# Patient Record
Sex: MALE | Race: WHITE | Employment: UNEMPLOYED | URBAN - METROPOLITAN AREA
[De-identification: names, ages, dates, MRNs, and addresses within clinical notes are randomized per-mention and may not be internally consistent; named-entity substitution may affect disease eponyms.]

---

## 2017-01-04 ENCOUNTER — TELEPHONE (OUTPATIENT)
Dept: NURSING | Facility: CLINIC | Age: 47
End: 2017-01-04

## 2017-01-04 NOTE — TELEPHONE ENCOUNTER
Call Type: Triage Call    Presenting Problem: i am still waiting faor a call back if I can come  in and get my blood tests done.;; Review of EPIC reveals orders are  present for requested test; Transferred to scheduling to schedule  lab appointment.  Triage Note:  Guideline Title: Information Only Call; No Symptom Triage (Adult)  Recommended Disposition: Provide Information or Advice Only  Original Inclination: Would have called clinic  Override Disposition:  Intended Action: See /Timothy Appt  Physician Contacted: No  Requesting information regarding scheduled exam, test or procedure; no triage  required. Information provided from approved resources or clinical experience. ?  YES  Requesting regular office appointment ? NO  Sign(s) or symptom(s) associated with a diagnosed condition or with a new illness  ? NO  Requesting information about provider, services or community resources ? NO  Call back to complete assessment/clarification of information from prior caller to  complete triage ? NO  Requesting information and provider is best resource; no triage required. ? NO  Caller not with patient and is unable to provide clinical information about  patient to facilitate triage. ? NO  Requesting provider information for recently scheduled test, procedure; no triage  required. Needed information not available per approved resources or clinical  experience. ? NO  Requesting information not available per approved reference or clinical  experience; no triage required. ? NO  Physician Instructions:  Care Advice:

## 2017-01-05 ENCOUNTER — TELEPHONE (OUTPATIENT)
Dept: FAMILY MEDICINE | Facility: CLINIC | Age: 47
End: 2017-01-05

## 2017-01-06 ENCOUNTER — DOCUMENTATION ONLY (OUTPATIENT)
Dept: LAB | Facility: CLINIC | Age: 47
End: 2017-01-06

## 2017-01-06 DIAGNOSIS — Z12.5 SPECIAL SCREENING FOR MALIGNANT NEOPLASM OF PROSTATE: ICD-10-CM

## 2017-01-06 DIAGNOSIS — I10 HYPERTENSION GOAL BP (BLOOD PRESSURE) < 140/90: ICD-10-CM

## 2017-01-06 DIAGNOSIS — I10 HYPERTENSION GOAL BP (BLOOD PRESSURE) < 140/90: Primary | ICD-10-CM

## 2017-01-06 LAB — PSA SERPL-ACNC: 0.42 UG/L (ref 0–4)

## 2017-01-06 PROCEDURE — 84403 ASSAY OF TOTAL TESTOSTERONE: CPT | Mod: 90 | Performed by: FAMILY MEDICINE

## 2017-01-06 PROCEDURE — 36415 COLL VENOUS BLD VENIPUNCTURE: CPT | Performed by: FAMILY MEDICINE

## 2017-01-06 PROCEDURE — G0103 PSA SCREENING: HCPCS | Performed by: FAMILY MEDICINE

## 2017-01-06 PROCEDURE — 84270 ASSAY OF SEX HORMONE GLOBUL: CPT | Mod: 90 | Performed by: FAMILY MEDICINE

## 2017-01-06 PROCEDURE — 80076 HEPATIC FUNCTION PANEL: CPT | Performed by: PHYSICIAN ASSISTANT

## 2017-01-06 PROCEDURE — 99000 SPECIMEN HANDLING OFFICE-LAB: CPT | Performed by: FAMILY MEDICINE

## 2017-01-06 NOTE — PROGRESS NOTES
This patient came in for lab work today at the [facility]. He was requesting to have his liver function tested as well. Please place appropriate lab orders. We have the blood that is needed for testing. Thank you!  -[facility]

## 2017-01-08 LAB
SHBG SERPL-SCNC: 18 NMOL/L (ref 11–80)
TESTOST FREE SERPL-MCNC: 0.26 NG/DL (ref 4.7–24.4)
TESTOST SERPL-MCNC: 11 NG/DL (ref 240–950)

## 2017-01-09 LAB
ALBUMIN SERPL-MCNC: 3.6 G/DL (ref 3.4–5)
ALP SERPL-CCNC: 111 U/L (ref 40–150)
ALT SERPL W P-5'-P-CCNC: 129 U/L (ref 0–70)
AST SERPL W P-5'-P-CCNC: 56 U/L (ref 0–45)
BILIRUB DIRECT SERPL-MCNC: 0.1 MG/DL (ref 0–0.2)
BILIRUB SERPL-MCNC: 0.5 MG/DL (ref 0.2–1.3)
PROT SERPL-MCNC: 6.9 G/DL (ref 6.8–8.8)

## 2017-01-09 NOTE — PROGRESS NOTES
Quick Note:    Please call pt with results below:     Awaiting liver functions, but PSA level = normal and testosterone levels = quite low - recommend that pt discuss possible supplementation with Dr. Segovia.  ______

## 2017-01-09 NOTE — PROGRESS NOTES
Quick Note:    Jeremi  Here are your recent results. Your liver function is elevated and you should make an office visit to discuss this elevation and possible causes. If you have any questions please do not hesitate to contact our office via phone ([phone]) or MyChart.    Rosalia Calvo, MS, PA-C  [facility] [place]    ______

## 2017-01-10 ENCOUNTER — TELEPHONE (OUTPATIENT)
Dept: FAMILY MEDICINE | Facility: CLINIC | Age: 47
End: 2017-01-10

## 2017-01-10 NOTE — TELEPHONE ENCOUNTER
Tuesday the 17th is the earliest but put him at  The 2:00 Pm or he can see someone else. Maria G James CMA

## 2017-01-10 NOTE — TELEPHONE ENCOUNTER
Reason for Call:  Same Day Appointment, Requested Provider:  Luis Armando Segovia MD    PCP: Luis Armando Segovia    Reason for visit: Pt asking to be seen this week to go over his lab results.  He is a bit concerned about some of them and wants to talk to MD about it.    Duration of symptoms:     Have you been treated for this in the past? Yes    Additional comments:     Can we leave a detailed message on this number? YES    Phone number patient can be reached at: Cell number on file:    Telephone Information:   Mobile [phone]       Best Time:     Call taken on 1/10/2017 at 3:21 PM by Myrna Shay

## 2017-01-11 ENCOUNTER — OFFICE VISIT (OUTPATIENT)
Dept: FAMILY MEDICINE | Facility: CLINIC | Age: 47
End: 2017-01-11
Payer: COMMERCIAL

## 2017-01-11 ENCOUNTER — TELEPHONE (OUTPATIENT)
Dept: FAMILY MEDICINE | Facility: CLINIC | Age: 47
End: 2017-01-11

## 2017-01-11 VITALS
OXYGEN SATURATION: 96 % | WEIGHT: 239 LBS | HEIGHT: 72 IN | SYSTOLIC BLOOD PRESSURE: 122 MMHG | BODY MASS INDEX: 32.37 KG/M2 | TEMPERATURE: 98.2 F | DIASTOLIC BLOOD PRESSURE: 84 MMHG | HEART RATE: 102 BPM

## 2017-01-11 DIAGNOSIS — R80.9 MICROALBUMINURIA: ICD-10-CM

## 2017-01-11 DIAGNOSIS — R79.89 ELEVATED LFTS: ICD-10-CM

## 2017-01-11 DIAGNOSIS — R79.89 LOW TESTOSTERONE LEVEL IN MALE: Primary | ICD-10-CM

## 2017-01-11 DIAGNOSIS — N18.30 CKD (CHRONIC KIDNEY DISEASE) STAGE 3, GFR 30-59 ML/MIN (H): ICD-10-CM

## 2017-01-11 DIAGNOSIS — J45.20 INTERMITTENT ASTHMA, UNCOMPLICATED: ICD-10-CM

## 2017-01-11 PROCEDURE — 99215 OFFICE O/P EST HI 40 MIN: CPT | Performed by: PHYSICIAN ASSISTANT

## 2017-01-11 PROCEDURE — 82043 UR ALBUMIN QUANTITATIVE: CPT | Performed by: PHYSICIAN ASSISTANT

## 2017-01-11 RX ORDER — ALBUTEROL SULFATE 90 UG/1
AEROSOL, METERED RESPIRATORY (INHALATION)
COMMUNITY
Start: 1982-10-20 | End: 2017-01-11

## 2017-01-11 RX ORDER — AMITRIPTYLINE HYDROCHLORIDE 50 MG/1
TABLET ORAL
COMMUNITY
Start: 2008-02-29 | End: 2017-06-08

## 2017-01-11 RX ORDER — LORAZEPAM 1 MG/1
1 TABLET ORAL
COMMUNITY
End: 2017-01-27
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
## 2017-01-11 NOTE — TELEPHONE ENCOUNTER
Called & offered time below. Pt declined.  He is scheduled tomorrow with Rosalia Calvo.  Tamiko Richards, Clinic Receptionist

## 2017-01-11 NOTE — TELEPHONE ENCOUNTER
Clinic Action Needed:Yes  Reason for Call: Jeremi called stating he saw Rosalia FARAH and they discussed having his labs redrawn as she was under the impression that they were not fasting labs.  He called stating he is confident they were fasting as they were done between 8-10 AM and he would like to proceed with treatment rather than incur the delay in and expense in treatment.   Patient Recommendations/Teaching:Referred to clinic - routine visit  Teaching per [facility] guidelines.  This message is being routed to both his primary Dr. Segovia and to Rosalia FARAH at [facility] specific request.  He stated he has an appt with Dr. Segovia next week.   Routed to: Rosalia Anaya PA-C

## 2017-01-11 NOTE — NURSING NOTE
Chief Complaint   Patient presents with     Results     Review lab results from 1/6       Initial /84 mmHg  Pulse 102  Temp(Src) 98.2  F (36.8  C) (Tympanic)  Ht 6' (1.829 m)  Wt 239 lb (108.41 kg)  BMI 32.41 kg/m2  SpO2 96% Estimated body mass index is 32.41 kg/(m^2) as calculated from the following:    Height as of this encounter: 6' (1.829 m).    Weight as of this encounter: 239 lb (108.41 kg).  BP completed using cuff size: jose Miranda CMA

## 2017-01-12 DIAGNOSIS — Z53.9 ERRONEOUS ENCOUNTER--DISREGARD: Primary | ICD-10-CM

## 2017-01-12 LAB
CREAT UR-MCNC: 99 MG/DL
MICROALBUMIN UR-MCNC: 192 MG/L
MICROALBUMIN/CREAT UR: 193.35 MG/G CR (ref 0–17)

## 2017-01-12 NOTE — TELEPHONE ENCOUNTER
I was not questioning whether or not the labs were fasting.  Due to the dramatic change in testosterone when compared to 2015 results (as most testosterone levels are not this low unless the patient does not have testes) a repeat lab is needed to ensure it is accurate and not simply a lab error.  We also need to ensure his liver function has returned normal if replacement is needed.      Rosalia Calvo MS, PA-C

## 2017-01-12 NOTE — TELEPHONE ENCOUNTER
Called pt left VM that Rosalia wants labs rechecked due to dramatic change.  Checked with lab no need to be fasting.    Gemma Reeves RN    [facility]

## 2017-01-13 DIAGNOSIS — R79.89 ELEVATED LFTS: ICD-10-CM

## 2017-01-13 DIAGNOSIS — N18.30 CKD (CHRONIC KIDNEY DISEASE) STAGE 3, GFR 30-59 ML/MIN (H): ICD-10-CM

## 2017-01-13 DIAGNOSIS — R79.89 LOW TESTOSTERONE LEVEL IN MALE: ICD-10-CM

## 2017-01-13 LAB
ALBUMIN SERPL-MCNC: 3.7 G/DL (ref 3.4–5)
ALP SERPL-CCNC: 104 U/L (ref 40–150)
ALT SERPL W P-5'-P-CCNC: 83 U/L (ref 0–70)
ANION GAP SERPL CALCULATED.3IONS-SCNC: 6 MMOL/L (ref 3–14)
AST SERPL W P-5'-P-CCNC: 36 U/L (ref 0–45)
BILIRUB DIRECT SERPL-MCNC: 0.2 MG/DL (ref 0–0.2)
BILIRUB SERPL-MCNC: 0.6 MG/DL (ref 0.2–1.3)
BUN SERPL-MCNC: 14 MG/DL (ref 7–30)
CALCIUM SERPL-MCNC: 9.1 MG/DL (ref 8.5–10.1)
CHLORIDE SERPL-SCNC: 103 MMOL/L (ref 94–109)
CO2 SERPL-SCNC: 30 MMOL/L (ref 20–32)
CREAT SERPL-MCNC: 1.29 MG/DL (ref 0.66–1.25)
FERRITIN SERPL-MCNC: 226 NG/ML (ref 26–388)
FSH SERPL-ACNC: 3.3 IU/L (ref 0.7–10.8)
GFR SERPL CREATININE-BSD FRML MDRD: 59 ML/MIN/1.7M2
GLUCOSE SERPL-MCNC: 92 MG/DL (ref 70–99)
HBV CORE AB SERPL QL IA: NONREACTIVE
HBV SURFACE AB SERPL IA-ACNC: 0.59 M[IU]/ML
HBV SURFACE AG SERPL QL IA: NONREACTIVE
HCV AB SERPL QL IA: NORMAL
HIV 1+2 AB+HIV1 P24 AG SERPL QL IA: NORMAL
LH SERPL-ACNC: 1.7 IU/L (ref 1.5–9.3)
POTASSIUM SERPL-SCNC: 4.1 MMOL/L (ref 3.4–5.3)
PROLACTIN SERPL-MCNC: 14 UG/L (ref 2–18)
PROT SERPL-MCNC: 6.9 G/DL (ref 6.8–8.8)
SODIUM SERPL-SCNC: 139 MMOL/L (ref 133–144)
T3FREE SERPL-MCNC: 3.2 PG/ML (ref 2.3–4.2)
T4 FREE SERPL-MCNC: 0.8 NG/DL (ref 0.76–1.46)
TSH SERPL DL<=0.05 MIU/L-ACNC: 3.5 MU/L (ref 0.4–4)

## 2017-01-13 PROCEDURE — 84439 ASSAY OF FREE THYROXINE: CPT | Performed by: PHYSICIAN ASSISTANT

## 2017-01-13 PROCEDURE — 86706 HEP B SURFACE ANTIBODY: CPT | Performed by: PHYSICIAN ASSISTANT

## 2017-01-13 PROCEDURE — 87340 HEPATITIS B SURFACE AG IA: CPT | Performed by: PHYSICIAN ASSISTANT

## 2017-01-13 PROCEDURE — 84481 FREE ASSAY (FT-3): CPT | Performed by: PHYSICIAN ASSISTANT

## 2017-01-13 PROCEDURE — 84403 ASSAY OF TOTAL TESTOSTERONE: CPT | Performed by: PHYSICIAN ASSISTANT

## 2017-01-13 PROCEDURE — 36415 COLL VENOUS BLD VENIPUNCTURE: CPT | Performed by: PHYSICIAN ASSISTANT

## 2017-01-13 PROCEDURE — 82672 ASSAY OF ESTROGEN: CPT | Mod: 90 | Performed by: PHYSICIAN ASSISTANT

## 2017-01-13 PROCEDURE — 86704 HEP B CORE ANTIBODY TOTAL: CPT | Performed by: PHYSICIAN ASSISTANT

## 2017-01-13 PROCEDURE — 84443 ASSAY THYROID STIM HORMONE: CPT | Performed by: PHYSICIAN ASSISTANT

## 2017-01-13 PROCEDURE — 87389 HIV-1 AG W/HIV-1&-2 AB AG IA: CPT | Performed by: PHYSICIAN ASSISTANT

## 2017-01-13 PROCEDURE — 80076 HEPATIC FUNCTION PANEL: CPT | Performed by: PHYSICIAN ASSISTANT

## 2017-01-13 PROCEDURE — 86803 HEPATITIS C AB TEST: CPT | Performed by: PHYSICIAN ASSISTANT

## 2017-01-13 PROCEDURE — 99000 SPECIMEN HANDLING OFFICE-LAB: CPT | Performed by: PHYSICIAN ASSISTANT

## 2017-01-13 PROCEDURE — 83001 ASSAY OF GONADOTROPIN (FSH): CPT | Performed by: PHYSICIAN ASSISTANT

## 2017-01-13 PROCEDURE — 83002 ASSAY OF GONADOTROPIN (LH): CPT | Performed by: PHYSICIAN ASSISTANT

## 2017-01-13 PROCEDURE — 84146 ASSAY OF PROLACTIN: CPT | Performed by: PHYSICIAN ASSISTANT

## 2017-01-13 PROCEDURE — 84270 ASSAY OF SEX HORMONE GLOBUL: CPT | Performed by: PHYSICIAN ASSISTANT

## 2017-01-13 PROCEDURE — 80048 BASIC METABOLIC PNL TOTAL CA: CPT | Performed by: PHYSICIAN ASSISTANT

## 2017-01-13 PROCEDURE — 82728 ASSAY OF FERRITIN: CPT | Performed by: PHYSICIAN ASSISTANT

## 2017-01-16 ENCOUNTER — TELEPHONE (OUTPATIENT)
Dept: FAMILY MEDICINE | Facility: CLINIC | Age: 47
End: 2017-01-16

## 2017-01-16 NOTE — TELEPHONE ENCOUNTER
Name of medication and dosage:  Advair  Previously tried and failed:  none  Submitted PA via:  [employer]  [facility]  Reference #:  [id number]  Standard or Urgent:  Standard  Date submitted:  01/16/2017  MA signature:  Maria G James CMA

## 2017-01-16 NOTE — TELEPHONE ENCOUNTER
Name of medication and dosage:  advair  Previously tried and failed:  none  Submitted PA via:  [employer]  [facility]  [facility]  Reference #:  [id number]  Standard or Urgent:  Standard  Date submitted:  01/16/2017  MA signature:  Maria G James CMA

## 2017-01-17 LAB
SHBG SERPL-SCNC: 18 NMOL/L (ref 11–80)
TESTOST FREE SERPL-MCNC: 0.61 NG/DL (ref 4.7–24.4)
TESTOST SERPL-MCNC: 25 NG/DL (ref 240–950)

## 2017-01-17 NOTE — PROGRESS NOTES
Quick Note:    Jeremi  Here are your recent results. You testosterone has remained very low and requires replacement. Dr. Segovia will start replacement therapy. Your liver function has improved quite a bit with the cessation of medications. All other labs for hepatitis and endocrine disorders are normal. If you have any questions please do not hesitate to contact our office via phone ([phone]) or [employer].    I am still awaiting the final results of your estrogen levels (this can take a few weeks to result)    Rosalia Calvo, MS, PA-C  [facility] [place]    ______

## 2017-01-18 LAB — LAB SCANNED RESULT: NORMAL

## 2017-01-18 NOTE — PROGRESS NOTES
Quick Note:    Jeremi  Here are your recent results. Your estrogen levels are normal. If you have any questions please do not hesitate to contact our office via phone ([phone]) or MyChart.    Rosalia Calvo MS, PA-C  [facility] [place]    ______

## 2017-01-20 NOTE — TELEPHONE ENCOUNTER
Response received regarding PA for advair (name of medication) - denied.  Forwarding to provider for review.  Needs to fail formulary alternatives - dulera, aerospan, asmanex or arnuity ellipta.  Please advise  Forms sent to [employer].    Emerald Zuniga

## 2017-01-21 NOTE — TELEPHONE ENCOUNTER
I do not see that he has ever tried any of the alternatives in the past.  Please confirm with patient and if he has not, I can send in an alternative to see if it works for him.      Rosalia Calvo MS, PA-C
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
## 2017-01-27 ENCOUNTER — OFFICE VISIT (OUTPATIENT)
Dept: FAMILY MEDICINE | Facility: CLINIC | Age: 47
End: 2017-01-27
Payer: COMMERCIAL

## 2017-01-27 VITALS
BODY MASS INDEX: 33.05 KG/M2 | DIASTOLIC BLOOD PRESSURE: 82 MMHG | WEIGHT: 244 LBS | TEMPERATURE: 98.3 F | SYSTOLIC BLOOD PRESSURE: 152 MMHG | HEART RATE: 91 BPM | OXYGEN SATURATION: 94 % | HEIGHT: 72 IN

## 2017-01-27 DIAGNOSIS — J30.1 ALLERGIC RHINITIS DUE TO POLLEN, UNSPECIFIED RHINITIS SEASONALITY: ICD-10-CM

## 2017-01-27 DIAGNOSIS — N18.30 CKD (CHRONIC KIDNEY DISEASE) STAGE 3, GFR 30-59 ML/MIN (H): ICD-10-CM

## 2017-01-27 DIAGNOSIS — H04.123 DRY EYES: ICD-10-CM

## 2017-01-27 DIAGNOSIS — E34.9 HYPOTESTOSTERONISM: ICD-10-CM

## 2017-01-27 DIAGNOSIS — F41.1 GENERALIZED ANXIETY DISORDER: ICD-10-CM

## 2017-01-27 DIAGNOSIS — F41.0 PANIC DISORDER WITHOUT AGORAPHOBIA: Primary | ICD-10-CM

## 2017-01-27 DIAGNOSIS — G47.00 INSOMNIA, UNSPECIFIED TYPE: ICD-10-CM

## 2017-01-27 DIAGNOSIS — J45.20 INTERMITTENT ASTHMA, UNCOMPLICATED: ICD-10-CM

## 2017-01-27 DIAGNOSIS — F33.1 MAJOR DEPRESSIVE DISORDER, RECURRENT EPISODE, MODERATE (H): ICD-10-CM

## 2017-01-27 PROCEDURE — 99214 OFFICE O/P EST MOD 30 MIN: CPT | Performed by: FAMILY MEDICINE

## 2017-01-27 RX ORDER — LORAZEPAM 1 MG/1
1 TABLET ORAL EVERY 8 HOURS PRN
Qty: 90 TABLET | Refills: 0 | Status: SHIPPED | OUTPATIENT
Start: 2017-02-07 | End: 2017-03-20

## 2017-01-27 RX ORDER — ALPRAZOLAM 0.5 MG
0.5 TABLET ORAL 2 TIMES DAILY PRN
Qty: 22 TABLET | Refills: 0 | Status: SHIPPED | OUTPATIENT
Start: 2017-01-27 | End: 2017-04-03

## 2017-01-27 RX ORDER — TESTOSTERONE CYPIONATE 1000 MG/10ML
100 INJECTION, SOLUTION INTRAMUSCULAR WEEKLY
Qty: 10 ML | Refills: 3 | Status: SHIPPED | OUTPATIENT
Start: 2017-01-27 | End: 2017-01-27

## 2017-01-27 RX ORDER — TESTOSTERONE CYPIONATE 1000 MG/10ML
100 INJECTION, SOLUTION INTRAMUSCULAR WEEKLY
Qty: 10 ML | Refills: 3 | Status: SHIPPED | OUTPATIENT
Start: 2017-01-27 | End: 2017-02-21

## 2017-01-27 RX ORDER — ALPRAZOLAM 0.5 MG
0.5 TABLET ORAL 2 TIMES DAILY PRN
Qty: 22 TABLET | Refills: 0 | Status: SHIPPED | OUTPATIENT
Start: 2017-01-27 | End: 2017-01-27

## 2017-01-27 ASSESSMENT — ANXIETY QUESTIONNAIRES
2. NOT BEING ABLE TO STOP OR CONTROL WORRYING: NEARLY EVERY DAY
5. BEING SO RESTLESS THAT IT IS HARD TO SIT STILL: MORE THAN HALF THE DAYS
GAD7 TOTAL SCORE: 20
IF YOU CHECKED OFF ANY PROBLEMS ON THIS QUESTIONNAIRE, HOW DIFFICULT HAVE THESE PROBLEMS MADE IT FOR YOU TO DO YOUR WORK, TAKE CARE OF THINGS AT HOME, OR GET ALONG WITH OTHER PEOPLE: EXTREMELY DIFFICULT
1. FEELING NERVOUS, ANXIOUS, OR ON EDGE: NEARLY EVERY DAY
7. FEELING AFRAID AS IF SOMETHING AWFUL MIGHT HAPPEN: NEARLY EVERY DAY
3. WORRYING TOO MUCH ABOUT DIFFERENT THINGS: NEARLY EVERY DAY
6. BECOMING EASILY ANNOYED OR IRRITABLE: NEARLY EVERY DAY

## 2017-01-27 ASSESSMENT — PATIENT HEALTH QUESTIONNAIRE - PHQ9: 5. POOR APPETITE OR OVEREATING: NEARLY EVERY DAY

## 2017-01-27 NOTE — TELEPHONE ENCOUNTER
The medications are:  ALPRAZolam (XANAX) 0.5 MG tablet  And   testosterone cypionate 100 MG/ML SOLN inj

## 2017-01-27 NOTE — TELEPHONE ENCOUNTER
Name of caller: Jeremi  Relationship to Patient: Self    Reason for Call: Patient calling - states he was seen by PCP today and was prescribed a few medications. States when he went to the pharmacy the price was way too much and he cannot afford. Pharmacist advised him there were no generic forms. Requesting to have faxed over to a different pharmacy. [facility] [address].    Best phone number to reach patient at is: [phone]  Ok to leave a message with medical info: Yes    Pharmacy preferred (if calling for a refill): NA

## 2017-01-27 NOTE — NURSING NOTE
Chief Complaint   Patient presents with     Recheck Medication       Initial /82 mmHg  Pulse 91  Temp(Src) 98.3  F (36.8  C) (Oral)  Ht 6' (1.829 m)  Wt 244 lb (110.678 kg)  BMI 33.09 kg/m2  SpO2 94% Estimated body mass index is 33.09 kg/(m^2) as calculated from the following:    Height as of this encounter: 6' (1.829 m).    Weight as of this encounter: 244 lb (110.678 kg).  BP completed using cuff size: large

## 2017-01-27 NOTE — Clinical Note
My Asthma Action Plan  Name: Jeremi Damon   YOB: 1970  Date: 1/27/2017   My doctor: Luis Armando Segovia   My clinic: [address] [address] [phone] My Control Medicine: Advair        Dose:   My Rescue Medicine: Albuterol        Dose:   My Oral Steroid Medicine: albuterol My Asthma Severity: intermittent  Avoid your asthma triggers: pt aware of triggers        GREEN ZONE   Good Control    I feel good    No cough or wheeze    Can work, sleep and play without asthma symptoms       Take your asthma control medicine every day.     1. If exercise triggers your asthma, take your rescue medication    15 minutes before exercise or sports, and    During exercise if you have asthma symptoms  2. Spacer to use with inhaler: If you have a spacer, make sure to use it with your inhaler             YELLOW ZONE Getting Worse  I have ANY of these:    I do not feel good    Cough or wheeze    Chest feels tight    Wake up at night   1. Keep taking your Green Zone medications  2. Start taking your rescue medicine:    every 20 minutes for up to 1 hour. Then every 4 hours for 24-48 hours.  3. If you stay in the Yellow Zone for more than 12-24 hours, contact your doctor.  4. If you do not return to the Green Zone in 12-24 hours or you get worse, start taking your oral steroid medicine if prescribed by your provider.           RED ZONE Medical Alert - Get Help  I have ANY of these:    I feel awful    Medicine is not helping    Breathing getting harder    Trouble walking or talking    Nose opens wide to breathe       1. Take your rescue medicine NOW  2. If your provider has prescribed an oral steroid medicine, start taking it NOW  3. Call your doctor NOW  4. If you are still in the Red Zone after 20 minutes and you have not reached your doctor:    Take your rescue medicine again and    Call 911 or go to the emergency room right away    See your  regular doctor within 2 weeks of an Emergency Room or Urgent Care visit for follow-up treatment.        The above medication may be given at school or day care?: N/A (Adult Patient)  Child can carry and use inhaler(s) at school with approval of school nurse?: N/A (Adult Patient)    Electronically signed by: Maria G James, January 27, 2017    Annual Reminders:  Meet with Asthma Educator,  Flu Shot in the Fall, consider Pneumonia Vaccination for patients with asthma (aged 19 and older).    Pharmacy:    [id number] - [place], [place] - [address] AT [facility] [address]  [facility] - [facility] [address] [facility] - [facility] [address] AT [facility] [address]  WRITTEN PRESCRIPTION REQUESTED  [facility] - [place], [place] - [address] AT [place] & [address] [place], [place] [address] [address] [facility] [address] [facility] [address] [facility][1] [address] [facility]                      Asthma Triggers  How To Control Things That Make Your Asthma Worse    Triggers are things that make your asthma worse.  Look at the list below to help you find your triggers and what you can do about them.  You can help prevent asthma flare-ups by staying away from your triggers.      Trigger                                                          What you can do   Cigarette Smoke  Tobacco smoke can make asthma worse. Do not allow smoking in your home, car or around you.  Be sure no one smokes at a child s day care or school.  If you smoke, ask your health care provider for ways to help you quick.  Ask family members to quit too.  Ask your health care provider for a referral to Quit plan to help you quit smoking, or call 1-135-484-PLAN.     Colds, Flu, Bronchitis  These are common triggers of asthma. Wash your hands often.  Don t touch your eyes, nose or mouth.  Get a flu shot every year.      Dust Mites  These are tiny bugs that live in cloth or carpet. They are too small to see. Wash sheets and blankets in hot water every week.   Encase pillows and mattress in dust mite proof covers.  Avoid having carpet if you can. If you have carpet, vacuum weekly.   Use a dust mask and HEPA vacuum.   Pollen and Outdoor Mold  Some people are allergic to trees, grass, or weed pollen, or molds. Try to keep your windows closed.  Limit time out doors when pollen count is high.   Ask you health care provider about taking medicine during allergy season.     Animal Dander  Some people are allergic to skin flakes, urine or saliva from pets with fur or feathers. Keep pets with fur or feathers out of your home.    If you can t keep the pet outdoors, then keep the pet out of your bedroom.  Keep the bedroom door closed.  Keep pets off cloth furniture and away from stuffed toys.     Mice, Rats, and Cockroaches  Some people are allergic to the waste from these pests.   Cover food and garbage.  Clean up spills and food crumbs.  Store grease in the refrigerator.   Keep food out of the bedroom.   Indoor Mold  This can be a trigger if your home has high moisture Fix leaking faucets, pipes, or other sources of water.   Clean moldy surfaces.  Dehumidify basement if it is damp and smelly.   Smoke, Strong Odors, and Sprays  These can reduce air quality. Stay away from strong odors and sprays, such as perfume, powder, hair spray, paints, smoke incense, paint, cleaning products, candles and new carpet.   Exercise or Sports  Some people with asthma have this trigger. Be active!  Ask you doctor about taking medicine before sports or exercise to prevent symptoms.    Warm up for 5-10 minutes before and after sports or exercise.     Other Triggers of Asthma  Cold air:  Cover your nose and mouth with a scarf.  Sometimes laughing or crying can be a trigger.  Some medicines and food can trigger asthma.

## 2017-01-27 NOTE — LETTER
[address] [address]  [phone]        August 1, 2017    Jeremi Damon  [address] [place] [address]              Dear Jeremi Damon    This is to remind you that your non-fasting lab work is due.    You may call our office at [phone] to schedule an appointment.    Please disregard this notice if you have already had your labs drawn or made an appointment.        Sincerely,        Luis Armando Segovia MD

## 2017-01-27 NOTE — MR AVS SNAPSHOT
After Visit Summary   1/27/2017    Jeremi Damon    MRN: [id number]           Patient Information     Date Of Birth          1970        Visit Information        Provider Department      1/27/2017 10:40 AM Luis Armando Segovia MD [facility]        Today's Diagnoses     Panic disorder without agoraphobia    -  1     Major Depressive Disorder, Recurrent Episode, Mode         Intermittent asthma, uncomplicated         CKD (chronic kidney disease) stage 3, GFR 30-59 ml/min         Hypotestosteronism         Dry eyes         Allergic rhinitis due to pollen, unspecified rhinitis seasonality         Generalized anxiety disorder         Insomnia, unspecified type           Care Instructions    [employer]        Follow-ups after your visit        Future tests that were ordered for you today     Open Standing Orders        Priority Remaining Interval Expires Ordered    Testosterone Free and Total Routine 6/6 q1-2 months 1/27/2018 1/27/2017          Open Future Orders        Priority Expected Expires Ordered    Prostate spec antigen screen Routine 7/27/2017 7/27/2017 1/27/2017            Who to contact     If you have questions or need follow up information about today's clinic visit or your schedule please contact [facility] directly at [phone].  Normal or non-critical lab and imaging results will be communicated to you by "[employer]"hart, letter or phone within 4 business days after the clinic has received the results. If you do not hear from us within 7 days, please contact the clinic through "[employer]"hart or phone. If you have a critical or abnormal lab result, we will notify you by phone as soon as possible.  Submit refill requests through [employer] or call your pharmacy and they will forward the refill request to us. Please allow 3 business days for your refill to be completed.          Additional Information About Your Visit        "[employer] Information     [employer] gives you secure  access to your electronic health record. If you see a primary care provider, you can also send messages to your care team and make appointments. If you have questions, please call your primary care clinic.  If you do not have a primary care provider, please call [phone] and they will assist you.        Care EveryWhere ID     This is your Care EveryWhere ID. This could be used by other organizations to access your Page medical records  [id number]        Your Vitals Were     Pulse Temperature Height BMI (Body Mass Index) Pulse Oximetry       91 98.3  F (36.8  C) (Oral) 6' (1.829 m) 33.09 kg/m2 94%        Blood Pressure from Last 3 Encounters:   01/27/17 152/82   01/11/17 122/84   12/09/16 130/82    Weight from Last 3 Encounters:   01/27/17 244 lb (110.678 kg)   01/11/17 239 lb (108.41 kg)   12/09/16 236 lb (107.049 kg)              We Performed the Following     Asthma Action Plan   (Please complete E-AAP by signing order and opening link in order details)     DEPRESSION ACTION PLAN (DAP) Order [[id number]]          Today's Medication Changes          These changes are accurate as of: 1/27/17 12:21 PM.  If you have any questions, ask your nurse or doctor.               Start taking these medicines.        Dose/Directions    ALPRAZolam 0.5 MG tablet   Commonly known as:  XANAX   Used for:  Panic disorder without agoraphobia, Generalized anxiety disorder   Started by:  Luis Armando Segovia MD        Dose:  0.5 mg   Take 1 tablet (0.5 mg) by mouth 2 times daily as needed for anxiety   Quantity:  22 tablet   Refills:  0       beclomethasone 80 MCG/ACT Inhaler   Commonly known as:  QVAR   Used for:  Intermittent asthma, uncomplicated   Started by:  Luis Armando Segovia MD        Dose:  2 puff   Inhale 2 puffs into the lungs 2 times daily   Quantity:  3 Inhaler   Refills:  1       glycerin-hypromellose- 0.2-0.2-1 % Soln ophthalmic solution   Commonly known as:  ARTIFICIAL TEARS   Used for:  Dry eyes   Started by:   Luis Armando Segovia MD        Dose:  1 drop   Place 1 drop into both eyes 2 times daily as needed for dry eyes   Quantity:  30 mL   Refills:  3       testosterone cypionate 100 MG/ML Soln inj   Used for:  Hypotestosteronism   Started by:  Luis Armando Segovia MD        Dose:  100 mg   Inject 1 mL (100 mg) into the muscle once a week   Quantity:  10 mL   Refills:  3         These medicines have changed or have updated prescriptions.        Dose/Directions    LORazepam 1 MG tablet   Commonly known as:  ATIVAN   This may have changed:  See the new instructions.   Used for:  Panic disorder without agoraphobia, Generalized anxiety disorder   Changed by:  Luis Armando Segovia MD        Dose:  1 mg   Start taking on:  2/7/2017   Take 1 tablet (1 mg) by mouth every 8 hours as needed for anxiety   Quantity:  90 tablet   Refills:  0            Where to get your medicines      These medications were sent to [id number] - [place] - [address]  [address] [place] [address]     Phone:  [phone]    - beclomethasone 80 MCG/ACT Inhaler  - glycerin-hypromellose- 0.2-0.2-1 % Soln ophthalmic solution      Some of these will need a paper prescription and others can be bought over the counter.  Ask your nurse if you have questions.     Bring a paper prescription for each of these medications    - ALPRAZolam 0.5 MG tablet  - LORazepam 1 MG tablet  - testosterone cypionate 100 MG/ML Soln inj             Primary Care Provider Office Phone # Fax #    Luis Armando Segovia [phone]       [address] [address]        Thank you!     Thank you for choosing [facility]  for your care. Our goal is always to provide you with excellent care. Hearing back from our patients is one way we can continue to improve our services. Please take a few minutes to complete the written survey that you may receive in the mail after your visit with us. Thank you!              Your Updated Medication List - Protect others around you: Learn how to safely use, store and throw away your medicines at www.disposemymeds.org.          This list is accurate as of: 1/27/17 12:21 PM.  Always use your most recent med list.                   Brand Name Dispense Instructions for use    albuterol 108 (90 BASE) MCG/ACT Inhaler    PROAIR HFA/PROVENTIL HFA/VENTOLIN HFA    1 Inhaler    Inhale 2 puffs into the lungs every 4 hours as needed for shortness of breath / dyspnea       ALPRAZolam 0.5 MG tablet    XANAX    22 tablet    Take 1 tablet (0.5 mg) by mouth 2 times daily as needed for anxiety       * amitriptyline 50 MG tablet    ELAVIL         * amitriptyline HCl 150 MG Tabs     90 tablet    Take 150 mg by mouth At Bedtime       amphetamine-dextroamphetamine 20 MG per tablet    ADDERALL    90 tablet    Take 1 tablet (20 mg) by mouth 3 times daily       ASPIRIN NOT PRESCRIBED    INTENTIONAL     by Other route continuous prn.       beclomethasone 80 MCG/ACT Inhaler    QVAR    3 Inhaler    Inhale 2 puffs into the lungs 2 times daily       cycloSPORINE 0.05 % ophthalmic emulsion    RESTASIS    1 Box    Place 1 drop into both eyes 2 times daily       finasteride 5 MG tablet    PROSCAR    90 tablet    1/2 tab daily       fluticasone 50 MCG/ACT spray    FLONASE    1 Package    Spray 1-2 sprays into both nostrils daily       fluticasone-salmeterol 250-50 MCG/DOSE diskus inhaler    ADVAIR DISKUS    1 Inhaler    INHALE 1 PUFF BY MOUTH TWICE DAILY       glycerin-hypromellose- 0.2-0.2-1 % Soln ophthalmic solution    ARTIFICIAL TEARS    30 mL    Place 1 drop into both eyes 2 times daily as needed for dry eyes       hydrochlorothiazide 25 MG tablet    HYDRODIURIL    90 tablet    Take 1 tablet (25 mg) by mouth daily       imiquimod 5 % cream    ALDARA    36 packet    Apply  to lesion.   Wash off after 8 hours.   May use for up to 16 weeks.       LORazepam 1 MG tablet   Start taking on:  2/7/2017    ATIVAN     90 tablet    Take 1 tablet (1 mg) by mouth every 8 hours as needed for anxiety       losartan 50 MG tablet    COZAAR    90 tablet    Take 1 tablet (50 mg) by mouth daily       metoprolol 25 MG tablet    LOPRESSOR    180 tablet    Take 1 tablet (25 mg) by mouth 2 times daily       MULTIVITAMIN TABS   OR          nitroglycerin 0.4 MG sublingual tablet    NITROSTAT    25 tablet    Place 1 tablet (0.4 mg) under the tongue every 5 minutes as needed for chest pain If you are still having symptoms after 3 doses (15 minutes) call 911.       testosterone cypionate 100 MG/ML Soln inj     10 mL    Inject 1 mL (100 mg) into the muscle once a week       traMADol 50 MG tablet    ULTRAM    90 tablet    TAKE ONE TO TWO TABLETS BY MOUTH EVERY 8 HOURS AS NEEDED FOR MODERATE PAIN       Vitamin C 100 MG Tabs     90    1 TABLET 3 TIMES DAILY       zolpidem 10 MG tablet    AMBIEN    30 tablet    Take 1 tablet (10 mg) by mouth nightly as needed for sleep       * Notice:  This list has 2 medication(s) that are the same as other medications prescribed for you. Read the directions carefully, and ask your doctor or other care provider to review them with you.

## 2017-01-27 NOTE — TELEPHONE ENCOUNTER
Called # below     Left a VM asking how much were the scripts and was it with insurance     Awaiting call back     Kari Mcleod RN, BSN  [place] [facility]

## 2017-01-27 NOTE — Clinical Note
My Depression Action Plan  Name: Jeremi Damon   Date of Birth 1970  Date: 1/27/2017    My doctor: Luis Armando Segovia   My clinic: [address] [id number] [phone]          GREEN    ZONE   Good Control    What it looks like:     Things are going generally well. You have normal up s and down s. You may even feel depressed from time to time, but bad moods usually last less than a day.   What you need to do:  1. Continue to care for yourself (see self care plan)  2. Check your depression survival kit and update it as needed  3. Follow your physician s recommendations including any medication.  4. Do not stop taking medication unless you consult with your physician first.           YELLOW         ZONE Getting Worse    What it looks like:     Depression is starting to interfere with your life.     It may be hard to get out of bed; you may be starting to isolate yourself from others.    Symptoms of depression are starting to last most all day and this has happened for several days.     You may have suicidal thoughts but they are not constant.   What you need to do:     1. Call your care team, your response to treatment will improve if you keep your care team informed of your progress. Yellow periods are signs an adjustment may need to be made.     2. Continue your self-care, even if you have to fake it!    3. Talk to someone in your support network    4. Open up your depression survival kit           RED    ZONE Medical Alert - Get Help    What it looks like:     Depression is seriously interfering with your life.     You may experience these or other symptoms: You can t get out of bed most days, can t work or engage in other necessary activities, you have trouble taking care of basic hygiene, or basic responsibilities, thoughts of suicide or death that will not go away, self-injurious behavior.     What you need to do:  1. Call your care  team and request a same-day appointment. If they are not available (weekends or after hours) call your local crisis line, emergency room or 911.      Electronically signed by: Maria G James, January 27, 2017    Depression Self Care Plan / Survival Kit    Self-Care for Depression  Here s the deal. Your body and mind are really not as separate as most people think.  What you do and think affects how you feel and how you feel influences what you do and think. This means if you do things that people who feel good do, it will help you feel better.  Sometimes this is all it takes.  There is also a place for medication and therapy depending on how severe your depression is, so be sure to consult with your medical provider and/ or Behavioral Health Consultant if your symptoms are worsening or not improving.     In order to better manage my stress, I will:    Exercise  Get some form of exercise, every day. This will help reduce pain and release endorphins, the  feel good  chemicals in your brain. This is almost as good as taking antidepressants!  This is not the same as joining a gym and then never going! (they count on that by the way ) It can be as simple as just going for a walk or doing some gardening, anything that will get you moving.      Hygiene   Maintain good hygiene (Get out of bed in the morning, Make your bed, Brush your teeth, Take a shower, and Get dressed like you were going to work, even if you are unemployed).  If your clothes don't fit try to get ones that do.    Diet  I will strive to eat foods that are good for me, drink plenty of water, and avoid excessive sugar, caffeine, alcohol, and other mood-altering substances.  Some foods that are helpful in depression are: complex carbohydrates, B vitamins, flaxseed, fish or fish oil, fresh fruits and vegetables.    Psychotherapy  I agree to participate in Individual Therapy (if recommended).    Medication  If prescribed medications, I agree to take them.   Missing doses can result in serious side effects.  I understand that drinking alcohol, or other illicit drug use, may cause potential side effects.  I will not stop my medication abruptly without first discussing it with my provider.    Staying Connected With Others  I will stay in touch with my friends, family members, and my primary care provider/team.    Use your imagination  Be creative.  We all have a creative side; it doesn t matter if it s oil painting, sand castles, or mud pies! This will also kick up the endorphins.    Witness Beauty  (AKA stop and smell the roses) Take a look outside, even in mid-winter. Notice colors, textures. Watch the squirrels and birds.     Service to others  Be of service to others.  There is always someone else in need.  By helping others we can  get out of ourselves  and remember the really important things.  This also provides opportunities for practicing all the other parts of the program.    Humor  Laugh and be silly!  Adjust your TV habits for less news and crime-drama and more comedy.    Control your stress  Try breathing deep, massage therapy, biofeedback, and meditation. Find time to relax each day.     My support system    Clinic Contact:  Phone number:    Contact 1:  Phone number:    Contact 2:  Phone number:    [place]/:  Phone number:    Therapist:  Phone number:    Local crisis center:    Phone number:    Other community support:  Phone number:

## 2017-01-27 NOTE — TELEPHONE ENCOUNTER
Faxed to the pharmacy     Called # below     advised pt on the the information above and below     Patient stated an understanding and agreed with plan.    Kari Mcleod RN, BSN  [facility]

## 2017-01-27 NOTE — TELEPHONE ENCOUNTER
Xanax  - insurance wont pay for it due to it is similar nature of it with ativan  - pt says he will pay cash for it     Testosterone cypionate 80 bucks without insurance  - good rx will only work for 200 mg/ml    Would you be willing to send a new script to a different pharmacy since they will be cheaper at the [facility]

## 2017-01-28 ASSESSMENT — ANXIETY QUESTIONNAIRES: GAD7 TOTAL SCORE: 20

## 2017-01-28 ASSESSMENT — PATIENT HEALTH QUESTIONNAIRE - PHQ9: SUM OF ALL RESPONSES TO PHQ QUESTIONS 1-9: 26

## 2017-01-30 ENCOUNTER — TELEPHONE (OUTPATIENT)
Dept: FAMILY MEDICINE | Facility: CLINIC | Age: 47
End: 2017-01-30

## 2017-01-30 DIAGNOSIS — E34.9 HYPOTESTOSTERONISM: ICD-10-CM

## 2017-01-30 NOTE — TELEPHONE ENCOUNTER
Name of medication and dosage:  Testosterone 100 mg/ml  Previously tried and failed:  none  Submitted PA via:  [employer]  To:  [facility]  Reference #:  [id number]  Standard or Urgent:  Standard  Date submitted:  01/30/2017  MA signature:  Maria G James CMA

## 2017-02-02 ENCOUNTER — TELEPHONE (OUTPATIENT)
Dept: FAMILY MEDICINE | Facility: CLINIC | Age: 47
End: 2017-02-02

## 2017-02-02 DIAGNOSIS — J45.20 INTERMITTENT ASTHMA, UNCOMPLICATED: Primary | ICD-10-CM

## 2017-02-02 RX ORDER — LORAZEPAM 1 MG/1
TABLET ORAL
Qty: 90 TABLET | Refills: 0 | OUTPATIENT
Start: 2017-02-02

## 2017-02-02 NOTE — TELEPHONE ENCOUNTER
Qvar is not covered by insurance. Asmanex or Aerospan are preferred. [facility] is pharmacy.  Maria G James CMA

## 2017-02-07 DIAGNOSIS — G47.00 INSOMNIA, UNSPECIFIED: Primary | ICD-10-CM

## 2017-02-07 RX ORDER — ZOLPIDEM TARTRATE 10 MG/1
10 TABLET ORAL
Qty: 30 TABLET | Refills: 0 | Status: SHIPPED | OUTPATIENT
Start: 2017-02-07 | End: 2017-10-10

## 2017-02-07 RX ORDER — LORAZEPAM 1 MG/1
TABLET ORAL
Qty: 90 TABLET | Refills: 0 | OUTPATIENT
Start: 2017-02-07

## 2017-02-07 NOTE — TELEPHONE ENCOUNTER
Pt called stating he had requested ambien last week.    Message handled by Nurse Triage with [facility] - provider name: RAIMUNDO MIKE     RX approved.    Faxed to Kwadwo Reeves RN    [place] [facility]  .

## 2017-02-08 DIAGNOSIS — G47.00 INSOMNIA, UNSPECIFIED: Primary | ICD-10-CM

## 2017-02-08 RX ORDER — ZOLPIDEM TARTRATE 10 MG/1
10 TABLET ORAL
Qty: 30 TABLET | Refills: 0 | OUTPATIENT
Start: 2017-02-08

## 2017-02-08 NOTE — TELEPHONE ENCOUNTER
Routing refill request to provider for review/approval because:  Drug not on the [facility] refill protocol   Amelia Flores RN

## 2017-02-08 NOTE — TELEPHONE ENCOUNTER
Controlled Substance Refill Request for zolpidem (AMBIEN) 10 MG tablet  Problem List Complete:  Yes    Last Written Prescription Date:  2.7.17  Last Fill Quantity: 30,   # refills: 0    Last Office Visit with [facility] primary care provider: 1 27.17    Clinic visit frequency required: na     Future Office visit:     Controlled substance agreement on file: no     Processing:  Fax Rx to listed pharmacy   checked in past 6 months?  No, route to RN1.9.17

## 2017-02-10 DIAGNOSIS — M54.42 LEFT-SIDED LOW BACK PAIN WITH LEFT-SIDED SCIATICA, UNSPECIFIED CHRONICITY: Primary | ICD-10-CM

## 2017-02-10 RX ORDER — TRAMADOL HYDROCHLORIDE 50 MG/1
TABLET ORAL
Qty: 90 TABLET | Refills: 0 | Status: SHIPPED | OUTPATIENT
Start: 2017-02-10 | End: 2017-06-08

## 2017-02-10 NOTE — TELEPHONE ENCOUNTER
Controlled Substance Refill Request for traMADol (ULTRAM) 50 MG tablet  Problem List Complete:  Yes    Last Written Prescription Date:  1.7.17  Last Fill Quantity: 90,   # refills: 0    Last Office Visit with [facility] primary care provider: 1.27.17    Clinic visit frequency required: na     Future Office visit:     Controlled substance agreement on file: No.     Processing:  Fax Rx to listed pharmacy   checked in past 6 months?  Yes 1.9.17

## 2017-02-20 ENCOUNTER — TELEPHONE (OUTPATIENT)
Dept: FAMILY MEDICINE | Facility: CLINIC | Age: 47
End: 2017-02-20

## 2017-02-20 NOTE — TELEPHONE ENCOUNTER
Mar is not on pt med list but needs a PA according to his pharmacy [facility]. Do you want the PA or try something else. Maria G James CMA

## 2017-02-21 RX ORDER — TESTOSTERONE CYPIONATE 200 MG/ML
100 INJECTION, SOLUTION INTRAMUSCULAR WEEKLY
Qty: 2 ML | Refills: 3 | Status: SHIPPED | OUTPATIENT
Start: 2017-02-21 | End: 2017-03-20

## 2017-02-21 NOTE — TELEPHONE ENCOUNTER
Pt  Called and asked to have the testosterone changed to 200 MG/ML from the 100 it is cheaper and he will pay out of pocket and do only 1/2 the dose.     Send to [facility]. Maria G James CMA

## 2017-02-21 NOTE — TELEPHONE ENCOUNTER
Response received regarding PA for testosterone (name of medication) - denied.  Forwarding to provider for review.  Informed [facility]  Forms sent to scan.      Pt needs to have all of these conditions    Primary or hypogonadotropic hypogonadism     2 test of low testosterone levels    Demetrice James CMA

## 2017-02-22 NOTE — TELEPHONE ENCOUNTER
Called [facility] pharmacy and informed the Qvar PA was denied - pt already picked up [facility]

## 2017-02-22 NOTE — TELEPHONE ENCOUNTER
asmanex just sent recently -  - please see if that was covered,  If yes then d/c qvar,  Please let me know otherwise

## 2017-03-11 DIAGNOSIS — G47.00 INSOMNIA, UNSPECIFIED TYPE: ICD-10-CM

## 2017-03-13 RX ORDER — AMITRIPTYLINE HYDROCHLORIDE 150 MG/1
TABLET ORAL
Qty: 90 TABLET | Refills: 1 | Status: SHIPPED | OUTPATIENT
Start: 2017-03-13 | End: 2017-09-27

## 2017-03-13 NOTE — TELEPHONE ENCOUNTER
Prescription approved per [facility].    Amelia Grace RN, BSN   [facility]

## 2017-03-13 NOTE — TELEPHONE ENCOUNTER
AMITRIPTYLINE HCL 150MG TAB      Last Written Prescription Date: 09/02/2016  Last Fill Quantity: 90, # refills: 1  Last Office Visit with G, P or [facility] prescribing provider: 01/27/2017       Potassium   Date Value Ref Range Status   01/13/2017 4.1 3.4 - 5.3 mmol/L Final     Creatinine   Date Value Ref Range Status   01/13/2017 1.29 (H) 0.66 - 1.25 mg/dL Final     BP Readings from Last 3 Encounters:   01/27/17 152/82   01/11/17 122/84   12/09/16 130/82

## 2017-03-14 DIAGNOSIS — F41.0 PANIC DISORDER WITHOUT AGORAPHOBIA: ICD-10-CM

## 2017-03-14 DIAGNOSIS — F41.1 GENERALIZED ANXIETY DISORDER: ICD-10-CM

## 2017-03-14 RX ORDER — LORAZEPAM 1 MG/1
TABLET ORAL
Qty: 90 TABLET | Refills: 0 | Status: CANCELLED | OUTPATIENT
Start: 2017-03-14

## 2017-03-14 NOTE — TELEPHONE ENCOUNTER
Controlled Substance Refill Request for  LORazepam (ATIVAN) 1 MG tablet 90 tablet 0 2/7/2017  No   Sig: Take 1 tablet (1 mg) by mouth every 8 hours as needed for anxiety   Class: Local Print   Route: Oral   Order: [id number]       Problem List Complete:  No     PROVIDER TO CONSIDER COMPLETION OF PROBLEM LIST AND OVERVIEW/CONTROLLED SUBSTANCE AGREEMENT        Last Office Visit with [facility] primary care provider: 1/27/2017    Future Office visit:     Controlled substance agreement on file: No.     Processing:  Fax Rx to see above  pharmacy   checked in past 6 months?  No, route to RN     Kari Mcleod RN, BSN  [facility]

## 2017-03-14 NOTE — TELEPHONE ENCOUNTER
Pulled .  Oxycodone Rx's monthly from Pain clinic since 7/2016 (most recently #90 monthly).  No record of this in chart.  Needs OV to discuss.  Once scheduled will fill this RX.    Maria G aware (and may have made phone call attempt already)

## 2017-03-17 NOTE — TELEPHONE ENCOUNTER
Pt calling     Advised pt on the information below     Pt stated that he has told Md RAIMUNDO about the percocet and he was given the tramadol for him to wean off the percocet and it should be documented in his chart   (RN unable to locate such documentation)     Pt will be seeing Md RAIMUNDO on Monday to discuss further     Kari Mcleod RN, BSN  [facility]

## 2017-03-20 ENCOUNTER — OFFICE VISIT (OUTPATIENT)
Dept: FAMILY MEDICINE | Facility: CLINIC | Age: 47
End: 2017-03-20
Payer: COMMERCIAL

## 2017-03-20 VITALS
HEART RATE: 84 BPM | HEIGHT: 72 IN | BODY MASS INDEX: 33.46 KG/M2 | TEMPERATURE: 98.2 F | OXYGEN SATURATION: 96 % | DIASTOLIC BLOOD PRESSURE: 84 MMHG | SYSTOLIC BLOOD PRESSURE: 142 MMHG | WEIGHT: 247 LBS

## 2017-03-20 DIAGNOSIS — I42.2 HYPERTROPHIC CARDIOMYOPATHY (H): ICD-10-CM

## 2017-03-20 DIAGNOSIS — I10 ESSENTIAL HYPERTENSION WITH GOAL BLOOD PRESSURE LESS THAN 140/90: ICD-10-CM

## 2017-03-20 DIAGNOSIS — E34.9 HYPOTESTOSTERONISM: ICD-10-CM

## 2017-03-20 DIAGNOSIS — F41.1 GENERALIZED ANXIETY DISORDER: ICD-10-CM

## 2017-03-20 DIAGNOSIS — M54.42 LEFT-SIDED LOW BACK PAIN WITH LEFT-SIDED SCIATICA, UNSPECIFIED CHRONICITY: ICD-10-CM

## 2017-03-20 DIAGNOSIS — F41.0 PANIC DISORDER WITHOUT AGORAPHOBIA: Primary | ICD-10-CM

## 2017-03-20 DIAGNOSIS — B36.0 TINEA VERSICOLOR: ICD-10-CM

## 2017-03-20 PROCEDURE — 99214 OFFICE O/P EST MOD 30 MIN: CPT | Performed by: FAMILY MEDICINE

## 2017-03-20 RX ORDER — FLUCONAZOLE 150 MG/1
150 TABLET ORAL WEEKLY
Qty: 10 TABLET | Refills: 3 | Status: SHIPPED | OUTPATIENT
Start: 2017-03-20 | End: 2019-07-17

## 2017-03-20 RX ORDER — OXYCODONE HYDROCHLORIDE 5 MG/1
5 TABLET ORAL PRN
COMMUNITY
Start: 2017-03-19 | End: 2017-06-08

## 2017-03-20 RX ORDER — TESTOSTERONE CYPIONATE 200 MG/ML
100 INJECTION, SOLUTION INTRAMUSCULAR WEEKLY
Qty: 2 ML | Refills: 5 | Status: SHIPPED | OUTPATIENT
Start: 2017-03-20 | End: 2017-03-20

## 2017-03-20 RX ORDER — METOPROLOL TARTRATE 50 MG
50 TABLET ORAL 2 TIMES DAILY
Qty: 180 TABLET | Refills: 1 | Status: SHIPPED | OUTPATIENT
Start: 2017-03-20 | End: 2017-08-15

## 2017-03-20 RX ORDER — LORAZEPAM 1 MG/1
1 TABLET ORAL EVERY 8 HOURS PRN
Qty: 90 TABLET | Refills: 0 | Status: SHIPPED | OUTPATIENT
Start: 2017-03-20 | End: 2017-05-03

## 2017-03-20 RX ORDER — TESTOSTERONE CYPIONATE 200 MG/ML
100 INJECTION, SOLUTION INTRAMUSCULAR WEEKLY
Qty: 2 ML | Refills: 5 | Status: SHIPPED | OUTPATIENT
Start: 2017-03-20 | End: 2017-06-08

## 2017-03-20 NOTE — PROGRESS NOTES
SUBJECTIVE:                                                    Jeremi Damon is a 46 year old male who presents to clinic today for the following health issues:    Discuss pain medication-oxycodone and tramadol- [facility] pain clinic    Pain: Patient received triggerpoint injections this morning at the pain clinic. His pain today is down his left leg radiating into his calf and toes. He is also in pain from his injections. Reports he would like to get off of pain medications in the future if his pain can be better managed.     Lorazepam: Used for anxiety and not being able to sleep. Reports he has stayed up for 48 hours at a time multiple times a month due to pain.    Problem list and histories reviewed & adjusted, as indicated.  Additional history: as documented    ROS:  Constitutional, HEENT, cardiovascular, pulmonary, GI, , musculoskeletal, neuro, skin, endocrine and psych systems are negative, except as otherwise noted.    This document serves as a record of the services and decisions personally performed and made by Luis Armando Segovia MD. It was created on his behalf by Lynn Collins, a trained medical scribe. The creation of this document is based the provider's statements to the medical scribe.  Lynn Collins   OBJECTIVE:                                                    /84  Pulse 84  Temp 98.2  F (36.8  C) (Oral)  Ht 1.829 m (6')  Wt 112 kg (247 lb)  SpO2 96%  BMI 33.5 kg/m2 Body mass index is 33.5 kg/(m^2).   GENERAL: healthy, alert, well nourished, well hydrated, no distress  EYES: Eyes grossly normal to inspection, extraocular movements - intact  RESP: lungs clear to auscultation - no rales, no rhonchi, no wheezes  CV: regular rates and rhythm, normal S1 S2, no S3 or S4 and no murmur, no click or rub -  MS: extremities- no gross deformities noted, no edema  BACK: bilateral paralumbar tenderness  SKIN: slight pigmented patchy rash on across back with fine scaling, otherwise, no suspicious  lesions, no rashes  PSYCH: Alert and oriented times 3; speech- coherent , normal rate and volume; able to articulate logical thoughts, able to abstract reason, no tangential thoughts, no hallucinations or delusions, affect- normal  Diagnostic test results:  No results found for this or any previous visit (from the past 24 hour(s)).     ASSESSMENT/PLAN:         Jeremi was seen today for recheck medication.    Diagnoses and all orders for this visit:    Panic disorder without agoraphobia  Generalized anxiety disorder: - controlled - continue medication.  -     LORazepam (ATIVAN) 1 MG tablet; Take 1 tablet (1 mg) by mouth every 8 hours as needed for anxiety    Hypotestosteronism  -     testosterone cypionate (DEPOTESTOTERONE CYPIONATE) 200 MG/ML injection; Inject 0.5 mLs (100 mg) into the muscle once a week - with pharmacist recommended syringes and needles.    Tinea versicolor: - controlled - continue medication.  -     fluconazole (DIFLUCAN) 150 MG tablet; Take 1 tablet (150 mg) by mouth once a week for 4 weeks and then monthly    Left-sided low back pain with left-sided sciatica, unspecified chronicity    Essential hypertension with goal blood pressure less than 140/90: Patient will return for recheck.  -     metoprolol (LOPRESSOR) 50 MG tablet; Take 1 tablet (50 mg) by mouth 2 times daily        H/o hypertrophic cardiomyopathy - stable - no chest pain,  No edema.    Risks, benefits and alternatives of treatments discussed. Plan agreed on.      Followup: BP recheck    Will call, return to clinic, or go to ED if worsening or symptoms not improving as discussed.    See patient instructions.     BMI:   Estimated body mass index is 33.5 kg/(m^2) as calculated from the following:    Height as of this encounter: 1.829 m (6').    Weight as of this encounter: 112 kg (247 lb).   Weight management plan: Discussed healthy diet and exercise guidelines and patient will follow up in 6 months in clinic to re-evaluate.      Health  Maintenance Topics with due status: Overdue       Topic Date Due    WELLNESS VISIT Q1 YR (NO INBASKET) 07/07/2012     Health Maintenance Topics with due status: Due On       Topic Date Due    ASTHMA CONTROL TEST Q6 MOS (NO INBASKET) 03/02/2017       Health maintenance reviewed/updated? Yes    The information in this document, created by the medical scribe for me, accurately reflects the services I personally performed and the decisions made by me. I have reviewed and approved this document for accuracy prior to leaving the patient care area.  Luis Armando Segovia MD March 20, 2017 7:51 AM        Nate Segovia MD

## 2017-03-20 NOTE — NURSING NOTE
Chief Complaint   Patient presents with     Recheck Medication       Initial /84  Pulse 84  Temp 98.2  F (36.8  C) (Oral)  Ht 6' (1.829 m)  Wt 247 lb (112 kg)  SpO2 96%  BMI 33.5 kg/m2 Estimated body mass index is 33.5 kg/(m^2) as calculated from the following:    Height as of this encounter: 6' (1.829 m).    Weight as of this encounter: 247 lb (112 kg).  Medication Reconciliation: complete

## 2017-03-20 NOTE — MR AVS SNAPSHOT
After Visit Summary   3/20/2017    Jeremi Damon    MRN: [id number]           Patient Information     Date Of Birth          1970        Visit Information        Provider Department      3/20/2017 2:00 PM Luis Armando Segovia MD [facility]        Today's Diagnoses     Panic disorder without agoraphobia    -  1    Generalized anxiety disorder        Hypotestosteronism        Tinea versicolor        Left-sided low back pain with left-sided sciatica, unspecified chronicity        Essential hypertension with goal blood pressure less than 140/90           Follow-ups after your visit        Who to contact     If you have questions or need follow up information about today's clinic visit or your schedule please contact [facility] directly at [phone].  Normal or non-critical lab and imaging results will be communicated to you by MyChart, letter or phone within 4 business days after the clinic has received the results. If you do not hear from us within 7 days, please contact the clinic through [employer] or phone. If you have a critical or abnormal lab result, we will notify you by phone as soon as possible.  Submit refill requests through [employer] or call your pharmacy and they will forward the refill request to us. Please allow 3 business days for your refill to be completed.          Additional Information About Your Visit        MyChart Information     [employer] gives you secure access to your electronic health record. If you see a primary care provider, you can also send messages to your care team and make appointments. If you have questions, please call your primary care clinic.  If you do not have a primary care provider, please call [phone] and they will assist you.        Care EveryWhere ID     This is your Care EveryWhere ID. This could be used by other organizations to access your [place] medical records  [id number]        Your Vitals Were     Pulse  Temperature Height Pulse Oximetry BMI (Body Mass Index)       84 98.2  F (36.8  C) (Oral) 6' (1.829 m) 96% 33.5 kg/m2        Blood Pressure from Last 3 Encounters:   03/20/17 142/84   01/27/17 152/82   01/11/17 122/84    Weight from Last 3 Encounters:   03/20/17 247 lb (112 kg)   01/27/17 244 lb (110.7 kg)   01/11/17 239 lb (108.4 kg)              Today, you had the following     No orders found for display         Today's Medication Changes          These changes are accurate as of: 3/20/17  2:41 PM.  If you have any questions, ask your nurse or doctor.               Start taking these medicines.        Dose/Directions    fluconazole 150 MG tablet   Commonly known as:  DIFLUCAN   Used for:  Tinea versicolor   Started by:  Luis Armando Segovia MD        Dose:  150 mg   Take 1 tablet (150 mg) by mouth once a week for 4 weeks and then monthly   Quantity:  10 tablet   Refills:  3       testosterone cypionate 200 MG/ML injection   Commonly known as:  DEPOTESTOTERONE CYPIONATE   Used for:  Hypotestosteronism   Started by:  Luis Armando Segovia MD        Dose:  100 mg   Inject 0.5 mLs (100 mg) into the muscle once a week - with pharmacist recommended syringes and needles.   Quantity:  2 mL   Refills:  5         These medicines have changed or have updated prescriptions.        Dose/Directions    metoprolol 50 MG tablet   Commonly known as:  LOPRESSOR   This may have changed:    - medication strength  - how much to take   Used for:  Essential hypertension with goal blood pressure less than 140/90   Changed by:  Luis Armando Segovia MD        Dose:  50 mg   Take 1 tablet (50 mg) by mouth 2 times daily   Quantity:  180 tablet   Refills:  1            Where to get your medicines      Some of these will need a paper prescription and others can be bought over the counter.  Ask your nurse if you have questions.     Bring a paper prescription for each of these medications     fluconazole 150 MG tablet    LORazepam 1 MG tablet     metoprolol 50 MG tablet    testosterone cypionate 200 MG/ML injection                Primary Care Provider Office Phone # Fax #    Luis Armando Segovia [phone]       [facility] [address] [place] [id number]        Thank you!     Thank you for choosing [facility]  for your care. Our goal is always to provide you with excellent care. Hearing back from our patients is one way we can continue to improve our services. Please take a few minutes to complete the written survey that you may receive in the mail after your visit with us. Thank you!             Your Updated Medication List - Protect others around you: Learn how to safely use, store and throw away your medicines at www.disposemymeds.org.          This list is accurate as of: 3/20/17  2:41 PM.  Always use your most recent med list.                   Brand Name Dispense Instructions for use    albuterol 108 (90 BASE) MCG/ACT Inhaler    PROAIR HFA/PROVENTIL HFA/VENTOLIN HFA    1 Inhaler    Inhale 2 puffs into the lungs every 4 hours as needed for shortness of breath / dyspnea       ALPRAZolam 0.5 MG tablet    XANAX    22 tablet    Take 1 tablet (0.5 mg) by mouth 2 times daily as needed for anxiety       * amitriptyline 50 MG tablet    ELAVIL         * amitriptyline HCl 150 MG Tabs     90 tablet    TAKE ONE TABLET (150MG) BY MOUTH AT BEDTIME       amphetamine-dextroamphetamine 20 MG per tablet    ADDERALL    90 tablet    Take 1 tablet (20 mg) by mouth 3 times daily       ASPIRIN NOT PRESCRIBED    INTENTIONAL     by Other route continuous prn.       cycloSPORINE 0.05 % ophthalmic emulsion    RESTASIS    1 Box    Place 1 drop into both eyes 2 times daily       finasteride 5 MG tablet    PROSCAR    90 tablet    1/2 tab daily       fluconazole 150 MG tablet    DIFLUCAN    10 tablet    Take 1 tablet (150 mg) by mouth once a week for 4 weeks and then monthly       fluticasone 50 MCG/ACT spray    FLONASE    1  Package    Spray 1-2 sprays into both nostrils daily       fluticasone-salmeterol 250-50 MCG/DOSE diskus inhaler    ADVAIR DISKUS    1 Inhaler    INHALE 1 PUFF BY MOUTH TWICE DAILY       glycerin-hypromellose- 0.2-0.2-1 % Soln ophthalmic solution    ARTIFICIAL TEARS    30 mL    Place 1 drop into both eyes 2 times daily as needed for dry eyes       hydrochlorothiazide 25 MG tablet    HYDRODIURIL    90 tablet    Take 1 tablet (25 mg) by mouth daily       imiquimod 5 % cream    ALDARA    36 packet    Apply  to lesion.   Wash off after 8 hours.   May use for up to 16 weeks.       LORazepam 1 MG tablet    ATIVAN    90 tablet    Take 1 tablet (1 mg) by mouth every 8 hours as needed for anxiety       losartan 50 MG tablet    COZAAR    90 tablet    Take 1 tablet (50 mg) by mouth daily       metoprolol 50 MG tablet    LOPRESSOR    180 tablet    Take 1 tablet (50 mg) by mouth 2 times daily       mometasone 220 MCG/INH Inhaler    ASMANEX 60 METERED DOSES    1 Inhaler    Inhale 1 puff into the lungs 2 times daily       MULTIVITAMIN TABS   OR          nitroglycerin 0.4 MG sublingual tablet    NITROSTAT    25 tablet    Place 1 tablet (0.4 mg) under the tongue every 5 minutes as needed for chest pain If you are still having symptoms after 3 doses (15 minutes) call 911.       oxyCODONE 5 MG IR tablet    ROXICODONE     5 mg as needed       testosterone cypionate 200 MG/ML injection    DEPOTESTOTERONE CYPIONATE    2 mL    Inject 0.5 mLs (100 mg) into the muscle once a week - with pharmacist recommended syringes and needles.       traMADol 50 MG tablet    ULTRAM    90 tablet    TAKE ONE TO TWO TABLETS BY MOUTH EVERY 8 HOURS AS NEEDED FOR MODERATE PAIN       Vitamin C 100 MG Tabs     90    1 TABLET 3 TIMES DAILY       zolpidem 10 MG tablet    AMBIEN    30 tablet    Take 1 tablet (10 mg) by mouth nightly as needed for sleep       * Notice:  This list has 2 medication(s) that are the same as other medications prescribed for you.  Read the directions carefully, and ask your doctor or other care provider to review them with you.

## 2017-04-03 DIAGNOSIS — F41.1 GENERALIZED ANXIETY DISORDER: ICD-10-CM

## 2017-04-03 DIAGNOSIS — F41.0 PANIC DISORDER WITHOUT AGORAPHOBIA: ICD-10-CM

## 2017-04-03 NOTE — TELEPHONE ENCOUNTER
Controlled Substance Refill Request for Alprazolam (xanax)  Problem List Complete:  Yes    Last Written Prescription Date:  01/27/2017  Last Fill Quantity: 22,   # refills: 0    Last Office Visit with [facility] primary care provider: 03/20/2017    Clinic visit frequency required: None     Future Office visit:     Controlled substance agreement on file: Yes:  Date 08/07/2015.     Processing:  Fax Rx to [facility] pharmacy   checked in past 6 months?  No, route to CINDY Mason, CMA

## 2017-04-04 RX ORDER — ALPRAZOLAM 0.5 MG
TABLET ORAL
Qty: 22 TABLET | Refills: 0 | Status: SHIPPED | OUTPATIENT
Start: 2017-04-04 | End: 2017-06-08

## 2017-04-14 ENCOUNTER — APPOINTMENT (OUTPATIENT)
Dept: CT IMAGING | Facility: CLINIC | Age: 47
End: 2017-04-14
Attending: EMERGENCY MEDICINE
Payer: COMMERCIAL

## 2017-04-14 ENCOUNTER — HOSPITAL ENCOUNTER (EMERGENCY)
Facility: CLINIC | Age: 47
Discharge: LEFT BEFORE DISCHARGE | End: 2017-04-15
Attending: EMERGENCY MEDICINE | Admitting: EMERGENCY MEDICINE
Payer: COMMERCIAL

## 2017-04-14 DIAGNOSIS — I10 ESSENTIAL HYPERTENSION: ICD-10-CM

## 2017-04-14 DIAGNOSIS — R51.9 NONINTRACTABLE HEADACHE, UNSPECIFIED CHRONICITY PATTERN, UNSPECIFIED HEADACHE TYPE: ICD-10-CM

## 2017-04-14 LAB
ANION GAP SERPL CALCULATED.3IONS-SCNC: 8 MMOL/L (ref 3–14)
APPEARANCE CSF: CLEAR
BASOPHILS # BLD AUTO: 0 10E9/L (ref 0–0.2)
BASOPHILS NFR BLD AUTO: 0.2 %
BUN SERPL-MCNC: 10 MG/DL (ref 7–30)
CALCIUM SERPL-MCNC: 9 MG/DL (ref 8.5–10.1)
CHLORIDE SERPL-SCNC: 103 MMOL/L (ref 94–109)
CO2 SERPL-SCNC: 30 MMOL/L (ref 20–32)
COLOR CSF: COLORLESS
CREAT SERPL-MCNC: 1.08 MG/DL (ref 0.66–1.25)
DIFFERENTIAL METHOD BLD: ABNORMAL
EOSINOPHIL # BLD AUTO: 0 10E9/L (ref 0–0.7)
EOSINOPHIL NFR BLD AUTO: 0.4 %
ERYTHROCYTE [DISTWIDTH] IN BLOOD BY AUTOMATED COUNT: 13.4 % (ref 10–15)
GFR SERPL CREATININE-BSD FRML MDRD: 73 ML/MIN/1.7M2
GLUCOSE CSF-MCNC: 73 MG/DL (ref 40–70)
GLUCOSE SERPL-MCNC: 98 MG/DL (ref 70–99)
HCT VFR BLD AUTO: 54.5 % (ref 40–53)
HGB BLD-MCNC: 18.5 G/DL (ref 13.3–17.7)
IMM GRANULOCYTES # BLD: 0 10E9/L (ref 0–0.4)
IMM GRANULOCYTES NFR BLD: 0.4 %
LYMPHOCYTES # BLD AUTO: 2 10E9/L (ref 0.8–5.3)
LYMPHOCYTES NFR BLD AUTO: 17.3 %
MCH RBC QN AUTO: 31.3 PG (ref 26.5–33)
MCHC RBC AUTO-ENTMCNC: 33.9 G/DL (ref 31.5–36.5)
MCV RBC AUTO: 92 FL (ref 78–100)
MONOCYTES # BLD AUTO: 0.9 10E9/L (ref 0–1.3)
MONOCYTES NFR BLD AUTO: 8 %
NEUTROPHILS # BLD AUTO: 8.4 10E9/L (ref 1.6–8.3)
NEUTROPHILS NFR BLD AUTO: 73.7 %
NRBC # BLD AUTO: 0 10*3/UL
NRBC BLD AUTO-RTO: 0 /100
PLATELET # BLD AUTO: 235 10E9/L (ref 150–450)
POTASSIUM SERPL-SCNC: 3.9 MMOL/L (ref 3.4–5.3)
PROT CSF-MCNC: 50 MG/DL (ref 15–60)
RBC # BLD AUTO: 5.91 10E12/L (ref 4.4–5.9)
RBC # CSF MANUAL: 1 /UL (ref 0–2)
RBC # CSF MANUAL: NORMAL /UL (ref 0–2)
SODIUM SERPL-SCNC: 141 MMOL/L (ref 133–144)
TUBE # CSF: 4 #
WBC # BLD AUTO: 11.4 10E9/L (ref 4–11)
WBC # CSF MANUAL: 1 /UL (ref 0–5)
WBC # CSF MANUAL: NORMAL /UL (ref 0–5)

## 2017-04-14 PROCEDURE — 89050 BODY FLUID CELL COUNT: CPT | Performed by: EMERGENCY MEDICINE

## 2017-04-14 PROCEDURE — 96375 TX/PRO/DX INJ NEW DRUG ADDON: CPT

## 2017-04-14 PROCEDURE — 82945 GLUCOSE OTHER FLUID: CPT | Performed by: EMERGENCY MEDICINE

## 2017-04-14 PROCEDURE — 87070 CULTURE OTHR SPECIMN AEROBIC: CPT | Performed by: EMERGENCY MEDICINE

## 2017-04-14 PROCEDURE — 87498 ENTEROVIRUS PROBE&REVRS TRNS: CPT | Performed by: EMERGENCY MEDICINE

## 2017-04-14 PROCEDURE — 62270 DX LMBR SPI PNXR: CPT

## 2017-04-14 PROCEDURE — 70450 CT HEAD/BRAIN W/O DYE: CPT

## 2017-04-14 PROCEDURE — 25000125 ZZHC RX 250: Performed by: EMERGENCY MEDICINE

## 2017-04-14 PROCEDURE — 84157 ASSAY OF PROTEIN OTHER: CPT | Performed by: EMERGENCY MEDICINE

## 2017-04-14 PROCEDURE — 25000128 H RX IP 250 OP 636: Performed by: EMERGENCY MEDICINE

## 2017-04-14 PROCEDURE — 99285 EMERGENCY DEPT VISIT HI MDM: CPT | Mod: 25

## 2017-04-14 PROCEDURE — 96361 HYDRATE IV INFUSION ADD-ON: CPT

## 2017-04-14 PROCEDURE — 96365 THER/PROPH/DIAG IV INF INIT: CPT

## 2017-04-14 PROCEDURE — 80048 BASIC METABOLIC PNL TOTAL CA: CPT | Performed by: EMERGENCY MEDICINE

## 2017-04-14 PROCEDURE — 87205 SMEAR GRAM STAIN: CPT | Performed by: EMERGENCY MEDICINE

## 2017-04-14 PROCEDURE — 85025 COMPLETE CBC W/AUTO DIFF WBC: CPT | Performed by: EMERGENCY MEDICINE

## 2017-04-14 RX ORDER — KETOROLAC TROMETHAMINE 30 MG/ML
30 INJECTION, SOLUTION INTRAMUSCULAR; INTRAVENOUS ONCE
Status: COMPLETED | OUTPATIENT
Start: 2017-04-14 | End: 2017-04-14

## 2017-04-14 RX ORDER — LABETALOL HYDROCHLORIDE 5 MG/ML
10 INJECTION, SOLUTION INTRAVENOUS ONCE
Status: COMPLETED | OUTPATIENT
Start: 2017-04-14 | End: 2017-04-14

## 2017-04-14 RX ORDER — ONDANSETRON 2 MG/ML
4 INJECTION INTRAMUSCULAR; INTRAVENOUS ONCE
Status: COMPLETED | OUTPATIENT
Start: 2017-04-14 | End: 2017-04-14

## 2017-04-14 RX ORDER — DIPHENHYDRAMINE HYDROCHLORIDE 50 MG/ML
50 INJECTION INTRAMUSCULAR; INTRAVENOUS ONCE
Status: COMPLETED | OUTPATIENT
Start: 2017-04-14 | End: 2017-04-14

## 2017-04-14 RX ORDER — SODIUM CHLORIDE 9 MG/ML
1000 INJECTION, SOLUTION INTRAVENOUS CONTINUOUS
Status: DISCONTINUED | OUTPATIENT
Start: 2017-04-14 | End: 2017-04-15 | Stop reason: HOSPADM

## 2017-04-14 RX ADMIN — KETOROLAC TROMETHAMINE 30 MG: 30 INJECTION, SOLUTION INTRAMUSCULAR at 20:06

## 2017-04-14 RX ADMIN — ONDANSETRON 4 MG: 2 SOLUTION INTRAMUSCULAR; INTRAVENOUS at 20:06

## 2017-04-14 RX ADMIN — SODIUM CHLORIDE 1000 ML: 9 INJECTION, SOLUTION INTRAVENOUS at 20:06

## 2017-04-14 RX ADMIN — DIPHENHYDRAMINE HYDROCHLORIDE 50 MG: 50 INJECTION, SOLUTION INTRAMUSCULAR; INTRAVENOUS at 20:06

## 2017-04-14 RX ADMIN — Medication 2 G: at 21:15

## 2017-04-14 RX ADMIN — LABETALOL HYDROCHLORIDE 10 MG: 5 INJECTION, SOLUTION INTRAVENOUS at 23:33

## 2017-04-14 ASSESSMENT — ENCOUNTER SYMPTOMS
FEVER: 0
PHOTOPHOBIA: 1
LIGHT-HEADEDNESS: 1
ABDOMINAL PAIN: 0
DIZZINESS: 1
HEADACHES: 1

## 2017-04-14 NOTE — ED AVS SNAPSHOT
Emergency Department    [address] [place] [address]    Phone:  [phone]    Fax:  [phone]                                       Jeremi Damon   MRN: [id number]    Department:   Emergency Department   Date of Visit:  4/14/2017           After Visit Summary Signature Page     I have received my discharge instructions, and my questions have been answered. I have discussed any challenges I see with this plan with the nurse or doctor.    ..........................................................................................................................................  Patient/Patient Representative Signature      ..........................................................................................................................................  Patient Representative Print Name and Relationship to Patient    ..................................................               ................................................  Date                                            Time    ..........................................................................................................................................  Reviewed by Signature/Title    ...................................................              ..............................................  Date                                                            Time

## 2017-04-14 NOTE — ED AVS SNAPSHOT
Emergency Department    [address] [facility] [address]    Phone:  [phone]    Fax:  [phone]                                       Jeremi Damon   MRN: [id number]    Department:   Emergency Department   Date of Visit:  4/14/2017           Patient Information     Date Of Birth          1970        Your diagnoses for this visit were:     Nonintractable headache, unspecified chronicity pattern, unspecified headache type     Essential hypertension        You were seen by Luis Armando Griggs DO.      Follow-up Information     Follow up with Luis Armando Segovia MD In 2 days.    Specialty:  Family Practice    Contact information:    [facility]  [address] [facility] [address] [phone]          Follow up with  Emergency Department.    Specialty:  EMERGENCY MEDICINE    Why:  If symptoms worsen    Contact information:    [address] [facility] [address] [phone]        Discharge Instructions          * HEADACHE [unspecified]    The cause of your headache today is not clear, but it does not appear to be the sign of any serious illness.  Under stress, some people tense the muscles of their shoulder, neck and scalp without knowing it. If this condition lasts long enough, a TENSION HEADACHE can occur.  A MIGRAINE HEADACHE is caused by changes in blood flow to the brain. It can be mild or severe. A migraine attack may be triggered by emotional stress, hormone changes during the menstrual cycle, oral contraceptives, alcohol use, certain foods containing tyramine, eye strain, weather changes, missing meals, lack of sleep or oversleeping.  Other causes of headache include a viral illness, sinus, ear or throat infection, dental pain and TMJ (jaw joint) pain.  HOME CARE:    If you were given pain medicine for this headache, do not drive yourself home. Arrange for a ride, instead. When you get home, try to sleep. You should feel much better  when you wake up.    If you are having nausea or vomiting, follow a light diet until your headache is relieved.    If you have a migraine type headache, use sunglasses when in the daylight or around bright indoor lighting until symptoms improve. Bright glaring light can worsen this kind of headache.  FOLLOW UP with your doctor if the headache is not better within the next 24 hours. If you have frequent headaches you should discuss a treatment plan with your primary care doctor. By being aware of the earliest signs of headache, and starting treatment right away, you may be able to stop the pain yourself.  GET PROMPT MEDICAL ATTENTION if any of the following occur:    Worsening of your head pain or no improvement within 24 hours    Repeated vomiting (unable to keep liquids down)    Fever over 101 F (38.3 C)    Stiff neck    Extreme drowsiness, confusion or fainting    Weakness of an arm or leg or one side of the face    Difficulty with speech or vision    [id number] [place] [address]. All rights reserved. This information is not intended as a substitute for professional medical care. Always follow your healthcare professional's instructions.    Established High Blood Pressure    High blood pressure (hypertension) is a chronic disease. Often health care providers don t know what causes it. But it can be caused by certain health conditions and medicines.  If you have high blood pressure, you may not have any symptoms. If you do have symptoms, they may include headache, dizziness, changes in your vision, chest pain, and shortness of breath. But even without symptoms, high blood pressure that s not treated raises your risk for heart attack and stroke. High blood pressure is a serious health risk and shouldn t be ignored.  A blood pressure reading is made up of two numbers: a higher number over a lower number. The top number is the systolic pressure. The bottom number is the  diastolic pressure. A normal blood pressure is less than 120 over less than 80.  High blood pressure is when either the top number is 140 or higher, or the bottom number is 90 or higher. This must be the result when taking your blood pressure a number of times. The blood pressures between normal and high are called prehypertension.  Home care  If you have high blood pressure, you should do what is listed below to lower your blood pressure. If you are taking medicines for high blood pressure, these methods may reduce or end your need for medicines in the future.    Begin a weight-loss program if you are overweight.    Cut back on how much salt you get in your diet. Here s how to do this:    Don t eat foods that have a lot of salt. These include olives, pickles, smoked meats, and salted potato chips.    Don t add salt to your food at the table.    Use only small amounts of salt when cooking.    Begin an exercise program. Talk with your health care provider about the type of exercise program that would be best for you. It doesn't have to be hard. Even brisk walking for 20 minutes 3 times a week is a good form of exercise.    Don t take medicines that have heart stimulants. This includes many cold and sinus decongestant pills and sprays, as well as diet pills. Check the warnings about hypertension on the label. Stimulants such as amphetamine or cocaine could be lethal for someone with high blood pressure. Never take these.    Limit how much caffeine you get in your diet. Switch to caffeine-free products.    Stop smoking. If you are a long-time smoker, this can be hard. Enroll in a stop-smoking program to make it more likely that you will quit for good.    Learn how to handle stress. This is an important part of any program to lower blood pressure. Learn about relaxation methods like meditation, yoga, or biofeedback.    If your provider prescribed medicines, take them exactly as directed. Missing doses may cause your  blood pressure get out of control.    Consider buying an automatic blood pressure machine. You can get one of these at most pharmacies. Use this to watch your blood pressure at home. Give the results to your provider.  Follow-up care  You will need to make regular visits to your health care provider. This is to check your blood pressure and to make changes to your medicines. Make a follow-up appointment as directed.  When to seek medical advice  Call your health care provider right away if any of these occur:    Chest pain or shortness of breath    Severe headache    Throbbing or rushing sound in the ears    Nosebleed    Sudden severe pain in your belly (abdomen)    Extreme drowsiness, confusion, or fainting    Dizziness or dizziness with a spinning sensation (vertigo)    Weakness of an arm or leg or one side of the face    You have problems speaking or seeing     1088-7213 [employer]. [address] [place], [place] [address]. All rights reserved. This information is not intended as a substitute for professional medical care. Always follow your healthcare professional's instructions.          24 Hour Appointment Hotline       To make an appointment at any [facility], call [phone] ([phone]). If you don't have a family doctor or clinic, we will help you find one. [facility] are conveniently located to serve the needs of you and your family.             Review of your medicines      Our records show that you are taking the medicines listed below. If these are incorrect, please call your family doctor or clinic.        Dose / Directions Last dose taken    albuterol 108 (90 BASE) MCG/ACT Inhaler   Commonly known as:  PROAIR HFA/PROVENTIL HFA/VENTOLIN HFA   Dose:  2 puff   Quantity:  1 Inhaler        Inhale 2 puffs into the lungs every 4 hours as needed for shortness of breath / dyspnea   Refills:  11        ALPRAZolam 0.5 MG tablet   Commonly known as:  XANAX   Quantity:  22  tablet        TAKE ONE TABLET BY MOUTH TWICE DAILY AS NEEDED FOR ANXIETY   Refills:  0        * amitriptyline 50 MG tablet   Commonly known as:  ELAVIL        Refills:  0        * amitriptyline HCl 150 MG Tabs   Quantity:  90 tablet        TAKE ONE TABLET (150MG) BY MOUTH AT BEDTIME   Refills:  1        amphetamine-dextroamphetamine 20 MG per tablet   Commonly known as:  ADDERALL   Dose:  20 mg   Quantity:  90 tablet        Take 1 tablet (20 mg) by mouth 3 times daily   Refills:  0        ASPIRIN NOT PRESCRIBED   Commonly known as:  INTENTIONAL        by Other route continuous prn.   Refills:  0        cycloSPORINE 0.05 % ophthalmic emulsion   Commonly known as:  RESTASIS   Dose:  1 drop   Quantity:  1 Box        Place 1 drop into both eyes 2 times daily   Refills:  11        finasteride 5 MG tablet   Commonly known as:  PROSCAR   Quantity:  90 tablet        1/2 tab daily   Refills:  3        fluconazole 150 MG tablet   Commonly known as:  DIFLUCAN   Dose:  150 mg   Quantity:  10 tablet        Take 1 tablet (150 mg) by mouth once a week for 4 weeks and then monthly   Refills:  3        fluticasone 50 MCG/ACT spray   Commonly known as:  FLONASE   Dose:  1-2 spray   Quantity:  1 Package        Spray 1-2 sprays into both nostrils daily   Refills:  11        fluticasone-salmeterol 250-50 MCG/DOSE diskus inhaler   Commonly known as:  ADVAIR DISKUS   Quantity:  1 Inhaler        INHALE 1 PUFF BY MOUTH TWICE DAILY   Refills:  3        glycerin-hypromellose- 0.2-0.2-1 % Soln ophthalmic solution   Commonly known as:  ARTIFICIAL TEARS   Dose:  1 drop   Quantity:  30 mL        Place 1 drop into both eyes 2 times daily as needed for dry eyes   Refills:  3        hydrochlorothiazide 25 MG tablet   Commonly known as:  HYDRODIURIL   Dose:  25 mg   Quantity:  90 tablet        Take 1 tablet (25 mg) by mouth daily   Refills:  1        imiquimod 5 % cream   Commonly known as:  ALDARA   Quantity:  36 packet        Apply  to  lesion.   Wash off after 8 hours.   May use for up to 16 weeks.   Refills:  6        LORazepam 1 MG tablet   Commonly known as:  ATIVAN   Dose:  1 mg   Quantity:  90 tablet        Take 1 tablet (1 mg) by mouth every 8 hours as needed for anxiety   Refills:  0        losartan 50 MG tablet   Commonly known as:  COZAAR   Dose:  50 mg   Quantity:  90 tablet        Take 1 tablet (50 mg) by mouth daily   Refills:  1        metoprolol 50 MG tablet   Commonly known as:  LOPRESSOR   Dose:  50 mg   Quantity:  180 tablet        Take 1 tablet (50 mg) by mouth 2 times daily   Refills:  1        mometasone 220 MCG/INH Inhaler   Commonly known as:  ASMANEX 60 METERED DOSES   Dose:  1 puff   Quantity:  1 Inhaler        Inhale 1 puff into the lungs 2 times daily   Refills:  11        MULTIVITAMIN TABS   OR        Refills:  0        nitroglycerin 0.4 MG sublingual tablet   Commonly known as:  NITROSTAT   Dose:  0.4 mg   Quantity:  25 tablet        Place 1 tablet (0.4 mg) under the tongue every 5 minutes as needed for chest pain If you are still having symptoms after 3 doses (15 minutes) call 911.   Refills:  0        oxyCODONE 5 MG IR tablet   Commonly known as:  ROXICODONE   Dose:  5 mg        5 mg as needed   Refills:  0        testosterone cypionate 200 MG/ML injection   Commonly known as:  DEPOTESTOTERONE CYPIONATE   Dose:  100 mg   Quantity:  2 mL        Inject 0.5 mLs (100 mg) into the muscle once a week - with pharmacist recommended syringes and needles.   Refills:  5        traMADol 50 MG tablet   Commonly known as:  ULTRAM   Quantity:  90 tablet        TAKE ONE TO TWO TABLETS BY MOUTH EVERY 8 HOURS AS NEEDED FOR MODERATE PAIN   Refills:  0        Vitamin C 100 MG Tabs   Quantity:  90        1 TABLET 3 TIMES DAILY   Refills:  0        zolpidem 10 MG tablet   Commonly known as:  AMBIEN   Dose:  10 mg   Quantity:  30 tablet        Take 1 tablet (10 mg) by mouth nightly as needed for sleep   Refills:  0        * Notice:  This  list has 2 medication(s) that are the same as other medications prescribed for you. Read the directions carefully, and ask your doctor or other care provider to review them with you.            Procedures and tests performed during your visit     Basic metabolic panel    CBC with platelets differential    CSF Culture Aerobic Bacterial    CT Head w/o Contrast    Cell count with differential CSF: Tube 1    Cell count with differential CSF: Tube 4    Enterovirus PCR CSF    Glucose CSF: Tube 2    Gram stain    Protein total CSF: Tube 2      Orders Needing Specimen Collection     None      Pending Results     Date and Time Order Name Status Description    4/14/2017 2253 Enterovirus PCR CSF In process     4/14/2017 2253 Gram stain Preliminary     4/14/2017 2253 CSF Culture Aerobic Bacterial In process             Pending Culture Results     Date and Time Order Name Status Description    4/14/2017 2253 Enterovirus PCR CSF In process     4/14/2017 2253 Gram stain Preliminary     4/14/2017 2253 CSF Culture Aerobic Bacterial In process             Test Results From Your Hospital Stay        4/14/2017 10:08 PM      Narrative     CT SCAN OF THE HEAD WITHOUT CONTRAST   4/14/2017  9:48 PM     HISTORY: Headache.    TECHNIQUE: Axial images of the head and coronal reformations without  IV contrast material.  Radiation dose for this scan was reduced using  automated exposure control, adjustment of the mA and/or kV according  to patient size, or iterative reconstruction technique.    COMPARISON: None.    FINDINGS: The ventricles are normal in size, shape and configuration.   The brain parenchyma and subarachnoid spaces are normal. There is no  evidence of intracranial hemorrhage, mass, acute infarct or anomaly.     The visualized portions of the sinuses show some minimal mucosal  thickening in the ethmoid regions and maxillary sinuses. The mastoids  appear normal. There is no evidence of trauma.        Impression     IMPRESSION:  Normal CT scan of the head.      CHANEL JARA MD         4/14/2017  9:36 PM      Component Results     Component Value Ref Range & Units Status    WBC 11.4 (H) 4.0 - 11.0 10e9/L Final    RBC Count 5.91 (H) 4.4 - 5.9 10e12/L Final    Hemoglobin 18.5 (H) 13.3 - 17.7 g/dL Final    Hematocrit 54.5 (H) 40.0 - 53.0 % Final    MCV 92 78 - 100 fl Final    MCH 31.3 26.5 - 33.0 pg Final    MCHC 33.9 31.5 - 36.5 g/dL Final    RDW 13.4 10.0 - 15.0 % Final    Platelet Count 235 150 - 450 10e9/L Final    Diff Method Automated Method  Final    % Neutrophils 73.7 % Final    % Lymphocytes 17.3 % Final    % Monocytes 8.0 % Final    % Eosinophils 0.4 % Final    % Basophils 0.2 % Final    % Immature Granulocytes 0.4 % Final    Nucleated RBCs 0 0 /100 Final    Absolute Neutrophil 8.4 (H) 1.6 - 8.3 10e9/L Final    Absolute Lymphocytes 2.0 0.8 - 5.3 10e9/L Final    Absolute Monocytes 0.9 0.0 - 1.3 10e9/L Final    Absolute Eosinophils 0.0 0.0 - 0.7 10e9/L Final    Absolute Basophils 0.0 0.0 - 0.2 10e9/L Final    Abs Immature Granulocytes 0.0 0 - 0.4 10e9/L Final    Absolute Nucleated RBC 0.0  Final         4/14/2017  9:50 PM      Component Results     Component Value Ref Range & Units Status    Sodium 141 133 - 144 mmol/L Final    Potassium 3.9 3.4 - 5.3 mmol/L Final    Chloride 103 94 - 109 mmol/L Final    Carbon Dioxide 30 20 - 32 mmol/L Final    Anion Gap 8 3 - 14 mmol/L Final    Glucose 98 70 - 99 mg/dL Final    Urea Nitrogen 10 7 - 30 mg/dL Final    Creatinine 1.08 0.66 - 1.25 mg/dL Final    GFR Estimate 73 >60 mL/min/1.7m2 Final    Non  GFR Calc    GFR Estimate If Black 88 >60 mL/min/1.7m2 Final    African American GFR Calc    Calcium 9.0 8.5 - 10.1 mg/dL Final         4/14/2017 11:19 PM      Component Results     Component Value Ref Range & Units Status    WBC CSF 1 0 - 5 /uL Final    RBC CSF 1 0 - 2 /uL Final    Tube Number 4 # Final    Color CSF Colorless CLRL Final    Appearance CSF Clear CLER Final          4/14/2017 11:18 PM      Component Results     Component Value Ref Range & Units Status    Glucose CSF 73 (H) 40 - 70 mg/dL Final    CSF glucose concentrations are about 60 percent of normal plasma glucose.         4/14/2017 11:18 PM      Component Results     Component Value Ref Range & Units Status    Protein Total CSF 50 15 - 60 mg/dL Final         4/14/2017 10:59 PM         4/14/2017 11:30 PM      Component Results     Component    Specimen Description    Cerebrospinal fluid    Gram Stain    No organisms seen  No WBC's seen  No RBC's seen  Called to GATO WILKES RN ER @ [address].  Gram stain result is preliminary and awaits review of Microbiology Staff.      Micro Report Status    Pending         4/14/2017 11:20 PM      Component Results     Component Value Ref Range & Units Status    WBC CSF  0 - 5 /uL Final    Canceled, Test credited   Duplicate request  NO EVIDENCE OF TRAUMATIC TAP. REFLEX COUNTING OF TUBE 1 NOT INDICATED.      RBC CSF  0 - 2 /uL Final    Canceled, Test credited   Duplicate request  NO EVIDENCE OF TRAUMATIC TAP. REFLEX COUNTING OF TUBE 1 NOT INDICATED.           4/14/2017 11:00 PM                Clinical Quality Measure: Blood Pressure Screening     Your blood pressure was checked while you were in the emergency department today. The last reading we obtained was  BP: (!) 171/107 . Please read the guidelines below about what these numbers mean and what you should do about them.  If your systolic blood pressure (the top number) is less than 120 and your diastolic blood pressure (the bottom number) is less than 80, then your blood pressure is normal. There is nothing more that you need to do about it.  If your systolic blood pressure (the top number) is 120-139 or your diastolic blood pressure (the bottom number) is 80-89, your blood pressure may be higher than it should be. You should have your blood pressure rechecked within a year by a primary care provider.  If your systolic blood  pressure (the top number) is 140 or greater or your diastolic blood pressure (the bottom number) is 90 or greater, you may have high blood pressure. High blood pressure is treatable, but if left untreated over time it can put you at risk for heart attack, stroke, or kidney failure. You should have your blood pressure rechecked by a primary care provider within the next 4 weeks.  If your provider in the emergency department today gave you specific instructions to follow-up with your doctor or provider even sooner than that, you should follow that instruction and not wait for up to 4 weeks for your follow-up visit.        Thank you for choosing [facility]       Thank you for choosing [facility] for your care. Our goal is always to provide you with excellent care. Hearing back from our patients is one way we can continue to improve our services. Please take a few minutes to complete the written survey that you may receive in the mail after you visit with us. Thank you!        [facility] [employer] Information     [employer] gives you secure access to your electronic health record. If you see a primary care provider, you can also send messages to your care team and make appointments. If you have questions, please call your primary care clinic.  If you do not have a primary care provider, please call [phone] and they will assist you.        Care EveryWhere ID     This is your Care EveryWhere ID. This could be used by other organizations to access your [facility] medical records  [id number]        After Visit Summary       This is your record. Keep this with you and show to your community pharmacist(s) and doctor(s) at your next visit.

## 2017-04-15 VITALS
HEIGHT: 72 IN | HEART RATE: 99 BPM | TEMPERATURE: 97.9 F | BODY MASS INDEX: 34.67 KG/M2 | DIASTOLIC BLOOD PRESSURE: 107 MMHG | OXYGEN SATURATION: 94 % | WEIGHT: 256 LBS | RESPIRATION RATE: 18 BRPM | SYSTOLIC BLOOD PRESSURE: 171 MMHG

## 2017-04-15 DIAGNOSIS — L65.9 ALOPECIA: ICD-10-CM

## 2017-04-15 LAB
EV RNA SPEC QL NAA+PROBE: NORMAL
GRAM STN SPEC: NORMAL
MICRO REPORT STATUS: NORMAL
SPECIMEN SOURCE: NORMAL
SPECIMEN SOURCE: NORMAL

## 2017-04-15 RX ORDER — LABETALOL HYDROCHLORIDE 5 MG/ML
20 INJECTION, SOLUTION INTRAVENOUS ONCE
Status: DISCONTINUED | OUTPATIENT
Start: 2017-04-15 | End: 2017-04-15 | Stop reason: HOSPADM

## 2017-04-15 NOTE — ED NOTES
[facility]  ED Nurse Handoff Report    ED Chief complaint: Headache (Gets migraines several times a week. This headache has been there 2.5 weeks. Tried oxycodone and flexeril but not helping. +light sensitivity and vomiting.)      ED Diagnosis:   Final diagnoses:   Nonintractable headache, unspecified chronicity pattern, unspecified headache type   Essential hypertension       Code Status: Full Code    Allergies:   Allergies   Allergen Reactions     Amlodipine      Cramping       Theophylline Hives       Activity level - Baseline/Home:  Independent    Activity Level - Current:   Independent     Needed?: No    Isolation: No  Infection: Not Applicable    Bariatric?: No    Vital Signs:   Vitals:    04/14/17 2230 04/14/17 2330 04/14/17 2345 04/15/17 0000   BP:  (!) 181/121 (!) 208/115 (!) 215/118   Pulse:       Resp:       Temp:       TempSrc:       SpO2: 97%  93% 94%   Weight:       Height:           Cardiac Rhythm: ,        Pain level: 0-10 Pain Scale: 9    Is this patient confused?: No    Patient Report: Initial Complaint: Headache  Focused Assessment: PMH significant for migraines who presents to the emergency department for evaluation of a headache. For the past 2.5 weeks the patient has been dealing with an intermittent migraine, which has felt different from his typical migraines. Pt hypertensive while in ED.  Not much improvement on symptoms with medications.  Tests Performed: labs, head CT, LP  Abnormal Results: no improvement; hypertensive  Treatments provided: iv toradol, benadryl, zofran, labetolol    Family Comments: self    OBS brochure/video discussed/provided to patient: N/A    ED Medications:   Medications   0.9% sodium chloride BOLUS (0 mLs Intravenous Stopped 4/14/17 2330)     Followed by   0.9% sodium chloride infusion (not administered)   labetalol (NORMODYNE/TRANDATE) injection 20 mg (not administered)   ketorolac (TORADOL) injection 30 mg (30 mg Intravenous Given  4/14/17 2006)   diphenhydrAMINE (BENADRYL) injection 50 mg (50 mg Intravenous Given 4/14/17 2006)   ondansetron (ZOFRAN) injection 4 mg (4 mg Intravenous Given 4/14/17 2006)   magnesium sulfate 2 g in NS intermittent infusion ([facility] or [facility]) (0 g Intravenous Stopped 4/14/17 2221)   labetalol (NORMODYNE/TRANDATE) injection 10 mg (10 mg Intravenous Given 4/14/17 2333)       Drips infusing?:  No      ED NURSE PHONE NUMBER: [id number]

## 2017-04-15 NOTE — DISCHARGE INSTRUCTIONS
* HEADACHE [unspecified]    The cause of your headache today is not clear, but it does not appear to be the sign of any serious illness.  Under stress, some people tense the muscles of their shoulder, neck and scalp without knowing it. If this condition lasts long enough, a TENSION HEADACHE can occur.  A MIGRAINE HEADACHE is caused by changes in blood flow to the brain. It can be mild or severe. A migraine attack may be triggered by emotional stress, hormone changes during the menstrual cycle, oral contraceptives, alcohol use, certain foods containing tyramine, eye strain, weather changes, missing meals, lack of sleep or oversleeping.  Other causes of headache include a viral illness, sinus, ear or throat infection, dental pain and TMJ (jaw joint) pain.  HOME CARE:    If you were given pain medicine for this headache, do not drive yourself home. Arrange for a ride, instead. When you get home, try to sleep. You should feel much better when you wake up.    If you are having nausea or vomiting, follow a light diet until your headache is relieved.    If you have a migraine type headache, use sunglasses when in the daylight or around bright indoor lighting until symptoms improve. Bright glaring light can worsen this kind of headache.  FOLLOW UP with your doctor if the headache is not better within the next 24 hours. If you have frequent headaches you should discuss a treatment plan with your primary care doctor. By being aware of the earliest signs of headache, and starting treatment right away, you may be able to stop the pain yourself.  GET PROMPT MEDICAL ATTENTION if any of the following occur:    Worsening of your head pain or no improvement within 24 hours    Repeated vomiting (unable to keep liquids down)    Fever over 101 F (38.3 C)    Stiff neck    Extreme drowsiness, confusion or fainting    Weakness of an arm or leg or one side of the face    Difficulty with speech or vision    [id number] [address], [address]  [place] [address]. All rights reserved. This information is not intended as a substitute for professional medical care. Always follow your healthcare professional's instructions.    Established High Blood Pressure    High blood pressure (hypertension) is a chronic disease. Often health care providers don t know what causes it. But it can be caused by certain health conditions and medicines.  If you have high blood pressure, you may not have any symptoms. If you do have symptoms, they may include headache, dizziness, changes in your vision, chest pain, and shortness of breath. But even without symptoms, high blood pressure that s not treated raises your risk for heart attack and stroke. High blood pressure is a serious health risk and shouldn t be ignored.  A blood pressure reading is made up of two numbers: a higher number over a lower number. The top number is the systolic pressure. The bottom number is the diastolic pressure. A normal blood pressure is less than 120 over less than 80.  High blood pressure is when either the top number is 140 or higher, or the bottom number is 90 or higher. This must be the result when taking your blood pressure a number of times. The blood pressures between normal and high are called prehypertension.  Home care  If you have high blood pressure, you should do what is listed below to lower your blood pressure. If you are taking medicines for high blood pressure, these methods may reduce or end your need for medicines in the future.    Begin a weight-loss program if you are overweight.    Cut back on how much salt you get in your diet. Here s how to do this:    Don t eat foods that have a lot of salt. These include olives, pickles, smoked meats, and salted potato chips.    Don t add salt to your food at the table.    Use only small amounts of salt when cooking.    Begin an exercise program. Talk with your health care provider about the type of exercise program  that would be best for you. It doesn't have to be hard. Even brisk walking for 20 minutes 3 times a week is a good form of exercise.    Don t take medicines that have heart stimulants. This includes many cold and sinus decongestant pills and sprays, as well as diet pills. Check the warnings about hypertension on the label. Stimulants such as amphetamine or cocaine could be lethal for someone with high blood pressure. Never take these.    Limit how much caffeine you get in your diet. Switch to caffeine-free products.    Stop smoking. If you are a long-time smoker, this can be hard. Enroll in a stop-smoking program to make it more likely that you will quit for good.    Learn how to handle stress. This is an important part of any program to lower blood pressure. Learn about relaxation methods like meditation, yoga, or biofeedback.    If your provider prescribed medicines, take them exactly as directed. Missing doses may cause your blood pressure get out of control.    Consider buying an automatic blood pressure machine. You can get one of these at most pharmacies. Use this to watch your blood pressure at home. Give the results to your provider.  Follow-up care  You will need to make regular visits to your health care provider. This is to check your blood pressure and to make changes to your medicines. Make a follow-up appointment as directed.  When to seek medical advice  Call your health care provider right away if any of these occur:    Chest pain or shortness of breath    Severe headache    Throbbing or rushing sound in the ears    Nosebleed    Sudden severe pain in your belly (abdomen)    Extreme drowsiness, confusion, or fainting    Dizziness or dizziness with a spinning sensation (vertigo)    Weakness of an arm or leg or one side of the face    You have problems speaking or seeing     [id number] [employer]. [address] [place] [address], [place] [address]. All rights reserved. This information is not  intended as a substitute for professional medical care. Always follow your healthcare professional's instructions.

## 2017-04-15 NOTE — ED PROVIDER NOTES
History     Chief Complaint:  Headache     HPI   Jeremi Damon is a 46 year old male with a PMH significant for migraines who presents to the emergency department for evaluation of a headache.  For the past 2.5 weeks the patient has been dealing with an intermittent migraine, which has felt different from his typical migraines.  He has tried Oxycodone and Flexeril for this without any improvement.  Currently, his headache is a 9/10 in severity and described as a pressure, squeezing with associated photophobia.  The patient has had associated nausea and vomiting for the last four days.  He additionally feels dizzy and light-headed.  The patient otherwise has had no fevers, chest pain or abdominal pain.  He denies any recent head trauma or falls.          Allergies:  Amlodipine  Theophylline     Medications:    Xanax  Oxycodone  Ativan   Diflucan   Testosterone cypionate   Lopressor  Amitriptyline   Tramadol   Ambien   Asmanex inhaler   Advair diskus   Albuterol inhaler  Adderall   Nitroglycerin   Hydrochlorothiazide  Cozaar  Proscar  Flonase   Multivitamin   Aspirin      Past Medical History:    Benign hypertension   Hypersomnia with sleep apnea  Major depressive disorder   Mild persistent asthma  Other primary cardiomyopathies   Insomnia  ADHD  Panic disorder with agoraphobia   Generalized anxiety disorder  Hyperkalemia   Stage III CKD   GERD without esophagitis   Migraine   Microalbuminuria     Past Surgical History:    Appendectomy   Back surgery   Repair nasal septum   Laparoscopic cholecystectomy   Pectoral muscle surgery   Bilateral radiofrequency volume reduction of the inferior turbinates  Rhinoplasty, septoplasty     Family History:    Cancer - Father  Hypertension - Father, Brother   CAD - Father, Mother   Cardiovascular - Sister, Brother    Social History:  The patient presented to the ED alone.   Tobacco use: Never smoker   Alcohol use: Occasional     Marital Status:  Single      Review of Systems    Constitutional: Negative for fever.   Eyes: Positive for photophobia.   Cardiovascular: Negative for chest pain.   Gastrointestinal: Negative for abdominal pain.   Neurological: Positive for dizziness, light-headedness and headaches.   All other systems reviewed and are negative.      Physical Exam     Patient Vitals for the past 24 hrs:   BP Temp Temp src Pulse Resp SpO2 Height Weight   04/15/17 0030 (!) 171/107 - - - - - - -   04/15/17 0019 (!) 160/94 - - - - - - -   04/15/17 0016 (!) 155/91 - - - - - - -   04/15/17 0000 (!) 215/118 - - - - 94 % - -   04/14/17 2345 (!) 208/115 - - - - 93 % - -   04/14/17 2330 (!) 181/121 - - - - - - -   04/14/17 2230 - - - - - 97 % - -   04/14/17 2221 - - - - - 97 % - -   04/14/17 2219 (!) 232/125 - - - - - - -   04/14/17 1948 (!) 168/97 - - - - - - -   04/14/17 1927 (!) 185/98 97.9  F (36.6  C) Oral 99 20 98 % 1.829 m (6') 116.1 kg (256 lb)      Physical Exam  Physical Exam   General:  Lying on bed.    HENT:  No obvious trauma to head  Right Ear:  External ear normal.   Left Ear:  External ear normal.   Nose:  Nose normal.   Eyes:  Conjunctivae and EOM are normal. Pupils are equal, round, and reactive.   Neck: Normal range of motion. Neck supple. No tracheal deviation present.   CV:  Normal heart sounds. No murmur heard.  Pulm/Chest: Effort normal and breath sounds normal.   Abd: Soft. No distension. There is no tenderness. There is no rigidity, no rebound and no guarding.   M/S: Normal range of motion.   Neuro: Alert. GCS 15.  CN II-XII Grossly intact, no pronator drift, normal finger-nose-finger, visual fields intact by confrontation. Muscle strength is +5 proximal and distal in the bilateral upper and lower extremities. No dysarthria. Normal palm up, palm down.   Skin: Skin is warm and dry. No rash noted. Not diaphoretic.   Psych: Normal mood and affect. Behavior is normal.      Emergency Department Course   Imaging:   Radiographic findings were communicated with the patient  who voiced understanding of the findings.    CT Head w/o Contrast:   IMPRESSION: Normal CT scan of the head.  Per radiology.     Laboratory:  CBC: WBC 11.4 high, HGB 18.5 high, o/w WNL. ()    BMP: WNL. (Creatinine 1.08)      Cell Count with Differential CSF Tube 1: WBC 1, RBC 1, Colorless   Glucose CSF Tube 2: 73 high   Protein Total CSF Tube 2: 50   Gram Stain: No organisms seen, No WBC's seen, No RBC's seen.  Preliminary read.     CSF Culture Aerobic Bacterial: Pending   Enterovirus PCR CSF: Pending     Procedures:   Lumbar Puncture       INDICATION:  Headache      CONSENT:  Risks (including but not limited to; infection, bleeding, spinal headache with possibility of spinal patch and temporary or permanent neurologic injury), benefits and alternatives were discussed with patient and consent for procedure was obtained.    TIMEOUT:  Universal protocol was followed. TIME OUT conducted just prior to starting procedure confirmed patient identity, site/side, procedure, patient position, and availability of correct equipment and implants? Yes      MEDICATIONS:  Lidocaine: Local infiltration    PROCEDURAL NOTE:  Patient was placed in a sitting, supported by bedside stand position.  The low back was prepped with Betadine.  The patient was medicated as above.  A spinal needle was used to gain access to the subarachnoid space with stylet in place.   The fluid was clear.  Stylet was replaced and needle withdrawn.    PATIENT STATUS:  Patient tolerated the procedure well.  There were no complications.       Interventions:  2006 NS 1,000 mL IV  2006 Toradol 30 mg IV  2006 Benadryl 50 mg IV  2006 Zofran 4 mg IV    2115 Magnesium sulfate 2 g IV    2333 Labetalol 10 mg IV      Emergency Department Course:  Nursing notes and vitals reviewed.   1946: I performed an exam of the patient as documented above.     The patient was administered the above medications.     2050: I reassessed the patient who continues to have a headache  despite the above interventions.  I reviewed the risks and benefits for advanced imaging and a lumbar puncture with the patient.  He consented for CT imaging.      2206: I reassessed the patient.      2237: Lumbar puncture was performed as documented above.      2333: I reassessed the patient.      At this point, Jeremi Damon declined my recommended care and insisted upon discharge. I  discussed with the the patient my current diagnostic impression, recommended treatment, and alternatives to treatment. I discussed with them what I anticipated could happen if the patient were discharged including significant morbidity and even death. The patient was able to understand this explanation and ask appropriate questions. In my opinion this patient does have capacity to decide to leave Against Medical Advice, and I have asked the patient to sign a form indicating that decision. I have given the patient thorough discharge instructions and have prescribed treatment. I have invited the patient to return here at any time if they decide to have further evaluation or treatment, regardless of his ability to pay and that it would be my pleasure to continue to evaluate and treat their concerns.      Impression & Plan    Medical Decision Making:  Jeremi Damon is a very pleasant 46 year old male who presents with a headache. He has a history of headaches.  I considered a broad differential diagnosis for this patient including tension, migraine, analgesic rebound, occipital neuralgia, etc.  I also considered other less common but serious causes considered included meningitis, encephalitis, subarachnoid bleed, temporal arteritis, stroke, tumor, etc.  The patient did not feel much better after the Toradol, Benadryl and Zofran.  The patient reported his headache continued to feel different from his normal migraine.  I then reviewed the risks and benefits of advanced imaging and LP.  The patient consented for this.  Fortunately, CT  scan is unremarkable.  He then consented for an LP after reviewing the risks and benefits of this.  There is no evidence of red cells to suggest subarachnoid hemorrhage and no signs of meningitis.  An enterovirus is sent off.  I discussed he would receive a call if this is positive, but if it returns positive no additional intervention is needed it would just be an FYI for cause of pain.  The patient was hypertensive and it came down somewhat, but then spike pretty high to 234/125.  The patient is on Metoprolol and Losartan.  He did not take his evening dose of either.  He is provided Labetalol here which helped reduce his pressure somewhat, but he required a second dose and he was still hypertensive. This could be contributing to his headache. He will be admitted for continued eval and treatment. He has no chest pain to suggest cardiac disease or aortic dissection. No signs of renal insufficieny.    The pt declined admission. He reports feeling much better. Headache is improved. No chest pain or other symptoms. Pt wants to leave AMA. I had several conversations with the pt. He desires to still go home. I invited him to return at any time. His blood pressure did improve. He has his losartan and metoprolol and will take them when he gets home.      Diagnosis:      ICD-10-CM    1. Nonintractable headache, unspecified chronicity pattern, unspecified headache type R51    2. Essential hypertension I10        Disposition:  Admit to Nehal Quiroga, am serving as a scribe on 4/14/2017 at 1946 to document services personally performed by Luis Armando Griggs DO, based on my observations and the provider's statements to me.    Nehal Bradford  4/14/2017    EMERGENCY DEPARTMENT            Luis Armando Griggs DO  04/15/17 0045

## 2017-04-17 NOTE — TELEPHONE ENCOUNTER
finasteride (PROSCAR) 5 MG tablet      Last Written Prescription Date: 4.11.16  Last Fill Quantity: 90,  # refills: 3   Last Office Visit with G, [facility] or [facility] prescribing provider: 3.20.17

## 2017-04-18 RX ORDER — FINASTERIDE 5 MG/1
TABLET, FILM COATED ORAL
Qty: 90 TABLET | Refills: 1 | Status: SHIPPED | OUTPATIENT
Start: 2017-04-18 | End: 2017-11-16

## 2017-04-18 NOTE — TELEPHONE ENCOUNTER
Proscar         Last Written Prescription Date: 04/11/2016  Last Fill Quantity: 90, # refills: 3    Last Office Visit with G, P or [facility] prescribing provider:  03/20/2107   Future Office Visit:      BP Readings from Last 3 Encounters:   04/15/17 (!) 171/107   03/20/17 142/84   01/27/17 152/82

## 2017-04-18 NOTE — TELEPHONE ENCOUNTER
Prescription approved per [facility] Refill Protocol.    Gemma Reeves RN    [facility]

## 2017-04-20 LAB
BACTERIA SPEC CULT: NO GROWTH
MICRO REPORT STATUS: NORMAL
SPECIMEN SOURCE: NORMAL

## 2017-05-03 DIAGNOSIS — F41.0 PANIC DISORDER WITHOUT AGORAPHOBIA: ICD-10-CM

## 2017-05-03 DIAGNOSIS — F41.1 GENERALIZED ANXIETY DISORDER: ICD-10-CM

## 2017-05-04 RX ORDER — LORAZEPAM 1 MG/1
TABLET ORAL
Qty: 90 TABLET | Refills: 0 | Status: SHIPPED | OUTPATIENT
Start: 2017-05-04 | End: 2017-06-05

## 2017-05-04 NOTE — TELEPHONE ENCOUNTER
Controlled Substance Refill Request for ativan  Problem List Complete:  Yes    Last Written Prescription Date:  3/20/17  Last Fill Quantity: 90,   # refills: 0    Last Office Visit with [facility] primary care provider: 3/20/17    Clinic visit frequency required: not listed     Future Office visit:     Controlled substance agreement on file: Yes:  Date 8/7/15.     Processing:  Fax Rx to [facility] pharmacy    Emerald Zuniga

## 2017-05-09 DIAGNOSIS — I10 ESSENTIAL HYPERTENSION WITH GOAL BLOOD PRESSURE LESS THAN 140/90: ICD-10-CM

## 2017-05-10 NOTE — TELEPHONE ENCOUNTER
Losartan      Last Written Prescription Date: 09/02/2016  Last Fill Quantity: 90, # refills: 1  Last Office Visit with [facility], [facility] or [facility] prescribing provider: 03/20/2107       Potassium   Date Value Ref Range Status   04/14/2017 3.9 3.4 - 5.3 mmol/L Final     Creatinine   Date Value Ref Range Status   04/14/2017 1.08 0.66 - 1.25 mg/dL Final     BP Readings from Last 3 Encounters:   04/15/17 (!) 171/107   03/20/17 142/84   01/27/17 152/82

## 2017-05-11 RX ORDER — LOSARTAN POTASSIUM 100 MG/1
100 TABLET ORAL DAILY
Qty: 90 TABLET | Refills: 1 | Status: SHIPPED | OUTPATIENT
Start: 2017-05-11 | End: 2017-10-10

## 2017-05-11 NOTE — TELEPHONE ENCOUNTER
See recent notes from pt trying to see RL in clinic, pt was upset that their insurance is not covered at the clinic anymore. Advised pt of nurse only or pharmacy visit as well.     Please advise.    Routing refill request to provider for review/approval because:  Patient needs to be seen because:  Due for BP check.    Amelia Flores RN

## 2017-05-12 NOTE — TELEPHONE ENCOUNTER
Agree he needs to have his bp rechecked as it was quite high.  I will send in a refill of his medication for now and increase the dose to 100 mg (he can use up his 50 mg tabs by taking 2 at the same time)

## 2017-05-12 NOTE — TELEPHONE ENCOUNTER
Attempt # 1    Left non-detailed VM for patient to call back.    Gemma Hartley, REJI, RN, PHN  [facility]

## 2017-05-12 NOTE — TELEPHONE ENCOUNTER
"Jeremi called back, call taken for [facility].     I informed him Jeremi of below to increase Losartan and that he is due for BP recheck. I informed him he can have RN visit, or pharmacy check and those are non-chargeable visits. Patient says he has cuff at home. Had no further questions regarding medication. Informed patient he should also have BP check after his increase in dosage of Losartan. Time not indicated below but usually 2 weeks. He says he has a cuff at home and will take it.     We spent quite a bit of time talking about his insurance (see below notes). He is upset his insurance is not accepted at [facility]. He says he has [employer] and has never had any problems before. Per a provider at this office [facility] [place] is not accepted but it is \"thought\" [facility] is accepted. I advised him to call his insurance company or speak to his  as to his specific benefits. I also referred him to the [facility] Consumer Price line and [facility] to see if they would also be able to assist him further with his insurance specifics.     Jeremi was thankful for information given and will call the numbers I had given him for the above resources.     BENNY [facility] Triage  "

## 2017-05-13 DIAGNOSIS — I10 ESSENTIAL HYPERTENSION WITH GOAL BLOOD PRESSURE LESS THAN 140/90: ICD-10-CM

## 2017-05-16 RX ORDER — METOPROLOL TARTRATE 50 MG
TABLET ORAL
Qty: 180 TABLET | Refills: 0 | OUTPATIENT
Start: 2017-05-16

## 2017-05-16 NOTE — TELEPHONE ENCOUNTER
metoprolol (LOPRESSOR) 50 MG tablet      Last Written Prescription Date: 3/20/2017  Last Fill Quantity: 180, # refills: 1    Last Office Visit with G, P or [facility] prescribing provider:  3/20/2017   Future Office Visit:        BP Readings from Last 3 Encounters:   04/15/17 (!) 171/107   03/20/17 142/84   01/27/17 152/82       Patient was given written script for 6 months of refills on 3/20/2017. I called patient and he said he switched to [facility] pharmacy and just received refill on 4/15/2017 for the Metoprolol and it has 1 refill available.  He said to disregard this refill.  Rx denied and sent back to [facility] noting what patient stated.    Gemma Hartley, REJI, RN, PHN  [facility]

## 2017-05-20 ENCOUNTER — HOSPITAL ENCOUNTER (EMERGENCY)
Facility: CLINIC | Age: 47
Discharge: HOME | End: 2017-05-20
Attending: EMERGENCY MEDICINE | Admitting: EMERGENCY MEDICINE
Payer: COMMERCIAL

## 2017-05-20 VITALS
WEIGHT: 255 LBS | HEART RATE: 81 BPM | RESPIRATION RATE: 18 BRPM | DIASTOLIC BLOOD PRESSURE: 106 MMHG | TEMPERATURE: 98 F | HEIGHT: 72 IN | BODY MASS INDEX: 34.54 KG/M2 | OXYGEN SATURATION: 95 % | SYSTOLIC BLOOD PRESSURE: 163 MMHG
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
## 2017-05-20 DIAGNOSIS — L03.011 CELLULITIS OF FINGER OF RIGHT HAND: ICD-10-CM

## 2017-05-20 DIAGNOSIS — S61.259A CAT BITE OF FINGER, INITIAL ENCOUNTER: ICD-10-CM

## 2017-05-20 PROCEDURE — 90715 TDAP VACCINE 7 YRS/> IM: CPT | Performed by: EMERGENCY MEDICINE

## 2017-05-20 PROCEDURE — 99282 EMERGENCY DEPT VISIT SF MDM: CPT | Mod: 25

## 2017-05-20 PROCEDURE — 90471 IMMUNIZATION ADMIN: CPT

## 2017-05-20 PROCEDURE — 25000125 ZZHC RX 250: Performed by: EMERGENCY MEDICINE

## 2017-05-20 RX ADMIN — CLOSTRIDIUM TETANI TOXOID ANTIGEN (FORMALDEHYDE INACTIVATED), CORYNEBACTERIUM DIPHTHERIAE TOXOID ANTIGEN (FORMALDEHYDE INACTIVATED), BORDETELLA PERTUSSIS TOXOID ANTIGEN (GLUTARALDEHYDE INACTIVATED), BORDETELLA PERTUSSIS FILAMENTOUS HEMAGGLUTININ ANTIGEN (FORMALDEHYDE INACTIVATED), BORDETELLA PERTUSSIS PERTACTIN ANTIGEN, AND BORDETELLA PERTUSSIS FIMBRIAE 2/3 ANTIGEN 0.5 ML: 5; 2; 2.5; 5; 3; 5 INJECTION, SUSPENSION INTRAMUSCULAR at 19:18

## 2017-05-20 NOTE — ED AVS SNAPSHOT
Emergency Department    [address] [place] [id number]    Phone:  [phone]    Fax:  [phone]                                       Jeremi Damon   MRN: [id number]    Department:   Emergency Department   Date of Visit:  5/20/2017           After Visit Summary Signature Page     I have received my discharge instructions, and my questions have been answered. I have discussed any challenges I see with this plan with the nurse or doctor.    ..........................................................................................................................................  Patient/Patient Representative Signature      ..........................................................................................................................................  Patient Representative Print Name and Relationship to Patient    ..................................................               ................................................  Date                                            Time    ..........................................................................................................................................  Reviewed by Signature/Title    ...................................................              ..............................................  Date                                                            Time

## 2017-05-20 NOTE — ED AVS SNAPSHOT
Emergency Department    [address] [facility] [address]    Phone:  [phone]    Fax:  [phone]                                       Jeremi Damon   MRN: [id number]    Department:   Emergency Department   Date of Visit:  5/20/2017           Patient Information     Date Of Birth          1970        Your diagnoses for this visit were:     Cat bite of finger, initial encounter     Cellulitis of finger of right hand        You were seen by Cara Mohamud MD.      Follow-up Information     Follow up with Luis Armando Segovia MD.    Specialty:  Family Practice    Contact information:    [facility]  [address] [facility] [address]  [phone]          Follow up with  Emergency Department.    Specialty:  EMERGENCY MEDICINE    Why:  If symptoms worsen    Contact information:    [address] [facility] [id number] [phone]        Discharge Instructions         Cat Bite    A cat bite can cause a wound deep enough to break the skin. In such cases, the wound is cleaned and then closed. Sometimes, the wound is not closed completely. This is so that fluid can drain if the wound becomes infected. In addition to wound care, a tetanus shot may be given, if needed.  Home Care    Wash your hands well with soap and warm water before and after caring for the wound. This helps lower the risk of infection.    Care for the wound as directed. If a dressing was applied to the wound, be sure to change it as directed.    If the wound bleeds, place a clean, soft cloth on the wound. Then firmly apply pressure until the bleeding stops. This may take up to 5 minutes. Do not release the pressure and look at the wound during this time.    Most wounds heal within 10 days. But an infection can occur even with proper treatment. So be sure to check the wound daily for signs of infection (see below).    Antibiotics may be prescribed. These help prevent or treat  infection. If you re given antibiotics, take them as directed. Also be sure to complete the medications.  Rabies Prevention   Rabies is a virus that can be carried in certain animals. These can include domestic animals such as cats and dogs. Pets fully vaccinated against rabies (2 shots) are at very low risk of infection. But because human rabies is almost always fatal, any biting pet should be confined for 10 days as an extra precaution. In general, if there is a risk for rabies, the following steps may need to be taken:    If someone s pet cat has bitten you, it should be kept in a secure area for the next 10 days to watch for signs of illness. (If the pet owner won t allow this, contact your local animal control center.) If the cat becomes ill or dies during that time, contact your local animal control center at once so the animal may be tested for rabies. If the cat stays healthy for the next 10 days, there is no danger of rabies in the animal or you.    If a stray cat bit you, contact your local animal control center. They can give information on capture, quarantine, and animal rabies testing.    If you can t locate the animal that bit you in the next 2 days, and if rabies exists in your region, you may need to receive the rabies vaccine series. Call your health care provider right away. Or, return to the emergency department promptly.    All animal bites should be reported to the local animal control center. If you were not given a form to fill out, you can report this yourself.  Follow-up care  Follow up with your health care provider, or as directed.  When to seek medical advice  Call your health care provider right away if any of these occur:    Signs of infection:    Spreading redness or warmth from the wound    Increased pain or swelling    Fever of 100.4 F (38 C) or higher, or as directed by your health care provider    Colored fluid or pus draining from the wound    Signs of rabies  infection:    Headache    Confusion    Strange behavior    Seizure    Decreased ability to move any body part near the bite area    Bleeding that cannot be stopped after 5 minutes of firm pressure              4015-3616 [employer]. [address] [place] [address], [place] [address]. All rights reserved. This information is not intended as a substitute for professional medical care. Always follow your healthcare professional's instructions.          Discharge Instructions for Cellulitis  You have been diagnosed with cellulitis. This is an infection in the deepest layer of the skin. In some cases, the infection also affects the muscle. Cellulitis is caused by bacteria. The bacteria can enter the body through broken skin. This can happen with a cut, scratch, animal bite, or an insect bite that has been scratched. You may have been treated in the hospital with antibiotics and fluids. You will likely be given a prescription for antibiotics to take at home. This sheet will help you take care of yourself at home.  Home Care  When you are home:    Take the prescribed antibiotic medication you are given as directed until it is gone. Take it even if you feel better. It treats the infection and stops it from returning. Not taking all of the medication can make future infections hard to treat.    Keep the infected area clean.    When possible, raise the infected area above the level of your heart. This helps keep swelling down.    Talk to your doctor if you are in pain. Ask what kind of over-the-counter medication you can take for pain.    Apply clean bandages as advised.    Take your temperature once a day for a week.    Wash your hands often to prevent spreading the infection.  In the future, wash your hands before and after you touch cuts, scratches, or bandages. This will help prevent infection.   When to Call Your Doctor  Call your doctor immediately if you have any of the following:    Vomiting    Fever of100.4 F  (38 C) or higher, or as directed by your health care provider    Shaking chills    Redness that gets worse in or around the infected area    Swelling of the infected area    Pain that gets worse in or around the infected area    Difficulty or pain when moving the joints above or below the infected area    Discharge or pus draining from the area     4103-5235 [employer]. [address] [place] [address]. All rights reserved. This information is not intended as a substitute for professional medical care. Always follow your healthcare professional's instructions.          24 Hour Appointment Hotline       To make an appointment at any [facility], call [phone] ([phone]). If you don't have a family doctor or clinic, we will help you find one. [facility] clinics are conveniently located to serve the needs of you and your family.             Review of your medicines      START taking        Dose / Directions Last dose taken    amoxicillin-clavulanate 875-125 MG per tablet   Commonly known as:  AUGMENTIN   Dose:  1 tablet   Quantity:  20 tablet        Take 1 tablet by mouth 2 times daily   Refills:  0          Our records show that you are taking the medicines listed below. If these are incorrect, please call your family doctor or clinic.        Dose / Directions Last dose taken    albuterol 108 (90 BASE) MCG/ACT Inhaler   Commonly known as:  PROAIR HFA/PROVENTIL HFA/VENTOLIN HFA   Dose:  2 puff   Quantity:  1 Inhaler        Inhale 2 puffs into the lungs every 4 hours as needed for shortness of breath / dyspnea   Refills:  11        ALPRAZolam 0.5 MG tablet   Commonly known as:  XANAX   Quantity:  22 tablet        TAKE ONE TABLET BY MOUTH TWICE DAILY AS NEEDED FOR ANXIETY   Refills:  0        * amitriptyline 50 MG tablet   Commonly known as:  ELAVIL        Refills:  0        * amitriptyline HCl 150 MG Tabs   Quantity:  90 tablet        TAKE ONE TABLET (150MG) BY MOUTH AT BEDTIME    Refills:  1        amphetamine-dextroamphetamine 20 MG per tablet   Commonly known as:  ADDERALL   Dose:  20 mg   Quantity:  90 tablet        Take 1 tablet (20 mg) by mouth 3 times daily   Refills:  0        ASPIRIN NOT PRESCRIBED   Commonly known as:  INTENTIONAL        by Other route continuous prn.   Refills:  0        cycloSPORINE 0.05 % ophthalmic emulsion   Commonly known as:  RESTASIS   Dose:  1 drop   Quantity:  1 Box        Place 1 drop into both eyes 2 times daily   Refills:  11        finasteride 5 MG tablet   Commonly known as:  PROSCAR   Quantity:  90 tablet        TAKE ONE-HALF TABLET BY MOUTH ONCE DAILY   Refills:  1        fluconazole 150 MG tablet   Commonly known as:  DIFLUCAN   Dose:  150 mg   Quantity:  10 tablet        Take 1 tablet (150 mg) by mouth once a week for 4 weeks and then monthly   Refills:  3        fluticasone 50 MCG/ACT spray   Commonly known as:  FLONASE   Dose:  1-2 spray   Quantity:  1 Package        Spray 1-2 sprays into both nostrils daily   Refills:  11        fluticasone-salmeterol 250-50 MCG/DOSE diskus inhaler   Commonly known as:  ADVAIR DISKUS   Quantity:  1 Inhaler        INHALE 1 PUFF BY MOUTH TWICE DAILY   Refills:  3        glycerin-hypromellose- 0.2-0.2-1 % Soln ophthalmic solution   Commonly known as:  ARTIFICIAL TEARS   Dose:  1 drop   Quantity:  30 mL        Place 1 drop into both eyes 2 times daily as needed for dry eyes   Refills:  3        hydrochlorothiazide 25 MG tablet   Commonly known as:  HYDRODIURIL   Dose:  25 mg   Quantity:  90 tablet        Take 1 tablet (25 mg) by mouth daily   Refills:  1        imiquimod 5 % cream   Commonly known as:  ALDARA   Quantity:  36 packet        Apply  to lesion.   Wash off after 8 hours.   May use for up to 16 weeks.   Refills:  6        LORazepam 1 MG tablet   Commonly known as:  ATIVAN   Quantity:  90 tablet        TAKE 1 TABLET BY MOUTH EVERY 8 HOURS AS NEEDED FOR ANXIETY   Refills:  0        losartan 100 MG  tablet   Commonly known as:  COZAAR   Dose:  100 mg   Quantity:  90 tablet        Take 1 tablet (100 mg) by mouth daily   Refills:  1        metoprolol 50 MG tablet   Commonly known as:  LOPRESSOR   Dose:  50 mg   Quantity:  180 tablet        Take 1 tablet (50 mg) by mouth 2 times daily   Refills:  1        mometasone 220 MCG/INH Inhaler   Commonly known as:  ASMANEX 60 METERED DOSES   Dose:  1 puff   Quantity:  1 Inhaler        Inhale 1 puff into the lungs 2 times daily   Refills:  11        MULTIVITAMIN TABS   OR        Refills:  0        nitroglycerin 0.4 MG sublingual tablet   Commonly known as:  NITROSTAT   Dose:  0.4 mg   Quantity:  25 tablet        Place 1 tablet (0.4 mg) under the tongue every 5 minutes as needed for chest pain If you are still having symptoms after 3 doses (15 minutes) call 911.   Refills:  0        oxyCODONE 5 MG IR tablet   Commonly known as:  ROXICODONE   Dose:  5 mg        5 mg as needed   Refills:  0        testosterone cypionate 200 MG/ML injection   Commonly known as:  DEPOTESTOTERONE   Dose:  100 mg   Quantity:  2 mL        Inject 0.5 mLs (100 mg) into the muscle once a week - with pharmacist recommended syringes and needles.   Refills:  5        traMADol 50 MG tablet   Commonly known as:  ULTRAM   Quantity:  90 tablet        TAKE ONE TO TWO TABLETS BY MOUTH EVERY 8 HOURS AS NEEDED FOR MODERATE PAIN   Refills:  0        Vitamin C 100 MG Tabs   Quantity:  90        1 TABLET 3 TIMES DAILY   Refills:  0        zolpidem 10 MG tablet   Commonly known as:  AMBIEN   Dose:  10 mg   Quantity:  30 tablet        Take 1 tablet (10 mg) by mouth nightly as needed for sleep   Refills:  0        * Notice:  This list has 2 medication(s) that are the same as other medications prescribed for you. Read the directions carefully, and ask your doctor or other care provider to review them with you.            Prescriptions were sent or printed at these locations (1 Prescription)                   Other  Prescriptions                Printed at Department/Unit printer (1 of 1)         amoxicillin-clavulanate (AUGMENTIN) 875-125 MG per tablet                Orders Needing Specimen Collection     None      Pending Results     No orders found from 5/18/2017 to 5/21/2017.            Pending Culture Results     No orders found from 5/18/2017 to 5/21/2017.            Pending Results Instructions     If you had any lab results that were not finalized at the time of your Discharge, you can call the ED Lab Result RN at [phone]. You will be contacted by this team for any positive Lab results or changes in treatment. The nurses are available 7 days a week from 10A to 6:30P.  You can leave a message 24 hours per day and they will return your call.        Test Results From Your Hospital Stay               Clinical Quality Measure: Blood Pressure Screening     Your blood pressure was checked while you were in the emergency department today. The last reading we obtained was  BP: (!) 166/111 . Please read the guidelines below about what these numbers mean and what you should do about them.  If your systolic blood pressure (the top number) is less than 120 and your diastolic blood pressure (the bottom number) is less than 80, then your blood pressure is normal. There is nothing more that you need to do about it.  If your systolic blood pressure (the top number) is 120-139 or your diastolic blood pressure (the bottom number) is 80-89, your blood pressure may be higher than it should be. You should have your blood pressure rechecked within a year by a primary care provider.  If your systolic blood pressure (the top number) is 140 or greater or your diastolic blood pressure (the bottom number) is 90 or greater, you may have high blood pressure. High blood pressure is treatable, but if left untreated over time it can put you at risk for heart attack, stroke, or kidney failure. You should have your blood pressure rechecked by a  primary care provider within the next 4 weeks.  If your provider in the emergency department today gave you specific instructions to follow-up with your doctor or provider even sooner than that, you should follow that instruction and not wait for up to 4 weeks for your follow-up visit.        Thank you for choosing [facility]       Thank you for choosing [facility] for your care. Our goal is always to provide you with excellent care. Hearing back from our patients is one way we can continue to improve our services. Please take a few minutes to complete the written survey that you may receive in the mail after you visit with us. Thank you!        [employer] Information     [employer] gives you secure access to your electronic health record. If you see a primary care provider, you can also send messages to your care team and make appointments. If you have questions, please call your primary care clinic.  If you do not have a primary care provider, please call [phone] and they will assist you.        Care EveryWhere ID     This is your Care EveryWhere ID. This could be used by other organizations to access your [facility] medical records  [id number]        After Visit Summary       This is your record. Keep this with you and show to your community pharmacist(s) and doctor(s) at your next visit.

## 2017-05-20 NOTE — ED PROVIDER NOTES
History     Chief Complaint:  Cat Bite      HPI   Jeremi Damon is a 46 year old male who presents with a cat bite.  He states that he was bit by his cat on Thursday and has concerns that the tingling in his finger may be caused by infection.  The patient was bit on his right index finger.  He denies any other symptoms at this time.  No fever, chills, dizziness or lightheadedness.      Allergies:  Amlodipine  Theophylline      Medications:    Xanax  Oxycodone  Ativan   Diflucan   Testosterone cypionate   Lopressor  Amitriptyline   Tramadol   Ambien   Asmanex inhaler   Advair diskus   Albuterol inhaler  Adderall   Nitroglycerin   Hydrochlorothiazide  Cozaar  Proscar  Flonase   Multivitamin   Aspirin       Past Medical History:    Benign hypertension   Hypersomnia with sleep apnea  Major depressive disorder   Mild persistent asthma  Other primary cardiomyopathies   Insomnia  ADHD  Panic disorder with agoraphobia   Generalized anxiety disorder  Hyperkalemia   Stage III CKD   GERD without esophagitis   Migraine   Microalbuminuria      Past Surgical History:   Appendectomy   Back surgery   Repair nasal septum   Laparoscopic cholecystectomy   Pectoral muscle surgery   Bilateral radiofrequency volume reduction of the inferior turbinates  Rhinoplasty, septoplasty      Family History:   Cancer - Father  Hypertension - Father, Brother   CAD - Father, Mother   Cardiovascular - Sister, Brother     Social History:  The patient presented to the ED alone.   Tobacco use: Never smoker   Alcohol use: Occasional    Marital Status: Single     Review of Systems   Skin: Positive for wound.        Cat bite right index finger   All other systems reviewed and are negative.        Physical Exam   First Vitals:  BP: (!) 195/107  Pulse: 81  Temp: 98  F (36.7  C)  Resp: 16  Height: 182.9 cm (6')  Weight: 115.7 kg (255 lb)  SpO2: 98 %    Physical Exam  General: Patient is alert and normal appearing.  HEENT: Head atraumatic    Eyes: pupils  equal and reactive. Conjunctiva clear   Nares: patent   Oropharynx: no lesions, uvula midline, no palatal draping, normal voice, no trismus  Neck: Supple without lymphadenopathy, no meningismus  Chest: Heart regular rate and rhythm.   Lungs: Equal clear to auscultation with no wheeze or rales  Abdomen: Soft, non tender, nondistended, normal bowel sounds  Back: No costovertebral angle tenderness, no midline C, T or L spine tenderness  Neuro: Grossly nonfocal, normal speech, strength equal bilaterally, CN 2-12 intact  Extremities: No deformities, equal radial and DP pulses. No clubbing, cyanosis.  No edema  Skin: Warm and dry with no rash. Right index finger with redness and swelling around cat bite eryn on distal phalynx, cap refill < 2sec, edema of index finger, but normal flexion/extension and minimal tenderness along flexor tendon.       Emergency Department Course     Interventions:  1918 - Tdap 0.5 ml IM    Emergency Department Course:  Nursing notes and vitals reviewed.  I performed an exam of the patient as documented above.   Tetanus booster was administered to the patient in the form of Tdap injection, see interventions above.      I personally reviewed the laboratory results with the patient and answered all related questions prior to discharge.    Impression & Plan      Medical Decision Making:  Jeremi Damon is a 46 year old male who presents for evaluation of a cat bite to his right index finger.  The workup here in the ED shows no signs of compartment syndrome, significant lacerations, tendon or bone injury.  No signs of foreign body or cat teeth in wound.  Cat is known so will have them observe for signs of rabies; no rabies shots indicated from ED. Patient states shots up to date on cat.  Do not feel consistent with flexor tenosynovitis at this time.  The wounds were scrubbed and washed out with high pressure irrigation.  Will start augmentin and have them observe for signs of worsening infection  (pain, redness, warmth, red streaks, etc).       Diagnosis:    ICD-10-CM    1. Cat bite of finger, initial encounter S61.259A     W55.01XA    2. Cellulitis of finger of right hand L03.011        Disposition:   Discharge home.      Discharge Medications:  Discharge Medication List as of 5/20/2017  7:25 PM      START taking these medications    Details   amoxicillin-clavulanate (AUGMENTIN) 875-125 MG per tablet Take 1 tablet by mouth 2 times daily, Disp-20 tablet, R-0, Local Print             Scribe Disclosure:  I, Ines Mcdonald, am serving as a scribe at 6:59 PM on 5/20/2017 to document services personally performed by Cara Mohamud MD, based on my observations and the provider's statements to me.      Ines Mcdonald  5/20/2017    EMERGENCY DEPARTMENT       Cara Mohamud MD  05/21/17 4123

## 2017-05-21 ASSESSMENT — ENCOUNTER SYMPTOMS: WOUND: 1

## 2017-05-21 NOTE — DISCHARGE INSTRUCTIONS
Cat Bite    A cat bite can cause a wound deep enough to break the skin. In such cases, the wound is cleaned and then closed. Sometimes, the wound is not closed completely. This is so that fluid can drain if the wound becomes infected. In addition to wound care, a tetanus shot may be given, if needed.  Home Care    Wash your hands well with soap and warm water before and after caring for the wound. This helps lower the risk of infection.    Care for the wound as directed. If a dressing was applied to the wound, be sure to change it as directed.    If the wound bleeds, place a clean, soft cloth on the wound. Then firmly apply pressure until the bleeding stops. This may take up to 5 minutes. Do not release the pressure and look at the wound during this time.    Most wounds heal within 10 days. But an infection can occur even with proper treatment. So be sure to check the wound daily for signs of infection (see below).    Antibiotics may be prescribed. These help prevent or treat infection. If you re given antibiotics, take them as directed. Also be sure to complete the medications.  Rabies Prevention   Rabies is a virus that can be carried in certain animals. These can include domestic animals such as cats and dogs. Pets fully vaccinated against rabies (2 shots) are at very low risk of infection. But because human rabies is almost always fatal, any biting pet should be confined for 10 days as an extra precaution. In general, if there is a risk for rabies, the following steps may need to be taken:    If someone s pet cat has bitten you, it should be kept in a secure area for the next 10 days to watch for signs of illness. (If the pet owner won t allow this, contact your local animal control center.) If the cat becomes ill or dies during that time, contact your local animal control center at once so the animal may be tested for rabies. If the cat stays healthy for the next 10 days, there is no danger of rabies in the  animal or you.    If a stray cat bit you, contact your local animal control center. They can give information on capture, quarantine, and animal rabies testing.    If you can t locate the animal that bit you in the next 2 days, and if rabies exists in your region, you may need to receive the rabies vaccine series. Call your health care provider right away. Or, return to the emergency department promptly.    All animal bites should be reported to the local animal control center. If you were not given a form to fill out, you can report this yourself.  Follow-up care  Follow up with your health care provider, or as directed.  When to seek medical advice  Call your health care provider right away if any of these occur:    Signs of infection:    Spreading redness or warmth from the wound    Increased pain or swelling    Fever of 100.4 F (38 C) or higher, or as directed by your health care provider    Colored fluid or pus draining from the wound    Signs of rabies infection:    Headache    Confusion    Strange behavior    Seizure    Decreased ability to move any body part near the bite area    Bleeding that cannot be stopped after 5 minutes of firm pressure              8223-4499 [employer]. [address] [place] [address]. All rights reserved. This information is not intended as a substitute for professional medical care. Always follow your healthcare professional's instructions.          Discharge Instructions for Cellulitis  You have been diagnosed with cellulitis. This is an infection in the deepest layer of the skin. In some cases, the infection also affects the muscle. Cellulitis is caused by bacteria. The bacteria can enter the body through broken skin. This can happen with a cut, scratch, animal bite, or an insect bite that has been scratched. You may have been treated in the hospital with antibiotics and fluids. You will likely be given a prescription for antibiotics to take at home. This  sheet will help you take care of yourself at home.  Home Care  When you are home:    Take the prescribed antibiotic medication you are given as directed until it is gone. Take it even if you feel better. It treats the infection and stops it from returning. Not taking all of the medication can make future infections hard to treat.    Keep the infected area clean.    When possible, raise the infected area above the level of your heart. This helps keep swelling down.    Talk to your doctor if you are in pain. Ask what kind of over-the-counter medication you can take for pain.    Apply clean bandages as advised.    Take your temperature once a day for a week.    Wash your hands often to prevent spreading the infection.  In the future, wash your hands before and after you touch cuts, scratches, or bandages. This will help prevent infection.   When to Call Your Doctor  Call your doctor immediately if you have any of the following:    Vomiting    Fever of100.4 F (38 C) or higher, or as directed by your health care provider    Shaking chills    Redness that gets worse in or around the infected area    Swelling of the infected area    Pain that gets worse in or around the infected area    Difficulty or pain when moving the joints above or below the infected area    Discharge or pus draining from the area     7475-9445 [employer]. [address] [place] [address], [place] [address]. All rights reserved. This information is not intended as a substitute for professional medical care. Always follow your healthcare professional's instructions.

## 2017-05-31 ENCOUNTER — TRANSFERRED RECORDS (OUTPATIENT)
Dept: HEALTH INFORMATION MANAGEMENT | Facility: CLINIC | Age: 47
End: 2017-05-31

## 2017-06-05 DIAGNOSIS — F41.0 PANIC DISORDER WITHOUT AGORAPHOBIA: ICD-10-CM

## 2017-06-05 DIAGNOSIS — F41.1 GENERALIZED ANXIETY DISORDER: ICD-10-CM

## 2017-06-06 RX ORDER — LORAZEPAM 1 MG/1
TABLET ORAL
Qty: 90 TABLET | Refills: 0 | Status: SHIPPED | OUTPATIENT
Start: 2017-06-06 | End: 2017-06-08

## 2017-06-06 NOTE — TELEPHONE ENCOUNTER
Prescription(s) signed and in the [facility].  Please process and notify the patient, if needed.

## 2017-06-06 NOTE — TELEPHONE ENCOUNTER
Controlled Substance Refill Request for Ativan  Problem List Complete:  Yes    Last Written Prescription Date:  05/04/2017 -- NOTE-not to exceed 5 additional fills before 09/16/2017.  Last Fill Quantity: 90,   # refills: 0    Last Office Visit with [facility] primary care provider: 03/20/2017    Clinic visit frequency required: None Noted     Future Office visit:     Controlled substance agreement on file: No.     Processing:  Fax Rx to  [facility]/pharmacy #[id number] - [place], [place] - [address]   checked in past 6 months?  No, route to RN

## 2017-06-08 ENCOUNTER — OFFICE VISIT (OUTPATIENT)
Dept: FAMILY MEDICINE | Facility: CLINIC | Age: 47
End: 2017-06-08
Payer: COMMERCIAL

## 2017-06-08 VITALS
WEIGHT: 259 LBS | DIASTOLIC BLOOD PRESSURE: 103 MMHG | HEART RATE: 82 BPM | HEIGHT: 72 IN | OXYGEN SATURATION: 93 % | BODY MASS INDEX: 35.08 KG/M2 | TEMPERATURE: 98.7 F | SYSTOLIC BLOOD PRESSURE: 150 MMHG

## 2017-06-08 DIAGNOSIS — I10 HYPERTENSION GOAL BP (BLOOD PRESSURE) < 140/90: ICD-10-CM

## 2017-06-08 DIAGNOSIS — I10 ESSENTIAL HYPERTENSION WITH GOAL BLOOD PRESSURE LESS THAN 140/90: ICD-10-CM

## 2017-06-08 DIAGNOSIS — F90.2 ATTENTION DEFICIT HYPERACTIVITY DISORDER (ADHD), COMBINED TYPE: ICD-10-CM

## 2017-06-08 DIAGNOSIS — I42.2 HYPERTROPHIC CARDIOMYOPATHY (H): ICD-10-CM

## 2017-06-08 DIAGNOSIS — F41.0 PANIC DISORDER WITHOUT AGORAPHOBIA: ICD-10-CM

## 2017-06-08 DIAGNOSIS — E29.1 HYPOGONADISM MALE: Primary | ICD-10-CM

## 2017-06-08 DIAGNOSIS — F41.1 GENERALIZED ANXIETY DISORDER: ICD-10-CM

## 2017-06-08 DIAGNOSIS — J45.20 INTERMITTENT ASTHMA, UNCOMPLICATED: ICD-10-CM

## 2017-06-08 PROCEDURE — 99214 OFFICE O/P EST MOD 30 MIN: CPT | Performed by: FAMILY MEDICINE

## 2017-06-08 RX ORDER — ALBUTEROL SULFATE 90 UG/1
2 AEROSOL, METERED RESPIRATORY (INHALATION) EVERY 4 HOURS PRN
Qty: 1 INHALER | Refills: 11 | Status: SHIPPED | OUTPATIENT
Start: 2017-06-08 | End: 2018-01-20

## 2017-06-08 RX ORDER — HYDROCHLOROTHIAZIDE 25 MG/1
25 TABLET ORAL DAILY
Qty: 90 TABLET | Refills: 1 | Status: SHIPPED | OUTPATIENT
Start: 2017-06-08 | End: 2017-10-10

## 2017-06-08 RX ORDER — TESTOSTERONE CYPIONATE 200 MG/ML
100 INJECTION, SOLUTION INTRAMUSCULAR WEEKLY
Qty: 2 ML | Refills: 5 | Status: SHIPPED | OUTPATIENT
Start: 2017-06-08 | End: 2017-09-11

## 2017-06-08 RX ORDER — AMLODIPINE BESYLATE 5 MG/1
5 TABLET ORAL DAILY
Qty: 90 TABLET | Refills: 1 | Status: SHIPPED | OUTPATIENT
Start: 2017-06-08 | End: 2017-10-10

## 2017-06-08 RX ORDER — DEXTROAMPHETAMINE SACCHARATE, AMPHETAMINE ASPARTATE, DEXTROAMPHETAMINE SULFATE AND AMPHETAMINE SULFATE 5; 5; 5; 5 MG/1; MG/1; MG/1; MG/1
20 TABLET ORAL 3 TIMES DAILY
Qty: 90 TABLET | Refills: 0 | Status: SHIPPED | OUTPATIENT
Start: 2017-06-08 | End: 2017-06-08

## 2017-06-08 RX ORDER — DEXTROAMPHETAMINE SACCHARATE, AMPHETAMINE ASPARTATE, DEXTROAMPHETAMINE SULFATE AND AMPHETAMINE SULFATE 5; 5; 5; 5 MG/1; MG/1; MG/1; MG/1
20 TABLET ORAL 3 TIMES DAILY
Qty: 90 TABLET | Refills: 0 | Status: SHIPPED | OUTPATIENT
Start: 2017-07-08 | End: 2017-06-08

## 2017-06-08 RX ORDER — LORAZEPAM 1 MG/1
1 TABLET ORAL EVERY 8 HOURS PRN
Qty: 90 TABLET | Refills: 0 | Status: SHIPPED | OUTPATIENT
Start: 2017-07-08 | End: 2017-06-08

## 2017-06-08 RX ORDER — LORAZEPAM 1 MG/1
1 TABLET ORAL EVERY 8 HOURS PRN
Qty: 90 TABLET | Refills: 0 | Status: SHIPPED | OUTPATIENT
Start: 2017-08-08 | End: 2017-06-27

## 2017-06-08 RX ORDER — TESTOSTERONE CYPIONATE 200 MG/ML
100 INJECTION, SOLUTION INTRAMUSCULAR WEEKLY
Qty: 2 ML | Refills: 5 | Status: SHIPPED | OUTPATIENT
Start: 2017-06-08 | End: 2017-06-08

## 2017-06-08 RX ORDER — DEXTROAMPHETAMINE SACCHARATE, AMPHETAMINE ASPARTATE, DEXTROAMPHETAMINE SULFATE AND AMPHETAMINE SULFATE 5; 5; 5; 5 MG/1; MG/1; MG/1; MG/1
20 TABLET ORAL 3 TIMES DAILY
Qty: 90 TABLET | Refills: 0 | Status: SHIPPED | OUTPATIENT
Start: 2017-08-08 | End: 2018-04-03

## 2017-06-08 RX ORDER — ALBUTEROL SULFATE 90 UG/1
2 AEROSOL, METERED RESPIRATORY (INHALATION) EVERY 4 HOURS PRN
Qty: 1 INHALER | Refills: 11 | Status: SHIPPED | OUTPATIENT
Start: 2017-06-08 | End: 2017-06-08

## 2017-06-08 NOTE — PROGRESS NOTES
SUBJECTIVE:                                                    Jeremi Damon is a 46 year old male who presents to clinic today for the following health issues:    Hypertension Follow-up    Low Salt Diet: no added salt    The patient has felt more jittery since increasing the losartan dose to 100 mg. He does drink caffeine.    The patient has been unable to get his heart rate up past 110 bpm when exercising. He feels like this is causing him to be unable to lose weight.     Anxiety Follow-Up    Status since last visit: No change    Other associated symptoms:None    Complicating factors:   Significant life event: No   Current substance abuse: None  Depression symptoms: No  NANCY-7 SCORE 9/2/2016 12/9/2016 1/27/2017   Total Score - - -   Total Score 20 14 20        GAD7     Medication Followup of ADHD/ Adderall    Taking Medication as prescribed: yes    Side Effects:  None    Medication Helping Symptoms:  yes     Back Pain  The patient has been working with a neurologist regarding chronic back pain. He has a history of left-sided low back pain with left-sided sciatica. He recently had a CT scan and it was found that he has a lipoma in his lower back and a bone spur. He is considering back surgery.    Heart Problem:  The patient states that he occasionally feels like his heart skips a beat. This occurs when he is at rest. He does exercise regularly.    Hypotestoterone:  The patient states that he unable to get coverage for his hypotestosterone medications.    Seasonal Allergies:  The patient has been taking benadryl to help with seasonal allergy symptoms.      Problem list and histories reviewed & adjusted, as indicated.  Additional history: as documented      ROS:  Constitutional, HEENT, cardiovascular, pulmonary, GI, , musculoskeletal, neuro, skin, endocrine and psych systems are negative, except as otherwise noted.    This document serves as a record of the services and decisions personally performed and made by  Luis Armando Segovia MD. It was created on his behalf by Uyen Bush, a trained medical scribe. The creation of this document is based on the provider's statements to the medical scribe.  Uyen Bush 2:44 PM 6/8/2017  OBJECTIVE:                                                    BP (!) 150/103  Pulse 82  Temp 98.7  F (37.1  C) (Oral)  Ht 1.829 m (6')  Wt 117.5 kg (259 lb)  SpO2 93%  BMI 35.13 kg/m2 Body mass index is 35.13 kg/(m^2).   GENERAL: healthy, alert, well nourished, well hydrated, no distress  HENT: ear canals- normal; TMs- normal; Nose- normal; Mouth- no ulcers, no lesions  NECK: no tenderness, no adenopathy, no asymmetry, no masses, no stiffness; thyroid- normal to palpation  RESP: lungs clear to auscultation - no rales, no rhonchi, no wheezes  CV: regular rates and rhythm, normal S1 S2, no S3 or S4 and no murmur, no click or rub -  ABDOMEN: soft, no tenderness, no  hepatosplenomegaly, no masses, normal bowel sounds  MS: extremities- no gross deformities noted, no edema  SKIN: no suspicious lesions, no rashes  NEURO: strength and tone- normal, sensory exam- grossly normal, mentation- intact, speech- normal, reflexes- symmetric  BACK: left lower back pain, otherwise no CVA tenderness, no paralumbar tenderness    Diagnostic test results:  none      ASSESSMENT/PLAN:         Jeremi was seen today for recheck medication.    Diagnoses and all orders for this visit:    Hypogonadism male - start depotestoterone injections  -     testosterone cypionate (DEPOTESTOTERONE) 200 MG/ML injection; Inject 0.5 mLs (100 mg) into the muscle once a week - with pharmacist recommended syringes and needles.    Hypertension goal BP (blood pressure) < 140/90 - uncontrolled - continue medication. Start taking amlodipine once daily.    Hypertrophic cardiomyopathy (H)    Intermittent asthma, uncomplicated - controlled - continue medication.  -     fluticasone-salmeterol (ADVAIR DISKUS) 250-50 MCG/DOSE diskus inhaler; INHALE  1 PUFF BY MOUTH TWICE DAILY  -     albuterol (PROAIR HFA/PROVENTIL HFA/VENTOLIN HFA) 108 (90 BASE) MCG/ACT Inhaler; Inhale 2 puffs into the lungs every 4 hours as needed for shortness of breath / dyspnea    Attention deficit hyperactivity disorder (ADHD), combined type - controlled - continue medication.  -     amphetamine-dextroamphetamine (ADDERALL) 20 MG per tablet; Take 1 tablet (20 mg) by mouth 3 times daily    Essential hypertension with goal blood pressure less than 140/90 - uncontrolled - continue medication. Start taking amlodipine once daily.  -     hydrochlorothiazide (HYDRODIURIL) 25 MG tablet; Take 1 tablet (25 mg) by mouth daily  -     amLODIPine (NORVASC) 5 MG tablet; Take 1 tablet (5 mg) by mouth daily    Panic disorder without agoraphobia - controlled - continue medication.  -     LORazepam (ATIVAN) 1 MG tablet; Take 1 tablet (1 mg) by mouth every 8 hours as needed for anxiety    Generalized anxiety disorder - controlled - continue medication.  -     LORazepam (ATIVAN) 1 MG tablet; Take 1 tablet (1 mg) by mouth every 8 hours as needed for anxiety        Risks, benefits and alternatives of treatments discussed. Plan agreed on.      Followup: As needed    Will call, return to clinic, or go to ED if worsening or symptoms not improving as discussed.    See patient instructions.     BMI:   Estimated body mass index is 35.13 kg/(m^2) as calculated from the following:    Height as of this encounter: 1.829 m (6').    Weight as of this encounter: 117.5 kg (259 lb).   Weight management plan: Discussed healthy diet and exercise guidelines and patient will follow up in 12 months in clinic to re-evaluate.      Health Maintenance Topics with due status: Overdue       Topic Date Due    WELLNESS VISIT Q1 YR 07/07/2012    ASTHMA CONTROL TEST Q6 MOS 03/02/2017       Health maintenance reviewed/updated? Yes    The information in this document, created by a scribe for me, accurately reflects the services I personally  performed and the decisions made by me. I have reviewed and approved this document for accuracy.      Nate Segovia MD

## 2017-06-08 NOTE — MR AVS SNAPSHOT
After Visit Summary   6/8/2017    Jeremi Damon    MRN: [id number]           Patient Information     Date Of Birth          1970        Visit Information        Provider Department      6/8/2017 2:20 PM Luis Armando Segovia MD [facility]        Today's Diagnoses     Hypogonadism male    -  1    Hypertension goal BP (blood pressure) < 140/90        Hypertrophic cardiomyopathy (H)        Hypotestosteronism        Intermittent asthma, uncomplicated        Attention deficit hyperactivity disorder (ADHD), combined type        Essential hypertension with goal blood pressure less than 140/90        Panic disorder without agoraphobia        Generalized anxiety disorder           Follow-ups after your visit        Who to contact     If you have questions or need follow up information about today's clinic visit or your schedule please contact [facility] directly at [phone].  Normal or non-critical lab and imaging results will be communicated to you by MyChart, letter or phone within 4 business days after the clinic has received the results. If you do not hear from us within 7 days, please contact the clinic through MyChart or phone. If you have a critical or abnormal lab result, we will notify you by phone as soon as possible.  Submit refill requests through [employer] or call your pharmacy and they will forward the refill request to us. Please allow 3 business days for your refill to be completed.          Additional Information About Your Visit        MyChart Information     [employer] gives you secure access to your electronic health record. If you see a primary care provider, you can also send messages to your care team and make appointments. If you have questions, please call your primary care clinic.  If you do not have a primary care provider, please call [phone] and they will assist you.        Care EveryWhere ID     This is your Care EveryWhere ID. This could be  used by other organizations to access your [facility] medical records  [id number]        Your Vitals Were     Pulse Temperature Height Pulse Oximetry BMI (Body Mass Index)       82 98.7  F (37.1  C) (Oral) 6' (1.829 m) 93% 35.13 kg/m2        Blood Pressure from Last 3 Encounters:   06/08/17 (!) 150/103   05/20/17 (!) 163/106   04/15/17 (!) 171/107    Weight from Last 3 Encounters:   06/08/17 259 lb (117.5 kg)   05/20/17 255 lb (115.7 kg)   04/14/17 256 lb (116.1 kg)              Today, you had the following     No orders found for display         Today's Medication Changes          These changes are accurate as of: 6/8/17  3:08 PM.  If you have any questions, ask your nurse or doctor.               Start taking these medicines.        Dose/Directions    amLODIPine 5 MG tablet   Commonly known as:  NORVASC   Used for:  Essential hypertension with goal blood pressure less than 140/90   Started by:  Luis Armando Segovia MD        Dose:  5 mg   Take 1 tablet (5 mg) by mouth daily   Quantity:  90 tablet   Refills:  1       amphetamine-dextroamphetamine 20 MG per tablet   Commonly known as:  ADDERALL   Used for:  Attention deficit hyperactivity disorder (ADHD), combined type   Started by:  Luis Armando Segovia MD        Dose:  20 mg   Start taking on:  8/8/2017   Take 1 tablet (20 mg) by mouth 3 times daily   Quantity:  90 tablet   Refills:  0       LORazepam 1 MG tablet   Commonly known as:  ATIVAN   Used for:  Panic disorder without agoraphobia, Generalized anxiety disorder   Started by:  Luis Armando Segovia MD        Dose:  1 mg   Start taking on:  8/8/2017   Take 1 tablet (1 mg) by mouth every 8 hours as needed for anxiety   Quantity:  90 tablet   Refills:  0         Stop taking these medicines if you haven't already. Please contact your care team if you have questions.     ALPRAZolam 0.5 MG tablet   Commonly known as:  XANAX   Stopped by:  Luis Armando Segovia MD           mometasone 220 MCG/INH Inhaler   Commonly known as:   ASMANEX 60 METERED DOSES   Stopped by:  Luis Armando Segovia MD           oxyCODONE 5 MG IR tablet   Commonly known as:  ROXICODONE   Stopped by:  Luis Armando Segovia MD           traMADol 50 MG tablet   Commonly known as:  ULTRAM   Stopped by:  Luis Armando Segovia MD                Where to get your medicines      These medications were sent to [facility]/pharmacy #[id number] - [place], [place] - [address]  [address] [address]     Phone:  [phone]     albuterol 108 (90 BASE) MCG/ACT Inhaler    amLODIPine 5 MG tablet    fluticasone-salmeterol 250-50 MCG/DOSE diskus inhaler    hydrochlorothiazide 25 MG tablet         Some of these will need a paper prescription and others can be bought over the counter.  Ask your nurse if you have questions.     Bring a paper prescription for each of these medications     amphetamine-dextroamphetamine 20 MG per tablet    LORazepam 1 MG tablet    testosterone cypionate 200 MG/ML injection                Primary Care Provider Office Phone # Fax #    Luis Armando Segovia [phone]       [facility] [address] [place] [id number]        Thank you!     Thank you for choosing [facility]  for your care. Our goal is always to provide you with excellent care. Hearing back from our patients is one way we can continue to improve our services. Please take a few minutes to complete the written survey that you may receive in the mail after your visit with us. Thank you!             Your Updated Medication List - Protect others around you: Learn how to safely use, store and throw away your medicines at www.disposemymeds.org.          This list is accurate as of: 6/8/17  3:08 PM.  Always use your most recent med list.                   Brand Name Dispense Instructions for use    albuterol 108 (90 BASE) MCG/ACT Inhaler    PROAIR HFA/PROVENTIL HFA/VENTOLIN HFA    1 Inhaler    Inhale 2 puffs into the lungs every 4 hours as needed for shortness of  breath / dyspnea       amitriptyline HCl 150 MG Tabs     90 tablet    TAKE ONE TABLET (150MG) BY MOUTH AT BEDTIME       amLODIPine 5 MG tablet    NORVASC    90 tablet    Take 1 tablet (5 mg) by mouth daily       amphetamine-dextroamphetamine 20 MG per tablet   Start taking on:  8/8/2017    ADDERALL    90 tablet    Take 1 tablet (20 mg) by mouth 3 times daily       ASPIRIN NOT PRESCRIBED    INTENTIONAL     by Other route continuous prn.       cycloSPORINE 0.05 % ophthalmic emulsion    RESTASIS    1 Box    Place 1 drop into both eyes 2 times daily       finasteride 5 MG tablet    PROSCAR    90 tablet    TAKE ONE-HALF TABLET BY MOUTH ONCE DAILY       fluconazole 150 MG tablet    DIFLUCAN    10 tablet    Take 1 tablet (150 mg) by mouth once a week for 4 weeks and then monthly       fluticasone 50 MCG/ACT spray    FLONASE    1 Package    Spray 1-2 sprays into both nostrils daily       fluticasone-salmeterol 250-50 MCG/DOSE diskus inhaler    ADVAIR DISKUS    1 Inhaler    INHALE 1 PUFF BY MOUTH TWICE DAILY       glycerin-hypromellose- 0.2-0.2-1 % Soln ophthalmic solution    ARTIFICIAL TEARS    30 mL    Place 1 drop into both eyes 2 times daily as needed for dry eyes       hydrochlorothiazide 25 MG tablet    HYDRODIURIL    90 tablet    Take 1 tablet (25 mg) by mouth daily       imiquimod 5 % cream    ALDARA    36 packet    Apply  to lesion.   Wash off after 8 hours.   May use for up to 16 weeks.       LORazepam 1 MG tablet   Start taking on:  8/8/2017    ATIVAN    90 tablet    Take 1 tablet (1 mg) by mouth every 8 hours as needed for anxiety       losartan 100 MG tablet    COZAAR    90 tablet    Take 1 tablet (100 mg) by mouth daily       metoprolol 50 MG tablet    LOPRESSOR    180 tablet    Take 1 tablet (50 mg) by mouth 2 times daily       MULTIVITAMIN TABS   OR          nitroglycerin 0.4 MG sublingual tablet    NITROSTAT    25 tablet    Place 1 tablet (0.4 mg) under the tongue every 5 minutes as needed for chest  pain If you are still having symptoms after 3 doses (15 minutes) call 911.       testosterone cypionate 200 MG/ML injection    DEPOTESTOTERONE    2 mL    Inject 0.5 mLs (100 mg) into the muscle once a week - with pharmacist recommended syringes and needles.       Vitamin C 100 MG Tabs     90    1 TABLET 3 TIMES DAILY       zolpidem 10 MG tablet    AMBIEN    30 tablet    Take 1 tablet (10 mg) by mouth nightly as needed for sleep

## 2017-06-08 NOTE — NURSING NOTE
Chief Complaint   Patient presents with     Recheck Medication       Initial BP (!) 144/100 (BP Location: Left arm, Patient Position: Chair, Cuff Size: Adult Large)  Pulse 82  Temp 98.7  F (37.1  C) (Oral)  Ht 6' (1.829 m)  Wt 259 lb (117.5 kg)  SpO2 93%  BMI 35.13 kg/m2 Estimated body mass index is 35.13 kg/(m^2) as calculated from the following:    Height as of this encounter: 6' (1.829 m).    Weight as of this encounter: 259 lb (117.5 kg).  Medication Reconciliation: complete

## 2017-06-09 ASSESSMENT — ASTHMA QUESTIONNAIRES: ACT_TOTALSCORE: 6

## 2017-06-22 ENCOUNTER — TRANSFERRED RECORDS (OUTPATIENT)
Dept: HEALTH INFORMATION MANAGEMENT | Facility: CLINIC | Age: 47
End: 2017-06-22

## 2017-06-23 ENCOUNTER — TELEPHONE (OUTPATIENT)
Dept: FAMILY MEDICINE | Facility: CLINIC | Age: 47
End: 2017-06-23

## 2017-06-23 DIAGNOSIS — F41.1 GENERALIZED ANXIETY DISORDER: ICD-10-CM

## 2017-06-23 DIAGNOSIS — F41.0 PANIC DISORDER WITHOUT AGORAPHOBIA: ICD-10-CM

## 2017-06-23 NOTE — TELEPHONE ENCOUNTER
Pt calling regarding their Ativan prescription.      Pt will be going to [place] or [place] on 7/1 and will be back around 7/15.  Pt notes they are due (around 7/8) in the middle of these dates.  Pt requests if we can extend into this timeframe and have a smaller script next fill?    [phone] can leave a detailed vm. Can fax to [facility] in [place] on [address].    Routing to PCP for further review/recommendations/orders.    Amelia Flores RN

## 2017-06-27 RX ORDER — LORAZEPAM 1 MG/1
1 TABLET ORAL EVERY 8 HOURS PRN
Qty: 90 TABLET | Refills: 0 | Status: SHIPPED | OUTPATIENT
Start: 2017-07-31 | End: 2017-06-27

## 2017-06-27 RX ORDER — LORAZEPAM 1 MG/1
1 TABLET ORAL EVERY 8 HOURS PRN
Qty: 90 TABLET | Refills: 0 | Status: SHIPPED | OUTPATIENT
Start: 2017-06-30 | End: 2017-09-19

## 2017-06-27 NOTE — TELEPHONE ENCOUNTER
He can fill early and then the next refill will not be before 8/8/17 _ prescription already given for that date at his last visit.

## 2017-07-12 ENCOUNTER — TELEPHONE (OUTPATIENT)
Dept: FAMILY MEDICINE | Facility: CLINIC | Age: 47
End: 2017-07-12

## 2017-07-12 NOTE — TELEPHONE ENCOUNTER
Reason for Call:  Same Day Appointment, Requested Provider:  Luis Armando Segovia MD    PCP: Luis Armando Segovia    Reason for visit: Lower back pain    Duration of symptoms: Pt states he drove from [place] and now has back pain and would like to see Dr. Segovia. Pt was told he was scheduled out till Tuesday, and offered urgent care, pt stated he would like to see Dr. Segovia this week.    Have you been treated for this in the past? No    Additional comments:     Can we leave a detailed message on this number? YES    Phone number patient can be reached at: Home number on file [phone] (home)    Best Time:     Call taken on 7/12/2017 at 12:29 PM by Amelia Ellis

## 2017-07-13 DIAGNOSIS — M54.42 LEFT-SIDED LOW BACK PAIN WITH LEFT-SIDED SCIATICA, UNSPECIFIED CHRONICITY: ICD-10-CM

## 2017-07-13 RX ORDER — TRAMADOL HYDROCHLORIDE 50 MG/1
TABLET ORAL
Qty: 30 TABLET | Refills: 0 | Status: CANCELLED | OUTPATIENT
Start: 2017-07-13

## 2017-07-13 RX ORDER — TRAMADOL HYDROCHLORIDE 50 MG/1
50-100 TABLET ORAL 2 TIMES DAILY PRN
Qty: 90 TABLET | Refills: 0 | Status: SHIPPED | OUTPATIENT
Start: 2017-07-13 | End: 2017-10-10

## 2017-07-13 NOTE — TELEPHONE ENCOUNTER
Left message advising patient of appt - advised to call back if does not work    Emerald Zuniga

## 2017-07-13 NOTE — TELEPHONE ENCOUNTER
Reason for Call:  Medication or medication refill:    Do you use a [facility]?  Name of the pharmacy and phone number for the current request:  [facility] in [place] on [address]    Name of the medication requested: Tramadol 50 mg    Other request: Please call or fax it in to [facility]    Can we leave a detailed message on this number? YES    Phone number patient can be reached at: Home number on file [phone] (home)    Best Time: any      Call taken on 7/13/2017 at 10:14 AM by Swati Richards

## 2017-07-13 NOTE — TELEPHONE ENCOUNTER
Patient calling and states that he drove to and from [place] and notes his back his trashed.  States it is stiff and he can not bend over.  States history of surgery and pain shooting down his leg  Back Pain       Duration: Worsening the beginning of this week        Specific cause: sitting in care driving on vacation to and from [place]    Description:   Location of pain: low back, down left leg and into foot  Character of pain: sharp  Pain radiation:radiates into the left leg  New numbness or weakness in legs, not attributed to pain:  no     Intensity: Currently 8/10    History:   Pain interferes with job: Retired however interfering with ADL such as getting into a chair and simple movements  History of back problems: L5 and S1 Fusion  Any previous MRI or X-rays: Yes- at [facility].  Date 3 weeks ago  Sees a specialist for back pain:  Yes, through [facility] pain clninic pain clinic, appointment made for soonest they could get him in was 25.  Therapies tried without relief: cold states any moving is to much right now    Alleviating factors:   Improved by: not improved by anything      Precipitating factors:  Worsened by: Lifting, Bending, Standing, Sitting and Walking    Patient is requesting refill of tramadol  States has used in the past and has been effective in treating.  Requesting refill until seen Monday.  Not sure if his insurance would cover an UC visit and does not think he needs ER at this point.   has long history of back issues.      Please advise,  Medication pended for fill if appropriate,  Natalie Romeo RN  [employer]

## 2017-07-13 NOTE — TELEPHONE ENCOUNTER
Prescription(s) signed and in the [facility].  Please process and notify the patient, if needed.

## 2017-07-13 NOTE — TELEPHONE ENCOUNTER
tramadol      Last Written Prescription Date: 2/10/2017  Last Fill Quantity: 90,  # refills: 0   Last Office Visit with G, P or [facility] prescribing provider: 6/8/2017                                         Next 5 appointments (look out 90 days)     Jul 17, 2017  9:40 AM CDT   Office Visit with Luis Armando Segovia MD   [facility]    [address] [place] [id number] [phone]                  Routing refill request to provider for review/approval because:  Drug not active on patient's medication list, discontinued.  Amelia Flores RN

## 2017-07-25 ENCOUNTER — TRANSFERRED RECORDS (OUTPATIENT)
Dept: HEALTH INFORMATION MANAGEMENT | Facility: CLINIC | Age: 47
End: 2017-07-25

## 2017-08-10 ENCOUNTER — TRANSFERRED RECORDS (OUTPATIENT)
Dept: HEALTH INFORMATION MANAGEMENT | Facility: CLINIC | Age: 47
End: 2017-08-10

## 2017-08-10 LAB — PHQ9 SCORE: 24

## 2017-08-15 DIAGNOSIS — I10 ESSENTIAL HYPERTENSION WITH GOAL BLOOD PRESSURE LESS THAN 140/90: ICD-10-CM

## 2017-08-15 NOTE — TELEPHONE ENCOUNTER
metoprolol (LOPRESSOR) 50 MG tablet      Last Written Prescription Date: 3/20/2017  Last Fill Quantity: 180 tablet, # refills: 1    Last Office Visit with G, [facility] or [facility] prescribing provider:  6/8/2017   Future Office Visit:        BP Readings from Last 3 Encounters:   06/08/17 (!) 150/103   05/20/17 (!) 163/106   04/15/17 (!) 171/107

## 2017-08-17 RX ORDER — METOPROLOL TARTRATE 50 MG
TABLET ORAL
Qty: 180 TABLET | Refills: 0 | Status: SHIPPED | OUTPATIENT
Start: 2017-08-17 | End: 2017-10-10

## 2017-08-17 NOTE — TELEPHONE ENCOUNTER
Limited rx    Due for med check in the next few weeks with RL    BP Readings from Last 3 Encounters:   06/08/17 (!) 150/103   05/20/17 (!) 163/106   04/15/17 (!) 171/107     Creatinine   Date Value Ref Range Status   04/14/2017 1.08 0.66 - 1.25 mg/dL Final

## 2017-08-17 NOTE — TELEPHONE ENCOUNTER
Routing refill request to provider for review/approval because:  Labs out of range:  BP      REJI Bonner, RN, PHN  [facility]  Ph) [phone]

## 2017-09-06 ENCOUNTER — TELEPHONE (OUTPATIENT)
Dept: LAB | Facility: CLINIC | Age: 47
End: 2017-09-06

## 2017-09-07 ENCOUNTER — TRANSFERRED RECORDS (OUTPATIENT)
Dept: HEALTH INFORMATION MANAGEMENT | Facility: CLINIC | Age: 47
End: 2017-09-07

## 2017-09-11 DIAGNOSIS — E29.1 HYPOGONADISM MALE: ICD-10-CM

## 2017-09-12 RX ORDER — TESTOSTERONE CYPIONATE 200 MG/ML
100 INJECTION, SOLUTION INTRAMUSCULAR WEEKLY
Qty: 10 ML | Refills: 1 | Status: SHIPPED | OUTPATIENT
Start: 2017-09-12 | End: 2017-11-16

## 2017-09-12 RX ORDER — TESTOSTERONE CYPIONATE 100 MG/ML
INJECTION, SOLUTION INTRAMUSCULAR
Refills: 3 | OUTPATIENT
Start: 2017-09-12

## 2017-09-12 NOTE — TELEPHONE ENCOUNTER
Called Pt - requested rx be [facility]ed to [facility] [address].   Faxed 09/17/2017 - 5:12pm  Sophie Virgen CMA

## 2017-09-18 ENCOUNTER — TELEPHONE (OUTPATIENT)
Dept: FAMILY MEDICINE | Facility: CLINIC | Age: 47
End: 2017-09-18

## 2017-09-18 DIAGNOSIS — J45.30 MILD PERSISTENT ASTHMA WITHOUT COMPLICATION: Primary | ICD-10-CM

## 2017-09-18 DIAGNOSIS — I10 ESSENTIAL HYPERTENSION WITH GOAL BLOOD PRESSURE LESS THAN 140/90: ICD-10-CM

## 2017-09-18 RX ORDER — AMLODIPINE BESYLATE 5 MG/1
TABLET ORAL
Qty: 90 TABLET | Refills: 0 | OUTPATIENT
Start: 2017-09-18

## 2017-09-18 NOTE — TELEPHONE ENCOUNTER
Advair not covered . Preferred products Dulera or Symbicort Inhalers.  Please advise . Need new order  Thank-you    [facility] pharmacy  Jennifer Guerrero LPN

## 2017-09-19 DIAGNOSIS — F41.1 GENERALIZED ANXIETY DISORDER: ICD-10-CM

## 2017-09-19 DIAGNOSIS — F41.0 PANIC DISORDER WITHOUT AGORAPHOBIA: ICD-10-CM

## 2017-09-19 RX ORDER — BUDESONIDE AND FORMOTEROL FUMARATE DIHYDRATE 160; 4.5 UG/1; UG/1
2 AEROSOL RESPIRATORY (INHALATION) 2 TIMES DAILY
Qty: 3 INHALER | Refills: 1 | Status: SHIPPED | OUTPATIENT
Start: 2017-09-19 | End: 2018-12-28

## 2017-09-19 RX ORDER — LORAZEPAM 1 MG/1
TABLET ORAL
Qty: 90 TABLET | Refills: 0 | Status: SHIPPED | OUTPATIENT
Start: 2017-09-19 | End: 2017-10-10

## 2017-09-19 NOTE — TELEPHONE ENCOUNTER
Prescription(s) signed and in the [facility].  Please process and notify the patient, if needed.

## 2017-09-19 NOTE — TELEPHONE ENCOUNTER
Controlled Substance Refill Request for Lorazepam  Problem List Complete:  Yes    Last Written Prescription Date:  06/30/2017  Last Fill Quantity: 90,   # refills: 0    Last Office Visit with [facility] primary care provider: 07/17/2017    Clinic visit frequency required: none noted     Future Office visit:     Controlled substance agreement on file: No.     Processing:  [id number] [place] [place] [facility] [address]     checked in past 6 months?  No, route to RN

## 2017-09-27 ENCOUNTER — TELEPHONE (OUTPATIENT)
Dept: FAMILY MEDICINE | Facility: CLINIC | Age: 47
End: 2017-09-27

## 2017-09-27 DIAGNOSIS — G47.00 INSOMNIA, UNSPECIFIED TYPE: ICD-10-CM

## 2017-09-27 RX ORDER — AMITRIPTYLINE HYDROCHLORIDE 150 MG/1
TABLET ORAL
Qty: 90 TABLET | Refills: 0 | Status: SHIPPED | OUTPATIENT
Start: 2017-09-27 | End: 2017-12-24

## 2017-09-27 NOTE — TELEPHONE ENCOUNTER
Pt calling wanting their amitriptyline filled.  Ok per protocol to fill.    Pt also scheduled for RL follow up next week.    Amelia Flores RN  [facility]

## 2017-10-03 ENCOUNTER — TRANSFERRED RECORDS (OUTPATIENT)
Dept: HEALTH INFORMATION MANAGEMENT | Facility: CLINIC | Age: 47
End: 2017-10-03

## 2017-10-10 ENCOUNTER — OFFICE VISIT (OUTPATIENT)
Dept: FAMILY MEDICINE | Facility: CLINIC | Age: 47
End: 2017-10-10
Payer: MEDICARE

## 2017-10-10 VITALS
HEART RATE: 100 BPM | SYSTOLIC BLOOD PRESSURE: 160 MMHG | BODY MASS INDEX: 35.08 KG/M2 | TEMPERATURE: 98.3 F | WEIGHT: 259 LBS | HEIGHT: 72 IN | OXYGEN SATURATION: 92 % | DIASTOLIC BLOOD PRESSURE: 102 MMHG

## 2017-10-10 DIAGNOSIS — F33.1 MAJOR DEPRESSIVE DISORDER, RECURRENT EPISODE, MODERATE (H): ICD-10-CM

## 2017-10-10 DIAGNOSIS — M54.42 LEFT-SIDED LOW BACK PAIN WITH LEFT-SIDED SCIATICA, UNSPECIFIED CHRONICITY: ICD-10-CM

## 2017-10-10 DIAGNOSIS — N18.30 CKD (CHRONIC KIDNEY DISEASE) STAGE 3, GFR 30-59 ML/MIN (H): ICD-10-CM

## 2017-10-10 DIAGNOSIS — Z00.00 ROUTINE GENERAL MEDICAL EXAMINATION AT A HEALTH CARE FACILITY: Primary | ICD-10-CM

## 2017-10-10 DIAGNOSIS — Z23 NEED FOR PROPHYLACTIC VACCINATION AND INOCULATION AGAINST INFLUENZA: ICD-10-CM

## 2017-10-10 DIAGNOSIS — J45.30 MILD PERSISTENT ASTHMA WITHOUT COMPLICATION: ICD-10-CM

## 2017-10-10 DIAGNOSIS — F33.8 SEASONAL AFFECTIVE DISORDER (H): ICD-10-CM

## 2017-10-10 DIAGNOSIS — Z12.11 SCREENING FOR COLON CANCER: ICD-10-CM

## 2017-10-10 DIAGNOSIS — R19.5 LOOSE STOOLS: ICD-10-CM

## 2017-10-10 DIAGNOSIS — D58.2 ELEVATED HEMOGLOBIN (H): ICD-10-CM

## 2017-10-10 DIAGNOSIS — I10 HYPERTENSION GOAL BP (BLOOD PRESSURE) < 140/90: ICD-10-CM

## 2017-10-10 DIAGNOSIS — I10 ESSENTIAL HYPERTENSION WITH GOAL BLOOD PRESSURE LESS THAN 140/90: ICD-10-CM

## 2017-10-10 DIAGNOSIS — F41.1 GENERALIZED ANXIETY DISORDER: ICD-10-CM

## 2017-10-10 DIAGNOSIS — R19.7 DIARRHEA, UNSPECIFIED TYPE: ICD-10-CM

## 2017-10-10 DIAGNOSIS — F41.0 PANIC DISORDER WITHOUT AGORAPHOBIA: ICD-10-CM

## 2017-10-10 DIAGNOSIS — E29.1 HYPOGONADISM IN MALE: ICD-10-CM

## 2017-10-10 LAB
BASOPHILS # BLD AUTO: 0 10E9/L (ref 0–0.2)
BASOPHILS NFR BLD AUTO: 0.3 %
DIFFERENTIAL METHOD BLD: ABNORMAL
EOSINOPHIL # BLD AUTO: 0.4 10E9/L (ref 0–0.7)
EOSINOPHIL NFR BLD AUTO: 5.7 %
ERYTHROCYTE [DISTWIDTH] IN BLOOD BY AUTOMATED COUNT: 15.6 % (ref 10–15)
HCT VFR BLD AUTO: 53.9 % (ref 40–53)
HGB BLD-MCNC: 17.6 G/DL (ref 13.3–17.7)
LYMPHOCYTES # BLD AUTO: 1.6 10E9/L (ref 0.8–5.3)
LYMPHOCYTES NFR BLD AUTO: 22.5 %
MCH RBC QN AUTO: 28.9 PG (ref 26.5–33)
MCHC RBC AUTO-ENTMCNC: 32.7 G/DL (ref 31.5–36.5)
MCV RBC AUTO: 88 FL (ref 78–100)
MONOCYTES # BLD AUTO: 0.8 10E9/L (ref 0–1.3)
MONOCYTES NFR BLD AUTO: 11.5 %
NEUTROPHILS # BLD AUTO: 4.3 10E9/L (ref 1.6–8.3)
NEUTROPHILS NFR BLD AUTO: 60 %
PLATELET # BLD AUTO: 193 10E9/L (ref 150–450)
RBC # BLD AUTO: 6.1 10E12/L (ref 4.4–5.9)
WBC # BLD AUTO: 7.2 10E9/L (ref 4–11)

## 2017-10-10 PROCEDURE — 36415 COLL VENOUS BLD VENIPUNCTURE: CPT | Performed by: FAMILY MEDICINE

## 2017-10-10 PROCEDURE — 84270 ASSAY OF SEX HORMONE GLOBUL: CPT | Performed by: FAMILY MEDICINE

## 2017-10-10 PROCEDURE — 80061 LIPID PANEL: CPT | Performed by: FAMILY MEDICINE

## 2017-10-10 PROCEDURE — 90686 IIV4 VACC NO PRSV 0.5 ML IM: CPT | Performed by: FAMILY MEDICINE

## 2017-10-10 PROCEDURE — 84403 ASSAY OF TOTAL TESTOSTERONE: CPT | Performed by: FAMILY MEDICINE

## 2017-10-10 PROCEDURE — 99213 OFFICE O/P EST LOW 20 MIN: CPT | Mod: 25 | Performed by: FAMILY MEDICINE

## 2017-10-10 PROCEDURE — 85025 COMPLETE CBC W/AUTO DIFF WBC: CPT | Performed by: FAMILY MEDICINE

## 2017-10-10 PROCEDURE — 80053 COMPREHEN METABOLIC PANEL: CPT | Performed by: FAMILY MEDICINE

## 2017-10-10 PROCEDURE — G0008 ADMIN INFLUENZA VIRUS VAC: HCPCS | Performed by: FAMILY MEDICINE

## 2017-10-10 PROCEDURE — 99396 PREV VISIT EST AGE 40-64: CPT | Mod: 25 | Performed by: FAMILY MEDICINE

## 2017-10-10 RX ORDER — AMLODIPINE BESYLATE 5 MG/1
5 TABLET ORAL DAILY
Qty: 90 TABLET | Refills: 1 | Status: SHIPPED | OUTPATIENT
Start: 2017-10-10 | End: 2017-10-10

## 2017-10-10 RX ORDER — LOPERAMIDE HYDROCHLORIDE 2 MG/1
2 TABLET ORAL 3 TIMES DAILY PRN
Qty: 90 TABLET | Refills: 1 | Status: SHIPPED | OUTPATIENT
Start: 2017-10-10 | End: 2018-02-16

## 2017-10-10 RX ORDER — AMLODIPINE BESYLATE 10 MG/1
10 TABLET ORAL DAILY
Qty: 90 TABLET | Refills: 1 | Status: SHIPPED | OUTPATIENT
Start: 2017-10-10 | End: 2018-09-11

## 2017-10-10 RX ORDER — METOPROLOL TARTRATE 50 MG
50 TABLET ORAL 2 TIMES DAILY
Qty: 180 TABLET | Refills: 1 | Status: SHIPPED | OUTPATIENT
Start: 2017-10-10 | End: 2018-04-10

## 2017-10-10 RX ORDER — OXYCODONE HYDROCHLORIDE 10 MG/1
10-20 TABLET ORAL EVERY 6 HOURS PRN
Qty: 100 TABLET | Refills: 0 | Status: SHIPPED | OUTPATIENT
Start: 2017-10-10 | End: 2017-11-02

## 2017-10-10 RX ORDER — BUPROPION HYDROCHLORIDE 150 MG/1
150 TABLET ORAL EVERY MORNING
Qty: 90 TABLET | Refills: 1 | Status: SHIPPED | OUTPATIENT
Start: 2017-10-10 | End: 2018-04-10

## 2017-10-10 RX ORDER — LOSARTAN POTASSIUM 100 MG/1
100 TABLET ORAL DAILY
Qty: 90 TABLET | Refills: 1 | Status: SHIPPED | OUTPATIENT
Start: 2017-10-10 | End: 2018-09-11

## 2017-10-10 RX ORDER — HYDROCHLOROTHIAZIDE 25 MG/1
25 TABLET ORAL DAILY
Qty: 90 TABLET | Refills: 1 | Status: SHIPPED | OUTPATIENT
Start: 2017-10-10 | End: 2018-04-10

## 2017-10-10 RX ORDER — LORAZEPAM 1 MG/1
1 TABLET ORAL 2 TIMES DAILY PRN
Qty: 60 TABLET | Refills: 0 | Status: SHIPPED | OUTPATIENT
Start: 2017-10-19 | End: 2017-11-08

## 2017-10-10 RX ORDER — CLONAZEPAM 1 MG/1
0.5-1 TABLET ORAL DAILY
Qty: 30 TABLET | Refills: 0 | Status: SHIPPED | OUTPATIENT
Start: 2017-10-10 | End: 2017-11-16

## 2017-10-10 NOTE — MR AVS SNAPSHOT
After Visit Summary   10/10/2017    Jeremi Damon    MRN: [id number]           Patient Information     Date Of Birth          1970        Visit Information        Provider Department      10/10/2017 10:40 AM Luis Armando Segovia MD [facility] [place]        Today's Diagnoses     Routine general medical examination at a health care facility    -  1    Essential hypertension with goal blood pressure less than 140/90        Elevated hemoglobin (H)        Left-sided low back pain with left-sided sciatica, unspecified chronicity        Mild persistent asthma without complication        Major Depressive Disorder, Recurrent Episode, Mode        CKD (chronic kidney disease) stage 3, GFR 30-59 ml/min        Hypertension goal BP (blood pressure) < 140/90        Generalized anxiety disorder        Need for prophylactic vaccination and inoculation against influenza        Screening for colon cancer        Seasonal affective disorder (H)        Panic disorder without agoraphobia        Hypogonadism in male        Diarrhea, unspecified type        Loose stools          Care Instructions      Preventive Health Recommendations  Male Ages 40 to 49    Yearly exam:             See your health care provider every year in order to  o   Review health changes.   o   Discuss preventive care.    o   Review your medicines if your doctor has prescribed any.    You should be tested each year for STDs (sexually transmitted diseases) if you re at risk.     Have a cholesterol test every 5 years.     Have a colonoscopy (test for colon cancer) if someone in your family has had colon cancer or polyps before age 50.     After age 45, have a diabetes test (fasting glucose). If you are at risk for diabetes, you should have this test every 3 years.      Talk with your health care provider about whether or not a prostate cancer screening test (PSA) is right for you.    Shots: Get a flu shot each year. Get a tetanus shot every 10  years.     Nutrition:    Eat at least 5 servings of fruits and vegetables daily.     Eat whole-grain bread, whole-wheat pasta and brown rice instead of white grains and rice.     Talk to your provider about Calcium and Vitamin D.     Lifestyle    Exercise for at least 150 minutes a week (30 minutes a day, 5 days a week). This will help you control your weight and prevent disease.     Limit alcohol to one drink per day.     No smoking.     Wear sunscreen to prevent skin cancer.     See your dentist every six months for an exam and cleaning.              Follow-ups after your visit        Future tests that were ordered for you today     Open Future Orders        Priority Expected Expires Ordered    Fecal colorectal cancer screen (FIT) Routine 10/31/2017 1/2/2018 10/10/2017            Who to contact     If you have questions or need follow up information about today's clinic visit or your schedule please contact [facility] directly at [phone].  Normal or non-critical lab and imaging results will be communicated to you by [employer], letter or phone within 4 business days after the clinic has received the results. If you do not hear from us within 7 days, please contact the clinic through [employer] or phone. If you have a critical or abnormal lab result, we will notify you by phone as soon as possible.  Submit refill requests through [employer] or call your pharmacy and they will forward the refill request to us. Please allow 3 business days for your refill to be completed.          Additional Information About Your Visit        [employer] Information     [employer] gives you secure access to your electronic health record. If you see a primary care provider, you can also send messages to your care team and make appointments. If you have questions, please call your primary care clinic.  If you do not have a primary care provider, please call [phone] and they will assist you.        Care EveryWhere ID      This is your Care EveryWhere ID. This could be used by other organizations to access your [place] medical records  [id number]        Your Vitals Were     Pulse Temperature Height Pulse Oximetry BMI (Body Mass Index)       100 98.3  F (36.8  C) (Oral) 6' (1.829 m) 92% 35.13 kg/m2        Blood Pressure from Last 3 Encounters:   10/10/17 (!) 160/102   06/08/17 (!) 150/103   05/20/17 (!) 163/106    Weight from Last 3 Encounters:   10/10/17 259 lb (117.5 kg)   06/08/17 259 lb (117.5 kg)   05/20/17 255 lb (115.7 kg)              We Performed the Following     CBC with platelets and differential     Comprehensive metabolic panel (BMP + Alb, Alk Phos, ALT, AST, Total. Bili, TP)     HC FLU VAC PRESRV FREE QUAD SPLIT VIR 3+YRS IM  [13082]     HC FLU VAC PRESRV FREE QUAD SPLIT VIR 3+YRS IM     Lipid panel reflex to direct LDL     Testosterone Free and Total     VACCINE ADMINISTRATION, INITIAL          Today's Medication Changes          These changes are accurate as of: 10/10/17 11:48 AM.  If you have any questions, ask your nurse or doctor.               Start taking these medicines.        Dose/Directions    amLODIPine 10 MG tablet   Commonly known as:  NORVASC   Used for:  Essential hypertension with goal blood pressure less than 140/90   Started by:  Luis Armando Segovia MD        Dose:  10 mg   Take 1 tablet (10 mg) by mouth daily   Quantity:  90 tablet   Refills:  1       buPROPion 150 MG 24 hr tablet   Commonly known as:  WELLBUTRIN XL   Used for:  Generalized anxiety disorder, Seasonal affective disorder (H), Major depressive disorder, recurrent episode, moderate (H)   Started by:  Luis Armando Segovia MD        Dose:  150 mg   Take 1 tablet (150 mg) by mouth every morning   Quantity:  90 tablet   Refills:  1       clonazePAM 1 MG tablet   Commonly known as:  klonoPIN   Used for:  Generalized anxiety disorder   Started by:  Luis Armando Segovia MD        Dose:  0.5-1 mg   Take 0.5-1 tablets (0.5-1 mg) by mouth daily  "  Quantity:  30 tablet   Refills:  0       loperamide 2 MG tablet   Commonly known as:  IMODIUM A-D   Used for:  Loose stools   Started by:  Luis Armando Segovia MD        Dose:  2 mg   Take 1 tablet (2 mg) by mouth 3 times daily as needed for diarrhea   Quantity:  90 tablet   Refills:  1       oxyCODONE 10 MG IR tablet   Commonly known as:  ROXICODONE   Used for:  Left-sided low back pain with left-sided sciatica, unspecified chronicity   Started by:  Luis Armando Segovia MD        Dose:  10-20 mg   Take 1-2 tablets (10-20 mg) by mouth every 6 hours as needed for severe pain - max 6 tabs / day   Quantity:  100 tablet   Refills:  0       Syringe Luer Lock 20G X 1-1/2\" 3 ML Misc   Used for:  Hypogonadism in male   Started by:  Luis Armando Segovia MD        Dose:  1 Device   1 Device once a week - and also needs 22 G needles 1.5 inch #30 with 1 refill   Quantity:  30 each   Refills:  1         These medicines have changed or have updated prescriptions.        Dose/Directions    LORazepam 1 MG tablet   Commonly known as:  ATIVAN   This may have changed:  See the new instructions.   Used for:  Panic disorder without agoraphobia, Generalized anxiety disorder   Changed by:  Luis Armando Segovia MD        Dose:  1 mg   Start taking on:  10/19/2017   Take 1 tablet (1 mg) by mouth 2 times daily as needed for anxiety   Quantity:  60 tablet   Refills:  0       metoprolol 50 MG tablet   Commonly known as:  LOPRESSOR   This may have changed:  See the new instructions.   Used for:  Essential hypertension with goal blood pressure less than 140/90   Changed by:  Luis Armando Segovia MD        Dose:  50 mg   Take 1 tablet (50 mg) by mouth 2 times daily   Quantity:  180 tablet   Refills:  1         Stop taking these medicines if you haven't already. Please contact your care team if you have questions.     traMADol 50 MG tablet   Commonly known as:  ULTRAM   Stopped by:  Luis Armando Segovia MD           zolpidem 10 MG tablet   Commonly known as:  " "AMBIEN   Stopped by:  Luis Armando Segovia MD                Where to get your medicines      These medications were sent to [id number] - [place], [place] - [address]  [address] [address]     Phone:  [phone]     amLODIPine 10 MG tablet    buPROPion 150 MG 24 hr tablet    hydrochlorothiazide 25 MG tablet    loperamide 2 MG tablet    losartan 100 MG tablet    metoprolol 50 MG tablet    Syringe Luer Lock 20G X 1-1/2\" 3 ML Misc         Some of these will need a paper prescription and others can be bought over the counter.  Ask your nurse if you have questions.     Bring a paper prescription for each of these medications     clonazePAM 1 MG tablet    LORazepam 1 MG tablet    oxyCODONE 10 MG IR tablet                Primary Care Provider Office Phone # Fax #    Luis Armando Segovia [phone] [address] [address]        Equal Access to Services     [facility]: Hadii aad ku hadasho Soomaali, waaxda luqadaha, qaybta kaalmada adeegyada, waxay idiin haynedn andre zelaya . So [facility] [phone].    ATENCIÓN: Si habla español, tiene a lake disposición servicios gratuitos de asistencia lingüística. Joslyn al [phone].    We comply with applicable federal civil rights laws and [place] laws. We do not discriminate on the basis of race, color, national origin, age, disability, sex, sexual orientation, or gender identity.            Thank you!     Thank you for choosing [facility]  for your care. Our goal is always to provide you with excellent care. Hearing back from our patients is one way we can continue to improve our services. Please take a few minutes to complete the written survey that you may receive in the mail after your visit with us. Thank you!             Your Updated Medication List - Protect others around you: Learn how to safely use, store and throw away your medicines at www.disposemymeds.org.          This list is " accurate as of: 10/10/17 11:48 AM.  Always use your most recent med list.                   Brand Name Dispense Instructions for use Diagnosis    albuterol 108 (90 BASE) MCG/ACT Inhaler    PROAIR HFA/PROVENTIL HFA/VENTOLIN HFA    1 Inhaler    Inhale 2 puffs into the lungs every 4 hours as needed for shortness of breath / dyspnea    Intermittent asthma, uncomplicated       amitriptyline HCl 150 MG Tabs     90 tablet    TAKE ONE TABLET (150MG) BY MOUTH AT BEDTIME    Insomnia, unspecified type       amLODIPine 10 MG tablet    NORVASC    90 tablet    Take 1 tablet (10 mg) by mouth daily    Essential hypertension with goal blood pressure less than 140/90       amphetamine-dextroamphetamine 20 MG per tablet    ADDERALL    90 tablet    Take 1 tablet (20 mg) by mouth 3 times daily    Attention deficit hyperactivity disorder (ADHD), combined type       ASPIRIN NOT PRESCRIBED    INTENTIONAL     by Other route continuous prn.        budesonide-formoterol 160-4.5 MCG/ACT Inhaler    SYMBICORT    3 Inhaler    Inhale 2 puffs into the lungs 2 times daily    Mild persistent asthma without complication       buPROPion 150 MG 24 hr tablet    WELLBUTRIN XL    90 tablet    Take 1 tablet (150 mg) by mouth every morning    Generalized anxiety disorder, Seasonal affective disorder (H), Major depressive disorder, recurrent episode, moderate (H)       clonazePAM 1 MG tablet    klonoPIN    30 tablet    Take 0.5-1 tablets (0.5-1 mg) by mouth daily    Generalized anxiety disorder       cycloSPORINE 0.05 % ophthalmic emulsion    RESTASIS    1 Box    Place 1 drop into both eyes 2 times daily    Dry eyes, bilateral       finasteride 5 MG tablet    PROSCAR    90 tablet    TAKE ONE-HALF TABLET BY MOUTH ONCE DAILY    Alopecia       fluconazole 150 MG tablet    DIFLUCAN    10 tablet    Take 1 tablet (150 mg) by mouth once a week for 4 weeks and then monthly    Tinea versicolor       fluticasone 50 MCG/ACT spray    FLONASE    1 Package    Spray 1-2  "sprays into both nostrils daily    Nasal congestion       glycerin-hypromellose- 0.2-0.2-1 % Soln ophthalmic solution    ARTIFICIAL TEARS    30 mL    Place 1 drop into both eyes 2 times daily as needed for dry eyes    Dry eyes       hydrochlorothiazide 25 MG tablet    HYDRODIURIL    90 tablet    Take 1 tablet (25 mg) by mouth daily    Essential hypertension with goal blood pressure less than 140/90       imiquimod 5 % cream    ALDARA    36 packet    Apply  to lesion.   Wash off after 8 hours.   May use for up to 16 weeks.    Flat wart       loperamide 2 MG tablet    IMODIUM A-D    90 tablet    Take 1 tablet (2 mg) by mouth 3 times daily as needed for diarrhea    Loose stools       LORazepam 1 MG tablet   Start taking on:  10/19/2017    ATIVAN    60 tablet    Take 1 tablet (1 mg) by mouth 2 times daily as needed for anxiety    Panic disorder without agoraphobia, Generalized anxiety disorder       losartan 100 MG tablet    COZAAR    90 tablet    Take 1 tablet (100 mg) by mouth daily    Essential hypertension with goal blood pressure less than 140/90       metoprolol 50 MG tablet    LOPRESSOR    180 tablet    Take 1 tablet (50 mg) by mouth 2 times daily    Essential hypertension with goal blood pressure less than 140/90       MULTIVITAMIN TABS   OR           nitroGLYcerin 0.4 MG sublingual tablet    NITROSTAT    25 tablet    Place 1 tablet (0.4 mg) under the tongue every 5 minutes as needed for chest pain If you are still having symptoms after 3 doses (15 minutes) call 911.    Other chest pain       oxyCODONE 10 MG IR tablet    ROXICODONE    100 tablet    Take 1-2 tablets (10-20 mg) by mouth every 6 hours as needed for severe pain - max 6 tabs / day    Left-sided low back pain with left-sided sciatica, unspecified chronicity       Syringe Luer Lock 20G X 1-1/2\" 3 ML Misc     30 each    1 Device once a week - and also needs 22 G needles 1.5 inch #30 with 1 refill    Hypogonadism in male       testosterone " cypionate 200 MG/ML injection    DEPOTESTOTERONE    10 mL    Inject 0.5 mLs (100 mg) into the muscle once a week    Hypogonadism male       Vitamin C 100 MG Tabs     90    1 TABLET 3 TIMES DAILY

## 2017-10-10 NOTE — NURSING NOTE
Chief Complaint   Patient presents with     Physical     non-fasting     Initial BP (!) 160/102 (BP Location: Left arm, Patient Position: Chair, Cuff Size: Adult Large)  Pulse 100  Temp 98.3  F (36.8  C) (Oral)  Ht 6' (1.829 m)  Wt 259 lb (117.5 kg)  SpO2 92%  BMI 35.13 kg/m2 Estimated body mass index is 35.13 kg/(m^2) as calculated from the following:    Height as of this encounter: 6' (1.829 m).    Weight as of this encounter: 259 lb (117.5 kg).  BP completed using cuff size large left Arm  Lisa [facility] CMA

## 2017-10-10 NOTE — PROGRESS NOTES
"  SUBJECTIVE:                                                    Jeremi Damon is a 47 year old male who presents to clinic today for the following health issues:    Medication Followup of ***    Taking Medication as prescribed: {.:396672::\"yes\"}    Side Effects:  {NONEORCHOOSE:695549::\"None\"}    Medication Helping Symptoms:  {.:709369::\"yes\"}         Problem list and histories reviewed & adjusted, as indicated.  Additional history: as documented    ROS:  Constitutional, HEENT, cardiovascular, pulmonary, GI, , musculoskeletal, neuro, skin, endocrine and psych systems are negative, except as otherwise noted.    OBJECTIVE:                                                    There were no vitals taken for this visit. There is no height or weight on file to calculate BMI.   {.:127720::\"GENERAL: healthy, alert, well nourished, well hydrated, no distress\",\"HENT: ear canals- normal; TMs- normal; Nose- normal; Mouth- no ulcers, no lesions\",\"NECK: no tenderness, no adenopathy, no asymmetry, no masses, no stiffness; thyroid- normal to palpation\",\"RESP: lungs clear to auscultation - no rales, no rhonchi, no wheezes\",\"CV: regular rates and rhythm, normal S1 S2, no S3 or S4 and no murmur, no click or rub -\",\"ABDOMEN: soft, no tenderness, no  hepatosplenomegaly, no masses, normal bowel sounds\"}  Diagnostic test results:  {DIAGNOSTIC TEST RESULTS:142534::\"none \"}     ASSESSMENT/PLAN:         There are no diagnoses linked to this encounter.    Risks, benefits and alternatives of treatments discussed. Plan agreed on.      Followup:***    Will call, return to clinic, or go to ED if worsening or symptoms not improving as discussed.    See patient instructions.     {Quality Requirements:515070}    Health Maintenance Topics with due status: Overdue       Topic Date Due    WELLNESS VISIT Q1 YR 07/07/2012    INFLUENZA VACCINE (SYSTEM ASSIGNED) 09/01/2017       Health maintenance reviewed/updated? Yes      Nate Segovia MD   "

## 2017-10-11 LAB
ALBUMIN SERPL-MCNC: 3.7 G/DL (ref 3.4–5)
ALP SERPL-CCNC: 79 U/L (ref 40–150)
ALT SERPL W P-5'-P-CCNC: 94 U/L (ref 0–70)
ANION GAP SERPL CALCULATED.3IONS-SCNC: 8 MMOL/L (ref 3–14)
AST SERPL W P-5'-P-CCNC: 85 U/L (ref 0–45)
BILIRUB SERPL-MCNC: 0.7 MG/DL (ref 0.2–1.3)
BUN SERPL-MCNC: 14 MG/DL (ref 7–30)
CALCIUM SERPL-MCNC: 8.5 MG/DL (ref 8.5–10.1)
CHLORIDE SERPL-SCNC: 104 MMOL/L (ref 94–109)
CHOLEST SERPL-MCNC: 139 MG/DL
CO2 SERPL-SCNC: 30 MMOL/L (ref 20–32)
CREAT SERPL-MCNC: 1.62 MG/DL (ref 0.66–1.25)
GFR SERPL CREATININE-BSD FRML MDRD: 45 ML/MIN/1.7M2
GLUCOSE SERPL-MCNC: 69 MG/DL (ref 70–99)
HDLC SERPL-MCNC: 28 MG/DL
LDLC SERPL CALC-MCNC: 63 MG/DL
NONHDLC SERPL-MCNC: 111 MG/DL
POTASSIUM SERPL-SCNC: 4.3 MMOL/L (ref 3.4–5.3)
PROT SERPL-MCNC: 7.2 G/DL (ref 6.8–8.8)
SODIUM SERPL-SCNC: 142 MMOL/L (ref 133–144)
TRIGL SERPL-MCNC: 239 MG/DL

## 2017-10-11 ASSESSMENT — ASTHMA QUESTIONNAIRES: ACT_TOTALSCORE: 18

## 2017-10-12 LAB
SHBG SERPL-SCNC: 13 NMOL/L (ref 11–80)
TESTOST FREE SERPL-MCNC: 12.65 NG/DL (ref 4.7–24.4)
TESTOST SERPL-MCNC: 411 NG/DL (ref 240–950)

## 2017-10-16 NOTE — PROGRESS NOTES
Dear Jeremi,    Here is a summary of your recent test results:  -Liver and gallbladder tests (ALT,AST, Alk phos,bilirubin) are silightly elevated.   -Kidney function (GFR) is decreased.  ADVISE: recheck in 3 months (BMP, DX: 593.9 - unspecified disorder of kidney )  -Sodium is normal.  -Potassium is normal.  -Glucose (diabetic screening test) is normal (actually slight low from fasting).  -LDL(bad) cholesterol level is normal.  -HDL(good) cholesterol level is low and your triglycerides are elevated which can increase your heart disease risk.  A diet high in fat and simple carbohydrates, genetics and being overweight can contribute to this.   ADVISE: a regular exercise program with at least 30 minutes of aerobic exercise 3-4 days/week ( 45 minutes 4-6 days/week if weight loss needed), and omega-3 fatty acids (fish oil) 3756-2200 mg daily are helpful to improve this.  Rechecking your cholesterol in 12 months is recommended (LIPID w/ LDL reflex, DX: low HDL).  -Normal red blood cell (hgb) levels, normal white blood cell count and normal platelet levels.  -Testosterone levels are in the normal range.    For additional lab test information, labtestsonline.org is an excellent reference.             Thank you very much for trusting me and [facility].     Healthy regards,  Nate Segovia MD

## 2017-10-19 ENCOUNTER — HOSPITAL ENCOUNTER (INPATIENT)
Facility: CLINIC | Age: 47
LOS: 2 days | Discharge: HOME | DRG: 552 | End: 2017-10-21
Attending: EMERGENCY MEDICINE | Admitting: INTERNAL MEDICINE
Payer: MEDICARE

## 2017-10-19 ENCOUNTER — APPOINTMENT (OUTPATIENT)
Dept: CT IMAGING | Facility: CLINIC | Age: 47
DRG: 552 | End: 2017-10-19
Attending: EMERGENCY MEDICINE
Payer: MEDICARE

## 2017-10-19 DIAGNOSIS — M54.6 ACUTE LEFT-SIDED THORACIC BACK PAIN: ICD-10-CM

## 2017-10-19 LAB
ALBUMIN SERPL-MCNC: 3.4 G/DL (ref 3.4–5)
ALP SERPL-CCNC: 84 U/L (ref 40–150)
ALT SERPL W P-5'-P-CCNC: 79 U/L (ref 0–70)
ANION GAP SERPL CALCULATED.3IONS-SCNC: 6 MMOL/L (ref 3–14)
AST SERPL W P-5'-P-CCNC: 100 U/L (ref 0–45)
BILIRUB SERPL-MCNC: 0.8 MG/DL (ref 0.2–1.3)
BUN SERPL-MCNC: 20 MG/DL (ref 7–30)
CALCIUM SERPL-MCNC: 8.4 MG/DL (ref 8.5–10.1)
CHLORIDE SERPL-SCNC: 101 MMOL/L (ref 94–109)
CO2 SERPL-SCNC: 30 MMOL/L (ref 20–32)
CREAT SERPL-MCNC: 1.82 MG/DL (ref 0.66–1.25)
ERYTHROCYTE [DISTWIDTH] IN BLOOD BY AUTOMATED COUNT: 14.6 % (ref 10–15)
GFR SERPL CREATININE-BSD FRML MDRD: 40 ML/MIN/1.7M2
GLUCOSE SERPL-MCNC: 72 MG/DL (ref 70–99)
HCT VFR BLD AUTO: 50.3 % (ref 40–53)
HGB BLD-MCNC: 16.7 G/DL (ref 13.3–17.7)
MCH RBC QN AUTO: 29.2 PG (ref 26.5–33)
MCHC RBC AUTO-ENTMCNC: 33.2 G/DL (ref 31.5–36.5)
MCV RBC AUTO: 88 FL (ref 78–100)
PLATELET # BLD AUTO: 197 10E9/L (ref 150–450)
POTASSIUM SERPL-SCNC: 4.4 MMOL/L (ref 3.4–5.3)
PROT SERPL-MCNC: 7.4 G/DL (ref 6.8–8.8)
RBC # BLD AUTO: 5.71 10E12/L (ref 4.4–5.9)
SODIUM SERPL-SCNC: 137 MMOL/L (ref 133–144)
WBC # BLD AUTO: 10.7 10E9/L (ref 4–11)

## 2017-10-19 PROCEDURE — 25000128 H RX IP 250 OP 636: Performed by: EMERGENCY MEDICINE

## 2017-10-19 PROCEDURE — 25000128 H RX IP 250 OP 636: Performed by: NURSE PRACTITIONER

## 2017-10-19 PROCEDURE — 25000132 ZZH RX MED GY IP 250 OP 250 PS 637: Mod: GY | Performed by: INTERNAL MEDICINE

## 2017-10-19 PROCEDURE — 96375 TX/PRO/DX INJ NEW DRUG ADDON: CPT

## 2017-10-19 PROCEDURE — A9270 NON-COVERED ITEM OR SERVICE: HCPCS | Mod: GY | Performed by: NURSE PRACTITIONER

## 2017-10-19 PROCEDURE — 25000125 ZZHC RX 250: Performed by: EMERGENCY MEDICINE

## 2017-10-19 PROCEDURE — 96374 THER/PROPH/DIAG INJ IV PUSH: CPT

## 2017-10-19 PROCEDURE — 85027 COMPLETE CBC AUTOMATED: CPT | Performed by: INTERNAL MEDICINE

## 2017-10-19 PROCEDURE — 12000000 ZZH R&B MED SURG/OB

## 2017-10-19 PROCEDURE — 25000132 ZZH RX MED GY IP 250 OP 250 PS 637: Mod: GY | Performed by: NURSE PRACTITIONER

## 2017-10-19 PROCEDURE — 96376 TX/PRO/DX INJ SAME DRUG ADON: CPT

## 2017-10-19 PROCEDURE — 25000125 ZZHC RX 250

## 2017-10-19 PROCEDURE — 99285 EMERGENCY DEPT VISIT HI MDM: CPT | Mod: 25

## 2017-10-19 PROCEDURE — 80053 COMPREHEN METABOLIC PANEL: CPT | Performed by: INTERNAL MEDICINE

## 2017-10-19 PROCEDURE — 99223 1ST HOSP IP/OBS HIGH 75: CPT | Mod: AI | Performed by: NURSE PRACTITIONER

## 2017-10-19 PROCEDURE — 72128 CT CHEST SPINE W/O DYE: CPT

## 2017-10-19 RX ORDER — CLONAZEPAM 0.5 MG/1
0.5-1 TABLET ORAL DAILY PRN
Status: DISCONTINUED | OUTPATIENT
Start: 2017-10-19 | End: 2017-10-21 | Stop reason: HOSPADM

## 2017-10-19 RX ORDER — NALOXONE HYDROCHLORIDE 0.4 MG/ML
.1-.4 INJECTION, SOLUTION INTRAMUSCULAR; INTRAVENOUS; SUBCUTANEOUS
Status: DISCONTINUED | OUTPATIENT
Start: 2017-10-19 | End: 2017-10-21 | Stop reason: HOSPADM

## 2017-10-19 RX ORDER — HYDROMORPHONE HYDROCHLORIDE 1 MG/ML
.3-.5 INJECTION, SOLUTION INTRAMUSCULAR; INTRAVENOUS; SUBCUTANEOUS
Status: DISCONTINUED | OUTPATIENT
Start: 2017-10-19 | End: 2017-10-20

## 2017-10-19 RX ORDER — AMOXICILLIN 250 MG
1-2 CAPSULE ORAL 2 TIMES DAILY PRN
Status: DISCONTINUED | OUTPATIENT
Start: 2017-10-19 | End: 2017-10-21 | Stop reason: HOSPADM

## 2017-10-19 RX ORDER — KETOROLAC TROMETHAMINE 30 MG/ML
30 INJECTION, SOLUTION INTRAMUSCULAR; INTRAVENOUS ONCE
Status: COMPLETED | OUTPATIENT
Start: 2017-10-19 | End: 2017-10-19

## 2017-10-19 RX ORDER — METOPROLOL TARTRATE 50 MG
50 TABLET ORAL 2 TIMES DAILY
Status: DISCONTINUED | OUTPATIENT
Start: 2017-10-19 | End: 2017-10-21 | Stop reason: HOSPADM

## 2017-10-19 RX ORDER — PROCHLORPERAZINE MALEATE 5 MG
5-10 TABLET ORAL EVERY 6 HOURS PRN
Status: DISCONTINUED | OUTPATIENT
Start: 2017-10-19 | End: 2017-10-21 | Stop reason: HOSPADM

## 2017-10-19 RX ORDER — BUDESONIDE AND FORMOTEROL FUMARATE DIHYDRATE 160; 4.5 UG/1; UG/1
2 AEROSOL RESPIRATORY (INHALATION) 2 TIMES DAILY
Status: DISCONTINUED | OUTPATIENT
Start: 2017-10-19 | End: 2017-10-19 | Stop reason: CLARIF

## 2017-10-19 RX ORDER — SODIUM CHLORIDE 9 MG/ML
INJECTION, SOLUTION INTRAVENOUS CONTINUOUS
Status: DISCONTINUED | OUTPATIENT
Start: 2017-10-19 | End: 2017-10-21 | Stop reason: HOSPADM

## 2017-10-19 RX ORDER — ACETAMINOPHEN 325 MG/1
650 TABLET ORAL EVERY 6 HOURS
Status: DISCONTINUED | OUTPATIENT
Start: 2017-10-19 | End: 2017-10-19

## 2017-10-19 RX ORDER — KETAMINE HYDROCHLORIDE 10 MG/ML
INJECTION, SOLUTION INTRAMUSCULAR; INTRAVENOUS
Status: COMPLETED
Start: 2017-10-19 | End: 2017-10-19

## 2017-10-19 RX ORDER — LIDOCAINE 50 MG/G
3 PATCH TOPICAL
Status: DISCONTINUED | OUTPATIENT
Start: 2017-10-19 | End: 2017-10-21 | Stop reason: HOSPADM

## 2017-10-19 RX ORDER — KETAMINE HCL IN 0.9 % NACL 20 MG/2 ML
30 SYRINGE (ML) INTRAVENOUS ONCE
Status: COMPLETED | OUTPATIENT
Start: 2017-10-19 | End: 2017-10-19

## 2017-10-19 RX ORDER — KETAMINE HCL IN 0.9 % NACL 20 MG/2 ML
50 SYRINGE (ML) INTRAVENOUS ONCE
Status: COMPLETED | OUTPATIENT
Start: 2017-10-19 | End: 2017-10-19

## 2017-10-19 RX ORDER — LORAZEPAM 2 MG/ML
1 INJECTION INTRAMUSCULAR ONCE
Status: COMPLETED | OUTPATIENT
Start: 2017-10-19 | End: 2017-10-19

## 2017-10-19 RX ORDER — FINASTERIDE 5 MG/1
5 TABLET, FILM COATED ORAL EVERY EVENING
Status: DISCONTINUED | OUTPATIENT
Start: 2017-10-19 | End: 2017-10-21 | Stop reason: HOSPADM

## 2017-10-19 RX ORDER — LORAZEPAM 1 MG/1
1 TABLET ORAL 2 TIMES DAILY PRN
Status: DISCONTINUED | OUTPATIENT
Start: 2017-10-19 | End: 2017-10-21 | Stop reason: HOSPADM

## 2017-10-19 RX ORDER — POLYETHYLENE GLYCOL 3350 17 G/17G
17 POWDER, FOR SOLUTION ORAL DAILY PRN
Status: DISCONTINUED | OUTPATIENT
Start: 2017-10-19 | End: 2017-10-21 | Stop reason: HOSPADM

## 2017-10-19 RX ORDER — PROCHLORPERAZINE 25 MG
25 SUPPOSITORY, RECTAL RECTAL EVERY 12 HOURS PRN
Status: DISCONTINUED | OUTPATIENT
Start: 2017-10-19 | End: 2017-10-21 | Stop reason: HOSPADM

## 2017-10-19 RX ORDER — ZOLPIDEM TARTRATE 5 MG/1
10 TABLET ORAL
Status: DISCONTINUED | OUTPATIENT
Start: 2017-10-19 | End: 2017-10-21 | Stop reason: HOSPADM

## 2017-10-19 RX ORDER — OXYCODONE HYDROCHLORIDE 5 MG/1
10-20 TABLET ORAL EVERY 6 HOURS PRN
Status: DISCONTINUED | OUTPATIENT
Start: 2017-10-19 | End: 2017-10-20

## 2017-10-19 RX ORDER — ONDANSETRON 2 MG/ML
4 INJECTION INTRAMUSCULAR; INTRAVENOUS EVERY 6 HOURS PRN
Status: DISCONTINUED | OUTPATIENT
Start: 2017-10-19 | End: 2017-10-21 | Stop reason: HOSPADM

## 2017-10-19 RX ORDER — LOSARTAN POTASSIUM 100 MG/1
100 TABLET ORAL DAILY
Status: CANCELLED | OUTPATIENT
Start: 2017-10-19

## 2017-10-19 RX ORDER — ONDANSETRON 4 MG/1
4 TABLET, ORALLY DISINTEGRATING ORAL EVERY 6 HOURS PRN
Status: DISCONTINUED | OUTPATIENT
Start: 2017-10-19 | End: 2017-10-21 | Stop reason: HOSPADM

## 2017-10-19 RX ORDER — CYCLOBENZAPRINE HCL 10 MG
10 TABLET ORAL 3 TIMES DAILY PRN
Status: CANCELLED | OUTPATIENT
Start: 2017-10-19

## 2017-10-19 RX ORDER — BUPROPION HYDROCHLORIDE 150 MG/1
150 TABLET ORAL EVERY MORNING
Status: DISCONTINUED | OUTPATIENT
Start: 2017-10-20 | End: 2017-10-21 | Stop reason: HOSPADM

## 2017-10-19 RX ORDER — AMLODIPINE BESYLATE 10 MG/1
10 TABLET ORAL EVERY EVENING
Status: DISCONTINUED | OUTPATIENT
Start: 2017-10-19 | End: 2017-10-21 | Stop reason: HOSPADM

## 2017-10-19 RX ORDER — ALBUTEROL SULFATE 90 UG/1
2 AEROSOL, METERED RESPIRATORY (INHALATION) EVERY 4 HOURS PRN
Status: DISCONTINUED | OUTPATIENT
Start: 2017-10-19 | End: 2017-10-21 | Stop reason: HOSPADM

## 2017-10-19 RX ORDER — AMITRIPTYLINE HYDROCHLORIDE 75 MG/1
150 TABLET ORAL AT BEDTIME
Status: DISCONTINUED | OUTPATIENT
Start: 2017-10-19 | End: 2017-10-21 | Stop reason: HOSPADM

## 2017-10-19 RX ADMIN — HYDROMORPHONE HYDROCHLORIDE 1 MG: 1 INJECTION, SOLUTION INTRAMUSCULAR; INTRAVENOUS; SUBCUTANEOUS at 15:35

## 2017-10-19 RX ADMIN — HYDROMORPHONE HYDROCHLORIDE 1 MG: 1 INJECTION, SOLUTION INTRAMUSCULAR; INTRAVENOUS; SUBCUTANEOUS at 11:43

## 2017-10-19 RX ADMIN — OXYCODONE HYDROCHLORIDE 20 MG: 5 TABLET ORAL at 19:08

## 2017-10-19 RX ADMIN — CLONAZEPAM 1 MG: 0.5 TABLET ORAL at 22:19

## 2017-10-19 RX ADMIN — AMITRIPTYLINE HYDROCHLORIDE 150 MG: 75 TABLET, FILM COATED ORAL at 22:16

## 2017-10-19 RX ADMIN — HYDROMORPHONE HYDROCHLORIDE 0.5 MG: 1 INJECTION, SOLUTION INTRAMUSCULAR; INTRAVENOUS; SUBCUTANEOUS at 18:04

## 2017-10-19 RX ADMIN — FLUTICASONE FUROATE AND VILANTEROL TRIFENATATE 1 PUFF: 200; 25 POWDER RESPIRATORY (INHALATION) at 19:23

## 2017-10-19 RX ADMIN — Medication 50 MG: at 17:05

## 2017-10-19 RX ADMIN — SODIUM CHLORIDE: 9 INJECTION, SOLUTION INTRAVENOUS at 18:01

## 2017-10-19 RX ADMIN — KETOROLAC TROMETHAMINE 30 MG: 30 INJECTION, SOLUTION INTRAMUSCULAR at 13:02

## 2017-10-19 RX ADMIN — AMLODIPINE BESYLATE 10 MG: 10 TABLET ORAL at 19:08

## 2017-10-19 RX ADMIN — HYDROMORPHONE HYDROCHLORIDE 1 MG: 1 INJECTION, SOLUTION INTRAMUSCULAR; INTRAVENOUS; SUBCUTANEOUS at 12:17

## 2017-10-19 RX ADMIN — KETAMINE HYDROCHLORIDE 50 MG: 10 INJECTION, SOLUTION INTRAMUSCULAR; INTRAVENOUS at 17:05

## 2017-10-19 RX ADMIN — LIDOCAINE 3 PATCH: 50 PATCH TOPICAL at 19:11

## 2017-10-19 RX ADMIN — Medication 30 MG: at 13:05

## 2017-10-19 RX ADMIN — LORAZEPAM 1 MG: 2 INJECTION INTRAMUSCULAR; INTRAVENOUS at 11:41

## 2017-10-19 RX ADMIN — HYDROMORPHONE HYDROCHLORIDE 0.5 MG: 1 INJECTION, SOLUTION INTRAMUSCULAR; INTRAVENOUS; SUBCUTANEOUS at 20:29

## 2017-10-19 RX ADMIN — METOPROLOL TARTRATE 50 MG: 50 TABLET, FILM COATED ORAL at 20:30

## 2017-10-19 RX ADMIN — ZOLPIDEM TARTRATE 10 MG: 5 TABLET, FILM COATED ORAL at 22:16

## 2017-10-19 RX ADMIN — FINASTERIDE 5 MG: 5 TABLET, FILM COATED ORAL at 19:08

## 2017-10-19 RX ADMIN — TIZANIDINE 4 MG: 4 TABLET ORAL at 20:34

## 2017-10-19 ASSESSMENT — ACTIVITIES OF DAILY LIVING (ADL)
BATHING: 0 - INDEPENDENT
BATHING: 0-->INDEPENDENT
RETIRED_EATING: 0-->INDEPENDENT
DRESS: 0 - INDEPENDENT
RETIRED_COMMUNICATION: 0-->UNDERSTANDS/COMMUNICATES WITHOUT DIFFICULTY
EATING: 0 - INDEPENDENT
TRANSFERRING: 0-->INDEPENDENT
DRESS: 0-->INDEPENDENT
AMBULATION: 0-->INDEPENDENT
TOILETING: 0 - INDEPENDENT
TRANSFERRING: 0 - INDEPENDENT
NUMBER_OF_TIMES_PATIENT_HAS_FALLEN_WITHIN_LAST_SIX_MONTHS: 1
AMBULATION: 0 - INDEPENDENT
SWALLOWING: 0-->SWALLOWS FOODS/LIQUIDS WITHOUT DIFFICULTY
SWALLOWING: 0 - SWALLOWS FOODS/LIQUIDS WITHOUT DIFFICULTY
COMMUNICATION: 0 - UNDERSTANDS/COMMUNICATES WITHOUT DIFFICULTY
FALL_HISTORY_WITHIN_LAST_SIX_MONTHS: YES
TOILETING: 0-->INDEPENDENT
COGNITION: 0 - NO COGNITION ISSUES REPORTED

## 2017-10-19 ASSESSMENT — ENCOUNTER SYMPTOMS
NECK PAIN: 1
NUMBNESS: 0
MYALGIAS: 1
BACK PAIN: 1
NECK STIFFNESS: 1

## 2017-10-19 NOTE — PHARMACY-ADMISSION MEDICATION HISTORY
Admission medication history interview status for the 10/19/2017  admission is complete. See EPIC admission navigator for prior to admission medications     Medication history source reliability:Good    Actions taken by pharmacist (provider contacted, etc): Reviewed SureScripts & recent clinic notes    Additional medication history information not noted on PTA med list :  -He may receive steroid injections at pain clinic for back pain.    Medication reconciliation/reorder completed by provider prior to medication history? No    Time spent in this activity: 20 min    Prior to Admission medications    Medication Sig Last Dose Taking? Auth Provider   CYCLOBENZAPRINE HCL PO Take 10 mg by mouth 3 times daily as needed for muscle spasms 10/19/2017 at Unknown time Yes Unknown, Entered By History   Zolpidem Tartrate (AMBIEN PO) Take 10 mg by mouth nightly as needed for sleep Past Week at Unknown time Yes Unknown, Entered By History   metoprolol (LOPRESSOR) 50 MG tablet Take 1 tablet (50 mg) by mouth 2 times daily 10/19/2017 at am Yes Luis Armando Segovia MD   hydrochlorothiazide (HYDRODIURIL) 25 MG tablet Take 1 tablet (25 mg) by mouth daily  Patient taking differently: Take 25 mg by mouth daily as needed  Past Month at Unknown time Yes Luis Armando Segovia MD   losartan (COZAAR) 100 MG tablet Take 1 tablet (100 mg) by mouth daily 10/18/2017 at pm Yes Luis Armando Segovia MD   oxyCODONE (ROXICODONE) 10 MG IR tablet Take 1-2 tablets (10-20 mg) by mouth every 6 hours as needed for severe pain - max 6 tabs / day 10/19/2017 at Unknown time Yes Luis Armando Segovia MD   buPROPion (WELLBUTRIN XL) 150 MG 24 hr tablet Take 1 tablet (150 mg) by mouth every morning 10/18/2017 at Unknown time Yes Luis Armando Segovia MD   LORazepam (ATIVAN) 1 MG tablet Take 1 tablet (1 mg) by mouth 2 times daily as needed for anxiety Past Week at Unknown time Yes Luis Armando Segovia MD   clonazePAM (KLONOPIN) 1 MG tablet Take 0.5-1 tablets (0.5-1 mg) by mouth  daily  Patient taking differently: Take 0.5-1 mg by mouth daily as needed  Past Week at Unknown time Yes Luis Armando Segovia MD   amLODIPine (NORVASC) 10 MG tablet Take 1 tablet (10 mg) by mouth daily 10/18/2017 at pm Yes Luis Armando Segovia MD   amitriptyline HCl 150 MG TABS TAKE ONE TABLET (150MG) BY MOUTH AT BEDTIME 10/18/2017 at pm Yes Luis Armando Segovia MD   budesonide-formoterol (SYMBICORT) 160-4.5 MCG/ACT Inhaler Inhale 2 puffs into the lungs 2 times daily Past Week at Unknown time Yes Luis Armando Segovia MD   testosterone cypionate (DEPOTESTOTERONE) 200 MG/ML injection Inject 0.5 mLs (100 mg) into the muscle once a week Past Month at Unknown time Yes Luis Armando Segovia MD   amphetamine-dextroamphetamine (ADDERALL) 20 MG per tablet Take 1 tablet (20 mg) by mouth 3 times daily  Patient taking differently: Take 10-20 mg by mouth 3 times daily as needed  Past Week at Unknown time Yes Luis Armando Segovia MD   albuterol (PROAIR HFA/PROVENTIL HFA/VENTOLIN HFA) 108 (90 BASE) MCG/ACT Inhaler Inhale 2 puffs into the lungs every 4 hours as needed for shortness of breath / dyspnea prn Yes Luis Armando Segovia MD   finasteride (PROSCAR) 5 MG tablet TAKE ONE-HALF TABLET BY MOUTH ONCE DAILY 10/18/2017 at pm Yes Luis Armando Segovia MD   glycerin-hypromellose- (ARTIFICIAL TEARS) 0.2-0.2-1 % SOLN ophthalmic solution Place 1 drop into both eyes 2 times daily as needed for dry eyes Past Month at Unknown time Yes Luis Armando Segovia MD   nitroglycerin (NITROSTAT) 0.4 MG sublingual tablet Place 1 tablet (0.4 mg) under the tongue every 5 minutes as needed for chest pain If you are still having symptoms after 3 doses (15 minutes) call 911. prn Yes Luis Armando Segovia MD   VITAMIN C 100 MG OR TABS 1 TABLET 3 TIMES DAILY Past Week at Unknown time Yes BARTOLOME Clarke MD   MULTIVITAMIN TABS   OR  Past Week at Unknown time Yes Luciano Mota MD   loperamide (IMODIUM A-D) 2 MG tablet Take 1 tablet (2 mg) by mouth 3 times daily as needed  "for diarrhea More than a month at Unknown time  Luis Armando Segovia MD   Syringe/Needle, Disp, (SYRINGE LUER LOCK) 20G X 1-1/2\" 3 ML MISC 1 Device once a week - and also needs 22 G needles 1.5 inch #30 with 1 refill   Luis Armando Segovia MD   fluconazole (DIFLUCAN) 150 MG tablet Take 1 tablet (150 mg) by mouth once a week for 4 weeks and then monthly More than a month at Unknown time  Luis Armando Segovia MD   cycloSPORINE (RESTASIS) 0.05 % ophthalmic emulsion Place 1 drop into both eyes 2 times daily Ran out  Luis Armando Segovia MD   imiquimod (ALDARA) 5 % cream Apply  to lesion.   Wash off after 8 hours.   May use for up to 16 weeks. More than a month at Unknown time  Luis Armando Segovia MD   fluticasone (FLONASE) 50 MCG/ACT nasal spray Spray 1-2 sprays into both nostrils daily More than a month at Unknown time  Luis Armando Segovia MD   ASPIRIN NOT PRESCRIBED, INTENTIONAL, by Other route continuous prn.   Luis Armando Segovia MD        "

## 2017-10-19 NOTE — IP AVS SNAPSHOT
[facility] [facility]    [address] [place] [address]    Phone:  [phone]                                       After Visit Summary   10/19/2017    Jeremi Damon    MRN: [id number]           After Visit Summary Signature Page     I have received my discharge instructions, and my questions have been answered. I have discussed any challenges I see with this plan with the nurse or doctor.    ..........................................................................................................................................  Patient/Patient Representative Signature      ..........................................................................................................................................  Patient Representative Print Name and Relationship to Patient    ..................................................               ................................................  Date                                            Time    ..........................................................................................................................................  Reviewed by Signature/Title    ...................................................              ..............................................  Date                                                            Time

## 2017-10-19 NOTE — PROGRESS NOTES
Mr. Damon is a 47- year old male who presented to the [facility] ED today for evaluation and treatment of back pain, acute on chronic. Dr. Garcia was contacted by Dr. Roberts for observation admission for pain control. Upon discussing observation admission with Mr. Damon he is declining admission. At this time he is requesting to go home. Dr. Roberts and I both went to Mr. Damon's bedside, discussed plan with Mr. Damon, and he continues to decline the offer of admission.     I have discussed updated plan for discharge with Dr. Garcia.    RAINA Allan, CNP  Hospitalist Service, House Officer  [facility]     Text Page  Pager: [phone]

## 2017-10-19 NOTE — ED NOTES
[facility]  ED Nurse Handoff Report    ED Chief complaint: Back Pain (hx chronic back pain , [facility] pt. on friday missed the chair and landed on butt. increased back pain since around T8)      ED Diagnosis:   Final diagnoses:   None       Code Status: Full Code    Allergies:   Allergies   Allergen Reactions     Acetaminophen      Gabapentin      Suicidal thoughts     Morphine      Theophylline Hives       Activity level - Baseline/Home:  Independent    Activity Level - Current:   Independent     Needed?: No    Isolation: No  Infection: Not Applicable    Bariatric?: No    Vital Signs:   Vitals:    10/19/17 1113 10/19/17 1216 10/19/17 1217   BP: (!) 162/105 (!) 158/103    Pulse: 93     Resp: 20     Temp: 97.9  F (36.6  C)     TempSrc: Oral     SpO2: 94%  94%   Weight: 115.7 kg (255 lb)     Height: 1.829 m (6')         Cardiac Rhythm: ,        Pain level: 0-10 Pain Scale: 9    Is this patient confused?: No    Patient Report: Initial Complaint: Pt presents stating he missed the chair on Friday falling on his tailbone. C/O mid to upper back pain with spasms.  Focused Assessment:Pt very uncomfortable with back pain and spasms  Tests Performed:CT scan  Abnormal Results CT normal  Treatments provided: Dilaudid 1mg x2, ketamine and toradol    Family Comments: No family here    OBS brochure/video discussed/provided to patient: N/A    ED Medications:   Medications   HYDROmorphone (DILAUDID) injection 1 mg (1 mg Intravenous Given 10/19/17 1217)   LORazepam (ATIVAN) injection 1 mg (1 mg Intravenous Given 10/19/17 1141)       Drips infusing?:  No      ED NURSE PHONE NUMBER: [phone]

## 2017-10-19 NOTE — DISCHARGE INSTRUCTIONS

## 2017-10-19 NOTE — H&P
[facility]    History and Physical  Hospitalist       Date of Admission:  10/19/2017    Assessment & Plan   Jeremi Damon is a 47 year old male who presented to [facility] ED today for evaluation and treatment of low back pain with onset 6-7 days ago after a fall from a chair to the floor. He endorses landing on his tailbone and has experienced progressive thoracic back pain since the fall. A CMP was obtained and notable for elevated creatinine and transaminitis. He is currently in the Emergency Department and will be admitted for inpatient admission.     #1 Concern for acute kidney injury versus chronic kidney disease, creatinine 1.82  Mr. Damon had a creatinine of 1.62 on 10/10/2017, 1.08 4/14/2017. Per EMR review has had a creatinine as high as 1.61 in September 2016. He received a dose of Toradol in the ED.  - admit to inpatient for concern of PEDRO versus CKD  - no further NSAIDS, avoid nephrotoxic medications   - renally dose medications, as appropriate  - IV hydration with NS at 125 cc/hour, highly encourage oral intake  - hold Losartan  - BMP in the AM     #2 Back pain, acute on chronic, secondary to mechanical fall  He endorses slipping off a chair onto his buttocks 6-7 days ago; has been experiencing progressive worsening of pain and muscle spasm. Has not had relief with oxycodone, Flexeril, topicals, massage, heat/ice, and ultrasound. CT in the ED was without concern for bony fracture or spinal abnormality. He states he has a pain contract with [facility].   - dilaudid IV PRN pain  - oxycodone PRN pain  - scheduled Tylenol  - lidoderm patch  - Zanaflex PRN  - IP pain consult tomorrow    #3 Asthma, per patient mild  Chronic, stable.  - albuterol and Symbicort, as at home    #4 Depression, treated  Chronic, stable.  - Wellbutrin, as at home    #5 Hypertension, treated  Chronic, stable.  - hold Losartan due to concern for PEDRO  - metoprolol and amlodipine, as at home     #6  Transaminitis, ALT 79,   On 10/10/2017 he presented to his primary for a routine visit. Transaminitis was noted at that time, too. Unclear what is contributing. He is not currently on statin therapy. He denies alcohol use.   - CMP tomorrow    DVT Prophylaxis: Pneumatic Compression Devices  Code Status: Full Code    Disposition: Expected discharge in 2-4 days once tolerating oral pain medication and renal function further evaluated.    The above assessment and plan has been discussed with Dr. Garcia, who is in agreement with the above assessment and plan.     RAINA Allan, CNP  Hospitalist Service, House Officer  [facility]     Text Page  Pager: [phone]    Primary Care Physician   Dr. Luis Armando Segovia    Chief Complaint   Back pain    History is obtained from the patient    History of Present Illness   Jeremi Damon is a 47 year old male who presents with progressively worsening acute on chronic low back pain of 6-7 days duration. He endorses a mechanical fall from a chair onto buttocks last week. He did not have a sudden onset of pain, rather worsening over time. His pain has not been relieved with oxycodone, Flexeril, topical analgesics, ultrasound, and massage. His pain is worsened by movement. He was prompted to seek care today due to progression of pain.    Per EMR review the ED administered ketamine, ketoralac, lorazepam, and dilaudid without significant improvement in pain. A CT of his thoracic spine was unremarkable. He was initially offered observation admission for pain control and declined admission. Continued pain despite additional doses of pain medication led him to be agreeable to admission.    A BMP revealed concern for creatinine of 1.82. Last creatinine was 1.6 on 10/10/2017. EMR review reveals intermittently elevated creatinine, however not as high as today. Out of concern for underlying renal disease with administration of ketoralac we will admit him for  "inpatient admission for pain control and further renal monitoring.     Past Medical History    I personally reviewed history with patient:  - left ventricular hypertrophy, denies history of cardiac events  - mild intermittent asthma  - depression  - sleep apnea, does not use CPAP  - hypertension, treated    Past Surgical History   I personally reviewed history with patient:  - pectoral tear repair  - turbinate removal, rhinoplasty, septoplasty  - lap cholecystectomy  - appendectomy  - L5-S1 fusion    Prior to Admission Medications   Prior to Admission Medications   Prescriptions Last Dose Informant Patient Reported? Taking?   ASPIRIN NOT PRESCRIBED, INTENTIONAL,   Yes No   Sig: by Other route continuous prn.   CYCLOBENZAPRINE HCL PO 10/19/2017 at Unknown time  Yes Yes   Sig: Take 10 mg by mouth 3 times daily as needed for muscle spasms   LORazepam (ATIVAN) 1 MG tablet Past Week at Unknown time  No Yes   Sig: Take 1 tablet (1 mg) by mouth 2 times daily as needed for anxiety   MULTIVITAMIN TABS   OR Past Week at Unknown time  No Yes   Patient taking differently: 1 tablet daily   Syringe/Needle, Disp, (SYRINGE LUER LOCK) 20G X 1-1/2\" 3 ML MISC   No No   Si Device once a week - and also needs 22 G needles 1.5 inch #30 with 1 refill   VITAMIN C 100 MG OR TABS Past Week at Unknown time  No Yes   Si TABLET 3 TIMES DAILY   Zolpidem Tartrate (AMBIEN PO) Past Week at Unknown time  Yes Yes   Sig: Take 10 mg by mouth nightly as needed for sleep   albuterol (PROAIR HFA/PROVENTIL HFA/VENTOLIN HFA) 108 (90 BASE) MCG/ACT Inhaler prn  No Yes   Sig: Inhale 2 puffs into the lungs every 4 hours as needed for shortness of breath / dyspnea   amLODIPine (NORVASC) 10 MG tablet 10/18/2017 at pm  No Yes   Sig: Take 1 tablet (10 mg) by mouth daily   amitriptyline HCl 150 MG TABS 10/18/2017 at pm  No Yes   Sig: TAKE ONE TABLET (150MG) BY MOUTH AT BEDTIME   amphetamine-dextroamphetamine (ADDERALL) 20 MG per tablet Past Week at Unknown " time  No Yes   Sig: Take 1 tablet (20 mg) by mouth 3 times daily   Patient taking differently: Take 10-20 mg by mouth 3 times daily as needed    buPROPion (WELLBUTRIN XL) 150 MG 24 hr tablet 10/18/2017 at Unknown time  No Yes   Sig: Take 1 tablet (150 mg) by mouth every morning   budesonide-formoterol (SYMBICORT) 160-4.5 MCG/ACT Inhaler Past Week at Unknown time  No Yes   Sig: Inhale 2 puffs into the lungs 2 times daily   clonazePAM (KLONOPIN) 1 MG tablet Past Week at Unknown time  No Yes   Sig: Take 0.5-1 tablets (0.5-1 mg) by mouth daily   Patient taking differently: Take 0.5-1 mg by mouth daily as needed    cycloSPORINE (RESTASIS) 0.05 % ophthalmic emulsion Ran out  No No   Sig: Place 1 drop into both eyes 2 times daily   finasteride (PROSCAR) 5 MG tablet 10/18/2017 at pm  No Yes   Sig: TAKE ONE-HALF TABLET BY MOUTH ONCE DAILY   fluconazole (DIFLUCAN) 150 MG tablet More than a month at Unknown time  No No   Sig: Take 1 tablet (150 mg) by mouth once a week for 4 weeks and then monthly   fluticasone (FLONASE) 50 MCG/ACT nasal spray More than a month at Unknown time  No No   Sig: Spray 1-2 sprays into both nostrils daily   glycerin-hypromellose- (ARTIFICIAL TEARS) 0.2-0.2-1 % SOLN ophthalmic solution Past Month at Unknown time  No Yes   Sig: Place 1 drop into both eyes 2 times daily as needed for dry eyes   hydrochlorothiazide (HYDRODIURIL) 25 MG tablet Past Month at Unknown time  No Yes   Sig: Take 1 tablet (25 mg) by mouth daily   Patient taking differently: Take 25 mg by mouth daily as needed    imiquimod (ALDARA) 5 % cream More than a month at Unknown time  No No   Sig: Apply  to lesion.   Wash off after 8 hours.   May use for up to 16 weeks.   loperamide (IMODIUM A-D) 2 MG tablet More than a month at Unknown time  No No   Sig: Take 1 tablet (2 mg) by mouth 3 times daily as needed for diarrhea   losartan (COZAAR) 100 MG tablet 10/18/2017 at pm  No Yes   Sig: Take 1 tablet (100 mg) by mouth daily    metoprolol (LOPRESSOR) 50 MG tablet 10/19/2017 at am  No Yes   Sig: Take 1 tablet (50 mg) by mouth 2 times daily   nitroglycerin (NITROSTAT) 0.4 MG sublingual tablet prn  No Yes   Sig: Place 1 tablet (0.4 mg) under the tongue every 5 minutes as needed for chest pain If you are still having symptoms after 3 doses (15 minutes) call 911.   oxyCODONE (ROXICODONE) 10 MG IR tablet 10/19/2017 at Unknown time  No Yes   Sig: Take 1-2 tablets (10-20 mg) by mouth every 6 hours as needed for severe pain - max 6 tabs / day   testosterone cypionate (DEPOTESTOTERONE) 200 MG/ML injection Past Month at Unknown time  No Yes   Sig: Inject 0.5 mLs (100 mg) into the muscle once a week      Facility-Administered Medications: None     Allergies   Allergies   Allergen Reactions     Acetaminophen      Gabapentin      Suicidal thoughts     Morphine      Theophylline Hives       Social History   I personally reviewed history with patient:  - lives locally  - significant other occasionally lives with him  - 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     Physical Exam   Temp: 97.9  F (36.6  C) Temp src: Oral BP: 149/84 Pulse: 93   Resp: 20 SpO2: 94 % O2 Device: Nasal cannula Oxygen Delivery: 2 LPM  Vital Signs with Ranges  Temp:  [97.9  F (36.6  C)] 97.9  F (36.6  C)  Pulse:  [93] 93  Resp:  [20] 20  BP: (143-174)/() 149/84  SpO2:  [86 %-97 %] 94 %  255 lbs 0 oz    General: Appears stated age. Diaphoretic, appears in pain.   Skin:  Warm. No rashes or lesions on exposed skin.  HEENT:  Normocephalic, atraumatic.  Chest:  Bilateral anterior and posterior lung fields clear to auscultation. No increased work of  breathing. Does require supplemental oxygen.  Cardiovascular:  Regular rate and rhythm, without murmur, rub, or gallop. Bilateral upper extremity distal pulses palpable. Bilateral lower extremity edema to ankles.   Abdomen:  Soft, non-tender, non-distended. Bowel sounds present.   Musculoskeletal:  Moves all four extremities. Positive straight leg raise: left.   Neurological:  Alert and oriented x 4. Cranial nerves II-XII grossly intact.   Psychiatric:  Affect and mood congruent.    Data   Data reviewed today:  I personally reviewed the CT thoracic spine image(s) showing no acute fracture or abnormality.    Recent Labs  Lab 10/19/17  1519   WBC 10.7   HGB 16.7   MCV 88         POTASSIUM 4.4   CHLORIDE 101   CO2 30   BUN 20   CR 1.82*   ANIONGAP 6   MIKO 8.4*   GLC 72   ALBUMIN 3.4   PROTTOTAL 7.4   BILITOTAL 0.8   ALKPHOS 84   ALT 79*   *       Imaging:  Recent Results (from the past 24 hour(s))   CT Thoracic Spine w/o Contrast    Narrative    CT THORACIC SPINE W/O CONTRAST 10/19/2017 12:16 PM     HISTORY:  fall - axial load- mid thoracic pain     TECHNIQUE:  Axial images of the spine were obtained without  intravenous contrast. Coronal and sagittal reformations were  performed.  Images were reviewed in bone and soft tissue windows.  Radiation dose for this scan was reduced using automated exposure  control, adjustment of the mA and/or kV according to patient size, or  iterative reconstruction technique.    COMPARISON: None.    FINDINGS: There is normal bony alignment.  No fractures are  identified. There are degenerative changes in the midthoracic spine  with loss of disc space height and anterior osteophytes.      Impression    IMPRESSION: No thoracic spine fractures are identified.    SHOLA RODRIGUEZ MD

## 2017-10-19 NOTE — Clinical Note
Admitting Physician: JB BALDWIN [[id number]]  Bed Type: Adult Med/Surg [46]  Bed request comments: OBS Status    Patient refused admission

## 2017-10-19 NOTE — IP AVS SNAPSHOT
MRN:[id number]                      After Visit Summary   10/19/2017    Jeremi Damon    MRN: [id number]           Thank you!     Thank you for choosing [facility] for your care. Our goal is always to provide you with excellent care. Hearing back from our patients is one way we can continue to improve our services. Please take a few minutes to complete the written survey that you may receive in the mail after you visit with us. Thank you!        Patient Information     Date Of Birth          1970        Designated Caregiver       Most Recent Value    Caregiver    Will someone help with your care after discharge? no      About your hospital stay     You were admitted on:  October 19, 2017 You last received care in the:  [facility] [facility] [address] [facility]    You were discharged on:  October 21, 2017        Reason for your hospital stay       This is a 47 year old male admitted with musculoskeletal back pain.                  Who to Call     For medical emergencies, please call 911.  For non-urgent questions about your medical care, please call your primary care provider or clinic, [phone]          Attending Provider     Provider Specialty    Ajit Roberts MD Emergency Medicine    Dede Garcia MD Internal Medicine       Primary Care Provider Office Phone # Fax #    Luis Armando Segovia [phone]      After Care Instructions     Activity       Your activity upon discharge: activity as tolerated            Diet       Follow this diet upon discharge: Orders Placed This Encounter      Regular Diet Adult                  Follow-up Appointments     Follow-up and recommended labs and tests        Follow up with primary care provider, Luis Armando eSgovia, within 7 days for hospital follow- up.  No follow up labs or test are needed.                  Further instructions from your care team         General Neck and Back Pain    Both neck and back pain are usually caused by  injury to the muscles or ligaments of the spine. Sometimes the disks that separate each bone of the spine may cause pain by pressing on a nearby nerve. Back and neck pain may appear after a sudden twisting or bending force (such as in a car accident), or sometimes after a simple awkward movement. In either case, muscle spasm is often present and adds to the pain.  Acute neck and back pain usually gets better in 1 to 2 weeks. Pain related to disk disease, arthritis in the spinal joints or spinal stenosis (narrowing of the spinal canal) can become chronic and last for months or years.  Back and neck pain are common problems. Most people feel better in 1 or 2 weeks, and most of the rest in 1 to 2 months. Most people can remain active.  People experience and describe pain differently.    Pain can be sharp, stabbing, shooting, aching, cramping, or burning    Movement, standing, bending, lifting, sitting, or walking may worsen the pain    Pain can be localized to one spot or area, or it can be more generalized    Pain can spread or radiate upwards, downwards, to the front, or go down your arms    Muscle spasm may occur.  Most of the time mechanical problems with the muscles or spine cause the pain. it is usually caused by an injury, whether known or not, to the muscles or ligaments. While illnesses can cause back pain, it is usually not caused by a serious illness. Pain is usually related to physical activity, whether sports, exercise, work, or normal activity. Sometimes it can occur without an identifiable cause. This can happen simply by stretching or moving wrong, without noting pain at the time. Other causes include:    Overexertion, lifting, pushing, pulling incorrectly or too aggressively.    Sudden twisting, bending or stretching from an accident (car or fall), or accidental movement.    Poor posture    Poor conditioning, lack of regular exercise    Spinal disc disease or arthritis    Stress    Pregnancy, or  illness like appendicitis, bladder or kidney infection, pelvic infections   Home care    For neck pain: Use a comfortable pillow that supports the head and keeps the spine in a neutral position. The position of the head should not be tilted forward or backward.    When in bed, try to find a position of comfort. A firm mattress is best. Try lying flat on your back with pillows under your knees. You can also try lying on your side with your knees bent up towards your chest and a pillow between your knees.    At first, do not try to stretch out the sore spots. If there is a strain, it is not like the good soreness you get after exercising without an injury. In this case, stretching may make it worse.    Avoid prolonged sitting, long car rides or travel. This puts more stress on the lower back than standing or walking.    During the first 24 to 72 hours after an injury, apply an ice pack to the painful area for 20 minutes and then remove it for 20 minutes over a period of 60 to 90 minutes or several times a day.     You can alternate ice and heat therapies. Talk with your healthcare provider about the best treatment for your back or neck pain. As a safety precaution, do not use a heating pad at bedtime. Sleeping with a heating pad can lead to skin burns or tissue damage.    Therapeutic massage can help relax the back and neck muscles without stretching them.    Be aware of safe lifting methods and do not lift anything over 15 pounds until all the pain is gone.  Medications  Talk to your healthcare provider before using medicine, especially if you have other medical problems or are taking other medicines.    You may use over-the-counter medicine to control pain, unless another pain medicine was prescribed. If you have chronic conditions like diabetes, liver or kidney disease, stomach ulcers,  gastrointestinal bleeding, or are taking blood thinner medicines.    Be careful if you are given pain medicines, narcotics, or  medicine for muscle spasm. They can cause drowsiness, and can affect your coordination, reflexes, and judgment. Do not drive or operate heavy machinery.  Follow-up care  Follow up with your healthcare provider, or as advised. Physical therapy or further tests may be needed.  If X-rays were taken, you will be notified of any new findings that may affect your care.  Call 911  Seek emergency medical care if any of the following occur:    Trouble breathing    Confusion    Very drowsy or trouble awakening    Fainting or loss of consciousness    Rapid or very slow heart rate    Loss of bowel or bladder control  When to seek medical advice  Call your healthcare provider right away if any of these occur:    Pain becomes worse or spreads into your arms or legs    Weakness, numbness or pain in one or both arms or legs    Numbness in the groin area    Difficulty walking    Fever of 100.4 F (38 C) or higher, or as directed by your healthcare provider  Date Last Reviewed: 7/1/2016 2000-2017 [employer]. [address] [place] [address]. All rights reserved. This information is not intended as a substitute for professional medical care. Always follow your healthcare professional's instructions.          Pending Results     No orders found from 10/17/2017 to 10/20/2017.            Statement of Approval     Ordered          10/21/17 0837  I have reviewed and agree with all the recommendations and orders detailed in this document.  EFFECTIVE NOW     Approved and electronically signed by:  Dave Simmons MD             Admission Information     Date & Time Provider Department Dept. Phone    10/19/2017 Dede Garcia MD [place] [facility] [address] [facility] [phone]      Your Vitals Were     Blood Pressure Pulse Temperature Respirations Height Weight    194/124 (BP Location: Right arm) 72 98.1  F (36.7  C) (Oral) 20 1.829 m (6') 115.7 kg (255 lb)    Pulse Oximetry BMI (Body Mass Index)                 95% 34.58 kg/m2          [employer] Information     [employer] gives you secure access to your electronic health record. If you see a primary care provider, you can also send messages to your care team and make appointments. If you have questions, please call your primary care clinic.  If you do not have a primary care provider, please call [phone] and they will assist you.        Care EveryWhere ID     This is your Care EveryWhere ID. This could be used by other organizations to access your [facility] medical records  [id number]        Equal Access to Services     MATHEW ABEL : Hadcinda lopez Socami, waaxda luqadaha, qaybta kaalmamercedes lopez, radha zelaya . So [facility] [phone].    ATENCIÓN: Si habla español, tiene a lake disposición servicios gratuitos de asistencia lingüística. Llame al [phone].    We comply with applicable federal civil rights laws and [place] laws. We do not discriminate on the basis of race, color, national origin, age, disability, sex, sexual orientation, or gender identity.               Review of your medicines      CONTINUE these medicines which may have CHANGED, or have new prescriptions. If we are uncertain of the size of tablets/capsules you have at home, strength may be listed as something that might have changed.        Dose / Directions    amphetamine-dextroamphetamine 20 MG per tablet   Commonly known as:  ADDERALL   This may have changed:    - how much to take  - when to take this  - reasons to take this   Used for:  Attention deficit hyperactivity disorder (ADHD), combined type        Dose:  20 mg   Take 1 tablet (20 mg) by mouth 3 times daily   Quantity:  90 tablet   Refills:  0       clonazePAM 1 MG tablet   Commonly known as:  klonoPIN   This may have changed:    - when to take this  - reasons to take this   Used for:  Generalized anxiety disorder        Dose:  0.5-1 mg   Take 0.5-1 tablets (0.5-1 mg) by mouth daily   Quantity:   30 tablet   Refills:  0       hydrochlorothiazide 25 MG tablet   Commonly known as:  HYDRODIURIL   This may have changed:    - when to take this  - reasons to take this   Used for:  Essential hypertension with goal blood pressure less than 140/90        Dose:  25 mg   Take 1 tablet (25 mg) by mouth daily   Quantity:  90 tablet   Refills:  1       MULTIVITAMIN TABS   OR   This may have changed:  See the new instructions.        Refills:  0         CONTINUE these medicines which have NOT CHANGED        Dose / Directions    albuterol 108 (90 BASE) MCG/ACT Inhaler   Commonly known as:  PROAIR HFA/PROVENTIL HFA/VENTOLIN HFA   Used for:  Intermittent asthma, uncomplicated        Dose:  2 puff   Inhale 2 puffs into the lungs every 4 hours as needed for shortness of breath / dyspnea   Quantity:  1 Inhaler   Refills:  11       AMBIEN PO        Dose:  10 mg   Take 10 mg by mouth nightly as needed for sleep   Refills:  0       amitriptyline HCl 150 MG Tabs   Used for:  Insomnia, unspecified type        TAKE ONE TABLET (150MG) BY MOUTH AT BEDTIME   Quantity:  90 tablet   Refills:  0       amLODIPine 10 MG tablet   Commonly known as:  NORVASC   Used for:  Essential hypertension with goal blood pressure less than 140/90        Dose:  10 mg   Take 1 tablet (10 mg) by mouth daily   Quantity:  90 tablet   Refills:  1       ASPIRIN NOT PRESCRIBED   Commonly known as:  INTENTIONAL        by Other route continuous prn.   Refills:  0       budesonide-formoterol 160-4.5 MCG/ACT Inhaler   Commonly known as:  SYMBICORT   Used for:  Mild persistent asthma without complication        Dose:  2 puff   Inhale 2 puffs into the lungs 2 times daily   Quantity:  3 Inhaler   Refills:  1       buPROPion 150 MG 24 hr tablet   Commonly known as:  WELLBUTRIN XL   Used for:  Generalized anxiety disorder, Seasonal affective disorder (H), Major depressive disorder, recurrent episode, moderate (H)        Dose:  150 mg   Take 1 tablet (150 mg) by mouth  every morning   Quantity:  90 tablet   Refills:  1       CYCLOBENZAPRINE HCL PO        Dose:  10 mg   Take 10 mg by mouth 3 times daily as needed for muscle spasms   Refills:  0       cycloSPORINE 0.05 % ophthalmic emulsion   Commonly known as:  RESTASIS   Used for:  Dry eyes, bilateral        Dose:  1 drop   Place 1 drop into both eyes 2 times daily   Quantity:  1 Box   Refills:  11       finasteride 5 MG tablet   Commonly known as:  PROSCAR   Used for:  Alopecia        TAKE ONE-HALF TABLET BY MOUTH ONCE DAILY   Quantity:  90 tablet   Refills:  1       fluconazole 150 MG tablet   Commonly known as:  DIFLUCAN   Used for:  Tinea versicolor        Dose:  150 mg   Take 1 tablet (150 mg) by mouth once a week for 4 weeks and then monthly   Quantity:  10 tablet   Refills:  3       fluticasone 50 MCG/ACT spray   Commonly known as:  FLONASE   Used for:  Nasal congestion        Dose:  1-2 spray   Spray 1-2 sprays into both nostrils daily   Quantity:  1 Package   Refills:  11       glycerin-hypromellose- 0.2-0.2-1 % Soln ophthalmic solution   Commonly known as:  ARTIFICIAL TEARS   Used for:  Dry eyes        Dose:  1 drop   Place 1 drop into both eyes 2 times daily as needed for dry eyes   Quantity:  30 mL   Refills:  3       imiquimod 5 % cream   Commonly known as:  ALDARA   Used for:  Flat wart        Apply  to lesion.   Wash off after 8 hours.   May use for up to 16 weeks.   Quantity:  36 packet   Refills:  6       loperamide 2 MG tablet   Commonly known as:  IMODIUM A-D   Used for:  Loose stools        Dose:  2 mg   Take 1 tablet (2 mg) by mouth 3 times daily as needed for diarrhea   Quantity:  90 tablet   Refills:  1       LORazepam 1 MG tablet   Commonly known as:  ATIVAN   Used for:  Panic disorder without agoraphobia, Generalized anxiety disorder        Dose:  1 mg   Take 1 tablet (1 mg) by mouth 2 times daily as needed for anxiety   Quantity:  60 tablet   Refills:  0       losartan 100 MG tablet   Commonly  "known as:  COZAAR   Used for:  Essential hypertension with goal blood pressure less than 140/90        Dose:  100 mg   Take 1 tablet (100 mg) by mouth daily   Quantity:  90 tablet   Refills:  1       metoprolol 50 MG tablet   Commonly known as:  LOPRESSOR   Used for:  Essential hypertension with goal blood pressure less than 140/90        Dose:  50 mg   Take 1 tablet (50 mg) by mouth 2 times daily   Quantity:  180 tablet   Refills:  1       nitroGLYcerin 0.4 MG sublingual tablet   Commonly known as:  NITROSTAT   Used for:  Other chest pain        Dose:  0.4 mg   Place 1 tablet (0.4 mg) under the tongue every 5 minutes as needed for chest pain If you are still having symptoms after 3 doses (15 minutes) call 911.   Quantity:  25 tablet   Refills:  0       oxyCODONE 10 MG IR tablet   Commonly known as:  ROXICODONE   Used for:  Left-sided low back pain with left-sided sciatica, unspecified chronicity        Dose:  10-20 mg   Take 1-2 tablets (10-20 mg) by mouth every 6 hours as needed for severe pain - max 6 tabs / day   Quantity:  100 tablet   Refills:  0       Syringe Luer Lock 20G X 1-1/2\" 3 ML Misc   Used for:  Hypogonadism in male        Dose:  1 Device   1 Device once a week - and also needs 22 G needles 1.5 inch #30 with 1 refill   Quantity:  30 each   Refills:  1       testosterone cypionate 200 MG/ML injection   Commonly known as:  DEPOTESTOTERONE   Used for:  Hypogonadism male        Dose:  100 mg   Inject 0.5 mLs (100 mg) into the muscle once a week   Quantity:  10 mL   Refills:  1       Vitamin C 100 MG Tabs        1 TABLET 3 TIMES DAILY   Quantity:  90   Refills:  0                Protect others around you: Learn how to safely use, store and throw away your medicines at www.disposemymeds.org.             Medication List: This is a list of all your medications and when to take them. Check marks below indicate your daily home schedule. Keep this list as a reference.      Medications           Morning " Afternoon Evening Bedtime As Needed    albuterol 108 (90 BASE) MCG/ACT Inhaler   Commonly known as:  PROAIR HFA/PROVENTIL HFA/VENTOLIN HFA   Inhale 2 puffs into the lungs every 4 hours as needed for shortness of breath / dyspnea                                   AMBIEN PO   Take 10 mg by mouth nightly as needed for sleep   Last time this was given:  10 mg on 10/19/2017 10:16 PM                                   amitriptyline HCl 150 MG Tabs   TAKE ONE TABLET (150MG) BY MOUTH AT BEDTIME   Last time this was given:  150 mg on 10/20/2017  9:21 PM   Next Dose Due:  Tonight at bedtime                                   amLODIPine 10 MG tablet   Commonly known as:  NORVASC   Take 1 tablet (10 mg) by mouth daily   Last time this was given:  10 mg on 10/20/2017  8:17 PM   Next Dose Due:  Tonight according to home schedule                                amphetamine-dextroamphetamine 20 MG per tablet   Commonly known as:  ADDERALL   Take 1 tablet (20 mg) by mouth 3 times daily   Next Dose Due:  Today at home according to usual home schedule                                  ASPIRIN NOT PRESCRIBED   Commonly known as:  INTENTIONAL   by Other route continuous prn.   Last time this was given:  10/20/2017  3:07 AM                                budesonide-formoterol 160-4.5 MCG/ACT Inhaler   Commonly known as:  SYMBICORT   Inhale 2 puffs into the lungs 2 times daily   Next Dose Due:  Today at home according to home schedule                                buPROPion 150 MG 24 hr tablet   Commonly known as:  WELLBUTRIN XL   Take 1 tablet (150 mg) by mouth every morning   Last time this was given:  150 mg on 10/21/2017  8:23 AM   Next Dose Due:  On Sunday Morning                                clonazePAM 1 MG tablet   Commonly known as:  klonoPIN   Take 0.5-1 tablets (0.5-1 mg) by mouth daily   Last time this was given:  1 mg on 10/21/2017 12:41 AM   Next Dose Due:  On Sunday if needed                                CYCLOBENZAPRINE HCL  PO   Take 10 mg by mouth 3 times daily as needed for muscle spasms   Next Dose Due:  Today if needed for spasms                                cycloSPORINE 0.05 % ophthalmic emulsion   Commonly known as:  RESTASIS   Place 1 drop into both eyes 2 times daily   Next Dose Due:  Today at home according to home schedule                                finasteride 5 MG tablet   Commonly known as:  PROSCAR   TAKE ONE-HALF TABLET BY MOUTH ONCE DAILY   Last time this was given:  5 mg on 10/20/2017  8:17 PM                                fluconazole 150 MG tablet   Commonly known as:  DIFLUCAN   Take 1 tablet (150 mg) by mouth once a week for 4 weeks and then monthly                                fluticasone 50 MCG/ACT spray   Commonly known as:  FLONASE   Spray 1-2 sprays into both nostrils daily   Next Dose Due:  Today at home                                glycerin-hypromellose- 0.2-0.2-1 % Soln ophthalmic solution   Commonly known as:  ARTIFICIAL TEARS   Place 1 drop into both eyes 2 times daily as needed for dry eyes                                   hydrochlorothiazide 25 MG tablet   Commonly known as:  HYDRODIURIL   Take 1 tablet (25 mg) by mouth daily   Next Dose Due:  Today at home according to usual schedule                                imiquimod 5 % cream   Commonly known as:  ALDARA   Apply  to lesion.   Wash off after 8 hours.   May use for up to 16 weeks.                                loperamide 2 MG tablet   Commonly known as:  IMODIUM A-D   Take 1 tablet (2 mg) by mouth 3 times daily as needed for diarrhea                                   LORazepam 1 MG tablet   Commonly known as:  ATIVAN   Take 1 tablet (1 mg) by mouth 2 times daily as needed for anxiety   Last time this was given:  1 mg on 10/20/2017 11:33 AM                                   losartan 100 MG tablet   Commonly known as:  COZAAR   Take 1 tablet (100 mg) by mouth daily   Next Dose Due:  Today at home according to usual schedule      "                           metoprolol 50 MG tablet   Commonly known as:  LOPRESSOR   Take 1 tablet (50 mg) by mouth 2 times daily   Last time this was given:  50 mg on 10/21/2017  8:23 AM   Next Dose Due:  Tonight at home according to usual schedule                                MULTIVITAMIN TABS   OR   Next Dose Due:  Today at home according to usual schedule                                nitroGLYcerin 0.4 MG sublingual tablet   Commonly known as:  NITROSTAT   Place 1 tablet (0.4 mg) under the tongue every 5 minutes as needed for chest pain If you are still having symptoms after 3 doses (15 minutes) call 911.                                oxyCODONE 10 MG IR tablet   Commonly known as:  ROXICODONE   Take 1-2 tablets (10-20 mg) by mouth every 6 hours as needed for severe pain - max 6 tabs / day   Last time this was given:  20 mg on 10/21/2017  4:36 AM   Next Dose Due:  At 10:36AM or later today at home.                                Syringe Luer Lock 20G X 1-1/2\" 3 ML Misc   1 Device once a week - and also needs 22 G needles 1.5 inch #30 with 1 refill                                testosterone cypionate 200 MG/ML injection   Commonly known as:  DEPOTESTOTERONE   Inject 0.5 mLs (100 mg) into the muscle once a week   Next Dose Due:  According to usual schedule                                Vitamin C 100 MG Tabs   1 TABLET 3 TIMES DAILY   Next Dose Due:  Today at home according to usual schedule                                  "

## 2017-10-19 NOTE — ED PROVIDER NOTES
"  History     Chief Complaint:  Back pain     The history is provided by the patient.      Jeremi Damon is a 47 year old male with a history of chronic lower back pain who presents with back pain. Of note, he has a contract with [facility] pain clinic for management of his lower back pain. His last visit there was on 09/11/17 where he was recommended to stop using medical marijuana but to continue using oxycodone and to consider Tramadol or Zafoxone. He was seen at his primary practitioner on 10/10/17 with no mention of back pain then. He reports missing a chair on Friday (6 days ago), falling to his tailbone. He has developed worse pain in his upper thoracic area with spasms and pain in his neck. The pain continues to worsen prompting him to the emergency department. He says that he is unable to move his neck without any pain and his spasms come at random and rates his pain a 7/10 in severity. He has never had trouble in his back like this. He denies any numbness or tingling in his legs and has no other complaints.      Allergies:  Acetaminophen  Gabapentin  Morphine  Theophylline     Medications:    metoprolol (LOPRESSOR) 50 MG tablet  hydrochlorothiazide (HYDRODIURIL) 25 MG tablet  losartan (COZAAR) 100 MG tablet  oxyCODONE (ROXICODONE) 10 MG IR tablet  buPROPion (WELLBUTRIN XL) 150 MG 24 hr tablet  LORazepam (ATIVAN) 1 MG tablet  clonazePAM (KLONOPIN) 1 MG tablet  loperamide (IMODIUM A-D) 2 MG tablet  amLODIPine (NORVASC) 10 MG tablet  Syringe/Needle, Disp, (SYRINGE LUER LOCK) 20G X 1-1/2\" 3 ML MISC  amitriptyline HCl 150 MG TABS  budesonide-formoterol (SYMBICORT) 160-4.5 MCG/ACT Inhaler  testosterone cypionate (DEPOTESTOTERONE) 200 MG/ML injection  amphetamine-dextroamphetamine (ADDERALL) 20 MG per tablet  albuterol (PROAIR HFA/PROVENTIL HFA/VENTOLIN HFA) 108 (90 BASE) MCG/ACT Inhaler  finasteride (PROSCAR) 5 MG tablet  fluconazole (DIFLUCAN) 150 MG tablet  glycerin-hypromellose- (ARTIFICIAL TEARS) " 0.2-0.2-1 % SOLN ophthalmic solution  nitroglycerin (NITROSTAT) 0.4 MG sublingual tablet  cycloSPORINE (RESTASIS) 0.05 % ophthalmic emulsion  imiquimod (ALDARA) 5 % cream  fluticasone (FLONASE) 50 MCG/ACT nasal spray  ASPIRIN NOT PRESCRIBED, INTENTIONAL,  VITAMIN C 100 MG OR TABS  MULTIVITAMIN TABS   OR    Past Medical History:    Bengin HTP  Chronic back pain  Hiccough  Major depressive disorder   Mild asthma   Cardiomyopathies  CKD    Past Surgical History:    Appendectomy    Back surgery   Repair of nasal septum   Laparoscopic cholecystectomy   Surgery of pectoral muscle   Surgery of bilateral radiofrequency volume reduction of the inferior turbinates  Rhinoplasty     Family History:    Cancer  HTN  CAD  MI    Social History:  Presents with no one    Tobacco use: Never smoker   Alcohol use: Rarely  PCP: Luis Armando Segovia    Marital Status:  Single     Review of Systems   Musculoskeletal: Positive for back pain, myalgias, neck pain and neck stiffness.   Neurological: Negative for numbness.   All other systems reviewed and are negative.     Physical Exam     Patient Vitals for the past 24 hrs:   BP Temp Temp src Pulse Resp SpO2 Height Weight   10/19/17 1330 (!) 172/114 - - - - 94 % - -   10/19/17 1256 - - - - - 93 % - -   10/19/17 1233 - - - - - (!) 88 % - -   10/19/17 1228 (!) 154/98 - - - - (!) 86 % - -   10/19/17 1217 - - - - - 94 % - -   10/19/17 1216 (!) 158/103 - - - - - - -   10/19/17 1113 (!) 162/105 97.9  F (36.6  C) Oral 93 20 94 % 1.829 m (6') 115.7 kg (255 lb)        Physical Exam  Nursing note and vitals reviewed.  Constitutional:   Awake alert  Mouth/Throat:  Oropharynx is clear and moist.   Eyes:    Conjunctivae normal and EOM are normal. Pupils are equal, round, and reactive to light.   Cardiovascular: Normal rate, regular rhythm, normal heart sounds and intact distal pulses.    Pulmonary/Chest:  Effort normal and breath sounds normal. No respiratory distress. No wheezes. No rales. No tenderness.    Abdominal:   Soft. The patient exhibits no mass. There is no tenderness. There is no rebound and no guarding.   Musculoskeletal:  Normal range of motion. No edema and no tenderness. Tender mid thoracic spine with para spinal spasm. No cervical, thoracic or sacral tenderness. Motor sensory exam normal and symmetry lower extremities.   Lymphadenopathy:  No cervical adenopathy.   Neurological:  Alert and oriented to person, place, and time. No cranial nerve deficit. Normal muscle tone.           GCS 15   Skin:    Skin is warm and dry.   Psychiatric:   Normal mood and affect.    Emergency Department Course   Imaging:  Imaging results were discussed with the patient and all questions were answered.     CT Thoracic spine w/o contrast:  IMPRESSION: No thoracic spine fractures are identified.    SHOLA RODRIGUEZ MD    Interventions:  1141: Ativan 1 mg IV   1217: Dilaudid 1 mg IV   1302: Toradol 30 mg IV   1305: Ketalar 30 mg IV    The patient's symptoms were not improved with parenteral narcotics and benzodiazapines.     Emergency Department Course:  Past medical records, nursing notes, and vitals reviewed.  1119: I performed an exam of the patient and obtained history, as documented above.  IV inserted and blood drawn.   Above interventions provided.   The patient was sent for a CT while in the emergency department, findings above.   I personally reviewed the laboratory results with the Patient and answered all related questions prior to admission.   Findings and plan explained to the Patient who consents to admission.     1405: Discussed the patient with Dr. Garcia, who will admit the patient to a observation bed for further monitoring, evaluation, and treatment.      Impression & Plan    Medical Decision Makin-year-old male with chronic low back pain seen [facility] pain clinic.  He was seen there .  Scant note is in the electronic medical record here.  He was using medical marijuana for his pain but pain  doctor asked him to discontinue this as they put him on high-dose opioids.  He was seen in his clinic October 10 and no mention was made of his back pain.  Last Friday or six days prior to arrival he was assembling a chair and sat down in it and it slid out and he landed on his buttocks and coccyx.  Axial load type injury.  Ever since then he's had midthoracic back pain with spasm.  No improvement with his chronic pain medications.  He does have a pain contract with [facility] pain [facility].  Because the axial load injury I obtained a thoracic CT scan to look for compression fracture and none was seen.  No acute bony abnormality was noted.  He has no new neurologic symptoms to his legs.  He has some chronic radiculopathy.  He does not describe any neck or lumbar back pain above his baseline.  I don't think he needs imaging studies there.  We are unable to get his pain under control despite the above medications so he will be admitted observation status with Dr. Garcia for pain control    Addendum: A shunt and refused to be admitted.  While attempting to ambulate he then changed his mind and would like to be admitted now.  Dr. Garcia reinformed    Diagnosis:    ICD-10-CM    1. Acute left-sided thoracic back pain M54.6        Disposition:  Admitted to Dr. Garcia for further observation and pain management.     Discharge Medications:  New Prescriptions    No medications on file         Matthias Catherine  10/19/2017    EMERGENCY DEPARTMENT  Matthias MANRIQUE, am serving as a scribe at 11:19 AM on 10/19/2017 to document services personally performed by Ajit Roberts MD based on my observations and the provider's statements to me.       Ajit Roberts MD  10/19/17 2846       Ajit Roberts MD  10/19/17 0601

## 2017-10-20 LAB
ALBUMIN SERPL-MCNC: 2.7 G/DL (ref 3.4–5)
ALP SERPL-CCNC: 68 U/L (ref 40–150)
ALT SERPL W P-5'-P-CCNC: 67 U/L (ref 0–70)
ANION GAP SERPL CALCULATED.3IONS-SCNC: 5 MMOL/L (ref 3–14)
AST SERPL W P-5'-P-CCNC: 88 U/L (ref 0–45)
BILIRUB SERPL-MCNC: 0.5 MG/DL (ref 0.2–1.3)
BUN SERPL-MCNC: 13 MG/DL (ref 7–30)
CALCIUM SERPL-MCNC: 8.1 MG/DL (ref 8.5–10.1)
CHLORIDE SERPL-SCNC: 103 MMOL/L (ref 94–109)
CO2 SERPL-SCNC: 31 MMOL/L (ref 20–32)
CREAT SERPL-MCNC: 1.23 MG/DL (ref 0.66–1.25)
GFR SERPL CREATININE-BSD FRML MDRD: 62 ML/MIN/1.7M2
GLUCOSE SERPL-MCNC: 77 MG/DL (ref 70–99)
POTASSIUM SERPL-SCNC: 4 MMOL/L (ref 3.4–5.3)
PROT SERPL-MCNC: 6.4 G/DL (ref 6.8–8.8)
SODIUM SERPL-SCNC: 139 MMOL/L (ref 133–144)

## 2017-10-20 PROCEDURE — 25000128 H RX IP 250 OP 636: Performed by: INTERNAL MEDICINE

## 2017-10-20 PROCEDURE — 80053 COMPREHEN METABOLIC PANEL: CPT | Performed by: NURSE PRACTITIONER

## 2017-10-20 PROCEDURE — 12000000 ZZH R&B MED SURG/OB

## 2017-10-20 PROCEDURE — 99233 SBSQ HOSP IP/OBS HIGH 50: CPT | Performed by: INTERNAL MEDICINE

## 2017-10-20 PROCEDURE — 25000132 ZZH RX MED GY IP 250 OP 250 PS 637: Mod: GY | Performed by: NURSE PRACTITIONER

## 2017-10-20 PROCEDURE — 25000128 H RX IP 250 OP 636: Performed by: NURSE PRACTITIONER

## 2017-10-20 PROCEDURE — 25000132 ZZH RX MED GY IP 250 OP 250 PS 637: Mod: GY | Performed by: INTERNAL MEDICINE

## 2017-10-20 PROCEDURE — 99207 ZZC CDG-MDM COMPONENT: MEETS MODERATE - UP CODED: CPT | Performed by: INTERNAL MEDICINE

## 2017-10-20 PROCEDURE — A9270 NON-COVERED ITEM OR SERVICE: HCPCS | Mod: GY | Performed by: INTERNAL MEDICINE

## 2017-10-20 PROCEDURE — A9270 NON-COVERED ITEM OR SERVICE: HCPCS | Mod: GY | Performed by: NURSE PRACTITIONER

## 2017-10-20 PROCEDURE — 36415 COLL VENOUS BLD VENIPUNCTURE: CPT | Performed by: NURSE PRACTITIONER

## 2017-10-20 PROCEDURE — 80048 BASIC METABOLIC PNL TOTAL CA: CPT | Performed by: NURSE PRACTITIONER

## 2017-10-20 RX ORDER — HYDROMORPHONE HYDROCHLORIDE 1 MG/ML
.5-.7 INJECTION, SOLUTION INTRAMUSCULAR; INTRAVENOUS; SUBCUTANEOUS
Status: DISCONTINUED | OUTPATIENT
Start: 2017-10-20 | End: 2017-10-21

## 2017-10-20 RX ORDER — OXYCODONE HYDROCHLORIDE 5 MG/1
10-15 TABLET ORAL EVERY 4 HOURS PRN
Status: DISCONTINUED | OUTPATIENT
Start: 2017-10-20 | End: 2017-10-20

## 2017-10-20 RX ORDER — OXYCODONE HYDROCHLORIDE 5 MG/1
10-15 TABLET ORAL EVERY 6 HOURS PRN
Status: DISCONTINUED | OUTPATIENT
Start: 2017-10-20 | End: 2017-10-20

## 2017-10-20 RX ORDER — OXYCODONE HYDROCHLORIDE 5 MG/1
10-20 TABLET ORAL EVERY 6 HOURS PRN
Status: DISCONTINUED | OUTPATIENT
Start: 2017-10-20 | End: 2017-10-21 | Stop reason: HOSPADM

## 2017-10-20 RX ORDER — CALCIUM CARBONATE 500 MG/1
500-1000 TABLET, CHEWABLE ORAL
Status: DISCONTINUED | OUTPATIENT
Start: 2017-10-20 | End: 2017-10-21 | Stop reason: HOSPADM

## 2017-10-20 RX ORDER — OXYCODONE HCL 20 MG/1
20 TABLET, FILM COATED, EXTENDED RELEASE ORAL EVERY 12 HOURS
Status: DISCONTINUED | OUTPATIENT
Start: 2017-10-20 | End: 2017-10-20

## 2017-10-20 RX ADMIN — HYDROMORPHONE HYDROCHLORIDE 0.7 MG: 1 INJECTION, SOLUTION INTRAMUSCULAR; INTRAVENOUS; SUBCUTANEOUS at 14:56

## 2017-10-20 RX ADMIN — AMITRIPTYLINE HYDROCHLORIDE 150 MG: 75 TABLET, FILM COATED ORAL at 21:21

## 2017-10-20 RX ADMIN — BUPROPION HYDROCHLORIDE 150 MG: 150 TABLET, FILM COATED, EXTENDED RELEASE ORAL at 08:31

## 2017-10-20 RX ADMIN — TIZANIDINE 4 MG: 4 TABLET ORAL at 12:44

## 2017-10-20 RX ADMIN — TIZANIDINE 4 MG: 4 TABLET ORAL at 21:21

## 2017-10-20 RX ADMIN — HYDROMORPHONE HYDROCHLORIDE 0.5 MG: 1 INJECTION, SOLUTION INTRAMUSCULAR; INTRAVENOUS; SUBCUTANEOUS at 09:44

## 2017-10-20 RX ADMIN — SODIUM CHLORIDE: 9 INJECTION, SOLUTION INTRAVENOUS at 02:05

## 2017-10-20 RX ADMIN — AMLODIPINE BESYLATE 10 MG: 10 TABLET ORAL at 20:17

## 2017-10-20 RX ADMIN — SODIUM CHLORIDE: 9 INJECTION, SOLUTION INTRAVENOUS at 09:25

## 2017-10-20 RX ADMIN — METOPROLOL TARTRATE 50 MG: 50 TABLET, FILM COATED ORAL at 20:17

## 2017-10-20 RX ADMIN — HYDROMORPHONE HYDROCHLORIDE 0.7 MG: 1 INJECTION, SOLUTION INTRAMUSCULAR; INTRAVENOUS; SUBCUTANEOUS at 17:06

## 2017-10-20 RX ADMIN — HYDROMORPHONE HYDROCHLORIDE 0.5 MG: 1 INJECTION, SOLUTION INTRAMUSCULAR; INTRAVENOUS; SUBCUTANEOUS at 05:09

## 2017-10-20 RX ADMIN — HYDROMORPHONE HYDROCHLORIDE 0.7 MG: 1 INJECTION, SOLUTION INTRAMUSCULAR; INTRAVENOUS; SUBCUTANEOUS at 20:10

## 2017-10-20 RX ADMIN — TIZANIDINE 4 MG: 4 TABLET ORAL at 05:09

## 2017-10-20 RX ADMIN — CLONAZEPAM 1 MG: 0.5 TABLET ORAL at 08:31

## 2017-10-20 RX ADMIN — OXYCODONE HYDROCHLORIDE 20 MG: 5 TABLET ORAL at 12:37

## 2017-10-20 RX ADMIN — LORAZEPAM 1 MG: 1 TABLET ORAL at 11:33

## 2017-10-20 RX ADMIN — FINASTERIDE 5 MG: 5 TABLET, FILM COATED ORAL at 20:17

## 2017-10-20 RX ADMIN — SODIUM CHLORIDE: 9 INJECTION, SOLUTION INTRAVENOUS at 17:21

## 2017-10-20 RX ADMIN — METOPROLOL TARTRATE 50 MG: 50 TABLET, FILM COATED ORAL at 08:31

## 2017-10-20 RX ADMIN — OXYCODONE HYDROCHLORIDE 10 MG: 5 TABLET ORAL at 06:04

## 2017-10-20 RX ADMIN — HYDROMORPHONE HYDROCHLORIDE 0.5 MG: 1 INJECTION, SOLUTION INTRAMUSCULAR; INTRAVENOUS; SUBCUTANEOUS at 00:04

## 2017-10-20 RX ADMIN — HYDROMORPHONE HYDROCHLORIDE 0.7 MG: 1 INJECTION, SOLUTION INTRAMUSCULAR; INTRAVENOUS; SUBCUTANEOUS at 22:27

## 2017-10-20 RX ADMIN — OXYCODONE HYDROCHLORIDE 20 MG: 5 TABLET ORAL at 20:11

## 2017-10-20 NOTE — PROGRESS NOTES
[facility]    Hospitalist Progress Note    Date of Service (when I saw the patient): 10/20/2017    Assessment & Plan   Jeremi Damon is a 47 year old male who was admitted on 10/19/2017 with acute on chronic back pain.    1. Back pain - Discussed with Pain Service.  Discontinued oxycontin and returned oxycodone to PTA dose, with prn IV dilaudid for breakthrough.  Continue tizanidine and ativan.  Consider baclofen or prednisone.  CT thoracic spine unremarkable.  Informed patient I will not further increase pain medications.  Consult PT/OT.    2. PEDRO, stage 1 - Improved with IVF.  Continue to hold losartan.    3. Asthma - Consider albuterol and Symbicort.    4. Depression - Continue Wellbutrin     5. HTN - Continue metoprolol and amlodipine    6. Transaminitis - Improving.  Follow.    DVT Prophylaxis: Pneumatic Compression Devices  Code Status: Full Code    Disposition: Expected discharge in 1-2 days.    Dave Simmons  Text Page (7 am to 6 pm)    Interval History   The patient complains of severe back pain between scapula, worse with movement and deep breaths.    -Data reviewed today: I reviewed all new labs and imaging results over the last 24 hours. I personally reviewed no images or EKG's today.    Physical Exam   Temp: 98.5  F (36.9  C) Temp src: Oral BP: (!) 155/99 Pulse: 79   Resp: 20 SpO2: 94 % O2 Device: None (Room air) Oxygen Delivery: 2 LPM  Vitals:    10/19/17 1113   Weight: 115.7 kg (255 lb)     Vital Signs with Ranges  Temp:  [98.2  F (36.8  C)-98.5  F (36.9  C)] 98.5  F (36.9  C)  Pulse:  [78-81] 79  Resp:  [16-20] 20  BP: (143-191)/() 155/99  SpO2:  [86 %-97 %] 94 %  I/O last 3 completed shifts:  In: 1523 [I.V.:1523]  Out: 450 [Urine:450]    Gen: Well nourished, well developed, alert and oriented x 3, no acute distressed  HEENT: Atraumatic, normocephalic  Lungs: Clear to ausculation without wheezes, rhonchi, or rales  Heart: Regular rate and rhythm, no murmurs, gallops, or  rubs  GI: Bowel sound normal, no hepatosplenomegaly or masses  Lymph: No lymphadenopathy or edema  Skin: No rashes     Medications     NaCl 125 mL/hr at 10/20/17 0925       oxyCODONE  20 mg Oral Q12H     amitriptyline  150 mg Oral At Bedtime     amLODIPine  10 mg Oral QPM     buPROPion  150 mg Oral QAM     finasteride  5 mg Oral QPM     metoprolol  50 mg Oral BID     lidocaine  3 patch Transdermal Q24h    And     lidocaine   Transdermal Q24H    And     lidocaine   Transdermal Q8H     fluticasone-vilanterol  1 puff Inhalation QPM       Data     Recent Labs  Lab 10/20/17  0828 10/19/17  1519   WBC  --  10.7   HGB  --  16.7   MCV  --  88   PLT  --  197    137   POTASSIUM 4.0 4.4   CHLORIDE 103 101   CO2 31 30   BUN 13 20   CR 1.23 1.82*   ANIONGAP 5 6   MIKO 8.1* 8.4*   GLC 77 72   ALBUMIN 2.7* 3.4   PROTTOTAL 6.4* 7.4   BILITOTAL 0.5 0.8   ALKPHOS 68 84   ALT 67 79*   AST 88* 100*       No results found for this or any previous visit (from the past 24 hour(s)).

## 2017-10-20 NOTE — PLAN OF CARE
Problem: Patient Care Overview  Goal: Plan of Care/Patient Progress Review  Outcome: No Change  ED admit @ 1750. BP elevated, other VSS, O2 wnl RA. A&Ox4. SBA/FR, refusing bed alarm. Tolerating reg diet, good appetite. C/o Lt upper back pain 7-8/10, unable to move his neck without pain, also c/o muscle spasms,  IV Dilaudid given x2, Oxycodone x1, po Tizanidine x1, and scheduled 3 Lido patches on Lt upper back, with little or no relief. IP pain consult tomorrow. Prn Clonazepam given x1 for anxiety. Cr 1.82, ALT 79, . IVF infusing 125 mL/hr. Rechecking labs in AM. Discharge pending progress. RN will cont to monitor.

## 2017-10-20 NOTE — CONSULTS
Inpatient Pain Management Service: Consultation      DATE OF CONSULT: October 20, 2017     REASON FOR PAIN CONSULTATION:  Jeremi Damon is a 47 year old male I am seeing in consultation at the request of Jennifer Ortiz, for evaluation and recommendations for acute on chronic back pain.     TIME SPENT: 30 minutes including 10 minutes of face-to-face time counseling Jeremi Damon about their pain management treatment options, and determining pain medication plan.    --------------------------------------------------------------------------------------------------  PAIN ASSESSMENT     PAIN DESCRIPTION:   Location(s):  Main pain this AM is across upper back- across both shoulder blades, pain shooting into spine  Duration: constant  Pain intensity: 8/10  Quality of the pain:  Major spasms, stabbing pain, stated he cannot handle current pain at all  Aggravating factors:  Movement, being in certain positions  Relieving factors: nothing helping right now    HEALTH & LIFESTYLE PRACTICES:   Tobacco:  reports that he has never smoked. He has never used smokeless tobacco.  Alcohol:  reports that he does not drink alcohol.  Illicit drugs:  reports that he does not use illicit drugs.    PAST MEDICAL AND PSYCHIATRIC HISTORY:    Past Medical History:   Diagnosis Date     Allergic rhinitis, cause unspecified     Allergic rhinitis     Benign hypertension      Chronic back pain      Hiccough      Hypersomnia with sleep apnea, unspecified      Major depressive disorder, single episode, moderate (H) 3/08     Mild persistent asthma      Other primary cardiomyopathies     Cardiomyopathy -- unknown etiology       PAST SURGICAL HISTORY:   Past Surgical History:   Procedure Laterality Date     APPENDECTOMY  2007     BACK SURGERY  2014    L5-S1 fusion     HC REPAIR OF NASAL SEPTUM       LAPAROSCOPIC CHOLECYSTECTOMY  1/05    Cholecystectomy, Laparoscopic     SURGICAL HISTORY OF -       torn pectoral muscle     SURGICAL HISTORY OF -   2009     Bilateral radiofrequency volume reduction of the inferior turbinates.     SURGICAL HISTORY OF -   2012    rhinoplasty- septoplasty       CAPA (Clinically Aligned Pain Assessment)  Comfort (How is your pain?): Intolerable  Change in Pain (Since your last medication/intervention?): Getting worse  Pain Control (How are your pain treatments working?): Inadequate pain control  Functioning (Are you able to do activities to get better?) : Can't do anything because of pain    --------------------------------------------------------------------------------------------------  PAIN MEDICATION ASSESSMENT    ALLERGIES:    Allergies   Allergen Reactions     Acetaminophen      Gabapentin      Suicidal thoughts     Morphine      Theophylline Hives      Per patient, tylenol and morphine are not true allergies-- both drugs cause severe headaches      PREADMISSION PAIN MEDICATIONS:    Prior to Admission medications    Medication Sig Start Date End Date Taking? Authorizing Provider   CYCLOBENZAPRINE HCL PO Take 10 mg by mouth 3 times daily as needed for muscle spasms   Yes Unknown, Entered By History   Zolpidem Tartrate (AMBIEN PO) Take 10 mg by mouth nightly as needed for sleep   Yes Unknown, Entered By History   oxyCODONE (ROXICODONE) 10 MG IR tablet Take 1-2 tablets (10-20 mg) by mouth every 6 hours as needed for severe pain - max 6 tabs / day 10/10/17  Yes Luis Armando Segovia MD   LORazepam (ATIVAN) 1 MG tablet Take 1 tablet (1 mg) by mouth 2 times daily as needed for anxiety 10/19/17  Yes Luis Armando Segovia MD   clonazePAM (KLONOPIN) 1 MG tablet Take 0.5-1 tablets (0.5-1 mg) by mouth daily  Patient taking differently: Take 0.5-1 mg by mouth daily as needed  10/10/17  Yes Luis Armando Segovia MD   amitriptyline HCl 150 MG TABS TAKE ONE TABLET (150MG) BY MOUTH AT BEDTIME 9/27/17  Yes Luis Armando Segovia MD       CURRENT INPATIENT PAIN MEDICATIONS:    1) Oxycodone IR 10-20 mg PO Q6H prn severe pain (home dose)  2)  Dilaudid 0.3-0.5 mg IV Q2H  "PRN severe pain  3) Amitriptyline 150 mg PO QHS (home dose)  4) Clonazepam and Lorazepam PRN anxiety (but should help with spasms)  5) Lidocaine patch - 3 patches (home dose)   6) Tizanidine 4 mg PO Q6H PRN spasm (new medication)    PAST PAIN TREATMENT:   Jeremi has received oxycodone for his back chronically.  It appears he's had     LABS THAT CAN AFFECT PAIN MEDICATION CLEARANCE:   Lab Results   Component Value Date    BUN 13 10/20/2017     Lab Results   Component Value Date    CR 1.23 10/20/2017     Lab Results   Component Value Date    AST 88 10/20/2017     Lab Results   Component Value Date    ALT 67 10/20/2017       VITAL SIGNS:    B/P: 155/99, T: 98.5, P: 79, R: 20      ASSESSMENT:   Went to see Mr. Damon this AM-- he was actively having spasms and grimacing.  He stated that pain was \"unbearable\".  Patient appeared extremely uncomfortable and had a hard time answering my questions during the spasms.      1) Acute pain crisis on top of chronic pain  2) Opioid tolerant- was taking 40 mg of oxycodone per day PTA  3) Intolerant to tylenol and gabapentin  4) Recent scr rise-- unknown cause  5) Recent AST/ALT rise (may be related to testosterone injections?)-- would decrease dose or hold if they continue to rise    TREATMENT RECOMMENDATIONS/PLAN:   1) No NSAIDs until scr normalizes.  If Scr returns to normal (and renal signs off), could potentially start oral ibuprofen or IV toradol      4) Increased Dilaudid to 0.5-0.7 mg Q2H PRN   5) Patient is not a candidate for either tylenol or gabapentin/lyrica due to intolerance  6) Continue lidocaine patches  7) Offer patient heat (or ice), to shoulder blades to help with pain/spasms  8) If spasms do not arthur soon-- may need to consider trying baclofen 5 mg PO TID (appears he tried this a while back and he felt it didn't work?) or scheduling ativan 0.5mg Q6H (would dc clonazepam).    9) Last ditch option after all of the above would be to consider starting a medrol " dose-flor to get inflammation down in his shoulders/back to see if that stops the spasms    Koffi Del Rio (JB), Pharm.D.  [phone]  October 20, 2017, 10:41 AM  Inpatient Pain Management Service    ADDENDUM  Spoke with primary team-- pain plan preferred would be to NOT do long-acting narcotics, but keep him on short-acting and see if the IV dilaudid for breakthrough gets him through his current pain issues.  I have therefore placed him back on his home oxycodone dose.  If pain continues to be unmanageable this weekend, possibly do a medrol dose-flor or try replacing his tizanidine with baclofen.  radha

## 2017-10-20 NOTE — PLAN OF CARE
Problem: Patient Care Overview  Goal: Plan of Care/Patient Progress Review  Outcome: No Change  No significant changes this shift.  Dilaudid x2, roxicodone x1, zanaflex x1 for pain/discomfort.  Uses urinal, refuses non-skid footwear and bed alarms.  IV infusing.  Will continue to monitor.

## 2017-10-20 NOTE — PLAN OF CARE
Problem: Patient Care Overview  Goal: Plan of Care/Patient Progress Review  Outcome: No Change  A&Ox4. Somewhat anxious r/t back pain/spasms, tearful about situation/pain. Receiving PRN IV dilaudid, PO oxycodone, tizanadine, ativan and klonopin, pain consult completed, pt declines heat/ice packs as rec in note. Garry diet, good appetite. Voiding adeq per urinal, loose BM x2.  Declined full skin assessment as pt is wearing own pants. Occ refusing bed/chair alarm. Has been up ind to BR x2, chair x for most of shift, PT/OT ordered. 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 Sleeping between cares when pain control adeq.

## 2017-10-21 VITALS
TEMPERATURE: 98.1 F | DIASTOLIC BLOOD PRESSURE: 124 MMHG | WEIGHT: 255 LBS | OXYGEN SATURATION: 95 % | RESPIRATION RATE: 20 BRPM | SYSTOLIC BLOOD PRESSURE: 194 MMHG | BODY MASS INDEX: 34.54 KG/M2 | HEART RATE: 72 BPM | HEIGHT: 72 IN

## 2017-10-21 PROCEDURE — 99238 HOSP IP/OBS DSCHRG MGMT 30/<: CPT | Performed by: INTERNAL MEDICINE

## 2017-10-21 PROCEDURE — A9270 NON-COVERED ITEM OR SERVICE: HCPCS | Mod: GY | Performed by: NURSE PRACTITIONER

## 2017-10-21 PROCEDURE — 25000132 ZZH RX MED GY IP 250 OP 250 PS 637: Mod: GY | Performed by: INTERNAL MEDICINE

## 2017-10-21 PROCEDURE — A9270 NON-COVERED ITEM OR SERVICE: HCPCS | Mod: GY | Performed by: INTERNAL MEDICINE

## 2017-10-21 PROCEDURE — 25000128 H RX IP 250 OP 636: Performed by: INTERNAL MEDICINE

## 2017-10-21 PROCEDURE — 25000132 ZZH RX MED GY IP 250 OP 250 PS 637: Mod: GY | Performed by: NURSE PRACTITIONER

## 2017-10-21 RX ADMIN — BUPROPION HYDROCHLORIDE 150 MG: 150 TABLET, FILM COATED, EXTENDED RELEASE ORAL at 08:23

## 2017-10-21 RX ADMIN — HYDROMORPHONE HYDROCHLORIDE 0.5 MG: 1 INJECTION, SOLUTION INTRAMUSCULAR; INTRAVENOUS; SUBCUTANEOUS at 02:32

## 2017-10-21 RX ADMIN — TIZANIDINE 4 MG: 4 TABLET ORAL at 08:23

## 2017-10-21 RX ADMIN — HYDROMORPHONE HYDROCHLORIDE 0.5 MG: 1 INJECTION, SOLUTION INTRAMUSCULAR; INTRAVENOUS; SUBCUTANEOUS at 00:35

## 2017-10-21 RX ADMIN — CLONAZEPAM 1 MG: 0.5 TABLET ORAL at 00:41

## 2017-10-21 RX ADMIN — OXYCODONE HYDROCHLORIDE 20 MG: 5 TABLET ORAL at 04:36

## 2017-10-21 RX ADMIN — HYDROMORPHONE HYDROCHLORIDE 0.5 MG: 1 INJECTION, SOLUTION INTRAMUSCULAR; INTRAVENOUS; SUBCUTANEOUS at 04:36

## 2017-10-21 RX ADMIN — METOPROLOL TARTRATE 50 MG: 50 TABLET, FILM COATED ORAL at 08:23

## 2017-10-21 NOTE — PROVIDER NOTIFICATION
"Pt c/o back pain, stating \"it's pain!\", without describing when asked to. Pt was given Zanaflex PRN as ordered and as Pain service note states. Pt angry and yelling at writer, stating I am not understanding him and not listening and he is unable to move his left arm. Pt has been using urinal ind, sitting at the edge of his bed ind, refusing bed alarm, ate breakfast ind and using all four extremities ind. Pt did not want to take his sched AM meds and Zanaflex but given the choice to take or writer could throw in trash if he refused to take them. Pt cont to yell \"Get me out of this place\", took his meds and threw the med cup. Dr. Simmons paged and he spoke with pt. Pt cont to be rude and swear and call inapprop names. Pt discharged as he requested.   "

## 2017-10-21 NOTE — PLAN OF CARE
Problem: Patient Care Overview  Goal: Plan of Care/Patient Progress Review  Outcome: Adequate for Discharge Date Met:  10/21/17  DC to home per MD order. Pt cont to be angry re situation. DC instructions read with pt as he requested. No new meds for DC prescribed. Pt arranged to have wife  pt. Pt was given w/c ride to car. All belongings were with pt.

## 2017-10-21 NOTE — PLAN OF CARE
Problem: Patient Care Overview  Goal: Plan of Care/Patient Progress Review  OT order received.  Patient discharged before seen by OT.
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
## 2017-10-21 NOTE — PLAN OF CARE
Problem: Patient Care Overview  Goal: Plan of Care/Patient Progress Review  Outcome: No Change  No changes this shift.  AOx4, Medicated with PRN pain medications throughout shift.  See MAR.  Anxiety medicated as well. IV infusing.  Refuses bed alarms.  Patient educated regarding falls/safety protocols.  Good appetite, consumed boxed lunch at 0100.  Will continue to monitor.

## 2017-10-21 NOTE — PLAN OF CARE
"Problem: Patient Care Overview  Goal: Plan of Care/Patient Progress Review  Outcome: No Change  A/O, B/P elevated, Other VSS. C/O constant stabbing neck and posterior back pain, medicated with PO and IV pain medication, minimal relief. Good Po intake. BS+, Gas+, No BM. Pt refusing bed alarms and assistance with ambulation. Refusing Lidocaine patch, heat/ice, Pt believes he needs an MRI.  LE edema. CMS+.  Cr down to 1.32. D/C pending, Pt unhappy with hospital care thus far and states \"No one is listening  to me\", RN encouraged verbalization of feelings and provided feed back  . RN will continue to monitor.        "

## 2017-10-24 ENCOUNTER — TELEPHONE (OUTPATIENT)
Dept: FAMILY MEDICINE | Facility: CLINIC | Age: 47
End: 2017-10-24

## 2017-10-24 NOTE — TELEPHONE ENCOUNTER
Patient is scheduled for a hospital follow-up visit with Dr. Segovia on 11/02/2017 at 9:40 a.m.      Ashley Panda  Patient Representative

## 2017-10-24 NOTE — TELEPHONE ENCOUNTER
Patient discharged from [facility] for inpatient hospital stay on 10/21 for acute left sided thoracic back pain.    Please contact patient to follow up; no appointment scheduled at this time.    ER / IP:  2/1    Care Coordination:  valery Zuniga

## 2017-10-25 NOTE — TELEPHONE ENCOUNTER
ED / Discharge Outreach Protocol    Patient Contact    Attempt # 2    Was call answered?  No.  Left message on voicemail with information to call me back. Even though the pt is scheduled, we need to verify if he has any further questions or concerns.     Amelia Flores RN  [facility]

## 2017-10-26 NOTE — TELEPHONE ENCOUNTER
Called [phone] (home)     ED / Discharge Outreach Protocol    Patient Contact    Attempt # 3    Was call answered?  No.  Left message on voicemail with information to call me back.    Asmita Marinelli RN  [facility]

## 2017-10-30 DIAGNOSIS — M54.42 LEFT-SIDED LOW BACK PAIN WITH LEFT-SIDED SCIATICA, UNSPECIFIED CHRONICITY: ICD-10-CM

## 2017-10-30 RX ORDER — OXYCODONE HYDROCHLORIDE 10 MG/1
10-20 TABLET ORAL EVERY 6 HOURS PRN
Qty: 100 TABLET | Refills: 0 | Status: CANCELLED | OUTPATIENT
Start: 2017-10-30

## 2017-10-30 NOTE — TELEPHONE ENCOUNTER
Controlled Substance Refill Request for Oxycodone  Problem List Complete:  Yes    Last Written Prescription Date:  10/10/2017  Last Fill Quantity: 90,   # refills: 1    Last Office Visit with [facility] primary care provider: 10/10/2017    Clinic visit frequency required: none noted     Future Office visit:   Next 5 appointments (look out 90 days)     Nov 02, 2017  9:40 AM CDT   [facility]   [facility]    [address] [place] [id number] [phone]                  Controlled substance agreement on file: No.     Processing:  [id number] [place] [place] [facility] [address] [facility]     checked in past 6 months?  No, route to RN

## 2017-10-31 NOTE — PROGRESS NOTES
"  SUBJECTIVE:                                                    Jeremi Damon is a 47 year old male who presents to clinic today for the following health issues:        Hospital Follow-up Visit:    Hospital/Nursing Home/IP Rehab Facility: [facility]  Date of Admission: 10/19/2017  Date of Discharge: 10/21/2017  Reason(s) for Admission: Left sided thoracic pain            Problems taking medications regularly:  None       Medication changes since discharge: None       Problems adhering to non-medication therapy:  None  Summary of hospitalization:  [facility] discharge summary reviewed  Diagnostic Tests/Treatments reviewed.  Follow up needed: none  Other Healthcare Providers Involved in Patient s Care:         None  Update since discharge: fluctuating course.     Post Discharge Medication Reconciliation: discharge medications reconciled and changed, per note/orders (see AVS).  Plan of care communicated with patient     Coding guidelines for this visit:  Type of Medical   Decision Making Face-to-Face Visit       within 7 Days of discharge Face-to-Face Visit        within 14 days of discharge   Moderate Complexity [id number]   High Complexity [id number]          Pt reports \"wiping out\" a month ago, after a chair slipped out from underneath him. He has been experiencing pain ever since, with majority of the severe pain located in his neck. He reported to the ED, where they did thoracic CT scan that showed no signs of a fracture. He has limited ROM of his neck, with little to no ability to turn his head. After his hospital visit, his pain had lowered 7/10 and he returned home to continue treatment with pain medication. Pt reports being administered ketamine in the hospital, and did not like the way he felt while on this medication..    Jeremi reports that he has been visiting a pain clinic, where the plan was to taper him off his percocet -  In the mean time he went to a medicinal marijuana " clinic and the taper for the percocet was sped up when they learned of this. He would now like to be off narcotics and had some questions about suboxone or methadone.  Last year he was on Tramadol and percocet for break through pain but does not want to go back to that. instead, to help treat his chronic pain. Jeremi needs this medication to treat intermittent episodes of severe pain. He would like to eventually wean off of his oxycodone use, as he experiences withdrawal symptoms on days when he does not take it. The symptoms include aches and pain, lethargy, diaphoresis, depression and lack of sleep.     was reviewed today with Pt, he confirmed he has discontinued medicinal marijuana use and understands the ramifications.    Problem list and histories reviewed & adjusted, as indicated.  Additional history: as documented    ROS:  Constitutional, HEENT, cardiovascular, pulmonary, GI, , musculoskeletal, neuro, skin, endocrine and psych systems are negative, except as otherwise noted.    This document serves as a record of the services and decisions personally performed and made by Luis Armando Segovia MD. It was created on her behalf by Ines Puentes, a trained medical scribe. The creation of this document is based the provider's statements to the medical scribe.  Scribe Ines Puentes 9:57 AM, November 2, 2017    OBJECTIVE:                                                    BP (!) 140/92 (BP Location: Right arm, Patient Position: Sitting, Cuff Size: Adult Large)  Pulse 100  Temp 98.3  F (36.8  C) (Oral)  Ht 1.829 m (6')  Wt 113.4 kg (250 lb)  SpO2 95%  BMI 33.91 kg/m2 Body mass index is 33.91 kg/(m^2).   GENERAL: healthy, alert, well nourished, well hydrated, no distress  HENT: ear canals- normal; TMs- normal; Nose- normal; Mouth- no ulcers, no lesions  NECK: bilateral paracervical and C6-C7 region tenderness, no adenopathy, no asymmetry, no masses, no stiffness; thyroid- normal to palpation  RESP: lungs clear to  auscultation - no rales, no rhonchi, no wheezes  CV: regular rates and rhythm, normal S1 S2, no S3 or S4 and no murmur, no click or rub -  ABDOMEN: soft, no tenderness, no  hepatosplenomegaly, no masses, normal bowel sounds  MS: extremities- no gross deformities noted, no edema  SKIN: no suspicious lesions, no rashes  PSYCH: Alert and oriented times 3; speech- coherent , normal rate and volume; able to articulate logical thoughts, able to abstract reason, no tangential thoughts, no hallucinations or delusions, affect- normal  Diagnostic test results:  None     ASSESSMENT/PLAN:         Jeremi was seen today for hospital f/u.    Diagnoses and all orders for this visit:    Neck pain, bilateral - Jeremi's pain persists, but he has discontinued his oxycodone use and will begin treating with this medication instead  -     cloNIDine (CATAPRES) 0.1 MG tablet; Take 1-2 tablets (0.1-0.2 mg) by mouth 2 times daily as needed    Narcotic withdrawal (H) - Jeremi has discontinued his oxycodone use and will begin medications to treat his withdrawal symptoms  -     clonazePAM (KLONOPIN) 1 MG tablet; Take 0.5-1 tablets (0.5-1 mg) by mouth 2 times daily as needed for anxiety  -     cloNIDine (CATAPRES) 0.1 MG tablet; Take 1-2 tablets (0.1-0.2 mg) by mouth 2 times daily as needed    Hypertension goal BP (blood pressure) < 140/90 - Jeremi's BP has decreased since his trip to ED, measuring 140/92 at today's visit. A recheck of this in 1-2 weeks is recommended       Risks, benefits and alternatives of treatments discussed. Plan agreed on.      Followup: Return as needed    Will call, return to clinic, or go to ED if worsening or symptoms not improving as discussed.    See patient instructions.       BMI:   Estimated body mass index is 33.91 kg/(m^2) as calculated from the following:    Height as of this encounter: 1.829 m (6').    Weight as of this encounter: 113.4 kg (250 lb).   Weight management plan: Discussed healthy diet and exercise  guidelines and patient will follow up in 12 months in clinic to re-evaluate.      The information in this document, created by the medical scribe for me, accurately reflects the services I personally performed and the decisions made by me. I have reviewed and approved this document for accuracy prior to leaving the patient care area.  9:54 AM, 11/02/17          Nate Segovia MD   Pager: [phone]

## 2017-11-01 NOTE — TELEPHONE ENCOUNTER
Called # below     Advised pt on the information below     Pt argued with RN about the need to get through the night he did not sleep due th pain the last few nights and Md RAIMUNDO got this request several days before and this should of been addressed then.   Rn advised pt that unfortunately this will need to be addressed to tomorrow by Md RAIMUNDO or if the pain is very bad he needs to go to the ER     Pt was upset and said good bye and hung up phone     Kari Mcleod RN, BSN  [facility]

## 2017-11-01 NOTE — TELEPHONE ENCOUNTER
Pt was in the ER last weekend after injuring back from falling off chair. Pt has had previous back surgery (17).  Had unexplained shooting pain into back/shoulders.  Pt noted they cannot wait until seen tomorrow. Insurance will not allow emergency care per pt report.      Pt wants just a day or two of medication and will be seen tomorrow with RL. Below script says 1 refill. There is no refill of this medication per pts med list. Advised pt RL is not in today and they will most likely need to be seen in ER, by another provider, or wait for RL appt tomorrow. Pt adamant about needing medication.     Pt wants a call back if able. [phone]. [facility] [place]  (advised of protocol for scripts as well- and this would need to be picked up or mailed)     Routing to another provider in clinic today.    Amelia Flores RN  [facility]

## 2017-11-02 ENCOUNTER — OFFICE VISIT (OUTPATIENT)
Dept: FAMILY MEDICINE | Facility: CLINIC | Age: 47
End: 2017-11-02
Payer: MEDICARE

## 2017-11-02 ENCOUNTER — TELEPHONE (OUTPATIENT)
Dept: FAMILY MEDICINE | Facility: CLINIC | Age: 47
End: 2017-11-02

## 2017-11-02 VITALS
HEART RATE: 100 BPM | SYSTOLIC BLOOD PRESSURE: 140 MMHG | BODY MASS INDEX: 33.86 KG/M2 | TEMPERATURE: 98.3 F | OXYGEN SATURATION: 95 % | HEIGHT: 72 IN | WEIGHT: 250 LBS | DIASTOLIC BLOOD PRESSURE: 92 MMHG

## 2017-11-02 DIAGNOSIS — I10 HYPERTENSION GOAL BP (BLOOD PRESSURE) < 140/90: ICD-10-CM

## 2017-11-02 DIAGNOSIS — F11.93 NARCOTIC WITHDRAWAL (H): ICD-10-CM

## 2017-11-02 DIAGNOSIS — M54.42 LEFT-SIDED LOW BACK PAIN WITH LEFT-SIDED SCIATICA, UNSPECIFIED CHRONICITY: ICD-10-CM

## 2017-11-02 DIAGNOSIS — M54.2 NECK PAIN, BILATERAL: Primary | ICD-10-CM

## 2017-11-02 PROCEDURE — 99213 OFFICE O/P EST LOW 20 MIN: CPT | Performed by: FAMILY MEDICINE

## 2017-11-02 RX ORDER — CLONAZEPAM 1 MG/1
0.5-1 TABLET ORAL 2 TIMES DAILY PRN
Qty: 60 TABLET | Refills: 0 | Status: SHIPPED | OUTPATIENT
Start: 2017-11-02 | End: 2017-11-16

## 2017-11-02 RX ORDER — CLONIDINE HYDROCHLORIDE 0.1 MG/1
TABLET ORAL
Qty: 20 TABLET | Refills: 1 | Status: SHIPPED | OUTPATIENT
Start: 2017-11-02 | End: 2017-11-02

## 2017-11-02 RX ORDER — OXYCODONE HYDROCHLORIDE 10 MG/1
10-20 TABLET ORAL EVERY 6 HOURS PRN
Qty: 100 TABLET | Refills: 0 | Status: CANCELLED | OUTPATIENT
Start: 2017-11-02

## 2017-11-02 RX ORDER — CLONIDINE HYDROCHLORIDE 0.1 MG/1
.1-.2 TABLET ORAL 2 TIMES DAILY PRN
Qty: 20 TABLET | Refills: 1 | Status: SHIPPED | OUTPATIENT
Start: 2017-11-02 | End: 2017-11-16

## 2017-11-02 NOTE — TELEPHONE ENCOUNTER
Reason for Call:  Medication or medication refill:    Do you use a [facility]?  Name of the pharmacy and phone number for the current request:  [facility]     Name of the medication requested: oxycodone hcl    Other request: Please fill & let pt know when done    Can we leave a detailed message on this number? YES    Phone number patient can be reached at: Cell number on file:    Telephone Information:   Mobile [phone]       Best Time: any      Call taken on 11/2/2017 at 2:08 PM by Swati Richards

## 2017-11-02 NOTE — NURSING NOTE
Chief Complaint   Patient presents with     Hospital F/U       Initial BP (!) 140/92 (BP Location: Right arm, Patient Position: Sitting, Cuff Size: Adult Large)  Pulse 100  Temp 98.3  F (36.8  C) (Oral)  Ht 6' (1.829 m)  Wt 250 lb (113.4 kg)  SpO2 95%  BMI 33.91 kg/m2 Estimated body mass index is 33.91 kg/(m^2) as calculated from the following:    Height as of this encounter: 6' (1.829 m).    Weight as of this encounter: 250 lb (113.4 kg).  Medication Reconciliation: complete

## 2017-11-02 NOTE — MR AVS SNAPSHOT
After Visit Summary   11/2/2017    Jeremi Damon    MRN: [id number]           Patient Information     Date Of Birth          1970        Visit Information        Provider Department      11/2/2017 9:40 AM Luis Armando Segovia MD [facility]        Today's Diagnoses     Neck pain, bilateral    -  1    Narcotic withdrawal (H)        Hypertension goal BP (blood pressure) < 140/90           Follow-ups after your visit        Who to contact     If you have questions or need follow up information about today's clinic visit or your schedule please contact [facility] directly at [phone].  Normal or non-critical lab and imaging results will be communicated to you by MyChart, letter or phone within 4 business days after the clinic has received the results. If you do not hear from us within 7 days, please contact the clinic through [employer] or phone. If you have a critical or abnormal lab result, we will notify you by phone as soon as possible.  Submit refill requests through [employer] or call your pharmacy and they will forward the refill request to us. Please allow 3 business days for your refill to be completed.          Additional Information About Your Visit        MyChart Information     [employer] gives you secure access to your electronic health record. If you see a primary care provider, you can also send messages to your care team and make appointments. If you have questions, please call your primary care clinic.  If you do not have a primary care provider, please call [phone] and they will assist you.        Care EveryWhere ID     This is your Care EveryWhere ID. This could be used by other organizations to access your [place] medical records  [id number]        Your Vitals Were     Pulse Temperature Height Pulse Oximetry BMI (Body Mass Index)       100 98.3  F (36.8  C) (Oral) 6' (1.829 m) 95% 33.91 kg/m2        Blood Pressure from Last 3 Encounters:    11/02/17 (!) 140/92   10/21/17 (!) 194/124   10/10/17 (!) 160/102    Weight from Last 3 Encounters:   11/02/17 250 lb (113.4 kg)   10/19/17 255 lb (115.7 kg)   10/10/17 259 lb (117.5 kg)              Today, you had the following     No orders found for display         Today's Medication Changes          These changes are accurate as of: 11/2/17 10:22 AM.  If you have any questions, ask your nurse or doctor.               Start taking these medicines.        Dose/Directions    cloNIDine 0.1 MG tablet   Commonly known as:  CATAPRES   Used for:  Narcotic withdrawal (H), Neck pain, bilateral   Started by:  Luis Armando Segovia MD        Dose:  0.1-0.2 mg   Take 1-2 tablets (0.1-0.2 mg) by mouth 2 times daily as needed   Quantity:  20 tablet   Refills:  1         These medicines have changed or have updated prescriptions.        Dose/Directions    amphetamine-dextroamphetamine 20 MG per tablet   Commonly known as:  ADDERALL   This may have changed:    - how much to take  - when to take this  - reasons to take this   Used for:  Attention deficit hyperactivity disorder (ADHD), combined type        Dose:  20 mg   Take 1 tablet (20 mg) by mouth 3 times daily   Quantity:  90 tablet   Refills:  0       * clonazePAM 1 MG tablet   Commonly known as:  klonoPIN   This may have changed:    - when to take this  - reasons to take this   Used for:  Generalized anxiety disorder   Changed by:  Luis Armando Segovia MD        Dose:  0.5-1 mg   Take 0.5-1 tablets (0.5-1 mg) by mouth daily   Quantity:  30 tablet   Refills:  0       * clonazePAM 1 MG tablet   Commonly known as:  klonoPIN   This may have changed:  You were already taking a medication with the same name, and this prescription was added. Make sure you understand how and when to take each.   Used for:  Narcotic withdrawal (H)   Changed by:  Luis Armando Segovia MD        Dose:  0.5-1 mg   Take 0.5-1 tablets (0.5-1 mg) by mouth 2 times daily as needed for anxiety   Quantity:  60 tablet    Refills:  0       hydrochlorothiazide 25 MG tablet   Commonly known as:  HYDRODIURIL   This may have changed:    - when to take this  - reasons to take this   Used for:  Essential hypertension with goal blood pressure less than 140/90        Dose:  25 mg   Take 1 tablet (25 mg) by mouth daily   Quantity:  90 tablet   Refills:  1       MULTIVITAMIN TABS   OR   This may have changed:  See the new instructions.        Refills:  0       * Notice:  This list has 2 medication(s) that are the same as other medications prescribed for you. Read the directions carefully, and ask your doctor or other care provider to review them with you.      Stop taking these medicines if you haven't already. Please contact your care team if you have questions.     cycloSPORINE 0.05 % ophthalmic emulsion   Commonly known as:  RESTASIS   Stopped by:  Luis Armando Segovia MD           fluticasone 50 MCG/ACT spray   Commonly known as:  FLONASE   Stopped by:  Luis Armando Segovia MD           glycerin-hypromellose- 0.2-0.2-1 % Soln ophthalmic solution   Commonly known as:  ARTIFICIAL TEARS   Stopped by:  Luis Armando Segovia MD           oxyCODONE IR 10 MG tablet   Commonly known as:  ROXICODONE   Stopped by:  Luis Armando Segovia MD                Where to get your medicines      These medications were sent to [id number] - [place], [place] - [address] [address]     Phone:  [phone]     cloNIDine 0.1 MG tablet         Some of these will need a paper prescription and others can be bought over the counter.  Ask your nurse if you have questions.     Bring a paper prescription for each of these medications     clonazePAM 1 MG tablet                Primary Care Provider Office Phone # Fax #    Luis Armando Segovia [phone]       [address] [place] [address]        Equal Access to Services     MATHEW ABEL AH: linda Foreman qaybta kaalmada adeegyada, waxay  eliana angeles andre ceeaan ah. So [facility] [place] [phone].    ATENCIÓN: Si chiola miriam, tiene a lake disposición servicios gratuitos de asistencia lingüística. Joslyn al [phone].    We comply with applicable federal civil rights laws and [place] laws. We do not discriminate on the basis of race, color, national origin, age, disability, sex, sexual orientation, or gender identity.            Thank you!     Thank you for choosing [facility]  for your care. Our goal is always to provide you with excellent care. Hearing back from our patients is one way we can continue to improve our services. Please take a few minutes to complete the written survey that you may receive in the mail after your visit with us. Thank you!             Your Updated Medication List - Protect others around you: Learn how to safely use, store and throw away your medicines at www.disposemymeds.org.          This list is accurate as of: 11/2/17 10:22 AM.  Always use your most recent med list.                   Brand Name Dispense Instructions for use Diagnosis    albuterol 108 (90 BASE) MCG/ACT Inhaler    PROAIR HFA/PROVENTIL HFA/VENTOLIN HFA    1 Inhaler    Inhale 2 puffs into the lungs every 4 hours as needed for shortness of breath / dyspnea    Intermittent asthma, uncomplicated       AMBIEN PO      Take 10 mg by mouth nightly as needed for sleep        amitriptyline HCl 150 MG Tabs     90 tablet    TAKE ONE TABLET (150MG) BY MOUTH AT BEDTIME    Insomnia, unspecified type       amLODIPine 10 MG tablet    NORVASC    90 tablet    Take 1 tablet (10 mg) by mouth daily    Essential hypertension with goal blood pressure less than 140/90       amphetamine-dextroamphetamine 20 MG per tablet    ADDERALL    90 tablet    Take 1 tablet (20 mg) by mouth 3 times daily    Attention deficit hyperactivity disorder (ADHD), combined type       ASPIRIN NOT PRESCRIBED    INTENTIONAL     by Other route continuous prn.         budesonide-formoterol 160-4.5 MCG/ACT Inhaler    SYMBICORT    3 Inhaler    Inhale 2 puffs into the lungs 2 times daily    Mild persistent asthma without complication       buPROPion 150 MG 24 hr tablet    WELLBUTRIN XL    90 tablet    Take 1 tablet (150 mg) by mouth every morning    Generalized anxiety disorder, Seasonal affective disorder (H), Major depressive disorder, recurrent episode, moderate (H)       * clonazePAM 1 MG tablet    klonoPIN    30 tablet    Take 0.5-1 tablets (0.5-1 mg) by mouth daily    Generalized anxiety disorder       * clonazePAM 1 MG tablet    klonoPIN    60 tablet    Take 0.5-1 tablets (0.5-1 mg) by mouth 2 times daily as needed for anxiety    Narcotic withdrawal (H)       cloNIDine 0.1 MG tablet    CATAPRES    20 tablet    Take 1-2 tablets (0.1-0.2 mg) by mouth 2 times daily as needed    Narcotic withdrawal (H), Neck pain, bilateral       CYCLOBENZAPRINE HCL PO      Take 10 mg by mouth 3 times daily as needed for muscle spasms        finasteride 5 MG tablet    PROSCAR    90 tablet    TAKE ONE-HALF TABLET BY MOUTH ONCE DAILY    Alopecia       fluconazole 150 MG tablet    DIFLUCAN    10 tablet    Take 1 tablet (150 mg) by mouth once a week for 4 weeks and then monthly    Tinea versicolor       hydrochlorothiazide 25 MG tablet    HYDRODIURIL    90 tablet    Take 1 tablet (25 mg) by mouth daily    Essential hypertension with goal blood pressure less than 140/90       imiquimod 5 % cream    ALDARA    36 packet    Apply  to lesion.   Wash off after 8 hours.   May use for up to 16 weeks.    Flat wart       loperamide 2 MG tablet    IMODIUM A-D    90 tablet    Take 1 tablet (2 mg) by mouth 3 times daily as needed for diarrhea    Loose stools       LORazepam 1 MG tablet    ATIVAN    60 tablet    Take 1 tablet (1 mg) by mouth 2 times daily as needed for anxiety    Panic disorder without agoraphobia, Generalized anxiety disorder       losartan 100 MG tablet    COZAAR    90 tablet    Take 1 tablet  "(100 mg) by mouth daily    Essential hypertension with goal blood pressure less than 140/90       metoprolol 50 MG tablet    LOPRESSOR    180 tablet    Take 1 tablet (50 mg) by mouth 2 times daily    Essential hypertension with goal blood pressure less than 140/90       MULTIVITAMIN TABS   OR           nitroGLYcerin 0.4 MG sublingual tablet    NITROSTAT    25 tablet    Place 1 tablet (0.4 mg) under the tongue every 5 minutes as needed for chest pain If you are still having symptoms after 3 doses (15 minutes) call 911.    Other chest pain       Syringe Luer Lock 20G X 1-1/2\" 3 ML Misc     30 each    1 Device once a week - and also needs 22 G needles 1.5 inch #30 with 1 refill    Hypogonadism in male       testosterone cypionate 200 MG/ML injection    DEPOTESTOTERONE    10 mL    Inject 0.5 mLs (100 mg) into the muscle once a week    Hypogonadism male       Vitamin C 100 MG Tabs     90    1 TABLET 3 TIMES DAILY        * Notice:  This list has 2 medication(s) that are the same as other medications prescribed for you. Read the directions carefully, and ask your doctor or other care provider to review them with you.      "

## 2017-11-02 NOTE — TELEPHONE ENCOUNTER
RN spoke with patient and reviewed today's office notes and recommendations.  Per today's office note patient had stopped Oxycodone use and was going to use Clonidine and Clonazepam to help with withdrawal symptoms.  He said that he wants Oxycodone refill to have when neck pain flares up.    Will route to RL for review and recommendation.    REJI Bonner, RN, N  [facility]) [phone]

## 2017-11-06 DIAGNOSIS — M54.42 LEFT-SIDED LOW BACK PAIN WITH LEFT-SIDED SCIATICA, UNSPECIFIED CHRONICITY: ICD-10-CM

## 2017-11-06 RX ORDER — TRAMADOL HYDROCHLORIDE 50 MG/1
50-100 TABLET ORAL 2 TIMES DAILY PRN
Qty: 90 TABLET | Refills: 0 | Status: SHIPPED | OUTPATIENT
Start: 2017-11-06 | End: 2017-12-28

## 2017-11-06 NOTE — TELEPHONE ENCOUNTER
Pt calling regarding Klonopin, said they could not get this from the [facility] pharmacy in [place].    Called the pharmacy, they said the soonest they can fill this is 11/7/2017.  Too soon to fill. (pharmacy advised the pt should not need any until 11/10/17 anyways).    Also, pt asked about the Tramadol below. Triage put a note on RL's computer to have this reviewed or put a medication through.      Pt notes they are still having pain and really need these medications while they are trying to wean off the stronger medications.    Amelia Flores RN  [facility]

## 2017-11-06 NOTE — TELEPHONE ENCOUNTER
Reason for Call:  Medication or medication refill:    Do you use a [facility]?  Name of the pharmacy and phone number for the current request:  [id number] - [place], [place] - [address]     Name of the medication requested: Tramadol    Can we leave a detailed message on this number? YES    Phone number patient can be reached at: Cell number on file:    Telephone Information:   Mobile [phone]     Best Time: Anytime    Call taken on 11/6/2017 at 7:52 AM by Ashley Panda

## 2017-11-06 NOTE — TELEPHONE ENCOUNTER
Controlled Substance Refill Request for Tramadol  Problem List Complete:  No     PROVIDER TO CONSIDER COMPLETION OF PROBLEM LIST AND OVERVIEW/CONTROLLED SUBSTANCE AGREEMENT      Last Written Prescription Date:  7/13/2017  Last Fill Quantity: 90,   # refills: 0    Last Office Visit with [facility] primary care provider: 11/2/2017    Future Office visit:     Controlled substance agreement on file: Yes:  Date 8/9/2015.     Processing:  Fax Rx to [facility] pharmacy     checked in past 6 months?  No      Routing refill request to provider for review/approval because:  Drug not on the [facility] refill protocol       Gemma Hartley, BS, RN, PHN  [facility] [phone]

## 2017-11-08 DIAGNOSIS — F41.1 GENERALIZED ANXIETY DISORDER: ICD-10-CM

## 2017-11-08 DIAGNOSIS — F41.0 PANIC DISORDER WITHOUT AGORAPHOBIA: ICD-10-CM

## 2017-11-09 RX ORDER — LORAZEPAM 1 MG/1
TABLET ORAL
Qty: 60 TABLET | Refills: 0 | Status: SHIPPED | OUTPATIENT
Start: 2017-11-17 | End: 2017-11-16

## 2017-11-09 NOTE — TELEPHONE ENCOUNTER
Controlled Substance Refill Request for   LORazepam (ATIVAN) 1 MG tablet 60 tablet 0 10/19/2017  No   Sig: Take 1 tablet (1 mg) by mouth 2 times daily as needed for anxiety   Class: Local Print   Route: Oral   Order: [id number]       Problem List Complete: NO        Last Office Visit with [facility] primary care provider: 11/2/2017        Future Office visit:   Next 5 appointments (look out 90 days)     Nov 10, 2017  1:40 PM CST   [facility] Armando [facility]    [address] [place] [id number] [phone]                  Controlled substance agreement on file: No.     Processing:  Fax Rx to see above  pharmacy     checked in past 6 months?  No, route to RN

## 2017-11-16 ENCOUNTER — OFFICE VISIT (OUTPATIENT)
Dept: FAMILY MEDICINE | Facility: CLINIC | Age: 47
End: 2017-11-16
Payer: MEDICARE

## 2017-11-16 VITALS
WEIGHT: 252 LBS | HEIGHT: 72 IN | SYSTOLIC BLOOD PRESSURE: 144 MMHG | HEART RATE: 82 BPM | BODY MASS INDEX: 34.13 KG/M2 | OXYGEN SATURATION: 97 % | TEMPERATURE: 98.5 F | DIASTOLIC BLOOD PRESSURE: 90 MMHG

## 2017-11-16 DIAGNOSIS — N52.9 ERECTILE DYSFUNCTION, UNSPECIFIED ERECTILE DYSFUNCTION TYPE: ICD-10-CM

## 2017-11-16 DIAGNOSIS — F41.0 PANIC DISORDER WITHOUT AGORAPHOBIA: ICD-10-CM

## 2017-11-16 DIAGNOSIS — E29.1 HYPOGONADISM MALE: ICD-10-CM

## 2017-11-16 DIAGNOSIS — M54.42 LEFT-SIDED LOW BACK PAIN WITH LEFT-SIDED SCIATICA, UNSPECIFIED CHRONICITY: Primary | ICD-10-CM

## 2017-11-16 DIAGNOSIS — F90.2 ATTENTION DEFICIT HYPERACTIVITY DISORDER (ADHD), COMBINED TYPE: ICD-10-CM

## 2017-11-16 DIAGNOSIS — F41.1 GENERALIZED ANXIETY DISORDER: ICD-10-CM

## 2017-11-16 DIAGNOSIS — E29.1 HYPOGONADISM IN MALE: ICD-10-CM

## 2017-11-16 DIAGNOSIS — L65.9 ALOPECIA: ICD-10-CM

## 2017-11-16 PROCEDURE — 99214 OFFICE O/P EST MOD 30 MIN: CPT | Performed by: FAMILY MEDICINE

## 2017-11-16 RX ORDER — OXYCODONE HYDROCHLORIDE 10 MG/1
10 TABLET ORAL 3 TIMES DAILY PRN
Qty: 90 TABLET | Refills: 0 | Status: SHIPPED | OUTPATIENT
Start: 2017-11-16 | End: 2018-04-10

## 2017-11-16 RX ORDER — LORAZEPAM 1 MG/1
1 TABLET ORAL 2 TIMES DAILY PRN
Qty: 60 TABLET | Refills: 0 | Status: SHIPPED | OUTPATIENT
Start: 2017-11-17 | End: 2017-12-15

## 2017-11-16 RX ORDER — SILDENAFIL CITRATE 20 MG/1
40-100 TABLET ORAL DAILY PRN
Qty: 40 TABLET | Refills: 11 | Status: SHIPPED | OUTPATIENT
Start: 2017-11-16 | End: 2018-10-04

## 2017-11-16 RX ORDER — FINASTERIDE 5 MG/1
TABLET, FILM COATED ORAL
Qty: 90 TABLET | Refills: 1 | Status: SHIPPED | OUTPATIENT
Start: 2017-11-16 | End: 2018-09-11

## 2017-11-16 RX ORDER — TESTOSTERONE CYPIONATE 200 MG/ML
150 INJECTION, SOLUTION INTRAMUSCULAR WEEKLY
Qty: 10 ML | Refills: 1 | Status: SHIPPED | OUTPATIENT
Start: 2017-11-16 | End: 2018-03-16

## 2017-11-16 ASSESSMENT — ANXIETY QUESTIONNAIRES
IF YOU CHECKED OFF ANY PROBLEMS ON THIS QUESTIONNAIRE, HOW DIFFICULT HAVE THESE PROBLEMS MADE IT FOR YOU TO DO YOUR WORK, TAKE CARE OF THINGS AT HOME, OR GET ALONG WITH OTHER PEOPLE: SOMEWHAT DIFFICULT
2. NOT BEING ABLE TO STOP OR CONTROL WORRYING: MORE THAN HALF THE DAYS
6. BECOMING EASILY ANNOYED OR IRRITABLE: SEVERAL DAYS
5. BEING SO RESTLESS THAT IT IS HARD TO SIT STILL: MORE THAN HALF THE DAYS
3. WORRYING TOO MUCH ABOUT DIFFERENT THINGS: NEARLY EVERY DAY
7. FEELING AFRAID AS IF SOMETHING AWFUL MIGHT HAPPEN: MORE THAN HALF THE DAYS
GAD7 TOTAL SCORE: 16
1. FEELING NERVOUS, ANXIOUS, OR ON EDGE: NEARLY EVERY DAY

## 2017-11-16 ASSESSMENT — PATIENT HEALTH QUESTIONNAIRE - PHQ9
5. POOR APPETITE OR OVEREATING: NEARLY EVERY DAY
SUM OF ALL RESPONSES TO PHQ QUESTIONS 1-9: 18

## 2017-11-16 NOTE — LETTER
[facility] [place]    11/16/17    Patient: Jeremi Damon  YOB: 1970  Medical Record Number: [id number]                                                                  Controlled Substance Agreement  I understand that my care provider has prescribed controlled substances (narcotics, tranquilizers, and/or stimulants) to help manage my condition(s).  I am taking this medicine to help me function or work.  I know that this is strong medicine.  It could have serious side effects and even cause a dependency on the drug.  If I stop these medicines suddenly, I could have severe withdrawal symptoms.    The risks, benefits, and side effects of these medication(s) were explained to me.  I agree that:  1. I will take part in other treatments as advised by my provider.  This may be psychiatry or counseling, physical therapy, behavioral therapy, group treatment, or a referral to a pain clinic.  I will reduce or stop my medicine when my provider tells me to do so.   2. I will take my medicines as prescribed.  I will not change the dose or schedule unless my provider tells me to.  There will be no refills if I  run out early.   I may be contacted at any time without warning and asked to complete a drug test or pill count.   3. I will keep all my appointments at the clinic.  If I miss appointments or fail to follow instructions, my provider may stop my medicine.  4. I will not ask other providers to prescribe controlled substances. And I will not accept controlled substances from other people. If I need another prescribed controlled substance for a new reason, I will notify my provider within one business day.  5. If I enroll in the [place] Medical Marijuana program, I will tell my provider.  I will also sign an agreement to share my medical records with my provider.  6. I will use one pharmacy to fill all of my controlled substance prescriptions.  If my prescription is mailed to my pharmacy, it may take 5  to 7 days for my medicine to be ready.  7. I understand that my provider, clinic care team, and pharmacy can track controlled substance prescriptions from other providers through a central database (prescription monitoring program).  8. I will bring in my list of medications (or my medicine bottles) each time I come to the clinic.  REV- 04/2016                                                                                                                                            Page 1 of 2      [facility] [place]    11/16/17    Patient: Jeremi Damon  YOB: 1970  Medical Record Number: [id number]    9. Refills of controlled substances will be made only during office hours.  It is up to me to make sure that I do not run out of my medicines on weekends or holidays.    10. I am responsible for my prescriptions.  If the medicine is lost or stolen, it will not be replaced.   I also agree not to share these medicines with anyone.  11. I agree to not use ANY illegal or recreational drugs.  This includes marijuana, cocaine, bath salts or other drugs.  I agree not to use alcohol unless my provider says I may.  I agree to give urine samples whenever asked.  If I fail to give a urine sample, the provider may stop my medicine.     12. I will tell my nurse or provider right away if I become pregnant or have a new medical problem treated outside of [facility].  13. I understand that this medicine can affect my thinking and judgment.  It may be unsafe for me to drive, use machinery and do dangerous tasks.  I will not do any of these things until I know how the medicine affects me.  If my dose changes, I will wait to see how it affects me.  I will contact my provider if I have concerns about medicine side effects.  I understand that if I do not follow any of the conditions above, my prescriptions or treatment may be stopped.    I agree that my provider, clinic care team, and pharmacy may work with any  city, state or federal law enforcement agency that investigates the misuse, sale, or other diversion of my controlled medicine. I will allow my provider to discuss my care with or share a copy of this agreement with any other treating provider, pharmacy or emergency room where I receive care.  I agree to give up (waive) any right of privacy or confidentiality with respect to these authorizations.   I have read this agreement and have asked questions about anything I did not understand.   ___________________________________    ___________________________  Patient Signature                                                           Date and Time  ___________________________________     ____________________________  Witness                                                                            Date and Time  ___________________________________  Luis Armando Segovia MD  REV-  04/2016                                                                                                                                                                 Page 2 of 2  Opioid Pain Medicines (also known as Narcotics)  What You Need to Know      What are opioids?   Opioids are pain medicines that must be prescribed by a doctor. Examples are:     morphine (MS Contin, Anastasiia)    oxycodone (Oxycontin)    oxycodone and acetaminophen (Percocet)    hydrocodone and acetaminophen (Vicodin, Norco)     fentanyl patch (Duragesic)     hydromorphone (Dilaudid)     methadone     What do opioids do well?   Opioids are best for short-term pain after a surgery or injury. They also work well for cancer pain. Unlike other pain medicines, they do not cause liver or kidney failure or ulcers. They may help some people with long-lasting (chronic) pain.     What do opioids NOT do well?   Opioids never get rid of pain entirely, and they do not work well for most patients with chronic pain. Opioids do not reduce swelling, one of the causes of pain. They also  don t work well for nerve pain.     Side effects  Talk to your doctor before you start or decide to keep taking one of these medicines. Side effects include:    Lowers your breathing rate enough that it could cause death    Death due to taking more than the prescribed dose    Serious lifelong opioid use      Dependence is not the same as addiction. Addiction is when people keep using a substance that harms their body, their mind or their relations with others. If you have a history of drug or alcohol abuse, taking opioids can cause a relapse.  Over time, opioids don t work as well. Most people will need higher and higher doses. The higher the dose, the more serious the side effects. We don t know the long-term effects of opioids.   People who have used opioids for a long time have a lower quality of life, worse depression, higher levels of pain and more visits to doctors.  Overdose from prescription drugs is the second leading cause of death in the [place] The risk of overdose rises when opioids are taken with other drugs such as:    Medicines used for anxiety and panic attacks (such as lorazepam, alprazolam, clonazepam    Other sedatives    Alcohol    Illegal drugs such as heroin  Never share your opioids with others. Be sure to store opioids in a secure place, locked if possible.Young children can easily swallow them and overdose.     Are there other ways to manage pain?  Ways to help reduce pain:    Exercise every day.    Treat health problems that may be causing pain.    Treat mental health problems like depression and anxiety.     Worse depression symptoms; Less pleasure in things you usually enjoy    Feeling tired or sluggish    Slower thoughts or cloudy thinking    Being more sensitive to pain over time; Pain is harder to control.    Trouble sleeping or restless sleep    Changes in hormone levels (for example, less testosterone).     Changes in sex drive or ability to have sex    Long lasting nausea and  constipation    Trouble breathing while asleep; This is worse with lung problems like COPD or sleep apnea.    Unsafe driving    Getting sick more often    Itching    Feeling dizzy    Dry mouth    Sweating    Trouble emptying the bladder (peeing). This is worse if you have an enlarged prostate or get urinary tract infections (UTIs).    What else should I know about opioids?  When someone takes opioids for too long or too often, they become dependent. This means that if you stop or reduce the medicine too quickly, you will have withdrawal symptoms.          Practice good sleep habits.  Try to go to bed and get up at the same time every day.    Stop smoking.  Tobacco use can make pain worse.    Do things that you enjoy.    Find a way to work through pain without drugs.  Try deep breathing, meditation, visual imagery and aromatherapy.    Ask your doctor to help you create a plan to manage your pain.

## 2017-11-16 NOTE — MR AVS SNAPSHOT
After Visit Summary   11/16/2017    Jeremi Damon    MRN: [id number]           Patient Information     Date Of Birth          1970        Visit Information        Provider Department      11/16/2017 11:40 AM Luis Armando Segovia MD [facility]        Today's Diagnoses     Left-sided low back pain with left-sided sciatica, unspecified chronicity    -  1    Attention deficit hyperactivity disorder (ADHD), combined type        Hypogonadism in male        Erectile dysfunction, unspecified erectile dysfunction type        Alopecia        Hypogonadism male        Panic disorder without agoraphobia        Generalized anxiety disorder           Follow-ups after your visit        Who to contact     If you have questions or need follow up information about today's clinic visit or your schedule please contact [facility] directly at [phone].  Normal or non-critical lab and imaging results will be communicated to you by MyChart, letter or phone within 4 business days after the clinic has received the results. If you do not hear from us within 7 days, please contact the clinic through [facility] or phone. If you have a critical or abnormal lab result, we will notify you by phone as soon as possible.  Submit refill requests through [employer] or call your pharmacy and they will forward the refill request to us. Please allow 3 business days for your refill to be completed.          Additional Information About Your Visit        MyChart Information     [employer] gives you secure access to your electronic health record. If you see a primary care provider, you can also send messages to your care team and make appointments. If you have questions, please call your primary care clinic.  If you do not have a primary care provider, please call [phone] and they will assist you.        Care EveryWhere ID     This is your Care EveryWhere ID. This could be used by other organizations to  access your [facility] medical records  [id number]        Your Vitals Were     Pulse Temperature Height Pulse Oximetry BMI (Body Mass Index)       82 98.5  F (36.9  C) (Oral) 6' (1.829 m) 97% 34.18 kg/m2        Blood Pressure from Last 3 Encounters:   11/16/17 144/90   11/02/17 (!) 140/92   10/21/17 (!) 194/124    Weight from Last 3 Encounters:   11/16/17 252 lb (114.3 kg)   11/02/17 250 lb (113.4 kg)   10/19/17 255 lb (115.7 kg)              Today, you had the following     No orders found for display         Today's Medication Changes          These changes are accurate as of: 11/16/17  1:19 PM.  If you have any questions, ask your nurse or doctor.               Start taking these medicines.        Dose/Directions    oxyCODONE IR 10 MG tablet   Commonly known as:  ROXICODONE   Used for:  Left-sided low back pain with left-sided sciatica, unspecified chronicity   Started by:  Luis Armando Segovia MD        Dose:  10 mg   Take 1 tablet (10 mg) by mouth 3 times daily as needed for severe pain - max 3 tabs / day   Quantity:  90 tablet   Refills:  0       sildenafil 20 MG tablet   Commonly known as:  REVATIO   Used for:  Erectile dysfunction, unspecified erectile dysfunction type   Started by:  Luis Armando Segovia MD        Dose:   mg   Take 2-5 tablets ( mg) by mouth daily as needed   Quantity:  40 tablet   Refills:  11         These medicines have changed or have updated prescriptions.        Dose/Directions    amphetamine-dextroamphetamine 20 MG per tablet   Commonly known as:  ADDERALL   This may have changed:    - how much to take  - when to take this  - reasons to take this   Used for:  Attention deficit hyperactivity disorder (ADHD), combined type        Dose:  20 mg   Take 1 tablet (20 mg) by mouth 3 times daily   Quantity:  90 tablet   Refills:  0       finasteride 5 MG tablet   Commonly known as:  PROSCAR   This may have changed:  See the new instructions.   Used for:  Alopecia   Changed by:  Luca  Luis Armando MCCULLOUGH MD        1/2 tab daily   Quantity:  90 tablet   Refills:  1       hydrochlorothiazide 25 MG tablet   Commonly known as:  HYDRODIURIL   This may have changed:    - when to take this  - reasons to take this   Used for:  Essential hypertension with goal blood pressure less than 140/90        Dose:  25 mg   Take 1 tablet (25 mg) by mouth daily   Quantity:  90 tablet   Refills:  1       LORazepam 1 MG tablet   Commonly known as:  ATIVAN   This may have changed:  See the new instructions.   Used for:  Panic disorder without agoraphobia, Generalized anxiety disorder   Changed by:  Luis Armando Segovia MD        Dose:  1 mg   Start taking on:  11/17/2017   Take 1 tablet (1 mg) by mouth 2 times daily as needed for anxiety   Quantity:  60 tablet   Refills:  0       MULTIVITAMIN TABS   OR   This may have changed:  See the new instructions.        Refills:  0       testosterone cypionate 200 MG/ML injection   Commonly known as:  DEPOTESTOTERONE   This may have changed:    - how much to take  - additional instructions   Used for:  Hypogonadism male   Changed by:  Luis Armando Segovia MD        Dose:  150 mg   Inject 0.75 mLs (150 mg) into the muscle once a week With pharmacist recommend syringes and needles   Quantity:  10 mL   Refills:  1         Stop taking these medicines if you haven't already. Please contact your care team if you have questions.     clonazePAM 1 MG tablet   Commonly known as:  klonoPIN   Stopped by:  Luis Armando Segovia MD           cloNIDine 0.1 MG tablet   Commonly known as:  CATAPRES   Stopped by:  Luis Armando Seogvia MD                Where to get your medicines      These medications were sent to [facility]/pharmacy #[id number] - [place], [place] - [address]  [address] [address]     Phone:  [phone]     finasteride 5 MG tablet         Some of these will need a paper prescription and others can be bought over the counter.  Ask your nurse if you have questions.     Bring a paper prescription for  each of these medications     LORazepam 1 MG tablet    oxyCODONE IR 10 MG tablet    sildenafil 20 MG tablet    testosterone cypionate 200 MG/ML injection                Primary Care Provider Office Phone # Fax #    Luis Armando Segovia [phone]       [facility] [facility] [address]        Equal Access to Services     MATHEW ABEL : Hadii aad ku hadasho Soomaali, waaxda luqadaha, qaybta kaalmada adeegyada, waxay taiin hayaan adealberto jbgila zelaya . So [facility] [phone].    ATENCIÓN: Si habla español, tiene a lake disposición servicios gratuitos de asistencia lingüística. [facility] [phone].    We comply with applicable federal civil rights laws and [place] laws. We do not discriminate on the basis of race, color, national origin, age, disability, sex, sexual orientation, or gender identity.            Thank you!     Thank you for choosing [facility]  for your care. Our goal is always to provide you with excellent care. Hearing back from our patients is one way we can continue to improve our services. Please take a few minutes to complete the written survey that you may receive in the mail after your visit with us. Thank you!             Your Updated Medication List - Protect others around you: Learn how to safely use, store and throw away your medicines at www.disposemymeds.org.          This list is accurate as of: 11/16/17  1:19 PM.  Always use your most recent med list.                   Brand Name Dispense Instructions for use Diagnosis    albuterol 108 (90 BASE) MCG/ACT Inhaler    PROAIR HFA/PROVENTIL HFA/VENTOLIN HFA    1 Inhaler    Inhale 2 puffs into the lungs every 4 hours as needed for shortness of breath / dyspnea    Intermittent asthma, uncomplicated       AMBIEN PO      Take 10 mg by mouth nightly as needed for sleep        amitriptyline HCl 150 MG Tabs     90 tablet    TAKE ONE TABLET (150MG) BY MOUTH AT BEDTIME    Insomnia, unspecified type        amLODIPine 10 MG tablet    NORVASC    90 tablet    Take 1 tablet (10 mg) by mouth daily    Essential hypertension with goal blood pressure less than 140/90       amphetamine-dextroamphetamine 20 MG per tablet    ADDERALL    90 tablet    Take 1 tablet (20 mg) by mouth 3 times daily    Attention deficit hyperactivity disorder (ADHD), combined type       ASPIRIN NOT PRESCRIBED    INTENTIONAL     by Other route continuous prn.        budesonide-formoterol 160-4.5 MCG/ACT Inhaler    SYMBICORT    3 Inhaler    Inhale 2 puffs into the lungs 2 times daily    Mild persistent asthma without complication       buPROPion 150 MG 24 hr tablet    WELLBUTRIN XL    90 tablet    Take 1 tablet (150 mg) by mouth every morning    Generalized anxiety disorder, Seasonal affective disorder (H), Major depressive disorder, recurrent episode, moderate (H)       CYCLOBENZAPRINE HCL PO      Take 10 mg by mouth 3 times daily as needed for muscle spasms        finasteride 5 MG tablet    PROSCAR    90 tablet    1/2 tab daily    Alopecia       fluconazole 150 MG tablet    DIFLUCAN    10 tablet    Take 1 tablet (150 mg) by mouth once a week for 4 weeks and then monthly    Tinea versicolor       hydrochlorothiazide 25 MG tablet    HYDRODIURIL    90 tablet    Take 1 tablet (25 mg) by mouth daily    Essential hypertension with goal blood pressure less than 140/90       imiquimod 5 % cream    ALDARA    36 packet    Apply  to lesion.   Wash off after 8 hours.   May use for up to 16 weeks.    Flat wart       loperamide 2 MG tablet    IMODIUM A-D    90 tablet    Take 1 tablet (2 mg) by mouth 3 times daily as needed for diarrhea    Loose stools       LORazepam 1 MG tablet   Start taking on:  11/17/2017    ATIVAN    60 tablet    Take 1 tablet (1 mg) by mouth 2 times daily as needed for anxiety    Panic disorder without agoraphobia, Generalized anxiety disorder       losartan 100 MG tablet    COZAAR    90 tablet    Take 1 tablet (100 mg) by mouth daily     "Essential hypertension with goal blood pressure less than 140/90       metoprolol 50 MG tablet    LOPRESSOR    180 tablet    Take 1 tablet (50 mg) by mouth 2 times daily    Essential hypertension with goal blood pressure less than 140/90       MULTIVITAMIN TABS   OR           nitroGLYcerin 0.4 MG sublingual tablet    NITROSTAT    25 tablet    Place 1 tablet (0.4 mg) under the tongue every 5 minutes as needed for chest pain If you are still having symptoms after 3 doses (15 minutes) call 911.    Other chest pain       oxyCODONE IR 10 MG tablet    ROXICODONE    90 tablet    Take 1 tablet (10 mg) by mouth 3 times daily as needed for severe pain - max 3 tabs / day    Left-sided low back pain with left-sided sciatica, unspecified chronicity       sildenafil 20 MG tablet    REVATIO    40 tablet    Take 2-5 tablets ( mg) by mouth daily as needed    Erectile dysfunction, unspecified erectile dysfunction type       Syringe Luer Lock 20G X 1-1/2\" 3 ML Misc     30 each    1 Device once a week - and also needs 22 G needles 1.5 inch #30 with 1 refill    Hypogonadism in male       testosterone cypionate 200 MG/ML injection    DEPOTESTOTERONE    10 mL    Inject 0.75 mLs (150 mg) into the muscle once a week With pharmacist recommend syringes and needles    Hypogonadism male       traMADol 50 MG tablet    ULTRAM    90 tablet    Take 1-2 tablets ( mg) by mouth 2 times daily as needed for moderate pain    Left-sided low back pain with left-sided sciatica, unspecified chronicity       Vitamin C 100 MG Tabs     90    1 TABLET 3 TIMES DAILY          "

## 2017-11-16 NOTE — PROGRESS NOTES
SUBJECTIVE:                                                    Jeremi Damon is a 47 year old male who presents to clinic today for the following health issues:      Low back pain - radicular left left pain - worse lately with sharp pain into the feet  Previous fusion L5-S1, recent MRI - done at [facility] L4-L5 herniated disc (done by the [facility])  Off clonazepam and oxycodone - his tramadol is not helping much.     hypogonadism- on injection 100 mg weekly - last levels were in range but symptoms persists/ erectile dysfunction.      Hypertension Follow-up      Outpatient blood pressures are not being checked.    Low Salt Diet: no added salt    Depression and Anxiety Follow-Up    Status since last visit: depression is ok - anxiety is there    Other associated symptoms:None    Complicating factors:     Significant life event: none     Current substance abuse: None    PHQ-9 Score and MyChart F/U Questions 12/9/2016 1/27/2017 11/16/2017   Total Score 19 26 18   Q9: Suicide Ideation Several days Nearly every day Several days     NANCY-7 SCORE 12/9/2016 1/27/2017 11/16/2017   Total Score - - -   Total Score 14 20 16     Recommend transfer of care to not suicidal  PHQ-9  English  PHQ-9   Any Language  GAD7  Suicide Assessment Five-step Evaluation and Treatment (SAFE-T)          Medication Followup of ADHD/ Adderall    Taking Medication as prescribed: yes    Side Effects:  None    Medication Helping Symptoms:  yes           Problem list and histories reviewed & adjusted, as indicated.  Additional history: as documented    ROS:  Constitutional, HEENT, cardiovascular, pulmonary, GI, , musculoskeletal, neuro, skin, endocrine and psych systems are negative, except as otherwise noted.    OBJECTIVE:                                                    /90 (BP Location: Right arm, Patient Position: Sitting, Cuff Size: Adult Large)  Pulse 82  Temp 98.5  F (36.9  C) (Oral)  Ht 6' (1.829 m)  Wt 252 lb (114.3 kg)  SpO2  97%  BMI 34.18 kg/m2 Body mass index is 34.18 kg/(m^2).   GENERAL: healthy, alert, well nourished, well hydrated, no distress  HENT: ear canals- normal; TMs- normal; Nose- normal; Mouth- no ulcers, no lesions  NECK: no tenderness, no adenopathy, no asymmetry, no masses, no stiffness; thyroid- normal to palpation  RESP: lungs clear to auscultation - no rales, no rhonchi, no wheezes  CV: regular rates and rhythm, normal S1 S2, no S3 or S4 and no murmur, no click or rub -  ABDOMEN: soft, no tenderness, no  hepatosplenomegaly, no masses, normal bowel sounds  MS: extremities- no gross deformities noted, no edema  SKIN: no suspicious lesions, no rashes  NEURO: strength and tone- normal, sensory exam- grossly normal, mentation- intact, speech- normal, reflexes- symmetric  BACK: no CVA tenderness, bilateral paralumbar tenderness  PSYCH: Alert and oriented times 3; speech- coherent , normal rate and volume; able to articulate logical thoughts, able to abstract reason, no tangential thoughts, no hallucinations or delusions, affect- normal  PHQ-9 SCORE 9/2/2016 12/9/2016 1/27/2017 11/16/2017   Total Score - - - -   Total Score 23 19 26 18     NANCY-7 SCORE 12/9/2016 1/27/2017 11/16/2017   Total Score - - -   Total Score 14 20 16         Diagnostic test results:  none      ASSESSMENT/PLAN:       Jeremi was seen today for recheck medication.    Diagnoses and all orders for this visit:    Left-sided low back pain with left-sided sciatica, unspecified chronicity  -     oxyCODONE IR (ROXICODONE) 10 MG tablet; Take 1 tablet (10 mg) by mouth 3 times daily as needed for severe pain - max 3 tabs / day    Attention deficit hyperactivity disorder (ADHD), combined type    Hypogonadism in male    Erectile dysfunction, unspecified erectile dysfunction type  -     sildenafil (REVATIO) 20 MG tablet; Take 2-5 tablets ( mg) by mouth daily as needed    Alopecia  -     finasteride (PROSCAR) 5 MG tablet; 1/2 tab daily    Hypogonadism male  -      testosterone cypionate (DEPOTESTOTERONE) 200 MG/ML injection; Inject 0.75 mLs (150 mg) into the muscle once a week With pharmacist recommend syringes and needles    Panic disorder without agoraphobia  -     LORazepam (ATIVAN) 1 MG tablet; Take 1 tablet (1 mg) by mouth 2 times daily as needed for anxiety    Generalized anxiety disorder  -     LORazepam (ATIVAN) 1 MG tablet; Take 1 tablet (1 mg) by mouth 2 times daily as needed for anxiety      Risks, benefits and alternatives of treatments discussed. Plan agreed on.      Followup: 1-2 months and with a pain clinic    Will call, return to clinic, or go to ED if worsening or symptoms not improving as discussed.    See patient instructions.           Nate Segovia MD   Pager: [phone]

## 2017-11-16 NOTE — NURSING NOTE
Chief Complaint   Patient presents with     Recheck Medication       Initial /90 (BP Location: Right arm, Patient Position: Sitting, Cuff Size: Adult Large)  Pulse 82  Temp 98.5  F (36.9  C) (Oral)  Ht 6' (1.829 m)  Wt 252 lb (114.3 kg)  SpO2 97%  BMI 34.18 kg/m2 Estimated body mass index is 34.18 kg/(m^2) as calculated from the following:    Height as of this encounter: 6' (1.829 m).    Weight as of this encounter: 252 lb (114.3 kg).  Medication Reconciliation: complete

## 2017-11-17 ASSESSMENT — ANXIETY QUESTIONNAIRES: GAD7 TOTAL SCORE: 16

## 2017-12-15 DIAGNOSIS — F41.1 GENERALIZED ANXIETY DISORDER: ICD-10-CM

## 2017-12-15 DIAGNOSIS — F41.0 PANIC DISORDER WITHOUT AGORAPHOBIA: ICD-10-CM

## 2017-12-15 RX ORDER — LORAZEPAM 1 MG/1
TABLET ORAL
Qty: 60 TABLET | Refills: 0 | Status: SHIPPED | OUTPATIENT
Start: 2017-12-17 | End: 2018-04-10

## 2017-12-15 NOTE — TELEPHONE ENCOUNTER
Controlled Substance Refill Request for  LORazepam (ATIVAN) 1 MG tablet 60 tablet 0 11/17/2017  No   Sig: Take 1 tablet (1 mg) by mouth 2 times daily as needed for anxiety   Class: Local Print       Problem List Complete:  No     PROVIDER TO CONSIDER COMPLETION OF PROBLEM LIST AND OVERVIEW/CONTROLLED SUBSTANCE AGREEMENT        Last Office Visit with [facility] primary care provider: 11/16/2017    Future Office visit:     Controlled substance agreement on file: No.     Processing:  Fax Rx to see below  pharmacy     checked in past 6 months?  No, route to RN     Kari Mcleod RN, BSN  [facility]

## 2017-12-24 DIAGNOSIS — G47.00 INSOMNIA, UNSPECIFIED TYPE: ICD-10-CM

## 2017-12-26 NOTE — TELEPHONE ENCOUNTER
Requested Prescriptions   Pending Prescriptions Disp Refills     amitriptyline HCl 150 MG TABS [Pharmacy Med Name: AMITRIPTYLINE  MG TAB]  Last Written Prescription Date:  9/27/2017  Last Fill Quantity: 90,  # refills: 0   Last Office Visit with [facility], [facility] or [facility] prescribing provider:  11/16/2017   Future Office Visit:      90 tablet 0     Sig: TAKE ONE TABLET (150MG) BY MOUTH AT BEDTIME    Tricyclic Antidepressants Protocol Failed    12/24/2017  1:31 PM       Failed - Blood pressure under 140/90    BP Readings from Last 3 Encounters:   11/16/17 144/90   11/02/17 (!) 140/92   10/21/17 (!) 194/124                Passed - Recent (12 mo) or future (30 d) visit with authorizing provider's specialty     Patient had office visit in the last year or has a visit in the next 30 days with authorizing provider.  See chart review.              Passed - Patient is age 18 or older

## 2017-12-28 DIAGNOSIS — F41.1 GENERALIZED ANXIETY DISORDER: Primary | ICD-10-CM

## 2017-12-28 DIAGNOSIS — M54.42 LEFT-SIDED LOW BACK PAIN WITH LEFT-SIDED SCIATICA, UNSPECIFIED CHRONICITY: ICD-10-CM

## 2017-12-28 RX ORDER — AMITRIPTYLINE HYDROCHLORIDE 150 MG/1
TABLET ORAL
Qty: 90 TABLET | Refills: 0 | Status: SHIPPED | OUTPATIENT
Start: 2017-12-28 | End: 2018-04-03

## 2017-12-28 NOTE — TELEPHONE ENCOUNTER
Routing refill request to provider for review/approval because:  BP out of protocol range    Bernarda Greer,RN  [facility]  [phone]

## 2017-12-29 NOTE — TELEPHONE ENCOUNTER
Requested Prescriptions   Pending Prescriptions Disp Refills     clonazePAM (KLONOPIN) 1 MG tablet [Pharmacy Med Name: CLONAZEPAM 1 MG TABLET] 60 tablet 0     Sig: TAKE 0.5 TO 1 TABLET BY MOUTH TWICE A DAY AS NEEDED FOR ANXIETY    There is no refill protocol information for this order   clonazePAM (KLONOPIN) 1 MG tablet (Discontinued)  Last Written Prescription Date:  11.2.17  Last Fill Quantity: 60,   # refills: 0  Last Office Visit: 11.16.17  Future Office visit:       Routing refill request to provider for review/approval because:  Drug not active on patient's medication list           traMADol (ULTRAM) 50 MG tablet [Pharmacy Med Name: TRAMADOL HCL 50 MG   TABLET]    Last Written Prescription Date:  11.6.17  Last Fill Quantity: 90tablet,   # refills: 0  Last Office Visit: 11.6.17  Future Office visit:       Routing refill request to provider for review/approval because:  Drug not on the [facility], [facility] or  Health refill protocol or controlled substance   90 tablet 0     Sig: TAKE 1 TO 2 TABLETS BY MOUTH 2 TIMES DAILY AS NEEDED FOR MODERATE PAIN    There is no refill protocol information for this order

## 2017-12-29 NOTE — TELEPHONE ENCOUNTER
clonazePAM (KLONOPIN) 1 MG tablet  Discontinued      Last Written Prescription Date:  11/2/2017  Last Fill Quantity: 60 tablet,   # refills: 0  Last Office Visit: 11/16/2017  Future Office visit:       Routing refill request to provider for review/approval because:  Drug not on the [facility], [facility] or [facility] refill protocol or controlled substance        traMADol (ULTRAM) 50 MG tablet      Last Written Prescription Date:  11/6/2017  Last Fill Quantity: 90 tablet,   # refills: 0  Last Office Visit: 11/16/2017  Future Office visit:       Routing refill request to provider for review/approval because:  Drug not on the [facility], [facility] or [facility] refill protocol or controlled substance

## 2018-01-02 RX ORDER — TRAMADOL HYDROCHLORIDE 50 MG/1
TABLET ORAL
Qty: 90 TABLET | Refills: 0 | Status: SHIPPED | OUTPATIENT
Start: 2018-01-02 | End: 2018-09-11

## 2018-01-02 RX ORDER — CLONAZEPAM 1 MG/1
TABLET ORAL
Qty: 60 TABLET | Refills: 0 | Status: SHIPPED | OUTPATIENT
Start: 2018-01-02 | End: 2018-03-19

## 2018-01-19 ENCOUNTER — NURSE TRIAGE (OUTPATIENT)
Dept: NURSING | Facility: CLINIC | Age: 48
End: 2018-01-19

## 2018-01-19 NOTE — TELEPHONE ENCOUNTER
"Patient is calling requesting to cancel dental appointment at the [facility] today. Advised patient to contact dental clinic after 8 a.m..  Patient reporting he is cancelling due to \"feeling sick.\" Triage was offered. Patient declines at this time.    Jennifer Escalante RN  [facility]      "

## 2018-01-20 DIAGNOSIS — J45.20 INTERMITTENT ASTHMA, UNCOMPLICATED: ICD-10-CM

## 2018-01-20 NOTE — LETTER
[address] [address]  [phone]  January 24, 2018    Jeremi Damon  [address]   [place] [facility] [place] [facility] [address]    Dear Jeremi,    I care about your health and have reviewed your health plan. I have reviewed your medical conditions, medication list, and lab results and am making recommendations based on this review, to better manage your health.    You are in particular need of attention regarding:  -Asthma    I am recommending that you:  -Complete and return the attached ASTHMA CONTROL TEST.  If your total score is 19 or less or you have been to the ER or urgent care for your asthma, then please schedule an asthma followup appointment.      Here is a list of Health Maintenance topics that are due now or due soon:  Health Maintenance Due   Topic Date Due     PSA Q1 YR 01/06/2018     MICROALBUMIN Q1 YEAR 01/11/2018     ASTHMA ACTION PLAN Q1 YR 01/27/2018     DEPRESSION ACTION PLAN Q1 YR 01/27/2018       Please call us at [phone] (or use [employer]) to address the above recommendations.     Thank you for trusting [facility] and we appreciate the opportunity to serve you.  We look forward to supporting your healthcare needs in the future.    Healthy Regards,    Luis Armando Segovia MD

## 2018-01-22 RX ORDER — ALBUTEROL SULFATE 90 UG/1
AEROSOL, METERED RESPIRATORY (INHALATION)
Qty: 18 INHALER | Refills: 1 | Status: SHIPPED | OUTPATIENT
Start: 2018-01-22 | End: 2018-07-09

## 2018-01-22 NOTE — TELEPHONE ENCOUNTER
"Requested Prescriptions   Pending Prescriptions Disp Refills     VENTOLIN  (90 BASE) MCG/ACT Inhaler [Pharmacy Med Name: VENTOLIN HFA 90 MCG INHALER] 18 Inhaler 1    Last Written Prescription Date:  6.8.17  Last Fill Quantity: 1 INHALER,  # refills: 11   Last Office Visit with [facility] or [facility] prescribing provider:  11.16.17   Future Office Visit:  ACT Total Scores 9/2/2016 6/8/2017 10/10/2017   ACT TOTAL SCORE - - -   ASTHMA ER VISITS - - -   ASTHMA HOSPITALIZATIONS - - -   ACT TOTAL SCORE (Goal Greater than or Equal to 20) 20 6 18   In the past 12 months, how many times did you visit the emergency room for your asthma without being admitted to the hospital? 0 0 0   In the past 12 months, how many times were you hospitalized overnight because of your asthma? 0 0 0           Sig: INHALE 2 PUFFS EVERY 4 HOURS AS NEEDED    Asthma Maintenance Inhalers - Anticholinergics Failed    1/20/2018 10:53 AM       Failed - Asthma control test score is 20 or greater in last 6 months    Please review ACT score.          Passed - Patient is age 12 years or older       Passed - Recent (6 mo) or future visit with authorizing provider's specialty    Patient had office visit in the last 6 months or has a visit in the next 30 days with authorizing provider.  See \"Patient Info\" tab in inbasket, or \"Choose Columns\" in Meds & Orders section of the refill encounter.              "

## 2018-01-22 NOTE — TELEPHONE ENCOUNTER
Routing refill request to provider for review/approval because:  Labs out of range:  ACT Score    Asmita Marinelli RN  [facility]

## 2018-02-16 DIAGNOSIS — R19.5 LOOSE STOOLS: ICD-10-CM

## 2018-02-19 RX ORDER — LOPERAMIDE HCL 2 MG
CAPSULE ORAL
Qty: 90 CAPSULE | Refills: 1 | Status: SHIPPED | OUTPATIENT
Start: 2018-02-19 | End: 2018-04-10

## 2018-02-19 NOTE — TELEPHONE ENCOUNTER
Prescription approved per [facility] Refill Protocol.  Amelia Flores RN  [facility]

## 2018-02-19 NOTE — TELEPHONE ENCOUNTER
Requested Prescriptions   Pending Prescriptions Disp Refills     loperamide (IMODIUM) 2 MG capsule [Pharmacy Med Name: LOPERAMIDE 2 MG CAPSULE]    Last Written Prescription Date:  10.10.17  Last Fill Quantity: 90,  # refills: 1   Last office visit: 11/16/2017 with prescribing provider:  11.16.17   Future Office Visit:     90 capsule 1     Sig: TAKE 1 TABLET (2 MG) BY MOUTH 3 TIMES DAILY AS NEEDED FOR DIARRHEA    There is no refill protocol information for this order

## 2018-02-19 NOTE — PROGRESS NOTES
Patient: Kaya Pablo Date: 2018   : 1946    72 year old female      OUTPATIENT WOUND CARE PROGRESS NOTE    Supervising Wound Care / Hyperbaric Medicine Physician: Not Applicable  Consulting Provider:  Destiny Luong MD  Date of Consultation/Last Comprehensive Exam:  2018    Referring  Provider:  Hospital Medicine      SUBJECTIVE:    Chief Complaint:  Pressure ulcer , coccyx, stage 4 present on admission    Wound/Ulcer Present:    Pressure ulcer, other sites    Additional Wound Category:  Other, moisture associated skin damage     Maximum Baseline Ambulatory Status:  Non-ambulatory    History of Present Illness:  This is a 72 year old female who I am following for a stage 4 coccyx ulcer present on this admission to [facility]  for Pneumonia and PE. She was under the care of Dr. Jones at [facility]. She had been treated with IV antibiotics as an outpatient due to myositis on MRI 18.  There was concern for osteo as well. She had a trial of VAC therapy at [facility] but after about 6 days her periwound skin could not tolerate it.     I saw her two weeks ago as an inpatient  for initial consult, requested a Duarte catheter be placed to try and protect her skin and allow it to heal. I placed bovine collagen in the wound and  Had the wound packed with Gauze daily  and Covered with Aquacel Ag. Outer dressings have been changed 2-3 times a day based on Drainage.       She is currently at [facility] in [place]. She still has the Duarte Catheter in.   She is much more comfortable being without urine drainage. She states that occasionally \"They don't get around to\" changing the outer dressing of her wound and the gauze packing daily. She is bed \"most of the time\". She voices concerns about there not being bed rails on the bed and she is leery about turing on her side.       Current Treatment Regimen:  Dressing:  Aquacel Ag, Carie and wet to dry gauze   Frequency:  Twice a day  Quick Note:    Jeremi  I have reviewed your recent labs. Here are the results:    -Microalbumin (urine protein) level is elevated but markedly improved from 1 year ago. This is suggestive of early damage to your kidneys likely from high blood pressure. ADVISE: avoiding anti-inflamatory agents such as ibuprofen (Advil, Motrin) or naproxen (Aleve) as much as possible, keeping your blood pressure in a normal range, and continuing your medication that helps protect your kidneys. Recheck in 1 year.       If you have any questions please do not hesitate to contact our office via phone ([phone]) or MyChart.    Rosalia Calvo, MS, PA-C  [facility]    ______     Changed by:  Nursing home staff    Review of Systems:  Pertinent items are noted in HPI (history of present illness).    Past Medical History:   Diagnosis Date   • Allergy    • Anemia    • COPD (chronic obstructive pulmonary disease) (CMS/[facility])    • Coronary artery disease 11/2011    stent circumflex   • Depression    • Dyslipidemia    • GERD (gastroesophageal reflux disease)    • History of urinary incontinence     \"Leakage\"   • Incontinence    • LBBB (left bundle branch block) 9/14/2015   • Lymphedema 2013    legs/feet   • Meniere's disease    • Osteoarthritis     neck, back, knees   • Osteoporosis, unspecified 10/30/2012   • Paget disease of bone     forehead   • Peripheral vascular disease (CMS/[facility])    • RAD (reactive airway disease)    • Sleep apnea     Has CPAP, but does not use it. \"It's broken\".   • Urinary tract infection     Recurring   • Vaginitis and vulvovaginitis      Past Surgical History:   Procedure Laterality Date   • Appendectomy     • Combined rt and lt heart cath w ventriculography w md sup & interp  10/09/2012   • Dexa bone density axial skeleton  07/06/2010   • Gastric bypass      1986   • Heel spur surgery      bilaterally   • Hernia repair      multiple umbilica/ventral   • Knee arthroscopy w/ arthrotomy      bilateral   • Left heart cath including left venticulography w md sup and interp  11/30/2011   • Pap smear,routine  07/14/2011   • Ptca  11/30/2011    w/stent   • Removal gallbladder      Cholecystectomy (gallbladder)   • Small intestine surgery      small bowel obstruction   • Tubal ligation     • Tennyson tooth extraction       Social History     Social History   • Marital status:      Spouse name: N/A   • Number of children: 3   • Years of education: N/A     Occupational History   • retired RN      psych      Social History Main Topics   • Smoking status: Former Smoker     Packs/day: 3.00     Years: 26.00     Types: Cigarettes     Quit date: 1/1/1984   • Smokeless tobacco:  Never Used   • Alcohol use No   • Drug use: No   • Sexual activity: Not on file     Other Topics Concern   • Caffeine Concern Yes     none   • Exercise Yes     very minimal     Social History Narrative    wuit smoking many years ago.  Has a 60 pack year history of smoking.  No alcohol.  Retired RN     Family History   Problem Relation Age of Onset   • Heart Mother      afib   • Diabetes Mother    • Heart disease Mother    • Dementia/Alzheimers Mother    • Diabetes Son    • Diabetes Maternal Grandmother    • Heart disease Son       MI age 44   • Stroke Father    • Dementia/Alzheimers Father        Current Outpatient Prescriptions   Medication Sig   • acetaminophen-codeine (TYLENOL NO.3) 300-30 MG per tablet Take 2 tablets by mouth every 6 hours as needed for Pain.   • furosemide (LASIX) 40 MG tablet Take 1 tablet by mouth daily.   • guaiFENesin (MUCINEX) 600 MG 12 hr tablet Take 2 tablets by mouth every 12 hours.   • Potassium Chloride ER 20 MEQ Tab CR Take 20 mEq by mouth 2 times daily.   • Saccharomyces boulardii (PROBIOTIC) 250 MG Cap Take 1 capsule by mouth 2 times daily. TAKE 1 CAPSULES TWO TIMES A DAY FOR 30 DAYS   • tiotropium (SPIRIVA HANDIHALER) 18 MCG capsule for inhaler Place 1 capsule into inhaler and inhale daily.   • albuterol-ipratropium 2.5 mg/0.5 mg (DUONEB) 0.5-2.5 (3) MG/3ML nebulizer solution Take 3 mLs by nebulization 4 times daily.   • Menthol, Topical Analgesic, (BIOFREEZE) 4 % Gel Apply to bilateral knees TID   • rivaroxaban (XARELTO) 20 MG Tab Take 1 tablet by mouth daily (with dinner). START ON 18   • Camphor-Menthol-Methyl Sal (MUSCLE RUB) 4-10-30 % Cream Apply to right thigh 3x per day.   • diphenhydrAMINE (DIPHENHIST) 25 MG capsule Take 1 capsule by mouth 3 times daily as needed for Itching.   • polyethylene glycol (MIRALAX) powder Take 17 g by mouth twice a week. Monday and Thursday.   • LORazepam (ATIVAN) 0.5 MG tablet Take 1 tablet by mouth every morning.   • famotidine  (PEPCID) 10 MG tablet Take 1 tablet by mouth daily as needed (refulx, nausea, heartburn).   • Podiatric Products (FLEXITOL HEEL BALM) Ointment Apply 45 g topically three times a week. Apply to affected areas on bilateral feet   • aluminum-magnesium hydroxide-simethicone (MAALOX REGULAR STRENGTH) 200-200-20 MG/5ML Suspension Take 20 mLs by mouth 2 times daily as needed (reflux, nausea, indigestion).   • docusate sodium-sennosides (SENOKOT S) 50-8.6 MG per tablet Take 2 tablets by mouth daily. Hold for loose stools   • Multiple Vitamins-Minerals (MULTIVITAMIN ADULT) Tab Take 1 tablet by mouth daily.   • NUTRITIONAL SUPPLEMENT Liquid Take 1 Dose by mouth daily. Indications: nutritional supplement   • ondansetron (ZOFRAN) 4 MG tablet Take 1 tablet by mouth every 8 hours as needed for Nausea.   • Menthol, Topical Analgesic, (LY VELÁSQUEZ) 1.4 % Patch Apply to back for 12 hours, off for 12 hours   • lidocaine (XYLOCAINE) 5 % ointment Apply topically 2 times daily.   • dicyclomine (BENTYL) 10 MG capsule Take 1 capsule by mouth 4 times daily as needed (diarrhea / irritable bowel).   • acetaminophen (TYLENOL) 325 MG tablet Take 2 tablets by mouth every 4 hours as needed for Pain or Fever.   • bisacodyl (DULCOLAX) 10 MG suppository Place 1 suppository rectally daily as needed for Constipation.   • sodium biphosphate (FLEET) 7-19 GM/118ML enema Place 1 enema rectally daily as needed for Constipation.   • magnesium hydroxide (MILK OF MAGNESIA) 400 MG/5ML suspension Take 30 mLs by mouth daily as needed for Constipation.   • bisoprolol (ZEBETA) 5 MG tablet TAKE 1/2 TABLET DAILY.   • tiZANidine (ZANAFLEX) 4 MG tablet TAKE 1 TABLET BY MOUTH   NIGHTLY.   • gabapentin (NEURONTIN) 100 MG capsule Take 1 capsule by mouth daily. in the morning.   • gabapentin (NEURONTIN) 300 MG capsule TAKE 1 CAPSULE AT BEDTIME   • PROAIR  (90 Base) MCG/ACT inhaler INHALE 2 PUFFS INTO THE  LUNGS EVERY 4 HOURS AS   NEEDED FOR SHORTNESS OF  BREATH OR  WHEEZING.   • darifenacin (ENABLEX) 15 MG 24 hr tablet TAKE 1 TABLET BY MOUTH DAILY   • atorvastatin (LIPITOR) 20 MG tablet TAKE 1 TABLET DAILY   • rabeprazole (ACIPHEX) 20 MG tablet TAKE 1 TABLET BY MOUTH 2 TIMES DAILY.   • nitroGLYcerin (NITROSTAT) 0.4 MG SL tablet Place 1 tablet under the tongue every 5 minutes as needed (chest pain).   • Elastic Bandages & Supports (JOBST KNEE HIGH COMPRESSION SM) MISC Apply in the morning, remove in the evening.   • Cholecalciferol (VITAMIN D) 2000 UNITS CAPS Take 2,000 Units by mouth daily.   • calcium carbonate-vitamin D (CALTRATE+D) 600-400 MG-UNIT per tablet Take 1 tablet by mouth daily.   • vitamin B-12 (CYANOCOBALAMIN) 1000 MCG tablet Take 1,000 mcg by mouth daily.     No current facility-administered medications for this encounter.         ALLERGIES:  Bactrim; Betadine [povidone iodine]; Doxycycline; Erythromycin; Flagyl [metronidazole hcl]; Lidocaine hcl-epinephrine; Macrobid [nitrofurantoin monohydrate macrocrystals]; Meperidine; Oxycodone; Percocet [oxycodone-acetaminophen]; Tramadol; and Tape [adhesive]    OBJECTIVE:  Vital Signs:    Visit Vitals  BP (!) 81/53 (Patient Position: Prone)   Temp 97.4 °F (36.3 °C) (Tympanic)   Resp 16         Physical Exam:  General appearance: Appears stated age, obese and oriented to person, place, time and situation  Neurologic:  normal  Skin: positive findings: hyperpigmentation  groin, buttock(s) bilateral, hip(s) bilateral and right flank. Pigmentation due to prior moisture assd skin damage  Ulcer and Wound: One: Location- buttock(s) midline, coccyx, Lower  Size- Length 2.8 cm, Width 3cm and Depth  2.5 cm  Stage- IV and Bone palpable but not jagged Grade- N/A  Tunneling- tunneling to 9-10 o clock is mauch narrower , and measured at 2 cm   Granulation Tissue- 80 %  Epithelial Tissue- None  Eschar- none  Drainage- None    Heels - no breakdown or erythema. Groin and abdominal skin folds dry, non erythematous.      Wound Bed Quality:  coccyx  Granulation tissue and Fibrin      Sandra-wound Quality:    hyperpigmented and Intact    Additional Descriptors:  palpable bone    Wound Hip/trochanter Left Lateral Tear (Active)   Number of days: 122       Wound Elbow Left Abrasion (Active)   Number of days: 122       Wound Chest Left Anterior Tear (Active)   Number of days: 122       Wound Abdomen Left Lower Excoriation (Active)   Number of days: 122       Wound Foot Right Dorsal Pressure injury (Active)   Number of days: 122       Wound Heel Right Posterior (Active)   Number of days: 122       Wound Abdomen Left Anterior;Skin fold Excoriation (Active)   Number of days: 122       Wound Groin Anterior Moisture associated skin damage (MASD) (Active)   Number of days: 19       Wound Abdomen Left Lateral Non-pressure injury (Active)   Number of days: 122       Wound Hip/trochanter Left Lateral Pressure injury (Active)   Number of days: 122       Wound Abdomen Left Lateral;Anterior;Skin fold (Active)   Number of days:        Wound Buttock Left Excoriation (Active)   Number of days: 122       Wound Buttock Right Excoriation (Active)   Number of days: 122       Wound Coccyx Excoriation (Active)   Number of days: 122       Wound Coccyx Pressure injury (Active)   Wound Length (cm) 3 cm 2/19/2018 10:00 AM   Wound Width (cm) 2.8 cm 2/19/2018 10:00 AM   Wound Depth (cm) 2.5 cm 2/19/2018 10:00 AM   Wound Surface Area (cm2) 8.4 cm2 2/19/2018 10:00 AM   Wound Volume (cm3) 21 cm3 2/19/2018 10:00 AM   Wound Volume Change (Initial) -24 cm3 2/19/2018 10:00 AM   Wound Volume % Change (Initial) -53.33 % 2/19/2018 10:00 AM   Number of days: 0       Wound Back Skin fold;Lower Moisture associated skin damage (MASD) (Active)   Number of days: 19       Wound Abdomen Skin fold Moisture associated skin damage (MASD) (Active)   Number of days: 19       Wound Calf Left Skin fold Moisture associated skin damage (MASD) (Active)   Number of days: 19       Wound Calf Right Skin fold Moisture  associated skin damage (MASD) (Active)   Number of days: 19       Wound Breast Right Skin fold Moisture associated skin damage (MASD) (Active)   Number of days: 19       Wound Hip/trochanter Left Pressure injury (Active)   Number of days: 19       PROCEDURE:  Not indicated    Procedure was Performed by:  Not applicable    Laboratory assessments reviewed:  No results found for: PAB   Albumin (g/dL)   Date Value   02/05/2018 1.6 (L)   01/31/2018 1.9 (L)   10/22/2017 2.3 (L)      No results available in last 24 hours    Lab Results   Component Value Date    WBC 6.0 02/05/2018    GLUCOSE 93 02/05/2018    HGBA1C 5.8 (H) 02/14/2017    CRP 3.7 (H) 02/01/2018    RESR 10 01/22/2018    CREATININE 0.92 02/05/2018    GFRA 71 02/05/2018    GFRNA 61 02/05/2018        Culture results:  Specimen Description (no units)   Date Value   02/01/2018 SPUTUM SPUTUM   01/31/2018 BLOOD, PERIPHERAL ANTECUBITAL,LEFT   01/31/2018 BLOOD, PERIPHERAL ANTECUBITAL,LEFT   01/31/2018 URINE, CLEAN CATCH/MIDSTREAM   01/31/2018 URINE    CULTURE (no units)   Date Value   02/01/2018 RARE NORMAL UPPER RESPIRATORY LUIS ENRIQUE   01/31/2018 NO GROWTH 5 DAYS.   01/31/2018 NO GROWTH 5 DAYS.   07/15/2017 >100,000 CFU/mL ESCHERICHIA COLI (P)   07/15/2017 WITH MIXED LUIS ENRIQUE        Diagnostic Assessments Reviewed:  MRI     Nutritional Assessment:  Prealbumin and/or Albumin reviewed    Wound treatment goals are palliative:  No    DIAGNOSES:  Pressure ulcer, other site, stage IV and present on admission coccyx  Context issues: poor mobility, non ambulatory  Obesity  Protein malnutrition    Medical Decision Making:   Jacqueline's skin  And wound look improved just from having her urine contained  Per Dr Neal he did not think she needed continued IV abx for just the ulcer . The bone is granulated over at this time. All of her moisture associated skin damage is resolved with placement of the Duarte catheter . We weighed the risk and benefit of continuing this and I support keeping it  in until her wound is  less deep and her mobility allows her to toilet more effectively on her own.      I would like to continue with Bovine collagen (manny) in the wound every M/W/F.   Moist gauze to fill the rest of the wound and  Aquacel Ag  tocover the opening - changed daily.   Outer ABD should be changed daily.  Continue Frequent repositioning and Prevalon boots.    May get into chair 30 minutes BID.     Follow up in clinic 2 weeks.    Destiny Luong MD,[facility]  (P) [phone]  (F) [phone]  [email or web address]

## 2018-02-22 NOTE — TELEPHONE ENCOUNTER
ACT Total Scores 6/8/2017 10/10/2017 2/22/2018   ACT TOTAL SCORE - - -   ASTHMA ER VISITS - - -   ASTHMA HOSPITALIZATIONS - - -   ACT TOTAL SCORE (Goal Greater than or Equal to 20) 6 18 20   In the past 12 months, how many times did you visit the emergency room for your asthma without being admitted to the hospital? 0 0 0   In the past 12 months, how many times were you hospitalized overnight because of your asthma? 0 0 0     Patient was given #18 inhalers x 1 refill. ACT score fine.     Asmita Marinelli RN  [facility]

## 2018-02-23 ASSESSMENT — ASTHMA QUESTIONNAIRES: ACT_TOTALSCORE: 20

## 2018-03-16 DIAGNOSIS — E29.1 HYPOGONADISM MALE: ICD-10-CM

## 2018-03-16 DIAGNOSIS — F41.1 GENERALIZED ANXIETY DISORDER: ICD-10-CM

## 2018-03-16 DIAGNOSIS — F11.93 NARCOTIC WITHDRAWAL (H): ICD-10-CM

## 2018-03-16 DIAGNOSIS — M54.2 NECK PAIN, BILATERAL: ICD-10-CM

## 2018-03-16 NOTE — LETTER
[facility] - [address] [place] [address]                                                                                                       [phone]    March 23, 2018    Jeremi Damon  [address]   [facility] [address]      To Whom it May Concern:    My staff have been attempting to reach you in regards to a recent refill request for: multiple medications.   The request for Clonazepam has been denied. You were prescribed lorazepam 1 mg by Dr. Theo Aj on 3/16/2018 for #60 (per the Prescription Monitoring Program, this has been a very regular prescription for the last year every month).  This is the same family of medications and you cannot take them together. You will need to see Dr. Segovia to discuss this.      Also, the clonidine was prescribed for withdrawal symptoms and not intended to be used regularly- if you are experiencing these, you will need to see your pain provider or Dr. Segovia to discuss further.    Thank you for your time.        Sincerely,    Rosalia Calvo PA-C

## 2018-03-19 RX ORDER — CLONIDINE HYDROCHLORIDE 0.1 MG/1
TABLET ORAL
Qty: 20 TABLET | Refills: 1 | OUTPATIENT
Start: 2018-03-19

## 2018-03-19 RX ORDER — CLONAZEPAM 1 MG/1
TABLET ORAL
Qty: 60 TABLET | Refills: 0 | OUTPATIENT
Start: 2018-03-19

## 2018-03-19 RX ORDER — TESTOSTERONE CYPIONATE 200 MG/ML
INJECTION, SOLUTION INTRAMUSCULAR
Qty: 10 ML | Refills: 0 | Status: SHIPPED | OUTPATIENT
Start: 2018-03-19 | End: 2018-09-20

## 2018-03-19 NOTE — TELEPHONE ENCOUNTER
Routing refill request to provider for review/approval because:  Clonazepam and Testosterone are not on the [facility] refill protocol   BP above goal to refill Clonidine per protocol      REJI Bonner, RN, N  [facility]) [phone]

## 2018-03-19 NOTE — TELEPHONE ENCOUNTER
Clonazepam denied.  He was prescribed lorazepam 1 mg by Dr. Theo Aj on 3/16 #60 (per the  this has been a very regular RX for the last year every month).  This is the same family of medications and he cannot take them together.  He will need to see RL to discuss     The clonidine was prescribed for withdrawal symptoms and not intended to be used regularly- if he is experiencing these he needs to see his pain MD or RL to discuss further.    Testosterone filled.      Rosalia Calvo, MS, PA-C

## 2018-03-19 NOTE — TELEPHONE ENCOUNTER
"Requested Prescriptions   Pending Prescriptions Disp Refills     clonazePAM (KLONOPIN) 1 MG tablet [Pharmacy Med Name: CLONAZEPAM 1 MG TABLET] 60 tablet 0            Last Written Prescription Date:  1.2.18  Last Fill Quantity: 60,   # refills: 0  Last Office Visit: 11.16.17  Future Office visit:       Routing refill request to provider for review/approval because:  Drug not on the [facility] refill protocol or controlled substance   Sig: TAKE 1/2 TO 1 TABLET BY MOUTH TWICE DAILY AS NEEDED FOR ANXIETY    There is no refill protocol information for this order        cloNIDine (CATAPRES) 0.1 MG tablet (Discontinued) [Pharmacy Med Name: CLONIDINE HCL 0.1 MG TABLET] 20 tablet 1        Last Written Prescription Date:  11.2.117  Last Fill Quantity: 20,  # refills: 1   Last office visit: 11/16/2017 with prescribing provider:     Future Office Visit:     Sig: TAKE 1 TABLET BY MOUTH TWICE A DAY. INCREASE TO 2 TABS TWICE DAILY IF BLOOD PRESSURE BELOW 140/90    Central Acting Antiadrenergic Agents Failed    3/16/2018  4:42 PM       Failed - Blood pressure under 140/90 in past 12 months    BP Readings from Last 3 Encounters:   11/16/17 144/90   11/02/17 (!) 140/92   10/21/17 (!) 194/124                Passed - Patient is 6 years of age or older       Passed - Recent (12 mo) or future (30 days) visit within the authorizing provider's specialty    Patient had office visit in the last 12 months or has a visit in the next 30 days with authorizing provider or within the authorizing provider's specialty.  See \"Patient Info\" tab in inbasket, or \"Choose Columns\" in Meds & Orders section of the refill encounter.           Passed - Normal serum creatinine on file within past 12 months    Recent Labs   Lab Test  10/20/17   0828   CR  1.23             testosterone cypionate (DEPOTESTOTERONE) 200 MG/ML injection [Pharmacy Med Name: TESTOSTERONE  MG/ML] 10 mL       Last Written Prescription Date:  11.16.17  Last Fill " Quantity: 10 ml,  # refills: 1   Last office visit: 11/16/2017 with prescribing provider:     Future Office Visit:     Sig: INJECT 0.5 ML INTRAMUSCULARLY ONCE WEEKLY    There is no refill protocol information for this order

## 2018-03-20 NOTE — TELEPHONE ENCOUNTER
Testosterone faxed to [facility] pharmacy in [place] at [phone]    Kari Mcleod RN, BSN  [place] [facility]

## 2018-03-20 NOTE — TELEPHONE ENCOUNTER
Attempt # 1 to [phone].    Left non-detailed VM for patient to call back.    REJI Bonner, RN, PHN  [facility]

## 2018-03-22 NOTE — TELEPHONE ENCOUNTER
Attempt #2  Called # below - Left a non-detailed message to call back and speak with any triage nurse.    Asmita Marinelli RN  [place] [facility]

## 2018-03-23 NOTE — TELEPHONE ENCOUNTER
Attempt #3  Called # below - Left a non-detailed message to call back and speak with any triage nurse.    Letter sent    Asmita Marinelli RN  [place] [facility]

## 2018-04-02 NOTE — PROGRESS NOTES
"  SUBJECTIVE:                                                    Jeremi Damon is a 47 year old male who presents to clinic today for the following health issues:    Hypertension Follow-up    Outpatient blood pressures being checked at dentist- elevated    Low Salt Diet: no added salt    - BP's have been elevated lately, measuring 150/90 at today's visit. He is experiencing increased anxiety/depression, which could be contributing. He has been working to lose weight, is unable to get his HR up to the target \"Fat-burning\" rate, would like to discuss options that would allow this.       Depression and Anxiety Follow-Up    Status since last visit: Worsened     Other associated symptoms:None    Complicating factors:     Significant life event: Yes-  Mother is ill- friends issues- family isues     Current substance abuse: None    PHQ-9  English  PHQ-9 12/9/2016 1/27/2017 11/16/2017   Total Score 19 26 18   Q9: Suicide Ideation Several days Nearly every day Several days     NANCY-7  NANCY-7 SCORE 12/9/2016 1/27/2017 11/16/2017   Total Score - - -   Total Score 14 20 16   Suicide Assessment Five-step Evaluation and Treatment (SAFE-T)    - Pt has been experiencing \"shirt-wetting\" panic attacks, unsure of the cause. He does note a friend committed suicide about 1 month ago, has contributed to his increased depression. He does not feel his Wellbutrin is managing his mood symptoms, has been taking more Ativan lately to alleviate panic attacks/anxiety. Jeremi also notes increased episodes of loose stool when he is stressed, would like some medication to help alleviate this.      Medication Followup of ADHD/ Adderall    Taking Medication as prescribed: yes    Side Effects:  None    Medication Helping Symptoms:  Yes    - Pt is doing well on medication, no concerns or side effect complaints today.           Problem list and histories reviewed & adjusted, as indicated.  Additional history: as documented    ROS:  Constitutional, HEENT, " cardiovascular, pulmonary, GI, , musculoskeletal, neuro, skin, endocrine and psych systems are negative, except as otherwise noted.    This document serves as a record of the services and decisions personally performed and made by Luis Armando Segovia MD. It was created on his behalf by Ines Puentes, a trained medical scribe. The creation of this document is based the provider's statements to the medical scribe.  Scribe Ines Puentes 11:10 AM, April 10, 2018    OBJECTIVE:                                                    /90  Pulse 84  Temp 99.3  F (37.4  C) (Oral)  Ht 1.829 m (6')  Wt 112 kg (247 lb)  SpO2 95%  BMI 33.5 kg/m2 Body mass index is 33.5 kg/(m^2).   GENERAL: healthy, alert, well nourished, well hydrated, no distress  HENT: ear canals- normal; TMs- normal; Nose- normal; Mouth- no ulcers, no lesions  NECK: no tenderness, no adenopathy, no asymmetry, no masses, no stiffness; thyroid- normal to palpation  RESP: lungs clear to auscultation - no rales, no rhonchi, no wheezes  CV: regular rates and rhythm, normal S1 S2, no S3 or S4 and no murmur, no click or rub -  ABDOMEN: soft, no tenderness, no  hepatosplenomegaly, no masses, normal bowel sounds  MS: extremities- no gross deformities noted, no edema  SKIN: no suspicious lesions, no rashes  PSYCH: Alert and oriented times 3; speech- coherent , normal rate and volume; able to articulate logical thoughts, able to abstract reason, no tangential thoughts, no hallucinations or delusions, affect- normal    Diagnostic test results:  None     ASSESSMENT/PLAN:         Jeremi was seen today for recheck medication.    Diagnoses and all orders for this visit:    Hypertension goal BP (blood pressure) < 140/90/Essential hypertension with goal blood pressure less than 140/90/Hypertrophic cardiomyopathy (H) - Labs pending; Medication refilled & New Rx given; May take half dose of metoprolol to allow target HR for weight loss  -     Albumin Random Urine Quantitative  with Creat Ratio  -     Comprehensive metabolic panel (BMP + Alb, Alk Phos, ALT, AST, Total. Bili, TP)  -     cloNIDine (CATAPRES) 0.1 MG tablet; Take 1 tablet (0.1 mg) by mouth 2 times daily  -     metoprolol tartrate (LOPRESSOR) 50 MG tablet; Take 0.5-1 tablets (25-50 mg) by mouth 2 times daily    Major Depressive Disorder, Recurrent Episode, Mode - Medication refilled; Discontinued Wellbutrin  -     escitalopram (LEXAPRO) 10 MG tablet; Take 1 tablet (10 mg) by mouth daily    CKD (chronic kidney disease) stage 3, GFR 30-59 ml/min - Labs pending  -     Comprehensive metabolic panel (BMP + Alb, Alk Phos, ALT, AST, Total. Bili, TP)    Panic disorder without agoraphobia/Generalized anxiety disorder/Panic attack - Medication refilled & new Rx given  -     escitalopram (LEXAPRO) 10 MG tablet; Take 1 tablet (10 mg) by mouth daily  -     clonazePAM (KLONOPIN) 1 MG tablet; Take 0.5-1 tablets (0.5-1 mg) by mouth 2 times daily as needed for anxiety    Flat wart - Medication refiled  -     imiquimod (ALDARA) 5 % cream; Apply  to lesion.   Wash off after 8 hours.   May use for up to 16 weeks.    Loose stools - Medication filled  -     loperamide (IMODIUM A-D) 2 MG tablet; Take 2 tabs (4 mg) after first loose stool, and then take one tab (2 mg) after each diarrheal stool.  Max of 8 tabs (16 mg) per day.    Hypersomnia with sleep apnea - Previously diagnosed, referral given to receive new treatment  -     SLEEP EVALUATION & MANAGEMENT REFERRAL - [facility]  [phone] (Age 18 and up); Future    Screening for prostate cancer - Labs pending  -     PROSTATE SPEC ANTIGEN SCREEN        Risks, benefits and alternatives of treatments discussed. Plan agreed on.      Followup: Return to clinic in 6 months for follow-up    Will call, return to clinic, or go to ED if worsening or symptoms not improving as discussed.    See patient instructions.       BMI:   Estimated body mass index is 33.5 kg/(m^2) as  calculated from the following:    Height as of this encounter: 1.829 m (6').    Weight as of this encounter: 112 kg (247 lb).   Weight management plan: Discussed healthy diet and exercise guidelines and patient will follow up in 12 months in clinic to re-evaluate.        The information in this document, created by the medical scribe for me, accurately reflects the services I personally performed and the decisions made by me. I have reviewed and approved this document for accuracy prior to leaving the patient care area.  11:10 AM, 04/10/18        Nate Segovia MD   Pager: [phone]

## 2018-04-03 DIAGNOSIS — F90.2 ATTENTION DEFICIT HYPERACTIVITY DISORDER (ADHD), COMBINED TYPE: ICD-10-CM

## 2018-04-03 DIAGNOSIS — G47.00 INSOMNIA, UNSPECIFIED TYPE: ICD-10-CM

## 2018-04-03 RX ORDER — DEXTROAMPHETAMINE SACCHARATE, AMPHETAMINE ASPARTATE, DEXTROAMPHETAMINE SULFATE AND AMPHETAMINE SULFATE 5; 5; 5; 5 MG/1; MG/1; MG/1; MG/1
20 TABLET ORAL 3 TIMES DAILY
Qty: 90 TABLET | Refills: 0 | Status: SHIPPED | OUTPATIENT
Start: 2018-04-03 | End: 2018-08-09

## 2018-04-03 RX ORDER — AMITRIPTYLINE HYDROCHLORIDE 150 MG/1
150 TABLET ORAL AT BEDTIME
Qty: 90 TABLET | Refills: 0 | Status: SHIPPED | OUTPATIENT
Start: 2018-04-03 | End: 2018-07-05

## 2018-04-03 NOTE — TELEPHONE ENCOUNTER
"Requested Prescriptions   Pending Prescriptions Disp Refills     amitriptyline HCl 150 MG TABS 90 tablet 0    Tricyclic Agents ( Annual appt and no PHQ9) Failed    4/3/2018  8:13 AM   Last Written Prescription Date:  12/28/2107  Last Fill Quantity: 90,  # refills: 0   Last office visit: 11/16/2017   Next 5 appointments (look out 90 days)     Apr 10, 2018 10:40 AM CDT   [facility]    [address] [place] [id number] [phone]                       Failed - Blood Pressure under 140/90 in past 12 mos    BP Readings from Last 3 Encounters:   11/16/17 144/90   11/02/17 (!) 140/92   10/21/17 (!) 194/124                Passed - Recent (12 mo) or future (30 days) visit within authorizing provider's specialty    Patient had office visit in the last 12 months or has a visit in the next 30 days with authorizing provider or within the authorizing provider's specialty.  See \"Patient Info\" tab in inbasket, or \"Choose Columns\" in Meds & Orders section of the refill encounter.           Passed - Patient is age 18 or older           Controlled Substance Refill Request for Adderall 20mg  Problem List Complete:  Yes    Last Written Prescription Date:  06/08/2017  Last Fill Quantity: 90,   # refills: 0    [place] [facility] [place] 11/16/2017    Clinic visit frequency required: none      Future Office visit:   Next 5 appointments (look out 90 days)     Apr 10, 2018 10:40 AM CDT   [facility]    [address] [place] [id number] [phone]                  Controlled substance agreement on file: No.     Processing:  [facility]   checked in past 6 months?  No, route to RN      Reason for Call:  Medication or medication refill:    Do you use a [facility]?  Name of the pharmacy and phone " number for the current request:  [facility]    Name of the medication requested: amphetamine-dextroamphetamine (ADDERALL) 20 MG per tablet and amitriptyline HCl 150 MG TABS    Other request: Pt had to cancel appt on 4/3/18 due to weather. He was wondering if he could get a refill on these 2 meds until his scheduled appt on 4/10/18. No appts available this week with Dr. Segovia    Can we leave a detailed message on this number? YES    Phone number patient can be reached at: Home number on file [phone] (home)    Best Time:     Call taken on 4/3/2018 at 8:10 AM by Amelia Ellis

## 2018-04-03 NOTE — TELEPHONE ENCOUNTER
Patient notified by phone and Adderall mailed to [facility] per his request.    Gemma Hartley, BS, RN, N  [facility]) [phone]

## 2018-04-03 NOTE — TELEPHONE ENCOUNTER
Routing refill request to provider for review/approval because:  BP is out of range to fill per protocol.    REJI Bonner, RN, PHN  [facility]) [phone]

## 2018-04-10 ENCOUNTER — OFFICE VISIT (OUTPATIENT)
Dept: FAMILY MEDICINE | Facility: CLINIC | Age: 48
End: 2018-04-10
Payer: MEDICARE

## 2018-04-10 VITALS
WEIGHT: 247 LBS | DIASTOLIC BLOOD PRESSURE: 90 MMHG | HEART RATE: 84 BPM | BODY MASS INDEX: 33.46 KG/M2 | TEMPERATURE: 99.3 F | SYSTOLIC BLOOD PRESSURE: 150 MMHG | HEIGHT: 72 IN | OXYGEN SATURATION: 95 %

## 2018-04-10 DIAGNOSIS — Z12.5 SCREENING FOR PROSTATE CANCER: ICD-10-CM

## 2018-04-10 DIAGNOSIS — I10 HYPERTENSION GOAL BP (BLOOD PRESSURE) < 140/90: Primary | ICD-10-CM

## 2018-04-10 DIAGNOSIS — F41.1 GENERALIZED ANXIETY DISORDER: ICD-10-CM

## 2018-04-10 DIAGNOSIS — I10 ESSENTIAL HYPERTENSION WITH GOAL BLOOD PRESSURE LESS THAN 140/90: ICD-10-CM

## 2018-04-10 DIAGNOSIS — I42.2 HYPERTROPHIC CARDIOMYOPATHY (H): ICD-10-CM

## 2018-04-10 DIAGNOSIS — B07.8 FLAT WART: ICD-10-CM

## 2018-04-10 DIAGNOSIS — F41.0 PANIC ATTACK: ICD-10-CM

## 2018-04-10 DIAGNOSIS — G47.30 HYPERSOMNIA WITH SLEEP APNEA: ICD-10-CM

## 2018-04-10 DIAGNOSIS — N18.30 CKD (CHRONIC KIDNEY DISEASE) STAGE 3, GFR 30-59 ML/MIN (H): ICD-10-CM

## 2018-04-10 DIAGNOSIS — F41.0 PANIC DISORDER WITHOUT AGORAPHOBIA: ICD-10-CM

## 2018-04-10 DIAGNOSIS — G47.10 HYPERSOMNIA WITH SLEEP APNEA: ICD-10-CM

## 2018-04-10 DIAGNOSIS — R19.5 LOOSE STOOLS: ICD-10-CM

## 2018-04-10 DIAGNOSIS — F33.1 MAJOR DEPRESSIVE DISORDER, RECURRENT EPISODE, MODERATE (H): ICD-10-CM

## 2018-04-10 PROBLEM — N17.9 ACUTE KIDNEY INJURY (H): Status: ACTIVE | Noted: 2017-10-19

## 2018-04-10 PROCEDURE — 99214 OFFICE O/P EST MOD 30 MIN: CPT | Performed by: FAMILY MEDICINE

## 2018-04-10 RX ORDER — CLONIDINE HYDROCHLORIDE 0.1 MG/1
0.1 TABLET ORAL 2 TIMES DAILY
Qty: 180 TABLET | Refills: 1 | Status: SHIPPED | OUTPATIENT
Start: 2018-04-10 | End: 2019-02-21

## 2018-04-10 RX ORDER — METOPROLOL TARTRATE 50 MG
25-50 TABLET ORAL 2 TIMES DAILY
Qty: 180 TABLET | Refills: 1 | Status: SHIPPED | OUTPATIENT
Start: 2018-04-10 | End: 2018-09-11

## 2018-04-10 RX ORDER — ESCITALOPRAM OXALATE 10 MG/1
10 TABLET ORAL DAILY
Qty: 90 TABLET | Refills: 1 | Status: SHIPPED | OUTPATIENT
Start: 2018-04-10 | End: 2018-04-26 | Stop reason: SINTOL

## 2018-04-10 RX ORDER — CLONAZEPAM 1 MG/1
0.5-1 TABLET ORAL 2 TIMES DAILY PRN
Qty: 20 TABLET | Refills: 0 | Status: SHIPPED | OUTPATIENT
Start: 2018-04-10 | End: 2018-05-10

## 2018-04-10 RX ORDER — LORAZEPAM 1 MG/1
1 TABLET ORAL DAILY PRN
Qty: 60 TABLET | Refills: 0 | Status: SHIPPED | OUTPATIENT
Start: 2018-04-10 | End: 2018-04-10

## 2018-04-10 RX ORDER — LOPERAMIDE HYDROCHLORIDE 2 MG/1
TABLET ORAL
Qty: 30 TABLET | Refills: 0 | Status: SHIPPED | OUTPATIENT
Start: 2018-04-10 | End: 2021-02-15

## 2018-04-10 RX ORDER — IMIQUIMOD 12.5 MG/.25G
CREAM TOPICAL
Qty: 36 PACKET | Refills: 6 | Status: SHIPPED | OUTPATIENT
Start: 2018-04-10 | End: 2018-09-11

## 2018-04-10 ASSESSMENT — ANXIETY QUESTIONNAIRES
3. WORRYING TOO MUCH ABOUT DIFFERENT THINGS: NEARLY EVERY DAY
GAD7 TOTAL SCORE: 21
2. NOT BEING ABLE TO STOP OR CONTROL WORRYING: NEARLY EVERY DAY
5. BEING SO RESTLESS THAT IT IS HARD TO SIT STILL: NEARLY EVERY DAY
7. FEELING AFRAID AS IF SOMETHING AWFUL MIGHT HAPPEN: NEARLY EVERY DAY
1. FEELING NERVOUS, ANXIOUS, OR ON EDGE: NEARLY EVERY DAY
6. BECOMING EASILY ANNOYED OR IRRITABLE: NEARLY EVERY DAY

## 2018-04-10 ASSESSMENT — PATIENT HEALTH QUESTIONNAIRE - PHQ9: 5. POOR APPETITE OR OVEREATING: NEARLY EVERY DAY

## 2018-04-10 NOTE — NURSING NOTE
Chief Complaint   Patient presents with     Recheck Medication       Initial /90  Pulse 84  Temp 99.3  F (37.4  C) (Oral)  Ht 6' (1.829 m)  Wt 247 lb (112 kg)  SpO2 95%  BMI 33.5 kg/m2 Estimated body mass index is 33.5 kg/(m^2) as calculated from the following:    Height as of this encounter: 6' (1.829 m).    Weight as of this encounter: 247 lb (112 kg).  Medication Reconciliation: complete

## 2018-04-10 NOTE — LETTER
My Depression Action Plan  Name: Jeremi Damon   Date of Birth 1970  Date: 4/2/2018    My doctor: Luis Armando Segovia   My clinic: [address] [id number] [phone]          GREEN    ZONE   Good Control    What it looks like:     Things are going generally well. You have normal up s and down s. You may even feel depressed from time to time, but bad moods usually last less than a day.   What you need to do:  1. Continue to care for yourself (see self care plan)  2. Check your depression survival kit and update it as needed  3. Follow your physician s recommendations including any medication.  4. Do not stop taking medication unless you consult with your physician first.           YELLOW         ZONE Getting Worse    What it looks like:     Depression is starting to interfere with your life.     It may be hard to get out of bed; you may be starting to isolate yourself from others.    Symptoms of depression are starting to last most all day and this has happened for several days.     You may have suicidal thoughts but they are not constant.   What you need to do:     1. Call your care team, your response to treatment will improve if you keep your care team informed of your progress. Yellow periods are signs an adjustment may need to be made.     2. Continue your self-care, even if you have to fake it!    3. Talk to someone in your support network    4. Open up your depression survival kit           RED    ZONE Medical Alert - Get Help    What it looks like:     Depression is seriously interfering with your life.     You may experience these or other symptoms: You can t get out of bed most days, can t work or engage in other necessary activities, you have trouble taking care of basic hygiene, or basic responsibilities, thoughts of suicide or death that will not go away, self-injurious behavior.     What you need to do:  1. Call your care  team and request a same-day appointment. If they are not available (weekends or after hours) call your local crisis line, emergency room or 911.            Depression Self Care Plan / Survival Kit    Self-Care for Depression  Here s the deal. Your body and mind are really not as separate as most people think.  What you do and think affects how you feel and how you feel influences what you do and think. This means if you do things that people who feel good do, it will help you feel better.  Sometimes this is all it takes.  There is also a place for medication and therapy depending on how severe your depression is, so be sure to consult with your medical provider and/ or Behavioral Health Consultant if your symptoms are worsening or not improving.     In order to better manage my stress, I will:    Exercise  Get some form of exercise, every day. This will help reduce pain and release endorphins, the  feel good  chemicals in your brain. This is almost as good as taking antidepressants!  This is not the same as joining a gym and then never going! (they count on that by the way ) It can be as simple as just going for a walk or doing some gardening, anything that will get you moving.      Hygiene   Maintain good hygiene (Get out of bed in the morning, Make your bed, Brush your teeth, Take a shower, and Get dressed like you were going to work, even if you are unemployed).  If your clothes don't fit try to get ones that do.    Diet  I will strive to eat foods that are good for me, drink plenty of water, and avoid excessive sugar, caffeine, alcohol, and other mood-altering substances.  Some foods that are helpful in depression are: complex carbohydrates, B vitamins, flaxseed, fish or fish oil, fresh fruits and vegetables.    Psychotherapy  I agree to participate in Individual Therapy (if recommended).    Medication  If prescribed medications, I agree to take them.  Missing doses can result in serious side effects.  I  understand that drinking alcohol, or other illicit drug use, may cause potential side effects.  I will not stop my medication abruptly without first discussing it with my provider.    Staying Connected With Others  I will stay in touch with my friends, family members, and my primary care provider/team.    Use your imagination  Be creative.  We all have a creative side; it doesn t matter if it s oil painting, sand castles, or mud pies! This will also kick up the endorphins.    Witness Beauty  (AKA stop and smell the roses) Take a look outside, even in mid-winter. Notice colors, textures. Watch the squirrels and birds.     Service to others  Be of service to others.  There is always someone else in need.  By helping others we can  get out of ourselves  and remember the really important things.  This also provides opportunities for practicing all the other parts of the program.    Humor  Laugh and be silly!  Adjust your TV habits for less news and crime-drama and more comedy.    Control your stress  Try breathing deep, massage therapy, biofeedback, and meditation. Find time to relax each day.     My support system    Clinic Contact:  Phone number:    Contact 1:  Phone number:    Contact 2:  Phone number:    [employer]/:  Phone number:    Therapist:  Phone number:    Local crisis center:    Phone number:    Other community support:  Phone number:

## 2018-04-10 NOTE — MR AVS SNAPSHOT
After Visit Summary   4/10/2018    Jeremi Damon    MRN: [id number]           Patient Information     Date Of Birth          1970        Visit Information        Provider Department      4/10/2018 10:40 AM Luis Armando Segovia MD [facility]        Today's Diagnoses     Hypertension goal BP (blood pressure) < 140/90    -  1    Hypertrophic cardiomyopathy (H)        Major Depressive Disorder, Recurrent Episode, Mode        CKD (chronic kidney disease) stage 3, GFR 30-59 ml/min        Panic disorder without agoraphobia        Generalized anxiety disorder        Panic attack        Essential hypertension with goal blood pressure less than 140/90        Flat wart        Loose stools        Hypersomnia with sleep apnea        Screening for prostate cancer           Follow-ups after your visit        Additional Services     SLEEP EVALUATION & MANAGEMENT REFERRAL - [facility]  [phone] (Age 18 and up)       [facility] - Call [phone] or [email or web address]    Please be aware that coverage of these services is subject to the terms and limitations of your health insurance plan.  Call member services at your health plan with any benefit or coverage questions.      Please bring the following to your appointment:    >>   List of current medications   >>   This referral request   >>   Any documents/labs given to you for this referral                  Follow-up notes from your care team     Return in about 6 months (around 10/3/2018) for Med Check.      Future tests that were ordered for you today     Open Future Orders        Priority Expected Expires Ordered    SLEEP EVALUATION & MANAGEMENT REFERRAL - [facility]  [phone] (Age 18 and up) Routine  4/10/2019 4/10/2018            Who to contact     If you have questions or need follow up information about today's clinic visit or your schedule please contact  [facility] directly at [phone].  Normal or non-critical lab and imaging results will be communicated to you by MyChart, letter or phone within 4 business days after the clinic has received the results. If you do not hear from us within 7 days, please contact the clinic through [employer] or phone. If you have a critical or abnormal lab result, we will notify you by phone as soon as possible.  Submit refill requests through [employer] or call your pharmacy and they will forward the refill request to us. Please allow 3 business days for your refill to be completed.          Additional Information About Your Visit        [employer] Information     [employer] gives you secure access to your electronic health record. If you see a primary care provider, you can also send messages to your care team and make appointments. If you have questions, please call your primary care clinic.  If you do not have a primary care provider, please call [phone] and they will assist you.        Care EveryWhere ID     This is your Care EveryWhere ID. This could be used by other organizations to access your [facility] medical records  [id number]        Your Vitals Were     Pulse Temperature Height Pulse Oximetry BMI (Body Mass Index)       84 99.3  F (37.4  C) (Oral) 6' (1.829 m) 95% 33.5 kg/m2        Blood Pressure from Last 3 Encounters:   04/10/18 150/90   11/16/17 144/90   11/02/17 (!) 140/92    Weight from Last 3 Encounters:   04/10/18 247 lb (112 kg)   11/16/17 252 lb (114.3 kg)   11/02/17 250 lb (113.4 kg)              We Performed the Following     Albumin Random Urine Quantitative with Creat Ratio     Comprehensive metabolic panel (BMP + Alb, Alk Phos, ALT, AST, Total. Bili, TP)     PROSTATE SPEC ANTIGEN SCREEN          Today's Medication Changes          These changes are accurate as of 4/10/18 11:30 AM.  If you have any questions, ask your nurse or doctor.               Start taking these medicines.         Dose/Directions    cloNIDine 0.1 MG tablet   Commonly known as:  CATAPRES   Used for:  Hypertension goal BP (blood pressure) < 140/90   Started by:  Luis Armando Segovia MD        Dose:  0.1 mg   Take 1 tablet (0.1 mg) by mouth 2 times daily   Quantity:  180 tablet   Refills:  1       escitalopram 10 MG tablet   Commonly known as:  LEXAPRO   Used for:  Major depressive disorder, recurrent episode, moderate (H), Panic disorder without agoraphobia, Generalized anxiety disorder, Panic attack   Started by:  Luis Armando Segovia MD        Dose:  10 mg   Take 1 tablet (10 mg) by mouth daily   Quantity:  90 tablet   Refills:  1         These medicines have changed or have updated prescriptions.        Dose/Directions    * loperamide 2 MG capsule   Commonly known as:  IMODIUM   This may have changed:  Another medication with the same name was added. Make sure you understand how and when to take each.   Used for:  Loose stools   Changed by:  Luis Armando Segovia MD        TAKE 1 TABLET (2 MG) BY MOUTH 3 TIMES DAILY AS NEEDED FOR DIARRHEA   Quantity:  90 capsule   Refills:  1       * loperamide 2 MG tablet   Commonly known as:  IMODIUM A-D   This may have changed:  You were already taking a medication with the same name, and this prescription was added. Make sure you understand how and when to take each.   Used for:  Loose stools   Changed by:  Luis Armando Segovia MD        Take 2 tabs (4 mg) after first loose stool, and then take one tab (2 mg) after each diarrheal stool.  Max of 8 tabs (16 mg) per day.   Quantity:  30 tablet   Refills:  0       LORazepam 1 MG tablet   Commonly known as:  ATIVAN   This may have changed:  See the new instructions.   Used for:  Panic disorder without agoraphobia, Generalized anxiety disorder   Changed by:  Luis Armando Segovia MD        Dose:  1 mg   Take 1 tablet (1 mg) by mouth daily as needed for anxiety   Quantity:  60 tablet   Refills:  0       metoprolol tartrate 50 MG tablet   Commonly known as:   LOPRESSOR   This may have changed:  how much to take   Used for:  Essential hypertension with goal blood pressure less than 140/90   Changed by:  Luis Armando Segovia MD        Dose:  25-50 mg   Take 0.5-1 tablets (25-50 mg) by mouth 2 times daily   Quantity:  180 tablet   Refills:  1       MULTIVITAMIN TABS   OR   This may have changed:  See the new instructions.        Refills:  0       * Notice:  This list has 2 medication(s) that are the same as other medications prescribed for you. Read the directions carefully, and ask your doctor or other care provider to review them with you.      Stop taking these medicines if you haven't already. Please contact your care team if you have questions.     AMBIEN PO   Stopped by:  Luis Armando Segovia MD           buPROPion 150 MG 24 hr tablet   Commonly known as:  WELLBUTRIN XL   Stopped by:  Luis Armando Segovia MD           hydrochlorothiazide 25 MG tablet   Commonly known as:  HYDRODIURIL   Stopped by:  Luis Armando Segovia MD           oxyCODONE IR 10 MG tablet   Commonly known as:  ROXICODONE   Stopped by:  Luis Armando Segovia MD                Where to get your medicines      These medications were sent to [facility] [place] [id number]     Phone:  [phone]     cloNIDine 0.1 MG tablet    escitalopram 10 MG tablet    imiquimod 5 % cream    loperamide 2 MG tablet    metoprolol tartrate 50 MG tablet         Some of these will need a paper prescription and others can be bought over the counter.  Ask your nurse if you have questions.     Bring a paper prescription for each of these medications     LORazepam 1 MG tablet                Primary Care Provider Office Phone # Fax #    Luis Armando Segovia [phone]       [address] [place], [place] [address]        Equal Access to Services     MATHEW ABEL AH: linda Foreman qaybta kaalmada adeegyada,  radha angeles andre jean'aan ah. So [facility] [phone].    ATENCIÓN: Si habla miriam, tiene [place] disposición servicios gratuitos de asistencia lingüística. Joslyn holt [phone].    We comply with applicable federal civil rights laws and [place] laws. We do not discriminate on the basis of race, color, national origin, age, disability, sex, sexual orientation, or gender identity.            Thank you!     Thank you for choosing [facility]  for your care. Our goal is always to provide you with excellent care. Hearing back from our patients is one way we can continue to improve our services. Please take a few minutes to complete the written survey that you may receive in the mail after your visit with us. Thank you!             Your Updated Medication List - Protect others around you: Learn how to safely use, store and throw away your medicines at www.disposemymeds.org.          This list is accurate as of 4/10/18 11:30 AM.  Always use your most recent med list.                   Brand Name Dispense Instructions for use Diagnosis    amitriptyline HCl 150 MG Tabs     90 tablet    Take 150 mg by mouth At Bedtime    Insomnia, unspecified type       amLODIPine 10 MG tablet    NORVASC    90 tablet    Take 1 tablet (10 mg) by mouth daily    Essential hypertension with goal blood pressure less than 140/90       amphetamine-dextroamphetamine 20 MG per tablet    ADDERALL    90 tablet    Take 1 tablet (20 mg) by mouth 3 times daily    Attention deficit hyperactivity disorder (ADHD), combined type       ASPIRIN NOT PRESCRIBED    INTENTIONAL     by Other route continuous prn.        budesonide-formoterol 160-4.5 MCG/ACT Inhaler    SYMBICORT    3 Inhaler    Inhale 2 puffs into the lungs 2 times daily    Mild persistent asthma without complication       cloNIDine 0.1 MG tablet    CATAPRES    180 tablet    Take 1 tablet (0.1 mg) by mouth 2 times daily    Hypertension goal BP (blood pressure) < 140/90        CYCLOBENZAPRINE HCL PO      Take 10 mg by mouth 3 times daily as needed for muscle spasms        escitalopram 10 MG tablet    LEXAPRO    90 tablet    Take 1 tablet (10 mg) by mouth daily    Major depressive disorder, recurrent episode, moderate (H), Panic disorder without agoraphobia, Generalized anxiety disorder, Panic attack       finasteride 5 MG tablet    PROSCAR    90 tablet    1/2 tab daily    Alopecia       fluconazole 150 MG tablet    DIFLUCAN    10 tablet    Take 1 tablet (150 mg) by mouth once a week for 4 weeks and then monthly    Tinea versicolor       imiquimod 5 % cream    ALDARA    36 packet    Apply  to lesion.   Wash off after 8 hours.   May use for up to 16 weeks.    Flat wart       * loperamide 2 MG capsule    IMODIUM    90 capsule    TAKE 1 TABLET (2 MG) BY MOUTH 3 TIMES DAILY AS NEEDED FOR DIARRHEA    Loose stools       * loperamide 2 MG tablet    IMODIUM A-D    30 tablet    Take 2 tabs (4 mg) after first loose stool, and then take one tab (2 mg) after each diarrheal stool.  Max of 8 tabs (16 mg) per day.    Loose stools       LORazepam 1 MG tablet    ATIVAN    60 tablet    Take 1 tablet (1 mg) by mouth daily as needed for anxiety    Panic disorder without agoraphobia, Generalized anxiety disorder       losartan 100 MG tablet    COZAAR    90 tablet    Take 1 tablet (100 mg) by mouth daily    Essential hypertension with goal blood pressure less than 140/90       metoprolol tartrate 50 MG tablet    LOPRESSOR    180 tablet    Take 0.5-1 tablets (25-50 mg) by mouth 2 times daily    Essential hypertension with goal blood pressure less than 140/90       MULTIVITAMIN TABS   OR           nitroGLYcerin 0.4 MG sublingual tablet    NITROSTAT    25 tablet    Place 1 tablet (0.4 mg) under the tongue every 5 minutes as needed for chest pain If you are still having symptoms after 3 doses (15 minutes) call 911.    Other chest pain       sildenafil 20 MG tablet    REVATIO    40 tablet    Take 2-5 tablets  "( mg) by mouth daily as needed    Erectile dysfunction, unspecified erectile dysfunction type       Syringe Luer Lock 20G X 1-1/2\" 3 ML Misc     30 each    1 Device once a week - and also needs 22 G needles 1.5 inch #30 with 1 refill    Hypogonadism in male       testosterone cypionate 200 MG/ML injection    DEPOTESTOTERONE    10 mL    INJECT 0.5 ML INTRAMUSCULARLY ONCE WEEKLY    Hypogonadism male       traMADol 50 MG tablet    ULTRAM    90 tablet    TAKE 1 TO 2 TABLETS BY MOUTH 2 TIMES DAILY AS NEEDED FOR MODERATE PAIN    Left-sided low back pain with left-sided sciatica, unspecified chronicity       VENTOLIN  (90 BASE) MCG/ACT Inhaler   Generic drug:  albuterol     18 Inhaler    INHALE 2 PUFFS EVERY 4 HOURS AS NEEDED    Intermittent asthma, uncomplicated       Vitamin C 100 MG Tabs     90    1 TABLET 3 TIMES DAILY        * Notice:  This list has 2 medication(s) that are the same as other medications prescribed for you. Read the directions carefully, and ask your doctor or other care provider to review them with you.      "

## 2018-04-10 NOTE — LETTER
My Asthma Action Plan  Name: Jeremi Damon   YOB: 1970  Date: 4/2/2018   My doctor: Luis Armando Segovia MD   My clinic: [facility] [place]        My Control Medicine: ventolin  My Rescue Medicine: none   My Asthma Severity: mild persistent  Avoid your asthma triggers: pt aware triggers               GREEN ZONE   Good Control    I feel good    No cough or wheeze    Can work, sleep and play without asthma symptoms       Take your asthma control medicine every day.     1. If exercise triggers your asthma, take your rescue medication    15 minutes before exercise or sports, and    During exercise if you have asthma symptoms  2. Spacer to use with inhaler: If you have a spacer, make sure to use it with your inhaler             YELLOW ZONE Getting Worse  I have ANY of these:    I do not feel good    Cough or wheeze    Chest feels tight    Wake up at night   1. Keep taking your Green Zone medications  2. Start taking your rescue medicine:    every 20 minutes for up to 1 hour. Then every 4 hours for 24-48 hours.  3. If you stay in the Yellow Zone for more than 12-24 hours, contact your doctor.  4. If you do not return to the Green Zone in 12-24 hours or you get worse, start taking your oral steroid medicine if prescribed by your provider.           RED ZONE Medical Alert - Get Help  I have ANY of these:    I feel awful    Medicine is not helping    Breathing getting harder    Trouble walking or talking    Nose opens wide to breathe       1. Take your rescue medicine NOW  2. If your provider has prescribed an oral steroid medicine, start taking it NOW  3. Call your doctor NOW  4. If you are still in the Red Zone after 20 minutes and you have not reached your doctor:    Take your rescue medicine again and    Call 911 or go to the emergency room right away    See your regular doctor within 2 weeks of an Emergency Room or Urgent Care visit for follow-up treatment.          Annual Reminders:  Meet with  Asthma Educator,  Flu Shot in the Fall, consider Pneumonia Vaccination for patients with asthma (aged 19 and older).    Pharmacy:    [id number] - [place], [place] - [address]  [facility] [place] - [address]  [id number] - [place], [place] - [address] AT [address]  WRITTEN PRESCRIPTION REQUESTED  [id number] - [place], [place] - [address]  [facility] [place], [place] - [address]  [id number] - [place], [place] - [address] [id number] - [place], [place] - [address] [id number] - [place], [place] - [address]                      Asthma Triggers  How To Control Things That Make Your Asthma Worse    Triggers are things that make your asthma worse.  Look at the list below to help you find your triggers and what you can do about them.  You can help prevent asthma flare-ups by staying away from your triggers.      Trigger                                                          What you can do   Cigarette Smoke  Tobacco smoke can make asthma worse. Do not allow smoking in your home, car or around you.  Be sure no one smokes at a child s day care or school.  If you smoke, ask your health care provider for ways to help you quit.  Ask family members to quit too.  Ask your health care provider for a referral to Quit Plan to help you quit smoking, or call 6-660-971-PLAN.     Colds, Flu, Bronchitis  These are common triggers of asthma. Wash your hands often.  Don t touch your eyes, nose or mouth.  Get a flu shot every year.     Dust Mites  These are tiny bugs that live in cloth or carpet. They are too small to see. Wash sheets and blankets in hot water every week.   Encase pillows and mattress in dust mite proof covers.  Avoid having carpet if you can. If you have carpet, vacuum weekly.   Use a  dust mask and HEPA vacuum.   Pollen and Outdoor Mold  Some people are allergic to trees, grass, or weed pollen, or molds. Try to keep your windows closed.  Limit time out doors when pollen count is high.   Ask you health care provider about taking medicine during allergy season.     Animal Dander  Some people are allergic to skin flakes, urine or saliva from pets with fur or feathers. Keep pets with fur or feathers out of your home.    If you can t keep the pet outdoors, then keep the pet out of your bedroom.  Keep the bedroom door closed.  Keep pets off cloth furniture and away from stuffed toys.     Mice, Rats, and Cockroaches  Some people are allergic to the waste from these pests.   Cover food and garbage.  Clean up spills and food crumbs.  Store grease in the refrigerator.   Keep food out of the bedroom.   Indoor Mold  This can be a trigger if your home has high moisture. Fix leaking faucets, pipes, or other sources of water.   Clean moldy surfaces.  Dehumidify basement if it is damp and smelly.   Smoke, Strong Odors, and Sprays  These can reduce air quality. Stay away from strong odors and sprays, such as perfume, powder, hair spray, paints, smoke incense, paint, cleaning products, candles and new carpet.   Exercise or Sports  Some people with asthma have this trigger. Be active!  Ask your doctor about taking medicine before sports or exercise to prevent symptoms.    Warm up for 5-10 minutes before and after sports or exercise.     Other Triggers of Asthma  Cold air:  Cover your nose and mouth with a scarf.  Sometimes laughing or crying can be a trigger.  Some medicines and food can trigger asthma.

## 2018-04-11 ASSESSMENT — PATIENT HEALTH QUESTIONNAIRE - PHQ9: SUM OF ALL RESPONSES TO PHQ QUESTIONS 1-9: 20

## 2018-04-11 ASSESSMENT — ANXIETY QUESTIONNAIRES: GAD7 TOTAL SCORE: 21

## 2018-04-26 DIAGNOSIS — F41.1 GENERALIZED ANXIETY DISORDER: ICD-10-CM

## 2018-04-26 DIAGNOSIS — F33.1 MAJOR DEPRESSIVE DISORDER, RECURRENT EPISODE, MODERATE (H): ICD-10-CM

## 2018-04-26 DIAGNOSIS — F33.8 SEASONAL AFFECTIVE DISORDER (H): ICD-10-CM

## 2018-04-26 DIAGNOSIS — G47.00 INSOMNIA, UNSPECIFIED TYPE: Primary | ICD-10-CM

## 2018-04-26 RX ORDER — BUPROPION HYDROCHLORIDE 300 MG/1
300 TABLET ORAL EVERY MORNING
Qty: 90 TABLET | Refills: 1 | Status: SHIPPED | OUTPATIENT
Start: 2018-04-26 | End: 2018-09-11

## 2018-04-26 RX ORDER — ZOLPIDEM TARTRATE 10 MG/1
10 TABLET ORAL
Qty: 30 TABLET | Refills: 0 | Status: SHIPPED | OUTPATIENT
Start: 2018-04-26 | End: 2018-05-02

## 2018-04-26 NOTE — TELEPHONE ENCOUNTER
"Requested Prescriptions   Pending Prescriptions Disp Refills     buPROPion (WELLBUTRIN XL) 150 MG 24 hr tablet [Pharmacy Med Name: BUPROPION HCL  MG TABLET]  DISCONTINUED 4/10/18.  Last Written Prescription Date:  10/10/17  Last Fill Quantity: 90 TABLET,  # refills: 1   Last office visit: 4/10/2018 with prescribing provider:  STEVEN   Future Office Visit:     90 tablet 1     Sig: TAKE 1 TABLET (150 MG) BY MOUTH EVERY MORNING    SSRIs Protocol Failed    4/26/2018 10:23 AM       Failed - PHQ-9 score less than 5 in past 6 months    Please review last PHQ-9 score.   PHQ-9 SCORE 1/27/2017 11/16/2017 4/10/2018   Total Score - - -   Total Score 26 18 20     NANCY-7 SCORE 1/27/2017 11/16/2017 4/10/2018   Total Score - - -   Total Score 20 16 21                Passed - Medication is Bupropion    If the medication is Bupropion (Wellbutrin), and the patient is taking for smoking cessation; OK to refill.         Passed - Patient is age 18 or older       Passed - Recent (6 mo) or future (30 days) visit within the authorizing provider's specialty    Patient had office visit in the last 6 months or has a visit in the next 30 days with authorizing provider or within the authorizing provider's specialty.  See \"Patient Info\" tab in inbasket, or \"Choose Columns\" in Meds & Orders section of the refill encounter.              "

## 2018-04-26 NOTE — TELEPHONE ENCOUNTER
Looks like patient is on Lexapro, need to verify    Called [phone]    Patient states he was originally on wellbutrin but he either peaked out or got used to the dose. Therefore, patient was switched to Lexapro and remembers why he discontinued this med years ago (increased suicidal thoughts/ideations), therefore patient D/C lexapro. (Med List updated)  Would like to continue wellbutrin (patient unsure of dosage)    Routing refill request to provider for review/approval because:  A break in medication (see above)        Patient also requesting refill of Ambien for sleep  Medication Detail         Disp Refills Start End COLLETTE     zolpidem (AMBIEN) 10 MG tablet (Discontinued) 30 tablet 0 2/7/2017 10/10/2017 No     Sig: Take 1 tablet (10 mg) by mouth nightly as needed for sleep     Problem List Complete:  No     PROVIDER TO CONSIDER COMPLETION OF PROBLEM LIST AND OVERVIEW/CONTROLLED SUBSTANCE AGREEMENT    Last Office Visit with [facility] primary care provider: 04/10/2018    Future Office visit:     Controlled substance agreement on file: No.     Processing:  [facility]acy [id number] - [place], [place] - [address]    Routing refill request to provider for review/approval because:  Drug not on the [facility] refill protocol   A break in medication        Asmita Marinelli RN  [facility]

## 2018-05-01 NOTE — TELEPHONE ENCOUNTER
Unable to find script for Ambien.  Called pharmacy - they did not receive this either.  Please reprint or advise.    Emerald Zuniga

## 2018-05-02 RX ORDER — ZOLPIDEM TARTRATE 10 MG/1
10 TABLET ORAL
Qty: 30 TABLET | Refills: 0 | Status: SHIPPED | OUTPATIENT
Start: 2018-05-02 | End: 2018-06-15

## 2018-05-09 ENCOUNTER — TELEPHONE (OUTPATIENT)
Dept: FAMILY MEDICINE | Facility: CLINIC | Age: 48
End: 2018-05-09

## 2018-05-09 DIAGNOSIS — F41.0 PANIC DISORDER WITHOUT AGORAPHOBIA: ICD-10-CM

## 2018-05-09 DIAGNOSIS — F41.1 GENERALIZED ANXIETY DISORDER: ICD-10-CM

## 2018-05-09 DIAGNOSIS — F41.0 PANIC ATTACK: ICD-10-CM

## 2018-05-09 NOTE — TELEPHONE ENCOUNTER
Reason for Call:  Other prescription    Detailed comments: The patient is calling asking for lorazepam or citalopram. He says he has a flight coming up that he is very nervous about. The pharmacy is in [place], [place]. Phone number: [phone].    Phone Number Patient can be reached at: Cell number on file:    Telephone Information:   Mobile [phone]     Best Time: Anytime    Can we leave a detailed message on this number? YES    Call taken on 5/9/2018 at 1:10 PM by Ashley Panda

## 2018-05-09 NOTE — TELEPHONE ENCOUNTER
Routing to PCP for further review/recommendations/orders. Unable to find specific pharmacy to pend for provider.  Amelia Flores RN  [facility]

## 2018-05-10 RX ORDER — CLONAZEPAM 1 MG/1
0.5-1 TABLET ORAL 2 TIMES DAILY PRN
Qty: 20 TABLET | Refills: 0 | Status: SHIPPED | OUTPATIENT
Start: 2018-05-10 | End: 2018-08-09

## 2018-05-10 NOTE — TELEPHONE ENCOUNTER
Starting citalopram just before a flight is not going to help as it takes 3-6 weeks to start working and also could increase his anxiety initially. Clonazepam prescription for 10 tabs printed to use for his travels - Signed and in NORTH in basket     Contact the patient or call the pharmacy to find out a fax number please.

## 2018-05-11 ENCOUNTER — TELEPHONE (OUTPATIENT)
Dept: FAMILY MEDICINE | Facility: CLINIC | Age: 48
End: 2018-05-11

## 2018-05-11 NOTE — TELEPHONE ENCOUNTER
Reason for Call:  Other prescription    Detailed comments: The patient is calling saying he needs his prescription sent to the [facility] in [place], NOT [place]. The phone number for [facility] is [phone].    Phone Number Patient can be reached at: Cell number on file:    Telephone Information:   Mobile [phone]     Best Time: Anytime    Can we leave a detailed message on this number? YES    Call taken on 5/11/2018 at 10:01 AM by Ashley Panda

## 2018-05-11 NOTE — TELEPHONE ENCOUNTER
Patient said Clonazepam should have been faxed to [facility] in [place], [place]. Phone number: [phone].  Rx faxed to [facility] per patient request.        REJI Bonner, RN, [facility]) [phone]

## 2018-05-11 NOTE — TELEPHONE ENCOUNTER
Called pt advised RX will be faxed.  Faxed rx over.    Gemma Reeves RN    [facility]

## 2018-06-15 DIAGNOSIS — G47.00 INSOMNIA, UNSPECIFIED TYPE: ICD-10-CM

## 2018-06-15 RX ORDER — ZOLPIDEM TARTRATE 10 MG/1
TABLET ORAL
Qty: 30 TABLET | Refills: 0 | Status: SHIPPED | OUTPATIENT
Start: 2018-06-15 | End: 2018-07-23

## 2018-06-15 NOTE — TELEPHONE ENCOUNTER
zolpidem (AMBIEN) 10 MG tablet        Last Written Prescription Date:  5.2.18  Last Fill Quantity: 30,  # refills: 0   Last Office Visit: 4/10/2018   Future Office Visit:         Routing refill request to provider for review/approval because:  Drug not on the [facility], [facility] or [facility] refill protocol or controlled substance

## 2018-07-05 DIAGNOSIS — G47.00 INSOMNIA, UNSPECIFIED TYPE: ICD-10-CM

## 2018-07-06 NOTE — TELEPHONE ENCOUNTER
PHQ-9 SCORE 1/27/2017 11/16/2017 4/10/2018   Total Score - - -   Total Score 26 18 20     NANCY-7 SCORE 1/27/2017 11/16/2017 4/10/2018   Total Score - - -   Total Score 20 16 21       Pt also due for BP check.  Sent mychart to advise.  Amelia Flores RN  [place] [facility]

## 2018-07-06 NOTE — TELEPHONE ENCOUNTER
"Requested Prescriptions   Pending Prescriptions Disp Refills     amitriptyline HCl 150 MG TABS [Pharmacy Med Name: AMITRIPTYLINE  MG TAB] 90 tablet 0        Last Written Prescription Date:  4.3.18  Last Fill Quantity: 90,  # refills: 0   Last Office Visit: 4/10/2018   Future Office Visit:      Sig: TAKE 1 TABLET BY MOUTH AT BEDTIME    Tricyclic Agents ( Annual appt and no PHQ9) Failed    7/5/2018  5:06 PM       Failed - Blood Pressure under 140/90 in past 12 mos    BP Readings from Last 3 Encounters:   04/10/18 150/90   11/16/17 144/90   11/02/17 (!) 140/92                Passed - Recent (12 mo) or future (30 days) visit within authorizing provider's specialty    Patient had office visit in the last 12 months or has a visit in the next 30 days with authorizing provider or within the authorizing provider's specialty.  See \"Patient Info\" tab in inbasket, or \"Choose Columns\" in Meds & Orders section of the refill encounter.           Passed - Patient is age 18 or older          "

## 2018-07-09 DIAGNOSIS — J45.20 INTERMITTENT ASTHMA, UNCOMPLICATED: ICD-10-CM

## 2018-07-09 NOTE — TELEPHONE ENCOUNTER
"Requested Prescriptions   Pending Prescriptions Disp Refills     albuterol (VENTOLIN HFA) 108 (90 Base) MCG/ACT Inhaler 18 Inhaler 1     Sig: Inhale 2 puffs into the lungs every 4 hours as needed    Asthma Maintenance Inhalers - Anticholinergics Passed    7/9/2018  3:35 PM       Passed - Patient is age 12 years or older       Passed - Asthma control assessment score within normal limits in last 6 months    Please review ACT score.          Passed - Recent (6 mo) or future (30 days) visit within the authorizing provider's specialty    Patient had office visit in the last 6 months or has a visit in the next 30 days with authorizing provider or within the authorizing provider's specialty.  See \"Patient Info\" tab in inbasket, or \"Choose Columns\" in Meds & Orders section of the refill encounter.            ACT Total Scores 6/8/2017 10/10/2017 2/22/2018   ACT TOTAL SCORE - - -   ASTHMA ER VISITS - - -   ASTHMA HOSPITALIZATIONS - - -   ACT TOTAL SCORE (Goal Greater than or Equal to 20) 6 18 20   In the past 12 months, how many times did you visit the emergency room for your asthma without being admitted to the hospital? 0 0 0   In the past 12 months, how many times were you hospitalized overnight because of your asthma? 0 0 0       "

## 2018-07-10 RX ORDER — ALBUTEROL SULFATE 90 UG/1
2 AEROSOL, METERED RESPIRATORY (INHALATION) EVERY 4 HOURS PRN
Qty: 3 INHALER | Refills: 0 | Status: SHIPPED | OUTPATIENT
Start: 2018-07-10 | End: 2018-10-04

## 2018-07-10 NOTE — TELEPHONE ENCOUNTER
Prescription approved per [facility] Refill Protocol.  Due for follow up office visit ini 9/2018 per last office note.  Pharmacy advised.    REJI Bonner, RN, N  [facility] [phone]

## 2018-07-13 ENCOUNTER — TELEPHONE (OUTPATIENT)
Dept: NURSING | Facility: CLINIC | Age: 48
End: 2018-07-13

## 2018-07-13 RX ORDER — AMITRIPTYLINE HYDROCHLORIDE 150 MG/1
TABLET ORAL
Qty: 90 TABLET | Refills: 0 | Status: SHIPPED | OUTPATIENT
Start: 2018-07-13 | End: 2018-10-07

## 2018-07-13 ASSESSMENT — ANXIETY QUESTIONNAIRES
5. BEING SO RESTLESS THAT IT IS HARD TO SIT STILL: NEARLY EVERY DAY
1. FEELING NERVOUS, ANXIOUS, OR ON EDGE: NEARLY EVERY DAY
IF YOU CHECKED OFF ANY PROBLEMS ON THIS QUESTIONNAIRE, HOW DIFFICULT HAVE THESE PROBLEMS MADE IT FOR YOU TO DO YOUR WORK, TAKE CARE OF THINGS AT HOME, OR GET ALONG WITH OTHER PEOPLE: VERY DIFFICULT
2. NOT BEING ABLE TO STOP OR CONTROL WORRYING: NEARLY EVERY DAY
7. FEELING AFRAID AS IF SOMETHING AWFUL MIGHT HAPPEN: MORE THAN HALF THE DAYS
GAD7 TOTAL SCORE: 17
3. WORRYING TOO MUCH ABOUT DIFFERENT THINGS: NEARLY EVERY DAY
6. BECOMING EASILY ANNOYED OR IRRITABLE: NOT AT ALL

## 2018-07-13 ASSESSMENT — PATIENT HEALTH QUESTIONNAIRE - PHQ9: 5. POOR APPETITE OR OVEREATING: NEARLY EVERY DAY

## 2018-07-13 NOTE — TELEPHONE ENCOUNTER
07/13/2018 Telephone Encounter:   Emerald Zuniga ClerkSigned 8:24 AM           See previous encounter - patient needs to complete PHQ9 - calls have been made and MyChart sent.       Emerald Zuniga                         Nataly Patrick, RNRegistered NurseSigned 4:21 AM           Caller wants to know that the hold up is with his Amitriptyline refill. Refill of medication continues to say pending? Caller wants care team to call and update him on status. Caller states he is unable to sleep without medication.                    Asmita Marinelli, RN  [facility]

## 2018-07-13 NOTE — TELEPHONE ENCOUNTER
Caller wants to know that the hold up is with his Amitriptyline refill. Refill of medication continues to say pending? Caller wants care team to call and update him on status. Caller states he is unable to sleep without medication.

## 2018-07-13 NOTE — TELEPHONE ENCOUNTER
Patient returning call     PHQ-9 SCORE 11/16/2017 4/10/2018 7/13/2018   Total Score - - -   Total Score 18 20 20     NANCY-7 SCORE 11/16/2017 4/10/2018 7/13/2018   Total Score - - -   Total Score 16 21 17       Patient stated that he will stop into a [facility] or pharmacy within the next week to get his BP rechecked, but he is completely out of medication and needs refill ASAP.     Message handled by Nurse Triage with [facility] - provider name: MD RAIMUNDO - OK to fill #[phone].  Rx sent      Asmita Marinelli RN  [place] [facility]

## 2018-07-13 NOTE — TELEPHONE ENCOUNTER
See previous encounter - patient needs to complete PHQ9 - calls have been made and MyChart sent.      Emerald Zuniga

## 2018-07-14 ASSESSMENT — ANXIETY QUESTIONNAIRES: GAD7 TOTAL SCORE: 17

## 2018-07-14 ASSESSMENT — PATIENT HEALTH QUESTIONNAIRE - PHQ9: SUM OF ALL RESPONSES TO PHQ QUESTIONS 1-9: 20

## 2018-07-23 DIAGNOSIS — G47.00 INSOMNIA, UNSPECIFIED TYPE: ICD-10-CM

## 2018-07-23 NOTE — TELEPHONE ENCOUNTER
Controlled Substance Refill Request for Ambien  Problem List Complete:  Yes    Last Written Prescription Date:  06/15/2018  Last Fill Quantity: 30,   # refills: 0    Last Office Visit with [facility] primary care provider: 04/10/2018    Clinic visit frequency required: none noted     Future Office visit:     Controlled substance agreement on file: No.     Processing:  Fax Rx to [id number] - [place], [place] - [address] pharmacy   checked in past 3 months?  No, route to RN

## 2018-07-24 RX ORDER — ZOLPIDEM TARTRATE 10 MG/1
TABLET ORAL
Qty: 30 TABLET | Refills: 0 | Status: SHIPPED | OUTPATIENT
Start: 2018-07-24 | End: 2018-08-30

## 2018-07-24 NOTE — TELEPHONE ENCOUNTER
Prescription(s) signed and in the [facility].  Please process and notify the patient, if needed.

## 2018-08-09 DIAGNOSIS — F41.0 PANIC DISORDER WITHOUT AGORAPHOBIA: ICD-10-CM

## 2018-08-09 DIAGNOSIS — F41.0 PANIC ATTACK: ICD-10-CM

## 2018-08-09 DIAGNOSIS — F90.2 ATTENTION DEFICIT HYPERACTIVITY DISORDER (ADHD), COMBINED TYPE: ICD-10-CM

## 2018-08-09 DIAGNOSIS — F41.1 GENERALIZED ANXIETY DISORDER: ICD-10-CM

## 2018-08-09 NOTE — TELEPHONE ENCOUNTER
Controlled Substance Refill Request for amphetamine-dextroamphetamine (ADDERALL) 20 MG per tablet  Problem List Complete:  Yes    Last Written Prescription Date:  4.3.18  Last Fill Quantity: 90,   # refills: 0    Last Office Visit with [facility] primary care provider: 4.10.18    Clinic visit frequency required: -     Future Office visit:   Next 5 appointments (look out 90 days)     Aug 16, 2018  2:20 PM CDT   [facility]    [address] [place] [id number] [phone]                  Controlled substance agreement on file: No.     Processing:  Fax Rx to listed pharmacy   checked in past 3 months?  Yes 6.6.17      clonazePAM (KLONOPIN) 1 MG tablet        Last Written Prescription Date:  5.10.18  Last Fill Quantity: 20,  # refills: 0   Last Office Visit: 4/10/2018   Future Office Visit:    Next 5 appointments (look out 90 days)     Aug 16, 2018  2:20 PM CDT   Office Visit with [facility] Arm[facility]   [facility]    [address] [place] [id number] [phone]                    Next 5 appointments (look out 90 days)     Aug 16, 2018  2:20 PM CDT   Office Visit with Luis Armando Segovia MD   [facility]    [address] [place] [id number] [phone]                   Routing refill request to provider for review/approval because:  Drug not on the [facility] or [facility] refill protocol or controlled substance

## 2018-08-09 NOTE — TELEPHONE ENCOUNTER
Reason for Call:  Other prescription    Detailed comments: Pt called this afternoon and would like a refill on his adderall as well as his clonazepam . Pt IS scheduled to see Dr. Segovia on 08/16/2018 at 2:20PM for his med check and BP check. Please refill these if possible before his next appt. Due to the pt being out before his appt. Please give pt a call back in regards to this if there are any questions or concerns. Thank you.    Phone Number Patient can be reached at: Home number on file [phone] (home)    Best Time:     Can we leave a detailed message on this number? YES    Call taken on 8/9/2018 at 3:22 PM by Rosalie Bush

## 2018-08-10 RX ORDER — DEXTROAMPHETAMINE SACCHARATE, AMPHETAMINE ASPARTATE, DEXTROAMPHETAMINE SULFATE AND AMPHETAMINE SULFATE 5; 5; 5; 5 MG/1; MG/1; MG/1; MG/1
20 TABLET ORAL 3 TIMES DAILY
Qty: 90 TABLET | Refills: 0 | Status: SHIPPED | OUTPATIENT
Start: 2018-08-10 | End: 2018-09-11

## 2018-08-10 RX ORDER — CLONAZEPAM 1 MG/1
0.5-1 TABLET ORAL 2 TIMES DAILY PRN
Qty: 20 TABLET | Refills: 0 | Status: SHIPPED | OUTPATIENT
Start: 2018-08-10 | End: 2018-09-11

## 2018-08-10 NOTE — TELEPHONE ENCOUNTER
Prescription(s) signed and in the [facility].  Please process and notify the patient, if needed.

## 2018-08-10 NOTE — TELEPHONE ENCOUNTER
Routing refill request to provider for review/approval because:  Drug not on the [facility] refill protocol   Amelia Flores RN  [place] [facility]

## 2018-08-13 NOTE — TELEPHONE ENCOUNTER
Walk[facility] Rx to [facility] per patient request.    Emerald Zuniga

## 2018-08-30 DIAGNOSIS — G47.00 INSOMNIA, UNSPECIFIED TYPE: ICD-10-CM

## 2018-08-30 RX ORDER — ZOLPIDEM TARTRATE 10 MG/1
TABLET ORAL
Qty: 30 TABLET | Refills: 0 | Status: SHIPPED | OUTPATIENT
Start: 2018-08-30 | End: 2018-10-02

## 2018-08-30 NOTE — TELEPHONE ENCOUNTER
Prescription(s) signed and in the [facility].  Please process and notify the patient, if needed.

## 2018-08-30 NOTE — TELEPHONE ENCOUNTER
Medication Detail         Disp Refills Start End COLLETTE     zolpidem (AMBIEN) 10 MG tablet 30 tablet 0 7/24/2018  No     Sig: TAKE 1 TABLET BY MOUTH AT BEDTIME     Problem List Complete:  No     PROVIDER TO CONSIDER COMPLETION OF PROBLEM LIST AND OVERVIEW/CONTROLLED SUBSTANCE AGREEMENT    Last Office Visit with [facility] primary care provider: 04/10/2018    Future Office visit:   Next 5 appointments (look out 90 days)     Sep 04, 2018 11:20 AM CDT   [facility]    [address] [place] [id number] [phone]                Controlled substance agreement on file: No.     Processing:  [id number] - [place], [place] - [address]    Routing refill request to provider for review/approval because:  Drug not on the [facility] refill protocol         Asmita Marinelli RN  [place] [facility]

## 2018-09-11 ENCOUNTER — OFFICE VISIT (OUTPATIENT)
Dept: FAMILY MEDICINE | Facility: CLINIC | Age: 48
End: 2018-09-11
Payer: MEDICARE

## 2018-09-11 ENCOUNTER — RADIANT APPOINTMENT (OUTPATIENT)
Dept: GENERAL RADIOLOGY | Facility: CLINIC | Age: 48
End: 2018-09-11
Attending: FAMILY MEDICINE
Payer: MEDICARE

## 2018-09-11 ENCOUNTER — TELEPHONE (OUTPATIENT)
Dept: FAMILY MEDICINE | Facility: CLINIC | Age: 48
End: 2018-09-11

## 2018-09-11 VITALS
OXYGEN SATURATION: 99 % | HEIGHT: 72 IN | SYSTOLIC BLOOD PRESSURE: 156 MMHG | WEIGHT: 250 LBS | HEART RATE: 92 BPM | DIASTOLIC BLOOD PRESSURE: 100 MMHG | BODY MASS INDEX: 33.86 KG/M2 | TEMPERATURE: 99 F

## 2018-09-11 DIAGNOSIS — I10 HYPERTENSION GOAL BP (BLOOD PRESSURE) < 140/90: Primary | ICD-10-CM

## 2018-09-11 DIAGNOSIS — M54.9 UPPER BACK PAIN ON RIGHT SIDE: ICD-10-CM

## 2018-09-11 DIAGNOSIS — M54.2 NECK PAIN: ICD-10-CM

## 2018-09-11 DIAGNOSIS — B07.8 FLAT WART: ICD-10-CM

## 2018-09-11 DIAGNOSIS — Z12.5 SCREENING FOR PROSTATE CANCER: ICD-10-CM

## 2018-09-11 DIAGNOSIS — F41.0 PANIC DISORDER WITHOUT AGORAPHOBIA: ICD-10-CM

## 2018-09-11 DIAGNOSIS — F33.1 MAJOR DEPRESSIVE DISORDER, RECURRENT EPISODE, MODERATE (H): ICD-10-CM

## 2018-09-11 DIAGNOSIS — L65.9 ALOPECIA: ICD-10-CM

## 2018-09-11 DIAGNOSIS — F41.0 PANIC ATTACK: ICD-10-CM

## 2018-09-11 DIAGNOSIS — F90.2 ATTENTION DEFICIT HYPERACTIVITY DISORDER (ADHD), COMBINED TYPE: ICD-10-CM

## 2018-09-11 DIAGNOSIS — F33.8 SEASONAL AFFECTIVE DISORDER (H): ICD-10-CM

## 2018-09-11 DIAGNOSIS — F41.1 GENERALIZED ANXIETY DISORDER: ICD-10-CM

## 2018-09-11 DIAGNOSIS — I10 ESSENTIAL HYPERTENSION WITH GOAL BLOOD PRESSURE LESS THAN 140/90: ICD-10-CM

## 2018-09-11 DIAGNOSIS — Z12.11 SCREEN FOR COLON CANCER: ICD-10-CM

## 2018-09-11 DIAGNOSIS — M54.9 BACK PAIN: ICD-10-CM

## 2018-09-11 PROCEDURE — 99214 OFFICE O/P EST MOD 30 MIN: CPT | Performed by: FAMILY MEDICINE

## 2018-09-11 PROCEDURE — 72040 X-RAY EXAM NECK SPINE 2-3 VW: CPT | Mod: FY

## 2018-09-11 PROCEDURE — 72070 X-RAY EXAM THORAC SPINE 2VWS: CPT | Mod: FY

## 2018-09-11 RX ORDER — FINASTERIDE 5 MG/1
TABLET, FILM COATED ORAL
Qty: 90 TABLET | Refills: 1 | Status: SHIPPED | OUTPATIENT
Start: 2018-09-11 | End: 2020-06-05

## 2018-09-11 RX ORDER — METOPROLOL TARTRATE 50 MG
25-50 TABLET ORAL 2 TIMES DAILY
Qty: 180 TABLET | Refills: 1 | Status: SHIPPED | OUTPATIENT
Start: 2018-09-11 | End: 2019-02-21

## 2018-09-11 RX ORDER — OXYCODONE HYDROCHLORIDE 5 MG/1
5 TABLET ORAL 2 TIMES DAILY PRN
Qty: 30 TABLET | Refills: 0 | Status: SHIPPED | OUTPATIENT
Start: 2018-09-11 | End: 2019-02-21

## 2018-09-11 RX ORDER — CYCLOBENZAPRINE HCL 10 MG
10 TABLET ORAL 3 TIMES DAILY PRN
Qty: 20 TABLET | Refills: 0 | COMMUNITY
Start: 2018-09-11 | End: 2019-03-25

## 2018-09-11 RX ORDER — LORAZEPAM 1 MG/1
1 TABLET ORAL DAILY PRN
Qty: 60 TABLET | Refills: 0 | Status: SHIPPED | OUTPATIENT
Start: 2018-09-11 | End: 2018-10-04

## 2018-09-11 RX ORDER — DEXTROAMPHETAMINE SACCHARATE, AMPHETAMINE ASPARTATE, DEXTROAMPHETAMINE SULFATE AND AMPHETAMINE SULFATE 5; 5; 5; 5 MG/1; MG/1; MG/1; MG/1
20 TABLET ORAL 3 TIMES DAILY
Qty: 90 TABLET | Refills: 0 | Status: SHIPPED | OUTPATIENT
Start: 2018-09-11 | End: 2018-09-11

## 2018-09-11 RX ORDER — BUPROPION HYDROCHLORIDE 300 MG/1
300 TABLET ORAL EVERY MORNING
Qty: 90 TABLET | Refills: 1 | Status: SHIPPED | OUTPATIENT
Start: 2018-09-11 | End: 2019-06-13

## 2018-09-11 RX ORDER — CLONAZEPAM 1 MG/1
0.5-1 TABLET ORAL 2 TIMES DAILY PRN
Qty: 20 TABLET | Refills: 0 | Status: SHIPPED | OUTPATIENT
Start: 2018-09-11 | End: 2018-09-11

## 2018-09-11 RX ORDER — IMIQUIMOD 12.5 MG/.25G
CREAM TOPICAL
Qty: 36 PACKET | Refills: 6 | Status: SHIPPED | OUTPATIENT
Start: 2018-09-11 | End: 2019-10-02

## 2018-09-11 RX ORDER — AMLODIPINE BESYLATE 10 MG/1
10 TABLET ORAL DAILY
Qty: 90 TABLET | Refills: 1 | Status: SHIPPED | OUTPATIENT
Start: 2018-09-11 | End: 2019-02-21

## 2018-09-11 RX ORDER — DEXTROAMPHETAMINE SACCHARATE, AMPHETAMINE ASPARTATE, DEXTROAMPHETAMINE SULFATE AND AMPHETAMINE SULFATE 5; 5; 5; 5 MG/1; MG/1; MG/1; MG/1
20 TABLET ORAL 3 TIMES DAILY
Qty: 90 TABLET | Refills: 0 | Status: SHIPPED | OUTPATIENT
Start: 2018-10-11 | End: 2018-09-11

## 2018-09-11 RX ORDER — DEXTROAMPHETAMINE SACCHARATE, AMPHETAMINE ASPARTATE, DEXTROAMPHETAMINE SULFATE AND AMPHETAMINE SULFATE 5; 5; 5; 5 MG/1; MG/1; MG/1; MG/1
20 TABLET ORAL 3 TIMES DAILY
Qty: 90 TABLET | Refills: 0 | Status: SHIPPED | OUTPATIENT
Start: 2018-11-11 | End: 2019-07-17

## 2018-09-11 RX ORDER — LOSARTAN POTASSIUM 100 MG/1
100 TABLET ORAL DAILY
Qty: 90 TABLET | Refills: 1 | Status: SHIPPED | OUTPATIENT
Start: 2018-09-11 | End: 2019-02-21

## 2018-09-11 NOTE — TELEPHONE ENCOUNTER
[facility] pharmacy calling to inquire about the Clonazepam and Lorazepam being prescribed with the Oxycodone.    Discussed with RL and he gave verbal order to hold Clonazepam and to only take the Lorazepam. He called to discuss this with the patient. Pharmacy advised.      Pharmacy also advised that patient filled #90 of the Oxycodone on 8/22/2018 and directions were to take 3 times per day. He should have enough until 9/22/2018.  They are concerned he is abusing this medication.  Insurance will not pay for early fill.      [facility] can be reached at [phone].      Routing to RL to review and advise.    Gemma Hartley, REJI, RN, PHN  [facility]) [phone]

## 2018-09-11 NOTE — PROGRESS NOTES
SUBJECTIVE:                                                      Jeremi Damon is a 48 year old male who presents to clinic today for the following health issues:    Back and shoulder Pain/ Fall    Onset: 4 days ago    Description:   Location: back and right and left shoulder  Character: Sharp    Intensity: severe    Progression of Symptoms: worse    Accompanying Signs & Symptoms:  Other symptoms: radiation of pain to from the neck to both shoulders and down the back    History:   Previous similar pain: no       Precipitating factors:   Trauma or overuse: YES- fell down stairs    Alleviating factors:  Improved by: pt has tried everything and has not helped    Therapies Tried and outcome: everything    Jeremi states in addition to the pain and symptoms listed above, that he has a tingling sensation in his hand and his vision is mildly blurry.     Hypertension  Jeremi presents an elevated BP of 156/100 today in clinic.    Anxiety  Jeremi requests a reversion from his former dosage of clonazepam..       Problem list and histories reviewed & adjusted, as indicated.  Additional history: as documented    ROS:  Constitutional, HEENT, cardiovascular, pulmonary, GI, , musculoskeletal, neuro, skin, endocrine and psych systems are negative, except as otherwise noted.    This document serves as a record of the services and decisions personally performed and made by Luis Armando Segovia MD. It was created on his behalf by Chad Gonzalez, a trained medical scribe. The creation of this document is based the provider's statements to the medical scribe.  Scribe Chad Gonzalez 12:12 PM, September 11, 2018      OBJECTIVE:                                                    BP (!) 156/100  Pulse 92  Temp 99  F (37.2  C) (Oral)  Ht 1.829 m (6')  Wt 113.4 kg (250 lb)  SpO2 99%  BMI 33.91 kg/m2 Body mass index is 33.91 kg/(m^2).   GENERAL: healthy, alert, well nourished, well hydrated, no distress  HENT: ear canals- normal; TMs- normal; Nose- normal;  Mouth- no ulcers, no lesions  NECK: reduced ROm in all directions.  Bilateral paracervical tenderness, no adenopathy, no asymmetry, no masses, no stiffness; thyroid- normal to palpation  RESP: lungs clear to auscultation - no rales, no rhonchi, no wheezes  CV: regular rates and rhythm, normal S1 S2, no S3 or S4 and no murmur, no click or rub -  ABDOMEN: soft, no tenderness, no  hepatosplenomegaly, no masses, normal bowel sounds  NEURO: strength and tone- normal, sensory exam- grossly normal, mentation- intact, speech- normal, reflexes- symmetric  BACK: upper back - right > left trapezius trigger point tenderness. no CVA tenderness, mild bilateral  paralumbar tenderness    MS?: Tender on the right paracervical  NEURO: Mild weakness in finger  test     Diagnostic test results:    Xray cervical - osteophytes at multiple levels. Reverse lordosis - DDD changes  Xray thoracic spine - mild DDD changes     ASSESSMENT/PLAN:                                                      Jeremi was seen today for fall.    Diagnoses and all orders for this visit:    Hypertension goal BP (blood pressure) < 140/90 - Elevated BP in clinic, continue meds as prescribed  - recheck when pain is less intense within the next 1-2 weeks.     Upper back pain on right side - Pt will begin oxycodone for pain as needed, lab results reviewed in clinic  -     XR Thoracic Spine 2 Views; Future  -     oxyCODONE IR (ROXICODONE) 5 MG tablet; Take 1 tablet (5 mg) by mouth 2 times daily as needed    Neck pain - worse with recent fall and extra pain meds to use on top of chronic pains meds for now.  Pt will begin oxycodone for pain as needed, lab results reviewed in clinic  -     XR Cervical Spine 2/3 Views; Future  -     XR Thoracic Spine 2 Views; Future  -     oxyCODONE IR (ROXICODONE) 5 MG tablet; Take 1 tablet (5 mg) by mouth 2 times daily as needed    Flat wart - Controlled, continue cream  -     imiquimod (ALDARA) 5 % cream; Apply  to lesion.   Wash  off after 8 hours.   May use for up to 16 weeks.    Essential hypertension with goal blood pressure less than 140/90 -   -     amLODIPine (NORVASC) 10 MG tablet; Take 1 tablet (10 mg) by mouth daily  -     metoprolol tartrate (LOPRESSOR) 50 MG tablet; Take 0.5-1 tablets (25-50 mg) by mouth 2 times daily  -     losartan (COZAAR) 100 MG tablet; Take 1 tablet (100 mg) by mouth daily      Major Depressive Disorder, Recurrent Episode, Mode / Seasonal affective disorder (H) - Pt will continue medication & monitor mood  -     buPROPion (WELLBUTRIN XL) 300 MG 24 hr tablet; Take 1 tablet (300 mg) by mouth every morning    Generalized anxiety disorder --/Panic disorder without agoraphobia / Panic attack -  continue medications as prescribed    -     LORazepam (ATIVAN) 1 MG tablet; Take 1 tablet (1 mg) by mouth daily as needed for anxiety    Alopecia - Controlled, continue meidcation  -     finasteride (PROSCAR) 5 MG tablet; 1/2 tab daily    Screen for colon cancer    Screening for prostate cancer        Risks, benefits and alternatives of treatments discussed. Plan agreed on.      Followup: Data Unavailable    See patient instructions.     BP Screening:   Last 3 BP Readings:    BP Readings from Last 3 Encounters:   09/11/18 (!) 156/100   04/10/18 150/90   11/16/17 144/90       The following was recommended to the patient:  Re-screen within 4 weeks and recommend lifestyle modifications  BMI:   Estimated body mass index is 33.91 kg/(m^2) as calculated from the following:    Height as of this encounter: 1.829 m (6').    Weight as of this encounter: 113.4 kg (250 lb).   Weight management plan: Discussed healthy diet and exercise guidelines and patient will follow up in 12 months in clinic to re-evaluate.      The information in this document, created by the medical scribe for me, accurately reflects the services I personally performed and the decisions made by me. I have reviewed and approved this document for accuracy prior to  leaving the patient care area.  12:12 PM, 09/11/18        Nate Segovia MD   Pager: [phone]

## 2018-09-11 NOTE — MR AVS SNAPSHOT
After Visit Summary   9/11/2018    Jeremi Damon    MRN: [id number]           Patient Information     Date Of Birth          1970        Visit Information        Provider Department      9/11/2018 11:40 AM Luis Armando Segovia MD [facility]        Today's Diagnoses     Hypertension goal BP (blood pressure) < 140/90    -  1    Upper back pain on right side        Neck pain        Flat wart        Essential hypertension with goal blood pressure less than 140/90        Generalized anxiety disorder        Seasonal affective disorder (H)        Major Depressive Disorder, Recurrent Episode, Mode        Panic disorder without agoraphobia        Panic attack        Alopecia        Screen for colon cancer        Screening for prostate cancer        Attention deficit hyperactivity disorder (ADHD), combined type           Follow-ups after your visit        Who to contact     If you have questions or need follow up information about today's clinic visit or your schedule please contact [facility] directly at [phone].  Normal or non-critical lab and imaging results will be communicated to you by [employer], letter or phone within 4 business days after the clinic has received the results. If you do not hear from us within 7 days, please contact the clinic through [employer] or phone. If you have a critical or abnormal lab result, we will notify you by phone as soon as possible.  Submit refill requests through ticketscript or call your pharmacy and they will forward the refill request to us. Please allow 3 business days for your refill to be completed.          Additional Information About Your Visit        MyChart Information     ticketscript gives you secure access to your electronic health record. If you see a primary care provider, you can also send messages to your care team and make appointments. If you have questions, please call your primary care clinic.  If you do not have a primary  care provider, please call [phone] and they will assist you.        Care EveryWhere ID     This is your Care EveryWhere ID. This could be used by other organizations to access your [facility] medical records  [id number]        Your Vitals Were     Pulse Temperature Height Pulse Oximetry BMI (Body Mass Index)       92 99  F (37.2  C) (Oral) 6' (1.829 m) 99% 33.91 kg/m2        Blood Pressure from Last 3 Encounters:   09/11/18 (!) 156/100   04/10/18 150/90   11/16/17 144/90    Weight from Last 3 Encounters:   09/11/18 250 lb (113.4 kg)   04/10/18 247 lb (112 kg)   11/16/17 252 lb (114.3 kg)                 Today's Medication Changes          These changes are accurate as of 9/11/18  5:33 PM.  If you have any questions, ask your nurse or doctor.               Start taking these medicines.        Dose/Directions    amphetamine-dextroamphetamine 20 MG per tablet   Commonly known as:  ADDERALL   Used for:  Attention deficit hyperactivity disorder (ADHD), combined type   Started by:  Luis Armando Segovia MD        Dose:  20 mg   Start taking on:  11/11/2018   Take 1 tablet (20 mg) by mouth 3 times daily   Quantity:  90 tablet   Refills:  0       LORazepam 1 MG tablet   Commonly known as:  ATIVAN   Used for:  Panic disorder without agoraphobia, Generalized anxiety disorder   Started by:  Luis Armando Segovia MD        Dose:  1 mg   Take 1 tablet (1 mg) by mouth daily as needed for anxiety   Quantity:  60 tablet   Refills:  0       oxyCODONE IR 5 MG tablet   Commonly known as:  ROXICODONE   Used for:  Upper back pain on right side, Neck pain   Started by:  Luis Armando Segovia MD        Dose:  5 mg   Take 1 tablet (5 mg) by mouth 2 times daily as needed   Quantity:  30 tablet   Refills:  0         These medicines have changed or have updated prescriptions.        Dose/Directions    MULTIVITAMIN TABS   OR   This may have changed:  See the new instructions.        Refills:  0         Stop taking these medicines if you haven't  already. Please contact your care team if you have questions.     clonazePAM 1 MG tablet   Commonly known as:  klonoPIN   Stopped by:  Luis Armando Segovia MD           traMADol 50 MG tablet   Commonly known as:  ULTRAM   Stopped by:  Luis Armando Segovia MD                Where to get your medicines      These medications were sent to [facility]/pharmacy #[id number] - [place], [place] - [address]  [address] [address]     Phone:  [phone]     amLODIPine 10 MG tablet    buPROPion 300 MG 24 hr tablet    finasteride 5 MG tablet    imiquimod 5 % cream    losartan 100 MG tablet    metoprolol tartrate 50 MG tablet         Some of these will need a paper prescription and others can be bought over the counter.  Ask your nurse if you have questions.     Bring a paper prescription for each of these medications     amphetamine-dextroamphetamine 20 MG per tablet    LORazepam 1 MG tablet    oxyCODONE IR 5 MG tablet               Information about OPIOIDS     PRESCRIPTION OPIOIDS: WHAT YOU NEED TO KNOW   We gave you an opioid (narcotic) pain medicine. It is important to manage your pain, but opioids are not always the best choice. You should first try all the other options your care team gave you. Take this medicine for as short a time (and as few doses) as possible.    Some activities can increase your pain, such as bandage changes or therapy sessions. It may help to take your pain medicine 30 to 60 minutes before these activities. Reduce your stress by getting enough sleep, working on hobbies you enjoy and practicing relaxation or meditation. Talk to your care team about ways to manage your pain beyond prescription opioids.    These medicines have risks:    DO NOT drive when on new or higher doses of pain medicine. These medicines can affect your alertness and reaction times, and you could be arrested for driving under the influence (DUI). If you need to use opioids long-term, talk to your care team about driving.    DO NOT  operate heavy machinery    DO NOT do any other dangerous activities while taking these medicines.    DO NOT drink any alcohol while taking these medicines.     If the opioid prescribed includes acetaminophen, DO NOT take with any other medicines that contain acetaminophen. Read all labels carefully. Look for the word  acetaminophen  or  Tylenol.  Ask your pharmacist if you have questions or are unsure.    You can get addicted to pain medicines, especially if you have a history of addiction (chemical, alcohol or substance dependence). Talk to your care team about ways to reduce this risk.    All opioids tend to cause constipation. Drink plenty of water and eat foods that have a lot of fiber, such as fruits, vegetables, prune juice, apple juice and high-fiber cereal. Take a laxative (Miralax, milk of magnesia, Colace, Senna) if you don t move your bowels at least every other day. Other side effects include upset stomach, sleepiness, dizziness, throwing up, tolerance (needing more of the medicine to have the same effect), physical dependence and slowed breathing.    Store your pills in a secure place, locked if possible. We will not replace any lost or stolen medicine. If you don t finish your medicine, please throw away (dispose) as directed by your pharmacist. The [place] Pollution [employer] has more information about safe disposal: https://www.pca.[facility].[place].us/living-green/managing-unwanted-medications         Primary Care Provider Office Phone # Fax #    Luis Armando Segovia [phone]       [address] [place] [address]        Equal Access to Services     MATHEW ABEL : Toni lopez Socami, waaxda luqadaha, qaybta kaalmaradha snider. So [facility] [phone].    ATENCIÓN: Si habla español, tiene a lake disposición servicios gratuitos de asistencia lingüística. Llame al [phone].    We comply with applicable federal civil rights  laws and [place] laws. We do not discriminate on the basis of race, color, national origin, age, disability, sex, sexual orientation, or gender identity.            Thank you!     Thank you for choosing [facility]  for your care. Our goal is always to provide you with excellent care. Hearing back from our patients is one way we can continue to improve our services. Please take a few minutes to complete the written survey that you may receive in the mail after your visit with us. Thank you!             Your Updated Medication List - Protect others around you: Learn how to safely use, store and throw away your medicines at www.disposemymeds.org.          This list is accurate as of 9/11/18  5:33 PM.  Always use your most recent med list.                   Brand Name Dispense Instructions for use Diagnosis    albuterol 108 (90 Base) MCG/ACT inhaler    VENTOLIN HFA    3 Inhaler    Inhale 2 puffs into the lungs every 4 hours as needed    Intermittent asthma, uncomplicated       amitriptyline HCl 150 MG Tabs     90 tablet    TAKE 1 TABLET BY MOUTH AT BEDTIME    Insomnia, unspecified type       amLODIPine 10 MG tablet    NORVASC    90 tablet    Take 1 tablet (10 mg) by mouth daily    Essential hypertension with goal blood pressure less than 140/90       amphetamine-dextroamphetamine 20 MG per tablet   Start taking on:  11/11/2018    ADDERALL    90 tablet    Take 1 tablet (20 mg) by mouth 3 times daily    Attention deficit hyperactivity disorder (ADHD), combined type       ASPIRIN NOT PRESCRIBED    INTENTIONAL     by Other route continuous prn.        budesonide-formoterol 160-4.5 MCG/ACT Inhaler    SYMBICORT    3 Inhaler    Inhale 2 puffs into the lungs 2 times daily    Mild persistent asthma without complication       buPROPion 300 MG 24 hr tablet    WELLBUTRIN XL    90 tablet    Take 1 tablet (300 mg) by mouth every morning    Generalized anxiety disorder, Seasonal affective disorder (H), Major  depressive disorder, recurrent episode, moderate (H)       cloNIDine 0.1 MG tablet    CATAPRES    180 tablet    Take 1 tablet (0.1 mg) by mouth 2 times daily    Hypertension goal BP (blood pressure) < 140/90       finasteride 5 MG tablet    PROSCAR    90 tablet    1/2 tab daily    Alopecia       FLEXERIL 10 MG tablet   Generic drug:  cyclobenzaprine     20 tablet    Take 1 tablet (10 mg) by mouth 3 times daily as needed for muscle spasms    Upper back pain on right side, Neck pain       fluconazole 150 MG tablet    DIFLUCAN    10 tablet    Take 1 tablet (150 mg) by mouth once a week for 4 weeks and then monthly    Tinea versicolor       imiquimod 5 % cream    ALDARA    36 packet    Apply  to lesion.   Wash off after 8 hours.   May use for up to 16 weeks.    Flat wart       loperamide 2 MG tablet    IMODIUM A-D    30 tablet    Take 2 tabs (4 mg) after first loose stool, and then take one tab (2 mg) after each diarrheal stool.  Max of 8 tabs (16 mg) per day.    Loose stools       LORazepam 1 MG tablet    ATIVAN    60 tablet    Take 1 tablet (1 mg) by mouth daily as needed for anxiety    Panic disorder without agoraphobia, Generalized anxiety disorder       losartan 100 MG tablet    COZAAR    90 tablet    Take 1 tablet (100 mg) by mouth daily    Essential hypertension with goal blood pressure less than 140/90       metoprolol tartrate 50 MG tablet    LOPRESSOR    180 tablet    Take 0.5-1 tablets (25-50 mg) by mouth 2 times daily    Essential hypertension with goal blood pressure less than 140/90       MULTIVITAMIN TABS   OR           nitroGLYcerin 0.4 MG sublingual tablet    NITROSTAT    25 tablet    Place 1 tablet (0.4 mg) under the tongue every 5 minutes as needed for chest pain If you are still having symptoms after 3 doses (15 minutes) call 911.    Other chest pain       oxyCODONE IR 5 MG tablet    ROXICODONE    30 tablet    Take 1 tablet (5 mg) by mouth 2 times daily as needed    Upper back pain on right side,  "Neck pain       sildenafil 20 MG tablet    REVATIO    40 tablet    Take 2-5 tablets ( mg) by mouth daily as needed    Erectile dysfunction, unspecified erectile dysfunction type       Syringe Luer Lock 20G X 1-1/2\" 3 ML Misc     30 each    1 Device once a week - and also needs 22 G needles 1.5 inch #30 with 1 refill    Hypogonadism in male       testosterone cypionate 200 MG/ML injection    DEPOTESTOTERONE    10 mL    INJECT 0.5 ML INTRAMUSCULARLY ONCE WEEKLY    Hypogonadism male       Vitamin C 100 MG Tabs     90    1 TABLET 3 TIMES DAILY        zolpidem 10 MG tablet    AMBIEN    30 tablet    TAKE 1 TABLET BY MOUTH AT BEDTIME    Insomnia, unspecified type         "

## 2018-09-11 NOTE — TELEPHONE ENCOUNTER
Huddled with RL.  He is aware that the patient filled #90 on 8/22/2018. That was prescribed by a pain clinic.  The patient does still have pills left at home but was concerned he would run out before the end of the month.  Dr. Segovia said that the patient fell down steps and his now having acute pain on top of his chronic pain so was advised to take more medication during this flare.  Dr. Segovia said it is okay to wait and fill until 9/22/2018 if that is what insurance will allow.    [facility] pharmacy advised and noted that they can fill it on 9/20/2018 at the earliest.      Gemma Hartley, BS, RN, N  [facility]) [phone]

## 2018-09-12 ENCOUNTER — TELEPHONE (OUTPATIENT)
Dept: FAMILY MEDICINE | Facility: CLINIC | Age: 48
End: 2018-09-12

## 2018-09-12 DIAGNOSIS — E29.1 HYPOGONADISM MALE: ICD-10-CM

## 2018-09-20 RX ORDER — TESTOSTERONE CYPIONATE 200 MG/ML
100 INJECTION, SOLUTION INTRAMUSCULAR WEEKLY
Qty: 10 ML | Refills: 0 | Status: SHIPPED | OUTPATIENT
Start: 2018-09-20 | End: 2019-02-19

## 2018-09-20 NOTE — TELEPHONE ENCOUNTER
"Reason for Call:  Other prescription    Detailed comments: Jeremi is calling saying he spoke personally with Dr. Segovia last night regarding medications. He is wondering if Dr. Segovia could call him again. He says a lot of doctors prescribed him medications that they shouldn't have and that him and Dr. Segovia are \"left to sift through the ashes.\" He would like to speak to him again if possible.    Phone Number Patient can be reached at: Cell number on file:    Telephone Information:   Mobile [phone]     Best Time: Anytime    Can we leave a detailed message on this number? NO    Call taken on 9/12/2018 at 2:29 PM by Ashley Panda      "
Called and discussed lorazepam  and usual use 1-3 tabs per on average.  For now will see how long the #60 will last.     Also needs testosterone sent to pharmacy. - Signed and in NORTH in basket   
Prescription for testosterone faxed to [facility].      Emerald Zuniga      
Routing to PCP for further review/recommendations/orders.    Asmita Marinelli RN  [facility]    
desaturation, nasal flaring and retractions

## 2018-10-02 DIAGNOSIS — G47.00 INSOMNIA, UNSPECIFIED TYPE: ICD-10-CM

## 2018-10-02 RX ORDER — ZOLPIDEM TARTRATE 10 MG/1
TABLET ORAL
Qty: 30 TABLET | Refills: 0 | Status: SHIPPED | OUTPATIENT
Start: 2018-10-02 | End: 2018-11-03

## 2018-10-02 NOTE — TELEPHONE ENCOUNTER
Routing refill request to provider for review/approval because:  Drug not on the [facility] refill protocol     REJI Bonner, RN, N  [facility] [phone]

## 2018-10-02 NOTE — TELEPHONE ENCOUNTER
Prescription(s) signed and in the [facility].  Please process and notify the patient, if needed.

## 2018-10-02 NOTE — TELEPHONE ENCOUNTER
zolpidem (AMBIEN) 10 MG tablet        Last Written Prescription Date:  8.30.18  Last Fill Quantity: 30,   # refills: 0  Last Office Visit: 9.11.18  Future Office visit:       Routing refill request to provider for review/approval because:  Drug not on the [facility], [facility] or [facility] refill protocol or controlled substance

## 2018-10-04 ENCOUNTER — MYC REFILL (OUTPATIENT)
Dept: FAMILY MEDICINE | Facility: CLINIC | Age: 48
End: 2018-10-04

## 2018-10-04 ENCOUNTER — TELEPHONE (OUTPATIENT)
Dept: FAMILY MEDICINE | Facility: CLINIC | Age: 48
End: 2018-10-04

## 2018-10-04 DIAGNOSIS — F41.1 GENERALIZED ANXIETY DISORDER: ICD-10-CM

## 2018-10-04 DIAGNOSIS — N52.9 ERECTILE DYSFUNCTION, UNSPECIFIED ERECTILE DYSFUNCTION TYPE: ICD-10-CM

## 2018-10-04 DIAGNOSIS — J45.20 INTERMITTENT ASTHMA, UNCOMPLICATED: ICD-10-CM

## 2018-10-04 DIAGNOSIS — F41.0 PANIC DISORDER WITHOUT AGORAPHOBIA: ICD-10-CM

## 2018-10-04 NOTE — TELEPHONE ENCOUNTER
Message from Tanyas Jewelryt:  Original authorizing provider: Luis Armando Segovia MD    Jeremi Damon would like a refill of the following medications:  sildenafil (REVATIO) 20 MG tablet [Luis Armando Segovia MD]  albuterol (VENTOLIN HFA) 108 (90 Base) MCG/ACT Inhaler [Luis Armando Segovia MD]  LORazepam (ATIVAN) 1 MG tablet [Luis Armando Segovia MD]    Preferred pharmacy: [facility]/PHARMACY #[id number] [facility] [address]    Comment:  Regarding Lorazepam, I was to contact Dr. Segovia to allow for either a stronger dose >1mg. or (2) 1mg. twice daily as needed. I believe this will need to be discussed with my Pharmacy because my current prescription was written for (1) 1mg. / day PRN. As I have been using the current prescription (2) 1mg./ day, I will run out in about ten days or so. Please let me know if I have to come in to talk with him to discuss this again. Thanks CHANI

## 2018-10-04 NOTE — TELEPHONE ENCOUNTER
Pt is due now to update PHQ9.  Nusirt message sent to pt. Follow up end date 12/10/18.   PHQ-9 SCORE 11/16/2017 4/10/2018 7/13/2018   Total Score - - -   Total Score 18 20 20     Rolan ROCK CMA

## 2018-10-05 RX ORDER — ALBUTEROL SULFATE 90 UG/1
2 AEROSOL, METERED RESPIRATORY (INHALATION) EVERY 4 HOURS PRN
Qty: 3 INHALER | Refills: 1 | Status: SHIPPED | OUTPATIENT
Start: 2018-10-05 | End: 2018-11-21

## 2018-10-05 NOTE — TELEPHONE ENCOUNTER
"Requested Prescriptions   Pending Prescriptions Disp Refills     sildenafil (REVATIO) 20 MG tablet 40 tablet 11     Sig: Take 2-5 tablets ( mg) by mouth daily as needed    Last Refill:   Medication Detail         Disp Refills Start End COLLETTE     sildenafil (REVATIO) 20 MG tablet 40 tablet 11 11/16/2017  --     Sig - Route: Take 2-5 tablets ( mg) by mouth daily as needed - Oral     Erectile Dysfuction Protocol Failed    10/4/2018  3:10 PM       Failed - Absence of nitrates on medication list       Failed - Absence of Alpha Blockers on Med list       Passed - Recent (12 mo) or future (30 days) visit within the authorizing provider's specialty    Patient had office visit in the last 12 months or has a visit in the next 30 days with authorizing provider or within the authorizing provider's specialty.  See \"Patient Info\" tab in inbasket, or \"Choose Columns\" in Meds & Orders section of the refill encounter.      LOV: 09/11/2018         Passed - Patient is age 18 or older     Routing refill request to provider for review/approval because:  Absence of nitrates on med list (Nitrostat)         albuterol (VENTOLIN HFA) 108 (90 Base) MCG/ACT inhaler 3 Inhaler 0     Sig: Inhale 2 puffs into the lungs every 4 hours as needed    Last Refill:   Medication Detail         Disp Refills Start End COLLETTE     albuterol (VENTOLIN HFA) 108 (90 Base) MCG/ACT Inhaler 3 Inhaler 0 7/10/2018  No     Sig - Route: Inhale 2 puffs into the lungs every 4 hours as needed - Inhalation     Asthma Maintenance Inhalers - Anticholinergics Failed    10/4/2018  3:10 PM       Failed - Asthma control assessment score within normal limits in last 6 months    Please review ACT score.   ACT Total Scores 6/8/2017 10/10/2017 2/22/2018   ACT TOTAL SCORE - - -   ASTHMA ER VISITS - - -   ASTHMA HOSPITALIZATIONS - - -   ACT TOTAL SCORE (Goal Greater than or Equal to 20) 6 18 20   In the past 12 months, how many times did you visit the emergency room for your " "asthma without being admitted to the hospital? 0 0 0   In the past 12 months, how many times were you hospitalized overnight because of your asthma? 0 0 0          Passed - Patient is age 12 years or older       Passed - Recent (6 mo) or future (30 days) visit within the authorizing provider's specialty    Patient had office visit in the last 6 months or has a visit in the next 30 days with authorizing provider or within the authorizing provider's specialty.  See \"Patient Info\" tab in inbasket, or \"Choose Columns\" in Meds & Orders section of the refill encounter.      LOV: 09/11/2018       Refilled per RN Protocol.       Medication Detail         Disp Refills Start End COLLETTE     LORazepam (ATIVAN) 1 MG tablet 60 tablet 0 9/11/2018  No     Sig - Route: Take 1 tablet (1 mg) by mouth daily as needed for anxiety - Oral     Problem List Complete:  No     PROVIDER TO CONSIDER COMPLETION OF PROBLEM LIST AND OVERVIEW/CONTROLLED SUBSTANCE AGREEMENT    Last Office Visit with [facility] primary care provider: 09/11/2018    Future Office visit:     Controlled substance agreement on file: No.     Processing:  [facility]/PHARMACY #[id number] - [place], [place] - [address]    Routing refill request to provider for review/approval because:  Drug not on the [facility] refill protocol   Please see [employer] Message regarding dosage change        Asmita Marinelli RN  [facility]    "

## 2018-10-05 NOTE — TELEPHONE ENCOUNTER
Pt due for new ACT. Sending by mail.    Amelia Flores RN  [facility]

## 2018-10-07 DIAGNOSIS — G47.00 INSOMNIA, UNSPECIFIED TYPE: ICD-10-CM

## 2018-10-08 RX ORDER — SILDENAFIL CITRATE 20 MG/1
40-100 TABLET ORAL DAILY PRN
Qty: 40 TABLET | Refills: 11 | Status: SHIPPED | OUTPATIENT
Start: 2018-10-08 | End: 2019-11-15

## 2018-10-08 RX ORDER — LORAZEPAM 1 MG/1
1 TABLET ORAL DAILY PRN
Qty: 60 TABLET | Refills: 0 | Status: SHIPPED | OUTPATIENT
Start: 2018-10-11 | End: 2018-10-15

## 2018-10-08 NOTE — TELEPHONE ENCOUNTER
"Requested Prescriptions   Pending Prescriptions Disp Refills     amitriptyline HCl 150 MG TABS [Pharmacy Med Name: AMITRIPTYLINE  MG TAB] 90 tablet 0      Last Written Prescription Date:  7.13.18  Last Fill Quantity: 90,  # refills: 0   Last office visit: 9/11/2018 with prescribing provider:     Future Office Visit:     Sig: TAKE 1 TABLET BY MOUTH AT BEDTIME    Tricyclic Agents ( Annual appt and no PHQ9) Failed    10/7/2018 10:18 PM       Failed - Blood Pressure under 140/90 in past 12 mos    BP Readings from Last 3 Encounters:   09/11/18 (!) 156/100   04/10/18 150/90   11/16/17 144/90                Passed - Recent (12 mo) or future (30 days) visit within authorizing provider's specialty    Patient had office visit in the last 12 months or has a visit in the next 30 days with authorizing provider or within the authorizing provider's specialty.  See \"Patient Info\" tab in inbasket, or \"Choose Columns\" in Meds & Orders section of the refill encounter.           Passed - Patient is age 18 or older          "

## 2018-10-08 NOTE — TELEPHONE ENCOUNTER
Routing refill request to provider for review/approval because:  BP is not at goal.    REJI Bonner, RN, N  [facility]) [phone]

## 2018-10-08 NOTE — TELEPHONE ENCOUNTER
Postdated prescription to 10/11/18 for the lorazepam and the other medication was sent.  Signed and in NORTH in basket.

## 2018-10-09 RX ORDER — AMITRIPTYLINE HYDROCHLORIDE 150 MG/1
TABLET ORAL
Qty: 90 TABLET | Refills: 0 | Status: SHIPPED | OUTPATIENT
Start: 2018-10-09 | End: 2019-01-06

## 2018-10-10 RX ORDER — ALBUTEROL SULFATE 90 UG/1
AEROSOL, METERED RESPIRATORY (INHALATION)
Qty: 54 INHALER | Refills: 0 | Status: SHIPPED | OUTPATIENT
Start: 2018-10-10 | End: 2018-11-06

## 2018-10-10 NOTE — TELEPHONE ENCOUNTER
ACT Total Scores 6/8/2017 10/10/2017 2/22/2018   ACT TOTAL SCORE - - -   ASTHMA ER VISITS - - -   ASTHMA HOSPITALIZATIONS - - -   ACT TOTAL SCORE (Goal Greater than or Equal to 20) 6 18 20   In the past 12 months, how many times did you visit the emergency room for your asthma without being admitted to the hospital? 0 0 0   In the past 12 months, how many times were you hospitalized overnight because of your asthma? 0 0 0     Temporary supply sent while awaiting ACT    Asmita Marinelli RN  [facility]

## 2018-10-11 ENCOUNTER — TELEPHONE (OUTPATIENT)
Dept: FAMILY MEDICINE | Facility: CLINIC | Age: 48
End: 2018-10-11

## 2018-10-11 DIAGNOSIS — F41.1 GENERALIZED ANXIETY DISORDER: ICD-10-CM

## 2018-10-11 DIAGNOSIS — F41.0 PANIC DISORDER WITHOUT AGORAPHOBIA: ICD-10-CM

## 2018-10-11 NOTE — TELEPHONE ENCOUNTER
Reason for Call:  Jeremi tried to  his Lorazepam Rx today. [facility] told him Dr Segovia will need to clarify the instructions. Rx is for 60 tabs, but instructions only say 1 tab per day.    Jeremi said the Rx should be for 2 tabs a day.    Please clarify with pharmacy.    Best phone number to reach pt at is: [phone]  Ok to leave a message with medical info? yes    Pharmacy preferred (if calling for a refill): [facility]    Lulú Quick  Patient Representative

## 2018-10-12 NOTE — TELEPHONE ENCOUNTER
Routing to RL to review and advise.  Patient states Lorazepam should be 2 per day.   Order is written for 1 per day.    REJI Bonner, RN, N  [facility]) [phone]

## 2018-10-13 ENCOUNTER — TELEPHONE (OUTPATIENT)
Dept: FAMILY MEDICINE | Facility: CLINIC | Age: 48
End: 2018-10-13

## 2018-10-13 NOTE — TELEPHONE ENCOUNTER
WARREN Rodriguez is requesting a new RX for Ativan as the patient has told them he is taking 2 tabs a day not 1. If this is correct could you update the RX.

## 2018-10-15 RX ORDER — LORAZEPAM 1 MG/1
1 TABLET ORAL 2 TIMES DAILY PRN
Qty: 60 TABLET | Refills: 0 | Status: SHIPPED | OUTPATIENT
Start: 2018-10-15 | End: 2018-11-20

## 2018-10-15 NOTE — TELEPHONE ENCOUNTER
Duplicate.    See 10/11/2018 telephone call.        Gemma Hartley, REJI, RN, N  [facility]) [phone]

## 2018-10-15 NOTE — TELEPHONE ENCOUNTER
Faxed to [facility] and checked IQ- and it went through.  I left message that it was faxed.    Evelin Gudino RN- Triage [employer]

## 2018-10-15 NOTE — TELEPHONE ENCOUNTER
Pharmacy calling to check on status of this message.    Please advise.    Gemma Hartley, REJI, RN, PHN  [facility] [phone]

## 2018-10-29 NOTE — TELEPHONE ENCOUNTER
Cellulitis of the Eye in Children: Care Instructions  Your Care Instructions    Cellulitis of the eye is an infection of the skin and tissues around the eye. It is also called preseptal cellulitis or periorbital cellulitis. This type of infection is often caused by bacteria. It may happen after a sinus infection, a dental infection, an insect bite, or an injury to the face. This eye problem can be very serious, depending on what part of the eye it affects. But it often goes away without lasting problems if it is treated right away. Medicine and home treatment will help your child get better. Follow-up care is a key part of your child's treatment and safety. Be sure to make and go to all appointments, and call your doctor if your child is having problems. It's also a good idea to know your child's test results and keep a list of the medicines your child takes. How can you care for your child at home? · Give your child antibiotics as directed. Do not stop using them just because your child is feeling better. Your child needs to take the full course of antibiotics. · Your child should not wear contact lenses unless the doctor says it is okay. · Prop up your child's head on pillows. Put a cool, damp cloth on the eye. This helps reduce swelling and relieve pain. The doctor may suggest using a warm pack to help heal the infection. · Keep the skin around your child's eye clean and dry. · Be safe with medicines. Read and follow all instructions on the label. ? If the doctor gave your child a prescription medicine for pain, give it as prescribed. ? If your child is not taking a prescription pain medicine, ask your doctor if your child can take an over-the-counter medicine. To prevent cellulitis  · Make sure that your child washes his or her hands well after using the bathroom and before and after eating. · Do not let your child rub or pick at the skin around the eyes.  Cellulitis occurs most often where there Prescription(s) signed and in the [facility].  Please process and notify the patient, if needed.   is a break in the skin. · Call the doctor if your child has a sinus infection with redness or swelling of the eyes. · If your child gets a cut, pimple, or insect bite near the eye, clean the wound as soon as you can. This can help prevent an infection. ? Wash the area with cool water and a mild soap, such as [place]. ? Do not use rubbing alcohol, hydrogen peroxide, iodine, or Mercurochrome. These can harm the tissues and slow healing. When should you call for help? Call your doctor now or seek immediate medical care if:    · Your child has signs of an eye infection, such as:  ? Pus or thick discharge coming from the eye.  ? Redness or swelling around the eye.  ? A fever.     · Your child's eye seems to be bulging out.     · Your child seems to be getting sicker.     · Your child has vision changes.    Watch closely for changes in your child's health, and be sure to contact your doctor if:    · Your child does not get better as expected. Where can you learn more? Go to http://nadine-kimber.info/. Enter P514 in the search box to learn more about \"Cellulitis of the Eye in Children: Care Instructions. \"  Current as of: December 3, 2017  Content Version: 11.8  © 8769-5938 [employer]. Care instructions adapted under license by [employer] (which disclaims liability or warranty for this information). If you have questions about a medical condition or this instruction, always ask your healthcare professional. [place] [address] any warranty or liability for your use of this information. Pinkeye From Bacteria in Children: Care Instructions  Your Care Instructions    Larina Sis is a problem that many children get. In pinkeye, the lining of the eyelid and the eye surface become red and swollen. The lining is called the conjunctiva (say \"wkex-ioyv-UQ-vuh\"). Pinkeye is also called conjunctivitis (say \"cne-ALKX-dqt-VY-tus\").   Pinkeye can be caused by bacteria, a virus, or an allergy. Your child's pinkeye is caused by bacteria. This type of pinkeye can spread quickly from person to person, usually from touching. Pinkeye from bacteria usually clears up 2 to 3 days after your child starts treatment with antibiotic eyedrops or ointment. Follow-up care is a key part of your child's treatment and safety. Be sure to make and go to all appointments, and call your doctor if your child is having problems. It's also a good idea to know your child's test results and keep a list of the medicines your child takes. How can you care for your child at home? Use antibiotics as directed  If the doctor gave your child antibiotic medicine, such as an ointment or eyedrops, use it as directed. Do not stop using it just because your child's eyes start to look better. Your child needs to take the full course of antibiotics. Keep the bottle tip clean. To put in eyedrops or ointment:  · Tilt your child's head back and pull his or her lower eyelid down with one finger. · Drop or squirt the medicine inside the lower lid. · Have your child close the eye for 30 to 60 seconds to let the drops or ointment move around. · Do not touch the tip of the bottle or tube to your child's eye, eyelid, eyelashes, or any other surface. Make your child comfortable  · Use moist cotton or a clean, wet cloth to remove the crust from your child's eyes. Wipe from the inside corner of the eye to the outside. Use a clean part of the cloth for each wipe. · Put cold or warm wet cloths on your child's eyes a few times a day if the eyes hurt or are itching. · Do not have your child wear contact lenses until the pinkeye is gone. Clean the contacts and storage case. · If your child wears disposable contacts, get out a new pair when the eyes have cleared and it is safe to wear contacts again. Prevent pinkeye from spreading  · Wash your hands and your child's hands often.  Always wash them before and after you treat pinkeye or touch your child's eyes or face. · Do not have your child share towels, pillows, or washcloths while he or she has pinkeye. Use clean linens, towels, and washcloths each day. · Do not share contact lens equipment, containers, or solutions. · Do not share eye medicine. When should you call for help? Call your doctor now or seek immediate medical care if:    · Your child has pain in an eye, not just irritation on the surface.     · Your child has a change in vision or a loss of vision.     · Your child's eye gets worse or is not better within 48 hours after he or she started antibiotics.    Watch closely for changes in your child's health, and be sure to contact your doctor if your child has any problems. Where can you learn more? Go to http://nadine-kimber.info/. Enter A279 in the search box to learn more about \"Pinkeye From Bacteria in Children: Care Instructions. \"  Current as of: November 20, 2017  Content Version: 11.8  © 0151-8093 [employer]. Care instructions adapted under license by [employer] (which disclaims liability or warranty for this information). If you have questions about a medical condition or this instruction, always ask your healthcare professional. [address] any warranty or liability for your use of this information.     Call or schedule office visit if eye drainage has not resolved after 48 hours

## 2018-11-03 DIAGNOSIS — G47.00 INSOMNIA, UNSPECIFIED TYPE: ICD-10-CM

## 2018-11-05 DIAGNOSIS — G47.00 INSOMNIA, UNSPECIFIED TYPE: ICD-10-CM

## 2018-11-05 RX ORDER — ZOLPIDEM TARTRATE 10 MG/1
TABLET ORAL
Qty: 30 TABLET | Refills: 0 | Status: SHIPPED | OUTPATIENT
Start: 2018-11-05 | End: 2018-12-07

## 2018-11-05 NOTE — TELEPHONE ENCOUNTER
Prescription(s) signed and in the [facility].  Please process and notify the patient, if needed.

## 2018-11-05 NOTE — TELEPHONE ENCOUNTER
Routing refill request to provider for review/approval because:  Drug not on the [facility] refill protocol     Kari Mcleod RN, BSN  [place] [facility]

## 2018-11-05 NOTE — TELEPHONE ENCOUNTER
zolpidem (AMBIEN) 10 MG tablet        Last Written Prescription Date:  10.2.18  Last Fill Quantity: 30,  # refills: 0   Last Office Visit: 9/11/2018   Future Office Visit:       Future Office visit:       Routing refill request to provider for review/approval because:  Drug not on the [facility], [facility] or [facility] refill protocol or controlled substance

## 2018-11-06 DIAGNOSIS — J45.20 INTERMITTENT ASTHMA, UNCOMPLICATED: ICD-10-CM

## 2018-11-06 NOTE — TELEPHONE ENCOUNTER
"Requested Prescriptions   Pending Prescriptions Disp Refills     albuterol (PROAIR HFA/PROVENTIL HFA/VENTOLIN HFA) 108 (90 Base) MCG/ACT inhaler [Pharmacy Med Name: VENTOLIN HFA 90 MCG INHALER] 18 Inhaler 0        Last Written Prescription Date:  10.10.18  Last Fill Quantity: 54 inhaler,  # refills: 0   Last Office Visit: 9/11/2018   Future Office Visit:      Sig: INHALE 2 PUFFS INTO THE LUNGS EVERY 4 HOURS AS NEEDED FOR SHORTNESS OF BREATH / DYSPNEA    Asthma Maintenance Inhalers - Anticholinergics Failed    11/6/2018  8:29 AM       Failed - Asthma control assessment score within normal limits in last 6 months    Please review ACT score.          Passed - Patient is age 12 years or older       Passed - Recent (6 mo) or future (30 days) visit within the authorizing provider's specialty    Patient had office visit in the last 6 months or has a visit in the next 30 days with authorizing provider or within the authorizing provider's specialty.  See \"Patient Info\" tab in inbasket, or \"Choose Columns\" in Meds & Orders section of the refill encounter.              "

## 2018-11-06 NOTE — LETTER
[facility]  [address] [place] [address], [place] [address]                                                                                                       [phone]    November 8, 2018    Jeremi Damon  [address]   [facility] [address]      To Whom it May Concern:    Thank you for your refill request for your    albuterol (PROAIR HFA/PROVENTIL HFA/VENTOLIN HFA) 108 (90 Base) MCG/ACT inhaler         After reviewing your chart, you are due for an updated ACT (Asthma Control Test), which is a questionnaire regarding the level of control of your asthma.     Please fill it out and send it back to me.     Thank you for your time.            Sincerely,          Nate Segovia M.D./KIM, RN

## 2018-11-07 RX ORDER — ZOLPIDEM TARTRATE 10 MG/1
TABLET ORAL
Qty: 30 TABLET | Refills: 0 | OUTPATIENT
Start: 2018-11-07

## 2018-11-07 NOTE — TELEPHONE ENCOUNTER
Duplicate- sent 11/5/18- info sent to pharmacy.  Bernarda Greer RN  [facility]  [phone]    Last Written Prescription Date:  11/5/18  Last Fill Quantity: 30,  # refills: 0   Last office visit: 9/11/2018 with prescribing provider:     Future Office Visit:    Requested Prescriptions   Pending Prescriptions Disp Refills     zolpidem (AMBIEN) 10 MG tablet [Pharmacy Med Name: ZOLPIDEM TARTRATE 10 MG TABLET] 30 tablet 0     Sig: TAKE 1 TABLET BY MOUTH AT BEDTIME    There is no refill protocol information for this order      '

## 2018-11-07 NOTE — TELEPHONE ENCOUNTER
Routing refill request to provider for review/approval because:  Needs ACT    ACT Total Scores 6/8/2017 10/10/2017 2/22/2018   ACT TOTAL SCORE - - -   ASTHMA ER VISITS - - -   ASTHMA HOSPITALIZATIONS - - -   ACT TOTAL SCORE (Goal Greater than or Equal to 20) 6 18 20   In the past 12 months, how many times did you visit the emergency room for your asthma without being admitted to the hospital? 0 0 0   In the past 12 months, how many times were you hospitalized overnight because of your asthma? 0 0 0

## 2018-11-08 RX ORDER — ALBUTEROL SULFATE 90 UG/1
AEROSOL, METERED RESPIRATORY (INHALATION)
Qty: 18 INHALER | Refills: 1 | Status: SHIPPED | OUTPATIENT
Start: 2018-11-08 | End: 2019-10-02

## 2018-11-08 NOTE — TELEPHONE ENCOUNTER
ACT sent with self-addressed, stamped, return envelope    Asmita Marinelli RN  [place] [facility]

## 2018-11-20 DIAGNOSIS — F41.1 GENERALIZED ANXIETY DISORDER: ICD-10-CM

## 2018-11-20 DIAGNOSIS — F41.0 PANIC DISORDER WITHOUT AGORAPHOBIA: ICD-10-CM

## 2018-11-20 RX ORDER — LORAZEPAM 1 MG/1
TABLET ORAL
Qty: 60 TABLET | Refills: 0 | Status: SHIPPED | OUTPATIENT
Start: 2018-11-20 | End: 2018-12-25

## 2018-11-20 NOTE — TELEPHONE ENCOUNTER
LORAZEPAM 1 MG TABLET  Last Written Prescription Date:  10/15/2018  Last Fill Quantity: 60,   # refills: 0  Last Office Visit: 9/11/2018  Future Office visit:       Routing refill request to provider for review/approval because:  Drug not on the [facility], [facility] or [facility] refill protocol or controlled substance      Requested Prescriptions   Pending Prescriptions Disp Refills     LORazepam (ATIVAN) 1 MG tablet [Pharmacy Med Name: LORAZEPAM 1 MG TABLET] 60 tablet 0     Sig: TAKE 1 TABLET BY MOUTH TWICE A DAY AS NEEDED FOR ANXIETY    There is no refill protocol information for this order

## 2018-11-21 DIAGNOSIS — J45.20 INTERMITTENT ASTHMA, UNCOMPLICATED: ICD-10-CM

## 2018-11-21 DIAGNOSIS — F41.1 GENERALIZED ANXIETY DISORDER: ICD-10-CM

## 2018-11-21 DIAGNOSIS — F41.0 PANIC DISORDER WITHOUT AGORAPHOBIA: ICD-10-CM

## 2018-11-21 RX ORDER — LORAZEPAM 1 MG/1
TABLET ORAL
Qty: 60 TABLET | Refills: 0 | Status: CANCELLED | OUTPATIENT
Start: 2018-11-21

## 2018-11-21 NOTE — TELEPHONE ENCOUNTER
Reason for Call:  Other prescription    Detailed comments: Pt called this afternoon and would like a refill on his ativan and his ventolin inhaler due to him being out and the holiday coming up. Please refill these both as a standing order so it will go through quickly thank you.    Phone Number Patient can be reached at: Home number on file [phone] (home)    Best Time:     Can we leave a detailed message on this number? YES    Call taken on 11/21/2018 at 1:45 PM by Rosalie Bush

## 2018-11-21 NOTE — TELEPHONE ENCOUNTER
"Requested Prescriptions   Pending Prescriptions Disp Refills     albuterol (VENTOLIN HFA) 108 (90 Base) MCG/ACT inhaler 3 Inhaler 1        Last Written Prescription Date:  11.8.18  Last Fill Quantity: 18 inhaler,  # refills: 1   Last Office Visit: 9/11/2018   Future Office Visit:       ACT Total Scores 6/8/2017 10/10/2017 2/22/2018   ACT TOTAL SCORE - - -   ASTHMA ER VISITS - - -   ASTHMA HOSPITALIZATIONS - - -   ACT TOTAL SCORE (Goal Greater than or Equal to 20) 6 18 20   In the past 12 months, how many times did you visit the emergency room for your asthma without being admitted to the hospital? 0 0 0   In the past 12 months, how many times were you hospitalized overnight because of your asthma? 0 0 0      Sig: Inhale 2 puffs into the lungs every 4 hours as needed for shortness of breath / dyspnea    Asthma Maintenance Inhalers - Anticholinergics Failed    11/21/2018  1:46 PM       Failed - Asthma control assessment score within normal limits in last 6 months    Please review ACT score.          Passed - Patient is age 12 years or older       Passed - Recent (6 mo) or future (30 days) visit within the authorizing provider's specialty    Patient had office visit in the last 6 months or has a visit in the next 30 days with authorizing provider or within the authorizing provider's specialty.  See \"Patient Info\" tab in inbasket, or \"Choose Columns\" in Meds & Orders section of the refill encounter.            LORazepam (ATIVAN) 1 MG tablet 60 tablet 0    There is no refill protocol information for this order          "

## 2018-11-21 NOTE — TELEPHONE ENCOUNTER
Prescription(s) signed and in the [facility].  Please process and notify the patient, if needed.

## 2018-11-23 RX ORDER — ALBUTEROL SULFATE 90 UG/1
2 AEROSOL, METERED RESPIRATORY (INHALATION) EVERY 4 HOURS PRN
Qty: 3 INHALER | Refills: 0 | Status: SHIPPED | OUTPATIENT
Start: 2018-11-23 | End: 2019-02-28

## 2018-11-23 NOTE — TELEPHONE ENCOUNTER
Ativan was faxed to [facility] on the day requested.     Ventolin- pt due for ACT gave one refill and noted on it that this is due.    Amelia Flores RN  [facility]

## 2018-12-07 DIAGNOSIS — G47.00 INSOMNIA, UNSPECIFIED TYPE: ICD-10-CM

## 2018-12-07 NOTE — TELEPHONE ENCOUNTER
zolpidem (AMBIEN) 10 MG tablet        Last Written Prescription Date:  11.5.18  Last Fill Quantity: 30,  # refills: 0   Last Office Visit: 9/11/2018   Future Office Visit:       Future Office visit:       Routing refill request to provider for review/approval because:  Drug not on the [facility], [facility] or [facility] refill protocol or controlled substance

## 2018-12-07 NOTE — TELEPHONE ENCOUNTER
Requested Prescriptions   Pending Prescriptions Disp Refills     zolpidem (AMBIEN) 10 MG tablet [Pharmacy Med Name: ZOLPIDEM TARTRATE 10 MG TABLET] 30 tablet 0     Sig: TAKE 1 TABLET BY MOUTH EVERYDAY AT BEDTIME    There is no refill protocol information for this order        Routing refill request to provider for review/approval because:  Drug not on the [facility] refill protocol   Amelia Flores RN  [facility]

## 2018-12-11 RX ORDER — ZOLPIDEM TARTRATE 10 MG/1
TABLET ORAL
Qty: 30 TABLET | Refills: 0 | Status: SHIPPED | OUTPATIENT
Start: 2018-12-11 | End: 2019-01-09

## 2018-12-25 DIAGNOSIS — F41.1 GENERALIZED ANXIETY DISORDER: ICD-10-CM

## 2018-12-25 DIAGNOSIS — F41.0 PANIC DISORDER WITHOUT AGORAPHOBIA: ICD-10-CM

## 2018-12-26 NOTE — TELEPHONE ENCOUNTER
LORazepam (ATIVAN) 1 MG tablet        Last Written Prescription Date:  11.20.18  Last Fill Quantity: 60,  # refills: 0   Last Office Visit: 9/11/2018   Future Office Visit:       Future Office visit:       Routing refill request to provider for review/approval because:  Drug not on the [facility], [facility] or [facility] refill protocol or controlled substance

## 2018-12-27 RX ORDER — LORAZEPAM 1 MG/1
TABLET ORAL
Qty: 60 TABLET | Refills: 0 | Status: SHIPPED | OUTPATIENT
Start: 2018-12-27 | End: 2019-01-30

## 2018-12-27 NOTE — TELEPHONE ENCOUNTER
Prescription faxed to [facility].      Emerald Zuniga

## 2018-12-27 NOTE — TELEPHONE ENCOUNTER
Prescription(s) signed and in the [facility].  Please process and notify the patient, if needed.

## 2018-12-28 ENCOUNTER — TELEPHONE (OUTPATIENT)
Dept: FAMILY MEDICINE | Facility: CLINIC | Age: 48
End: 2018-12-28

## 2018-12-28 ENCOUNTER — OFFICE VISIT (OUTPATIENT)
Dept: FAMILY MEDICINE | Facility: CLINIC | Age: 48
End: 2018-12-28
Payer: MEDICARE

## 2018-12-28 VITALS
TEMPERATURE: 98.3 F | HEART RATE: 87 BPM | SYSTOLIC BLOOD PRESSURE: 168 MMHG | DIASTOLIC BLOOD PRESSURE: 80 MMHG | OXYGEN SATURATION: 95 % | WEIGHT: 252 LBS | HEIGHT: 72 IN | BODY MASS INDEX: 34.13 KG/M2

## 2018-12-28 DIAGNOSIS — J45.31 MILD PERSISTENT ASTHMA WITH EXACERBATION: Primary | ICD-10-CM

## 2018-12-28 PROCEDURE — 99213 OFFICE O/P EST LOW 20 MIN: CPT | Performed by: PHYSICIAN ASSISTANT

## 2018-12-28 RX ORDER — BUDESONIDE AND FORMOTEROL FUMARATE DIHYDRATE 160; 4.5 UG/1; UG/1
2 AEROSOL RESPIRATORY (INHALATION) 2 TIMES DAILY
Qty: 3 INHALER | Refills: 1 | Status: SHIPPED | OUTPATIENT
Start: 2018-12-28 | End: 2020-12-03

## 2018-12-28 RX ORDER — PREDNISONE 20 MG/1
TABLET ORAL
Qty: 20 TABLET | Refills: 0 | Status: SHIPPED | OUTPATIENT
Start: 2018-12-28 | End: 2019-02-21

## 2018-12-28 ASSESSMENT — ENCOUNTER SYMPTOMS
FEVER: 0
COUGH: 1
DIARRHEA: 0
NAUSEA: 0
WHEEZING: 1
FOCAL WEAKNESS: 0
SHORTNESS OF BREATH: 1
ABDOMINAL PAIN: 0
CHILLS: 0
HEADACHES: 0
VOMITING: 0

## 2018-12-28 ASSESSMENT — MIFFLIN-ST. JEOR: SCORE: 2041.06
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
## 2018-12-28 NOTE — TELEPHONE ENCOUNTER
"Patient calling stating he is having on-going asthma symptoms and labored breathing.  Patient is having difficulty breathing and sounds labored while speaking.  Patient using ventolin and advair without relief.     Advised patient to go to ED and patient refused.  Attempted to educate patient about the care he may need than cannot be given in the clinic.  Patient stated, \"I've had asthma my whole life, I just need a nebulizer and maybe some steroids\".  Again advised patient to go to ED and patient refused.      An appointment was scheduled with a provider for today in .  However, called patient back and left message on  there is an  in [place] that is currently open.  Advised patient if breathing becomes more difficult to call 911, patient would not reply an answer.    REJI GonzalezN, RN  [facility]        "

## 2019-01-06 DIAGNOSIS — G47.00 INSOMNIA, UNSPECIFIED TYPE: ICD-10-CM

## 2019-01-07 NOTE — TELEPHONE ENCOUNTER
Please advise on refill?     Pt was just seen     Thank you     Kari Mcleod RN, BSN  [place] [facility]

## 2019-01-07 NOTE — TELEPHONE ENCOUNTER
"Requested Prescriptions   Pending Prescriptions Disp Refills     amitriptyline (ELAVIL) 150 MG tablet [Pharmacy Med Name: AMITRIPTYLINE  MG TAB] 90 tablet 0      Last Written Prescription Date:  10.9.18  Last Fill Quantity: 90,  # refills: 0   Last Office Visit: 12/28/2018   Future Office Visit:      Sig: TAKE 1 TABLET BY MOUTH AT BEDTIME    Tricyclic Agents ( Annual appt and no PHQ9) Failed - 1/6/2019  8:50 AM       Failed - Blood Pressure under 140/90 in past 12 mos    BP Readings from Last 3 Encounters:   12/28/18 168/80   09/11/18 (!) 156/100   04/10/18 150/90                Passed - Recent (12 mo) or future (30 days) visit within authorizing provider's specialty    Patient had office visit in the last 12 months or has a visit in the next 30 days with authorizing provider or within the authorizing provider's specialty.  See \"Patient Info\" tab in inbasket, or \"Choose Columns\" in Meds & Orders section of the refill encounter.             Passed - Medication is active on med list       Passed - Patient is age 18 or older          "

## 2019-01-08 RX ORDER — AMITRIPTYLINE HYDROCHLORIDE 150 MG/1
TABLET ORAL
Qty: 90 TABLET | Refills: 0 | Status: SHIPPED | OUTPATIENT
Start: 2019-01-08 | End: 2019-02-21

## 2019-01-09 DIAGNOSIS — G47.00 INSOMNIA, UNSPECIFIED TYPE: ICD-10-CM

## 2019-01-09 RX ORDER — ZOLPIDEM TARTRATE 10 MG/1
TABLET ORAL
Qty: 30 TABLET | Refills: 1 | Status: SHIPPED | OUTPATIENT
Start: 2019-01-12 | End: 2019-03-21

## 2019-01-09 NOTE — TELEPHONE ENCOUNTER
Routing refill request to provider for review/approval because:  Drug not on the [facility] refill protocol   Amelia Flores RN  [place] [facility]

## 2019-01-09 NOTE — TELEPHONE ENCOUNTER
zolpidem (AMBIEN) 10 MG tablet        Last Written Prescription Date:  12.11.18  Last Fill Quantity: 30,  # refills: 0   Last Office Visit: 12/28/2018   Future Office Visit:       Routing refill request to provider for review/approval because:  Drug not on the [facility], [facility] or [facility] refill protocol or controlled substance

## 2019-01-30 DIAGNOSIS — F41.0 PANIC DISORDER WITHOUT AGORAPHOBIA: ICD-10-CM

## 2019-01-30 DIAGNOSIS — F41.1 GENERALIZED ANXIETY DISORDER: ICD-10-CM

## 2019-01-31 RX ORDER — LORAZEPAM 1 MG/1
TABLET ORAL
Qty: 60 TABLET | Refills: 0 | Status: SHIPPED | OUTPATIENT
Start: 2019-01-31 | End: 2019-03-04

## 2019-01-31 NOTE — TELEPHONE ENCOUNTER
Requested Prescriptions   Pending Prescriptions Disp Refills     LORazepam (ATIVAN) 1 MG tablet [Pharmacy Med Name: LORAZEPAM 1 MG TABLET] 60 tablet 0     Sig: TAKE 1 TABLET BY MOUTH TWICE A DAY AS NEEDED FOR ANXIETY    There is no refill protocol information for this order          Medication Detail      Disp Refills Start End COLLETTE   LORazepam (ATIVAN) 1 MG tablet 60 tablet 0 12/27/2018  No   Sig: TAKE 1 TABELT BY MOUTH TWICE A DAY AS NEEDED FOR ANXIETY   Class: Local Print   Notes to Pharmacy: Not to exceed 5 additional fills before 05/19/2019   Order: [id number]       Fax to [facility] pharmacy.    Routing refill request to provider for review/approval because:  Drug not on the [facility] refill protocol   Amelia Flores RN  [place] [facility]

## 2019-02-06 ENCOUNTER — TELEPHONE (OUTPATIENT)
Dept: FAMILY MEDICINE | Facility: CLINIC | Age: 49
End: 2019-02-06

## 2019-02-06 NOTE — TELEPHONE ENCOUNTER
Pharmacist from [facility] Jennifer calling to say that they are back ordered for Lorazepam. Could you please  Send to [facility] [employer]  He has already notified the patient.   : last filled 12/27/18  Fax: [phone]    IQ fax sent    Evelin Gudino RN- Triage [employer]

## 2019-02-18 ENCOUNTER — TELEPHONE (OUTPATIENT)
Dept: FAMILY MEDICINE | Facility: CLINIC | Age: 49
End: 2019-02-18

## 2019-02-18 DIAGNOSIS — M54.9 UPPER BACK PAIN ON RIGHT SIDE: ICD-10-CM

## 2019-02-18 DIAGNOSIS — M54.2 NECK PAIN: ICD-10-CM

## 2019-02-18 NOTE — TELEPHONE ENCOUNTER
Reason for Call:  Other prescription/ call back/ appointment    Detailed comments: Pt called this afternoon and would like a refill on his pain medication (oxycodone) however, the pt needs to be seen by the doctor for a medication check for his pain meds. Pt called this afternoon and is wondering if Dr. Segovia would be able to get him in sometime this week due to a pain medication change. Please give pt a call to assess. Thank you.    Phone Number Patient can be reached at: Home number on file [phone] (home)    Best Time:     Can we leave a detailed message on this number? YES    Call taken on 2/18/2019 at 2:28 PM by Rosalie Bush

## 2019-02-18 NOTE — TELEPHONE ENCOUNTER
Ok to put pt in hospital spot tomorrow due to time of day today and appt not taken yet.    The patient indicates understanding of these issues and agrees with the plan.  Scheduled.  Amelia Flores RN  [facility]

## 2019-02-19 ENCOUNTER — TELEPHONE (OUTPATIENT)
Dept: FAMILY MEDICINE | Facility: CLINIC | Age: 49
End: 2019-02-19

## 2019-02-19 DIAGNOSIS — E29.1 HYPOGONADISM MALE: ICD-10-CM

## 2019-02-19 NOTE — TELEPHONE ENCOUNTER
Scheduled for 11:00 a.m. on 02/21/2019 with Dr. Segovia.       Ashley Panda  Patient Representative

## 2019-02-19 NOTE — TELEPHONE ENCOUNTER
I am sorry I have no appts today- offer the 11:00 on Thursday or he can see another provider at another clinic tomorrow if nothing is available here at [facility]

## 2019-02-19 NOTE — TELEPHONE ENCOUNTER
Reason for Call:  Same Day Appointment, Requested Provider:  Luis Armando Segoiva MD    PCP: Luis Armando Segovia    Reason for visit: The patient called and says he missed his appointment for today, 02/19/2019, due to being late from the dentist. He wants to see if Dr. Segovia can work him in as soon as possible again. He says he greatly appreciated us fitting him in today and was sorry he couldn't make it in. He also says he is going through some pretty serious mental health issues lately that he wants to talk to Dr. Segovia about.    Can we leave a detailed message on this number? YES    Phone number patient can be reached at: Cell number on file:    Telephone Information:   Mobile [phone]     Best Time: Anytime    Call taken on 2/19/2019 at 10:04 AM by Ashley Panda

## 2019-02-20 NOTE — TELEPHONE ENCOUNTER
Requested Prescriptions   Pending Prescriptions Disp Refills     testosterone cypionate (DEPOTESTOSTERONE) 200 MG/ML injection [Pharmacy Med Name: TESTOSTERONE  MG/ML] 10 mL 0     Sig: INJECT 0.5ML INTO THE MUSCLE ONCE WEEKLY    Androgen Agents Failed - 2/19/2019 10:37 PM       Failed - Lipid panel on file in past 12 mos    Recent Labs   Lab Test 10/10/17  1151  08/13/13  1022   CHOL 139   < > 113   TRIG 239*   < > 158*   HDL 28*   < > 27*   LDL 63   < > 55   NHDL 111   < >  --    VLDL  --   --  32*   CHOLHDLRATIO  --   --  4.3    < > = values in this interval not displayed.              Failed - ALT on file within past 12 mos    Recent Labs   Lab Test 10/20/17  0828   ALT 67            Failed - HCT less than 54% on file within past 12 mos    Recent Labs   Lab Test 10/19/17  1519   HCT 50.3            Failed - Serum Testosterone on file within past 12 mos    Recent Labs   Lab Test 10/10/17  1151   TESTOSTTOTAL 411            Failed - Serum PSA on file within past 12 mos    Lab Results   Component Value Date    PSA 0.42 01/06/2017            Failed - Refills for this classification require provider review       Failed - Blood pressure under 140/90 in past 6 months    BP Readings from Last 3 Encounters:   12/28/18 168/80   09/11/18 (!) 156/100   04/10/18 150/90                Failed - AST on file within past 12 mos    Recent Labs   Lab Test 10/20/17  0828   AST 88*            Passed - Patient is of age 12 and older       Passed - Medication is active on med list       Passed - Patient is not pregnant       Passed - No positive pregnancy test on file within past 12 mos       Passed - Recent (6 mo) or future (30 days) visit within the authorizing provider's specialty        testosterone cypionate (DEPOTESTOTERONE) 200 MG/ML injection 10 mL 0 9/20/2018       Last Written Prescription Date:  9/20/19  Last Fill Quantity: 10,  # refills: 0   Last office visit: 12/28/2018 with prescribing provider:  Dr. Pizano    Future Office Visit: 02/21/19  Next 5 appointments (look out 90 days)    Feb 21, 2019 11:00 AM CST  [facility] [address] [place] [id number] [phone]

## 2019-02-21 ENCOUNTER — OFFICE VISIT (OUTPATIENT)
Dept: FAMILY MEDICINE | Facility: CLINIC | Age: 49
End: 2019-02-21
Payer: MEDICARE

## 2019-02-21 ENCOUNTER — TELEPHONE (OUTPATIENT)
Dept: FAMILY MEDICINE | Facility: CLINIC | Age: 49
End: 2019-02-21

## 2019-02-21 ENCOUNTER — NURSE TRIAGE (OUTPATIENT)
Dept: NURSING | Facility: CLINIC | Age: 49
End: 2019-02-21

## 2019-02-21 ENCOUNTER — TELEPHONE (OUTPATIENT)
Dept: PALLIATIVE MEDICINE | Facility: CLINIC | Age: 49
End: 2019-02-21

## 2019-02-21 VITALS
BODY MASS INDEX: 32.91 KG/M2 | HEIGHT: 72 IN | OXYGEN SATURATION: 93 % | WEIGHT: 243 LBS | TEMPERATURE: 98.9 F | HEART RATE: 93 BPM | DIASTOLIC BLOOD PRESSURE: 110 MMHG | SYSTOLIC BLOOD PRESSURE: 170 MMHG

## 2019-02-21 DIAGNOSIS — M54.40 CHRONIC LEFT-SIDED LOW BACK PAIN WITH SCIATICA, SCIATICA LATERALITY UNSPECIFIED: Primary | ICD-10-CM

## 2019-02-21 DIAGNOSIS — I10 HYPERTENSION GOAL BP (BLOOD PRESSURE) < 140/90: ICD-10-CM

## 2019-02-21 DIAGNOSIS — Z12.11 SCREEN FOR COLON CANCER: ICD-10-CM

## 2019-02-21 DIAGNOSIS — M54.2 NECK PAIN, BILATERAL: ICD-10-CM

## 2019-02-21 DIAGNOSIS — G47.00 INSOMNIA, UNSPECIFIED TYPE: ICD-10-CM

## 2019-02-21 DIAGNOSIS — N18.30 CKD (CHRONIC KIDNEY DISEASE) STAGE 3, GFR 30-59 ML/MIN (H): ICD-10-CM

## 2019-02-21 DIAGNOSIS — I10 ESSENTIAL HYPERTENSION WITH GOAL BLOOD PRESSURE LESS THAN 140/90: ICD-10-CM

## 2019-02-21 DIAGNOSIS — Z12.5 SCREENING FOR PROSTATE CANCER: ICD-10-CM

## 2019-02-21 DIAGNOSIS — G89.29 CHRONIC LEFT-SIDED LOW BACK PAIN WITH SCIATICA, SCIATICA LATERALITY UNSPECIFIED: Primary | ICD-10-CM

## 2019-02-21 PROCEDURE — 99214 OFFICE O/P EST MOD 30 MIN: CPT | Performed by: FAMILY MEDICINE

## 2019-02-21 PROCEDURE — 82043 UR ALBUMIN QUANTITATIVE: CPT | Performed by: FAMILY MEDICINE

## 2019-02-21 RX ORDER — LOSARTAN POTASSIUM 100 MG/1
100 TABLET ORAL DAILY
Qty: 90 TABLET | Refills: 1 | Status: SHIPPED | OUTPATIENT
Start: 2019-02-21 | End: 2019-08-02

## 2019-02-21 RX ORDER — OXYCODONE HYDROCHLORIDE 5 MG/1
5 TABLET ORAL EVERY 6 HOURS PRN
Qty: 60 TABLET | Refills: 0 | Status: SHIPPED | OUTPATIENT
Start: 2019-02-21 | End: 2019-03-07

## 2019-02-21 RX ORDER — AMITRIPTYLINE HYDROCHLORIDE 150 MG/1
150 TABLET ORAL AT BEDTIME
Qty: 90 TABLET | Refills: 0 | Status: SHIPPED | OUTPATIENT
Start: 2019-02-21 | End: 2019-03-08

## 2019-02-21 RX ORDER — AMLODIPINE BESYLATE 10 MG/1
10 TABLET ORAL DAILY
Qty: 90 TABLET | Refills: 1 | Status: SHIPPED | OUTPATIENT
Start: 2019-02-21 | End: 2019-08-29

## 2019-02-21 RX ORDER — TESTOSTERONE CYPIONATE 200 MG/ML
INJECTION, SOLUTION INTRAMUSCULAR
Qty: 10 ML | Refills: 0 | Status: SHIPPED | OUTPATIENT
Start: 2019-02-21 | End: 2019-03-11

## 2019-02-21 RX ORDER — CLONIDINE HYDROCHLORIDE 0.1 MG/1
0.1 TABLET ORAL 2 TIMES DAILY
Qty: 180 TABLET | Refills: 1 | Status: SHIPPED | OUTPATIENT
Start: 2019-02-21 | End: 2019-08-02

## 2019-02-21 RX ORDER — METOPROLOL TARTRATE 50 MG
25-50 TABLET ORAL 2 TIMES DAILY
Qty: 180 TABLET | Refills: 1 | Status: SHIPPED | OUTPATIENT
Start: 2019-02-21 | End: 2019-08-02

## 2019-02-21 RX ORDER — BUPRENORPHINE 8 MG/1
8 TABLET SUBLINGUAL DAILY
Refills: 0 | COMMUNITY
Start: 2019-02-18 | End: 2019-05-24

## 2019-02-21 ASSESSMENT — ANXIETY QUESTIONNAIRES
3. WORRYING TOO MUCH ABOUT DIFFERENT THINGS: MORE THAN HALF THE DAYS
6. BECOMING EASILY ANNOYED OR IRRITABLE: SEVERAL DAYS
GAD7 TOTAL SCORE: 13
IF YOU CHECKED OFF ANY PROBLEMS ON THIS QUESTIONNAIRE, HOW DIFFICULT HAVE THESE PROBLEMS MADE IT FOR YOU TO DO YOUR WORK, TAKE CARE OF THINGS AT HOME, OR GET ALONG WITH OTHER PEOPLE: SOMEWHAT DIFFICULT
1. FEELING NERVOUS, ANXIOUS, OR ON EDGE: NEARLY EVERY DAY
5. BEING SO RESTLESS THAT IT IS HARD TO SIT STILL: NOT AT ALL
2. NOT BEING ABLE TO STOP OR CONTROL WORRYING: NEARLY EVERY DAY
7. FEELING AFRAID AS IF SOMETHING AWFUL MIGHT HAPPEN: SEVERAL DAYS

## 2019-02-21 ASSESSMENT — PATIENT HEALTH QUESTIONNAIRE - PHQ9
5. POOR APPETITE OR OVEREATING: NEARLY EVERY DAY
SUM OF ALL RESPONSES TO PHQ QUESTIONS 1-9: 24

## 2019-02-21 ASSESSMENT — MIFFLIN-ST. JEOR: SCORE: 2000.24

## 2019-02-21 NOTE — TELEPHONE ENCOUNTER
Prescription(s) signed and in the [facility].  Please process and notify the patient, if needed.

## 2019-02-21 NOTE — PROGRESS NOTES
SUBJECTIVE:                                                      Jeremi Damon is a 48 year old male who presents to clinic today for the following health issues:    Diabetes Follow-up    Patient is checking blood sugars: not at all    Diabetic concerns: None     Symptoms of hypoglycemia (low blood sugar): none     Paresthesias (numbness or burning in feet) or sores: Yes      Date of last diabetic eye exam: does not know where he had it done    Diabetes Management Resources    Hypertension Follow-up    Outpatient blood pressures are being checked at home.  Results are 150/125.    Low Salt Diet: no added salt    Medication: Amlodipine, Losartan, Metoprolol     Depression and Anxiety Follow-Up    Status since last visit: Worsened     Other associated symptoms:None    Complicating factors:     Significant life event: Yes-       Current substance abuse: None      PHQ-9  English  PHQ 7/13/2018 10/4/2018 2/21/2019   PHQ-9 Total Score 20 19 24   Q9: Suicide Ideation Not at all Several days Several days   F/U: Thoughts of suicide or self-harm - No Yes   F/U: Safety concerns - No No   NANCY-7  NANCY-7 SCORE 4/10/2018 7/13/2018 2/21/2019   Total Score - - -   Total Score 21 17 13     Suicide Assessment Five-step Evaluation and Treatment (SAFE-T)      Medication Followup of ADHD/ adderall    Taking Medication as prescribed: yes - 1-2 times per week depending on his need for focusing.     Side Effects:  None    Medication Helping Symptoms:  yes       Chronic neck and back pain - shoulder pains as well-  He has been seeing Theo Aj MD - His symptoms are ongoing and has had injections in the past and lumbar L5-S1 fusion in 2014.  Currently, his symptoms are worsening due to rapid switch from oxycodone (usual dose lately was 3 tabs per day on average) to Subutex 8 mg sl daily.  His neck is especially painful.  He would like to work with a psychologist regarding his coping with pain.     Sleep is poor - could not afford  amitriptyline, but would like it refilled.     Problem list and histories reviewed & adjusted, as indicated.  Additional history: as documented    ROS:  Constitutional, HEENT, cardiovascular, pulmonary, GI, , musculoskeletal, neuro, skin, endocrine and psych systems are negative, except as otherwise noted.    OBJECTIVE:                                                    BP (!) 170/110   Pulse 93   Temp 98.9  F (37.2  C) (Oral)   Ht 1.829 m (6')   Wt 110.2 kg (243 lb)   SpO2 93%   BMI 32.96 kg/m   Body mass index is 32.96 kg/m .   GENERAL: healthy, alert, well nourished, well hydrated, no distress  HENT: ear canals- normal; TMs- normal; Nose- normal; Mouth- no ulcers, no lesions  NECK: reduced right > left rotational ROM, bilateral paracervical tenderness, no adenopathy, no asymmetry, no masses, no stiffness; thyroid- normal to palpation  RESP: lungs clear to auscultation - no rales, no rhonchi, no wheezes  CV: regular rates and rhythm, normal S1 S2, no S3 or S4 and no murmur, no click or rub -  ABDOMEN: soft, no tenderness, no  hepatosplenomegaly, no masses, normal bowel sounds  MS: extremities- no gross deformities noted, no edema  SKIN: no suspicious lesions, no rashes  NEURO: strength and tone- normal, sensory exam- grossly normal, mentation- intact, speech- normal, reflexes- symmetric  BACK: no CVA tenderness, bilateral rhomboid region and paralumbar tenderness  PSYCH: Alert and oriented times 3; speech- coherent , normal rate and volume; able to articulate logical thoughts, able to abstract reason, no tangential thoughts, no hallucinations or delusions, affect-okay  LYMPHATICS: ant. cervical- normal, post. cervical- normal, axillary- normal, supraclavicular- normal, inguinal- normal    ASSESSMENT/PLAN:                                                      Jeremi was seen today for recheck medication.    Diagnoses and all orders for this visit:    Chronic left-sided low back pain with sciatica, sciatica  laterality unspecified/Neck pain, bilateral  -     PAIN MANAGEMENT REFERRAL  -     oxyCODONE (ROXICODONE) 5 MG tablet; Take 1 tablet (5 mg) by mouth every 6 hours as needed for pain    Hypertension goal BP (blood pressure) < 140/90 - poor control.  Off clonidine and will refill.  Continue his other meds and recheck in ~ 1 week or sooner prn. Increased pain may be contributing as well.   --     metoprolol tartrate (LOPRESSOR) 50 MG tablet; Take 0.5-1 tablets (25-50 mg) by mouth 2 times daily  -     losartan (COZAAR) 100 MG tablet; Take 1 tablet (100 mg) by mouth daily  -     amLODIPine (NORVASC) 10 MG tablet; Take 1 tablet (10 mg) by mouth daily  -     Albumin Random Urine Quantitative with Creat Ratio  -     Basic metabolic panel  (Ca, Cl, CO2, Creat, Gluc, K, Na, BUN); Future  -     cloNIDine (CATAPRES) 0.1 MG tablet; Take 1 tablet (0.1 mg) by mouth 2 times daily    Insomnia, unspecified type  -     amitriptyline (ELAVIL) 150 MG tablet; Take 1 tablet (150 mg) by mouth At Bedtime    CKD (chronic kidney disease) stage 3, GFR 30-59 ml/min (H)- stable but needs better blood pressure control.       Screening for prostate cancer  -     Cancel: PROSTATE SPEC ANTIGEN SCREEN  -     PSA, screen; Future    Screen for colon cancer        Risks, benefits and alternatives of treatments discussed. Plan agreed on.      Followup: Data Unavailable    See patient instructions.           Nate Segovia MD   Pager: [phone]

## 2019-02-22 LAB
CREAT UR-MCNC: 132 MG/DL
MICROALBUMIN UR-MCNC: 1150 MG/L
MICROALBUMIN/CREAT UR: 871.21 MG/G CR (ref 0–17)

## 2019-02-22 ASSESSMENT — ANXIETY QUESTIONNAIRES: GAD7 TOTAL SCORE: 13

## 2019-02-22 ASSESSMENT — ASTHMA QUESTIONNAIRES: ACT_TOTALSCORE: 13

## 2019-02-22 NOTE — TELEPHONE ENCOUNTER
Called back and there is question of him having a prescription for Feb 2019 for oxycodone already - they are looking shira into it.  If he has a prescription for oxycodone that he can fill already for Feb 2019 then they should not fill the prescription I gave otherwise it is okay to be filled for this month.

## 2019-02-22 NOTE — TELEPHONE ENCOUNTER
Clinic Action Needed: Yes, contact pharmacy at [phone]    [facility] Triage Call  Presenting Problem:  [facility] pharmacist Kasandra calling re: prescription for oxycodone 5mg presented by patient today.    Pharmacist states that patient currently has prescriptions for both:  beprenorphine 8mg, and oxycodone.    Both of these prescriptions are written from provider: [facility]  [place], [place]  [phone]    Routed to: RN Pool  P [id number]    Gisselle Sood, RN  [facility]

## 2019-02-22 NOTE — TELEPHONE ENCOUNTER
pulled, placed on RL, MD desk    Routing to PCP for further review/recommendations/orders.    Asmita Marinelli RN  [place] [facility]

## 2019-02-22 NOTE — RESULT ENCOUNTER NOTE
Dear Jeremi,    Here is a summary of your recent test results:  -Microalbumin (urine protein) level is elevated. This is suggestive of early damage to your kidneys from high blood pressure.  ADVISE: avoiding anti-inflamatory agents such as ibuprofen (Advil, Motrin) or naproxen (Aleve) as much as possible, keeping your blood pressure in a normal range, and continuing your medication (losartan) that helps protect your kidneys.  Also, this should be rechecked in 1 year.             Thank you very much for trusting me and [facility].     Healthy regards,  Nate Segovia MD

## 2019-02-22 NOTE — TELEPHONE ENCOUNTER
[facility] pharmacist Kasandra calling re: prescription for oxycodone 5mg presented by patient today.    Pharmacist states that patient currently has prescriptions for both:  beprenorphine 8mg, and oxycodone.    Both of these prescriptions are writtin from provider: [facility]  [place], [place]  [phone]    Reason for Disposition    Caller has NON-URGENT medication question about med that PCP prescribed and triager unable to answer question    Protocols used: MEDICATION QUESTION CALL-ADULT-

## 2019-02-28 DIAGNOSIS — J45.20 INTERMITTENT ASTHMA, UNCOMPLICATED: ICD-10-CM

## 2019-02-28 RX ORDER — ALBUTEROL SULFATE 90 UG/1
2 AEROSOL, METERED RESPIRATORY (INHALATION) EVERY 4 HOURS PRN
Qty: 25.5 G | Refills: 3 | Status: SHIPPED | OUTPATIENT
Start: 2019-02-28 | End: 2019-05-23

## 2019-02-28 NOTE — TELEPHONE ENCOUNTER
Routing refill request to provider for review/approval because:  Failed ACT Score        Asmita Marinelli, RN  [place] [facility]

## 2019-02-28 NOTE — TELEPHONE ENCOUNTER
"Requested Prescriptions   Pending Prescriptions Disp Refills     albuterol (PROAIR HFA/PROVENTIL HFA/VENTOLIN HFA) 108 (90 Base) MCG/ACT inhaler [Pharmacy Med Name: VENTOLIN HFA 90 MCG INHALER]  Last Written Prescription Date: 11/23/2018  Last Fill Quantity: 3 inhaler,  # refills: 0   Last office visit: 2/21/2019 with prescribing provider:  Luis Armando Segovia MD    Future Office Visit:     25.5 g 0     Sig: Inhale 2 puffs into the lungs every 4 hours as needed for shortness of breath / dyspnea    Asthma Maintenance Inhalers - Anticholinergics Failed - 2/28/2019  7:18 AM       Failed - Asthma control assessment score within normal limits in last 6 months    Please review ACT score.   ACT Total Scores 10/10/2017 2/22/2018 2/21/2019   ACT TOTAL SCORE - - -   ASTHMA ER VISITS - - -   ASTHMA HOSPITALIZATIONS - - -   ACT TOTAL SCORE (Goal Greater than or Equal to 20) 18 20 13   In the past 12 months, how many times did you visit the emergency room for your asthma without being admitted to the hospital? 0 0 0   In the past 12 months, how many times were you hospitalized overnight because of your asthma? 0 0 0            Passed - Patient is age 12 years or older       Passed - Medication is active on med list       Passed - Recent (6 mo) or future (30 days) visit within the authorizing provider's specialty    Patient had office visit in the last 6 months or has a visit in the next 30 days with authorizing provider or within the authorizing provider's specialty.  See \"Patient Info\" tab in inbasket, or \"Choose Columns\" in Meds & Orders section of the refill encounter.              "

## 2019-03-03 DIAGNOSIS — I10 ESSENTIAL HYPERTENSION WITH GOAL BLOOD PRESSURE LESS THAN 140/90: ICD-10-CM

## 2019-03-04 DIAGNOSIS — F41.0 PANIC DISORDER WITHOUT AGORAPHOBIA: ICD-10-CM

## 2019-03-04 DIAGNOSIS — F41.1 GENERALIZED ANXIETY DISORDER: ICD-10-CM

## 2019-03-04 RX ORDER — METOPROLOL TARTRATE 50 MG
25-50 TABLET ORAL 2 TIMES DAILY
Qty: 180 TABLET | Refills: 1 | OUTPATIENT
Start: 2019-03-04

## 2019-03-04 RX ORDER — AMLODIPINE BESYLATE 10 MG/1
TABLET ORAL
Qty: 90 TABLET | Refills: 1 | OUTPATIENT
Start: 2019-03-04

## 2019-03-04 RX ORDER — LORAZEPAM 1 MG/1
TABLET ORAL
Qty: 60 TABLET | Refills: 0 | Status: SHIPPED | OUTPATIENT
Start: 2019-03-04 | End: 2019-05-14

## 2019-03-04 RX ORDER — LOSARTAN POTASSIUM 100 MG/1
TABLET ORAL
Qty: 90 TABLET | Refills: 1 | OUTPATIENT
Start: 2019-03-04

## 2019-03-04 NOTE — TELEPHONE ENCOUNTER
Outpatient Medication Detail      Disp Refills Start End COLLETTE   LORazepam (ATIVAN) 1 MG tablet 60 tablet 0 1/31/2019  No   Sig: TAKE 1 TABLET BY MOUTH TWICE A DAY AS NEEDED FOR ANXIETY     Problem List Complete:  Yes    Last Office Visit with [facility] primary care provider: 02/21/2019    Future Office visit:     Controlled substance agreement:   Encounter-Level CSA - 11/16/2017:    Controlled Substance Agreement - Scan on 12/1/2017 10:43 AM: CONTROLLED SUBSTANCE AGREEMENT (below)       Encounter-Level CSA - 08/07/2015:    Controlled Substance Agreement - Scan on 8/19/2015 11:27 AM: Controlled Substance Agreement 08/07/15 (below)       Patient-Level CSA:    There are no patient-level csa.         Last Urine Drug Screen: No results found for: CDAUT, No results found for: COMDAT, No results found for: THC13, PCP13, COC13, MAMP13, OPI13, AMP13, BZO13, TCA13, MTD13, BAR13, OXY13, PPX13, BUP13     Processing:  [facility]    [email or web address]      Routing refill request to provider for review/approval because:  Drug not on the [facility] refill protocol         Asmita Marinelli RN  [facility]

## 2019-03-04 NOTE — TELEPHONE ENCOUNTER
"Requested Prescriptions   Pending Prescriptions Disp Refills     metoprolol tartrate (LOPRESSOR) 50 MG tablet [Pharmacy Med Name: METOPROLOL TARTRATE 50 MG TAB] 180 tablet 1     Sig: TAKE 0.5-1 TABLETS (25-50 MG) BY MOUTH 2 TIMES DAILY    Last Refill:    Disp Refills Start End COLLETTE   metoprolol tartrate (LOPRESSOR) 50 MG tablet 180 tablet 1 2/21/2019  No   Sig - Route: Take 0.5-1 tablets (25-50 mg) by mouth 2 times daily - Oral     Beta-Blockers Protocol Failed - 3/3/2019  8:33 AM       Failed - Blood pressure under 140/90 in past 12 months    BP Readings from Last 3 Encounters:   02/21/19 (!) 170/110   12/28/18 168/80   09/11/18 (!) 156/100          Passed - Patient is age 6 or older       Passed - Recent (12 mo) or future (30 days) visit within the authorizing provider's specialty    Patient had office visit in the last 12 months or has a visit in the next 30 days with authorizing provider or within the authorizing provider's specialty.  See \"Patient Info\" tab in inbasket, or \"Choose Columns\" in Meds & Orders section of the refill encounter.      LOV: 02/21/2019         Passed - Medication is active on med list       #180 x 1 refill sent on 2/1/19 - PHARMACY CHANGE         amLODIPine (NORVASC) 10 MG tablet [Pharmacy Med Name: AMLODIPINE BESYLATE 10 MG TAB] 90 tablet 1     Sig: TAKE 1 TABLET BY MOUTH EVERY DAY    Last Refill:    Disp Refills Start End COLLETTE   amLODIPine (NORVASC) 10 MG tablet 90 tablet 1 2/21/2019  No   Sig - Route: Take 1 tablet (10 mg) by mouth daily - Oral     Calcium Channel Blockers Protocol  Failed - 3/3/2019  8:33 AM       Failed - Blood pressure under 140/90 in past 12 months    BP Readings from Last 3 Encounters:   02/21/19 (!) 170/110   12/28/18 168/80   09/11/18 (!) 156/100          Failed - Normal serum creatinine on file in past 12 months    Recent Labs   Lab Test 10/20/17  0828   CR 1.23          Passed - Recent (12 mo) or future (30 days) visit within the authorizing provider's " "specialty    Patient had office visit in the last 12 months or has a visit in the next 30 days with authorizing provider or within the authorizing provider's specialty.  See \"Patient Info\" tab in inbasket, or \"Choose Columns\" in Meds & Orders section of the refill encounter.      LOV: 02/21/2019         Passed - Medication is active on med list       Passed - Patient is age 18 or older       #90 x 1 refill sent on 2/1/19 - PHARMACY CHANGE         losartan (COZAAR) 100 MG tablet [Pharmacy Med Name: LOSARTAN POTASSIUM 100 MG TAB] 90 tablet 1     Sig: TAKE 1 TABLET BY MOUTH EVERY DAY    Last Refill:   losartan (COZAAR) 100 MG tablet 90 tablet 1 2/21/2019  No   Sig - Route: Take 1 tablet (100 mg) by mouth daily - Oral     Angiotensin-II Receptors Failed - 3/3/2019  8:33 AM       Failed - Blood pressure under 140/90 in past 12 months    BP Readings from Last 3 Encounters:   02/21/19 (!) 170/110   12/28/18 168/80   09/11/18 (!) 156/100          Failed - Normal serum creatinine on file in past 12 months    Recent Labs   Lab Test 10/20/17  0828   CR 1.23          Failed - Normal serum potassium on file in past 12 months    Recent Labs   Lab Test 10/20/17  0828   POTASSIUM 4.0           Passed - Recent (12 mo) or future (30 days) visit within the authorizing provider's specialty    Patient had office visit in the last 12 months or has a visit in the next 30 days with authorizing provider or within the authorizing provider's specialty.  See \"Patient Info\" tab in inbasket, or \"Choose Columns\" in Meds & Orders section of the refill encounter.      LOV: 02/21/2019         Passed - Medication is active on med list       Passed - Patient is age 18 or older        #90 x 1 refill sent on 2/1/19 - PHARMACY CHANGE    02/21/2019 OV Notes:    Return in about 1 week (around 2/28/2019) for Blood Pressure Recheck- and med ccheck in ~3 months    Patient due for BP check (with either RN or in pharmacy) - no future appt scheduled    Called " patient @ [phone] - patient stated he will check his schedule and call back to schedule.   Patient stated he does not need these refilled.       Asmita Marinelli RN  [facility]

## 2019-03-05 NOTE — TELEPHONE ENCOUNTER
Prescription(s) signed and in the [facility].  Please process and notify the patient, if needed.

## 2019-03-07 DIAGNOSIS — M54.40 CHRONIC LEFT-SIDED LOW BACK PAIN WITH SCIATICA, SCIATICA LATERALITY UNSPECIFIED: ICD-10-CM

## 2019-03-07 DIAGNOSIS — M54.2 NECK PAIN, BILATERAL: ICD-10-CM

## 2019-03-07 DIAGNOSIS — G89.29 CHRONIC LEFT-SIDED LOW BACK PAIN WITH SCIATICA, SCIATICA LATERALITY UNSPECIFIED: ICD-10-CM

## 2019-03-07 NOTE — TELEPHONE ENCOUNTER
Reason for Call:  Medication or medication refill:    Do you use a [facility]?  Name of the pharmacy and phone number for the current request:  WRITTEN RX TO  AT     Name of the medication requested:   xyCODONE (ROXICODONE) 5 MG tablet 5 mg         Other request: Please call pt to see if he needs an appt. He is in a lot of pain.     Can we leave a detailed message on this number? YES    Phone number patient can be reached at: Cell number on file:    Telephone Information:   Mobile [phone]       Best Time: ANY      Call taken on 3/7/2019 at 1:50 PM by Swati Richards

## 2019-03-07 NOTE — TELEPHONE ENCOUNTER
Controlled Substance Refill Request for Oxycodone  Problem List Complete:  No     PROVIDER TO CONSIDER COMPLETION OF PROBLEM LIST AND OVERVIEW/CONTROLLED SUBSTANCE AGREEMENT    Last Written Prescription Date:  2/22/2019  Last Fill Quantity: 60,   # refills: 0    [facility] with [facility]: 2/21/2019    Future Office visit:     Controlled substance agreement:   Encounter-Level CSA - 11/16/2017:    Controlled Substance Agreement - Scan on 12/1/2017 10:43 AM: CONTROLLED SUBSTANCE AGREEMENT (below)       Encounter-Level CSA - 08/07/2015:    Controlled Substance Agreement - Scan on 8/19/2015 11:27 AM: Controlled Substance Agreement 08/07/15 (below)       Patient-Level CSA:    There are no patient-level csa.         Last Urine Drug Screen: No results found for: CDAUT, No results found for: COMDAT, No results found for: THC13, PCP13, COC13, MAMP13, OPI13, AMP13, BZO13, TCA13, MTD13, BAR13, OXY13, PPX13, BUP13     Processing:  Patient will  in clinic     [email or web address]       checked in past 3 months?  Yes 3/7/2019, last filled 2/22/2019 for #60.      Routing refill request to provider for review/approval because:  Drug not on the [facility] refill protocol       REJI Bonner, RN, N  [facility] [phone]

## 2019-03-08 PROBLEM — M54.2 NECK PAIN, BILATERAL: Status: ACTIVE | Noted: 2019-03-08

## 2019-03-08 RX ORDER — OXYCODONE HYDROCHLORIDE 5 MG/1
5 TABLET ORAL 3 TIMES DAILY PRN
Qty: 90 TABLET | Refills: 0 | Status: SHIPPED | OUTPATIENT
Start: 2019-03-08 | End: 2019-05-07

## 2019-03-08 NOTE — TELEPHONE ENCOUNTER
pulled - placed on MD RAIMNUDO desk    Routing to PCP for further review/recommendations/orders.    Asmita Marinelli RN  [place] [facility]

## 2019-03-08 NOTE — TELEPHONE ENCOUNTER
puneet and patient called and he has been taking 3-4 per day due to burning neck pain.    He has ongoing mood issues and has an appointment with psych.through pain addiction clinic.    I will refill his oxycodone up to 3 tabs per month for this month.     Prescription brought to our pharmacy

## 2019-03-08 NOTE — TELEPHONE ENCOUNTER
Controlled Substance Refill Request for oxyCODONE (ROXICODONE) 5 MG tablet  Problem List Complete:  Yes    Last Written Prescription Date:  2.21.19  Last Fill Quantity: 60,   # refills: 0      [place] [facility] [place]: 2.21.19    Future Office visit:     Controlled substance agreement:   Encounter-Level CSA - 11/16/2017:    Controlled Substance Agreement - Scan on 12/1/2017 10:43 AM: CONTROLLED SUBSTANCE AGREEMENT (below)       Encounter-Level CSA - 08/07/2015:    Controlled Substance Agreement - Scan on 8/19/2015 11:27 AM: Controlled Substance Agreement 08/07/15 (below)       Patient-Level CSA:    There are no patient-level csa.         Last Urine Drug Screen: No results found for: CDAUT, No results found for: COMDAT, No results found for: THC13, PCP13, COC13, MAMP13, OPI13, AMP13, BZO13, TCA13, MTD13, BAR13, OXY13, PPX13, BUP13     Processing:  Fax Rx to listed pharmacy    [email or web address]   checked in past 3 months?  Yes 2.21.19

## 2019-03-11 DIAGNOSIS — E29.1 HYPOGONADISM MALE: ICD-10-CM

## 2019-03-11 NOTE — TELEPHONE ENCOUNTER
Spoke to patient, he would like this mailed to [facility].  Mailed as requested.    Emerald Zuniga

## 2019-03-11 NOTE — TELEPHONE ENCOUNTER
Outpatient Medication Detail      Disp Refills Start End COLLETTE   testosterone cypionate (DEPOTESTOSTERONE) 200 MG/ML injection 10 mL 0 2/21/2019  No   Sig: INJECT 0.5ML INTO THE MUSCLE ONCE WEEKLY     Problem List Complete:  Yes    Last Office Visit with [facility] primary care provider: 02/21/2019    Future Office visit:     Controlled substance agreement:   Encounter-Level CSA - 11/16/2017:    Controlled Substance Agreement - Scan on 12/1/2017 10:43 AM: CONTROLLED SUBSTANCE AGREEMENT (below)       Encounter-Level CSA - 08/07/2015:    Controlled Substance Agreement - Scan on 8/19/2015 11:27 AM: Controlled Substance Agreement 08/07/15 (below)       Patient-Level CSA:    There are no patient-level csa.         Last Urine Drug Screen: No results found for: CDAUT, No results found for: COMDAT, No results found for: THC13, PCP13, COC13, MAMP13, OPI13, AMP13, BZO13, TCA13, MTD13, BAR13, OXY13, PPX13, BUP13     Processing:  [facility] - [facility]    [email or web address]      Routing refill request to provider for review/approval because:  Drug not on the [facility] refill protocol         Asmita Marinelli RN  [place] [facility]

## 2019-03-12 RX ORDER — TESTOSTERONE CYPIONATE 200 MG/ML
INJECTION, SOLUTION INTRAMUSCULAR
Qty: 10 ML | Refills: 0 | Status: SHIPPED | OUTPATIENT
Start: 2019-03-12 | End: 2019-07-25

## 2019-03-12 NOTE — TELEPHONE ENCOUNTER
Prescription(s) signed and in the [facility].  Please process and notify the patient, if needed.

## 2019-03-21 DIAGNOSIS — G47.00 INSOMNIA, UNSPECIFIED TYPE: ICD-10-CM

## 2019-03-22 RX ORDER — ZOLPIDEM TARTRATE 10 MG/1
TABLET ORAL
Qty: 30 TABLET | Refills: 1 | Status: SHIPPED | OUTPATIENT
Start: 2019-03-22 | End: 2019-06-05

## 2019-03-22 NOTE — TELEPHONE ENCOUNTER
Prescription(s) signed and in the [facility].  Please process and notify the patient, if needed.

## 2019-03-25 ENCOUNTER — TELEPHONE (OUTPATIENT)
Dept: FAMILY MEDICINE | Facility: CLINIC | Age: 49
End: 2019-03-25

## 2019-03-25 DIAGNOSIS — R06.6 HICCOUGH: ICD-10-CM

## 2019-03-25 DIAGNOSIS — R06.6 INTRACTABLE HICCUPS: Primary | ICD-10-CM

## 2019-03-25 DIAGNOSIS — G47.00 INSOMNIA, UNSPECIFIED TYPE: ICD-10-CM

## 2019-03-25 DIAGNOSIS — M54.2 NECK PAIN: ICD-10-CM

## 2019-03-25 DIAGNOSIS — M54.9 UPPER BACK PAIN ON RIGHT SIDE: ICD-10-CM

## 2019-03-25 RX ORDER — BACLOFEN 10 MG/1
10 TABLET ORAL 3 TIMES DAILY PRN
Qty: 50 TABLET | Refills: 0 | Status: SHIPPED | OUTPATIENT
Start: 2019-03-25 | End: 2019-03-25

## 2019-03-25 RX ORDER — CYCLOBENZAPRINE HCL 10 MG
10 TABLET ORAL 3 TIMES DAILY PRN
Qty: 30 TABLET | Refills: 0 | Status: SHIPPED | OUTPATIENT
Start: 2019-03-25 | End: 2019-05-07

## 2019-03-25 NOTE — TELEPHONE ENCOUNTER
Pt reports the baclofen does not work well. He would prefer the flexeril if able. If RL wants him to try Baclofen first then he is willing.      Amelia Flores RN  [facility]

## 2019-03-25 NOTE — TELEPHONE ENCOUNTER
Called pharmacy and advised.      The patient indicates understanding of these issues and agrees with the plan.    Amelia Flores RN  [facility]

## 2019-03-25 NOTE — TELEPHONE ENCOUNTER
Reason for call:  Patient reporting a symptom    Symptom or request: Hiccups    Duration (how long have symptoms been present): 3 days    Have you been treated for this before? Yes    Additional comments: Patient states he has had hiccups for 8 months in the past and cyclobenzaprine resolved the symptoms.    Phone Number patient can be reached at:  Cell number on file:    Telephone Information:   Mobile [phone]       Best Time:  anytime    Can we leave a detailed message on this number:  YES    Call taken on 3/25/2019 at 8:38 AM by Jude GONZALES

## 2019-03-25 NOTE — TELEPHONE ENCOUNTER
"Concern - Hiccups  Onset: 3 days    Description:   hiccups    Intensity: moderate    Progression of Symptoms:  worsening    Accompanying Signs & Symptoms:  Nothing    Previous history of similar problem:   Yes, he has had persistent hiccups int he past.  He has history of hiccups for 8 months.    Precipitating factors:   Worsened by: Nothing    Alleviating factors:  Improved by: Flexeril    Therapies Tried and outcome: Holding breath, anti-gas       Flexeril has helped in the past. He said he has had a problem with hiccups since he was in his 20's.    He said his daughter is in labor right now and he does not want to go there \"like this\".        Routing to RL to review and advise.      Gemma Hartley, REJI, RN, N  [facility]  ) [phone]        "

## 2019-03-25 NOTE — TELEPHONE ENCOUNTER
This was just filled 3/22/19    Was faxed per chart. Routing to [facility] to resend if able.     Amelia Flores RN  [place] [facility]

## 2019-03-26 ENCOUNTER — TELEPHONE (OUTPATIENT)
Dept: PALLIATIVE MEDICINE | Facility: CLINIC | Age: 49
End: 2019-03-26

## 2019-03-26 DIAGNOSIS — F11.29 OPIOID DEPENDENCE WITH OPIOID-INDUCED DISORDER (H): Primary | ICD-10-CM

## 2019-03-26 RX ORDER — ZOLPIDEM TARTRATE 10 MG/1
TABLET ORAL
Qty: 30 TABLET | Refills: 0 | OUTPATIENT
Start: 2019-03-26

## 2019-03-26 NOTE — TELEPHONE ENCOUNTER
Appears this was managed by Matilde Berg, PAMELA  Of note, subutex is like suboxone, so consult be for addiction- so agree that he needs to see addiction provider first.    Alexa Ascencio MD  [facility]

## 2019-03-26 NOTE — TELEPHONE ENCOUNTER
refaxed prescription from 3/22/19 to [facility].  Please deny this request and close.    Emerald Zuniga

## 2019-03-26 NOTE — TELEPHONE ENCOUNTER
Order came in for a New Eval from Dr Luis Armando Segovia on 2/21. Pt called in today to schedule but was recently seeing Dr Aj for Subutex. Pt stated he is going thru withdraw and wants to be seen asap. Please review order.      Jacinda ROCK    [facility] [place]

## 2019-03-26 NOTE — TELEPHONE ENCOUNTER
Pain Management Center Referral      1. Confirmed address with patient? Yes  2. Confirmed phone number with patient? Yes  3. Confirmed referring provider? Yes  4. Is the PCP the same as the referring provider? Yes  5. Has the patient been to any previous pain clinics? Yes  (If yes, send JESS with welcome letter)  6. Which insurance are we to bill for this appointment?  Medicare    7. Informed pt of cancellation (48 hour) policy? Yes    REGARDING OPIOID MEDICATIONS: We will always address appropriateness of opioid pain medications, but we generally will not automatically take on a prescribing role. When we do take on prescribing of opioids for chronic pain, it is in collaboration with the referring physician for an intermediate period of time (months), with an expectation that the primary physician or provider will assume the prescribing role if medications are effective at stable doses with demonstrated compliance. Therefore, please do not assume that your prescribing responsibilities end on the day of pain clinic consultation.  8. Informed pt of prescribing policy? Yes    9.Please be aware that once you are established with a pain provider and location, you will need to continue have all future visits with that provider and location. It is best to determine what location is the most convenient for you and schedule with that one.    ** PATIENT INFORMED OF THIS POLICY Yes      9. Referring Provider: Luis Armando Segovia     10. Criteria for Triage Eval:   -Missed/Failed 1st DUAL appointment? N/A   -Medication Focused? N/A   -Mental Health Concerns? (e.g. Recent psych hospitalization/snap shot)? N/A   -Active substance abuse? N/A   -Patient behaviors (e.g. Offensive language/raised voice)? N/A

## 2019-03-26 NOTE — TELEPHONE ENCOUNTER
Patient called to schedule new patient evaluation. He told our  that he was in withdrawal and needed to be seen today. I called the patient. He states that he is in withdrawal and needs Subutex ASAP. I explained that this clinic does not prescribe Subutex and that I would not be able to continue his Oxycodone without a drug screen. I also expressed concern that he had gone through a 30 day prescription for Oxycodone in less than 2 weeks.     I strongly and repeatedly advised that he go to the ED for opiate withdrawal but he stated that he cannot afford it and would not go. He stated that he does not want to continue with oxycodone but would like to go back on Subutex as soon as possible.     I told him that I would request that his PCP Dr Segovia place an Addiction Medicine referral. I let him know that it could be a couple of weeks before he could get into that clinic. I also advised that he can talk with PCP about medication to help with opiate withdrawal (Clonidine).     I informed him that he is welcome to come in for Comprehensive pain eval however I cannot start the Subutex he wants.     I am happy to see him to consider comprehensive pain management when his Subutex is managed.     Will route high priority to Dr Segovia to write referral to Addiction Medicine team.     RAINA Clement, NP-C  [facility]

## 2019-03-27 ENCOUNTER — OFFICE VISIT (OUTPATIENT)
Dept: ADDICTION MEDICINE | Facility: CLINIC | Age: 49
End: 2019-03-27
Payer: MEDICARE

## 2019-03-27 VITALS
RESPIRATION RATE: 16 BRPM | HEIGHT: 72 IN | OXYGEN SATURATION: 99 % | BODY MASS INDEX: 33.18 KG/M2 | WEIGHT: 245 LBS | DIASTOLIC BLOOD PRESSURE: 88 MMHG | HEART RATE: 84 BPM | SYSTOLIC BLOOD PRESSURE: 143 MMHG

## 2019-03-27 DIAGNOSIS — F41.1 GENERALIZED ANXIETY DISORDER: ICD-10-CM

## 2019-03-27 DIAGNOSIS — F41.0 PANIC DISORDER WITHOUT AGORAPHOBIA: ICD-10-CM

## 2019-03-27 DIAGNOSIS — M54.2 NECK PAIN, BILATERAL: ICD-10-CM

## 2019-03-27 DIAGNOSIS — F11.20 UNCOMPLICATED OPIOID DEPENDENCE (H): Primary | ICD-10-CM

## 2019-03-27 DIAGNOSIS — F90.9 ATTENTION DEFICIT HYPERACTIVITY DISORDER (ADHD), UNSPECIFIED ADHD TYPE: ICD-10-CM

## 2019-03-27 DIAGNOSIS — M54.42 LEFT-SIDED LOW BACK PAIN WITH LEFT-SIDED SCIATICA, UNSPECIFIED CHRONICITY: ICD-10-CM

## 2019-03-27 DIAGNOSIS — R29.898 WEAKNESS OF LEFT FOOT: ICD-10-CM

## 2019-03-27 DIAGNOSIS — F33.1 MAJOR DEPRESSIVE DISORDER, RECURRENT EPISODE, MODERATE (H): ICD-10-CM

## 2019-03-27 LAB
AMPHETAMINES UR QL: ABNORMAL NG/ML
BARBITURATES UR QL SCN: NOT DETECTED NG/ML
BENZODIAZ UR QL SCN: ABNORMAL NG/ML
BUPRENORPHINE UR QL: NOT DETECTED NG/ML
CANNABINOIDS UR QL: NOT DETECTED NG/ML
COCAINE UR QL SCN: NOT DETECTED NG/ML
D-METHAMPHET UR QL: NOT DETECTED NG/ML
METHADONE UR QL SCN: NOT DETECTED NG/ML
OPIATES UR QL SCN: NOT DETECTED NG/ML
OXYCODONE UR QL SCN: ABNORMAL NG/ML
PCP UR QL SCN: NOT DETECTED NG/ML
PROPOXYPH UR QL: NOT DETECTED NG/ML
TRICYCLICS UR QL SCN: NOT DETECTED NG/ML

## 2019-03-27 PROCEDURE — 80306 DRUG TEST PRSMV INSTRMNT: CPT | Performed by: PEDIATRICS

## 2019-03-27 PROCEDURE — 99205 OFFICE O/P NEW HI 60 MIN: CPT | Performed by: PEDIATRICS

## 2019-03-27 RX ORDER — BUPRENORPHINE AND NALOXONE 8; 2 MG/1; MG/1
1 FILM, SOLUBLE BUCCAL; SUBLINGUAL DAILY
Qty: 20 FILM | Refills: 0 | Status: SHIPPED | OUTPATIENT
Start: 2019-03-27 | End: 2019-04-16

## 2019-03-27 ASSESSMENT — MIFFLIN-ST. JEOR
SCORE: 2009.31
SCORE: 2007.04

## 2019-03-27 NOTE — TELEPHONE ENCOUNTER
Attempt #1  Called patient @ [phone] - Left a non-detailed message to call back and speak with any triage nurse.    Asmita Marinelli RN  [place] [facility]
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
## 2019-03-27 NOTE — PATIENT INSTRUCTIONS
Start with 1/2 film daily until follow up.     Call Friday am [phone]    Call sooner with concerns.

## 2019-03-28 NOTE — TELEPHONE ENCOUNTER
Patient presented in the Pain Center on Wed 3/27/19. He was in significant opiate withdrawal. Reviewed that I cannot prescribe Subutex. I spoke with Dr Carreon in Addiction Med and she was able to emergently work him into her schedule. Please see her note for further information    RAINA Clement, NP-C  [facility]

## 2019-04-10 ENCOUNTER — TELEPHONE (OUTPATIENT)
Dept: FAMILY MEDICINE | Facility: CLINIC | Age: 49
End: 2019-04-10

## 2019-04-10 ASSESSMENT — PATIENT HEALTH QUESTIONNAIRE - PHQ9
5. POOR APPETITE OR OVEREATING: NOT AT ALL
SUM OF ALL RESPONSES TO PHQ QUESTIONS 1-9: 0

## 2019-04-10 ASSESSMENT — ANXIETY QUESTIONNAIRES
5. BEING SO RESTLESS THAT IT IS HARD TO SIT STILL: NOT AT ALL
2. NOT BEING ABLE TO STOP OR CONTROL WORRYING: NOT AT ALL
6. BECOMING EASILY ANNOYED OR IRRITABLE: NOT AT ALL
3. WORRYING TOO MUCH ABOUT DIFFERENT THINGS: NOT AT ALL
IF YOU CHECKED OFF ANY PROBLEMS ON THIS QUESTIONNAIRE, HOW DIFFICULT HAVE THESE PROBLEMS MADE IT FOR YOU TO DO YOUR WORK, TAKE CARE OF THINGS AT HOME, OR GET ALONG WITH OTHER PEOPLE: NOT DIFFICULT AT ALL
7. FEELING AFRAID AS IF SOMETHING AWFUL MIGHT HAPPEN: NOT AT ALL
GAD7 TOTAL SCORE: 0
1. FEELING NERVOUS, ANXIOUS, OR ON EDGE: NOT AT ALL

## 2019-04-10 NOTE — TELEPHONE ENCOUNTER
The patient indicates understanding of these issues and agrees with the plan.  Pt does not need a refill at this time. Pt is using the Symbicort twice daily.  Amelia Flores RN  [facility]

## 2019-04-10 NOTE — TELEPHONE ENCOUNTER
This is better but not quite to the goal >19.  Will recheck this in ~ 1 month.  Please check to see if he is taking his Symbicort twice daily

## 2019-04-10 NOTE — TELEPHONE ENCOUNTER
LOV 2/21/2019      ACT Total Scores 2/22/2018 2/21/2019 4/10/2019   ACT TOTAL SCORE - - -   ASTHMA ER VISITS - - -   ASTHMA HOSPITALIZATIONS - - -   ACT TOTAL SCORE (Goal Greater than or Equal to 20) 20 13 19   In the past 12 months, how many times did you visit the emergency room for your asthma without being admitted to the hospital? 0 0 0   In the past 12 months, how many times were you hospitalized overnight because of your asthma? 0 0 0     Please review and advise     Thank you     Kari Mcleod RN, BSN  [facility]

## 2019-04-11 ASSESSMENT — ASTHMA QUESTIONNAIRES: ACT_TOTALSCORE: 19

## 2019-04-11 ASSESSMENT — ANXIETY QUESTIONNAIRES: GAD7 TOTAL SCORE: 0

## 2019-04-16 ENCOUNTER — TELEPHONE (OUTPATIENT)
Dept: ADDICTION MEDICINE | Facility: CLINIC | Age: 49
End: 2019-04-16

## 2019-04-16 DIAGNOSIS — F11.20 UNCOMPLICATED OPIOID DEPENDENCE (H): ICD-10-CM

## 2019-04-16 RX ORDER — BUPRENORPHINE AND NALOXONE 8; 2 MG/1; MG/1
1 FILM, SOLUBLE BUCCAL; SUBLINGUAL DAILY
Qty: 8 FILM | Refills: 0 | Status: SHIPPED | OUTPATIENT
Start: 2019-04-16 | End: 2019-04-24

## 2019-04-16 NOTE — TELEPHONE ENCOUNTER
Refill for: SUBOXONE    Last Appointment: 3/27/19    Next Appointment: 4/24/19    No Shows/Cancellations since last appointment: Late Cancel 4/1/19    Last Refill in Epic (date and amount/how many days):    Disp Refills Start End COLLETTE   buprenorphine HCl-naloxone HCl (SUBOXONE) 8-2 MG per film 20 Film 0 3/27/2019  --   Sig - Route: Place 1 Film under the tongue daily - Sublingual     Most Recent UDS results: 3/27/19 POS for Amphetamine, Benzodiazepines, and oxycodone     reviewed and summarized below:   Fill Date Written   Drug    Qty Days Prescriber  04/07/2019 03/04/2019 Lorazepam 1 Mg Tablet  60 30 Ro Leh  03/27/2019 03/27/2019 Buprenorp-Nalox 8-2 Mg Sl Film 3 3 Ei Bur   03/27/2019 03/27/2019 Buprenorp-Nalox 8-2 Mg Sl Film 17 20 Ei Bur     Rx pended with appropriate QTY for bridge. Routing to provider for review and approval.

## 2019-04-16 NOTE — TELEPHONE ENCOUNTER
Reason for Call:  Medication or medication refill:    Do you use a [facility]?  Name of the pharmacy and phone number for the current request:  [facility] [place] tel: [phone]    Name of the medication requested: Suboxone bridge     Other request: pt will run out of med tomorrow, and will need a bride until his next appt on 4/24.     Can we leave a detailed message on this number? YES    Phone number patient can be reached at: Home number on file [phone] (home)    Best Time: anytimw     Call taken on 4/16/2019 at 12:16 PM by Otis Escobar

## 2019-04-24 ENCOUNTER — OFFICE VISIT (OUTPATIENT)
Dept: ADDICTION MEDICINE | Facility: CLINIC | Age: 49
End: 2019-04-24
Payer: MEDICARE

## 2019-04-24 VITALS
OXYGEN SATURATION: 98 % | WEIGHT: 245.5 LBS | BODY MASS INDEX: 33.3 KG/M2 | HEART RATE: 88 BPM | RESPIRATION RATE: 16 BRPM | DIASTOLIC BLOOD PRESSURE: 86 MMHG | SYSTOLIC BLOOD PRESSURE: 132 MMHG

## 2019-04-24 DIAGNOSIS — F11.20 UNCOMPLICATED OPIOID DEPENDENCE (H): ICD-10-CM

## 2019-04-24 DIAGNOSIS — F33.1 MAJOR DEPRESSIVE DISORDER, RECURRENT EPISODE, MODERATE (H): ICD-10-CM

## 2019-04-24 DIAGNOSIS — F41.0 PANIC DISORDER WITHOUT AGORAPHOBIA: ICD-10-CM

## 2019-04-24 DIAGNOSIS — F41.1 GENERALIZED ANXIETY DISORDER: ICD-10-CM

## 2019-04-24 DIAGNOSIS — F90.9 ATTENTION DEFICIT HYPERACTIVITY DISORDER (ADHD), UNSPECIFIED ADHD TYPE: ICD-10-CM

## 2019-04-24 DIAGNOSIS — M54.42 LEFT-SIDED LOW BACK PAIN WITH LEFT-SIDED SCIATICA, UNSPECIFIED CHRONICITY: ICD-10-CM

## 2019-04-24 DIAGNOSIS — M54.2 NECK PAIN, BILATERAL: Primary | ICD-10-CM

## 2019-04-24 DIAGNOSIS — R29.898 WEAKNESS OF LEFT FOOT: ICD-10-CM

## 2019-04-24 LAB
AMPHETAMINES UR QL: NOT DETECTED NG/ML
BARBITURATES UR QL SCN: NOT DETECTED NG/ML
BENZODIAZ UR QL SCN: ABNORMAL NG/ML
BUPRENORPHINE UR QL: ABNORMAL NG/ML
CANNABINOIDS UR QL: NOT DETECTED NG/ML
COCAINE UR QL SCN: NOT DETECTED NG/ML
D-METHAMPHET UR QL: NOT DETECTED NG/ML
METHADONE UR QL SCN: NOT DETECTED NG/ML
OPIATES UR QL SCN: NOT DETECTED NG/ML
OXYCODONE UR QL SCN: NOT DETECTED NG/ML
PCP UR QL SCN: NOT DETECTED NG/ML
PROPOXYPH UR QL: NOT DETECTED NG/ML
TRICYCLICS UR QL SCN: NOT DETECTED NG/ML

## 2019-04-24 PROCEDURE — 99215 OFFICE O/P EST HI 40 MIN: CPT | Performed by: PEDIATRICS

## 2019-04-24 PROCEDURE — 80306 DRUG TEST PRSMV INSTRMNT: CPT | Performed by: PEDIATRICS

## 2019-04-24 RX ORDER — ONDANSETRON 4 MG/1
4 TABLET, ORALLY DISINTEGRATING ORAL EVERY 8 HOURS PRN
Qty: 30 TABLET | Refills: 0 | Status: SHIPPED | OUTPATIENT
Start: 2019-04-24 | End: 2020-06-05

## 2019-04-24 RX ORDER — BUPRENORPHINE AND NALOXONE 8; 2 MG/1; MG/1
FILM, SOLUBLE BUCCAL; SUBLINGUAL
Qty: 15 FILM | Refills: 0 | Status: SHIPPED | OUTPATIENT
Start: 2019-04-24 | End: 2019-05-24
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
## 2019-05-02 ENCOUNTER — TELEPHONE (OUTPATIENT)
Dept: ADDICTION MEDICINE | Facility: CLINIC | Age: 49
End: 2019-05-02

## 2019-05-02 DIAGNOSIS — F33.1 MAJOR DEPRESSIVE DISORDER, RECURRENT EPISODE, MODERATE (H): Primary | ICD-10-CM

## 2019-05-02 DIAGNOSIS — F41.0 PANIC DISORDER WITHOUT AGORAPHOBIA: ICD-10-CM

## 2019-05-02 DIAGNOSIS — F41.1 GENERALIZED ANXIETY DISORDER: ICD-10-CM

## 2019-05-02 DIAGNOSIS — F90.9 ATTENTION DEFICIT HYPERACTIVITY DISORDER (ADHD), UNSPECIFIED ADHD TYPE: ICD-10-CM

## 2019-05-02 NOTE — TELEPHONE ENCOUNTER
Reason for Call:  Referral    Detailed comments: Pt called to let Dr. Carreon know that he hadn't received any calls from either pain management and/or psychiatry to schedule. Writer did transfer pt over to pain clinic to schedule. There is not a referral in the pt's chart for psychiatry. Per Dr. Carreon's last office visit note, pt was advised of needing pain management and psychiatry. Pt isn't sure what to do or where to go since most psychiatry needs a referral.    Phone Number Patient can be reached at: Home number on file [phone] (home)    Best Time: amytime    Can we leave a detailed message on this number? YES    Call taken on 5/2/2019 at 4:27 PM by Christine Waiet

## 2019-05-03 NOTE — TELEPHONE ENCOUNTER
Patient has an appt on 5/7 with Matilde Berg in pain management?  He had appt for psychiatry that he had missed just prior to my last appt and I encouraged him to reschedule that appt?

## 2019-05-03 NOTE — TELEPHONE ENCOUNTER
Patient scheduled the appointment with Matilde after Christine spoke with him but before this encounter was routed to provider (encounter started 5/2 but routed to provider on 5/3).    Writer is unable to locate a referral or record of psychiatric appointment in the [facility] system.    Writer called patient to discuss the situation.  Patient did not answer.  Voicemail left for him to return our call.      When he calls back we need to know where would have seen the psychiatrist a couple of days before Dr. Carreon's appointment with him (that he said he missed). It seems this was with an outside provider so he should not need a referral, he just needs to call them and reschedule the appointment.

## 2019-05-03 NOTE — TELEPHONE ENCOUNTER
5-3-2019 at 2:26 PM  Called patient and relayed the eval appointment reminder info (along with the 8:30 early arrival for paperwork) to patient.    Cara Marietta  Patient Representative  [facility] [place]
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
## 2019-05-07 ENCOUNTER — OFFICE VISIT (OUTPATIENT)
Dept: PALLIATIVE MEDICINE | Facility: CLINIC | Age: 49
End: 2019-05-07
Payer: MEDICARE

## 2019-05-07 VITALS
HEART RATE: 62 BPM | OXYGEN SATURATION: 97 % | BODY MASS INDEX: 33.91 KG/M2 | WEIGHT: 250 LBS | SYSTOLIC BLOOD PRESSURE: 188 MMHG | DIASTOLIC BLOOD PRESSURE: 95 MMHG

## 2019-05-07 DIAGNOSIS — G43.109 MIGRAINE WITH AURA AND WITHOUT STATUS MIGRAINOSUS, NOT INTRACTABLE: Primary | ICD-10-CM

## 2019-05-07 DIAGNOSIS — F41.9 ANXIETY: ICD-10-CM

## 2019-05-07 DIAGNOSIS — M54.2 NECK PAIN: ICD-10-CM

## 2019-05-07 DIAGNOSIS — M54.9 UPPER BACK PAIN ON RIGHT SIDE: ICD-10-CM

## 2019-05-07 DIAGNOSIS — F43.10 PTSD (POST-TRAUMATIC STRESS DISORDER): ICD-10-CM

## 2019-05-07 DIAGNOSIS — F32.2 CURRENT SEVERE EPISODE OF MAJOR DEPRESSIVE DISORDER WITHOUT PSYCHOTIC FEATURES, UNSPECIFIED WHETHER RECURRENT (H): ICD-10-CM

## 2019-05-07 DIAGNOSIS — M62.838 MUSCLE SPASM: ICD-10-CM

## 2019-05-07 PROCEDURE — 99215 OFFICE O/P EST HI 40 MIN: CPT | Performed by: NURSE PRACTITIONER

## 2019-05-07 RX ORDER — TIZANIDINE 2 MG/1
2-4 TABLET ORAL 3 TIMES DAILY
Qty: 90 TABLET | Refills: 1 | Status: SHIPPED | OUTPATIENT
Start: 2019-05-07 | End: 2019-05-24

## 2019-05-07 RX ORDER — CYCLOBENZAPRINE HCL 10 MG
10 TABLET ORAL 3 TIMES DAILY PRN
Qty: 30 TABLET | Refills: 1 | Status: SHIPPED | OUTPATIENT
Start: 2019-05-07 | End: 2019-06-14

## 2019-05-07 ASSESSMENT — PAIN SCALES - GENERAL: PAINLEVEL: EXTREME PAIN (8)

## 2019-05-07 NOTE — TELEPHONE ENCOUNTER
"I spoke to Jeremi. He has his appointment with pain today. He stated he's been waiting for MH appointment, though I don't see that a MH referral was placed. I will check with the Intake team to be sure.  Patient stated \"that (mental health) was my main concern.\" He voiced his frustration and asked that I reach out to his PCP to request an urgent MH referral gets placed today.     I gave the patient the option of a referral to [facility] for psychiatry, but he declined. \"No I want to stay in [place].\"         "

## 2019-05-07 NOTE — PATIENT INSTRUCTIONS
After Visit Instructions:     Thank you for coming to [facility] for your care. It is my goal to partner with you to help you reach your optimal state of health.     Continue daily self-care, identifying contributing factors, and monitoring variations in pain level. Continue to integrate self-care into your life.      1. Schedule pain psychology assessment with Dr ARNEL Mcelroy   2. Schedule follow-up with RAINA Hernandez, NP-C tomorrow.   3. Procedures recommended: Botox injections. We will submit prior authorization and let you know when it is approved.    4. You have been advised to go to the Emergency Room for high blood pressure  5. Medication recommendations:   1. Tizanidine 2-4 mg three times daily. Do not take with Cyclobenzaprine  2. Cyclobenzaprine 10 mg: take at bedtime if not taking tizaidine.       RAINA Clement, NP-C  [facility]  [facility]  Clinic Number:  [phone]

## 2019-05-13 ENCOUNTER — TELEPHONE (OUTPATIENT)
Dept: PSYCHIATRY | Facility: CLINIC | Age: 49
End: 2019-05-13
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
## 2019-05-14 DIAGNOSIS — F41.0 PANIC DISORDER WITHOUT AGORAPHOBIA: ICD-10-CM

## 2019-05-14 DIAGNOSIS — F41.1 GENERALIZED ANXIETY DISORDER: ICD-10-CM

## 2019-05-15 RX ORDER — LORAZEPAM 1 MG/1
TABLET ORAL
Qty: 60 TABLET | Refills: 0 | Status: SHIPPED | OUTPATIENT
Start: 2019-05-15 | End: 2019-06-13

## 2019-05-15 NOTE — TELEPHONE ENCOUNTER
Prescription(s) signed and in the [facility].  Please process and notify the patient, if needed.

## 2019-05-15 NOTE — TELEPHONE ENCOUNTER
Routing refill request to provider for review/approval because:  Drug not on the [facility] refill protocol     Kari Mcleod RN, BSN  [facility]

## 2019-05-15 NOTE — TELEPHONE ENCOUNTER
LORazepam (ATIVAN) 1 MG tablet          Last Written Prescription Date:  3.4.19  Last Fill Quantity: 60 tablet,   # refills: 0  Last Office Visit: Luis Armando Segovia MD 2.21.19    Future Office visit:    Next 5 appointments (look out 90 days)    May 22, 2019  2:00 PM CDT  Return Visit with RAINA Gill CNP  [facility] ([facility]) [address]  [address]  [facility] [address]  [phone]   May 24, 2019  2:00 PM CDT  Return Visit with Cleopatra Carreon MD  [facility] ([facility]) [address]  [address]  [facility] [address]  [phone]           Routing refill request to provider for review/approval because:  Drug not on the [facility], [facility] or  Health refill protocol or controlled substance

## 2019-05-22 ENCOUNTER — OFFICE VISIT (OUTPATIENT)
Dept: PALLIATIVE MEDICINE | Facility: CLINIC | Age: 49
End: 2019-05-22
Payer: MEDICARE

## 2019-05-22 ENCOUNTER — OFFICE VISIT (OUTPATIENT)
Dept: PALLIATIVE MEDICINE | Facility: CLINIC | Age: 49
End: 2019-05-22
Attending: NURSE PRACTITIONER
Payer: MEDICARE

## 2019-05-22 VITALS — SYSTOLIC BLOOD PRESSURE: 192 MMHG | OXYGEN SATURATION: 93 % | DIASTOLIC BLOOD PRESSURE: 111 MMHG | HEART RATE: 69 BPM

## 2019-05-22 DIAGNOSIS — M54.12 CERVICAL RADICULOPATHY: Primary | ICD-10-CM

## 2019-05-22 DIAGNOSIS — M62.838 SPASM OF MUSCLE: ICD-10-CM

## 2019-05-22 DIAGNOSIS — R51.9 CHRONIC DAILY HEADACHE: ICD-10-CM

## 2019-05-22 DIAGNOSIS — M54.81 OCCIPITAL NEURITIS: ICD-10-CM

## 2019-05-22 DIAGNOSIS — G43.109 MIGRAINE WITH AURA AND WITHOUT STATUS MIGRAINOSUS, NOT INTRACTABLE: ICD-10-CM

## 2019-05-22 DIAGNOSIS — M54.2 CERVICALGIA: ICD-10-CM

## 2019-05-22 DIAGNOSIS — M54.2 NECK PAIN, BILATERAL: Primary | ICD-10-CM

## 2019-05-22 PROCEDURE — 96150 HC HEALTH & BEHAVIOR ASSESS INITIAL, EA 15MIN: CPT | Performed by: PSYCHOLOGIST

## 2019-05-22 PROCEDURE — 99215 OFFICE O/P EST HI 40 MIN: CPT | Performed by: NURSE PRACTITIONER

## 2019-05-22 ASSESSMENT — PAIN SCALES - GENERAL: PAINLEVEL: EXTREME PAIN (8)
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
## 2019-05-22 NOTE — PATIENT INSTRUCTIONS
After Visit Instructions:     Thank you for coming to [facility] for your care. It is my goal to partner with you to help you reach your optimal state of health.     Continue daily self-care, identifying contributing factors, and monitoring variations in pain level. Continue to integrate self-care into your life.      1. Schedule pain psychology visit  2. Schedule physical therapy assessment  3. Schedule follow-up with RAINA Hernandez NP-C in as scheduled  4. Imaging: MRI of cervical spine   5. Procedures recommended:   1. We will discuss interventional options after MRI    2. Order for trigger point and occipital nerve blocks placed   6. Medication recommendations: No change at this time.     RAINA Clement NP-C  [facility]    Clinic Number:  [phone]   Call this number with any questions about your care and for scheduling assistance. Calls are returned Monday through Friday between 8 AM and 4:30 PM. We usually get back to you within 2 business days depending on the issue/request.       Medication refills:    For non-opioid medications, call your pharmacy directly to request a refill. The pharmacy will contact the [facility] for authorization. Please allow 3-4 days for these refills to be processed.     For opioid refills, call the clinic number or send a [employer] message. Please contact us 7-10 days before your refill is due. The message MUST include the name of the specific medication(s) requested and how you would like to receive the prescription(s). The options are as follows:    Pain Clinic staff can mail the prescription to your pharmacy. Please tell us the name of the pharmacy.    You may pick the prescription up at the Pain Clinic (tell us the location) or during a clinic visit with your pain provider    Pain Clinic staff can deliver the prescription to the [facility] pharmacy in the clinic building. Please  tell us the location.      We believe regular attendance is key to your success in our program.    Any time you are unable to keep your appointment we ask that you call us at least 24 hours in advance to let us know. This will allow us to offer the appointment time to another patient.

## 2019-05-23 DIAGNOSIS — J45.20 INTERMITTENT ASTHMA, UNCOMPLICATED: ICD-10-CM
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
## 2019-05-24 ENCOUNTER — OFFICE VISIT (OUTPATIENT)
Dept: ADDICTION MEDICINE | Facility: CLINIC | Age: 49
End: 2019-05-24
Payer: MEDICARE

## 2019-05-24 VITALS
OXYGEN SATURATION: 98 % | BODY MASS INDEX: 34.18 KG/M2 | DIASTOLIC BLOOD PRESSURE: 88 MMHG | HEART RATE: 77 BPM | SYSTOLIC BLOOD PRESSURE: 152 MMHG | RESPIRATION RATE: 14 BRPM | WEIGHT: 252 LBS

## 2019-05-24 DIAGNOSIS — F41.0 PANIC DISORDER WITHOUT AGORAPHOBIA: ICD-10-CM

## 2019-05-24 DIAGNOSIS — F41.1 GENERALIZED ANXIETY DISORDER: ICD-10-CM

## 2019-05-24 DIAGNOSIS — M54.2 NECK PAIN, BILATERAL: ICD-10-CM

## 2019-05-24 DIAGNOSIS — F90.9 ATTENTION DEFICIT HYPERACTIVITY DISORDER (ADHD), UNSPECIFIED ADHD TYPE: ICD-10-CM

## 2019-05-24 DIAGNOSIS — M54.42 LEFT-SIDED LOW BACK PAIN WITH LEFT-SIDED SCIATICA, UNSPECIFIED CHRONICITY: ICD-10-CM

## 2019-05-24 DIAGNOSIS — F33.1 MAJOR DEPRESSIVE DISORDER, RECURRENT EPISODE, MODERATE (H): ICD-10-CM

## 2019-05-24 DIAGNOSIS — F11.20 UNCOMPLICATED OPIOID DEPENDENCE (H): Primary | ICD-10-CM

## 2019-05-24 PROCEDURE — 99214 OFFICE O/P EST MOD 30 MIN: CPT | Performed by: PEDIATRICS

## 2019-05-24 PROCEDURE — 80306 DRUG TEST PRSMV INSTRMNT: CPT | Performed by: PEDIATRICS

## 2019-05-24 RX ORDER — BUPRENORPHINE AND NALOXONE 8; 2 MG/1; MG/1
FILM, SOLUBLE BUCCAL; SUBLINGUAL
Qty: 15 FILM | Refills: 0 | Status: SHIPPED | OUTPATIENT
Start: 2019-05-24 | End: 2019-06-26

## 2019-05-24 RX ORDER — METHOCARBAMOL 500 MG/1
500 TABLET, FILM COATED ORAL 3 TIMES DAILY
Qty: 45 TABLET | Refills: 0 | Status: SHIPPED | OUTPATIENT
Start: 2019-05-24 | End: 2019-07-29

## 2019-05-24 NOTE — TELEPHONE ENCOUNTER
Requested Prescriptions   Pending Prescriptions Disp Refills     VENTOLIN  (90 Base) MCG/ACT inhaler [Pharmacy Med Name: VENTOLIN HFA 90 MCG INHALER]      Last Written Prescription Date:  2.28.19  Last Fill Quantity: 25.5 g,  # refills: 3   Last office visit: 2/21/2019 with prescribing provider:  Luis Armando Segovia MD         Future Office Visit:   Next 5 appointments (look out 90 days)    May 24, 2019  2:00 PM CDT  Return Visit with Cleopatra Carreon MD  [facility] ([facility]) [address]  [address]  [facility] [address]  [phone]   Jul 10, 2019  1:00 PM CDT  Return Visit with Yomi Triplett, PhD [facility] ([facility]) [address]  [address]  [facility] [address]  [phone]   Jul 10, 2019  2:00 PM CDT  Return Visit with RAINA Gill [facility] ([facility]) [address]  [address]  [facility] [address]  [phone]             2     Sig: INHALE 2 PUFFS INTO THE LUNGS EVERY 4 HOURS AS NEEDED FOR SHORTNESS OF BREATH / DYSPNEA       Asthma Maintenance Inhalers - Anticholinergics Failed - 5/23/2019  8:02 PM        Failed - Asthma control assessment score within normal limits in last 6 months     Please review ACT score.     ACT Total Scores 2/22/2018 2/21/2019 4/10/2019   ACT TOTAL SCORE - - -   ASTHMA ER VISITS - - -   ASTHMA HOSPITALIZATIONS - - -   ACT TOTAL SCORE (Goal Greater than or Equal to 20) 20 13 19   In the past 12 months, how many times did you visit the emergency room for your asthma without being admitted to the hospital? 0 0 0   In the past 12 months, how many times were you hospitalized overnight because of your asthma? 0 0 0               Passed - Patient is age 12 years or older        Passed - Medication is active on med list        Passed - Recent (6 mo) or future (30 days) visit within the authorizing provider's  "specialty     Patient had office visit in the last 6 months or has a visit in the next 30 days with authorizing provider or within the authorizing provider's specialty.  See \"Patient Info\" tab in inbasket, or \"Choose Columns\" in Meds & Orders section of the refill encounter.            "
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
## 2019-05-28 ENCOUNTER — TELEPHONE (OUTPATIENT)
Dept: ADDICTION MEDICINE | Facility: CLINIC | Age: 49
End: 2019-05-28

## 2019-05-28 NOTE — TELEPHONE ENCOUNTER
Prior Authorization Retail Medication Request    Medication/Dose:  methocarbamol (ROBAXIN) 500 MG tablet  ICD code (if different than what is on RX):    Previously Tried and Failed:    Rationale:      Insurance Name:  Medicare   Insurance ID:  [id number]       Pharmacy Information (if different than what is on RX)  Name:  [facility]  Phone:  [phone]

## 2019-05-28 NOTE — TELEPHONE ENCOUNTER
PA Initiation    Medication: methocarbamol (ROBAXIN) 500 MG tablet- INITIATED  Insurance Company: [employer] - Phone [phone] Fax [phone]  Pharmacy Filling the Rx: [id number] [place] - [address]  Filling Pharmacy Phone: [phone]  Filling Pharmacy Fax:    Start Date: 5/28/2019

## 2019-05-28 NOTE — TELEPHONE ENCOUNTER
PRIOR AUTHORIZATION DENIED    Medication: methocarbamol (ROBAXIN) 500 MG tablet- DENIED    Denial Date: 5/28/2019    Denial Rational: Patient must have a history of trial & failure to the formulary alternative(s) or have a contraindication or intolerance to the formulary alternatives: chlorzoxazone 500mg        Appeal Information: If provider would like to appeal we will need a detailed letter of medical necessity to start the process. Then re-route this request back to the PA pool.

## 2019-05-29 RX ORDER — ALBUTEROL SULFATE 90 UG/1
AEROSOL, METERED RESPIRATORY (INHALATION)
Qty: 8.5 G | Refills: 0 | Status: SHIPPED | OUTPATIENT
Start: 2019-05-29 | End: 2019-07-08

## 2019-05-29 NOTE — TELEPHONE ENCOUNTER
ACT Total Scores 2/22/2018 2/21/2019 4/10/2019   ACT TOTAL SCORE - - -   ASTHMA ER VISITS - - -   ASTHMA HOSPITALIZATIONS - - -   ACT TOTAL SCORE (Goal Greater than or Equal to 20) 20 13 19   In the past 12 months, how many times did you visit the emergency room for your asthma without being admitted to the hospital? 0 0 0   In the past 12 months, how many times were you hospitalized overnight because of your asthma? 0 0 0     Due for an Office visit for further refills, only fill for 30 days     Kari Mcleod RN, BSN  [facility]

## 2019-05-29 NOTE — TELEPHONE ENCOUNTER
Patient must first try Clorzoxazone 500 mg or a letter of appeal stating he cannot try it for a medical reason. Routing to provider for review.

## 2019-05-31 ENCOUNTER — TELEPHONE (OUTPATIENT)
Dept: PALLIATIVE MEDICINE | Facility: CLINIC | Age: 49
End: 2019-05-31

## 2019-05-31 NOTE — TELEPHONE ENCOUNTER
Please check insurance coverage for botox.    Diagnosis: G43.109 Migraine with rashaad/without status migrainosus  How many days per month does the patient have migraine headaches? 30  How many hours per day do the migraines last? 24  What other treatments have been tried?   Botox: previously successful  Opiates: Codeine: NH, fentanyl: NH, hydrocodone: NH, hydromorphone: NH, oxycodone: H, tramadol: NH, buprenorphine:NH, Darvocet: H, Demerol: H  NSAIDS: Ibuprofen:[facility], Celebrex [place]  Muscle Relaxants: Baclofen: NH, cyclobenzaprine: H, methocarbamol: NH  Anti-migraine mediations: Fioricet: NH, rizatriptan: NH, tizanidine: NH, amitriptyline: NH topiramate: NH, Fiorinal: NH, nortriptyline: NH  Neuropathics: Gabapentin: , pregabalin: NH, topiramate: NH   Other medications not covered above: Medical cannabis: NH

## 2019-05-31 NOTE — TELEPHONE ENCOUNTER
Pt insured by Medicare - PA not available.     Per the Medicare Coverage Database web site (CMS.gov) the following criteria must be met for coverage.    Headache/Migraine  Coverage will only be allowed for those patients with chronic daily headaches (headache disorders occurring greater than 15 days a month - in many cases daily with a duration of four or more hours - for a period of at least 3 months) who have significant disability due to the headaches, and have been refractory to standard and usual conventional therapy. The etiology of the chronic daily headache may be chronic tension-type headache or chronic migraine (CM). CM is characterized by headache on > 15 days per month, of which at least 8 headache days per month meet criteria for migraine without aura or respond to migraine-specific treatment. For continuing Botulism toxin therapy the patients must demonstrate a significant decrease in the number and frequency of headaches and an improvement in function upon receiving Botulinum toxin.    Left VM for patient to return call, Medicare does not cover 100% and want to inform him of this. I am not sure what % they do cover but patient should call Medicare to find out. Also have some information to help with costs possibly.    Will await call back.      Keiry KITCHEN    [facility]

## 2019-06-05 DIAGNOSIS — G47.00 INSOMNIA, UNSPECIFIED TYPE: ICD-10-CM

## 2019-06-06 RX ORDER — ZOLPIDEM TARTRATE 10 MG/1
TABLET ORAL
Qty: 30 TABLET | Refills: 1 | Status: SHIPPED | OUTPATIENT
Start: 2019-06-06 | End: 2019-06-28

## 2019-06-06 NOTE — TELEPHONE ENCOUNTER
Requested Prescriptions   Pending Prescriptions Disp Refills     zolpidem (AMBIEN) 10 MG tablet [Pharmacy Med Name: ZOLPIDEM TARTRATE 10 MG TABLET]  Last Written Prescription Date:  03/22/2019  Last Fill Quantity: 30 tablet,  # refills: 1    Last Office Visit: 2/21/2019 Luis Armando Segovia MD       Future Office Visit:    Next 5 appointments (look out 90 days)    Jul 10, 2019  1:00 PM CDT  Return Visit with Yomi Triplett, PhD [facility] ([facility]) [address]  [facility] [address] [phone]   Jul 10, 2019  2:00 PM CDT  Return Visit with RAINA Gill [facility] ([facility]) [address]  [address]  [facility] [address] [phone]          30 tablet 1     Sig: TAKE 1 TAB BY MOUTH EVERYNIGHT AT BEDTIME       There is no refill protocol information for this order     Routing refill request to provider for review/approval because:  Drug not on the [facility], P or [facility] refill protocol or controlled substance

## 2019-06-06 NOTE — TELEPHONE ENCOUNTER
Routing refill request to provider for review/approval because:  Drug not on the [facility] refill protocol   Amelia Flores RN  [facility]

## 2019-06-06 NOTE — TELEPHONE ENCOUNTER
Prescription(s) signed and in the [facility].  Please process and notify the patient, if needed.

## 2019-06-11 ENCOUNTER — OFFICE VISIT (OUTPATIENT)
Dept: PALLIATIVE MEDICINE | Facility: CLINIC | Age: 49
End: 2019-06-11
Payer: MEDICARE

## 2019-06-11 VITALS — HEART RATE: 86 BPM | OXYGEN SATURATION: 96 % | DIASTOLIC BLOOD PRESSURE: 105 MMHG | SYSTOLIC BLOOD PRESSURE: 189 MMHG

## 2019-06-11 DIAGNOSIS — M54.2 NECK PAIN, BILATERAL: Primary | ICD-10-CM

## 2019-06-11 DIAGNOSIS — M54.81 BILATERAL OCCIPITAL NEURALGIA: ICD-10-CM

## 2019-06-11 PROCEDURE — 20552 NJX 1/MLT TRIGGER POINT 1/2: CPT | Performed by: PHYSICAL MEDICINE & REHABILITATION

## 2019-06-11 PROCEDURE — 64405 NJX AA&/STRD GR OCPL NRV: CPT | Mod: 50 | Performed by: PHYSICAL MEDICINE & REHABILITATION

## 2019-06-11 ASSESSMENT — PAIN SCALES - GENERAL: PAINLEVEL: EXTREME PAIN (8)

## 2019-06-11 NOTE — PATIENT INSTRUCTIONS
[facility]   Post Procedure Instructions    Today you had:  trigger point injections   occipital nerve block        Medications used:  lidocaine   bupivicaine           Go to the emergency room if you develop any shortness of breath    Monitor the injection sites for signs and symptoms of infection-fever, chills, redness, swelling, warmth, or drainage to areas.    You may have soreness at injection sites for up to 24 hours.    You may apply ice to the painful areas to help minimize the discomfort of the needle pokes.    Do not apply heat to sites for at least 12 hours.    You may use anti-inflammatory medications or Tylenol for pain control if necessary    Pain Clinic phone number during work hours Monday-Friday:  [phone]    After hours provider line: [phone]

## 2019-06-11 NOTE — PROGRESS NOTES
[facility] - Procedure Note    Date of Visit: 6/11/2019    Pre procedure Diagnosis: occipital neuralgia  And chronic myofascial pain  Post procedure Diagnosis: same  Procedure performed: Bilateral occipital nerve block and trigger point injections  Anesthesia: none  Complications: none  Operators: Blake Scott DO    Indications:   Jeremi Damon is a 48 year old male with a history of bilateral neck and trapezius pain as well as headaches. He reports that his headaches are from the base of his skull to behind his eyes. He was referred by Matilde Berg for bilateral onb and trigger point injections.  Exam shows tenderness in the cervical paraspinals, bilateral trapezius as well as along the occipital ridge bilaterally and they have tried conservative treatment including oral medications and PT.    Options/alternatives, benefits and risks were discussed with the patient including bleeding, infection, hematoma, nerve damage, stroke, and spinal cord injury.  Questions were answered to his satisfaction and he agrees to proceed. Voluntary informed consent was obtained and signed.     Vitals were reviewed: Yes  Allergies were reviewed:  Yes   Medications were reviewed:  Yes   Pre-procedure pain score: 8/10    Procedure:  After getting informed consent, a Pause for the Cause was performed.    The occipital ridge, occipital protuberance, and mastoid process were palpated bilaterally.  The location of maximal tenderness which was consistent with the location of the occipital nerve was palpated.  The area was cleaned.    Palpation for a pulse was completed on the left side, with no pulse noted at the site of the injection.  A 25G, 1.5 inch needle was introduced, aimed cephalad, in the superficial tissues at this area of tenderness.  The injection was completed at this location, fanning in 3 different directions.  Aspiration for heme was negative before all injections.    In total, 1.5 ml of 1%  lidocaine and 1.5 ml of 0.5% bupivacaine was injected.     The patient tolerated the procedure well, hemostasis was achieved.      The exact procedure was then repeated on the right side.        Procedure:  After getting informed consent, a Pause for the Cause was performed.    Trigger points were identified by patient, and marked when appropriate.  The area was prepped with Chloroprep.    Using clean technique, injections were completed using a 25G, 1.5 inch needle.  After negative aspiration, injection was completed.  A total of 8 locations were injected.  When possible, tissue was retracted from the chest wall to avoid lung injury.    Muscle groups injected:  -Bilateral lower cervical paraspinals at 3 locations on each side  -Bilateral lower trapezius at the base of the neck    Injection solution contained:  4ml of 1% lidocaine and 4ml of 0.5% bupivacaine.    Hemostasis was achieved, the area was cleaned, and bandaids were placed when appropriate.  The patient tolerated the procedure well.  Breath sounds were normal.        Assessment/Plan: Jeremi Damon is a 48 year old male s/p bilateral greater occipital nerve block for headaches and occipital neuralgia as well as bilateral trigger point injections for chronic myofascial pain.     1. Following today's procedure, the patient was advised to contact the [facility] for any of the following:   Fever, chills, or night sweats   New onset of pain, numbness, or weakness   Any questions/concerns regarding the procedure  If unable to contact the Pain Center, the patient was instructed to go to a local Emergency Room for any complications.   2. The patient will receive a follow-up call in 1 week.  3. The patient should follow-up with the referring provider in 2 weeks for post-procedure evaluation.      Blake Scott DO  [facility]

## 2019-06-11 NOTE — Clinical Note
Did bilateral onb and TP injections. Did not use steroid for onb due to severe hypertension, SBP was 200s then 190s.-Gulshan

## 2019-06-13 DIAGNOSIS — F33.1 MAJOR DEPRESSIVE DISORDER, RECURRENT EPISODE, MODERATE (H): ICD-10-CM

## 2019-06-13 DIAGNOSIS — F41.0 PANIC DISORDER WITHOUT AGORAPHOBIA: ICD-10-CM

## 2019-06-13 DIAGNOSIS — F41.1 GENERALIZED ANXIETY DISORDER: ICD-10-CM

## 2019-06-13 DIAGNOSIS — F33.8 SEASONAL AFFECTIVE DISORDER (H): ICD-10-CM

## 2019-06-13 RX ORDER — BUPROPION HYDROCHLORIDE 300 MG/1
300 TABLET ORAL EVERY MORNING
Qty: 30 TABLET | Refills: 0 | Status: SHIPPED | OUTPATIENT
Start: 2019-06-13 | End: 2019-07-11

## 2019-06-13 NOTE — TELEPHONE ENCOUNTER
Wellbutrin -     02/21/2019 OV Notes:    Return in about 1 week (around 2/28/2019) for Blood Pressure Recheck- and med ccheck in ~3 months. .     No future appt scheduled  30 day supply sent with note to schedule      Ativan  Routing refill request to provider for review/approval because:  Drug not on the [facility] refill protocol         Asmita Marinelli RN  [facility]

## 2019-06-13 NOTE — TELEPHONE ENCOUNTER
Requested Prescriptions   Pending Prescriptions Disp Refills     LORazepam (ATIVAN) 1 MG tablet            Last Written Prescription Date:  5.15.19  Last Fill Quantity: 60 tablet,   # refills: 0  Last Office Visit: Luis Armando Segovia MD        Future Office visit:    Next 5 appointments (look out 90 days)    Jul 10, 2019  1:00 PM CDT  Return Visit with Yomi Triplett, PhD [facility] ([facility]) [address]  [address]  [facility] [address] [phone]   Jul 10, 2019  2:00 PM CDT  Return Visit with RAINA Gill CNP  [facility] ([facility]) [address]  [address]  [facility] [id number] [phone]           Routing refill request to provider for review/approval because:  Drug not on the [facility], [facility] or  Health refill protocol or controlled substance              buPROPion (WELLBUTRIN XL) 300 MG 24 hr tablet [Pharmacy Med Name: BUPROPION HCL  MG TABLET]        Last Written Prescription Date:  9.11.18  Last Fill Quantity: 90 tablet,  # refills: 1   Last office visit: 2/21/2019 with prescribing provider:  Luis Armando Segovia MD           Future Office Visit:   Next 5 appointments (look out 90 days)    Jul 10, 2019  1:00 PM CDT  Return Visit with Yomi Triplett, PhD [facility] ([facility]) [address]  [address]  [facility] [address] [phone]   Jul 10, 2019  2:00 PM CDT  Return Visit with RAINA Gill CNP  [facility] ([facility]) [address]  [facility] [address] [phone]            90 tablet 1     Sig: TAKE 1 TABLET (300 MG) BY MOUTH EVERY MORNING       SSRIs Protocol Passed - 6/13/2019 12:26 PM        Passed - PHQ-9 score less than 5 in past 6 months     Please review last PHQ-9 score.     PHQ-9 SCORE 10/4/2018 2/21/2019 4/10/2019   PHQ-9 Total Score - - -   PHQ-9 Total Score MyChart 19  "(Moderately severe depression) - -   PHQ-9 Total Score 19 24 0     NANCY-7 SCORE 7/13/2018 2/21/2019 4/10/2019   Total Score - - -   Total Score 17 13 0                   Passed - Medication is Bupropion     If the medication is Bupropion (Wellbutrin), and the patient is taking for smoking cessation; OK to refill.          Passed - Medication is active on med list        Passed - Patient is age 18 or older        Passed - Recent (6 mo) or future (30 days) visit within the authorizing provider's specialty     Patient had office visit in the last 6 months or has a visit in the next 30 days with authorizing provider or within the authorizing provider's specialty.  See \"Patient Info\" tab in inbasket, or \"Choose Columns\" in Meds & Orders section of the refill encounter.                                  "

## 2019-06-14 DIAGNOSIS — M54.9 UPPER BACK PAIN ON RIGHT SIDE: ICD-10-CM

## 2019-06-14 DIAGNOSIS — M54.2 NECK PAIN: ICD-10-CM

## 2019-06-14 RX ORDER — LORAZEPAM 1 MG/1
1 TABLET ORAL 2 TIMES DAILY PRN
Qty: 60 TABLET | Refills: 2 | Status: SHIPPED | OUTPATIENT
Start: 2019-06-14 | End: 2019-08-09

## 2019-06-14 RX ORDER — CYCLOBENZAPRINE HCL 10 MG
10 TABLET ORAL 3 TIMES DAILY PRN
Qty: 30 TABLET | Refills: 1 | Status: SHIPPED | OUTPATIENT
Start: 2019-06-14 | End: 2019-07-05

## 2019-06-14 NOTE — TELEPHONE ENCOUNTER
Received fax request from [facility] pharmacy requesting refill(s) for cyclobenzaprine (FLEXERIL) 10 MG tablet    Last refilled on 5/23/2019    Pt last seen on 5/23/2019  Next appt scheduled for 7/10/2019    Will facilitate refill.

## 2019-06-19 ENCOUNTER — OFFICE VISIT (OUTPATIENT)
Dept: PALLIATIVE MEDICINE | Facility: CLINIC | Age: 49
End: 2019-06-19
Attending: NURSE PRACTITIONER
Payer: MEDICARE

## 2019-06-19 DIAGNOSIS — M62.838 MUSCLE SPASM: ICD-10-CM

## 2019-06-19 DIAGNOSIS — M54.2 NECK PAIN: ICD-10-CM

## 2019-06-19 DIAGNOSIS — G43.109 MIGRAINE WITH AURA AND WITHOUT STATUS MIGRAINOSUS, NOT INTRACTABLE: Primary | ICD-10-CM

## 2019-06-19 DIAGNOSIS — M54.9 UPPER BACK PAIN ON RIGHT SIDE: ICD-10-CM

## 2019-06-19 PROCEDURE — 97530 THERAPEUTIC ACTIVITIES: CPT | Mod: GP | Performed by: PHYSICAL THERAPIST

## 2019-06-19 PROCEDURE — 97162 PT EVAL MOD COMPLEX 30 MIN: CPT | Mod: GP | Performed by: PHYSICAL THERAPIST
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
## 2019-06-25 DIAGNOSIS — G43.109 MIGRAINE WITH AURA AND WITHOUT STATUS MIGRAINOSUS, NOT INTRACTABLE: ICD-10-CM

## 2019-06-25 DIAGNOSIS — M62.838 MUSCLE SPASM: ICD-10-CM

## 2019-06-25 DIAGNOSIS — F11.20 UNCOMPLICATED OPIOID DEPENDENCE (H): ICD-10-CM

## 2019-06-25 RX ORDER — TIZANIDINE 2 MG/1
2-4 TABLET ORAL 3 TIMES DAILY
Qty: 90 TABLET | Refills: 0 | Status: SHIPPED | OUTPATIENT
Start: 2019-06-25 | End: 2019-08-02

## 2019-06-25 NOTE — TELEPHONE ENCOUNTER
Ok for one refill only at this time. Patient has PT eval and declined further services. If he does not chose to participate in multidisciplinary pain management, will ask that he work with PCP re: medications.     RAINA Clement, NP-C  [facility]

## 2019-06-25 NOTE — TELEPHONE ENCOUNTER
Received fax request from [facility] pharmacy requesting refill(s) for tiZANidine (ZANAFLEX) 2 MG tablet     Last refilled on 5/19/19      Pt last seen on 5/22/19  Next appt scheduled for 7/10/19    Will facilitate refill.    Ana English, [facility]

## 2019-06-25 NOTE — TELEPHONE ENCOUNTER
Controlled Substance Refill Request for buprenorphine HCl-naloxone HCl (SUBOXONE) 8-2 MG per film  Problem List Complete:  Yes   checked in past 3 months?  No, route to RN

## 2019-06-26 ENCOUNTER — TELEPHONE (OUTPATIENT)
Dept: FAMILY MEDICINE | Facility: CLINIC | Age: 49
End: 2019-06-26

## 2019-06-26 RX ORDER — BUPRENORPHINE AND NALOXONE 8; 2 MG/1; MG/1
FILM, SOLUBLE BUCCAL; SUBLINGUAL
Qty: 9 FILM | Refills: 0 | Status: SHIPPED | OUTPATIENT
Start: 2019-06-26 | End: 2019-07-17

## 2019-06-26 NOTE — TELEPHONE ENCOUNTER
Pt is scheduled for 7/12/19 @ 10:40. Pt is still wondering about this refill.     Pt tel: [phone] (okay to leave a detailed message)    Christine Waite  [facility]

## 2019-06-26 NOTE — TELEPHONE ENCOUNTER
Refill for: Subxone    Last Appointment: 19    Next Appointment: not scheduled yet.     No Shows/Cancellations since last appointment:  none    Last Refill in Epic (date and amount/how many days):   Disp Refills Start End COLLETTE    buprenorphine HCl-naloxone HCl (SUBOXONE) 8-2 MG per film 15 Film 0 2019  No   Si/2 film /day  [id number]   Sent to pharmacy as: buprenorphine HCl-naloxone HCl (SUBOXONE) 8-2 MG per film   Class: E-Prescribe       Most Recent UDS results: 19 POS for Benzodiazepines and buprenorphine     reviewed and summarized below:   Fill Date Written  Drug     Qty Days Prescriber   2019 Lorazepam 1 Mg Tablet   60 30 Ro [facility]  2019 Zolpidem Tartrate 10 Mg Tablet  30 30 Ro [facility]  2019 Testosterone Cyp 200 Mg/ml   6 84 Ro [facility]   2019 Buprenorp-Nalox 8-2 Mg Sl Film  15 30 Ei Bur

## 2019-06-27 NOTE — TELEPHONE ENCOUNTER
Reason for call:  Other   Patient called regarding (reason for call): appointment  Additional comments: Patient called with complaints of migraines and stiff neck (declined triage). Would like to see if Dr. Segovia can fit him in his schedule Thursday 06/27/19, patient declined to see any other provider. Please call to follow up. Thanks!    Phone number to reach patient:  Home number on file [phone] (home)    Best Time:  anytime    Can we leave a detailed message on this number?  YES
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
## 2019-06-28 ENCOUNTER — OFFICE VISIT (OUTPATIENT)
Dept: FAMILY MEDICINE | Facility: CLINIC | Age: 49
End: 2019-06-28
Payer: MEDICARE

## 2019-06-28 VITALS
WEIGHT: 250 LBS | OXYGEN SATURATION: 95 % | BODY MASS INDEX: 33.86 KG/M2 | SYSTOLIC BLOOD PRESSURE: 142 MMHG | HEART RATE: 74 BPM | HEIGHT: 72 IN | TEMPERATURE: 98.6 F | DIASTOLIC BLOOD PRESSURE: 94 MMHG

## 2019-06-28 DIAGNOSIS — M54.2 NECK PAIN, BILATERAL: ICD-10-CM

## 2019-06-28 DIAGNOSIS — M54.40 CHRONIC LEFT-SIDED LOW BACK PAIN WITH SCIATICA, SCIATICA LATERALITY UNSPECIFIED: ICD-10-CM

## 2019-06-28 DIAGNOSIS — F41.1 GENERALIZED ANXIETY DISORDER: ICD-10-CM

## 2019-06-28 DIAGNOSIS — G89.29 CHRONIC LEFT-SIDED LOW BACK PAIN WITH SCIATICA, SCIATICA LATERALITY UNSPECIFIED: ICD-10-CM

## 2019-06-28 DIAGNOSIS — M54.2 NECK PAIN: Primary | ICD-10-CM

## 2019-06-28 DIAGNOSIS — Z12.11 SCREEN FOR COLON CANCER: ICD-10-CM

## 2019-06-28 DIAGNOSIS — G47.00 INSOMNIA, UNSPECIFIED TYPE: ICD-10-CM

## 2019-06-28 DIAGNOSIS — F41.0 PANIC ATTACK: ICD-10-CM

## 2019-06-28 DIAGNOSIS — K21.9 GASTROESOPHAGEAL REFLUX DISEASE WITHOUT ESOPHAGITIS: ICD-10-CM

## 2019-06-28 DIAGNOSIS — R29.898 ARM WEAKNESS: ICD-10-CM

## 2019-06-28 DIAGNOSIS — M54.12 CERVICAL RADICULOPATHY: ICD-10-CM

## 2019-06-28 DIAGNOSIS — N18.30 CKD (CHRONIC KIDNEY DISEASE) STAGE 3, GFR 30-59 ML/MIN (H): ICD-10-CM

## 2019-06-28 DIAGNOSIS — F41.0 PANIC DISORDER WITHOUT AGORAPHOBIA: ICD-10-CM

## 2019-06-28 PROCEDURE — 80048 BASIC METABOLIC PNL TOTAL CA: CPT | Performed by: FAMILY MEDICINE

## 2019-06-28 PROCEDURE — 99214 OFFICE O/P EST MOD 30 MIN: CPT | Performed by: FAMILY MEDICINE

## 2019-06-28 PROCEDURE — 36415 COLL VENOUS BLD VENIPUNCTURE: CPT | Performed by: FAMILY MEDICINE

## 2019-06-28 PROCEDURE — 82043 UR ALBUMIN QUANTITATIVE: CPT | Performed by: FAMILY MEDICINE

## 2019-06-28 RX ORDER — OMEPRAZOLE 20 MG/1
20 TABLET, DELAYED RELEASE ORAL DAILY
Qty: 90 TABLET | Refills: 1 | Status: SHIPPED | OUTPATIENT
Start: 2019-06-28 | End: 2019-12-12

## 2019-06-28 RX ORDER — CLONAZEPAM 1 MG/1
0.5-1 TABLET ORAL 2 TIMES DAILY PRN
Qty: 8 TABLET | Refills: 0 | Status: SHIPPED | OUTPATIENT
Start: 2019-06-28 | End: 2019-07-17

## 2019-06-28 RX ORDER — ZOLPIDEM TARTRATE 10 MG/1
10 TABLET ORAL
Qty: 30 TABLET | Refills: 0 | Status: SHIPPED | OUTPATIENT
Start: 2019-07-05 | End: 2019-08-09

## 2019-06-28 RX ORDER — OXYCODONE HYDROCHLORIDE 5 MG/1
5 TABLET ORAL 2 TIMES DAILY PRN
Qty: 28 TABLET | Refills: 0 | Status: SHIPPED | OUTPATIENT
Start: 2019-06-28 | End: 2019-08-29

## 2019-06-28 ASSESSMENT — MIFFLIN-ST. JEOR: SCORE: 2026.99

## 2019-06-28 ASSESSMENT — ANXIETY QUESTIONNAIRES
GAD7 TOTAL SCORE: 19
6. BECOMING EASILY ANNOYED OR IRRITABLE: MORE THAN HALF THE DAYS
3. WORRYING TOO MUCH ABOUT DIFFERENT THINGS: NEARLY EVERY DAY
2. NOT BEING ABLE TO STOP OR CONTROL WORRYING: NEARLY EVERY DAY
5. BEING SO RESTLESS THAT IT IS HARD TO SIT STILL: NEARLY EVERY DAY
7. FEELING AFRAID AS IF SOMETHING AWFUL MIGHT HAPPEN: MORE THAN HALF THE DAYS
1. FEELING NERVOUS, ANXIOUS, OR ON EDGE: NEARLY EVERY DAY
IF YOU CHECKED OFF ANY PROBLEMS ON THIS QUESTIONNAIRE, HOW DIFFICULT HAVE THESE PROBLEMS MADE IT FOR YOU TO DO YOUR WORK, TAKE CARE OF THINGS AT HOME, OR GET ALONG WITH OTHER PEOPLE: EXTREMELY DIFFICULT

## 2019-06-28 ASSESSMENT — PATIENT HEALTH QUESTIONNAIRE - PHQ9
SUM OF ALL RESPONSES TO PHQ QUESTIONS 1-9: 21
5. POOR APPETITE OR OVEREATING: NEARLY EVERY DAY

## 2019-06-28 NOTE — LETTER
My Asthma Action Plan  Name: Jeremi Damon   YOB: 1970  Date: 6/28/2019   My doctor: Luis Armando Segovia MD   My clinic: [facility] [place]        My Control Medicine: None  My Rescue Medicine: Albuterol (Proair/Ventolin/Proventil) inhaler 2 puffs every 4 hours as needed  symbicort 2 puffs every 2 hours as needed   My Asthma Severity: mild persistent  Avoid your asthma triggers: pt aware of triggers               GREEN ZONE   Good Control    I feel good    No cough or wheeze    Can work, sleep and play without asthma symptoms       Take your asthma control medicine every day.     1. If exercise triggers your asthma, take your rescue medication    15 minutes before exercise or sports, and    During exercise if you have asthma symptoms  2. Spacer to use with inhaler: If you have a spacer, make sure to use it with your inhaler             YELLOW ZONE Getting Worse  I have ANY of these:    I do not feel good    Cough or wheeze    Chest feels tight    Wake up at night   1. Keep taking your Green Zone medications  2. Start taking your rescue medicine:    every 20 minutes for up to 1 hour. Then every 4 hours for 24-48 hours.  3. If you stay in the Yellow Zone for more than 12-24 hours, contact your doctor.  4. If you do not return to the Green Zone in 12-24 hours or you get worse, start taking your oral steroid medicine if prescribed by your provider.           RED ZONE Medical Alert - Get Help  I have ANY of these:    I feel awful    Medicine is not helping    Breathing getting harder    Trouble walking or talking    Nose opens wide to breathe       1. Take your rescue medicine NOW  2. If your provider has prescribed an oral steroid medicine, start taking it NOW  3. Call your doctor NOW  4. If you are still in the Red Zone after 20 minutes and you have not reached your doctor:    Take your rescue medicine again and    Call 911 or go to the emergency room right away    See your regular doctor within  2 weeks of an Emergency Room or Urgent Care visit for follow-up treatment.          Annual Reminders:  Meet with Asthma Educator,  Flu Shot in the Fall, consider Pneumonia Vaccination for patients with asthma (aged 19 and older).    Pharmacy: [facility]/PHARMACY #[id number] [place] - [address]                      Asthma Triggers  How To Control Things That Make Your Asthma Worse    Triggers are things that make your asthma worse.  Look at the list below to help you find your triggers and what you can do about them.  You can help prevent asthma flare-ups by staying away from your triggers.      Trigger                                                          What you can do   Cigarette Smoke  Tobacco smoke can make asthma worse. Do not allow smoking in your home, car or around you.  Be sure no one smokes at a child s day care or school.  If you smoke, ask your health care provider for ways to help you quit.  Ask family members to quit too.  Ask your health care provider for a referral to Quit Plan to help you quit smoking, or call 1-678-880-PLAN.     Colds, Flu, Bronchitis  These are common triggers of asthma. Wash your hands often.  Don t touch your eyes, nose or mouth.  Get a flu shot every year.     Dust Mites  These are tiny bugs that live in cloth or carpet. They are too small to see. Wash sheets and blankets in hot water every week.   Encase pillows and mattress in dust mite proof covers.  Avoid having carpet if you can. If you have carpet, vacuum weekly.   Use a dust mask and HEPA vacuum.   Pollen and Outdoor Mold  Some people are allergic to trees, grass, or weed pollen, or molds. Try to keep your windows closed.  Limit time out doors when pollen count is high.   Ask you health care provider about taking medicine during allergy season.     Animal Dander  Some people are allergic to skin flakes, urine or saliva from pets with fur or feathers. Keep pets with fur or feathers out of your home.    If you can t keep  the pet outdoors, then keep the pet out of your bedroom.  Keep the bedroom door closed.  Keep pets off cloth furniture and away from stuffed toys.     Mice, Rats, and Cockroaches  Some people are allergic to the waste from these pests.   Cover food and garbage.  Clean up spills and food crumbs.  Store grease in the refrigerator.   Keep food out of the bedroom.   Indoor Mold  This can be a trigger if your home has high moisture. Fix leaking faucets, pipes, or other sources of water.   Clean moldy surfaces.  Dehumidify basement if it is damp and smelly.   Smoke, Strong Odors, and Sprays  These can reduce air quality. Stay away from strong odors and sprays, such as perfume, powder, hair spray, paints, smoke incense, paint, cleaning products, candles and new carpet.   Exercise or Sports  Some people with asthma have this trigger. Be active!  Ask your doctor about taking medicine before sports or exercise to prevent symptoms.    Warm up for 5-10 minutes before and after sports or exercise.     Other Triggers of Asthma  Cold air:  Cover your nose and mouth with a scarf.  Sometimes laughing or crying can be a trigger.  Some medicines and food can trigger asthma.
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
## 2019-06-28 NOTE — Clinical Note
RIDDHI Blood has been getting n/v, headaches and worsened headaches after taking suboxone.  He was leaving town and requested a small quantity of oxycodone for his neck and upper back pain especially.  I told him that may in violation of his pain contract.  He would like to continue to work you.

## 2019-06-29 LAB
ANION GAP SERPL CALCULATED.3IONS-SCNC: 8 MMOL/L (ref 3–14)
BUN SERPL-MCNC: 15 MG/DL (ref 7–30)
CALCIUM SERPL-MCNC: 9.3 MG/DL (ref 8.5–10.1)
CHLORIDE SERPL-SCNC: 103 MMOL/L (ref 94–109)
CO2 SERPL-SCNC: 28 MMOL/L (ref 20–32)
CREAT SERPL-MCNC: 1.43 MG/DL (ref 0.66–1.25)
CREAT UR-MCNC: 228 MG/DL
GFR SERPL CREATININE-BSD FRML MDRD: 56 ML/MIN/{1.73_M2}
GLUCOSE SERPL-MCNC: 65 MG/DL (ref 70–99)
MICROALBUMIN UR-MCNC: 957 MG/L
MICROALBUMIN/CREAT UR: 419.74 MG/G CR (ref 0–17)
POTASSIUM SERPL-SCNC: 5 MMOL/L (ref 3.4–5.3)
SODIUM SERPL-SCNC: 139 MMOL/L (ref 133–144)

## 2019-06-29 ASSESSMENT — ANXIETY QUESTIONNAIRES: GAD7 TOTAL SCORE: 19

## 2019-06-29 ASSESSMENT — ASTHMA QUESTIONNAIRES: ACT_TOTALSCORE: 13

## 2019-07-01 NOTE — RESULT ENCOUNTER NOTE
Dear Jeremi,    Here is a summary of your recent test results:  -Kidney function (GFR) is decreased.  ADVISE: rechecking this in 6 months  -Sodium is normal.  -Potassium is normal.  -Calcium is normal.  -Glucose (diabetic screening test) is normal.  -Microalbumin (urine protein) level is elevated. This is suggestive of early damage to your kidneys from high blood pressure.  ADVISE: avoiding anti-inflamatory agents such as ibuprofen (Advil, Motrin) or naproxen (Aleve) as much as possible, keeping your blood pressure in a normal range, and continuing your medication (losartan) that helps protect your kidneys.  Also, this should be rechecked in 1 year.     For additional lab test information, labtestsonline.org is an excellent reference.    In addition, here is a list of due or overdue Health Maintenance reminders:  Colonscopy due    Please call us at [phone] (or use [facility]) to address the above recommendations if needed.           Thank you very much for trusting me and [facility].     Healthy regards,  Nate Segovia MD

## 2019-07-02 ENCOUNTER — TELEPHONE (OUTPATIENT)
Dept: ADDICTION MEDICINE | Facility: CLINIC | Age: 49
End: 2019-07-02

## 2019-07-02 NOTE — TELEPHONE ENCOUNTER
"Patient called to report ongoing side effects from Suboxone. He experiences \"bad taste in (his) mouth, nausea, migraine, and vomiting.\" Patient reports Subutex felt the same as Suboxone. He has tried to take just 2 mg of Suboxone it feels \"75% as bad as his normal dose.\" He has been using 4 mg per day as prescribed. When he tries to go without it he experiences withdrawal. He is also concerned about his kidneys and wants to \"get off all this stuff.\" He feels conflicted as he is also in pain.  See recent visit with PCP for reference. He was prescribed Oxycodone in that visit for pain, but he has not filled it yet. \"I don't want to screw things up with Dr. Carreon.\"    He is going to [place] for about 10 days and he doesn't want to have \"those symptoms\" (from Suboxone) and wreck everyone's vacation. He will be gone 7/5-7/12. He rescheduled his follow-up to 7/17/19. He will call for a bridge.    He wants to know if he can just use the Oxycodone and not Suboxone while on this trip. If he must, he is willing to take 2 mg or \"whatever she suggests\" of Suboxone. He will not fill the Oxycodone until he gets permission to do so.     :  Fill Date Written  Drug     Qty Days Prescriber   06/26/2019 06/26/2019 Buprenorp-Nalox 8-2 Mg Sl Film  9 18 Ei Bur  "

## 2019-07-03 NOTE — TELEPHONE ENCOUNTER
Spoke with patient at length.  He has been taking Suboxone 4 mg daily for the most part but did have several days where he only took 2 mg trying to see if this would change his headache and nausea complaints.  It is unclear if it made much difference.  However later in both of those 2 days he experienced what sounds like opioid withdrawal related symptoms with diarrhea abdominal cramping sweating etc.  He has resumed taking 4 mg daily.  He continues to complain of chronic neck pain and headache which often lead to migraine.  He is taking ibuprofen (has a history of kidney disease) he did receive a prescription for oxycodone from his primary care provider but has not filled this.    Reviewed goals of buprenorphine therapy.  Would not advise patient to pursue this plan of coming off buprenorphine to use oxycodone during his trip because of the need for withdrawing off the Suboxone then taking the oxycodone then re-inducing Suboxone if he wishes to continue.  If he would like to be withdrawn from buprenorphine a plan could be made to do this but it should be done in a thoughtful manner and avoid changing medications frequently.  Acute opioid therapy is not indicated for migraine headache nor is it indicated as treatment for chronic neck pain or chronic headaches.    Patient will consider.  He will continue on Suboxone 4 mg daily for the current time.  He isencouraged to schedule for Botox as the pain management center has been trying to reach him to arrange this.  Contact information is provided.  Follow-up as scheduled.

## 2019-07-04 DIAGNOSIS — I10 ESSENTIAL HYPERTENSION WITH GOAL BLOOD PRESSURE LESS THAN 140/90: ICD-10-CM

## 2019-07-05 DIAGNOSIS — M54.2 NECK PAIN: ICD-10-CM

## 2019-07-05 DIAGNOSIS — M54.9 UPPER BACK PAIN ON RIGHT SIDE: ICD-10-CM

## 2019-07-05 RX ORDER — CYCLOBENZAPRINE HCL 10 MG
10 TABLET ORAL 3 TIMES DAILY PRN
Qty: 30 TABLET | Refills: 1 | Status: SHIPPED | OUTPATIENT
Start: 2019-07-05 | End: 2019-08-02

## 2019-07-05 NOTE — TELEPHONE ENCOUNTER
Received fax request from   [facility]/pharmacy #[id number] - [place], [place] - [address]  [address] [address]  Phone: [phone] Fax: [phone]    Pharmacy requesting refill(s) for cyclobenzaprine (FLEXERIL) 10 MG tablet    Last refilled on 06/24/19    Pt last seen on 06/11/19    Next appt scheduled for 07/10/19    Will facilitate refill.    Sheila Aragon [facility]  [place]

## 2019-07-05 NOTE — TELEPHONE ENCOUNTER
"Requested Prescriptions   Pending Prescriptions Disp Refills     metoprolol tartrate (LOPRESSOR) 50 MG tablet [Pharmacy Med Name: METOPROLOL TARTRATE 50 MG TAB]          Last Written Prescription Date:  2.21.19  Last Fill Quantity: 180 tablet,  # refills: 1   Last office visit: 6/28/2019 with prescribing provider:  Luis Armando Segovia MD           Future Office Visit:   Next 5 appointments (look out 90 days)    Jul 10, 2019  1:00 PM CDT  Return Visit with Yomi Triplett, PhD [facility] ([facility]) [address]  [address]  [facility] [address]  [phone]   Jul 10, 2019  2:00 PM CDT  Return Visit with RAINA Gill [facility] ([facility]) [address]  [address]  [facility] [address]  [phone]   Jul 17, 2019 11:20 AM CDT  Return Visit with Cleopatra Carreon MD  [facility] ([facility]) [address]  [address]  [facility] [address]  [phone]            180 tablet 1     Sig: TAKE 0.5-1 TABLETS (25-50 MG) BY MOUTH 2 TIMES DAILY       Beta-Blockers Protocol Failed - 7/4/2019  9:04 AM        Failed - Blood pressure under 140/90 in past 12 months     BP Readings from Last 3 Encounters:   06/28/19 (!) 142/94   06/11/19 (!) 189/105   05/24/19 152/88                 Passed - Patient is age 6 or older        Passed - Recent (12 mo) or future (30 days) visit within the authorizing provider's specialty     Patient had office visit in the last 12 months or has a visit in the next 30 days with authorizing provider or within the authorizing provider's specialty.  See \"Patient Info\" tab in inbasket, or \"Choose Columns\" in Meds & Orders section of the refill encounter.              Passed - Medication is active on med list        "

## 2019-07-05 NOTE — TELEPHONE ENCOUNTER
"Need BP recheck    Attempt #1  Called patient @ [phone] - \"service unavailable\"  No other #'s on file      Asmita Marinelli RN  [facility]    "

## 2019-07-08 DIAGNOSIS — J45.20 INTERMITTENT ASTHMA, UNCOMPLICATED: ICD-10-CM

## 2019-07-08 RX ORDER — ALBUTEROL SULFATE 90 UG/1
AEROSOL, METERED RESPIRATORY (INHALATION)
Qty: 18 G | Refills: 1 | Status: SHIPPED | OUTPATIENT
Start: 2019-07-08 | End: 2019-11-07

## 2019-07-08 RX ORDER — METOPROLOL TARTRATE 50 MG
25-50 TABLET ORAL 2 TIMES DAILY
Qty: 60 TABLET | Refills: 0 | Status: SHIPPED | OUTPATIENT
Start: 2019-07-08 | End: 2019-08-09

## 2019-07-08 NOTE — TELEPHONE ENCOUNTER
Due for an Office visit for further refills, only fill for 30 days     Kari Mcleod RN, BSN  [facility]

## 2019-07-08 NOTE — TELEPHONE ENCOUNTER
"Requested Prescriptions   Pending Prescriptions Disp Refills     albuterol (VENTOLIN HFA) 108 (90 Base) MCG/ACT inhaler            Last Written Prescription Date:  11.8.18  Last Fill Quantity: 18 inhaler,  # refills: 1   Last office visit: 6/28/2019 with prescribing provider:  Luis Armando Segovia MD             Future Office Visit:   Next 5 appointments (look out 90 days)    Jul 10, 2019  1:00 PM CDT  Return Visit with Yomi Triplett, PhD [facility] ([facility]) [address]  [facility] [address]  [phone]   Jul 10, 2019  2:00 PM CDT  Return Visit with RAINA Gill [facility] ([facility] [place]) [address]  [facility] [address]  [phone]   Jul 17, 2019 11:20 AM CDT  Return Visit with Cleopatra Carreon MD  [facility] ([facility]) [address]  [address]  [facility] [address]  [phone]            8.5 g 0       Asthma Maintenance Inhalers - Anticholinergics Failed - 7/8/2019  3:18 PM        Failed - Asthma control assessment score within normal limits in last 6 months     Please review ACT score.         PHQ-9 SCORE 2/21/2019 4/10/2019 6/28/2019   PHQ-9 Total Score - - -   PHQ-9 Total Score MyChart - - -   PHQ-9 Total Score 24 0 21     NANCY-7 SCORE 2/21/2019 4/10/2019 6/28/2019   Total Score - - -   Total Score 13 0 19                   Passed - Patient is age 12 years or older        Passed - Medication is active on med list        Passed - Recent (6 mo) or future (30 days) visit within the authorizing provider's specialty     Patient had office visit in the last 6 months or has a visit in the next 30 days with authorizing provider or within the authorizing provider's specialty.  See \"Patient Info\" tab in inbasket, or \"Choose Columns\" in Meds & Orders section of the refill encounter.            "

## 2019-07-17 ENCOUNTER — OFFICE VISIT (OUTPATIENT)
Dept: ADDICTION MEDICINE | Facility: CLINIC | Age: 49
End: 2019-07-17
Payer: MEDICARE

## 2019-07-17 VITALS
DIASTOLIC BLOOD PRESSURE: 90 MMHG | TEMPERATURE: 98.3 F | OXYGEN SATURATION: 95 % | SYSTOLIC BLOOD PRESSURE: 142 MMHG | HEIGHT: 72 IN | WEIGHT: 252 LBS | RESPIRATION RATE: 18 BRPM | HEART RATE: 96 BPM | BODY MASS INDEX: 34.13 KG/M2

## 2019-07-17 DIAGNOSIS — F41.0 PANIC DISORDER WITHOUT AGORAPHOBIA: ICD-10-CM

## 2019-07-17 DIAGNOSIS — M54.2 NECK PAIN, BILATERAL: ICD-10-CM

## 2019-07-17 DIAGNOSIS — F11.20 UNCOMPLICATED OPIOID DEPENDENCE (H): Primary | ICD-10-CM

## 2019-07-17 DIAGNOSIS — F41.1 GENERALIZED ANXIETY DISORDER: ICD-10-CM

## 2019-07-17 DIAGNOSIS — F90.9 ATTENTION DEFICIT HYPERACTIVITY DISORDER (ADHD), UNSPECIFIED ADHD TYPE: ICD-10-CM

## 2019-07-17 DIAGNOSIS — M54.42 LEFT-SIDED LOW BACK PAIN WITH LEFT-SIDED SCIATICA, UNSPECIFIED CHRONICITY: ICD-10-CM

## 2019-07-17 DIAGNOSIS — F33.1 MAJOR DEPRESSIVE DISORDER, RECURRENT EPISODE, MODERATE (H): ICD-10-CM

## 2019-07-17 LAB
AMPHETAMINES UR QL: NOT DETECTED NG/ML
BARBITURATES UR QL SCN: NOT DETECTED NG/ML
BENZODIAZ UR QL SCN: ABNORMAL NG/ML
BUPRENORPHINE UR QL: ABNORMAL NG/ML
CANNABINOIDS UR QL: NOT DETECTED NG/ML
COCAINE UR QL SCN: NOT DETECTED NG/ML
D-METHAMPHET UR QL: NOT DETECTED NG/ML
METHADONE UR QL SCN: NOT DETECTED NG/ML
OPIATES UR QL SCN: NOT DETECTED NG/ML
OXYCODONE UR QL SCN: NOT DETECTED NG/ML
PCP UR QL SCN: NOT DETECTED NG/ML
PROPOXYPH UR QL: NOT DETECTED NG/ML
TRICYCLICS UR QL SCN: ABNORMAL NG/ML

## 2019-07-17 PROCEDURE — 80306 DRUG TEST PRSMV INSTRMNT: CPT | Performed by: PEDIATRICS

## 2019-07-17 PROCEDURE — 99215 OFFICE O/P EST HI 40 MIN: CPT | Performed by: PEDIATRICS

## 2019-07-17 RX ORDER — BUPRENORPHINE AND NALOXONE 2; .5 MG/1; MG/1
FILM, SOLUBLE BUCCAL; SUBLINGUAL
Qty: 30 FILM | Refills: 0 | Status: SHIPPED | OUTPATIENT
Start: 2019-07-17 | End: 2019-08-02

## 2019-07-17 ASSESSMENT — MIFFLIN-ST. JEOR: SCORE: 2036.06
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
## 2019-07-19 ENCOUNTER — OFFICE VISIT (OUTPATIENT)
Dept: PALLIATIVE MEDICINE | Facility: CLINIC | Age: 49
End: 2019-07-19
Payer: MEDICARE

## 2019-07-19 VITALS
BODY MASS INDEX: 34.18 KG/M2 | DIASTOLIC BLOOD PRESSURE: 96 MMHG | HEART RATE: 68 BPM | SYSTOLIC BLOOD PRESSURE: 157 MMHG | WEIGHT: 252 LBS

## 2019-07-19 DIAGNOSIS — G43.109 MIGRAINE WITH AURA AND WITHOUT STATUS MIGRAINOSUS, NOT INTRACTABLE: Primary | ICD-10-CM

## 2019-07-19 PROCEDURE — 64615 CHEMODENERV MUSC MIGRAINE: CPT | Performed by: PSYCHIATRY & NEUROLOGY

## 2019-07-19 ASSESSMENT — PAIN SCALES - GENERAL: PAINLEVEL: EXTREME PAIN (8)
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
## 2019-07-19 NOTE — PATIENT INSTRUCTIONS
[facility]   Botox Injection Discharge Instructions    Do not rub or put extended pressure on the injection sites. You may gently touch the sites to remove any excess blood.    Monitor the injection sites for signs and symptoms of infection such as redness, swelling, warmth, fever, chills, or drainage to areas.    You may have soreness at the injection sites for up to 24 hours.    If you are able to use anti-inflammatory medications or Tylenol for pain control, you can take these as directed.    It may take up to 1 month to notice benefit from the 1st treatment    If you do notice relief, botox can be done every 3 months.  It may require insurance authorization everytime.      For questions about insurance coverage, please call the main clinic number and ask to speak with someone about botox coverage.     Pain Clinic phone number during work hours Monday-Friday:  [phone]    After hours provider line: [phone]

## 2019-07-20 ENCOUNTER — APPOINTMENT (OUTPATIENT)
Dept: ULTRASOUND IMAGING | Facility: CLINIC | Age: 49
End: 2019-07-20
Attending: EMERGENCY MEDICINE
Payer: MEDICARE

## 2019-07-20 ENCOUNTER — HOSPITAL ENCOUNTER (EMERGENCY)
Facility: CLINIC | Age: 49
Discharge: HOME | End: 2019-07-20
Attending: EMERGENCY MEDICINE | Admitting: EMERGENCY MEDICINE
Payer: MEDICARE

## 2019-07-20 VITALS
HEART RATE: 79 BPM | TEMPERATURE: 98.2 F | HEIGHT: 72 IN | DIASTOLIC BLOOD PRESSURE: 85 MMHG | BODY MASS INDEX: 33.86 KG/M2 | SYSTOLIC BLOOD PRESSURE: 162 MMHG | WEIGHT: 250 LBS | OXYGEN SATURATION: 95 %

## 2019-07-20 DIAGNOSIS — T14.8XXA HEMATOMA OF SKIN: ICD-10-CM

## 2019-07-20 DIAGNOSIS — L03.116 CELLULITIS OF LEFT LOWER EXTREMITY: ICD-10-CM

## 2019-07-20 LAB
ANION GAP SERPL CALCULATED.3IONS-SCNC: 3 MMOL/L (ref 3–14)
BASOPHILS # BLD AUTO: 0 10E9/L (ref 0–0.2)
BASOPHILS NFR BLD AUTO: 0.3 %
BUN SERPL-MCNC: 17 MG/DL (ref 7–30)
CALCIUM SERPL-MCNC: 9 MG/DL (ref 8.5–10.1)
CHLORIDE SERPL-SCNC: 106 MMOL/L (ref 94–109)
CO2 SERPL-SCNC: 29 MMOL/L (ref 20–32)
CREAT SERPL-MCNC: 1.81 MG/DL (ref 0.66–1.25)
DIFFERENTIAL METHOD BLD: ABNORMAL
EOSINOPHIL # BLD AUTO: 0.3 10E9/L (ref 0–0.7)
EOSINOPHIL NFR BLD AUTO: 3.3 %
ERYTHROCYTE [DISTWIDTH] IN BLOOD BY AUTOMATED COUNT: 15.5 % (ref 10–15)
GFR SERPL CREATININE-BSD FRML MDRD: 42 ML/MIN/{1.73_M2}
GLUCOSE SERPL-MCNC: 142 MG/DL (ref 70–99)
HCT VFR BLD AUTO: 51.9 % (ref 40–53)
HGB BLD-MCNC: 17.1 G/DL (ref 13.3–17.7)
IMM GRANULOCYTES # BLD: 0.1 10E9/L (ref 0–0.4)
IMM GRANULOCYTES NFR BLD: 0.6 %
LYMPHOCYTES # BLD AUTO: 1.9 10E9/L (ref 0.8–5.3)
LYMPHOCYTES NFR BLD AUTO: 19.2 %
MCH RBC QN AUTO: 28.6 PG (ref 26.5–33)
MCHC RBC AUTO-ENTMCNC: 32.9 G/DL (ref 31.5–36.5)
MCV RBC AUTO: 87 FL (ref 78–100)
MONOCYTES # BLD AUTO: 1 10E9/L (ref 0–1.3)
MONOCYTES NFR BLD AUTO: 10 %
NEUTROPHILS # BLD AUTO: 6.8 10E9/L (ref 1.6–8.3)
NEUTROPHILS NFR BLD AUTO: 66.6 %
NRBC # BLD AUTO: 0 10*3/UL
NRBC BLD AUTO-RTO: 0 /100
PLATELET # BLD AUTO: 270 10E9/L (ref 150–450)
POTASSIUM SERPL-SCNC: 4.7 MMOL/L (ref 3.4–5.3)
RBC # BLD AUTO: 5.97 10E12/L (ref 4.4–5.9)
SODIUM SERPL-SCNC: 138 MMOL/L (ref 133–144)
WBC # BLD AUTO: 10.1 10E9/L (ref 4–11)

## 2019-07-20 PROCEDURE — 80048 BASIC METABOLIC PNL TOTAL CA: CPT | Performed by: EMERGENCY MEDICINE

## 2019-07-20 PROCEDURE — 99284 EMERGENCY DEPT VISIT MOD MDM: CPT | Mod: 25

## 2019-07-20 PROCEDURE — 85025 COMPLETE CBC W/AUTO DIFF WBC: CPT | Performed by: EMERGENCY MEDICINE

## 2019-07-20 PROCEDURE — 93971 EXTREMITY STUDY: CPT | Mod: LT

## 2019-07-20 RX ORDER — CEPHALEXIN 500 MG/1
500 CAPSULE ORAL 4 TIMES DAILY
Qty: 28 CAPSULE | Refills: 0 | Status: SHIPPED | OUTPATIENT
Start: 2019-07-20 | End: 2019-08-29

## 2019-07-20 ASSESSMENT — ENCOUNTER SYMPTOMS
FEVER: 0
CHILLS: 0
COLOR CHANGE: 1

## 2019-07-20 ASSESSMENT — MIFFLIN-ST. JEOR: SCORE: 2026.99

## 2019-07-20 NOTE — ED AVS SNAPSHOT
Emergency Department  [address] [place] [id number]  Phone:  [phone]  Fax:  [phone]                                    Jeremi Damon   MRN: [id number]    Department:   Emergency Department   Date of Visit:  7/20/2019           After Visit Summary Signature Page    I have received my discharge instructions, and my questions have been answered. I have discussed any challenges I see with this plan with the nurse or doctor.    ..........................................................................................................................................  Patient/Patient Representative Signature      ..........................................................................................................................................  Patient Representative Print Name and Relationship to Patient    ..................................................               ................................................  Date                                   Time    ..........................................................................................................................................  Reviewed by Signature/Title    ...................................................              ..............................................  Date                                               Time          22EPIC Rev 08/18

## 2019-07-20 NOTE — ED PROVIDER NOTES
"  History     Chief Complaint:  Left Leg Pain & Swelling    HPI   Jeremi Damon is a 49 year old male who presents for evaluation of left lower extremity pain and swelling. About three days ago, he noticed the swelling, as well as redness, to his left leg from the mid calf down to the foot. With the redness, he initially thought it was a sunburn and was unconcerned. When the swelling worsened, he started worrying that it was something worse than a sunburn, so he started wrapping and elevating the extremity. This helped a little, but yesterday he reports the pain started worsening. This reminded him of a past episode where two unknown bug bites \"landed him in quarantine\" for a couple days, so he decided to present for evaluation. Here, he reports pain, swelling, and redness to the left leg. He states he can still walk, but it is painful. He denies any fever, chills, known bug bites, or known trauma. He denies any history of blood clots.     Allergies:  Acetaminophen   Amlodipine  Gabapentin  Metoprolol  Morphine  Sulfa  Theophylline      Medications:    Albuterol inhaler/neb  Symbicort inhaler  Bupropion  Suboxone  Clonidine  Amlodipine  Finasteride  Loperamide  Losartan  Ativan  Metoprolol  Nitrostat  Prilosec  Revatio  Zofran ODT  Depotestosterone injection  Tizanidine  Ambien     Past Medical History:    Chronic kidney disease  Hypertension  Chronic back pain  Hypersomnia  Depression  Asthma  Hypertrophic cardiomyopathy   NANCY  GERD  Hypogonadism  Esophageal reflux  ADHD  Panic disorder w/o agoraphobia    Past Surgical History:    Appendectomy  Lumbar fusion  Nasal septum repair  Cholecystectomy, Lap   Torn pectoral repair  Bilateral radiofrequency volume reduction of inferior turbinates    Family History:    Hodgkin's lymphoma, Hypertension, CAD - Father  Cardiovascular disease - Two siblings  MI - Mother    Social History:  Marital Status:  Single [1]  Negative for tobacco use.  Negative for alcohol " use.  Negative for drug use.     Review of Systems   Constitutional: Negative for chills and fever.   Cardiovascular: Positive for leg swelling.   Musculoskeletal: Negative for gait problem.   Skin: Positive for color change.   All other systems reviewed and are negative.    Physical Exam     Patient Vitals for the past 24 hrs:   BP Temp Temp src Pulse SpO2 Height Weight   07/20/19 1152 162/85 98.2  F (36.8  C) Oral 79 95 % 1.829 m (6') 113.4 kg (250 lb)      Physical Exam  General: Resting comfortably on the gurney  Eyes:  The pupils are equal and round    Conjunctivae and sclerae are normal  ENT:    Moist mucous membranes  Neck:  Normal range of motion  CV:  Regular rate and rhythm    Skin warm and well perfused     DP/PT pulses 2+ bilaterally  Resp:  Lungs are clear    Non-labored    No rales    No wheezing   MS:  Mild swelling on left lower extremity with small area of ecchymosis/erythema on left lateral LE. No abscess appreciated. Compartments soft  Skin:  See MS exam above  Neuro:   Awake, alert.      Speech is normal and fluent.    Face is symmetric.     Moves all extremities equally    SILT on bilateral LE  Psych: Normal affect.  Appropriate interactions.    Emergency Department Course   Imaging:  Radiographic findings were communicated with the patient who voiced understanding of the findings.  US Lower Extremity Venous Duplex, left:  No DVT demonstrated, as per radiology.     Laboratory:  CBC: WBC: 10.1, HGB: 17.1, PLT: 270  BMP: Glucose 142 (H), GFR: 42 (L), o/w WNL (Creatinine: 1.81 (H))    Emergency Department Course:  Nursing notes and vitals reviewed.   1205: I performed an exam of the patient as documented above.      IV was inserted and blood was drawn for laboratory testing, results above.    The patient was sent for a lower extremity ultrasound while in the emergency department, results above.     1340: I rechecked the patient and discussed the results of his workup thus far.     I personally  reviewed the laboratory and imaging results with the Patient and answered all related questions prior to discharge. He was prescribed Keflex.      Impression & Plan      Medical Decision Making:  Jeremi Damon is a 49 year old male who presents for evaluation of lower extremity redness and swelling. The history, physical exam is consistent with cellulitis.  An ultrasound was performed which shows no evidence of DVT. Possible small fluid collection where he is having most of pain/swelling. Thought to be hematoma which fits with clinical exam. Doubt abscess given appearance. Laboratory studies show creatinine elevation similar to past levels. There do not appear at this time to be any complication including necrotizing fascitis, abscess, osteomyelitis, sepsis, or shock.  Will treat for cellulitis given some mild erythema on the leg. Though it may be more just the small hematoma causing pain/swelling.  The patient is instructed to follow-up with primary care physician for recheck of creatinine and repeat skin exam. Also discussed elevation, ACE wrap for compression and monitoring for worsening.    Diagnosis:    ICD-10-CM    1. Cellulitis of left lower extremity L03.116    2. Hematoma of skin T14.8XXA        Disposition:  discharged to home    Discharge Medications:     Medication List      Started    cephALEXin 500 MG capsule  Commonly known as:  KEFLEX  500 mg, Oral, 4 TIMES DAILY            Scribe Disclosure:  I, Jennifer Bess, am serving as a scribe on 7/20/2019 at 12:05 PM to personally document services performed by Cara Mcmanus MD based on my observations and the provider's statements to me.      Jennifer Bess  7/20/2019    EMERGENCY DEPARTMENT       Cara Mcmanus MD  07/20/19 1935

## 2019-07-20 NOTE — ED TRIAGE NOTES
Noticed swelling, redness and pain to left lateral calf with swelling to foot and ankle. Concerned he has a spider bite

## 2019-07-20 NOTE — DISCHARGE INSTRUCTIONS
Start the antibiotics  Ace wrap on left lower extremity then can also ice it  Follow up with your primary care doctor this week to get it relooked at    Discharge Instructions  Cellulitis    Cellulitis is an infection of the skin that occurs when bacteria enter the skin.   Symptoms are generally redness, swelling, warmth and pain.  Your infection appeared to be appropriate to treat at home with antibiotics.  However, sometimes your infection may be worse than it seemed at first, or may worsen with time. If you have new or worse symptoms, you may need to be seen again in the Emergency Department or by your primary provider.    Generally, every Emergency Department visit should have a follow-up clinic visit with either a primary or a specialty clinic/provider. Please follow-up as instructed by your emergency provider today.    Return to the Emergency Department if:  The redness, pain, or swelling gets a lot worse.  If the red area was marked, return if it is red significantly beyond the marked area.  You are unable to get your antibiotics, or are vomiting (throwing up) these pills, or you cannot take them.  You are feeling more ill, weak or lightheaded.  You start to run a new fever (temperature >101 F).  Anything else about the infection worries or concerns you.  Treatment:  Start your antibiotics right away, and take them as prescribed. Be sure to finish the whole prescription, even if you are better.  Apply a heating pad, warm packs, or warm water soaks to the infected area for 15 minutes at a time, at least 3 times a day. Do not use a heating pad on your feet or legs if you have diabetes. Do not sleep with a heating pad on, since this can cause burns or skin injury.  Rest your injured area for at least 1-2 days. After that you may start using your extremity again as long as there is not too much pain.   Raise the injured area above the level of your heart as much as possible in the first 1-2 days.  Tylenol   (acetaminophen), Motrin  (ibuprofen), or Advil  (ibuprofen) may help may help reduce pain and fever and may help you feel more comfortable. Be sure to read and follow the package directions, and ask your provider if you have questions.    If you were given a prescription for medicine here today, be sure to read all of the information (including the package insert) that comes with your prescription.  This will include important information about the medicine, its side effects, and any warnings that you need to know about.  The pharmacist who fills the prescription can provide more information and answer questions you may have about the medicine.  If you have questions or concerns that the pharmacist cannot address, please call or return to the Emergency Department.     Remember that you can always come back to the Emergency Department if you are not able to see your regular provider in the amount of time listed above, if you get any new symptoms, or if there is anything that worries you.

## 2019-07-25 DIAGNOSIS — E29.1 HYPOGONADISM MALE: ICD-10-CM

## 2019-07-25 DIAGNOSIS — I10 HYPERTENSION GOAL BP (BLOOD PRESSURE) < 140/90: ICD-10-CM

## 2019-07-25 DIAGNOSIS — N18.30 CKD (CHRONIC KIDNEY DISEASE) STAGE 3, GFR 30-59 ML/MIN (H): Primary | ICD-10-CM

## 2019-07-25 RX ORDER — TESTOSTERONE CYPIONATE 200 MG/ML
INJECTION, SOLUTION INTRAMUSCULAR
Qty: 6 ML | Refills: 0 | Status: SHIPPED | OUTPATIENT
Start: 2019-07-25 | End: 2019-08-29

## 2019-07-25 NOTE — LETTER
[address] [place] [address]                                                                                                       [phone]    July 31, 2019    Jeremi Damon  [address]   [facility] [address]      Jeremi:    We have been calling you regarding a recent refill request we received for testosterone.  Unfortunately, we were unable to reach you.  We are notifying you that you are due for a lab appointment.  You can schedule this appointment via [employer] or by calling the clinic at [phone].      Sincerely,          Nate Segovia M.D./meg

## 2019-07-25 NOTE — TELEPHONE ENCOUNTER
He is overdue for followup labs - prescription has been signed (in [place]) and he needs labs prior to future refills of this.

## 2019-07-25 NOTE — TELEPHONE ENCOUNTER
Requested Prescriptions   Pending Prescriptions Disp Refills     testosterone cypionate (DEPOTESTOSTERONE) 200 MG/ML injection [Pharmacy Med Name: TESTOSTERONE  MG/ML]  Last Written Prescription Date: 3/12/2019  Last Fill Quantity: 10 ml,  # refills: 0   Last Office Visit: 6/28/2019 Luc[place]  Future Office Visit:    Next 5 appointments (look out 90 days)    Jul 29, 2019  9:00 AM CDT  Return Visit with RAINA Gill CNP  [facility] [address]  [address]  [facility] [address]  [phone]   Jul 31, 2019 11:20 AM CDT  Return Visit with Cleopatra Carreon MD  [facility] ([facility]) [address]  [address]  [facility] [address]  [phone]          6 mL      Sig: INJECT 0.5ML INTO THE MUSCLE ONCE WEEKLY       Androgen Agents Failed - 7/25/2019  4:16 PM        Failed - Lipid panel on file in past 12 mos     Recent Labs   Lab Test 10/10/17  1151  08/13/13  1022   CHOL 139   < > 113   TRIG 239*   < > 158*   HDL 28*   < > 27*   LDL 63   < > 55   NHDL 111   < >  --    VLDL  --   --  32*   CHOLHDLRATIO  --   --  4.3    < > = values in this interval not displayed.               Failed - ALT on file within past 12 mos     Recent Labs   Lab Test 10/20/17  0828   ALT 67             Failed - Serum Testosterone on file within past 12 mos     Recent Labs   Lab Test 10/10/17  1151   TESTOSTTOTAL 411             Failed - Serum PSA on file within past 12 mos     Lab Results   Component Value Date    PSA 0.42 01/06/2017             Failed - Refills for this classification require provider review        Failed - Blood pressure under 140/90 in past 6 months     BP Readings from Last 3 Encounters:   07/20/19 162/85   07/19/19 (!) 157/96   07/17/19 142/90                 Failed - AST on file within past 12 mos     Recent Labs   Lab Test 10/20/17  0828   AST 88*             Passed - Patient is of age 12 and older         Passed - Medication is active on med list        Passed - HCT less than 54% on file within past 12 mos     Recent Labs   Lab Test 07/20/19  1219   HCT 51.9             Passed - Patient is not pregnant        Passed - No positive pregnancy test on file within past 12 mos        Passed - Recent (6 mo) or future (30 days) visit within the authorizing provider's specialty

## 2019-07-26 NOTE — TELEPHONE ENCOUNTER
Prescription faxed to [facility].  Left non-detailed message for patient to call back.  Please schedule follow up when patient calls back.  (see previous notes for details)    Emerald Zuniga

## 2019-07-29 ENCOUNTER — OFFICE VISIT (OUTPATIENT)
Dept: PALLIATIVE MEDICINE | Facility: CLINIC | Age: 49
End: 2019-07-29
Payer: MEDICARE

## 2019-07-29 VITALS
DIASTOLIC BLOOD PRESSURE: 90 MMHG | WEIGHT: 258 LBS | SYSTOLIC BLOOD PRESSURE: 160 MMHG | HEART RATE: 68 BPM | BODY MASS INDEX: 34.99 KG/M2 | OXYGEN SATURATION: 95 %

## 2019-07-29 DIAGNOSIS — M54.2 NECK PAIN, BILATERAL: ICD-10-CM

## 2019-07-29 DIAGNOSIS — M62.838 MUSCLE SPASM: ICD-10-CM

## 2019-07-29 DIAGNOSIS — G43.109 MIGRAINE WITH AURA AND WITHOUT STATUS MIGRAINOSUS, NOT INTRACTABLE: Primary | ICD-10-CM

## 2019-07-29 PROCEDURE — 99214 OFFICE O/P EST MOD 30 MIN: CPT | Performed by: NURSE PRACTITIONER

## 2019-07-29 ASSESSMENT — PAIN SCALES - GENERAL: PAINLEVEL: SEVERE PAIN (7)
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
## 2019-07-29 NOTE — PATIENT INSTRUCTIONS
After Visit Instructions:     Thank you for coming to [facility] for your care. It is my goal to partner with you to help you reach your optimal state of health.     Continue daily self-care, identifying contributing factors, and monitoring variations in pain level. Continue to integrate self-care into your life.      1. Schedule follow-up with RAINA Hernandez NP-C about 1 week after MRI of cervical spine   2. Imaging: MRI of cervical spine   3. Procedures recommended: We will discuss after MRI    4. Medication recommendations:   1. Take muscle relaxer more regularly for best results.     RAINA Clement NP-C  [facility]  [facility]    Clinic Number:  [phone]     Call with any questions about your care and for scheduling assistance.     Calls are returned Monday through Friday between 8 AM and 4:30 PM. We usually get back to you within 2 business days depending on the issue/request.    If we are prescribing your medications:    For opioid medication refills, call the clinic or send a [employer] message 7 days in advance.  Please include:    Name of requested medication    Name of the pharmacy.    For non-opioid medications, call your pharmacy directly to request a refill. Please allow 3-4 days to be processed.     Per [place] State Law:    All controlled substance prescriptions must be filled within 30 days of being written.      For those controlled substances allowing refills, pickup must occur within 30 days of last fill.      We believe regular attendance is key to your success in our program!      Any time you are unable to keep your appointment we ask that you call us at least 24 hours in advance to cancel.This will allow us to offer the appointment time to another patient.   Multiple missed appointments may lead to dismissal from the clinic.

## 2019-07-30 DIAGNOSIS — I10 ESSENTIAL HYPERTENSION WITH GOAL BLOOD PRESSURE LESS THAN 140/90: ICD-10-CM

## 2019-07-31 DIAGNOSIS — M54.2 NECK PAIN: ICD-10-CM

## 2019-07-31 DIAGNOSIS — M54.9 UPPER BACK PAIN ON RIGHT SIDE: ICD-10-CM

## 2019-07-31 RX ORDER — LOSARTAN POTASSIUM 100 MG/1
TABLET ORAL
Qty: 90 TABLET | Refills: 0 | Status: SHIPPED | OUTPATIENT
Start: 2019-07-31 | End: 2019-08-29

## 2019-07-31 NOTE — TELEPHONE ENCOUNTER
"Requested Prescriptions   Pending Prescriptions Disp Refills     losartan (COZAAR) 100 MG tablet [Pharmacy Med Name: LOSARTAN POTASSIUM 100 MG TAB] 90 tablet 1     Sig: TAKE 1 TABLET BY MOUTH EVERY DAY  Last Written Prescription Date:  2/21/19  Last Fill Quantity: 90 tablet,  # refills: 1   Last office visit: 6/28/2019 with prescribing provider:  Luis Armando Segovia     Future Office Visit:   Next 5 appointments (look out 90 days)    Jul 31, 2019 11:20 AM CDT  Return Visit with Cleopatra Carreon MD  [facility] ([facility]) [address]  [address]  [facility] [address]  [phone]   Aug 02, 2019  9:20 AM CDT  Return Visit with Cleopatra Carreon MD  [facility] ([facility]) [address]  [address]  [facility] [address]  [phone]              Angiotensin-II Receptors Failed - 7/30/2019 11:34 PM        Failed - Blood pressure under 140/90 in past 12 months     BP Readings from Last 3 Encounters:   07/29/19 160/90   07/20/19 162/85   07/19/19 (!) 157/96           Failed - Normal serum creatinine on file in past 12 months     Recent Labs   Lab Test 07/20/19  1219   CR 1.81*           Passed - Recent (12 mo) or future (30 days) visit within the authorizing provider's specialty     Patient had office visit in the last 12 months or has a visit in the next 30 days with authorizing provider or within the authorizing provider's specialty.  See \"Patient Info\" tab in inbasket, or \"Choose Columns\" in Meds & Orders section of the refill encounter.            Passed - Medication is active on med list        Passed - Patient is age 18 or older        Passed - Normal serum potassium on file in past 12 months     Recent Labs   Lab Test 07/20/19  1219   POTASSIUM 4.7              " Hypokalemia Chronic systolic congestive heart failure

## 2019-07-31 NOTE — TELEPHONE ENCOUNTER
Requested Prescriptions   Pending Prescriptions Disp Refills     cyclobenzaprine (FLEXERIL) 10 MG tablet  Last Written Prescription Date:  7/5/19  Last Fill Quantity: 30,  # refills: 1   Last office visit: 7/17/2019 with prescribing provider:     Future Office Visit:   Next 5 appointments (look out 90 days)    Aug 02, 2019  9:20 AM CDT  Return Visit with Cleopatra Carreon MD  [facility] ([facility]) [address]  [address]  [facility] [address] [phone]          30 tablet 1     Sig: Take 1 tablet (10 mg) by mouth 3 times daily as needed for other (hiccups)       There is no refill protocol information for this order

## 2019-07-31 NOTE — TELEPHONE ENCOUNTER
Left non-detailed message for patient to call back.  Please schedule follow up when patient calls back.  (see previous notes for details)    I have been unable to reach this patient by phone.  A letter is being sent to the last known home address.    Thanks Bernarda

## 2019-08-01 RX ORDER — CYCLOBENZAPRINE HCL 10 MG
10 TABLET ORAL 3 TIMES DAILY PRN
Qty: 30 TABLET | Refills: 1 | Status: CANCELLED | OUTPATIENT
Start: 2019-08-01

## 2019-08-01 NOTE — TELEPHONE ENCOUNTER
Refill for: cyclobenzaprine (FLEXERIL) 10 MG tablet    Last Appointment: 7/17/19    Next Appointment: 8/2/19    No Shows/Cancellations since last appointment:  none    Last Refill in Epic (date and amount/how many days):    Disp Refills Start End COLLETTE   cyclobenzaprine (FLEXERIL) 10 MG tablet 30 tablet 1 7/5/2019  No   Sig - Route: Take 1 tablet (10 mg) by mouth 3 times daily as needed for other (hiccups) - Oral     Most Recent UDS results: POS benzodiazepines, tricyclic antidepressants, buprenorphine on 7/17    Medication not mentioned in last office visit note.   Routing to provider-- route back to PCP?    Estephania Dudley RN  08/01/19  9:05 AM

## 2019-08-01 NOTE — TELEPHONE ENCOUNTER
LVM for patient to return call to clinic.     If patient calls back:    Ask if patient has flexeril left still.      Estephania Dudley RN  08/01/19  3:04 PM

## 2019-08-02 ENCOUNTER — OFFICE VISIT (OUTPATIENT)
Dept: ADDICTION MEDICINE | Facility: CLINIC | Age: 49
End: 2019-08-02
Payer: MEDICARE

## 2019-08-02 VITALS
BODY MASS INDEX: 34.79 KG/M2 | DIASTOLIC BLOOD PRESSURE: 88 MMHG | SYSTOLIC BLOOD PRESSURE: 128 MMHG | HEART RATE: 89 BPM | WEIGHT: 256.5 LBS | RESPIRATION RATE: 18 BRPM | OXYGEN SATURATION: 95 % | TEMPERATURE: 98 F

## 2019-08-02 DIAGNOSIS — F90.9 ATTENTION DEFICIT HYPERACTIVITY DISORDER (ADHD), UNSPECIFIED ADHD TYPE: ICD-10-CM

## 2019-08-02 DIAGNOSIS — M54.2 NECK PAIN: ICD-10-CM

## 2019-08-02 DIAGNOSIS — M54.9 UPPER BACK PAIN ON RIGHT SIDE: ICD-10-CM

## 2019-08-02 DIAGNOSIS — F41.1 GENERALIZED ANXIETY DISORDER: ICD-10-CM

## 2019-08-02 DIAGNOSIS — F33.1 MAJOR DEPRESSIVE DISORDER, RECURRENT EPISODE, MODERATE (H): ICD-10-CM

## 2019-08-02 DIAGNOSIS — F41.0 PANIC DISORDER WITHOUT AGORAPHOBIA: ICD-10-CM

## 2019-08-02 DIAGNOSIS — F11.20 UNCOMPLICATED OPIOID DEPENDENCE (H): Primary | ICD-10-CM

## 2019-08-02 DIAGNOSIS — M54.12 CERVICAL RADICULOPATHY: Primary | ICD-10-CM

## 2019-08-02 PROCEDURE — 80306 DRUG TEST PRSMV INSTRMNT: CPT | Performed by: PEDIATRICS

## 2019-08-02 PROCEDURE — 99214 OFFICE O/P EST MOD 30 MIN: CPT | Performed by: PEDIATRICS

## 2019-08-02 RX ORDER — CYCLOBENZAPRINE HCL 10 MG
10 TABLET ORAL 3 TIMES DAILY PRN
Qty: 90 TABLET | Refills: 1 | Status: SHIPPED | OUTPATIENT
Start: 2019-08-02 | End: 2019-09-25

## 2019-08-02 RX ORDER — BUPRENORPHINE AND NALOXONE 2; .5 MG/1; MG/1
FILM, SOLUBLE BUCCAL; SUBLINGUAL
Qty: 30 FILM | Refills: 0 | Status: SHIPPED | OUTPATIENT
Start: 2019-08-02 | End: 2019-08-20
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
## 2019-08-08 ENCOUNTER — TELEPHONE (OUTPATIENT)
Dept: FAMILY MEDICINE | Facility: CLINIC | Age: 49
End: 2019-08-08

## 2019-08-08 DIAGNOSIS — F41.1 GENERALIZED ANXIETY DISORDER: ICD-10-CM

## 2019-08-08 DIAGNOSIS — G47.00 INSOMNIA, UNSPECIFIED TYPE: ICD-10-CM

## 2019-08-08 DIAGNOSIS — F41.0 PANIC DISORDER WITHOUT AGORAPHOBIA: ICD-10-CM

## 2019-08-08 NOTE — TELEPHONE ENCOUNTER
Pt is due now to update PHQ9.  clyde message sent to pt. Follow up end date 10/20/19.   PHQ-9 SCORE 2/21/2019 4/10/2019 6/28/2019   PHQ-9 Total Score - - -   PHQ-9 Total Score MyChart - - -   PHQ-9 Total Score 24 0 21     Rolan ROCK CMA

## 2019-08-08 NOTE — TELEPHONE ENCOUNTER
Requested Prescriptions   Pending Prescriptions Disp Refills     zolpidem (AMBIEN) 10 MG tablet [Pharmacy Med Name: ZOLPIDEM TARTRATE 10 MG TABLET] 30 tablet 0     Sig: TAKE 1 TABLET BY MOUTH EVERYDAY AT BEDTIME  Last Written Prescription Date:  7/5/19  Last Fill Quantity: 30 tab,  # refills: 0   Last office visit: 6/28/2019 with prescribing provider:  Luca   Future Office Visit:   Next 5 appointments (look out 90 days)    Aug 16, 2019 10:00 AM CDT  Return Visit with Cleopatra Carreon MD  [facility] ([facility]) [address] [facility]  [address]  [facility] [address]  [phone]             There is no refill protocol information for this order

## 2019-08-08 NOTE — TELEPHONE ENCOUNTER
Left message for patient to verify which pharmacy patient would like Rx sent to as Rx request is from a pharmacy in [place].    REJI GonzalezN, RN  [facility]

## 2019-08-09 DIAGNOSIS — I10 ESSENTIAL HYPERTENSION WITH GOAL BLOOD PRESSURE LESS THAN 140/90: ICD-10-CM

## 2019-08-09 RX ORDER — LORAZEPAM 1 MG/1
1 TABLET ORAL 2 TIMES DAILY PRN
Qty: 60 TABLET | Refills: 1 | Status: SHIPPED | OUTPATIENT
Start: 2019-08-14 | End: 2019-10-11

## 2019-08-09 RX ORDER — ZOLPIDEM TARTRATE 10 MG/1
10 TABLET ORAL
Qty: 30 TABLET | Refills: 0 | Status: SHIPPED | OUTPATIENT
Start: 2019-08-09 | End: 2019-08-12

## 2019-08-09 RX ORDER — ZOLPIDEM TARTRATE 10 MG/1
10 TABLET ORAL
Qty: 30 TABLET | Refills: 0 | OUTPATIENT
Start: 2019-08-09

## 2019-08-09 RX ORDER — METOPROLOL TARTRATE 50 MG
25-50 TABLET ORAL 2 TIMES DAILY
Qty: 60 TABLET | Refills: 0 | Status: SHIPPED | OUTPATIENT
Start: 2019-08-09 | End: 2019-08-29

## 2019-08-09 NOTE — TELEPHONE ENCOUNTER
"Requested Prescriptions   Pending Prescriptions Disp Refills     metoprolol tartrate (LOPRESSOR) 50 MG tablet [Pharmacy Med Name: METOPROLOL TARTRATE 50 MG TAB] 60 tablet 0     Sig: TAKE 0.5-1 TABLETS (25-50 MG) BY MOUTH 2 TIMES DAILY       Beta-Blockers Protocol Passed - 8/9/2019  3:24 PM        Passed - Blood pressure under 140/90 in past 12 months     BP Readings from Last 3 Encounters:   08/02/19 128/88   07/29/19 160/90   07/20/19 162/85                 Passed - Patient is age 6 or older        Passed - Recent (12 mo) or future (30 days) visit within the authorizing provider's specialty     Patient had office visit in the last 12 months or has a visit in the next 30 days with authorizing provider or within the authorizing provider's specialty.  See \"Patient Info\" tab in inbasket, or \"Choose Columns\" in Meds & Orders section of the refill encounter.              Passed - Medication is active on med list        Prescription approved per [facility] [place] Protocol.      Koffi Jones RN   [facility]    "

## 2019-08-09 NOTE — TELEPHONE ENCOUNTER
Pt called requesting Ambien and Lorazepam refilled at [facility] in [place]. Last fill was sent to [place] (per Pt)    Controlled Substance Refill Request for     Requested Prescriptions   Pending Prescriptions Disp Refills     zolpidem (AMBIEN) 10 MG tablet [Pharmacy Med Name: ZOLPIDEM TARTRATE 10 MG TABLET] 30 tablet 0     Sig: TAKE 1 TABLET BY MOUTH EVERYDAY AT BEDTIME   Last fill: 7/5/19      There is no refill protocol information for this order              LORazepam (ATIVAN) 1 MG tablet 60 tablet 1     Sig: Take 1 tablet (1 mg) by mouth 2 times daily as needed for anxiety TAKE 1 TABLET BY MOUTH TWICE A DAY AS NEEDED FOR ANXIETY   Last fill:6/14/19      There is no refill protocol information for this order            Problem List Complete:    Yes        Last Office Visit with [facility] primary care provider: 6/28/19    Future Office visit:   Next 5 appointments (look out 90 days)    Aug 16, 2019 10:00 AM CDT  Return Visit with Cleopatra Carreon MD  [facility] ([facility]) [address] [place] [address]  [address] [place] [address]  [phone]          Controlled substance agreement:   Encounter-Level CSA - 11/16/2017:    Controlled Substance Agreement - Scan on 12/1/2017 10:43 AM: CONTROLLED SUBSTANCE AGREEMENT (below)       Encounter-Level CSA - 08/07/2015:    Controlled Substance Agreement - Scan on 8/19/2015 11:27 AM: Controlled Substance Agreement 08/07/15 (below)       Patient-Level CSA:    There are no patient-level csa.         Last Urine Drug Screen: No results found for: CDAUT, No results found for: COMDAT,   Cannabinoids (22-akj-2-carboxy-9-THC)   Date Value Ref Range Status   08/02/2019 Not Detected NDET^Not Detected ng/mL Final     Comment:     Cutoff for a negative cannabinoid is 50 ng/mL or less.     Phencyclidine (Phencyclidine)   Date Value Ref Range Status   08/02/2019 Not Detected NDET^Not Detected ng/mL Final     Comment:     Cutoff for a negative PCP is 25  ng/mL or less.     Cocaine (Benzoylecgonine)   Date Value Ref Range Status   08/02/2019 Not Detected NDET^Not Detected ng/mL Final     Comment:     Cutoff for a negative cocaine is 150 ng/ml or less.     Methamphetamine (d-Methamphetamine)   Date Value Ref Range Status   08/02/2019 Not Detected NDET^Not Detected ng/mL Final     Comment:     Cutoff for a negative methamphetamine is 500 ng/ml or less.     Opiates (Morphine)   Date Value Ref Range Status   08/02/2019 Not Detected NDET^Not Detected ng/mL Final     Comment:     Cutoff for a negative opiate is 100 ng/ml or less.     Amphetamine (d-Amphetamine)   Date Value Ref Range Status   08/02/2019 Not Detected NDET^Not Detected ng/mL Final     Comment:     Cutoff for a negative amphetamine is 500 ng/mL or less.     Benzodiazepines (Nordiazepam)   Date Value Ref Range Status   08/02/2019 Detected, Abnormal Result (A) NDET^Not Detected ng/mL Final     Comment:     Cutoff for a positive benzodiazepines is greater than 150 ng/ml.  This is an unconfirmed screening result to be used for medical purposes only.   Order [id number] for confirmation or individual confirmation tests to [employer].       Tricyclic Antidepressants (Desipramine)   Date Value Ref Range Status   08/02/2019 Not Detected NDET^Not Detected ng/mL Final     Comment:     Cutoff for a negative tricyclic antidepressant is 300 ng/ml or less.     Methadone (Methadone)   Date Value Ref Range Status   08/02/2019 Not Detected NDET^Not Detected ng/mL Final     Comment:     Cutoff for a negative methadone is 200 ng/ml or less.     Barbiturates (Butalbital)   Date Value Ref Range Status   08/02/2019 Not Detected NDET^Not Detected ng/mL Final     Comment:     Cutoff for a negative barbituate is 200 ng/ml or less.     Oxycodone (Oxycodone)   Date Value Ref Range Status   08/02/2019 Not Detected NDET^Not Detected ng/mL Final     Comment:     Cutoff for a negative Oxycodone is 100 ng/mL or less.     Propoxyphene  (Norpropoxyphene)   Date Value Ref Range Status   08/02/2019 Not Detected NDET^Not Detected ng/mL Final     Comment:     Cutoff for a negative propoxyphene is 300 ng/ml or less     Buprenorphine (Buprenorphine)   Date Value Ref Range Status   08/02/2019 Detected, Abnormal Result (A) NDET^Not Detected ng/mL Final     Comment:     Cutoff for a positive buprenorphine is greater than 10 ng/ml.  This is an unconfirmed screening result to be used for medical purposes only.   Order [id number] for confirmation or individual confirmation tests to [employer].          Processing:  Fax Rx to [facility] in [place] ([address] [facility]) pharmacy    [email or web address]   checked in past 3 months?  No, route to CINDY Jones RN   [facility] [place]

## 2019-08-09 NOTE — TELEPHONE ENCOUNTER
Attempt #2  Called patient @ [phone] - Left a non-detailed message to call back and speak with any triage nurse.    Asmita Marinelli RN  [facility]

## 2019-08-12 ENCOUNTER — TRANSFERRED RECORDS (OUTPATIENT)
Dept: HEALTH INFORMATION MANAGEMENT | Facility: CLINIC | Age: 49
End: 2019-08-12

## 2019-08-12 DIAGNOSIS — R10.9 ABDOMINAL PAIN, UNSPECIFIED ABDOMINAL LOCATION: Primary | ICD-10-CM

## 2019-08-12 RX ORDER — ZOLPIDEM TARTRATE 10 MG/1
10 TABLET ORAL
Qty: 30 TABLET | Refills: 0 | Status: SHIPPED | OUTPATIENT
Start: 2019-08-12 | End: 2019-08-29

## 2019-08-12 NOTE — TELEPHONE ENCOUNTER
Prescriptions faxed to [facility] in [place]  Pharmacy at [phone].          Bernarda Hargrove

## 2019-08-13 ENCOUNTER — OFFICE VISIT (OUTPATIENT)
Dept: PSYCHIATRY | Facility: CLINIC | Age: 49
End: 2019-08-13
Attending: NURSE PRACTITIONER
Payer: MEDICARE

## 2019-08-13 ENCOUNTER — TELEPHONE (OUTPATIENT)
Dept: FAMILY MEDICINE | Facility: CLINIC | Age: 49
End: 2019-08-13

## 2019-08-13 VITALS
HEART RATE: 69 BPM | DIASTOLIC BLOOD PRESSURE: 97 MMHG | SYSTOLIC BLOOD PRESSURE: 167 MMHG | WEIGHT: 257.4 LBS | BODY MASS INDEX: 34.91 KG/M2

## 2019-08-13 DIAGNOSIS — F31.81 BIPOLAR 2 DISORDER (H): Primary | ICD-10-CM

## 2019-08-13 DIAGNOSIS — I10 ESSENTIAL HYPERTENSION WITH GOAL BLOOD PRESSURE LESS THAN 140/90: ICD-10-CM

## 2019-08-13 DIAGNOSIS — I10 HYPERTENSION GOAL BP (BLOOD PRESSURE) < 140/90: ICD-10-CM

## 2019-08-13 DIAGNOSIS — F11.20 OPIOID TYPE DEPENDENCE, CONTINUOUS (H): ICD-10-CM

## 2019-08-13 DIAGNOSIS — Z79.899 MEDICATION MANAGEMENT: ICD-10-CM

## 2019-08-13 PROCEDURE — 93010 ELECTROCARDIOGRAM REPORT: CPT | Performed by: INTERNAL MEDICINE

## 2019-08-13 PROCEDURE — 93005 ELECTROCARDIOGRAM TRACING: CPT | Mod: ZF | Performed by: NURSE PRACTITIONER

## 2019-08-13 PROCEDURE — G0463 HOSPITAL OUTPT CLINIC VISIT: HCPCS | Mod: ZF

## 2019-08-13 RX ORDER — GABAPENTIN 300 MG/1
CAPSULE ORAL
Qty: 120 CAPSULE | Refills: 0 | Status: SHIPPED | OUTPATIENT
Start: 2019-08-13 | End: 2019-08-29 | Stop reason: SINTOL

## 2019-08-13 RX ORDER — BUPROPION HYDROCHLORIDE 150 MG/1
150 TABLET ORAL EVERY MORNING
Qty: 7 TABLET | Refills: 0 | Status: SHIPPED | OUTPATIENT
Start: 2019-08-13 | End: 2019-11-22

## 2019-08-13 ASSESSMENT — PAIN SCALES - GENERAL: PAINLEVEL: EXTREME PAIN (8)

## 2019-08-13 NOTE — Clinical Note
I am waiting for consult from risk management, but you may be interested in seeing labs and results section of my documents.

## 2019-08-13 NOTE — TELEPHONE ENCOUNTER
Thanks for the update and yes you can manage his ambien and ativan.     I am not sure the clonidine was stopped (or taken off his medication list) on 8/2/19 - note to my clinic staff: please contact Jeremi and figure out why this is no longer on his medication list (when did he stop it and why?)  Thanks.

## 2019-08-13 NOTE — TELEPHONE ENCOUNTER
----- Message from RAINA Meza CNP sent at 8/13/2019 11:20 AM CDT -----  Dr Segovia    My name is sherie simon, psychiatric nurse practitioner at [facility] psychiatry clinic.  I saw this patient today and he mentioned that he has not been able to take Clonidine and Metoprolol due to lack of refills.  I explained that Metoprolol appeared to be prescribed on 8/9.  I am unsure if Clonidine is something pt is supposed to be taking or not as it is not on medication list.      His BP was significantly high today.  I understand he has pain, and it is coming down after 3 times of checking and one more time before he left today.  This may be due to Clonidine rebound as well.     Please advise him of his BP medication regimen and if Clonidine is something that he needs to continue, please coordinate with him to send refills.    Also, if it's ok, I would like to manage his Ativan and Ambien in the future.  I am going to try to taper these medications for the patient.    Thank you

## 2019-08-13 NOTE — TELEPHONE ENCOUNTER
Pt called in and stated the lorazepam needs to have a earlier start date and also the pharmacy does not have the metoprolol. [facility]. Please call pt back at [phone].    Amelia Ellis  Patient

## 2019-08-13 NOTE — TELEPHONE ENCOUNTER
"Called [facility] pharmacy and spoke with pharmacist.  Per pharmacist, the metoprolol Rx is at the clinic but it was too soon to fill when it was originally requested.  Per pharmacist, patient has been \"scarey\" when picking up prescriptions and speaking in a threatening manner when his medications have not been ready/approved.      Routing to provider for review.    REJI GonzalezN, RN  [facility]    "

## 2019-08-13 NOTE — PATIENT INSTRUCTIONS
-Complete EKG and Genesight today  -Decrease Wellbutrin XL to 150 mg daily for 7 days and stop   -Start Gabapentin 300 mg 2 times a day and you may increase it to 600 mg 2 times a day if after 3 days, not noticing changes in sleep.  If Gabapentin is sufficiently managing your anxiety, don't take Ativan    I will contact you via [facility] message after EKG result to inform you about which mood stabilizer you should start    Thank you for coming to the [facility].    Lab Testing:  If you had lab testing today and your results are reassuring or normal they will be mailed to you or sent through [facility] within 7 days.   If the lab tests need quick action we will call you with the results.  The phone number we will call with results is # [phone] (home) . If this is not the best number please call our clinic and change the number.    Medication Refills:  If you need any refills please call your pharmacy and they will contact us. Our fax number for refills is [phone]. Please allow three business for refill processing.   If you need to  your refill at a new pharmacy, please contact the new pharmacy directly. The new pharmacy will help you get your medications transferred.     Scheduling:  If you have any concerns about today's visit or wish to schedule another appointment please call our office during normal business hours [phone] (8-5:00 M-F)    Contact Us:  Please call [phone] during business hours (8-5:00 M-F).  If after clinic hours, or on the weekend, please call  [phone].    Financial Assistance [phone]  [facility] Billing [phone]  Central Billing Office, [facility]: [phone]  [place] Billing [phone]  Medical Records [phone]      MENTAL HEALTH CRISIS NUMBERS:  [facility]:   [facility] - [phone]   Crisis Residence [facility] - [facility] - [phone]   Walk-In Counseling Center [facility] - [phone]   COPE 24/7 [place] Mobile Team  for Adults - [[phone]]; Child - [[phone]]        [facility]:   [facility] - [phone]   Walk-in counseling [facility] - [phone]   Walk-in counseling [facility] - [phone]   Crisis Residence [facility] - [phone]   Urgent Care Adult Mental Health:   --Drop-in, 24/7 crisis line, and [facility] Mobile Team [[phone]]    CRISIS TEXT LINE: Text [phone] from anywhere, anytime, any crisis 24/7;    OR SEE [email or web address]     Poison Control Center - [phone]    CHILD: [facility] needs assessment team - [phone]     [facility] Lifeline - 1-995.388.8850; or [facility] Lifeline - 1-141.731.4831    If you have a medical emergency please call 911or go to the nearest ER.                    _____________________________________________    Again thank you for choosing [facility] and please let us know how we can best partner with you to improve you and your family's health.  You may be receiving a survey in the mail regarding this appointment. We would love to have your feedback, both positive and negative, so please fill out the survey and return it using the provided envelope. The survey is done by an external company, so your answers are anonymous.
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
## 2019-08-14 ENCOUNTER — TELEPHONE (OUTPATIENT)
Dept: PSYCHIATRY | Facility: CLINIC | Age: 49
End: 2019-08-14

## 2019-08-14 LAB — INTERPRETATION ECG - MUSE: NORMAL

## 2019-08-14 RX ORDER — METOPROLOL TARTRATE 50 MG
25-50 TABLET ORAL 2 TIMES DAILY
Qty: 60 TABLET | Refills: 0 | Status: CANCELLED | OUTPATIENT
Start: 2019-08-14

## 2019-08-14 ASSESSMENT — PATIENT HEALTH QUESTIONNAIRE - PHQ9: SUM OF ALL RESPONSES TO PHQ QUESTIONS 1-9: 19

## 2019-08-14 NOTE — TELEPHONE ENCOUNTER
Spoke with Pt and Pt noted that he is suppose to take the Clonidine for high BP. Pt noted that the [facility] pharmacy keeps messing up his medications. Pt also noted that he has not been able to fill (Metoprolol, Wellbutrin, Zolpidem, or Lorazepam).  This writer called [facility] to discuss. [facility] was noted that they had been behind about 3 days. All of these medications are ready for  today. Pt was notified.            ran today   PRESCRIPTIONS  Total Prescriptions: 48   Total Private Pay: 11   Fill Date ID Written Drug Qty Days Prescriber Rx # Pharmacy Refill Daily Dose * Pymt Type    08/09/2019  1  06/28/2019  Zolpidem Tartrate 10 Mg Tablet  30.00 30 Ro [facility]  [id number]  Gra ([id number])  1/1 0.50 [place]   08/02/2019  1  08/02/2019  Bupreno-Nalox 2-0.5 Mg Sl Film  30.00 15 Ei Bur  [id number]  Gra ([id number])  1/1 4.[place]   07/17/2019  1  07/17/2019  Bupreno-Nalox 2-0.5 Mg Sl Film  30.00 15 Ei Bur  [id number]  Gra ([id number])  [place]   07/04/2019  1  06/28/2019  Oxycodone Hcl 5 Mg Tablet  28.00 14 Ro Leh  [id number]  Gra ([id number])  1/1 15.[place]   07/04/2019  1  06/28/2019  Clonazepam 1 Mg Tablet  8.00 4 Ro Leh  [id number]  Gra ([id number])  1/[place] 4.[place]   06/26/2019  1  06/26/2019  Buprenorp-Nalox 8-2 Mg Sl Film  9.00 18 Ei Bur  [id number]  Gra ([id number])  [place]   06/14/2019  1  06/12/2019  Lorazepam 1 Mg Tablet  60.00 30 Ro Leh  [id number]  Gra ([id number])  1/2 2.[place]   06/07/2019  1  06/05/2019  Zolpidem Tartrate 10 Mg Tablet  30.00 30 Ro [facility]  [id number]  Gra ([id number])  1/1 0.50 [place]   06/06/2019  1  03/12/2019  Testosterone Cyp 200 Mg/ml  6.00 84 Ro [facility]  [id number]  Gra ([id number])  1/1  [place]   05/24/2019  1  05/24/2019  Buprenorp-Nalox 8-2 Mg Sl Film  15.00 30 Ei Bur  [id number]  Gra (1568)  [place] 4.[place]   05/16/2019  1  05/16/2019  Lorazepam 1 Mg Tablet  60.00 30 Ro [facility]  [id number]  Gra (0577)  1/[place] 2.[place]   04/28/2019  1   03/22/2019  Zolpidem Tartrate 10 Mg Tablet  30.00 30 Ro [facility]  [id number]  Gra ([id number])  2/1 0.50 [place]   04/24/2019  1  04/24/2019  Buprenorp-Nalox 8-2 Mg Sl Film  15.00 30 Ei Bur  [id number]  Gra (4757)  1/[place] 4.[place]   04/16/2019  1  04/16/2019  Buprenorp-Nalox 8-2 Mg Sl Film  8.00 8 Ei Bur  [id number]  Gra (4740)  1/[place] 8.[place]   04/07/2019  1  03/04/2019  Lorazepam 1 Mg Tablet  60.00 30 Ro Leh  [id number]  Gra ([id number])  1/[place] 2.[place]   03/27/2019  1  03/27/2019  Buprenorp-Nalox 8-2 Mg Sl Film  17.00 20 Ei Bur  [id number]  Gra (4783)  1/[place] 6.[place]   03/27/2019  1  03/27/2019  Buprenorp-Nalox 8-2 Mg Sl Film  3.00 3 Ei Bur  [id number]  Gra ([id number])  1/1 8.00 [place]   03/25/2019  1  03/22/2019  Zolpidem Tartrate 10 Mg Tablet  30.00 30 Ro [facility]h  [id number]  Gra ([id number])  1/1 0.50 [place]   03/13/2019  4  03/08/2019  Oxycodone Hcl 5 Mg Tablet  90.00 30 Ro Leh  [id number]  [place] ([id number])  1/1 22.[place]   03/09/2019  1  02/01/2019  Lorazepam 1 Mg Tablet  60.00 30 Ro [facility]h  [id number]  Gra (4783)  1/[place] 2.[place]   02/22/2019  3  02/21/2019  Oxycodone Hcl 5 Mg Tablet  60.00 15 Ro Leh  [id number]  Wal ([id number])  1/1 30.00 [place]   02/21/2019  3  02/21/2019  Testosteron Cyp 2,000 Mg/10 Ml  10.00 140 Ro Leh  [id number]  [place] (5415)  1/1  [place]   02/18/2019  1  09/19/2018  Buprenorphine 8 Mg Tablet Sl  90.00 30 Da [facility]  [id number]  Gra (2198)  1/1 24.[place]   02/15/2019  1  01/09/2019  Zolpidem Tartrate 10 Mg Tablet  30.00 30 Ro [facility]  [id number]  Gra (4722)  2/1 0.50 [place]   02/06/2019  1  01/31/2019  Lorazepam 1 Mg Tablet  60.00 30 Ro [facility]  [id number]  [place]uan[place] (8638)  1/1 2.00 [place]   01/15/2019  1  01/14/2019  Buprenorphine 8 Mg Tablet Sl  90.00 30 Da [facility]  [id number]  Gra (7856)  1/1 24.[place]   01/14/2019 1 01/14/2019  Oxycodone Hcl 5 Mg Tablet  60.00 30 Da [facility]  [id number]  Gra (4795)  1/1 15.00 [place]   01/14/2019  1  01/09/2019  Zolpidem Tartrate 10 Mg Tablet  30.00 30 Ro [facility]  [id number]  Gra (2627)  1/1 0.50 [place]   12/27/2018  1  12/27/2018  Lorazepam 1 Mg Tablet  60.00 30 Ro Leh  [id number]  Gra (1309)  1/[place] 2.[place]   12/26/2018  1  09/20/2018  Testosterone Cyp 200 Mg/ml  4.00 56 Ro Leh  [id number]  [place] (4801)  2/1  [place]   12/18/2018  1  12/15/2018  Oxycodone Hcl 5 Mg Tablet  90.00 30 Da [facility]  [id number]  Gra (0303)  1/1 22.[place]   12/12/2018  1  12/11/2018  Zolpidem Tartrate 10 Mg Tablet  30.00 30 Ro [facility]h  [id number]  Gra (5240)  1/1 0.[place]   11/21/2018  1  11/20/2018  Lorazepam 1 Mg Tablet  60.00 30 Ro [facility]h  [id number]  Gra (6344)  1/1 2.[place]   11/20/2018  1  11/19/2018  Buprenorphine 8 Mg Tablet Sl  90.00 30 Da [facility]  [id number]  [place] (6632)  1/1 24.[place]   11/19/2018  1  11/19/2018  Oxycodone Hcl 5 Mg Tablet  90.00 30 Da [facility]  [id number]  Gra (6565)  1/1 22.[place]   11/06/2018  1  11/06/2018  Zolpidem Tartrate 10 Mg Tablet  30.00 30 Ro Leh  [id number]  Gra (8276)  1/1 0.[place]   10/18/2018  1  10/18/2018  Oxycodone Hcl 5 Mg Tablet  90.00 30 Da [facility]  [id number]  Gra (4385)  1/1 22.[place]   10/16/2018  1  10/15/2018  Lorazepam 1 Mg Tablet  60.00 30 Ro Leh  [id number]  Gra (4714)  1/1 2.[place]   10/03/2018  1  10/03/2018  Zolpidem Tartrate 10 Mg Tablet  30.00 30 Ro [facility]  [id number]  Gra (3421)  1/1 0.[place]   09/27/2018  1  08/22/2018  Buprenorphine 8 Mg Tablet Sl  90.00 30 Da [facility]  [id number]  [place] (4783)  1/[place] 24.[place]   09/21/2018  2  09/11/2018  Dextroamp-Amphetamin 20 Mg Tab  90.00 30 Ro [facility]  [id number]  [place] (1260)  1/1  [place]   09/20/2018  1  09/20/2018  Testosterone Cyp 200 Mg/ml  6.00 84 Ro [facility]  [id number]  [place] (4783)  1/1  [place]   09/19/2018  1  09/19/2018  Oxycodone Hcl 5 Mg Tablet  90.00 30 Da [facility]  [id number]  [place] (4783)  1/1 22.[place]   09/11/2018  1  09/11/2018  Lorazepam 1 Mg Tablet  60.00 60 Ro [facility]  [id number]  Gra (4745)  1/1 1.00 [place]   08/31/2018  1  08/22/2018  Lorazepam 0.5 Mg Tablet  30.00 30 Da [facility]  [id number]  Gra (4783)  1/1 0.50 [place]   08/30/2018  1  08/30/2018  Zolpidem Tartrate 10 Mg Tablet  30.00 30 [facility]  [id number]  Gra (4747)  1/1 0.[place]   08/22/2018  1  08/22/2018  Oxycodone Hcl 5 Mg Tablet  90.00 30 Da [facility]  [id number]  Gra (4730)  1/1 22.[place]   08/18/2018  1  07/25/2018  Buprenorphine 8 Mg Tablet Sl  90.00 30 Da [facility]  [id number]  Gra (3438)  1/1 24.[place]     Will notify PCP of medication Clonidine, pended to file for review and advise of follow up appt (nurse only BP check).

## 2019-08-14 NOTE — TELEPHONE ENCOUNTER
On 2019,  Jeremi Damon ,  1970, arrived for [facility] Sight Test.  Obtained buccal swab of both cheeks and patient's insurance information. Patient signed Patient Consent form and verified information on Cheek Swab Envelope. Ordering clinician[facility]ally [facility] signed Cheek Swab Envelope. Order placed with Gene Huoli. [employer] scheduled to  overnight envelope..Jenny Reaves LPN

## 2019-08-15 RX ORDER — CLONIDINE HYDROCHLORIDE 0.1 MG/1
0.1 TABLET ORAL 2 TIMES DAILY
Qty: 180 TABLET | Refills: 1 | Status: SHIPPED | OUTPATIENT
Start: 2019-08-15 | End: 2020-02-04

## 2019-08-16 ENCOUNTER — NURSE TRIAGE (OUTPATIENT)
Dept: NURSING | Facility: CLINIC | Age: 49
End: 2019-08-16

## 2019-08-16 NOTE — TELEPHONE ENCOUNTER
Jeremi was just started on medication Clonidine and gabapentin and metoprolol.  Jeremi is calling with questions on medication and is also wanting to change his appointment.  No triage necessary.

## 2019-08-20 ENCOUNTER — TELEPHONE (OUTPATIENT)
Dept: ADDICTION MEDICINE | Facility: CLINIC | Age: 49
End: 2019-08-20

## 2019-08-20 DIAGNOSIS — F11.20 UNCOMPLICATED OPIOID DEPENDENCE (H): ICD-10-CM

## 2019-08-20 RX ORDER — BUPRENORPHINE AND NALOXONE 2; .5 MG/1; MG/1
FILM, SOLUBLE BUCCAL; SUBLINGUAL
Qty: 23 FILM | Refills: 0 | Status: SHIPPED | OUTPATIENT
Start: 2019-08-20 | End: 2019-09-03

## 2019-08-21 NOTE — TELEPHONE ENCOUNTER
Writer received [facility] results from [facility]. Writer placed a copy in scanning and a copy was placed in RAINA Meza CNP's folder. A message was routed to Tamara Mcneil

## 2019-08-27 ENCOUNTER — TELEPHONE (OUTPATIENT)
Dept: FAMILY MEDICINE | Facility: CLINIC | Age: 49
End: 2019-08-27

## 2019-08-27 NOTE — TELEPHONE ENCOUNTER
"Pt calling stating he is having a hard time - he needs something stronger or better than the Wellbutrin since fall is coming     He was given gabapentin to wean off the Wellbutrin  - he has been off the Wellbutrin for over a week - does not want to gabapentin anymore cause it is making him feel off     He stated that the last few days he has had thoughts that he may be better off dead but he has no plan.     He has said a few times \" im flipping out here\"  \" I have been taking more ativan than ever before\"   He is stating that he is having very bad dry eyes and everything is all blurry all the time      \"I feel that my NP for psychiatry is not listening to me at all\"     RN advised MULTIPLE times he should go to the ER - pt refused each time and said that \"they will not do anything for me, they will not change what she has put me on \" she is his NP for psychiatry    RN made pt an appointment with MD RAIMUNDO for 8/29/2019 at 1:20 pm   But he should go to the ER - pt refused again      pt stated that he does not want to harm himself or others he would much rather feel better than do that.     RN had a very hard time following his story because he sounded very sleepy and speech was grabbled at times     Routing pts psychiatry team     Kari Mcleod RN, BSN  [place] [facility]       "

## 2019-08-27 NOTE — TELEPHONE ENCOUNTER
Tamara Mcneil APRN CNP Whitley, Stephanie J, RN Cc: Theo Rubio RN   Caller: Unspecified (Today,  2:56 PM)             Pt was given Gabapentin to taper off of Ativan, not Wellbutrin.  Pt requested to come off of Wellbutrin.  Pt was encouraged to complete lab to start Depakote, mood stabilizer which he has not done since seen on 8/13.  Pt had Ativan refilled on 8/12 by PCP and should have sufficient refill.  We discussed Ativan taper off due to his cognitive changes, long term benzodiazapine use especially with Suboxone use.  If pt wants to continue Ativan, he should return to PCP who has been prescribing Ativan.      Tamara Mcneil APRN CNP Lehrer, Robert D, MD 7 minutes ago (3:28 PM)      pls see pt requesting refills of Ativan.  Since he has not signed control substance contract, I will not prescribe Ativan and I don't think this is safe medication for him to continue long term especially with suboxone.      If he wants to continue to see you, I am fine with it.  He is not following our recommendation to complete labs to start Depakote.  I am ok with him not being on Gabapentin, I started that to help tapering off of Gabapentin.  Pt has also picked up Ativan on 8/12 and he should have sufficient dose.       These notes were placed in the comment section of the routing - does not cshow up in pts chart     Please advise     Thank you     Kari Mcleod RN, BSN  [place] [facility]

## 2019-08-29 ENCOUNTER — DOCUMENTATION ONLY (OUTPATIENT)
Dept: PSYCHIATRY | Facility: CLINIC | Age: 49
End: 2019-08-29

## 2019-08-29 ENCOUNTER — OFFICE VISIT (OUTPATIENT)
Dept: FAMILY MEDICINE | Facility: CLINIC | Age: 49
End: 2019-08-29
Payer: MEDICARE

## 2019-08-29 VITALS
HEART RATE: 77 BPM | DIASTOLIC BLOOD PRESSURE: 100 MMHG | SYSTOLIC BLOOD PRESSURE: 170 MMHG | HEIGHT: 72 IN | TEMPERATURE: 97.6 F | WEIGHT: 259 LBS | BODY MASS INDEX: 35.08 KG/M2 | OXYGEN SATURATION: 94 %

## 2019-08-29 DIAGNOSIS — E29.1 HYPOGONADISM IN MALE: ICD-10-CM

## 2019-08-29 DIAGNOSIS — E66.01 MORBID OBESITY (H): Primary | ICD-10-CM

## 2019-08-29 DIAGNOSIS — I10 ESSENTIAL HYPERTENSION WITH GOAL BLOOD PRESSURE LESS THAN 140/90: ICD-10-CM

## 2019-08-29 DIAGNOSIS — F41.1 GENERALIZED ANXIETY DISORDER: ICD-10-CM

## 2019-08-29 DIAGNOSIS — G47.00 INSOMNIA, UNSPECIFIED TYPE: ICD-10-CM

## 2019-08-29 DIAGNOSIS — F33.1 MAJOR DEPRESSIVE DISORDER, RECURRENT EPISODE, MODERATE (H): ICD-10-CM

## 2019-08-29 DIAGNOSIS — N18.30 CKD (CHRONIC KIDNEY DISEASE) STAGE 3, GFR 30-59 ML/MIN (H): ICD-10-CM

## 2019-08-29 DIAGNOSIS — F31.81 BIPOLAR 2 DISORDER (H): ICD-10-CM

## 2019-08-29 PROBLEM — N17.9 ACUTE KIDNEY INJURY (H): Status: RESOLVED | Noted: 2017-10-19 | Resolved: 2019-08-29

## 2019-08-29 PROBLEM — J45.30 MILD PERSISTENT ASTHMA WITHOUT COMPLICATION: Status: ACTIVE | Noted: 2017-09-19

## 2019-08-29 PROCEDURE — 80048 BASIC METABOLIC PNL TOTAL CA: CPT | Performed by: FAMILY MEDICINE

## 2019-08-29 PROCEDURE — 99214 OFFICE O/P EST MOD 30 MIN: CPT | Performed by: FAMILY MEDICINE

## 2019-08-29 PROCEDURE — 36415 COLL VENOUS BLD VENIPUNCTURE: CPT | Performed by: FAMILY MEDICINE

## 2019-08-29 RX ORDER — AMITRIPTYLINE HYDROCHLORIDE 50 MG/1
50-150 TABLET ORAL AT BEDTIME
Qty: 90 TABLET | Refills: 5 | Status: SHIPPED | OUTPATIENT
Start: 2019-08-29 | End: 2019-12-31

## 2019-08-29 RX ORDER — LOSARTAN POTASSIUM 100 MG/1
100 TABLET ORAL DAILY
Qty: 90 TABLET | Refills: 1 | Status: SHIPPED | OUTPATIENT
Start: 2019-08-29 | End: 2020-05-12

## 2019-08-29 RX ORDER — HYDROCHLOROTHIAZIDE 25 MG/1
12.5-25 TABLET ORAL DAILY
Qty: 90 TABLET | Refills: 1 | Status: SHIPPED | OUTPATIENT
Start: 2019-08-29 | End: 2019-11-22

## 2019-08-29 RX ORDER — TESTOSTERONE CYPIONATE 200 MG/ML
50 INJECTION, SOLUTION INTRAMUSCULAR WEEKLY
Qty: 6 ML | Refills: 0 | Status: SHIPPED | OUTPATIENT
Start: 2019-08-29 | End: 2019-11-04

## 2019-08-29 RX ORDER — METOPROLOL TARTRATE 100 MG
100 TABLET ORAL 2 TIMES DAILY
Qty: 180 TABLET | Refills: 1 | Status: SHIPPED | OUTPATIENT
Start: 2019-08-29 | End: 2020-03-26

## 2019-08-29 RX ORDER — AMLODIPINE BESYLATE 10 MG/1
10 TABLET ORAL DAILY
Qty: 90 TABLET | Refills: 1 | Status: SHIPPED | OUTPATIENT
Start: 2019-08-29 | End: 2020-03-26

## 2019-08-29 RX ORDER — ZOLPIDEM TARTRATE 10 MG/1
10 TABLET ORAL
Qty: 30 TABLET | Refills: 0 | Status: SHIPPED | OUTPATIENT
Start: 2019-09-12 | End: 2019-10-11

## 2019-08-29 ASSESSMENT — MIFFLIN-ST. JEOR: SCORE: 2067.82

## 2019-08-29 ASSESSMENT — ANXIETY QUESTIONNAIRES
6. BECOMING EASILY ANNOYED OR IRRITABLE: MORE THAN HALF THE DAYS
IF YOU CHECKED OFF ANY PROBLEMS ON THIS QUESTIONNAIRE, HOW DIFFICULT HAVE THESE PROBLEMS MADE IT FOR YOU TO DO YOUR WORK, TAKE CARE OF THINGS AT HOME, OR GET ALONG WITH OTHER PEOPLE: EXTREMELY DIFFICULT
5. BEING SO RESTLESS THAT IT IS HARD TO SIT STILL: NEARLY EVERY DAY
7. FEELING AFRAID AS IF SOMETHING AWFUL MIGHT HAPPEN: SEVERAL DAYS
GAD7 TOTAL SCORE: 18
2. NOT BEING ABLE TO STOP OR CONTROL WORRYING: NEARLY EVERY DAY
1. FEELING NERVOUS, ANXIOUS, OR ON EDGE: NEARLY EVERY DAY
3. WORRYING TOO MUCH ABOUT DIFFERENT THINGS: NEARLY EVERY DAY

## 2019-08-29 ASSESSMENT — PATIENT HEALTH QUESTIONNAIRE - PHQ9
5. POOR APPETITE OR OVEREATING: NEARLY EVERY DAY
SUM OF ALL RESPONSES TO PHQ QUESTIONS 1-9: 20

## 2019-08-29 NOTE — PROGRESS NOTES
"Subjective     Jeremi Damon is a 49 year old male who presents to clinic today for the following health issues:    HPI     Pt having hard time getting Cypionate filled- partial fill- pharmacy saying it is no longer available    See psychiatry note 08/13/2019 to 08/26/2019- pt felt like he did not get the help he needs    Sleeping issues - 2 hours per night - previously amitriptyline (up to 150 mg niightly - occasional morning grogginess) has helped.     Tapered off Wellbutrin per psych plan- He was ut on gabapentin - ativan    Leg swelling    BP has been     Chronic pain has been ongoing - he is on suboxone.- gets botox for neck pain and migraines    Blood pressure has been high over 180/100.,    8/27/19 triage note (2 days ago)  Pt calling stating he is having a hard time - he needs something stronger or better than the Wellbutrin since fall is coming      He was given gabapentin to wean off the Wellbutrin  - he has been off the Wellbutrin for over a week - does not want to gabapentin anymore cause it is making him feel off      He stated that the last few days he has had thoughts that he may be better off dead but he has no plan.      He has said a few times \" im flipping out here\"  \" I have been taking more ativan than ever before\"   He is stating that he is having very bad dry eyes and everything is all blurry all the time       \"I feel that my NP for psychiatry is not listening to me at all\"      RN advised MULTIPLE times he should go to the ER - pt refused each time and said that \"they will not do anything for me, they will not change what she has put me on \" she is his NP for psychiatry     RN made pt an appointment with MD RAIMUNDO for 8/29/2019 at 1:20 pm   But he should go to the ER - pt refused again       pt stated that he does not want to harm himself or others he would much rather feel better than do that.                   Pt was given Gabapentin to taper off of Ativan, not Wellbutrin.  Pt requested to " come off of Wellbutrin.  Pt was encouraged to complete lab to start Depakote, mood stabilizer which he has not done since seen on 8/13.  Pt had Ativan refilled on 8/12 by PCP and should have sufficient refill.  We discussed Ativan taper off due to his cognitive changes, long term benzodiazapine use especially with Suboxone use.  If pt wants to continue Ativan, he should return to PCP who has been prescribing Ativan.     Social History     Tobacco Use     Smoking status: Never Smoker     Smokeless tobacco: Never Used   Substance Use Topics     Alcohol use: No     Comment: rarely      Drug use: No     PHQ 8/8/2019 8/13/2019 8/29/2019   PHQ-9 Total Score 14 19 20   Q9: Thoughts of better off dead/self-harm past 2 weeks Several days More than half the days More than half the days   F/U: Thoughts of suicide or self-harm No - -   F/U: Safety concerns No - -     NANCY-7 SCORE 6/28/2019 8/8/2019 8/29/2019   Total Score - - -   Total Score - 14 (moderate anxiety) -   Total Score 19 14 18         Suicide Assessment Five-step Evaluation and Treatment (SAFE-T)    Reviewed and updated as needed this visit by provider:  Tobacco  Allergies  Meds  Problems  Med Hx  Surg Hx  Fam Hx         Review of Systems   Constitutional, HEENT, cardiovascular, pulmonary, GI, , musculoskeletal, neuro, skin, endocrine and psych systems are negative, except as otherwise noted.          Objective     BP (!) 170/100   Pulse 77   Temp 97.6  F (36.4  C) (Oral)   Ht 1.829 m (6')   Wt 117.5 kg (259 lb)   SpO2 94%   BMI 35.13 kg/m   Body mass index is 35.13 kg/m .  Physical Exam   GENERAL: healthy, alert, well nourished, well hydrated, no distress  HENT: ear canals- normal; TMs- normal; Nose- normal; Mouth- no ulcers, no lesions  NECK: biul paracervical and trap trigger point tenderness, no adenopathy, no asymmetry, no masses, no stiffness; thyroid- normal to palpation  RESP: lungs clear to auscultation - no rales, no rhonchi, no wheezes  CV:  regular rates and rhythm, normal S1 S2, no S3 or S4 and no murmur, no click or rub -  ABDOMEN: soft, no tenderness, no  hepatosplenomegaly, no masses, normal bowel sounds  MS: extremities- no gross deformities noted, no edema  SKIN: no suspicious lesions, no rashes  NEURO: strength and tone- normal, sensory exam- grossly normal, mentation- intact, speech- normal, reflexes- symmetric  BACK: no CVA tenderness, bilateral   Upper and lower tenderness  PSYCH: Alert and oriented times 3; speech- coherent , normal rate and volume; able to articulate logical thoughts, able to abstract reason, no tangential thoughts, no hallucinations or delusions, affect- normal              Assessment & Plan     Jeremi was seen today for recheck medication.    Diagnoses and all orders for this visit:    Morbid obesity (H) - diet and exercise    CKD (chronic kidney disease) stage 3, GFR 30-59 ml/min (H)    Hypertension goal BP (blood pressure) < 140/90  -     Basic metabolic panel  (Ca, Cl, CO2, Creat, Gluc, K, Na, BUN)    Major Depressive Disorder, Recurrent Episode, Mode  -     Hepatic panel    Generalized anxiety disorder  -     Cancel: Hepatic panel    Hypogonadism in male    Essential hypertension with goal blood pressure less than 140/90  Hard to control - will add back hydrochlorothiazide and continue other medication.s   -     metoprolol tartrate (LOPRESSOR) 100 MG tablet; Take 1 tablet (100 mg) by mouth 2 times daily  -     losartan (COZAAR) 100 MG tablet; Take 1 tablet (100 mg) by mouth daily  -     amLODIPine (NORVASC) 10 MG tablet; Take 1 tablet (10 mg) by mouth daily  -     hydrochlorothiazide (HYDRODIURIL) 25 MG tablet; Take 0.5-1 tablets (12.5-25 mg) by mouth daily    Insomnia, unspecified type  -     zolpidem (AMBIEN) 10 MG tablet; Take 1 tablet (10 mg) by mouth nightly as needed for sleep  -     amitriptyline (ELAVIL) 50 MG tablet; Take 1-3 tablets ( mg) by mouth At Bedtime    Bipolar 2 disorder (H) recent diagnosis  from pymaximo  -     C Hepatic panel (Albumin, ALT, AST, Bili, Alk Phos, TP)    Other orders  -     testosterone cypionate (DEPOTESTOSTERONE) 200 MG/ML injection; Inject 0.25 mLs (50 mg) into the muscle once a week         BMI:   Estimated body mass index is 34.91 kg/m  as calculated from the following:    Height as of 7/20/19: 1.829 m (6').    Weight as of 8/13/19: 116.8 kg (257 lb 6.4 oz).   Weight management plan: Discussed healthy diet and exercise guidelines        See Patient Instructions    Return in about 1 month (around 9/29/2019).          Nate Segovia MD   Pager - [phone]  [facility] [place]

## 2019-08-29 NOTE — PROGRESS NOTES
[facility] Team Note     This patient was reviewed in our weekly interdisciplinary team meeting today.    The following treatment plan was developed:    - this clinic has not provided controlled substance prescriptions thus far  - it was decided that controlled substance prescriptions will not be provided  - the pt is taking an opiate and we do not use controlled substances in combination with opiates (benzodiazepines and related meds)  - the  reveals high likelihood of med misuse  - we will be happy to advise PCP regarding benzodiazepine taper    The most appropriate follow-up plan will be discussed next week.  Will document that part of the plan at that time.    Addendum to Team Note    - Tamara cannot see pt at scheduled time on the 10th (Sept)  - I will meet with pt on Thurs Sept 19th at 2:00     Addendum to Note from 08/29  - I see pt did not connect with us regarding appt for the 19th  - cannot continue to hold open that slot for this pt  - if he contacts us, will find another appt with me for him (I will work with our scheduling team)    Addendum to Note from 9/11  -Per phone conversation between Tamara Mcneil and Cleopatra Carreon, Dr Carreon indicated pt uses anabolic steroid and does not have any plan to stop using steroid.  This was not reported by pt during initial appointment with Tamara on 8/13/2019.    Loyda Mcnally MD

## 2019-08-30 LAB
ANION GAP SERPL CALCULATED.3IONS-SCNC: 4 MMOL/L (ref 3–14)
BUN SERPL-MCNC: 19 MG/DL (ref 7–30)
CALCIUM SERPL-MCNC: 10.1 MG/DL (ref 8.5–10.1)
CHLORIDE SERPL-SCNC: 102 MMOL/L (ref 94–109)
CO2 SERPL-SCNC: 33 MMOL/L (ref 20–32)
CREAT SERPL-MCNC: 1.49 MG/DL (ref 0.66–1.25)
GFR SERPL CREATININE-BSD FRML MDRD: 53 ML/MIN/{1.73_M2}
GLUCOSE SERPL-MCNC: 63 MG/DL (ref 70–99)
POTASSIUM SERPL-SCNC: 5 MMOL/L (ref 3.4–5.3)
SODIUM SERPL-SCNC: 139 MMOL/L (ref 133–144)

## 2019-08-30 ASSESSMENT — ANXIETY QUESTIONNAIRES: GAD7 TOTAL SCORE: 18

## 2019-08-31 PROBLEM — F31.81 BIPOLAR 2 DISORDER (H): Status: ACTIVE | Noted: 2019-08-31

## 2019-09-03 ENCOUNTER — TELEPHONE (OUTPATIENT)
Dept: ADDICTION MEDICINE | Facility: CLINIC | Age: 49
End: 2019-09-03

## 2019-09-03 DIAGNOSIS — F11.20 UNCOMPLICATED OPIOID DEPENDENCE (H): ICD-10-CM

## 2019-09-03 NOTE — RESULT ENCOUNTER NOTE
Dear Jeremi,    Here is a summary of your recent test results:  -Kidney function (GFR) is decreased but slight better than previously.  ADVISE: rechecking this in 6 months  -Sodium is normal.  -Potassium is normal.  -Calcium is normal.    For additional lab test information, labtestsonline.org is an excellent reference.    In addition, here is a list of due or overdue Health Maintenance reminders:    Flu Vaccine(1) due on 09/01/2019    Please call us at [phone] (or use Flud) to address the above recommendations if needed.           Thank you very much for trusting me and [facility].     Healthy regards,  Nate Segovia MD

## 2019-09-03 NOTE — TELEPHONE ENCOUNTER
Reason for Call:  Medication or medication refill:    Do you use a [facility]?  Name of the pharmacy and phone number for the current request:  [facility] [place] on [address]     Name of the medication requested:   buprenorphine HCl-naloxone HCl (SUBOXONE) 2-0.5 MG per film  Other request:   Controlled Substance Refill Request for buprenorphine HCl-naloxone HCl (SUBOXONE) 2-0.5 MG per film  Problem List Complete:    Yes    Last Written Prescription Date:  08/20/19  Last Fill Quantity: 23,   # refills: 0    THE MOST RECENT OFFICE VISIT MUST BE WITHIN THE PAST 3 MONTHS. AT LEAST ONE FACE TO FACE VISIT MUST OCCUR EVERY 6 MONTHS. ADDITIONAL VISITS CAN BE VIRTUAL.  (THIS STATEMENT SHOULD BE DELETED.)    [place] [facility] [place]:      Future Office visit:   Next 5 appointments (look out 90 days)    Sep 11, 2019 10:00 AM CDT  Return Visit with Cleopatra Carreon MD  [facility] ([facility]) [address] [place] [address]  [address] [place] [id number]  [phone]          Controlled substance agreement:   Encounter-Level CSA - 11/16/2017:    Controlled Substance Agreement - Scan on 12/1/2017 10:43 AM: CONTROLLED SUBSTANCE AGREEMENT (below)       Encounter-Level CSA - 08/07/2015:    Controlled Substance Agreement - Scan on 8/19/2015 11:27 AM: Controlled Substance Agreement 08/07/15 (below)       Patient-Level CSA:    There are no patient-level csa.         Last Urine Drug Screen: No results found for: CDAUT, No results found for: COMDAT,   Cannabinoids (10-nbq-9-carboxy-9-THC)   Date Value Ref Range Status   08/02/2019 Not Detected NDET^Not Detected ng/mL Final     Comment:     Cutoff for a negative cannabinoid is 50 ng/mL or less.     Phencyclidine (Phencyclidine)   Date Value Ref Range Status   08/02/2019 Not Detected NDET^Not Detected ng/mL Final     Comment:     Cutoff for a negative PCP is 25 ng/mL or less.     Cocaine (Benzoylecgonine)   Date Value Ref  Range Status   08/02/2019 Not Detected NDET^Not Detected ng/mL Final     Comment:     Cutoff for a negative cocaine is 150 ng/ml or less.     Methamphetamine (d-Methamphetamine)   Date Value Ref Range Status   08/02/2019 Not Detected NDET^Not Detected ng/mL Final     Comment:     Cutoff for a negative methamphetamine is 500 ng/ml or less.     Opiates (Morphine)   Date Value Ref Range Status   08/02/2019 Not Detected NDET^Not Detected ng/mL Final     Comment:     Cutoff for a negative opiate is 100 ng/ml or less.     Amphetamine (d-Amphetamine)   Date Value Ref Range Status   08/02/2019 Not Detected NDET^Not Detected ng/mL Final     Comment:     Cutoff for a negative amphetamine is 500 ng/mL or less.     Benzodiazepines (Nordiazepam)   Date Value Ref Range Status   08/02/2019 Detected, Abnormal Result (A) NDET^Not Detected ng/mL Final     Comment:     Cutoff for a positive benzodiazepines is greater than 150 ng/ml.  This is an unconfirmed screening result to be used for medical purposes only.   Order [id number] for confirmation or individual confirmation tests to [facility].       Tricyclic Antidepressants (Desipramine)   Date Value Ref Range Status   08/02/2019 Not Detected NDET^Not Detected ng/mL Final     Comment:     Cutoff for a negative tricyclic antidepressant is 300 ng/ml or less.     Methadone (Methadone)   Date Value Ref Range Status   08/02/2019 Not Detected NDET^Not Detected ng/mL Final     Comment:     Cutoff for a negative methadone is 200 ng/ml or less.     Barbiturates (Butalbital)   Date Value Ref Range Status   08/02/2019 Not Detected NDET^Not Detected ng/mL Final     Comment:     Cutoff for a negative barbituate is 200 ng/ml or less.     Oxycodone (Oxycodone)   Date Value Ref Range Status   08/02/2019 Not Detected NDET^Not Detected ng/mL Final     Comment:     Cutoff for a negative Oxycodone is 100 ng/mL or less.     Propoxyphene (Norpropoxyphene)   Date Value Ref Range Status   08/02/2019 Not Detected  NDET^Not Detected ng/mL Final     Comment:     Cutoff for a negative propoxyphene is 300 ng/ml or less     Buprenorphine (Buprenorphine)   Date Value Ref Range Status   08/02/2019 Detected, Abnormal Result (A) NDET^Not Detected ng/mL Final     Comment:     Cutoff for a positive buprenorphine is greater than 10 ng/ml.  This is an unconfirmed screening result to be used for medical purposes only.   Order [id number] for confirmation or individual confirmation tests to [employer].          Processing:       [email or web address]   checked in past 3 months?  No, route to RN          Can we leave a detailed message on this number? YES    Phone number patient can be reached at: Home number on file [phone] (home)    Best Time:      Call taken on 9/3/2019 at 1:23 PM by Natalie Adams

## 2019-09-04 RX ORDER — BUPRENORPHINE AND NALOXONE 2; .5 MG/1; MG/1
FILM, SOLUBLE BUCCAL; SUBLINGUAL
Qty: 16 FILM | Refills: 0 | Status: SHIPPED | OUTPATIENT
Start: 2019-09-04 | End: 2019-09-11

## 2019-09-04 NOTE — TELEPHONE ENCOUNTER
Last bridge pended by this RN for just an 11.5 day supply and not to reach his next appt. New bridge needed to get to his 19. Patient uses 2 films daily (1/2 film BID).       Refill for: Suboxone    Last Appointment: 19    Next Appointment: 19    No Shows/Cancellations since last appointment:  Cancel 2019    Last Refill in Epic (date and amount/how many days):    Disp Refills Start End COLLETTE   buprenorphine HCl-naloxone HCl (SUBOXONE) 2-0.5 MG per film 23 Film 0 2019  No   Si/2 film qid   Sent to pharmacy as: buprenorphine HCl-naloxone HCl (SUBOXONE) 2-0.5 MG per film   Class: E-Prescribe   Notes to Pharmacy: OLEG: [id number]         Most Recent UDS results: POS for Benzos, TVAs, and Buprenorphine     reviewed and summarized below:   Fill Date Drug        Qty  Days Prescriber   2019 Testosterone Cyp 200 Mg/ml      5.00  70 Ro Leh  2019 Bupreno-Nalox 2-0.5 Mg Sl Film 23.00 12 Ei Bur

## 2019-09-09 DIAGNOSIS — G47.00 INSOMNIA, UNSPECIFIED TYPE: ICD-10-CM

## 2019-09-10 NOTE — TELEPHONE ENCOUNTER
***      Last Written Prescription Date:  ***  Last Fill Quantity: ***,   # refills: ***  Last Office Visit: ***  Future Office visit:    Next 5 appointments (look out 90 days)    Sep 11, 2019 10:00 AM CDT  Return Visit with Cleopatra Carreon MD  [facility] ([facility]) [address] [facility]  [address]  [facility] [address]  [phone]           Routing refill request to provider for review/approval because:  {RX Non-Protocol:[id number]}

## 2019-09-10 NOTE — TELEPHONE ENCOUNTER
Thee already is a prescription sent on 8/29/19 to be filled on 9/9/19  - please check  or the pharmacy to see if they have the prescription and if he just needs to request it.

## 2019-09-10 NOTE — TELEPHONE ENCOUNTER
Routing refill request to provider for review/approval because:  Drug not on the [facility] refill protocol     Kari Mcleod RN, BSN  [facility]

## 2019-09-10 NOTE — TELEPHONE ENCOUNTER
Requested Prescriptions   Pending Prescriptions Disp Refills     zolpidem (AMBIEN) 10 MG tablet [Pharmacy Med Name: ZOLPIDEM TARTRATE 10 MG TABLET]  Last Written Prescription Date:  09/12/2019  Last Fill Quantity: 30 tablet,  # refills: 0   Last Office Visit: 8/29/2019 Luis Armando Segovia MD   Future Office Visit:    Next 5 appointments (look out 90 days)    Sep 11, 2019 10:00 AM CDT  Return Visit with Cleopatra Carreon MD  [facility] ([facility]) [address] [place] [address]  [address]  [facility] [address]  [phone]          30 tablet 0     Sig: TAKE 1 TABLET BY MOUTH AT NIGHT AS NEEDED FOR SLEEP       There is no refill protocol information for this order     Routing refill request to provider for review/approval because:  Drug not on the [facility], P or [facility] refill protocol or controlled substance

## 2019-09-11 ENCOUNTER — TELEPHONE (OUTPATIENT)
Dept: ADDICTION MEDICINE | Facility: CLINIC | Age: 49
End: 2019-09-11

## 2019-09-11 ENCOUNTER — MYC MEDICAL ADVICE (OUTPATIENT)
Dept: PALLIATIVE MEDICINE | Facility: CLINIC | Age: 49
End: 2019-09-11

## 2019-09-11 ENCOUNTER — OFFICE VISIT (OUTPATIENT)
Dept: ADDICTION MEDICINE | Facility: CLINIC | Age: 49
End: 2019-09-11
Payer: MEDICARE

## 2019-09-11 ENCOUNTER — TELEPHONE (OUTPATIENT)
Dept: PALLIATIVE MEDICINE | Facility: CLINIC | Age: 49
End: 2019-09-11

## 2019-09-11 VITALS
WEIGHT: 265 LBS | HEART RATE: 95 BPM | RESPIRATION RATE: 17 BRPM | OXYGEN SATURATION: 95 % | BODY MASS INDEX: 35.89 KG/M2 | DIASTOLIC BLOOD PRESSURE: 70 MMHG | TEMPERATURE: 98.3 F | HEIGHT: 72 IN | SYSTOLIC BLOOD PRESSURE: 144 MMHG

## 2019-09-11 DIAGNOSIS — F33.1 MAJOR DEPRESSIVE DISORDER, RECURRENT EPISODE, MODERATE (H): ICD-10-CM

## 2019-09-11 DIAGNOSIS — F11.20 UNCOMPLICATED OPIOID DEPENDENCE (H): ICD-10-CM

## 2019-09-11 DIAGNOSIS — M54.12 CERVICAL RADICULOPATHY: Primary | ICD-10-CM

## 2019-09-11 DIAGNOSIS — M54.2 NECK PAIN: ICD-10-CM

## 2019-09-11 DIAGNOSIS — F41.1 GENERALIZED ANXIETY DISORDER: ICD-10-CM

## 2019-09-11 DIAGNOSIS — F90.9 ATTENTION DEFICIT HYPERACTIVITY DISORDER (ADHD), UNSPECIFIED ADHD TYPE: ICD-10-CM

## 2019-09-11 DIAGNOSIS — F41.0 PANIC DISORDER WITHOUT AGORAPHOBIA: ICD-10-CM

## 2019-09-11 DIAGNOSIS — M54.9 UPPER BACK PAIN ON RIGHT SIDE: Primary | ICD-10-CM

## 2019-09-11 LAB
AMPHETAMINES UR QL: ABNORMAL NG/ML
BARBITURATES UR QL SCN: NOT DETECTED NG/ML
BENZODIAZ UR QL SCN: ABNORMAL NG/ML
BUPRENORPHINE UR QL: ABNORMAL NG/ML
CANNABINOIDS UR QL: NOT DETECTED NG/ML
COCAINE UR QL SCN: NOT DETECTED NG/ML
D-METHAMPHET UR QL: NOT DETECTED NG/ML
METHADONE UR QL SCN: NOT DETECTED NG/ML
OPIATES UR QL SCN: NOT DETECTED NG/ML
OXYCODONE UR QL SCN: NOT DETECTED NG/ML
PCP UR QL SCN: NOT DETECTED NG/ML
PROPOXYPH UR QL: NOT DETECTED NG/ML
TRICYCLICS UR QL SCN: ABNORMAL NG/ML

## 2019-09-11 PROCEDURE — 80306 DRUG TEST PRSMV INSTRMNT: CPT | Performed by: PEDIATRICS

## 2019-09-11 PROCEDURE — 99215 OFFICE O/P EST HI 40 MIN: CPT | Performed by: PEDIATRICS

## 2019-09-11 RX ORDER — BUPRENORPHINE AND NALOXONE 2; .5 MG/1; MG/1
FILM, SOLUBLE BUCCAL; SUBLINGUAL
Qty: 66 FILM | Refills: 0 | Status: SHIPPED | OUTPATIENT
Start: 2019-09-11 | End: 2019-10-02

## 2019-09-11 ASSESSMENT — MIFFLIN-ST. JEOR: SCORE: 2095.03

## 2019-09-11 NOTE — TELEPHONE ENCOUNTER
Cervical MRI ordered. My Chart message sent to inform him . He will likely want to go to [facility]     [facility] imaging- Please call to schedule: [phone]      RAINA Clement, NP-C  [facility]

## 2019-09-11 NOTE — TELEPHONE ENCOUNTER
"RX cancelled with original pharmacy.     RX called into new pharmacy.     Patient notified of refill transfer. He wanted it transferred to [facility] now because [facility] has \"Been really bad lately.\" I told him he can request a different pharmacy on his next refill. I would not be transferring it again today.     Patient given messages from provider that the order for the MRI is in and that he can schedule with the [facility] psychiatry service and will be given a new provider.       "

## 2019-09-11 NOTE — TELEPHONE ENCOUNTER
Reason for Call:  Medication Question    Detailed comments: Pt called stating that the pharmacy that the rx was sent to doesn't have any subx in stock therefore pt is requesting the rx be sent to a different pharmacy.    Current Pharmacy: [facility] [address]  Tel: [phone]    Change to Pharmacy: [facility] [address] [facility]  Tel: [phone]      Phone Number Patient can be reached at: Home number on file [phone] (home)    Best Time: anytime    Can we leave a detailed message on this number? YES    Call taken on 9/11/2019 at 12:22 PM by Christine Waite

## 2019-09-11 NOTE — TELEPHONE ENCOUNTER
RX called into the 3rd pharmacy. I was unable to reach a human. Voicemail left with prescription on provider voicemail. Patient notified.
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
## 2019-09-11 NOTE — TELEPHONE ENCOUNTER
Pt [facility] informed by [facility] [place] that they do not have the medication and to change it to the following as they have it and have a supply in stock.      [facility] to[facility] tel: [phone]    Christine Waite  [facility]

## 2019-09-11 NOTE — TELEPHONE ENCOUNTER
Attempt #1  Called patient @ [phone] (home) - Left a non-detailed message to call back and speak with any triage nurse.    Asmita Marinelli RN  [place] [facility]

## 2019-09-12 NOTE — TELEPHONE ENCOUNTER
Attempt #2  Called patient @ # below - Left a non-detailed message to call back and speak with any triage nurse.    Asmita Marinelli RN  [place] [facility]

## 2019-09-13 RX ORDER — ZOLPIDEM TARTRATE 10 MG/1
TABLET ORAL
Qty: 30 TABLET | Refills: 0 | OUTPATIENT
Start: 2019-09-13

## 2019-09-13 NOTE — TELEPHONE ENCOUNTER
"Called patient @ # below -     Advised of MD RAIMUNDO message below -   Patient stated that the pharmacy \"fucking\" messed up again. Stated he swears on his life that he never received the medication. Stated he is so fed up with this pharmacy, he is thinking it's time to change pharmacy's.      pulled:       Called [facility] @ [phone] -   Stated the patient hung up on one of the techs yesterday. Stated they have one in the system for 8/29 that was on a hold because it was too soon to fill.   Stated they just pushed it through now and it is good to go - they will get it ready for the patient and will be ready to  in a couple hours.       Asmita Marinelli RN  [place] [facility]  "

## 2019-09-25 DIAGNOSIS — M54.2 NECK PAIN: ICD-10-CM

## 2019-09-25 DIAGNOSIS — M54.9 UPPER BACK PAIN ON RIGHT SIDE: ICD-10-CM

## 2019-09-25 RX ORDER — CYCLOBENZAPRINE HCL 10 MG
10 TABLET ORAL 3 TIMES DAILY PRN
Qty: 90 TABLET | Refills: 1 | Status: SHIPPED | OUTPATIENT
Start: 2019-09-25 | End: 2019-11-22

## 2019-09-25 NOTE — TELEPHONE ENCOUNTER
Requested Prescriptions   Pending Prescriptions Disp Refills     cyclobenzaprine (FLEXERIL) 10 MG tablet  Last Written Prescription Date:  8/2/19  Last Fill Quantity: 90,  # refills: 1   Last office visit: 9/11/2019 with prescribing provider:     Future Office Visit:   Next 5 appointments (look out 90 days)    Oct 02, 2019  9:30 AM CDT  Return Visit with RAINA Gill CNP  [facility] [address]  [address]  [facility] [address] [phone]   Oct 02, 2019 10:00 AM CDT  Return Visit with Cleopatra Carreon MD  [facility] ([facility]) [address]  [address]  [facility] [address] [phone]          90 tablet 1     Sig: Take 1 tablet (10 mg) by mouth 3 times daily as needed for other (hiccups)       There is no refill protocol information for this order

## 2019-09-25 NOTE — TELEPHONE ENCOUNTER
Prescription approved per [facility] Refill Protocol. Zina Álvarez RN September 25, 2019 3:29 PM

## 2019-09-30 ENCOUNTER — TELEPHONE (OUTPATIENT)
Dept: ADDICTION MEDICINE | Facility: CLINIC | Age: 49
End: 2019-09-30

## 2019-09-30 NOTE — TELEPHONE ENCOUNTER
Reason for Call:  Med request    Detailed comments: Pt injured jaw over the weekend and is requesting something to help with the pt. Pt is going to the dentist due to a tooth being loosened from the injury. Pt stated that he thinks the dentist might pull the tooth. Pt stated that he has stopped taking the subx because he knows you aren't suppose to take the subx with any pain meds. Pt reports using Orajel & Ibuprofen but will need something.     Pharmacy tel: [facility] tel: [phone]    Phone Number Patient can be reached at: Home number on file [phone] (home)    Best Time: anytime    Can we leave a detailed message on this number? YES    Call taken on 9/30/2019 at 2:52 PM by Christine Waite

## 2019-09-30 NOTE — TELEPHONE ENCOUNTER
"Spoke to patient.      Patient reports on Saturday he was in an inversion swing and couldn't get out or upright again so he had to \"fling\" himself out of it and hit his jaw and tooth during this process.     Patient reports his tooth has been \"hanging on by a thread\" since Saturday and \"I'd pull it out myself if I had the balls\". Patient states pain is intense and \"the air hurts when I breathe\". Patient has dentist appointment at 4pm today to have tooth assessed.     Patient reports trying ice, Orajel and ibuprofen-- patient claims he has been taking 800mg every 3 hours, patient educated on safe dosing of ibuprofen    Writer informed patient that he needs to go to the dentist and see what they can do. Patient also informed that ibuprofen is the best pain option for tooth pain, in combination with ice. Patient stated \"that doesn't fucking work\".     Patient informed that  would not be prescribing any opioids to help with pain-- patient became upset. Patient stated \"she doesn't have to prescribe them, all she has to do is tell the pharmacy to fill it\". Writer informed patient that would be up to the discretion of provider.    Patient stated \"This is the bullshit problem with this bureaucracy... because of the medication that helps me not get the shits, I can't get a pain medication to help my jaw not hurt\". Writer explained that is not the issue at hand but patient argumentative about it.     Patient stopped his suboxone yesterday in anticipation of starting an opioid for pain management-- reports he is already having withdrawal symptoms.    Writer informed patient that he can discuss these issues/concerns with  at his follow up on 10/2-- patient stated \"what fucking good will that do?\".     Writer encouraged patient to go to dentist and to come to follow up on Wednesday. Patient again asked for medication to be approved-- writer informed him this wouldn't happen. Patient said \"oh fucking forget " "it. God dammit\" and ended the call.       Provider notified.    Estephania Dudley RN  09/30/19  3:30 PM    "

## 2019-10-02 ENCOUNTER — OFFICE VISIT (OUTPATIENT)
Dept: ADDICTION MEDICINE | Facility: CLINIC | Age: 49
End: 2019-10-02
Payer: MEDICARE

## 2019-10-02 ENCOUNTER — OFFICE VISIT (OUTPATIENT)
Dept: PALLIATIVE MEDICINE | Facility: CLINIC | Age: 49
End: 2019-10-02
Payer: MEDICARE

## 2019-10-02 VITALS — SYSTOLIC BLOOD PRESSURE: 154 MMHG | HEART RATE: 69 BPM | DIASTOLIC BLOOD PRESSURE: 82 MMHG

## 2019-10-02 VITALS
TEMPERATURE: 98.5 F | DIASTOLIC BLOOD PRESSURE: 82 MMHG | SYSTOLIC BLOOD PRESSURE: 154 MMHG | HEIGHT: 72 IN | OXYGEN SATURATION: 96 % | WEIGHT: 263 LBS | RESPIRATION RATE: 14 BRPM | HEART RATE: 72 BPM | BODY MASS INDEX: 35.62 KG/M2

## 2019-10-02 DIAGNOSIS — F41.1 GENERALIZED ANXIETY DISORDER: ICD-10-CM

## 2019-10-02 DIAGNOSIS — F41.0 PANIC DISORDER WITHOUT AGORAPHOBIA: ICD-10-CM

## 2019-10-02 DIAGNOSIS — M54.12 CERVICAL RADICULOPATHY: Primary | ICD-10-CM

## 2019-10-02 DIAGNOSIS — F11.20 UNCOMPLICATED OPIOID DEPENDENCE (H): Primary | ICD-10-CM

## 2019-10-02 DIAGNOSIS — M54.16 LUMBAR RADICULOPATHY: ICD-10-CM

## 2019-10-02 DIAGNOSIS — F33.1 MAJOR DEPRESSIVE DISORDER, RECURRENT EPISODE, MODERATE (H): ICD-10-CM

## 2019-10-02 DIAGNOSIS — F90.9 ATTENTION DEFICIT HYPERACTIVITY DISORDER (ADHD), UNSPECIFIED ADHD TYPE: ICD-10-CM

## 2019-10-02 DIAGNOSIS — G43.109 MIGRAINE WITH AURA AND WITHOUT STATUS MIGRAINOSUS, NOT INTRACTABLE: ICD-10-CM

## 2019-10-02 DIAGNOSIS — M54.9 UPPER BACK PAIN ON RIGHT SIDE: ICD-10-CM

## 2019-10-02 DIAGNOSIS — M54.2 NECK PAIN: ICD-10-CM

## 2019-10-02 PROCEDURE — 99214 OFFICE O/P EST MOD 30 MIN: CPT | Performed by: NURSE PRACTITIONER

## 2019-10-02 PROCEDURE — 99214 OFFICE O/P EST MOD 30 MIN: CPT | Performed by: PEDIATRICS

## 2019-10-02 PROCEDURE — 80306 DRUG TEST PRSMV INSTRMNT: CPT | Performed by: PEDIATRICS

## 2019-10-02 RX ORDER — BUPRENORPHINE AND NALOXONE 2; .5 MG/1; MG/1
FILM, SOLUBLE BUCCAL; SUBLINGUAL
Qty: 90 FILM | Refills: 0 | Status: SHIPPED | OUTPATIENT
Start: 2019-10-02 | End: 2019-10-02

## 2019-10-02 RX ORDER — BUPRENORPHINE AND NALOXONE 8; 2 MG/1; MG/1
FILM, SOLUBLE BUCCAL; SUBLINGUAL
Qty: 30 FILM | Refills: 0 | Status: SHIPPED | OUTPATIENT
Start: 2019-10-02 | End: 2019-10-30

## 2019-10-02 ASSESSMENT — PAIN SCALES - GENERAL: PAINLEVEL: SEVERE PAIN (7)

## 2019-10-02 ASSESSMENT — MIFFLIN-ST. JEOR: SCORE: 2085.96

## 2019-10-02 NOTE — PATIENT INSTRUCTIONS
After Visit Instructions:     Thank you for coming to [facility] for your care. It is my goal to partner with you to help you reach your optimal state of health.     Continue daily self-care, identifying contributing factors, and monitoring variations in pain level. Continue to integrate self-care into your life.      1. Orders given for acupuncture both through [facility] (they will call you) and for outside provider if you prefer.   2. Schedule follow-up with RAINA Hernandez, NPJULIO as needed  3. Imaging: Cervical spine MRI, order given for [facility]  4. Medication recommendations: No change to current medication.       RAINA Clement, NP-C  [facility]  [facility]    Clinic Number:  [phone]     Call with any questions about your care and for scheduling assistance.     Calls are returned Monday through Friday between 8 AM and 4:30 PM. We usually get back to you within 2 business days depending on the issue/request.    If we are prescribing your medications:    For opioid medication refills, call the clinic or send a [employer] message 7 days in advance.  Please include:    Name of requested medication    Name of the pharmacy.    For non-opioid medications, call your pharmacy directly to request a refill. Please allow 3-4 days to be processed.     Per [place] State Law:    All controlled substance prescriptions must be filled within 30 days of being written.      For those controlled substances allowing refills, pickup must occur within 30 days of last fill.      We believe regular attendance is key to your success in our program!      Any time you are unable to keep your appointment we ask that you call us at least 24 hours in advance to cancel.This will allow us to offer the appointment time to another patient.   Multiple missed appointments may lead to dismissal from the clinic.
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
## 2019-10-07 ENCOUNTER — TELEPHONE (OUTPATIENT)
Dept: PALLIATIVE MEDICINE | Facility: CLINIC | Age: 49
End: 2019-10-07

## 2019-10-07 DIAGNOSIS — G43.109 MIGRAINE WITH AURA AND WITHOUT STATUS MIGRAINOSUS, NOT INTRACTABLE: ICD-10-CM

## 2019-10-07 DIAGNOSIS — M62.838 MUSCLE SPASM: ICD-10-CM

## 2019-10-07 RX ORDER — TIZANIDINE 2 MG/1
2-4 TABLET ORAL 3 TIMES DAILY
Qty: 90 TABLET | Refills: 0 | Status: SHIPPED | OUTPATIENT
Start: 2019-10-07 | End: 2019-10-28

## 2019-10-07 NOTE — TELEPHONE ENCOUNTER
Received fax request from [facility] pharmacy requesting refill(s) for tiZANidine (ZANAFLEX) 2 MG tablet    Last refilled on 06/25/2019    Pt last seen on 10/02/2019  Next appt scheduled for NONE (10/25 for Botox)    Will facilitate refill.        Jennifer Mcconnell    [facility]

## 2019-10-11 DIAGNOSIS — F41.1 GENERALIZED ANXIETY DISORDER: ICD-10-CM

## 2019-10-11 DIAGNOSIS — G47.00 INSOMNIA, UNSPECIFIED TYPE: ICD-10-CM

## 2019-10-11 DIAGNOSIS — F41.0 PANIC DISORDER WITHOUT AGORAPHOBIA: ICD-10-CM

## 2019-10-11 RX ORDER — LORAZEPAM 1 MG/1
TABLET ORAL
Qty: 60 TABLET | Refills: 0 | Status: CANCELLED | OUTPATIENT
Start: 2019-10-11

## 2019-10-12 DIAGNOSIS — F33.8 SEASONAL AFFECTIVE DISORDER (H): ICD-10-CM

## 2019-10-12 DIAGNOSIS — F41.1 GENERALIZED ANXIETY DISORDER: ICD-10-CM

## 2019-10-12 DIAGNOSIS — F33.1 MAJOR DEPRESSIVE DISORDER, RECURRENT EPISODE, MODERATE (H): ICD-10-CM

## 2019-10-12 NOTE — TELEPHONE ENCOUNTER
Requested Prescriptions   Pending Prescriptions Disp Refills     zolpidem (AMBIEN) 10 MG tablet [Pharmacy Med Name: ZOLPIDEM TARTRATE 10  Last Written Prescription Date:    Last Fill Quantity: ,  # refills:    Last Office Visit: 8/29/2019   Future Office Visit:    Next 5 appointments (look out 90 days)    Nov 01, 2019 11:00 AM CDT  Return Visit with Cleopatra Carreon MD  [facility] ([facility] [address]  [address] [place] [id number]  [phone]          MG TABLET] 30 tablet 0     Sig: TAKE 1 TABLET (10 MG) BY MOUTH NIGHTLY AS NEEDED FOR SLEEP       There is no refill protocol information for this order        LORazepam (ATIVAN) 1 MG tablet [Pharmacy Med Name: LORAZEPAM 1 MG  Last Written Prescription Date:  8/14/19  Last Fill Quantity: 60,  # refills: 1   Last Office Visit: 8/29/2019   Future Office Visit:    Next 5 appointments (look out 90 days)    Nov 01, 2019 11:00 AM CDT  Return Visit with Cleopatra Carreon MD  [facility] ([facility]) [address]  [address]  [facility] [address]  [phone]          TABLET] 60 tablet 1     Sig: TAKE 1 TABLET BY MOUTH TWICE A DAY AS NEEDED FOR ANXIETY       There is no refill protocol information for this order

## 2019-10-12 NOTE — TELEPHONE ENCOUNTER
"Requested Prescriptions   Pending Prescriptions Disp Refills     buPROPion (WELLBUTRIN XL) 300 MG 24 hr tablet [Pharmacy Med Name:  Last Written Prescription Date:  8/13/19  Last Fill Quantity: 7,  # refills: 0   Last Office Visit: 8/29/2019   Future Office Visit:    Next 5 appointments (look out 90 days)    Nov 01, 2019 11:00 AM CDT  Return Visit with Cleopatra Carreon MD  [facility] ([facility]) [address] [place] [address]  [address]  [facility] [address] [phone]          BUPROPION HCL  MG TABLET] 90 tablet 0     Sig: TAKE 1 TABLET (300 MG) BY MOUTH EVERY MORNING       SSRIs Protocol Failed - 10/12/2019  8:47 AM   PHQ-9 SCORE 6/28/2019 8/8/2019 8/29/2019   PHQ-9 Total Score - - -   PHQ-9 Total Score MyChart - 14 (Moderate depression) -   PHQ-9 Total Score 21 14 20   Some encounter information is confidential and restricted. Go to Review Flowsheets activity to see all data.     NANCY-7 SCORE 6/28/2019 8/8/2019 8/29/2019   Total Score - - -   Total Score - 14 (moderate anxiety) -   Total Score 19 14 18        Failed - PHQ-9 score less than 5 in past 6 months     Please review last PHQ-9 score.           Passed - Medication is Bupropion     If the medication is Bupropion (Wellbutrin), and the patient is taking for smoking cessation; OK to refill.          Passed - Medication is active on med list        Passed - Patient is age 18 or older        Passed - Recent (6 mo) or future (30 days) visit within the authorizing provider's specialty     Patient had office visit in the last 6 months or has a visit in the next 30 days with authorizing provider or within the authorizing provider's specialty.  See \"Patient Info\" tab in inbasket, or \"Choose Columns\" in Meds & Orders section of the refill encounter.              "

## 2019-10-14 RX ORDER — BUPROPION HYDROCHLORIDE 300 MG/1
300 TABLET ORAL EVERY MORNING
Qty: 90 TABLET | Refills: 0 | Status: SHIPPED | OUTPATIENT
Start: 2019-10-14 | End: 2019-11-22

## 2019-10-14 NOTE — TELEPHONE ENCOUNTER
Pt calling stating that these refills were sent to [place] and he needs them here     Pt stated that they have already been filled and waiting for him in [place] but he needs them canceled and sent up to [place]     RN advised pt that these have not been sent any where and they are in the refill box to be refilled     Pt stated that he was told by the pharmacy that they are in [place]     RN pulled , and reviewed chart there was not a script recent filled or sent in/to [place]    Calling pharmacy for review of this [phone] - yes they had a refill for ativan from July and they filled it RN asked that script be cancle completely out of their system and we will address the refills from [place]     Please review and advise     Thank you     Kari Mcleod RN, BSN  [facility]

## 2019-10-14 NOTE — TELEPHONE ENCOUNTER
Controlled Substance Refill Request for   LORazepam (ATIVAN) 1 MG tablet 60 tablet 1 8/14/2019  No   Sig - Route: Take 1 tablet (1 mg) by mouth 2 times daily as needed for anxiety TAKE 1 TABLET BY MOUTH TWICE A DAY AS NEEDED FOR ANXIETY - Oral     zolpidem (AMBIEN) 10 MG tablet 30 tablet 0 9/12/2019  No   Sig - Route: Take 1 tablet (10 mg) by mouth nightly as needed for sleep - Oral         Problem List Complete:    Yes    Last Written Prescription Date:  8/14/2019 and 9/12/2019  Last Fill Quantity: 60,   # refills: 1  Last Office Visit with [facility] primary care provider: 8/29/2019      Future Office visit:   Next 5 appointments (look out 90 days)    Nov 01, 2019 11:00 AM CDT  Return Visit with Cleopatra Carreon MD  [facility] ([facility]) [address] [place] [address]  [address]  [facility] [address]  [phone]          Controlled substance agreement:   Encounter-Level CSA - 11/16/2017:    Controlled Substance Agreement - Scan on 12/1/2017 10:43 AM: CONTROLLED SUBSTANCE AGREEMENT     Encounter-Level CSA - 08/07/2015:    Controlled Substance Agreement - Scan on 8/19/2015 11:27 AM: Controlled Substance Agreement 08/07/15     Patient-Level CSA:    There are no patient-level csa.         Last Urine Drug Screen: No results found for: CDAUT, No results found for: COMDAT,   Cannabinoids (52-uvo-0-carboxy-9-THC)   Date Value Ref Range Status   10/02/2019 Not Detected NDET^Not Detected ng/mL Final     Comment:     Cutoff for a negative cannabinoid is 50 ng/mL or less.     Phencyclidine (Phencyclidine)   Date Value Ref Range Status   10/02/2019 Not Detected NDET^Not Detected ng/mL Final     Comment:     Cutoff for a negative PCP is 25 ng/mL or less.     Cocaine (Benzoylecgonine)   Date Value Ref Range Status   10/02/2019 Not Detected NDET^Not Detected ng/mL Final     Comment:     Cutoff for a negative cocaine is 150 ng/ml or less.     Methamphetamine (d-Methamphetamine)   Date Value  Ref Range Status   10/02/2019 Not Detected NDET^Not Detected ng/mL Final     Comment:     Cutoff for a negative methamphetamine is 500 ng/ml or less.     Opiates (Morphine)   Date Value Ref Range Status   10/02/2019 Not Detected NDET^Not Detected ng/mL Final     Comment:     Cutoff for a negative opiate is 100 ng/ml or less.     Amphetamine (d-Amphetamine)   Date Value Ref Range Status   10/02/2019 Not Detected NDET^Not Detected ng/mL Final     Comment:     Cutoff for a negative amphetamine is 500 ng/mL or less.     Benzodiazepines (Nordiazepam)   Date Value Ref Range Status   10/02/2019 Detected, Abnormal Result (A) NDET^Not Detected ng/mL Final     Comment:     Cutoff for a positive benzodiazepines is greater than 150 ng/ml.  This is an unconfirmed screening result to be used for medical purposes only.   Order [id number] for confirmation or individual confirmation tests to [facility].       Tricyclic Antidepressants (Desipramine)   Date Value Ref Range Status   10/02/2019 Detected, Abnormal Result (A) NDET^Not Detected ng/mL Final     Comment:     Cutoff for a positive tricyclic antidepressant is greater than 300 ng/ml.  This is an unconfirmed screening result to be used for medical purposes only.   Order [id number] for confirmation or individual confirmation tests to [facility].       Methadone (Methadone)   Date Value Ref Range Status   10/02/2019 Not Detected NDET^Not Detected ng/mL Final     Comment:     Cutoff for a negative methadone is 200 ng/ml or less.     Barbiturates (Butalbital)   Date Value Ref Range Status   10/02/2019 Not Detected NDET^Not Detected ng/mL Final     Comment:     Cutoff for a negative barbituate is 200 ng/ml or less.     Oxycodone (Oxycodone)   Date Value Ref Range Status   10/02/2019 Not Detected NDET^Not Detected ng/mL Final     Comment:     Cutoff for a negative Oxycodone is 100 ng/mL or less.     Propoxyphene (Norpropoxyphene)   Date Value Ref Range Status   10/02/2019 Not Detected NDET^Not  Detected ng/mL Final     Comment:     Cutoff for a negative propoxyphene is 300 ng/ml or less     Buprenorphine (Buprenorphine)   Date Value Ref Range Status   10/02/2019 Detected, Abnormal Result (A) NDET^Not Detected ng/mL Final     Comment:     Cutoff for a positive buprenorphine is greater than 10 ng/ml.  This is an unconfirmed screening result to be used for medical purposes only.   Order [id number] for confirmation or individual confirmation tests to [facility].          Processing:  Rx to be electronically transmitted to pharmacy by provider     [email or web address]   checked in past 3 months?  No, route to RN      Koffi Jones RN   [facility]

## 2019-10-14 NOTE — TELEPHONE ENCOUNTER
Prescription approved per [facility] Refill Protocol.    Koffi Jones RN   [facility]

## 2019-10-15 RX ORDER — LORAZEPAM 1 MG/1
TABLET ORAL
Qty: 60 TABLET | Refills: 1 | Status: SHIPPED | OUTPATIENT
Start: 2019-10-15 | End: 2019-12-11

## 2019-10-15 RX ORDER — ZOLPIDEM TARTRATE 10 MG/1
10 TABLET ORAL
Qty: 30 TABLET | Refills: 0 | Status: SHIPPED | OUTPATIENT
Start: 2019-10-15 | End: 2019-11-13

## 2019-10-25 ENCOUNTER — TELEPHONE (OUTPATIENT)
Dept: PALLIATIVE MEDICINE | Facility: CLINIC | Age: 49
End: 2019-10-25

## 2019-10-28 DIAGNOSIS — M62.838 MUSCLE SPASM: ICD-10-CM

## 2019-10-28 DIAGNOSIS — G43.109 MIGRAINE WITH AURA AND WITHOUT STATUS MIGRAINOSUS, NOT INTRACTABLE: ICD-10-CM

## 2019-10-28 RX ORDER — TIZANIDINE 2 MG/1
2-4 TABLET ORAL 3 TIMES DAILY
Qty: 90 TABLET | Refills: 0 | Status: SHIPPED | OUTPATIENT
Start: 2019-10-28 | End: 2019-11-14

## 2019-10-28 NOTE — TELEPHONE ENCOUNTER
Received fax request from   [facility]/pharmacy #[id number] - [place], [place] - [address]  [address] [address]  Phone: [phone] Fax: [phone]    pharmacy requesting refill(s) for Tizanidine 2mg    Last refilled on 10/07/19    Pt last seen on 10/02/19  Next appt scheduled for 10/29/19    Will facilitate refill.        Dk Johnson, [facility]   [facility]  October 28, 2019

## 2019-10-29 ENCOUNTER — OFFICE VISIT (OUTPATIENT)
Dept: PALLIATIVE MEDICINE | Facility: CLINIC | Age: 49
End: 2019-10-29
Payer: MEDICARE

## 2019-10-29 VITALS
WEIGHT: 263 LBS | SYSTOLIC BLOOD PRESSURE: 176 MMHG | HEART RATE: 87 BPM | BODY MASS INDEX: 35.67 KG/M2 | DIASTOLIC BLOOD PRESSURE: 87 MMHG

## 2019-10-29 DIAGNOSIS — G43.719 INTRACTABLE CHRONIC MIGRAINE WITHOUT AURA AND WITHOUT STATUS MIGRAINOSUS: Primary | ICD-10-CM

## 2019-10-29 PROCEDURE — 64615 CHEMODENERV MUSC MIGRAINE: CPT | Performed by: PSYCHIATRY & NEUROLOGY

## 2019-10-29 ASSESSMENT — PAIN SCALES - GENERAL: PAINLEVEL: EXTREME PAIN (9)

## 2019-10-29 NOTE — TELEPHONE ENCOUNTER
Patient has appt scheduled, but is hoping provider will call him.  He has new radiclar symptoms, left leg.  H/o previous surgery.  Has been going on for one week.    No b/b.  N/t in the leg.  Lateral and posterior to lateral three toes.    Has had side effects with gabapentin, Cymbalta.  Doesn't think Lyrica covered.  Tried extra tab of suboxone.  Doesn't feel that is helpful for his pain.  Has frustrations about suboxone managing his pain in general    I talked to him about whether imaging is appropriate, and he is not willing at thsi time.  Has no interest in procedure.    I advised him med management would need to occur with his primary pain provider.    Alexa Ascencio MD  [facility]

## 2019-10-29 NOTE — TELEPHONE ENCOUNTER
10- at 1:31 PM    When patient checked in for today's appt, he noted that he received a call from the clinic. Staff informed nurse at clinic.    Cara [place]  Patient Representative  [facility] [place]

## 2019-10-29 NOTE — PATIENT INSTRUCTIONS
[facility]   Botox Injection Discharge Instructions    Do not rub or put extended pressure on the injection sites. You may gently touch the sites to remove any excess blood.    Monitor the injection sites for signs and symptoms of infection such as redness, swelling, warmth, fever, chills, or drainage to areas.    You may have soreness at the injection sites for up to 24 hours.    If you are able to use anti-inflammatory medications or Tylenol for pain control, you can take these as directed.    It may take up to 1 month to notice benefit from the 1st treatment    If you do notice relief, botox can be done every 3 months.  It may require insurance authorization everytime.      For questions about insurance coverage, please call the main clinic number and ask to speak with someone about botox coverage.     Pain Clinic phone number during work hours Monday-Friday:  [phone]    After hours provider line: [phone]

## 2019-10-29 NOTE — TELEPHONE ENCOUNTER
Will ask nursing to call patient- advise him Matilde really does want to see him- and she can further discuss with Dr. Carreon.  He was really hoping to avoid coming in tomorrow and wanted a phone call.    Alexa Ascencio MD  [facility]

## 2019-10-29 NOTE — TELEPHONE ENCOUNTER
Patient stopped back at  to schedule an appt with Matilde Berg. Appt scheduled for tomorrow, 10- at 10:00.    Cara [place]  Patient Representative  [facility] [place]

## 2019-10-29 NOTE — TELEPHONE ENCOUNTER
TE reviewed. I will see him tomorrow as scheduled.     RAINA Clement, NP-C  [facility] [place]

## 2019-10-29 NOTE — TELEPHONE ENCOUNTER
Late documentation for 10/28/19.  Outreach X1.      10/29/19 Outreach X2. Left a  requesting call back. Also provided appointment reminder for todays appointment.  Provided call back number.    RIZWAN Cook, RN  Care Coordinator  [facility] [place]
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
## 2019-10-30 ENCOUNTER — TELEPHONE (OUTPATIENT)
Dept: FAMILY MEDICINE | Facility: CLINIC | Age: 49
End: 2019-10-30

## 2019-10-30 ENCOUNTER — TELEPHONE (OUTPATIENT)
Dept: PALLIATIVE MEDICINE | Facility: CLINIC | Age: 49
End: 2019-10-30

## 2019-10-30 ENCOUNTER — TELEPHONE (OUTPATIENT)
Dept: ADDICTION MEDICINE | Facility: CLINIC | Age: 49
End: 2019-10-30

## 2019-10-30 DIAGNOSIS — F11.20 UNCOMPLICATED OPIOID DEPENDENCE (H): ICD-10-CM

## 2019-10-30 DIAGNOSIS — B07.8 FLAT WART: ICD-10-CM

## 2019-10-30 DIAGNOSIS — E29.1 HYPOGONADISM MALE: Primary | ICD-10-CM

## 2019-10-30 RX ORDER — BUPRENORPHINE AND NALOXONE 8; 2 MG/1; MG/1
FILM, SOLUBLE BUCCAL; SUBLINGUAL
Qty: 2 FILM | Refills: 0 | Status: SHIPPED | OUTPATIENT
Start: 2019-10-30 | End: 2019-11-01

## 2019-10-30 NOTE — TELEPHONE ENCOUNTER
"IMIQUIMOD 5% CREAM PACKET  Last Written Prescription Date: 9/11/2018  Last Fill Quantity: 36PACKET,   # refills: 6  Last Office Visit: 8/29/2019  Future Office visit:  NA  Next 5 appointments (look out 90 days)    Nov 01, 2019 11:00 AM CDT  Return Visit with Cleopatra Carreon MD  [facility] ([facility]) [address] [place] [address]  [address]  [facility] [id number] [phone]           Routing refill request to provider for review/approval because:  Drug not active on patient's medication list     Requested Prescriptions   Pending Prescriptions Disp Refills     imiquimod (ALDARA) 5 % external cream [Pharmacy Med Name: IMIQUIMOD 5% CREAM PACKET] 36 packet 6     Sig: APPLY TO LESION. WASH OFF AFTER 8 HOURS. MAY USE FOR UP TO 16 WEEKS.       Aldara Protocol Failed - 10/30/2019  8:56 AM        Failed - Refills for this medication group require provider approval        Passed - Patient is 12 years of age or older        Passed - Recent (12 mo) or future (30 days) visit within the authorizing provider's specialty     Patient has had an office visit with the authorizing provider or a provider within the authorizing providers department within the previous 12 mos or has a future within next 30 days. See \"Patient Info\" tab in inbasket, or \"Choose Columns\" in Meds & Orders section of the refill encounter.                "

## 2019-10-30 NOTE — TELEPHONE ENCOUNTER
Patient injured self at the gym about 10 days ago and over-used his suboxone so is out early. See note from Matilde for more details.    Medication pended for 2 day supply.  Routing to provider.      Refill for: buprenorphine HCl-naloxone HCl (SUBOXONE) 8-2mg    Last Appointment: 10/2/19    Next Appointment: 19    No Shows/Cancellations since last appointment: none    Last Refill in Epic (date and amount/how many days):    Disp Refills Start End COLLETTE   buprenorphine HCl-naloxone HCl (SUBOXONE) 8-2 MG per film 30 Film 0 10/2/2019  --   Si/2 film bid     Most Recent UDS results: POS: benzodiazepines, tricyclic antidepressants and buprenorphine on 10/2     reviewed and summarized below:         Estephania Dudley RN  10/30/19  12:22 PM

## 2019-10-30 NOTE — TELEPHONE ENCOUNTER
Controlled Substance Refill Request for buprenorphine HCl-naloxone HCl (SUBOXONE) 8-2 MG per film  Problem List Complete:  Yes   checked in past 3 months?  No, route to RN     Fax received from [facility] tel: [phone]

## 2019-10-30 NOTE — LETTER
[facility]  [address] [place] [id number]                                                                                                       [phone]    November 11, 2019    Jermei Damon  [address] [place] [address]      To Whom it May Concern:    My staff have been attempting to reach you in regards to a recent request for: medication for back pain as well as testosterone medication questions.    After reviewing your chart, you are on a pain contract and should be getting any adjustment to your pain management from those providers.  Regarding the testosterone, I will send in the testosterone at the increased dose but you are due for a medication check and this can be done with a lab only appointment.     Please contact my office at [phone] with further questions or concerns.     Thank you for your time.        Sincerely,          Nate Segovia M.D./KIM RN

## 2019-10-30 NOTE — TELEPHONE ENCOUNTER
"I reviewed Dr Ascencio's office visit note from yesterday when Jeremi was seen for Botox injections. He was at that visit requesting opiates for an acute pain flare. Based on her exam notes, called Jeremi to see if I can address his concerns without an office visit.     Jeremi reviewed his recent injury. Was working out in the gym, doing a squat when his \"core muscles gave out\" and he overextended in the squat and his low back \"twitched\". This happened about 1 1/2 weeks ago. He has been overusing Suboxone and at first states that he is out of Suboxone then states that he has enough for today. Scheduled to see Dr Carreon in Addiction Med in two days.     I told him that I fully agreed with Dr Ascencio's opinion that we need updated imaging given the nature of his injury and increased weakness. He refused to get imaging stating that this had happened many times and \"I know what I'm dealing with\".  I told him that we cannot treat an injury unless we are clear on the nature of it. I also offered to order PT. He declined my recommendations.     He stated \"all I'm asking for is 2 weeks of pain relief\". I stated that based on his past history of repeated opiate abuse he was not a candidate for opiates without a complete evaluation for the increase in pain and the fact that he has overused Suboxone again is further concern for his ability to manage opiates safely.     He then stated that if that was the case, the call was done. I told him I would let Dr Carreon know that he is out of Suboxone but that he would have to contact her clinic to report this and to discuss options.     His appt for today with me is cancelled.     RAINA Clement, NP-C  [facility]        "

## 2019-10-30 NOTE — TELEPHONE ENCOUNTER
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 Please give pt a call back ASAP to see what kind of relief we can give him. Thank you.    Phone Number Patient can be reached at: Home number on file [phone] (home)    Best Time:     Can we leave a detailed message on this number? YES    Call taken on 10/30/2019 at 8:36 AM by Rosalie Bush

## 2019-10-30 NOTE — TELEPHONE ENCOUNTER
"Called patient @ # below -     Back Pain     Duration: 1.5 weeks        Specific cause: doing upright rows/exercising - pt moved in an unnatural way and the back seized up    Description:   Location of pain: low back left  Character of pain: \"like an electric current\" and constant  Pain radiation:radiates into the left leg and radiates into the left foot  New numbness or weakness in legs, not attributed to pain:  no     Intensity: Currently 7/10, At its worst 10/10    History:   Pain interferes with job: YES  History of back problems: previous herniated disc  Any previous MRI or X-rays: Yes- at [facility].  Sees a specialist for back pain:  No  Therapies tried without relief: gabapentin, ibuprofen, oxycodone, aspirin, heat, EMS, US, ice    Alleviating factors:   Improved by: Lying still in a particular position with ice, can get pain down to a 5/10    Precipitating factors:  Worsened by: Lifting, Bending, Sitting and Walking    DENIES: CP, SOB, Difficulty Breathing, Dizziness/Lightheadedness, Numbness/Tingling, HA, Vision/Hearing Changes, N/V, Palpitations    Pharmacy: [facility] in [place], [place]    Patient stated he does not have insurance right now, will not do surgery.   Patient requesting medication to help get him over the hump.           ALSO,   Patient wondering about prescription for:    Disp Refills Start End COLLETTE   testosterone cypionate (DEPOTESTOSTERONE) 200 MG/ML injection 6 mL 0 8/29/2019  No   Sig - Route: Inject 0.25 mLs (50 mg) into the muscle once a week - Intramuscular     Stated he is supposed to be taking 100mg per week, NOT 50mg.   Also, stated last refill he was given a shorter supply so will be needing refill on 11/06/2019.           Routing to PCP for further review/recommendations/orders.      Asmita Marinelli RN  [facility]  "

## 2019-10-31 RX ORDER — IMIQUIMOD 12.5 MG/.25G
CREAM TOPICAL
Qty: 36 PACKET | Refills: 6 | Status: SHIPPED | OUTPATIENT
Start: 2019-10-31 | End: 2020-06-05

## 2019-11-01 ENCOUNTER — OFFICE VISIT (OUTPATIENT)
Dept: ADDICTION MEDICINE | Facility: CLINIC | Age: 49
End: 2019-11-01
Payer: MEDICARE

## 2019-11-01 VITALS
HEART RATE: 85 BPM | DIASTOLIC BLOOD PRESSURE: 88 MMHG | OXYGEN SATURATION: 96 % | WEIGHT: 257.5 LBS | SYSTOLIC BLOOD PRESSURE: 148 MMHG | HEIGHT: 72 IN | BODY MASS INDEX: 34.88 KG/M2 | TEMPERATURE: 98.1 F

## 2019-11-01 DIAGNOSIS — F11.20 UNCOMPLICATED OPIOID DEPENDENCE (H): Primary | ICD-10-CM

## 2019-11-01 DIAGNOSIS — F33.1 MAJOR DEPRESSIVE DISORDER, RECURRENT EPISODE, MODERATE (H): ICD-10-CM

## 2019-11-01 DIAGNOSIS — M54.2 NECK PAIN: ICD-10-CM

## 2019-11-01 DIAGNOSIS — F41.1 GENERALIZED ANXIETY DISORDER: ICD-10-CM

## 2019-11-01 DIAGNOSIS — F90.9 ATTENTION DEFICIT HYPERACTIVITY DISORDER (ADHD), UNSPECIFIED ADHD TYPE: ICD-10-CM

## 2019-11-01 DIAGNOSIS — F41.0 PANIC DISORDER WITHOUT AGORAPHOBIA: ICD-10-CM

## 2019-11-01 DIAGNOSIS — M54.42 LEFT-SIDED LOW BACK PAIN WITH LEFT-SIDED SCIATICA, UNSPECIFIED CHRONICITY: ICD-10-CM

## 2019-11-01 PROCEDURE — 80306 DRUG TEST PRSMV INSTRMNT: CPT | Performed by: PEDIATRICS

## 2019-11-01 PROCEDURE — 99215 OFFICE O/P EST HI 40 MIN: CPT | Performed by: PEDIATRICS

## 2019-11-01 RX ORDER — BUPRENORPHINE AND NALOXONE 8; 2 MG/1; MG/1
FILM, SOLUBLE BUCCAL; SUBLINGUAL
Qty: 45 FILM | Refills: 0 | Status: SHIPPED | OUTPATIENT
Start: 2019-11-01 | End: 2019-11-22

## 2019-11-01 ASSESSMENT — MIFFLIN-ST. JEOR: SCORE: 2061.01

## 2019-11-01 NOTE — TELEPHONE ENCOUNTER
He is on a pain contract I believe and should be getting any adjustment to his pain management from them.
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
## 2019-11-03 ENCOUNTER — HEALTH MAINTENANCE LETTER (OUTPATIENT)
Age: 49
End: 2019-11-03

## 2019-11-04 RX ORDER — TESTOSTERONE CYPIONATE 200 MG/ML
100 INJECTION, SOLUTION INTRAMUSCULAR WEEKLY
Qty: 6 ML | Refills: 0 | Status: SHIPPED | OUTPATIENT
Start: 2019-11-04 | End: 2020-02-04

## 2019-11-04 NOTE — TELEPHONE ENCOUNTER
Please advise on testosterone question below as well.       Asmita Marinelli RN  [facility]

## 2019-11-04 NOTE — TELEPHONE ENCOUNTER
I will send in the testosterone at the increased dose but he also is due for medication check and this cna be done with a lab only appointment.

## 2019-11-05 NOTE — TELEPHONE ENCOUNTER
Writer attempted to call Pt, the telephone number listed is unable to take calls at this time. Writer will try again at a later time.    Koffi Jones RN   [facility]

## 2019-11-06 NOTE — TELEPHONE ENCOUNTER
Attempt #2  Called patient @ # below - Unable to LM (phone unable to take calls at this time)    Asmita Marinelli RN  [place] [facility]

## 2019-11-07 DIAGNOSIS — J45.20 INTERMITTENT ASTHMA, UNCOMPLICATED: ICD-10-CM

## 2019-11-07 NOTE — TELEPHONE ENCOUNTER
"VENTOLIN HFA 90 MCG INHALER  Last Written Prescription Date:  7/8/2019  Last Fill Quantity: 18g,  # refills: 1   Last office visit: 8/29/2019 with prescribing provider:  Luis Armando Segovia MD   Future Office Visit: NA  Next 5 appointments (look out 90 days)    Nov 22, 2019 11:00 AM CST  Return Visit with Cleopatra Carreon MD  [facility] ([facility]) [address] [place] [address]  [address]  [facility] [address] [phone]           Requested Prescriptions   Pending Prescriptions Disp Refills     VENTOLIN  (90 Base) MCG/ACT inhaler [Pharmacy Med Name: VENTOLIN HFA 90 MCG INHALER] 18 Inhaler 0     Sig: INHALE 2 PUFFS INTO THE LUNGS EVERY 4 HOURS AS NEEDED FOR SHORTNESS OF BREATH / DYSPNEA       Asthma Maintenance Inhalers - Anticholinergics Failed - 11/7/2019  8:09 AM        Failed - Asthma control assessment score within normal limits in last 6 months     Please review ACT score.           Passed - Patient is age 12 years or older        Passed - Medication is active on med list        Passed - Recent (6 mo) or future (30 days) visit within the authorizing provider's specialty     Patient had office visit in the last 6 months or has a visit in the next 30 days with authorizing provider or within the authorizing provider's specialty.  See \"Patient Info\" tab in inbasket, or \"Choose Columns\" in Meds & Orders section of the refill encounter.              "

## 2019-11-09 RX ORDER — ALBUTEROL SULFATE 90 UG/1
AEROSOL, METERED RESPIRATORY (INHALATION)
Qty: 18 INHALER | Refills: 0 | Status: SHIPPED | OUTPATIENT
Start: 2019-11-09 | End: 2019-12-19

## 2019-11-11 NOTE — TELEPHONE ENCOUNTER
Attempt #3  Called patient @ # below - Left a non-detailed message to call back and speak with any triage nurse.    Letter sent  Closing encounter    Asmita Marinelli RN  [place] Triage=

## 2019-11-11 NOTE — TELEPHONE ENCOUNTER
Pt called back to inquire about the refill of testosterone.   Pt advised he will need a lab draw to check on efficacy of medication. Pt reports he has not taken the medication in almost 2 weeks due to pharmacy not filling Rx.   Writer called [facility] to inquire why it has not been filled as this was sent and received on 11/4/19  testosterone cypionate (DEPOTESTOSTERONE) 200 MG/ML injection 6 mL 0 11/4/2019  No   Sig - Route: Inject 0.5 mLs (100 mg) into the muscle once a week - Intramuscular   Sent to pharmacy as: testosterone cypionate (DEPOTESTOSTERONE) 200 MG/ML injection   Class: E-Prescribe   Order: [id number]   E-Prescribing Status: Receipt confirmed by pharmacy (11/4/2019  5:56 PM CST)       Pt advised to check Testosterone level after taking medication for at least 2 weeks straight. Patient stated an understanding and agreed with plan.  Pt stated he will make lab appointment.    Koffi Jones RN   [facility]

## 2019-11-13 DIAGNOSIS — G47.00 INSOMNIA, UNSPECIFIED TYPE: ICD-10-CM

## 2019-11-14 DIAGNOSIS — M62.838 MUSCLE SPASM: ICD-10-CM

## 2019-11-14 DIAGNOSIS — G43.109 MIGRAINE WITH AURA AND WITHOUT STATUS MIGRAINOSUS, NOT INTRACTABLE: ICD-10-CM

## 2019-11-14 RX ORDER — TIZANIDINE 2 MG/1
2-4 TABLET ORAL 3 TIMES DAILY
Qty: 90 TABLET | Refills: 0 | Status: SHIPPED | OUTPATIENT
Start: 2019-11-14 | End: 2020-01-13

## 2019-11-14 NOTE — TELEPHONE ENCOUNTER
Received fax request from   [facility]/pharmacy #[id number] - [place], [place] - [address]  [address] [place] [id number]  Phone: [phone] Fax: [phone]    pharmacy requesting refill(s) for tiZANidine (ZANAFLEX) 2 MG tablet    Last refilled on 10/28/19    Pt last seen on 10/02/19    No future appointments scheduled at this time    Will facilitate refill.    Sheila Aragon [facility]  [place]

## 2019-11-14 NOTE — TELEPHONE ENCOUNTER
zolpidem (AMBIEN) 10 MG tablet              Last Written Prescription Date:  10.15.19  Last Fill Quantity: 30 tablet,  # refills: 0   Last office visit: 8/29/2019 with prescribing provider:  Luis Armando Segovia MD           Future Office Visit:   Next 5 appointments (look out 90 days)    Nov 22, 2019 11:00 AM CST  Return Visit with Cleopatra Carreon MD  [facility] ([facility]) [address]   [address]  [facility] [id number] [phone]             Routing refill request to provider for review/approval because:  Drug not on the [facility], [facility] or [facility] refill protocol or controlled substance

## 2019-11-15 DIAGNOSIS — N52.9 ERECTILE DYSFUNCTION, UNSPECIFIED ERECTILE DYSFUNCTION TYPE: ICD-10-CM

## 2019-11-15 RX ORDER — ZOLPIDEM TARTRATE 10 MG/1
10 TABLET ORAL
Qty: 30 TABLET | Refills: 0 | Status: SHIPPED | OUTPATIENT
Start: 2019-11-15 | End: 2019-12-14

## 2019-11-16 NOTE — TELEPHONE ENCOUNTER
"Requested Prescriptions   Pending Prescriptions Disp Refills     sildenafil (REVATIO) 20 MG tablet [Pharmacy Med Name: SILDENAFIL 20 MG  Last Written Prescription Date:  10/8/18  Last Fill Quantity: 40,  # refills: 11   Last Office Visit: 8/29/2019   Future Office Visit:    Next 5 appointments (look out 90 days)    Nov 22, 2019 11:00 AM CST  Return Visit with Cleopatra Carreon MD  [facility] ([facility]) [address] [place] [address]  [address]  [facility] [address] [phone]          TABLET] 40 tablet 11     Sig: TAKE 2 TO 5 TABLETS BY MOUTH DAILY AS NEEDED       Erectile Dysfuction Protocol Failed - 11/15/2019  8:49 PM        Failed - Absence of nitrates on medication list        Failed - Absence of Alpha Blockers on Med list        Passed - Recent (12 mo) or future (30 days) visit within the authorizing provider's specialty     Patient has had an office visit with the authorizing provider or a provider within the authorizing providers department within the previous 12 mos or has a future within next 30 days. See \"Patient Info\" tab in inbasket, or \"Choose Columns\" in Meds & Orders section of the refill encounter.              Passed - Medication is active on med list        Passed - Patient is age 18 or older          "

## 2019-11-18 RX ORDER — SILDENAFIL CITRATE 20 MG/1
TABLET ORAL
Qty: 40 TABLET | Refills: 0 | Status: ON HOLD | OUTPATIENT
Start: 2019-11-18 | End: 2020-11-30

## 2019-11-22 ENCOUNTER — OFFICE VISIT (OUTPATIENT)
Dept: ADDICTION MEDICINE | Facility: CLINIC | Age: 49
End: 2019-11-22
Payer: MEDICARE

## 2019-11-22 VITALS
SYSTOLIC BLOOD PRESSURE: 140 MMHG | HEART RATE: 79 BPM | RESPIRATION RATE: 18 BRPM | OXYGEN SATURATION: 98 % | HEIGHT: 72 IN | BODY MASS INDEX: 34.74 KG/M2 | TEMPERATURE: 98.3 F | DIASTOLIC BLOOD PRESSURE: 82 MMHG | WEIGHT: 256.5 LBS

## 2019-11-22 DIAGNOSIS — M54.42 LEFT-SIDED LOW BACK PAIN WITH LEFT-SIDED SCIATICA, UNSPECIFIED CHRONICITY: ICD-10-CM

## 2019-11-22 DIAGNOSIS — F33.1 MAJOR DEPRESSIVE DISORDER, RECURRENT EPISODE, MODERATE (H): ICD-10-CM

## 2019-11-22 DIAGNOSIS — F41.1 GENERALIZED ANXIETY DISORDER: ICD-10-CM

## 2019-11-22 DIAGNOSIS — F11.20 UNCOMPLICATED OPIOID DEPENDENCE (H): Primary | ICD-10-CM

## 2019-11-22 DIAGNOSIS — M54.2 NECK PAIN: ICD-10-CM

## 2019-11-22 PROCEDURE — 99214 OFFICE O/P EST MOD 30 MIN: CPT | Performed by: PEDIATRICS

## 2019-11-22 PROCEDURE — 80306 DRUG TEST PRSMV INSTRMNT: CPT | Performed by: PEDIATRICS

## 2019-11-22 RX ORDER — CYCLOBENZAPRINE HCL 10 MG
10 TABLET ORAL 3 TIMES DAILY PRN
Qty: 90 TABLET | Refills: 1 | Status: SHIPPED | OUTPATIENT
Start: 2019-11-22 | End: 2020-01-06

## 2019-11-22 RX ORDER — BUPRENORPHINE AND NALOXONE 8; 2 MG/1; MG/1
FILM, SOLUBLE BUCCAL; SUBLINGUAL
Qty: 60 FILM | Refills: 0 | Status: SHIPPED | OUTPATIENT
Start: 2019-11-22 | End: 2019-12-20

## 2019-11-22 ASSESSMENT — MIFFLIN-ST. JEOR: SCORE: 2056.48
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
## 2019-12-03 ENCOUNTER — TELEPHONE (OUTPATIENT)
Dept: FAMILY MEDICINE | Facility: CLINIC | Age: 49
End: 2019-12-03

## 2019-12-03 DIAGNOSIS — F41.0 PANIC ATTACK: ICD-10-CM

## 2019-12-03 DIAGNOSIS — F41.1 GENERALIZED ANXIETY DISORDER: ICD-10-CM

## 2019-12-03 DIAGNOSIS — F41.0 PANIC DISORDER WITHOUT AGORAPHOBIA: ICD-10-CM

## 2019-12-03 RX ORDER — CLONAZEPAM 1 MG/1
0.5-1 TABLET ORAL 2 TIMES DAILY PRN
Qty: 4 TABLET | Refills: 0 | Status: SHIPPED | OUTPATIENT
Start: 2019-12-03 | End: 2020-06-05

## 2019-12-03 NOTE — TELEPHONE ENCOUNTER
Pt calling     The flight is 6 hours long. He will be flying to [place]    Routing to PCP     Kari Mcleod RN, BSN  [facility]

## 2019-12-03 NOTE — TELEPHONE ENCOUNTER
Reason for Call:  Other prescription    Detailed comments: Patient is calling about a medication that helps him for plane rides. He states that he gets very claustrophobic and that he was working with  on getting something      Phone Number Patient can be reached at: Home number on file [phone] (home)    Best Time: anytime     Can we leave a detailed message on this number? YES    Call taken on 12/3/2019 at 8:23 AM by Myriam Virk

## 2019-12-03 NOTE — TELEPHONE ENCOUNTER
Attempt # 1  Called #   Telephone Information:   Mobile [phone]       Left a non detailed VM to call back at [phone] and ask for any available Triage Nurse.    Koffi Jones RN   [facility]

## 2019-12-03 NOTE — TELEPHONE ENCOUNTER
Pt calling to request Clonazepam. Pt stated he uses for claustrophobia for plane rides, leaving on plane on Thursday. Pt does have an Rx for Lorazepam with 1 refill, last written on 10/15/19 (49 days ago)    Routing to PCP to review and advise.        Koffi Jones RN   [facility] [place]

## 2019-12-04 NOTE — TELEPHONE ENCOUNTER
Called patient @ # below -     Advised of notes below - patient stated he is not sure 4 tabs will cover it. He will be going on 6 different flights (3 flights there, 3 flights back).   Patient stated he would need ~1 tab per flight (6 tabs total).     Routing to PCP for further review/recommendations/orders.    Asmita Marinelli RN  [facility]

## 2019-12-05 NOTE — TELEPHONE ENCOUNTER
Called patient @ # below - left detailed VM with MD RAIMUNDO message below    Asmita Marinelli RN  [facility]

## 2019-12-05 NOTE — TELEPHONE ENCOUNTER
Clonazepam has a 6-8 hour duration of action (it is a long-acting benzo)  He has ativan if needed as well.  So the 4 tabs if what I am recommending.

## 2019-12-11 DIAGNOSIS — G47.00 INSOMNIA, UNSPECIFIED TYPE: ICD-10-CM

## 2019-12-11 DIAGNOSIS — F41.0 PANIC DISORDER WITHOUT AGORAPHOBIA: ICD-10-CM

## 2019-12-11 DIAGNOSIS — F41.1 GENERALIZED ANXIETY DISORDER: ICD-10-CM

## 2019-12-12 DIAGNOSIS — F41.1 GENERALIZED ANXIETY DISORDER: ICD-10-CM

## 2019-12-12 DIAGNOSIS — F41.0 PANIC DISORDER WITHOUT AGORAPHOBIA: ICD-10-CM

## 2019-12-12 DIAGNOSIS — K21.9 GASTROESOPHAGEAL REFLUX DISEASE WITHOUT ESOPHAGITIS: ICD-10-CM

## 2019-12-12 NOTE — TELEPHONE ENCOUNTER
"Requested Prescriptions   Pending Prescriptions Disp Refills     omeprazole (PRILOSEC) 20 MG DR capsule [Pharmacy Med Name: OMEPRAZOLE DR 20 MG CAPSULE]        Last Written Prescription Date:  6.28.19  Last Fill Quantity: 90 tablet,  # refills: 1   Last office visit: 8/29/2019 with prescribing provider:  Luis Armando Segovia MD               Future Office Visit:   Next 5 appointments (look out 90 days)    Dec 20, 2019 11:20 AM CST  Return Visit with Cleopatra Carreon MD  [place] [facility] ([facility]) [address] [place] [address]  [address]  [facility] [address]  [phone]            90 capsule 1     Sig: TAKE 1 CAPSULE BY MOUTH EVERY DAY       PPI Protocol Failed - 12/12/2019  4:29 AM        Failed - Medication is active on med list        Passed - Not on Clopidogrel (unless Pantoprazole ordered)        Passed - No diagnosis of osteoporosis on record        Passed - Recent (12 mo) or future (30 days) visit within the authorizing provider's specialty     Patient has had an office visit with the authorizing provider or a provider within the authorizing providers department within the previous 12 mos or has a future within next 30 days. See \"Patient Info\" tab in inbasket, or \"Choose Columns\" in Meds & Orders section of the refill encounter.              Passed - Patient is age 18 or older        "

## 2019-12-12 NOTE — TELEPHONE ENCOUNTER
LORazepam (ATIVAN) 1 MG tablet        Last Written Prescription Date:  10.15.19  Last Fill Quantity: 60 tablet,  # refills: 1   Last office visit: 8/29/2019 with prescribing provider:  Luis Armando Segovia MD           Future Office Visit:   Next 5 appointments (look out 90 days)    Dec 20, 2019 11:20 AM CST  Return Visit with Cleopatra Carreon MD  [place] [facility] ([facility]) [address] [place] [address]  [address]  [facility] [address] [phone]             Routing refill request to provider for review/approval because:  Drug not on the [facility], [facility] or [facility] refill protocol or controlled substance

## 2019-12-13 ENCOUNTER — NURSE TRIAGE (OUTPATIENT)
Dept: NURSING | Facility: CLINIC | Age: 49
End: 2019-12-13

## 2019-12-13 DIAGNOSIS — G47.00 INSOMNIA, UNSPECIFIED TYPE: ICD-10-CM

## 2019-12-13 RX ORDER — ZOLPIDEM TARTRATE 10 MG/1
10 TABLET ORAL
Qty: 30 TABLET | Refills: 0 | Status: CANCELLED | OUTPATIENT
Start: 2019-12-13

## 2019-12-13 NOTE — TELEPHONE ENCOUNTER
Requested Prescriptions   Pending Prescriptions Disp Refills     LORazepam (ATIVAN) 1 MG tablet [Pharmacy Med Name: LORAZEPAM 1 MG TABLET] 60 tablet 1     Sig: TAKE 1 TABLET BY MOUTH TWICE A DAY AS NEEDED FOR ANXIETY       There is no refill protocol information for this order      Last Written Prescription Date:  12/15/19  Last Fill Quantity: 60,  # refills: 1   Last office visit: 8/29/2019 with prescribing provider:     Future Office Visit:   Next 5 appointments (look out 90 days)    Dec 20, 2019 11:20 AM CST  Return Visit with Cleopatra Carreon MD  [place] [facility] ([facility]) [address] [facility]  [address]  [facility] [address] [phone]

## 2019-12-13 NOTE — TELEPHONE ENCOUNTER
Reason for Call:  Medication or medication refill:    Do you use a [facility]?  Name of the pharmacy and phone number for the current request:  [facility]    Name of the medication requested: zolpidem (AMBIEN) 10 MG tablet    Other request: n/a    Can we leave a detailed message on this number? YES    Phone number patient can be reached at: Cell number on file:    Telephone Information:   Mobile [phone]       Best Time: any    Call taken on 12/13/2019 at 3:31 PM by Lizet Newby

## 2019-12-13 NOTE — TELEPHONE ENCOUNTER
zolpidem (AMBIEN) 10 MG tablet          Last Written Prescription Date:  11.15.19  Last Fill Quantity: 30 tablet,  # refills: 0   Last office visit: 8/29/2019 with prescribing provider:  Luis Armando Segovia MD         Future Office Visit:   Next 5 appointments (look out 90 days)    Dec 20, 2019 11:20 AM CST  Return Visit with Cleopatra Carreon MD  [place] [facility] ([facility]) [address] [place], [place] [address]   [address]  [facility] [id number] [phone]               Routing refill request to provider for review/approval because:  Drug not on the [facility], [facility] or [facility] refill protocol or controlled substance

## 2019-12-14 ENCOUNTER — NURSE TRIAGE (OUTPATIENT)
Dept: NURSING | Facility: CLINIC | Age: 49
End: 2019-12-14

## 2019-12-14 RX ORDER — LORAZEPAM 1 MG/1
TABLET ORAL
Qty: 60 TABLET | Refills: 1 | Status: SHIPPED | OUTPATIENT
Start: 2019-12-15 | End: 2020-02-07

## 2019-12-14 RX ORDER — ZOLPIDEM TARTRATE 10 MG/1
10 TABLET ORAL
Qty: 30 TABLET | Refills: 0 | Status: SHIPPED | OUTPATIENT
Start: 2019-12-15 | End: 2020-02-17

## 2019-12-14 NOTE — TELEPHONE ENCOUNTER
"Called patient back regarding refill requests, patient was extremely rude, using profanities.  He is very frustrated that \"I was told they were called to the pharmacy\" and there are no prescriptions at the pharmacy.  I informed him that they are with Dr. Segovia and he will have to review them for possible refill on Monday or Tuesday.  Patient was verbalizing frustrations continued and then hung up on [facility] staff.    Asmita Mays RN on 12/13/2019 at 9:04 PM       Reason for Disposition    Caller requesting a NON-URGENT new prescription or refill and triager unable to refill per unit policy    Protocols used: MEDICATION QUESTION CALL-A-AH    "

## 2019-12-14 NOTE — TELEPHONE ENCOUNTER
Jeremi calling asking why his prescripton was not sent to his pharmacy. Call elevated/ transferred to KAMERON Espinal Lead RN due to tramaine name calling.

## 2019-12-14 NOTE — TELEPHONE ENCOUNTER
"Patient calling at 1:15 pm and very insist that he speak with a [facility] nurse regarding his refills requests on Wednesday, Thursday, and Friday and spoke with [facility] staff during evening of 12/13.  He utilized a few profanities during conversation.            Patient stated \"Well I will just drive there\".  Confirmed for patient that no staff would be present, as the clinic was only open 8-12 today.  Patient stated multiple times\"So nobody is in the building?\", Reviewed with patient the hours for the clinic again and he proceeded to hang up the call.        Paged Dr. Segovia, primary provider, regarding safety concern for prescriptions with patient at 2:42pm. He verbalized he was going to contact the patient to discuss the situation and reinforce unable to refill until tomorrow at the very earliest.      Asmita Mays RN on 12/14/2019 at 3:10 PM                 Reason for Disposition    Caller hangs up    Protocols used: DIFFICULT CALLER-A-AH      "

## 2019-12-16 RX ORDER — LORAZEPAM 1 MG/1
TABLET ORAL
Qty: 60 TABLET | Refills: 1 | OUTPATIENT
Start: 2019-12-16

## 2019-12-17 DIAGNOSIS — J45.20 INTERMITTENT ASTHMA, UNCOMPLICATED: ICD-10-CM

## 2019-12-18 NOTE — TELEPHONE ENCOUNTER
ACT Total Scores 4/10/2019 6/28/2019 12/18/2019   ACT TOTAL SCORE - - -   ASTHMA ER VISITS - - -   ASTHMA HOSPITALIZATIONS - - -   ACT TOTAL SCORE (Goal Greater than or Equal to 20) 19 13 11   In the past 12 months, how many times did you visit the emergency room for your asthma without being admitted to the hospital? 0 0 0   In the past 12 months, how many times were you hospitalized overnight because of your asthma? 0 0 0

## 2019-12-18 NOTE — TELEPHONE ENCOUNTER
"Requested Prescriptions   Pending Prescriptions Disp Refills     VENTOLIN  (90 Base) MCG/ACT inhaler [Pharmacy Med Name: VENTOLIN HFA 90 MCG INHALER]        Last Written Prescription Date:  11.9.19  Last Fill Quantity: 18  inhaler,  # refills: 0   Last office visit: 8/29/2019 with prescribing provider:  Luis Armando Segovia MD           Future Office Visit:   Next 5 appointments (look out 90 days)    Dec 20, 2019 11:20 AM CST  Return Visit with Cleopatra Carreon MD  [place] [facility] ([facility]) [address]  [address]  [facility] [address]  [phone]            18 Inhaler 0     Sig: INHALE 2 PUFFS INTO THE LUNGS EVERY 4 HOURS AS NEEDED FOR SHORTNESS OF BREATH / DYSPNEA       Asthma Maintenance Inhalers - Anticholinergics Failed - 12/17/2019 10:30 PM        Failed - Asthma control assessment score within normal limits in last 6 months     Please review ACT score.     ACT Total Scores 2/21/2019 4/10/2019 6/28/2019   ACT TOTAL SCORE - - -   ASTHMA ER VISITS - - -   ASTHMA HOSPITALIZATIONS - - -   ACT TOTAL SCORE (Goal Greater than or Equal to 20) 13 19 13   In the past 12 months, how many times did you visit the emergency room for your asthma without being admitted to the hospital? 0 0 0   In the past 12 months, how many times were you hospitalized overnight because of your asthma? 0 0 0               Passed - Patient is age 12 years or older        Passed - Medication is active on med list        Passed - Recent (6 mo) or future (30 days) visit within the authorizing provider's specialty     Patient had office visit in the last 6 months or has a visit in the next 30 days with authorizing provider or within the authorizing provider's specialty.  See \"Patient Info\" tab in inbasket, or \"Choose Columns\" in Meds & Orders section of the refill encounter.            "

## 2019-12-19 RX ORDER — ALBUTEROL SULFATE 90 UG/1
AEROSOL, METERED RESPIRATORY (INHALATION)
Qty: 18 INHALER | Refills: 0 | Status: SHIPPED | OUTPATIENT
Start: 2019-12-19 | End: 2020-01-20

## 2019-12-20 ENCOUNTER — OFFICE VISIT (OUTPATIENT)
Dept: ADDICTION MEDICINE | Facility: CLINIC | Age: 49
End: 2019-12-20
Payer: MEDICARE

## 2019-12-20 VITALS
HEART RATE: 85 BPM | BODY MASS INDEX: 35.08 KG/M2 | OXYGEN SATURATION: 95 % | TEMPERATURE: 98.2 F | SYSTOLIC BLOOD PRESSURE: 176 MMHG | WEIGHT: 259 LBS | DIASTOLIC BLOOD PRESSURE: 108 MMHG | HEIGHT: 72 IN

## 2019-12-20 DIAGNOSIS — M54.2 NECK PAIN: ICD-10-CM

## 2019-12-20 DIAGNOSIS — F11.20 UNCOMPLICATED OPIOID DEPENDENCE (H): Primary | ICD-10-CM

## 2019-12-20 DIAGNOSIS — F33.1 MAJOR DEPRESSIVE DISORDER, RECURRENT EPISODE, MODERATE (H): ICD-10-CM

## 2019-12-20 DIAGNOSIS — M54.42 LEFT-SIDED LOW BACK PAIN WITH LEFT-SIDED SCIATICA, UNSPECIFIED CHRONICITY: ICD-10-CM

## 2019-12-20 DIAGNOSIS — F41.1 GENERALIZED ANXIETY DISORDER: ICD-10-CM

## 2019-12-20 DIAGNOSIS — F41.0 PANIC DISORDER WITHOUT AGORAPHOBIA: ICD-10-CM

## 2019-12-20 DIAGNOSIS — F90.9 ATTENTION DEFICIT HYPERACTIVITY DISORDER (ADHD), UNSPECIFIED ADHD TYPE: ICD-10-CM

## 2019-12-20 PROCEDURE — 99000 SPECIMEN HANDLING OFFICE-LAB: CPT | Performed by: PEDIATRICS

## 2019-12-20 PROCEDURE — 99215 OFFICE O/P EST HI 40 MIN: CPT | Performed by: PEDIATRICS

## 2019-12-20 PROCEDURE — 80306 DRUG TEST PRSMV INSTRMNT: CPT | Performed by: PEDIATRICS

## 2019-12-20 PROCEDURE — 80359 METHYLENEDIOXYAMPHETAMINES: CPT | Mod: 90 | Performed by: PEDIATRICS

## 2019-12-20 RX ORDER — BUPRENORPHINE AND NALOXONE 8; 2 MG/1; MG/1
FILM, SOLUBLE BUCCAL; SUBLINGUAL
Qty: 60 FILM | Refills: 0 | Status: SHIPPED | OUTPATIENT
Start: 2019-12-20 | End: 2020-01-22

## 2019-12-20 ASSESSMENT — PAIN SCALES - GENERAL: PAINLEVEL: SEVERE PAIN (7)

## 2019-12-20 ASSESSMENT — MIFFLIN-ST. JEOR: SCORE: 2067.82

## 2019-12-27 DIAGNOSIS — G47.00 INSOMNIA, UNSPECIFIED TYPE: ICD-10-CM

## 2019-12-27 NOTE — TELEPHONE ENCOUNTER
"Requested Prescriptions   Pending Prescriptions Disp Refills     amitriptyline (ELAVIL) 50 MG tablet [Pharmacy Med Name: AMITRIPTYLINE HCL 50 MG TAB]        Last Written Prescription Date:  8.29.19  Last Fill Quantity: 90 tablet,  # refills: 5   Last office visit: 8/29/2019 with prescribing provider:  Luis Armando Segovia MD             Future Office Visit:   Next 5 appointments (look out 90 days)    Jan 22, 2020 11:20 AM CST  Return Visit with Cleopatra Carreon MD  [facility] ([facility]) [address]  [address]  [facility] [address]  [phone]            90 tablet 5     Sig: TAKE 1-3 TABLETS ( MG) BY MOUTH AT BEDTIME       Tricyclic Agents ( Annual appt and no PHQ9) Failed - 12/27/2019  7:36 AM        Failed - Blood Pressure under 140/90 in past 12 mos     BP Readings from Last 3 Encounters:   12/20/19 (!) 176/108   11/22/19 (!) 140/82   11/01/19 (!) 148/88                 Passed - Recent (12 mo) or future (30 days) visit within authorizing provider's specialty     Patient has had an office visit with the authorizing provider or a provider within the authorizing providers department within the previous 12 mos or has a future within next 30 days. See \"Patient Info\" tab in inbasket, or \"Choose Columns\" in Meds & Orders section of the refill encounter.              Passed - Medication is active on med list        Passed - Patient is age 18 or older        "

## 2019-12-30 DIAGNOSIS — I10 ESSENTIAL HYPERTENSION WITH GOAL BLOOD PRESSURE LESS THAN 140/90: ICD-10-CM

## 2019-12-30 DIAGNOSIS — M54.2 NECK PAIN: ICD-10-CM

## 2019-12-30 LAB
AMPHET UR CFM-MCNC: 2200 NG/ML
AMPHET UR QL CFM: NORMAL

## 2019-12-30 NOTE — TELEPHONE ENCOUNTER
Requested Prescriptions   Pending Prescriptions Disp Refills     cyclobenzaprine (FLEXERIL) 10 MG tablet    Last Written Prescription Date:  11/22/19  Last Fill Quantity: 90,  # refills: 1   Last office visit: 12/20/2019 with prescribing provider:     Future Office Visit:   Next 5 appointments (look out 90 days)    Jan 22, 2020 11:20 AM CST  Return Visit with Cleopatra Carreon MD  [facility] ([facility]) [address]  [address]  [facility] [address] [phone]          90 tablet 1     Sig: Take 1 tablet (10 mg) by mouth 3 times daily as needed for other (hiccups)       There is no refill protocol information for this order

## 2019-12-30 NOTE — TELEPHONE ENCOUNTER
"Requested Prescriptions   Pending Prescriptions Disp Refills     metoprolol tartrate (LOPRESSOR) 50 MG tablet [Pharmacy Med Name: METOPROLOL TARTRATE 50 MG TAB]          Last Written Prescription Date:  8.29.19  Last Fill Quantity: 180 tablet,  # refills: 1   Last office visit: 8/29/2019 with prescribing provider:  Luis Armando Segovia MD               Future Office Visit:   Next 5 appointments (look out 90 days)    Jan 22, 2020 11:20 AM CST  Return Visit with Cleopatra Carreon MD  [facility] ([facility]) [address]  [address]  [facility] [address]  [phone]            60 tablet 0     Sig: TAKE 1/2 TO 1 TABLETS (25-50 MG) BY MOUTH 2 TIMES DAILY       Beta-Blockers Protocol Failed - 12/30/2019 11:18 AM        Failed - Blood pressure under 140/90 in past 12 months     BP Readings from Last 3 Encounters:   12/20/19 (!) 176/108   11/22/19 (!) 140/82   11/01/19 (!) 148/88                 Passed - Patient is age 6 or older        Passed - Recent (12 mo) or future (30 days) visit within the authorizing provider's specialty     Patient has had an office visit with the authorizing provider or a provider within the authorizing providers department within the previous 12 mos or has a future within next 30 days. See \"Patient Info\" tab in inbasket, or \"Choose Columns\" in Meds & Orders section of the refill encounter.              Passed - Medication is active on med list        "

## 2019-12-30 NOTE — TELEPHONE ENCOUNTER
"Requested Prescriptions   Pending Prescriptions Disp Refills     metoprolol tartrate (LOPRESSOR) 50 MG tablet [Pharmacy Med Name: METOPROLOL TARTRATE 50 MG TAB] 60 tablet 0     Sig: TAKE 1/2 TO 1 TABLETS (25-50 MG) BY MOUTH 2 TIMES DAILY       Last Written Prescription Date:  8/29/2019  Last Fill Quantity: 180,  # refills: 1   Last office visit: 8/29/2019 with prescribing provider:     Future Office Visit:   Next 5 appointments (look out 90 days)    Jan 22, 2020 11:20 AM CST  Return Visit with Cleopatra Carreon MD  [facility] ([facility]) [address]  [address]  [facility] [address]  [phone]               Beta-Blockers Protocol Failed - 12/30/2019 11:18 AM        Failed - Blood pressure under 140/90 in past 12 months     BP Readings from Last 3 Encounters:   12/20/19 (!) 176/108   11/22/19 (!) 140/82   11/01/19 (!) 148/88                 Passed - Patient is age 6 or older        Passed - Recent (12 mo) or future (30 days) visit within the authorizing provider's specialty     Patient has had an office visit with the authorizing provider or a provider within the authorizing providers department within the previous 12 mos or has a future within next 30 days. See \"Patient Info\" tab in inbasket, or \"Choose Columns\" in Meds & Orders section of the refill encounter.              Passed - Medication is active on med list        "

## 2019-12-31 RX ORDER — CYCLOBENZAPRINE HCL 10 MG
10 TABLET ORAL 3 TIMES DAILY PRN
Qty: 90 TABLET | Refills: 1 | OUTPATIENT
Start: 2019-12-31

## 2019-12-31 RX ORDER — AMITRIPTYLINE HYDROCHLORIDE 50 MG/1
50-150 TABLET ORAL AT BEDTIME
Qty: 90 TABLET | Refills: 5 | Status: SHIPPED | OUTPATIENT
Start: 2019-12-31 | End: 2020-06-29

## 2019-12-31 RX ORDER — METOPROLOL TARTRATE 50 MG
TABLET ORAL
Qty: 60 TABLET | Refills: 0 | Status: SHIPPED | OUTPATIENT
Start: 2019-12-31 | End: 2020-01-27

## 2019-12-31 NOTE — TELEPHONE ENCOUNTER
Please review and advise on the BP and refill     Thank you     Kari Mcleod RN, BSN  [facility]

## 2020-01-06 ENCOUNTER — TELEPHONE (OUTPATIENT)
Dept: FAMILY MEDICINE | Facility: CLINIC | Age: 50
End: 2020-01-06

## 2020-01-06 ENCOUNTER — NURSE TRIAGE (OUTPATIENT)
Dept: NURSING | Facility: CLINIC | Age: 50
End: 2020-01-06

## 2020-01-06 DIAGNOSIS — M54.2 NECK PAIN: ICD-10-CM

## 2020-01-06 RX ORDER — CYCLOBENZAPRINE HCL 10 MG
10 TABLET ORAL 3 TIMES DAILY PRN
Qty: 90 TABLET | Refills: 1 | Status: SHIPPED | OUTPATIENT
Start: 2020-01-06 | End: 2020-03-05

## 2020-01-06 NOTE — TELEPHONE ENCOUNTER
I need a prescription refilled . Cyclobenzaprine, I have been trying to get it filled for over a week and a half and I want it filled with Luca. I told him the clinic was closed and I might not be able to do that. He said can't you just send him a message?!  I said yes I can and ended the call.    Teagan Cali RN/ [facility]    Forward to Dr Segovia    Reason for Disposition    Caller has medication question only, adult not sick, and triager answers question    Protocols used: MEDICATION QUESTION CALL-A-

## 2020-01-06 NOTE — TELEPHONE ENCOUNTER
I need a prescription refilled . Cyclobenzaprine, I have been trying to get it filled for over a week and a half and I want it filled with Luca. I told him the clinic was closed and I might not be able to do that. He said can't you just send him a message?!  I said yes I can and he ended the call.    Teagan Cali RN/ [facility]    Forward to Dr Segovia

## 2020-01-10 DIAGNOSIS — G43.109 MIGRAINE WITH AURA AND WITHOUT STATUS MIGRAINOSUS, NOT INTRACTABLE: ICD-10-CM

## 2020-01-10 DIAGNOSIS — M62.838 MUSCLE SPASM: ICD-10-CM

## 2020-01-10 NOTE — TELEPHONE ENCOUNTER
Received fax request from [facility] pharmacy requesting refill(s) for tiZANidine (ZANAFLEX) 2 MG tablet    Last refilled on 11/14/19    Pt last seen on 10/02/19  Next appt scheduled for : none    Will facilitate refill.

## 2020-01-13 RX ORDER — TIZANIDINE 2 MG/1
2-4 TABLET ORAL 3 TIMES DAILY
Qty: 90 TABLET | Refills: 0 | Status: SHIPPED | OUTPATIENT
Start: 2020-01-13 | End: 2020-04-23

## 2020-01-16 ENCOUNTER — MYC MEDICAL ADVICE (OUTPATIENT)
Dept: FAMILY MEDICINE | Facility: CLINIC | Age: 50
End: 2020-01-16

## 2020-01-16 DIAGNOSIS — J45.20 INTERMITTENT ASTHMA, UNCOMPLICATED: ICD-10-CM

## 2020-01-16 NOTE — TELEPHONE ENCOUNTER
Need updated ACT    Attempt #1  MyChart Message sent    Asmita Marinelli RN  [facility]=

## 2020-01-16 NOTE — TELEPHONE ENCOUNTER
"Requested Prescriptions   Pending Prescriptions Disp Refills     VENTOLIN  (90 Base) MCG/ACT inhaler [Pharmacy Med Name: VENTOLIN HFA 90 MCG INHALER] 18 Inhaler 0     Sig: INHALE 2 PUFFS INTO THE LUNGS EVERY 4 HOURS AS NEEDED FOR SHORTNESS OF BREATH / DYSPNEA       Last Refill:    Disp Refills Start End COLLETTE   VENTOLIN  (90 Base) MCG/ACT inhaler 18 Inhaler 0 12/19/2019  Yes   Sig: INHALE 2 PUFFS INTO THE LUNGS EVERY 4 HOURS AS NEEDED FOR SHORTNESS OF BREATH / DYSPNEA     Asthma Maintenance Inhalers - Anticholinergics Failed - 1/16/2020  7:23 AM        Failed - Asthma control assessment score within normal limits in last 6 months     Please review ACT score.   ACT Total Scores 4/10/2019 6/28/2019 12/18/2019   ACT TOTAL SCORE - - -   ASTHMA ER VISITS - - -   ASTHMA HOSPITALIZATIONS - - -   ACT TOTAL SCORE (Goal Greater than or Equal to 20) 19 13 11   In the past 12 months, how many times did you visit the emergency room for your asthma without being admitted to the hospital? 0 0 0   In the past 12 months, how many times were you hospitalized overnight because of your asthma? 0 0 0           Passed - Patient is age 12 years or older        Passed - Medication is active on med list        Passed - Recent (6 mo) or future (30 days) visit within the authorizing provider's specialty     Patient had office visit in the last 6 months or has a visit in the next 30 days with authorizing provider or within the authorizing provider's specialty.  See \"Patient Info\" tab in inbasket, or \"Choose Columns\" in Meds & Orders section of the refill encounter.      LOV: 08/29/2019          "

## 2020-01-17 DIAGNOSIS — J45.20 INTERMITTENT ASTHMA, UNCOMPLICATED: ICD-10-CM

## 2020-01-18 NOTE — TELEPHONE ENCOUNTER
"Requested Prescriptions   Pending Prescriptions Disp Refills     VENTOLIN  (90 Base) MCG/ACT inhaler [Pharmacy Med Name: VENTOLIN  Last Written Prescription Date:  12/19/19  Last Fill Quantity: 18 iNHALER,  # refills: 0   Last Office Visit: 8/29/2019   Future Office Visit:    Next 5 appointments (look out 90 days)    Jan 22, 2020 11:20 AM CST  Return Visit with Cleopatra Carreon MD  [facility] ([facility]) [address] [place] [address]  [address]  [facility] [address]  [phone]          HFA 90 MCG INHALER] 18 Inhaler 0     Sig: INHALE 2 PUFFS INTO THE LUNGS EVERY 4 HOURS AS NEEDED FOR SHORTNESS OF BREATH / DYSPNEA       Asthma Maintenance Inhalers - Anticholinergics Failed - 1/17/2020  9:23 PM        Failed - Asthma control assessment score within normal limits in last 6 months     Please review ACT score.           Passed - Patient is age 12 years or older        Passed - Medication is active on med list        Passed - Recent (6 mo) or future (30 days) visit within the authorizing provider's specialty     Patient had office visit in the last 6 months or has a visit in the next 30 days with authorizing provider or within the authorizing provider's specialty.  See \"Patient Info\" tab in inbasket, or \"Choose Columns\" in Meds & Orders section of the refill encounter.              "

## 2020-01-20 RX ORDER — ALBUTEROL SULFATE 90 UG/1
AEROSOL, METERED RESPIRATORY (INHALATION)
Qty: 18 G | Refills: 0 | Status: SHIPPED | OUTPATIENT
Start: 2020-01-20 | End: 2020-03-18

## 2020-01-20 NOTE — TELEPHONE ENCOUNTER
Updated ACT     Refilled for 30 days     Kari Mcleod RN, BSN  [facility]

## 2020-01-20 NOTE — TELEPHONE ENCOUNTER
ACT Total Scores 6/28/2019 12/18/2019 1/20/2020   ACT TOTAL SCORE - - -   ASTHMA ER VISITS - - -   ASTHMA HOSPITALIZATIONS - - -   ACT TOTAL SCORE (Goal Greater than or Equal to 20) 13 11 13   In the past 12 months, how many times did you visit the emergency room for your asthma without being admitted to the hospital? 0 0 0   In the past 12 months, how many times were you hospitalized overnight because of your asthma? 0 0 0     Refill per RN protocol     Kari Mcleod RN, BSN  [facility]

## 2020-01-21 ASSESSMENT — ASTHMA QUESTIONNAIRES: ACT_TOTALSCORE: 13

## 2020-01-22 ENCOUNTER — OFFICE VISIT (OUTPATIENT)
Dept: ADDICTION MEDICINE | Facility: CLINIC | Age: 50
End: 2020-01-22
Payer: MEDICARE

## 2020-01-22 VITALS
WEIGHT: 256.5 LBS | RESPIRATION RATE: 18 BRPM | SYSTOLIC BLOOD PRESSURE: 142 MMHG | BODY MASS INDEX: 34.79 KG/M2 | OXYGEN SATURATION: 97 % | HEART RATE: 66 BPM | DIASTOLIC BLOOD PRESSURE: 72 MMHG | TEMPERATURE: 97.9 F

## 2020-01-22 DIAGNOSIS — F11.20 UNCOMPLICATED OPIOID DEPENDENCE (H): ICD-10-CM

## 2020-01-22 PROCEDURE — 99214 OFFICE O/P EST MOD 30 MIN: CPT | Performed by: PEDIATRICS

## 2020-01-22 PROCEDURE — 80306 DRUG TEST PRSMV INSTRMNT: CPT | Performed by: PEDIATRICS

## 2020-01-22 RX ORDER — BUPRENORPHINE AND NALOXONE 8; 2 MG/1; MG/1
FILM, SOLUBLE BUCCAL; SUBLINGUAL
Qty: 60 FILM | Refills: 0 | Status: SHIPPED | OUTPATIENT
Start: 2020-01-22 | End: 2020-02-24
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
## 2020-01-23 RX ORDER — ALBUTEROL SULFATE 90 UG/1
AEROSOL, METERED RESPIRATORY (INHALATION)
Qty: 18 INHALER | Refills: 0 | OUTPATIENT
Start: 2020-01-23

## 2020-01-25 DIAGNOSIS — I10 ESSENTIAL HYPERTENSION WITH GOAL BLOOD PRESSURE LESS THAN 140/90: ICD-10-CM

## 2020-01-27 RX ORDER — METOPROLOL TARTRATE 50 MG
TABLET ORAL
Qty: 180 TABLET | Refills: 0 | Status: SHIPPED | OUTPATIENT
Start: 2020-01-27 | End: 2020-06-05

## 2020-01-27 NOTE — TELEPHONE ENCOUNTER
"Requested Prescriptions   Pending Prescriptions Disp Refills     metoprolol tartrate (LOPRESSOR) 50 MG tablet [Pharmacy Med Name: METOPROLOL TARTRATE 50 MG TAB]        Last Written Prescription Date:  12.31.19  Last Fill Quantity: 60 tablet,  # refills: 0   Last office visit: 8/29/2019 with prescribing provider:  Luis Armando Segovia MD         Future Office Visit:       60 tablet 0     Sig: TAKE 1/2 TO 1 TABLETS (25-50 MG) BY MOUTH 2 TIMES DAILY       Beta-Blockers Protocol Failed - 1/25/2020  1:38 PM        Failed - Blood pressure under 140/90 in past 12 months     BP Readings from Last 3 Encounters:   01/22/20 (!) 142/72   12/20/19 (!) 176/108   11/22/19 (!) 140/82                 Passed - Patient is age 6 or older        Passed - Recent (12 mo) or future (30 days) visit within the authorizing provider's specialty     Patient has had an office visit with the authorizing provider or a provider within the authorizing providers department within the previous 12 mos or has a future within next 30 days. See \"Patient Info\" tab in inbasket, or \"Choose Columns\" in Meds & Orders section of the refill encounter.              Passed - Medication is active on med list        "

## 2020-01-27 NOTE — TELEPHONE ENCOUNTER
Routing refill request to provider for review/approval because:  Pt BP not WNL    Koffi Jones RN   [facility]

## 2020-01-28 NOTE — TELEPHONE ENCOUNTER
Left non-detailed message for patient to call back.  Please schedule follow up when patient calls back.  (see previous notes for details)    Emerald Zuniga

## 2020-01-30 NOTE — TELEPHONE ENCOUNTER
Patient does not need medication, still has a bottle full.  Will call to set up an appt with Dr. Segovia nd is going to call pharmacy to tell them he ddoes not need it right now.       Bernarda Hargrove

## 2020-02-03 DIAGNOSIS — I10 HYPERTENSION GOAL BP (BLOOD PRESSURE) < 140/90: ICD-10-CM

## 2020-02-03 DIAGNOSIS — E29.1 HYPOGONADISM MALE: ICD-10-CM

## 2020-02-03 NOTE — TELEPHONE ENCOUNTER
"Requested Prescriptions   Pending Prescriptions Disp Refills     cloNIDine (CATAPRES) 0.1 MG tablet [Pharmacy Med Name: CLONIDINE HCL 0.1 MG TABLET]        Last Written Prescription Date:  8.15.19  Last Fill Quantity: 180 tablet,  # refills: 1   Last office visit: 8/29/2019 with prescribing provider:  Luis Armando Segovia MD           Future Office Visit:       180 tablet 1     Sig: TAKE 1 TABLET (0.1 MG) BY MOUTH 2 TIMES DAILY       Central Acting Antiadrenergic Agents Failed - 2/3/2020 10:33 AM        Failed - Blood pressure under 140/90 in past 12 months     BP Readings from Last 3 Encounters:   01/22/20 (!) 142/72   12/20/19 (!) 176/108   11/22/19 (!) 140/82                 Failed - Normal serum creatinine on file within past 12 months     Recent Labs   Lab Test 08/29/19  1416   CR 1.49*             Passed - Patient is 6 years of age or older        Passed - Recent (12 mo) or future (30 days) visit within the authorizing provider's specialty     Patient has had an office visit with the authorizing provider or a provider within the authorizing providers department within the previous 12 mos or has a future within next 30 days. See \"Patient Info\" tab in inbasket, or \"Choose Columns\" in Meds & Orders section of the refill encounter.              Passed - Medication is active on med list        "

## 2020-02-04 ENCOUNTER — OFFICE VISIT (OUTPATIENT)
Dept: PALLIATIVE MEDICINE | Facility: CLINIC | Age: 50
End: 2020-02-04
Payer: MEDICARE

## 2020-02-04 VITALS — DIASTOLIC BLOOD PRESSURE: 97 MMHG | OXYGEN SATURATION: 100 % | SYSTOLIC BLOOD PRESSURE: 173 MMHG | HEART RATE: 68 BPM

## 2020-02-04 DIAGNOSIS — G43.719 INTRACTABLE CHRONIC MIGRAINE WITHOUT AURA AND WITHOUT STATUS MIGRAINOSUS: Primary | ICD-10-CM

## 2020-02-04 PROCEDURE — 64615 CHEMODENERV MUSC MIGRAINE: CPT | Performed by: PSYCHIATRY & NEUROLOGY

## 2020-02-04 RX ORDER — TESTOSTERONE CYPIONATE 200 MG/ML
100 INJECTION, SOLUTION INTRAMUSCULAR WEEKLY
Qty: 6 ML | Refills: 0 | Status: SHIPPED | OUTPATIENT
Start: 2020-02-04 | End: 2020-05-04

## 2020-02-04 RX ORDER — CLONIDINE HYDROCHLORIDE 0.1 MG/1
0.1 TABLET ORAL 2 TIMES DAILY
Qty: 180 TABLET | Refills: 1 | Status: SHIPPED | OUTPATIENT
Start: 2020-02-04 | End: 2020-07-20

## 2020-02-04 ASSESSMENT — PAIN SCALES - GENERAL: PAINLEVEL: SEVERE PAIN (7)

## 2020-02-04 NOTE — TELEPHONE ENCOUNTER
Routing refill request to provider for review/approval because:  Drug not on the [facility] refill protocol     Kari Mcleod RN, BSN  [facility]

## 2020-02-04 NOTE — TELEPHONE ENCOUNTER
Requested Prescriptions   Pending Prescriptions Disp Refills     testosterone cypionate (DEPOTESTOSTERONE) 200 MG/ML injection [Pharmacy Med Name: TESTOSTERONE  MG/ML]          Last Written Prescription Date:  11.4.19  Last Fill Quantity: 6 ml,  # refills: 0   Last office visit: 8/29/2019 with prescribing provider:  Luis Armando Segovia MD         Future Office Visit:       6 mL 0     Sig: INJECT 0.5 MLS (100 MG) INTO THE MUSCLE ONCE A WEEK       Androgen Agents Failed - 2/3/2020  9:32 PM        Failed - Lipid panel on file in past 12 mos     Recent Labs   Lab Test 10/10/17  1151  08/13/13  1022   CHOL 139   < > 113   TRIG 239*   < > 158*   HDL 28*   < > 27*   LDL 63   < > 55   NHDL 111   < >  --    VLDL  --   --  32*   CHOLHDLRATIO  --   --  4.3    < > = values in this interval not displayed.               Failed - ALT on file within past 12 mos     Recent Labs   Lab Test 10/20/17  0828   ALT 67             Failed - Serum Testosterone on file within past 12 mos     Recent Labs   Lab Test 10/10/17  1151   TESTOSTTOTAL 411             Failed - Serum PSA on file within past 12 mos     Lab Results   Component Value Date    PSA 0.42 01/06/2017             Failed - Refills for this classification require provider review        Failed - Blood pressure under 140/90 in past 6 months     BP Readings from Last 3 Encounters:   01/22/20 (!) 142/72   12/20/19 (!) 176/108   11/22/19 (!) 140/82                 Failed - AST on file within past 12 mos     Recent Labs   Lab Test 10/20/17  0828   AST 88*             Passed - Patient is of age 12 and older        Passed - Medication is active on med list        Passed - HCT less than 54% on file within past 12 mos     Recent Labs   Lab Test 07/20/19  1219   HCT 51.9             Passed - Patient is not pregnant        Passed - No positive pregnancy test on file within past 12 mos        Passed - Recent (6 mo) or future (30 days) visit within the authorizing provider's specialty
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
## 2020-02-04 NOTE — PATIENT INSTRUCTIONS
[facility]   Botox Injection Discharge Instructions    Do not rub or put extended pressure on the injection sites. You may gently touch the sites to remove any excess blood.    Monitor the injection sites for signs and symptoms of infection such as redness, swelling, warmth, fever, chills, or drainage to areas.    You may have soreness at the injection sites for up to 24 hours.    If you are able to use anti-inflammatory medications or Tylenol for pain control, you can take these as directed.    It may take up to 1 month to notice benefit from the 1st treatment    If you do notice relief, botox can be done every 3 months.  It may require insurance authorization everytime.      For questions about insurance coverage, please call the main clinic number and ask to speak with someone about botox coverage.     Pain Clinic phone number during work hours Monday-Friday:  [phone]    After hours provider line: [phone]

## 2020-02-07 DIAGNOSIS — F41.1 GENERALIZED ANXIETY DISORDER: ICD-10-CM

## 2020-02-07 DIAGNOSIS — F41.0 PANIC DISORDER WITHOUT AGORAPHOBIA: ICD-10-CM

## 2020-02-07 RX ORDER — LORAZEPAM 1 MG/1
TABLET ORAL
Qty: 60 TABLET | Refills: 1 | Status: SHIPPED | OUTPATIENT
Start: 2020-02-15 | End: 2020-04-07

## 2020-02-07 NOTE — TELEPHONE ENCOUNTER
Problem List Complete:    Yes    Controlled substance agreement:   Encounter-Level CSA - 11/16/2017:    Controlled Substance Agreement - Scan on 12/1/2017 10:43 AM: CONTROLLED SUBSTANCE AGREEMENT     Encounter-Level CSA - 08/07/2015:    Controlled Substance Agreement - Scan on 8/19/2015 11:27 AM: Controlled Substance Agreement 08/07/15     Patient-Level CSA:    There are no patient-level csa.       Last Urine Drug Screen: No results found for: CDAUT, No results found for: COMDAT,   Cannabinoids (00-fex-5-carboxy-9-THC)   Date Value Ref Range Status   01/22/2020 Not Detected NDET^Not Detected ng/mL Final     Comment:     Cutoff for a negative cannabinoid is 50 ng/mL or less.     Phencyclidine (Phencyclidine)   Date Value Ref Range Status   01/22/2020 Not Detected NDET^Not Detected ng/mL Final     Comment:     Cutoff for a negative PCP is 25 ng/mL or less.     Cocaine (Benzoylecgonine)   Date Value Ref Range Status   01/22/2020 Not Detected NDET^Not Detected ng/mL Final     Comment:     Cutoff for a negative cocaine is 150 ng/ml or less.     Methamphetamine (d-Methamphetamine)   Date Value Ref Range Status   01/22/2020 Not Detected NDET^Not Detected ng/mL Final     Comment:     Cutoff for a negative methamphetamine is 500 ng/ml or less.     Opiates (Morphine)   Date Value Ref Range Status   01/22/2020 Not Detected NDET^Not Detected ng/mL Final     Comment:     Cutoff for a negative opiate is 100 ng/ml or less.     Amphetamine (d-Amphetamine)   Date Value Ref Range Status   01/22/2020 Not Detected NDET^Not Detected ng/mL Final     Comment:     Cutoff for a negative amphetamine is 500 ng/mL or less.     Benzodiazepines (Nordiazepam)   Date Value Ref Range Status   01/22/2020 Detected, Abnormal Result (A) NDET^Not Detected ng/mL Final     Comment:     Cutoff for a positive benzodiazepines is greater than 150 ng/ml.  This is an unconfirmed screening result to be used for medical purposes only.   Order [id number] for  confirmation or individual confirmation tests to [facility].       Tricyclic Antidepressants (Desipramine)   Date Value Ref Range Status   01/22/2020 Detected, Abnormal Result (A) NDET^Not Detected ng/mL Final     Comment:     Cutoff for a positive tricyclic antidepressant is greater than 300 ng/ml.  This is an unconfirmed screening result to be used for medical purposes only.   Order [id number] for confirmation or individual confirmation tests to [facility].       Methadone (Methadone)   Date Value Ref Range Status   01/22/2020 Not Detected NDET^Not Detected ng/mL Final     Comment:     Cutoff for a negative methadone is 200 ng/ml or less.     Barbiturates (Butalbital)   Date Value Ref Range Status   01/22/2020 Not Detected NDET^Not Detected ng/mL Final     Comment:     Cutoff for a negative barbituate is 200 ng/ml or less.     Oxycodone (Oxycodone)   Date Value Ref Range Status   01/22/2020 Not Detected NDET^Not Detected ng/mL Final     Comment:     Cutoff for a negative Oxycodone is 100 ng/mL or less.     Propoxyphene (Norpropoxyphene)   Date Value Ref Range Status   01/22/2020 Not Detected NDET^Not Detected ng/mL Final     Comment:     Cutoff for a negative propoxyphene is 300 ng/ml or less     Buprenorphine (Buprenorphine)   Date Value Ref Range Status   01/22/2020 Detected, Abnormal Result (A) NDET^Not Detected ng/mL Final     Comment:     Cutoff for a positive buprenorphine is greater than 10 ng/ml.  This is an unconfirmed screening result to be used for medical purposes only.   Order [id number] for confirmation or individual confirmation tests to [facility].       [email or web address]    Routing refill request to provider for review/approval because:  Drug not on the [facility] refill protocol         Asmita Marinelli RN  [facility]

## 2020-02-07 NOTE — TELEPHONE ENCOUNTER
LORazepam (ATIVAN) 1 MG tablet      Last Written Prescription Date:  12.15.19  Last Fill Quantity: 60 tablet,  # refills: 1   Last office visit: 8/29/2019 with prescribing provider:  Luis Armando Segovia MD       Future Office Visit:        Routing refill request to provider for review/approval because:  Drug not on the [facility], [facility] or [facility] refill protocol or controlled substance

## 2020-02-10 ENCOUNTER — HEALTH MAINTENANCE LETTER (OUTPATIENT)
Age: 50
End: 2020-02-10

## 2020-02-13 DIAGNOSIS — G47.00 INSOMNIA, UNSPECIFIED TYPE: ICD-10-CM

## 2020-02-14 DIAGNOSIS — G47.00 INSOMNIA, UNSPECIFIED TYPE: ICD-10-CM

## 2020-02-14 NOTE — TELEPHONE ENCOUNTER
zolpidem (AMBIEN) 10 MG tablet      Last Written Prescription Date:  12.15.19  Last Fill Quantity: 30 tablet,  # refills: 0   Last office visit: 8/29/2019 with prescribing provider:  Luis Armando Segovia MD         Future Office Visit:        Routing refill request to provider for review/approval because:  Drug not on the [facility], [facility] or [facility] refill protocol or controlled substance

## 2020-02-14 NOTE — TELEPHONE ENCOUNTER
Problem List Complete:    Yes    Controlled substance agreement:   Encounter-Level CSA - 11/16/2017:    Controlled Substance Agreement - Scan on 12/1/2017 10:43 AM: CONTROLLED SUBSTANCE AGREEMENT     Encounter-Level CSA - 08/07/2015:    Controlled Substance Agreement - Scan on 8/19/2015 11:27 AM: Controlled Substance Agreement 08/07/15     Patient-Level CSA:    There are no patient-level csa.       Last Urine Drug Screen: No results found for: CDAUT, No results found for: COMDAT,   Cannabinoids (44-bfk-6-carboxy-9-THC)   Date Value Ref Range Status   01/22/2020 Not Detected NDET^Not Detected ng/mL Final     Comment:     Cutoff for a negative cannabinoid is 50 ng/mL or less.     Phencyclidine (Phencyclidine)   Date Value Ref Range Status   01/22/2020 Not Detected NDET^Not Detected ng/mL Final     Comment:     Cutoff for a negative PCP is 25 ng/mL or less.     Cocaine (Benzoylecgonine)   Date Value Ref Range Status   01/22/2020 Not Detected NDET^Not Detected ng/mL Final     Comment:     Cutoff for a negative cocaine is 150 ng/ml or less.     Methamphetamine (d-Methamphetamine)   Date Value Ref Range Status   01/22/2020 Not Detected NDET^Not Detected ng/mL Final     Comment:     Cutoff for a negative methamphetamine is 500 ng/ml or less.     Opiates (Morphine)   Date Value Ref Range Status   01/22/2020 Not Detected NDET^Not Detected ng/mL Final     Comment:     Cutoff for a negative opiate is 100 ng/ml or less.     Amphetamine (d-Amphetamine)   Date Value Ref Range Status   01/22/2020 Not Detected NDET^Not Detected ng/mL Final     Comment:     Cutoff for a negative amphetamine is 500 ng/mL or less.     Benzodiazepines (Nordiazepam)   Date Value Ref Range Status   01/22/2020 Detected, Abnormal Result (A) NDET^Not Detected ng/mL Final     Comment:     Cutoff for a positive benzodiazepines is greater than 150 ng/ml.  This is an unconfirmed screening result to be used for medical purposes only.   Order [id number] for  confirmation or individual confirmation tests to [facility].       Tricyclic Antidepressants (Desipramine)   Date Value Ref Range Status   01/22/2020 Detected, Abnormal Result (A) NDET^Not Detected ng/mL Final     Comment:     Cutoff for a positive tricyclic antidepressant is greater than 300 ng/ml.  This is an unconfirmed screening result to be used for medical purposes only.   Order [id number] for confirmation or individual confirmation tests to [facility].       Methadone (Methadone)   Date Value Ref Range Status   01/22/2020 Not Detected NDET^Not Detected ng/mL Final     Comment:     Cutoff for a negative methadone is 200 ng/ml or less.     Barbiturates (Butalbital)   Date Value Ref Range Status   01/22/2020 Not Detected NDET^Not Detected ng/mL Final     Comment:     Cutoff for a negative barbituate is 200 ng/ml or less.     Oxycodone (Oxycodone)   Date Value Ref Range Status   01/22/2020 Not Detected NDET^Not Detected ng/mL Final     Comment:     Cutoff for a negative Oxycodone is 100 ng/mL or less.     Propoxyphene (Norpropoxyphene)   Date Value Ref Range Status   01/22/2020 Not Detected NDET^Not Detected ng/mL Final     Comment:     Cutoff for a negative propoxyphene is 300 ng/ml or less     Buprenorphine (Buprenorphine)   Date Value Ref Range Status   01/22/2020 Detected, Abnormal Result (A) NDET^Not Detected ng/mL Final     Comment:     Cutoff for a positive buprenorphine is greater than 10 ng/ml.  This is an unconfirmed screening result to be used for medical purposes only.   Order [id number] for confirmation or individual confirmation tests to [facility].          [email or web address]    Routing refill request to provider for review/approval because:  Drug not on the [facility] refill protocol         Asmita Marinelli RN  [facility]

## 2020-02-17 RX ORDER — ZOLPIDEM TARTRATE 10 MG/1
10 TABLET ORAL
Qty: 30 TABLET | Refills: 0 | Status: SHIPPED | OUTPATIENT
Start: 2020-02-17 | End: 2020-03-18

## 2020-02-17 NOTE — TELEPHONE ENCOUNTER
zolpidem (AMBIEN) 10 MG tablet       Last Written Prescription Date:  2.17.20  Last Fill Quantity: 30 tab,  # refills: 0   Last office visit: 8/29/2019 with prescribing provider:  Luis Armando Segovia MD     Future Office Visit:        Routing refill request to provider for review/approval because:  Drug not on the [facility], [facility] or [facility] refill protocol or controlled substance

## 2020-02-18 RX ORDER — ZOLPIDEM TARTRATE 10 MG/1
TABLET ORAL
Qty: 30 TABLET | OUTPATIENT
Start: 2020-02-18

## 2020-02-24 ENCOUNTER — TELEPHONE (OUTPATIENT)
Dept: ADDICTION MEDICINE | Facility: CLINIC | Age: 50
End: 2020-02-24

## 2020-02-24 DIAGNOSIS — F11.20 UNCOMPLICATED OPIOID DEPENDENCE (H): ICD-10-CM

## 2020-02-24 RX ORDER — BUPRENORPHINE AND NALOXONE 8; 2 MG/1; MG/1
FILM, SOLUBLE BUCCAL; SUBLINGUAL
Qty: 60 FILM | Refills: 0 | Status: SHIPPED | OUTPATIENT
Start: 2020-02-24 | End: 2020-03-18

## 2020-02-24 NOTE — TELEPHONE ENCOUNTER
23 day supply pended.    Estephania Dudley, RN    Nurse Liaison  [facility]    [facility]iction Medicine Services

## 2020-02-24 NOTE — TELEPHONE ENCOUNTER
Reason for Call:  Other prescription    Detailed comments: Patient had called stating he sees  every other month. Patient had ran out of medication on Friday and stated that  had forgotten to prescribe his medication.    Phone Number Patient can be reached at: Cell number on file:    Telephone Information:   Mobile [phone]       Best Time: Anytime    Can we leave a detailed message on this number? YES    Call taken on 2/24/2020 at 8:25 AM by Syeda Paez

## 2020-02-24 NOTE — TELEPHONE ENCOUNTER
Per last visit note, patient was to follow up in 8 weeks, with one refill on Rx.  Last Rx for #60 with no refills.     No future visit scheduled.  Will need future appointment for refill.  Routing to AM TCs to contact patient to set up follow up.      reviewed and summarized below:      Will pend Rx, as patient is currently out, and route to provider.      Estephania Dudley RN    Nurse Liaison  [facility]    Addiction Medicine Services

## 2020-03-04 DIAGNOSIS — M54.2 NECK PAIN: ICD-10-CM

## 2020-03-05 RX ORDER — CYCLOBENZAPRINE HCL 10 MG
10 TABLET ORAL 3 TIMES DAILY PRN
Qty: 90 TABLET | Refills: 0 | Status: SHIPPED | OUTPATIENT
Start: 2020-03-05 | End: 2020-04-07

## 2020-03-05 NOTE — TELEPHONE ENCOUNTER
Last Written Prescription Date:  1/6/20  Last Fill Quantity: 90,  # refills: 1   Last office visit: 8/29/2019 with prescribing provider:     Future Office Visit:   Next 5 appointments (look out 90 days)    Mar 18, 2020 10:40 AM CDT  Return Visit with Cleopatra Carreon MD  [facility] ([facility]) [address]  [address]  [facility] [address] [phone]         Requested Prescriptions   Pending Prescriptions Disp Refills     cyclobenzaprine (FLEXERIL) 10 MG tablet [Pharmacy Med Name: CYCLOBENZAPRINE 10 MG TABLET] 90 tablet 1     Sig: TAKE 1 TABLET (10 MG) BY MOUTH 3 TIMES DAILY AS NEEDED FOR OTHER (HICCUPS)       There is no refill protocol information for this order

## 2020-03-17 DIAGNOSIS — G47.00 INSOMNIA, UNSPECIFIED TYPE: ICD-10-CM

## 2020-03-17 DIAGNOSIS — J45.20 INTERMITTENT ASTHMA, UNCOMPLICATED: ICD-10-CM

## 2020-03-18 ENCOUNTER — VIRTUAL VISIT (OUTPATIENT)
Dept: ADDICTION MEDICINE | Facility: CLINIC | Age: 50
End: 2020-03-18
Payer: MEDICARE

## 2020-03-18 DIAGNOSIS — F33.1 MAJOR DEPRESSIVE DISORDER, RECURRENT EPISODE, MODERATE (H): ICD-10-CM

## 2020-03-18 DIAGNOSIS — F11.20 UNCOMPLICATED OPIOID DEPENDENCE (H): Primary | ICD-10-CM

## 2020-03-18 DIAGNOSIS — F90.9 ATTENTION DEFICIT HYPERACTIVITY DISORDER (ADHD), UNSPECIFIED ADHD TYPE: ICD-10-CM

## 2020-03-18 DIAGNOSIS — F41.1 GENERALIZED ANXIETY DISORDER: ICD-10-CM

## 2020-03-18 DIAGNOSIS — M54.42 LEFT-SIDED LOW BACK PAIN WITH LEFT-SIDED SCIATICA, UNSPECIFIED CHRONICITY: ICD-10-CM

## 2020-03-18 DIAGNOSIS — M54.2 NECK PAIN: ICD-10-CM

## 2020-03-18 PROCEDURE — G2012 BRIEF CHECK IN BY MD/QHP: HCPCS | Performed by: PEDIATRICS

## 2020-03-18 RX ORDER — ALBUTEROL SULFATE 90 UG/1
AEROSOL, METERED RESPIRATORY (INHALATION)
Qty: 18 INHALER | Refills: 0 | Status: SHIPPED | OUTPATIENT
Start: 2020-03-18 | End: 2020-04-08

## 2020-03-18 RX ORDER — BUPRENORPHINE AND NALOXONE 8; 2 MG/1; MG/1
FILM, SOLUBLE BUCCAL; SUBLINGUAL
Qty: 60 FILM | Refills: 1 | Status: SHIPPED | OUTPATIENT
Start: 2020-03-18 | End: 2020-05-22

## 2020-03-18 RX ORDER — ZOLPIDEM TARTRATE 10 MG/1
10 TABLET ORAL
Qty: 30 TABLET | Refills: 0 | Status: SHIPPED | OUTPATIENT
Start: 2020-03-18 | End: 2020-04-17

## 2020-03-18 NOTE — TELEPHONE ENCOUNTER
"Requested Prescriptions   Pending Prescriptions Disp Refills     zolpidem (AMBIEN) 10 MG tablet [Pharmacy Med Name: ZOLPIDEM TARTRATE 10  Last Written Prescription Date:  2/17/20  Last Fill Quantity: 30,  # refills: 0   Last Office Visit: 8/29/2019   Future Office Visit:      MG TABLET] 30 tablet 0     Sig: TAKE 1 TABLET (10 MG) BY MOUTH NIGHTLY AS NEEDED FOR SLEEP       There is no refill protocol information for this order        VENTOLIN  (90 Base) MCG/ACT inhaler [Pharmacy Med Name: VENTOLIN  Last Written Prescription Date:  1/20/20  Last Fill Quantity: 18g,  # refills: 0   Last Office Visit: 8/29/2019   Future Office Visit:      HFA 90 MCG INHALER] 18 Inhaler 0     Sig: INHALE 2 PUFFS INTO THE LUNGS EVERY 4 HOURS AS NEEDED FOR SHORTNESS OF BREATH / DYSPNEA       Asthma Maintenance Inhalers - Anticholinergics Failed - 3/17/2020 11:57 PM        Failed - Asthma control assessment score within normal limits in last 6 months     Please review ACT score.           Failed - Recent (6 mo) or future (30 days) visit within the authorizing provider's specialty     Patient had office visit in the last 6 months or has a visit in the next 30 days with authorizing provider or within the authorizing provider's specialty.  See \"Patient Info\" tab in inbasket, or \"Choose Columns\" in Meds & Orders section of the refill encounter.            Passed - Patient is age 12 years or older        Passed - Medication is active on med list       Short-Acting Beta Agonist Inhalers Protocol  Failed - 3/17/2020 11:57 PM        Failed - Asthma control assessment score within normal limits in last 6 months     Please review ACT score.           Failed - Recent (6 mo) or future (30 days) visit within the authorizing provider's specialty     Patient had office visit in the last 6 months or has a visit in the next 30 days with authorizing provider or within the authorizing provider's specialty.  See \"Patient Info\" tab in inbasket, or \"Choose " "Columns\" in Meds & Orders section of the refill encounter.            Passed - Patient is age 12 or older        Passed - Medication is active on med list             "
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
## 2020-03-20 ENCOUNTER — TELEPHONE (OUTPATIENT)
Dept: FAMILY MEDICINE | Facility: CLINIC | Age: 50
End: 2020-03-20

## 2020-03-20 DIAGNOSIS — F41.1 GENERALIZED ANXIETY DISORDER: ICD-10-CM

## 2020-03-20 DIAGNOSIS — F33.1 MAJOR DEPRESSIVE DISORDER, RECURRENT EPISODE, MODERATE (H): ICD-10-CM

## 2020-03-20 DIAGNOSIS — F41.0 PANIC ATTACK: Primary | ICD-10-CM

## 2020-03-20 DIAGNOSIS — F41.0 PANIC DISORDER WITHOUT AGORAPHOBIA: ICD-10-CM

## 2020-03-20 DIAGNOSIS — G47.00 INSOMNIA, UNSPECIFIED TYPE: ICD-10-CM

## 2020-03-20 RX ORDER — ZOLPIDEM TARTRATE 10 MG/1
10 TABLET ORAL
Qty: 30 TABLET | Refills: 0 | Status: CANCELLED | OUTPATIENT
Start: 2020-03-20

## 2020-03-20 RX ORDER — BUPROPION HYDROCHLORIDE 300 MG/1
300 TABLET ORAL EVERY MORNING
Qty: 90 TABLET | Refills: 0 | Status: SHIPPED | OUTPATIENT
Start: 2020-03-20 | End: 2020-06-05

## 2020-03-20 RX ORDER — LORAZEPAM 1 MG/1
TABLET ORAL
Qty: 60 TABLET | Refills: 1 | Status: CANCELLED | OUTPATIENT
Start: 2020-03-20

## 2020-03-20 ASSESSMENT — PATIENT HEALTH QUESTIONNAIRE - PHQ9: SUM OF ALL RESPONSES TO PHQ QUESTIONS 1-9: 14

## 2020-03-20 NOTE — TELEPHONE ENCOUNTER
Can we triage this patient. I am not sure what is the question? It needs clarified. He is on buprenorphine.      Yaquelin Simpson, MIRELLA-BC

## 2020-03-20 NOTE — TELEPHONE ENCOUNTER
Called patient back- sent refill to pharmacy-  patient would like a refill on the lorazepam and ambien as well- having trouble getting this filled when he dost have refills on file.  please send refills .    Bernarda VACARN BSN  [facility]  [phone]

## 2020-03-20 NOTE — TELEPHONE ENCOUNTER
patient states that he is feeling down and was previously on this Wellbutrin 300 mg- states he was feeling better so stopped the med a few months ago  patient is off work and home and feels really depression - denies self harm thoughts  Has 300 mg tabs and can cut in half if needed?    PHQ 8/8/2019 8/29/2019 3/20/2020   PHQ-9 Total Score 14 20 14   Q9: Thoughts of better off dead/self-harm past 2 weeks Several days More than half the days Not at all   F/U: Thoughts of suicide or self-harm No - -   F/U: Safety concerns No - -   Some encounter information is confidential and restricted. Go to Review Flowsheets activity to see all data.       please advise shama VACARN BSN  [facility]  [phone]

## 2020-03-24 ENCOUNTER — NURSE TRIAGE (OUTPATIENT)
Dept: NURSING | Facility: CLINIC | Age: 50
End: 2020-03-24

## 2020-03-25 ENCOUNTER — TELEPHONE (OUTPATIENT)
Dept: ADDICTION MEDICINE | Facility: CLINIC | Age: 50
End: 2020-03-25

## 2020-03-25 NOTE — TELEPHONE ENCOUNTER
I called [facility], they didn't received the Suboxone on 3/18/20 though Epic reports it was indeed sent and received electronically by provider. I gave them a verbal order and they are filling Jeremi's Suboxone now.    Patient will be notified by [facility] when his meds are ready for .    I spoke to Jeremi on the phone and let him know refills should be ready today.

## 2020-03-25 NOTE — TELEPHONE ENCOUNTER
"Pt states he [facility] w[facility] earlier tonight about his bupropion refill. Says he was expecting to hear back from them. No note in EHR from today. Jennifer KELLEY says they did not receive his bupropion Rx. Pt states he is out and needs this. Rx is in EHR. Tried to reach [facility]; called at 8:26pm but pharmacy closed (closes @8pm; reopens @9am) Advised pt to call clinic in AM.     Reason for Disposition    Caller has NON-URGENT medication question about med that PCP prescribed and triager unable to answer question    Additional Information    Negative: Drug overdose and nurse unable to answer question    Negative: Caller requesting information not related to medicine    Negative: Caller requesting a prescription for Strep throat and has a positive culture result    Negative: Rash while taking a medication or within 3 days of stopping it    Negative: Immunization reaction suspected    Negative: [1] Asthma and [2] having symptoms of asthma (cough, wheezing, etc)    Negative: MORE THAN A DOUBLE DOSE of a prescription or over-the-counter (OTC) drug    Negative: [1] DOUBLE DOSE (an extra dose or lesser amount) of over-the-counter (OTC) drug AND [2] any symptoms (e.g., dizziness, nausea, pain, sleepiness)    Negative: [1] DOUBLE DOSE (an extra dose or lesser amount) of prescription drug AND [2] any symptoms (e.g., dizziness, nausea, pain, sleepiness)    Negative: Took another person's prescription drug    Negative: [1] DOUBLE DOSE (an extra dose or lesser amount) of prescription drug AND [2] NO symptoms (Exception: a double dose of antibiotics)    Negative: Diabetes drug error or overdose (e.g., insulin or extra dose)    Negative: [1] Request for URGENT new prescription or refill of \"essential\" medication (i.e., likelihood of harm to patient if not taken) AND [2] triager unable to fill per unit policy    Negative: [1] Prescription not at pharmacy AND [2] was prescribed today by PCP    Negative: Pharmacy calling with " prescription questions and triager unable to answer question    Negative: Caller has URGENT medication question about med that PCP prescribed and triager unable to answer question    Protocols used: MEDICATION QUESTION CALL-A-AH

## 2020-03-25 NOTE — TELEPHONE ENCOUNTER
Reason for Call:  Patients pharmacy isn't releasing sbxn and pt needs it now. he's having withdraws     Detailed comments: been going back and forth with pharmacy on sbxn they won give says too soon to refill    Phone Number Patient can be reached at: Home number on file [phone] (home)    Best Time: anytime    Can we leave a detailed message on this number? YES    Call taken on 3/25/2020 at 1:57 PM by Amelia Hernandez

## 2020-03-26 DIAGNOSIS — I10 ESSENTIAL HYPERTENSION WITH GOAL BLOOD PRESSURE LESS THAN 140/90: ICD-10-CM

## 2020-03-26 RX ORDER — METOPROLOL TARTRATE 100 MG
TABLET ORAL
Qty: 180 TABLET | Refills: 0 | Status: SHIPPED | OUTPATIENT
Start: 2020-03-26 | End: 2020-06-05

## 2020-03-26 RX ORDER — AMLODIPINE BESYLATE 10 MG/1
TABLET ORAL
Qty: 90 TABLET | Refills: 0 | Status: SHIPPED | OUTPATIENT
Start: 2020-03-26 | End: 2020-06-05

## 2020-03-26 NOTE — TELEPHONE ENCOUNTER
He is tolerating the medications now and the allergies list has been adjusted.  Ok to refill and call and see if he has a blood pressure cuff at home or if he can get it checked somewhere sooner than later.  Otherwise Okay if he is well for him to have a blood pressure only check as his last blood pressure was quite elevated.

## 2020-03-26 NOTE — TELEPHONE ENCOUNTER
Attempt #1  Called patient @ [phone] - Left a non-detailed message to call back and speak with any triage nurse.    Asmita Marinelli RN  [facility]

## 2020-03-26 NOTE — TELEPHONE ENCOUNTER
Routing refill request to provider for review/approval because:  Patient has allergy to both meds  Last BP elevated - do you want patient seen now for BP check?      Asmita Marinelli RN  [facility]

## 2020-03-27 DIAGNOSIS — G47.00 INSOMNIA, UNSPECIFIED TYPE: ICD-10-CM

## 2020-03-27 RX ORDER — ZOLPIDEM TARTRATE 10 MG/1
10 TABLET ORAL
Qty: 30 TABLET | Refills: 0 | OUTPATIENT
Start: 2020-03-27

## 2020-03-27 NOTE — TELEPHONE ENCOUNTER
Outpatient Medication Detail    Disp  Refills  Start  End  COLLETTE    LORazepam (ATIVAN) 1 MG tablet  60 tablet  1  2/15/2020   No    Sig: TAKE 1 TABLET BY MOUTH TWICE A DAY AS NEEDED FOR ANXIETY       Disp  Refills  Start  End  COLLETTE    zolpidem (AMBIEN) 10 MG tablet  30 tablet  0  3/18/2020   No    Sig - Route: TAKE 1 TABLET (10 MG) BY MOUTH NIGHTLY AS NEEDED FOR SLEEP - Oral      Problem List Complete:    Yes    Last Office Visit with [facility] primary care provider: 08/29/2019    Future Office visit:   Next 5 appointments (look out 90 days)    May 06, 2020 11:20 AM CDT  Telephone Visit with Cleopatra Carreon MD  [facility] ([facility]) [address] [place] [address]  [address]  [facility] [id number] [phone]          Controlled substance agreement:   Encounter-Level CSA - 11/16/2017:    Controlled Substance Agreement - Scan on 12/1/2017 10:43 AM: CONTROLLED SUBSTANCE AGREEMENT     Encounter-Level CSA - 08/07/2015:    Controlled Substance Agreement - Scan on 8/19/2015 11:27 AM: Controlled Substance Agreement 08/07/15     Patient-Level CSA:    There are no patient-level csa.       Last Urine Drug Screen: No results found for: CDAUT, No results found for: COMDAT,   Cannabinoids (15-lfg-3-carboxy-9-THC)   Date Value Ref Range Status   01/22/2020 Not Detected NDET^Not Detected ng/mL Final     Comment:     Cutoff for a negative cannabinoid is 50 ng/mL or less.     Phencyclidine (Phencyclidine)   Date Value Ref Range Status   01/22/2020 Not Detected NDET^Not Detected ng/mL Final     Comment:     Cutoff for a negative PCP is 25 ng/mL or less.     Cocaine (Benzoylecgonine)   Date Value Ref Range Status   01/22/2020 Not Detected NDET^Not Detected ng/mL Final     Comment:     Cutoff for a negative cocaine is 150 ng/ml or less.     Methamphetamine (d-Methamphetamine)   Date Value Ref Range Status   01/22/2020 Not Detected NDET^Not Detected ng/mL Final     Comment:     Cutoff for a negative  methamphetamine is 500 ng/ml or less.     Opiates (Morphine)   Date Value Ref Range Status   01/22/2020 Not Detected NDET^Not Detected ng/mL Final     Comment:     Cutoff for a negative opiate is 100 ng/ml or less.     Amphetamine (d-Amphetamine)   Date Value Ref Range Status   01/22/2020 Not Detected NDET^Not Detected ng/mL Final     Comment:     Cutoff for a negative amphetamine is 500 ng/mL or less.     Benzodiazepines (Nordiazepam)   Date Value Ref Range Status   01/22/2020 Detected, Abnormal Result (A) NDET^Not Detected ng/mL Final     Comment:     Cutoff for a positive benzodiazepines is greater than 150 ng/ml.  This is an unconfirmed screening result to be used for medical purposes only.   Order [id number] for confirmation or individual confirmation tests to [employer].       Tricyclic Antidepressants (Desipramine)   Date Value Ref Range Status   01/22/2020 Detected, Abnormal Result (A) NDET^Not Detected ng/mL Final     Comment:     Cutoff for a positive tricyclic antidepressant is greater than 300 ng/ml.  This is an unconfirmed screening result to be used for medical purposes only.   Order [id number] for confirmation or individual confirmation tests to [employer].       Methadone (Methadone)   Date Value Ref Range Status   01/22/2020 Not Detected NDET^Not Detected ng/mL Final     Comment:     Cutoff for a negative methadone is 200 ng/ml or less.     Barbiturates (Butalbital)   Date Value Ref Range Status   01/22/2020 Not Detected NDET^Not Detected ng/mL Final     Comment:     Cutoff for a negative barbituate is 200 ng/ml or less.     Oxycodone (Oxycodone)   Date Value Ref Range Status   01/22/2020 Not Detected NDET^Not Detected ng/mL Final     Comment:     Cutoff for a negative Oxycodone is 100 ng/mL or less.     Propoxyphene (Norpropoxyphene)   Date Value Ref Range Status   01/22/2020 Not Detected NDET^Not Detected ng/mL Final     Comment:     Cutoff for a negative propoxyphene is 300 ng/ml or less     Buprenorphine  (Buprenorphine)   Date Value Ref Range Status   01/22/2020 Detected, Abnormal Result (A) NDET^Not Detected ng/mL Final     Comment:     Cutoff for a positive buprenorphine is greater than 10 ng/ml.  This is an unconfirmed screening result to be used for medical purposes only.   Order [id number] for confirmation or individual confirmation tests to [employer].       Patient stated he has gone through the #60 x 1 refill of lorazepam sent on 2/15/20    [email or web address]      Routing refill request to provider for review/approval because:  Drug not on the G refill protocol

## 2020-03-27 NOTE — TELEPHONE ENCOUNTER
Called patient @ # below -     Advised of notes below - patient stated he does not need these medications yet. Has plenty at home yet.   Advised of MD RAIMUNDO message below - Patient stated an understanding and agreed with plan.    Asmita Marinelli RN  [facility]

## 2020-04-06 ENCOUNTER — TELEPHONE (OUTPATIENT)
Dept: FAMILY MEDICINE | Facility: CLINIC | Age: 50
End: 2020-04-06

## 2020-04-06 DIAGNOSIS — F41.1 GENERALIZED ANXIETY DISORDER: ICD-10-CM

## 2020-04-06 DIAGNOSIS — J45.20 INTERMITTENT ASTHMA, UNCOMPLICATED: ICD-10-CM

## 2020-04-06 DIAGNOSIS — F41.0 PANIC DISORDER WITHOUT AGORAPHOBIA: ICD-10-CM

## 2020-04-06 NOTE — TELEPHONE ENCOUNTER
"VENTOLIN HFA 90 MCG INHALER   Last Written Prescription Date:  3/18/2020  Last Fill Quantity: 18,  # refills: 0   Last office visit: 8/29/2019 with prescribing provider:  Luis Armando Segovia MD   Future Office Visit: NA  Next 5 appointments (look out 90 days)    May 06, 2020 11:20 AM CDT  Telephone Visit with Cleopatra Carreon MD  [facility] ([facility]) [address] [place] [address]  [address]  [facility] [address] [phone]           Requested Prescriptions   Pending Prescriptions Disp Refills     VENTOLIN  (90 Base) MCG/ACT inhaler [Pharmacy Med Name: VENTOLIN HFA 90 MCG INHALER] 18 Inhaler 0     Sig: INHALE 2 PUFFS INTO THE LUNGS EVERY 4 HOURS AS NEEDED FOR SHORTNESS OF BREATH / DYSPNEA       Asthma Maintenance Inhalers - Anticholinergics Failed - 4/6/2020 12:23 PM        Failed - Asthma control assessment score within normal limits in last 6 months     Please review ACT score.           Failed - Recent (6 mo) or future (30 days) visit within the authorizing provider's specialty     Patient had office visit in the last 6 months or has a visit in the next 30 days with authorizing provider or within the authorizing provider's specialty.  See \"Patient Info\" tab in inbasket, or \"Choose Columns\" in Meds & Orders section of the refill encounter.            Passed - Patient is age 12 years or older        Passed - Medication is active on med list       Short-Acting Beta Agonist Inhalers Protocol  Failed - 4/6/2020 12:23 PM        Failed - Asthma control assessment score within normal limits in last 6 months     Please review ACT score.           Failed - Recent (6 mo) or future (30 days) visit within the authorizing provider's specialty     Patient had office visit in the last 6 months or has a visit in the next 30 days with authorizing provider or within the authorizing provider's specialty.  See \"Patient Info\" tab in inbasket, or \"Choose Columns\" in Meds & Orders " section of the refill encounter.            Passed - Patient is age 12 or older        Passed - Medication is active on med list

## 2020-04-07 RX ORDER — LORAZEPAM 1 MG/1
TABLET ORAL
Qty: 60 TABLET | Refills: 1 | Status: CANCELLED | OUTPATIENT
Start: 2020-04-07

## 2020-04-08 RX ORDER — ALBUTEROL SULFATE 90 UG/1
AEROSOL, METERED RESPIRATORY (INHALATION)
Qty: 18 INHALER | Refills: 3 | Status: SHIPPED | OUTPATIENT
Start: 2020-04-08 | End: 2020-06-05

## 2020-04-08 NOTE — TELEPHONE ENCOUNTER
If he is asthma is stable with controller medication (symbicort) and occasional use of albuterol then no appointment is needed now.  Actually his Symbicort was last filled in Epic on 12/28/2018 so I doubt he is using that.  If he is wheezing and having a hard time with asthma symptoms then a video visit (or telephone ) is indicated int he next 1-2 weeks.     Refill for albuterol sent for now.

## 2020-04-14 NOTE — TELEPHONE ENCOUNTER
Called #   Telephone Information:   Mobile [phone]       Pt stated that he is doing well and does not have any problems     Rn advised on the information below and if things change please call the clinic    Kari Mcleod RN, BSN  [facility]

## 2020-04-23 DIAGNOSIS — M62.838 MUSCLE SPASM: ICD-10-CM

## 2020-04-23 DIAGNOSIS — G43.109 MIGRAINE WITH AURA AND WITHOUT STATUS MIGRAINOSUS, NOT INTRACTABLE: ICD-10-CM

## 2020-04-23 RX ORDER — TIZANIDINE 2 MG/1
2-4 TABLET ORAL 3 TIMES DAILY PRN
Qty: 90 TABLET | Refills: 0 | Status: SHIPPED | OUTPATIENT
Start: 2020-04-23 | End: 2020-05-15

## 2020-04-23 NOTE — TELEPHONE ENCOUNTER
Signed Prescriptions:                        Disp   Refills    tiZANidine (ZANAFLEX) 2 MG tablet          90 tab*0        Sig: Take 1-2 tablets (2-4 mg) by mouth 3 times daily as           needed for muscle spasms  Authorizing Provider: CHRIS WARD MD  [facility]

## 2020-04-23 NOTE — TELEPHONE ENCOUNTER
Received fax request from [facility] pharmacy requesting refill(s) for tiZANidine (ZANAFLEX) 2 MG tablet     Last refilled on 1/15/2020    Pt last seen on 2/4/2020  Next appt scheduled for 5/5/2020    Will facilitate refill.

## 2020-04-26 ENCOUNTER — TELEPHONE (OUTPATIENT)
Dept: PALLIATIVE MEDICINE | Facility: CLINIC | Age: 50
End: 2020-04-26

## 2020-04-26 DIAGNOSIS — G43.719 INTRACTABLE CHRONIC MIGRAINE WITHOUT AURA AND WITHOUT STATUS MIGRAINOSUS: Primary | ICD-10-CM

## 2020-04-26 NOTE — TELEPHONE ENCOUNTER
CAM botox order placed for patient.  In month of May, would want this to be with Dr. Subramanian.  He normally gets this at [place], but he lives in [place], so this could be done out of the [facility] location.    Will send this to Dr. Subramanian to see if she has preference on location.  This may need to be relayed to the CAM team in case the location is needed for any PA.    Then please send to  to transfer patient's appt.    Alexa Ascencio MD  [facility]

## 2020-04-27 NOTE — TELEPHONE ENCOUNTER
Pt scheduled 5/13 with Dr. Subramanian in [place]. Will schedule sooner once PA is obtained from CAM team, pt was originally scheduled 5/5.    Jennifer TORRES    [facility] Management

## 2020-04-28 DIAGNOSIS — G47.00 INSOMNIA, UNSPECIFIED TYPE: ICD-10-CM

## 2020-04-29 RX ORDER — AMITRIPTYLINE HYDROCHLORIDE 50 MG/1
TABLET ORAL
Qty: 270 TABLET | Refills: 1 | OUTPATIENT
Start: 2020-04-29

## 2020-04-29 NOTE — TELEPHONE ENCOUNTER
"Duplicate- sent- info sent to pharmacy.  Bernarda VACARN  [facility]  [phone]        Requested Prescriptions   Pending Prescriptions Disp Refills     amitriptyline (ELAVIL) 50 MG tablet [Pharmacy Med Name: AMITRIPTYLINE HCL 50 MG TAB] 270 tablet 1     Sig: TAKE 1 TO 3 TABLETS ( MG) BY MOUTH AT BEDTIME       Tricyclic Agents ( Annual appt and no PHQ9) Failed - 4/28/2020 12:14 AM        Failed - Blood Pressure under 140/90 in past 12 mos     BP Readings from Last 3 Encounters:   02/04/20 (!) 173/97   01/22/20 (!) 142/72   12/20/19 (!) 176/108                 Passed - Recent (12 mo) or future (30 days) visit within authorizing provider's specialty     Patient has had an office visit with the authorizing provider or a provider within the authorizing providers department within the previous 12 mos or has a future within next 30 days. See \"Patient Info\" tab in inbasket, or \"Choose Columns\" in Meds & Orders section of the refill encounter.              Passed - Medication is active on med list        Passed - Patient is age 18 or older             "

## 2020-05-03 ENCOUNTER — NURSE TRIAGE (OUTPATIENT)
Dept: NURSING | Facility: CLINIC | Age: 50
End: 2020-05-03

## 2020-05-03 DIAGNOSIS — E29.1 HYPOGONADISM MALE: ICD-10-CM

## 2020-05-04 ENCOUNTER — TELEPHONE (OUTPATIENT)
Dept: ADDICTION MEDICINE | Facility: CLINIC | Age: 50
End: 2020-05-04

## 2020-05-04 RX ORDER — TESTOSTERONE CYPIONATE 200 MG/ML
100 INJECTION, SOLUTION INTRAMUSCULAR WEEKLY
Qty: 6 ML | Refills: 0 | Status: SHIPPED | OUTPATIENT
Start: 2020-05-04 | End: 2020-06-05

## 2020-05-04 NOTE — TELEPHONE ENCOUNTER
Controlled Substance Refill Request for   testosterone cypionate (DEPOTESTOSTERONE) 200 MG/ML injection  6 mL  0  2/4/2020   No    Sig - Route: INJECT 0.5 MLS (100 MG) INTO THE MUSCLE ONCE A WEEK - Intramuscular    Sent to pharmacy as: testosterone cypionate (DEPOTESTOSTERONE) 200 MG/ML injection    Class: E-Prescribe    Notes to Pharmacy: Not to exceed 5 additional fills before 05/02/2020          Problem List Complete:    Yes    THE MOST RECENT OFFICE VISIT MUST BE WITHIN THE PAST 3 MONTHS. AT LEAST ONE FACE TO FACE VISIT MUST OCCUR EVERY 6 MONTHS. ADDITIONAL VISITS CAN BE VIRTUAL.  (THIS STATEMENT SHOULD BE DELETED.)    Last Office Visit with [facility] primary care provider: 8/29/2019    Future Office visit:   Next 5 appointments (look out 90 days)    May 06, 2020 11:20 AM CDT  Telephone Visit with Cleopatra Carreon MD  [facility] ([facility]) [address] [place] [address]  [address]  [facility] [address]  [phone]          Controlled substance agreement:   Encounter-Level CSA - 11/16/2017:    Controlled Substance Agreement - Scan on 12/1/2017 10:43 AM: CONTROLLED SUBSTANCE AGREEMENT     Encounter-Level CSA - 08/07/2015:    Controlled Substance Agreement - Scan on 8/19/2015 11:27 AM: Controlled Substance Agreement 08/07/15     Patient-Level CSA:    There are no patient-level csa.         Last Urine Drug Screen: No results found for: CDAUT, No results found for: COMDAT,   Cannabinoids (75-lgx-7-carboxy-9-THC)   Date Value Ref Range Status   01/22/2020 Not Detected NDET^Not Detected ng/mL Final     Comment:     Cutoff for a negative cannabinoid is 50 ng/mL or less.     Phencyclidine (Phencyclidine)   Date Value Ref Range Status   01/22/2020 Not Detected NDET^Not Detected ng/mL Final     Comment:     Cutoff for a negative PCP is 25 ng/mL or less.     Cocaine (Benzoylecgonine)   Date Value Ref Range Status   01/22/2020 Not Detected NDET^Not Detected ng/mL Final     Comment:      Cutoff for a negative cocaine is 150 ng/ml or less.     Methamphetamine (d-Methamphetamine)   Date Value Ref Range Status   01/22/2020 Not Detected NDET^Not Detected ng/mL Final     Comment:     Cutoff for a negative methamphetamine is 500 ng/ml or less.     Opiates (Morphine)   Date Value Ref Range Status   01/22/2020 Not Detected NDET^Not Detected ng/mL Final     Comment:     Cutoff for a negative opiate is 100 ng/ml or less.     Amphetamine (d-Amphetamine)   Date Value Ref Range Status   01/22/2020 Not Detected NDET^Not Detected ng/mL Final     Comment:     Cutoff for a negative amphetamine is 500 ng/mL or less.     Benzodiazepines (Nordiazepam)   Date Value Ref Range Status   01/22/2020 Detected, Abnormal Result (A) NDET^Not Detected ng/mL Final     Comment:     Cutoff for a positive benzodiazepines is greater than 150 ng/ml.  This is an unconfirmed screening result to be used for medical purposes only.   Order [id number] for confirmation or individual confirmation tests to [employer].       Tricyclic Antidepressants (Desipramine)   Date Value Ref Range Status   01/22/2020 Detected, Abnormal Result (A) NDET^Not Detected ng/mL Final     Comment:     Cutoff for a positive tricyclic antidepressant is greater than 300 ng/ml.  This is an unconfirmed screening result to be used for medical purposes only.   Order [id number] for confirmation or individual confirmation tests to [employer].       Methadone (Methadone)   Date Value Ref Range Status   01/22/2020 Not Detected NDET^Not Detected ng/mL Final     Comment:     Cutoff for a negative methadone is 200 ng/ml or less.     Barbiturates (Butalbital)   Date Value Ref Range Status   01/22/2020 Not Detected NDET^Not Detected ng/mL Final     Comment:     Cutoff for a negative barbituate is 200 ng/ml or less.     Oxycodone (Oxycodone)   Date Value Ref Range Status   01/22/2020 Not Detected NDET^Not Detected ng/mL Final     Comment:     Cutoff for a negative Oxycodone is 100 ng/mL or less.      Propoxyphene (Norpropoxyphene)   Date Value Ref Range Status   01/22/2020 Not Detected NDET^Not Detected ng/mL Final     Comment:     Cutoff for a negative propoxyphene is 300 ng/ml or less     Buprenorphine (Buprenorphine)   Date Value Ref Range Status   01/22/2020 Detected, Abnormal Result (A) NDET^Not Detected ng/mL Final     Comment:     Cutoff for a positive buprenorphine is greater than 10 ng/ml.  This is an unconfirmed screening result to be used for medical purposes only.   Order [id number] for confirmation or individual confirmation tests to [employer].          Processing:  Rx to be electronically transmitted to pharmacy by provider     [email or web address]   checked in past 3 months?  No, route to RN     Kari Mcleod RN, BSN  [facility]

## 2020-05-04 NOTE — TELEPHONE ENCOUNTER
Insurance completed, if you wanted to move appt.    Alexa Ascencio MD  [facility]

## 2020-05-04 NOTE — TELEPHONE ENCOUNTER
Called patient to move his appointment sooner, patient declined and stated he wants pain meds        Shayna Win    [facility]

## 2020-05-04 NOTE — TELEPHONE ENCOUNTER
This encounter is only pertaining to the approval for the patient to have Botox injections with Dr Subramanian. She does not prescribe medication for him. Provided him the phone number to schedule with Dr Carreon and transferred to scheduling.  RIZWAN Cook, RN  Care Coordinator  [facility] [place]

## 2020-05-04 NOTE — TELEPHONE ENCOUNTER
"Reason for Call:  Other call back    Detailed comments: Patient is requesting a call back from Dr. Carreon or her nurse to discuss the pain he has been having for the past couple of weeks.  Patient is hoping that Dr. Carreon can prescribed him with something for his pain but is aware that she might not be able to do so. Patient stated that if we were not able to help him with this request he is going to, \"politely break the contract he has with [facility] and seek care somewhere else\". Please call patient back for any further questions or concerns.     Phone Number Patient can be reached at: Home number on file [phone] (home)    Best Time: ASAP    Can we leave a detailed message on this number? YES    Call taken on 5/4/2020 at 4:37 PM by Kaylin Merida      "
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
## 2020-05-05 ENCOUNTER — MYC MEDICAL ADVICE (OUTPATIENT)
Dept: ADDICTION MEDICINE | Facility: CLINIC | Age: 50
End: 2020-05-05

## 2020-05-05 NOTE — TELEPHONE ENCOUNTER
Please let patient know we can discuss at appt tomorrow but I would not be prescribing Opioid pain medication other than Suboxone.

## 2020-05-05 NOTE — TELEPHONE ENCOUNTER
See recent calls with [facility], starting on 4/26/20.    Last virtual visit with Dr. Carreon 3/18/20  Next visit 5/6/20 with Dr. Carreon; 5/13/20 with Dr. Subramanian (botox?)    Summary of Treatment Plan by Dr. Carreon 3/18/20:  ASSESSMENT/PLAN:        1. Uncomplicated opioid dependence (H)    2. Neck pain, bilateral    3. Left-sided low back pain with left-sided sciatica, unspecified chronicity    4. Weakness of left foot    5. Major Depressive Disorder, Recurrent Episode, Mode    6. Generalized anxiety disorder    7. Panic disorder without agoraphobia    8. Attention deficit hyperactivity disorder (ADHD), unspecified ADHD type          Continue Suboxone  4 mg 4 times  daily .  Could further split dose for acute pain if desired.   Risk benefits side effects and intended purposes discussed.  Follow-up in 8 wk with 1 refill   phone visit plan  Follow-up with pain management as needed for management for neck and low back pain.     Opioid-induced hyperalgesia discussed again today.

## 2020-05-06 NOTE — TELEPHONE ENCOUNTER
Patient has appointment today.  No further action needed.    Estephania Dudley RN    Nurse Liaison  [facility]    Addiction Medicine Services

## 2020-05-10 DIAGNOSIS — I10 ESSENTIAL HYPERTENSION WITH GOAL BLOOD PRESSURE LESS THAN 140/90: ICD-10-CM

## 2020-05-11 DIAGNOSIS — M62.838 MUSCLE SPASM: ICD-10-CM

## 2020-05-11 DIAGNOSIS — G43.109 MIGRAINE WITH AURA AND WITHOUT STATUS MIGRAINOSUS, NOT INTRACTABLE: ICD-10-CM

## 2020-05-11 NOTE — TELEPHONE ENCOUNTER
Jeremi is not being seen regularly in the pain clinic any longer. Will route to PCP to consider filling. Please let Jeremi know.     RAINA Clement, NP-C  [facility] [place]

## 2020-05-11 NOTE — TELEPHONE ENCOUNTER
Received fax request from   [facility]/pharmacy #[id number] - [place], [place] - [address]  [address] [place] [id number]  Phone: [phone] Fax: [phone]    pharmacy requesting refill(s) for tiZANidine (ZANAFLEX) 2 MG tablet     Last refilled on 04/23/20    Pt last seen on 10/02/19    No future appointments scheduled at this time    Will facilitate refill.    Sheila Aragon [facility]  [place]

## 2020-05-11 NOTE — TELEPHONE ENCOUNTER
Please verify if he is josé manuel gto the pain clinic in 2 days as they could fill this (although it is early as he had it filled about 2 weeks ago #90)  I may refill this and also he is due for a 6 month medication check appointment. Please schedule here or address it at the pain clinic on 5/13/20.

## 2020-05-12 RX ORDER — LOSARTAN POTASSIUM 100 MG/1
TABLET ORAL
Qty: 30 TABLET | Refills: 0 | Status: SHIPPED | OUTPATIENT
Start: 2020-05-12 | End: 2020-06-05

## 2020-05-12 NOTE — TELEPHONE ENCOUNTER
Routing refill request to provider for review/approval because:  Labs out of range:  Scr and B/P

## 2020-05-13 ENCOUNTER — OFFICE VISIT (OUTPATIENT)
Dept: PALLIATIVE MEDICINE | Facility: CLINIC | Age: 50
End: 2020-05-13
Payer: MEDICARE

## 2020-05-13 ENCOUNTER — MYC MEDICAL ADVICE (OUTPATIENT)
Dept: FAMILY MEDICINE | Facility: CLINIC | Age: 50
End: 2020-05-13

## 2020-05-13 VITALS — HEART RATE: 63 BPM | OXYGEN SATURATION: 95 % | SYSTOLIC BLOOD PRESSURE: 173 MMHG | DIASTOLIC BLOOD PRESSURE: 103 MMHG

## 2020-05-13 DIAGNOSIS — G43.711 INTRACTABLE CHRONIC MIGRAINE WITHOUT AURA AND WITH STATUS MIGRAINOSUS: ICD-10-CM

## 2020-05-13 DIAGNOSIS — G43.109 MIGRAINE WITH AURA AND WITHOUT STATUS MIGRAINOSUS, NOT INTRACTABLE: Primary | ICD-10-CM

## 2020-05-13 PROCEDURE — 64615 CHEMODENERV MUSC MIGRAINE: CPT | Performed by: ANESTHESIOLOGY

## 2020-05-13 ASSESSMENT — PAIN SCALES - GENERAL: PAINLEVEL: SEVERE PAIN (6)
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
## 2020-05-13 NOTE — LETTER
[address] [address]  [phone]       May 20, 2020    Jeremi Damon  [address]   [facility] [address]    Jeremi:    We have been calling you regarding a recent refill request we received for your medications.  Unfortunately, we were unable to reach you.  We are notifying you that you are due for a blood pressure check and a follow up appointment prior to your next refill.  You can schedule this appointment via [employer] or by calling the clinic at [phone].      Sincerely,          Nate Segovia M.D./meg

## 2020-05-13 NOTE — TELEPHONE ENCOUNTER
Patient was seen at pain clinic today - notes unfinished.   Will await notes.     Asmita Marinelli RN  [facility]

## 2020-05-13 NOTE — PATIENT INSTRUCTIONS
[facility]   Botox Injection Discharge Instructions    Do not rub or put extended pressure on the injection sites. You may gently touch the sites to remove any excess blood.    Monitor the injection sites for signs and symptoms of infection such as redness, swelling, warmth, fever, chills, or drainage to areas.    You may have soreness at the injection sites for up to 24 hours.    If you are able to use anti-inflammatory medications or Tylenol for pain control, you can take these as directed.    It may take up to 1 month to notice benefit from the 1st treatment    If you do notice relief, botox can be done every 3 months.  It may require insurance authorization everytime.      For questions about insurance coverage, please call the main clinic number and ask to speak with someone about botox coverage.     Pain Clinic phone number during work hours Monday-Friday:  [phone]    After hours provider line: [phone]

## 2020-05-14 NOTE — TELEPHONE ENCOUNTER
tiZANidine (ZANAFLEX) 2 MG tablet  90 tablet  0  4/23/2020   No    Sig - Route: Take 1-2 tablets (2-4 mg) by mouth 3 times daily as needed for muscle spasms - Oral      cyclobenzaprine (FLEXERIL) 10 MG tablet  90 tablet  0  4/7/2020   No    Sig - Route: TAKE 1 TABLET (10 MG) BY MOUTH 3 TIMES DAILY AS NEEDED FOR OTHER (HICCUPS) - Oral      Per pain clinic visit:   4. Change in headache medication usage: Elavil 150mg at bedtime, suboxone 8mg daily, wellbutrin 300mg qam, Klonopin 1mg PRN, flexeril 10mg PRN, ativan PRN, tizanidine 2mg PRN, ambien 10mg PRN.   Per notes below Pt is not following pain clinic regularly and will need to be filled by PCP  Writer calling to verify this medication is not already filled  Attempt # 1  Called # [phone]     Left a non detailed VM to call back at [phone] and ask for any available Triage Nurse.    Koffi Jones RN   [facility] Triage

## 2020-05-15 DIAGNOSIS — M54.2 NECK PAIN: ICD-10-CM

## 2020-05-15 DIAGNOSIS — G47.00 INSOMNIA, UNSPECIFIED TYPE: ICD-10-CM

## 2020-05-15 RX ORDER — TIZANIDINE 2 MG/1
2-4 TABLET ORAL 3 TIMES DAILY PRN
Qty: 90 TABLET | Refills: 0 | Status: SHIPPED | OUTPATIENT
Start: 2020-05-15 | End: 2020-06-05

## 2020-05-15 NOTE — TELEPHONE ENCOUNTER
Pt was seen at Pain, but Pain will not fill due to not being followed regularly. See notes below.    Koffi Jones RN   [facility]

## 2020-05-18 NOTE — TELEPHONE ENCOUNTER
Left non-detailed message for patient to call back.  Please schedule follow up when patient calls back.  (see previous notes for details)    Thanks Bernarda

## 2020-05-19 NOTE — TELEPHONE ENCOUNTER
Requested Prescriptions   Pending Prescriptions Disp Refills     cyclobenzaprine (FLEXERIL) 10 MG tablet [Pharmacy Med Name: CYCLOBENZAPRINE 10 MG TABLET] 90 tablet 0     Sig: TAKE 1 TABLET (10 MG) BY MOUTH 3 TIMES DAILY AS NEEDED FOR OTHER (HICCUPS)       There is no refill protocol information for this order        zolpidem (AMBIEN) 10 MG tablet [Pharmacy Med Name: ZOLPIDEM TARTRATE 10 MG TABLET] 30 tablet 0     Sig: TAKE 1 TABLET (10 MG) BY MOUTH NIGHTLY AS NEEDED FOR SLEEP       There is no refill protocol information for this order            Controlled Substance Refill Request for ambien  Problem List Complete:  Yes   checked in past 3 months?  Yes 05/19/20      THE MOST RECENT OFFICE VISIT MUST BE WITHIN THE PAST 3 MONTHS. AT LEAST ONE FACE TO FACE VISIT MUST OCCUR EVERY 6 MONTHS. ADDITIONAL VISITS CAN BE VIRTUAL.  (THIS STATEMENT SHOULD BE DELETED.)     [place] [facility] [place]: [place]     Future Office visit:       Next 5 appointments (look out 90 days)    May 06, 2020 11:20 AM CDT  Telephone Visit with Cleopatra Carreon MD  [facility] ([facility]) [address] [place] [address]  [address]  [facility] [address]  [phone]             Controlled substance agreement:   Encounter-Level CSA - 11/16/2017:    Controlled Substance Agreement - Scan on 12/1/2017 10:43 AM: CONTROLLED SUBSTANCE AGREEMENT      Encounter-Level CSA - 08/07/2015:    Controlled Substance Agreement - Scan on 8/19/2015 11:27 AM: Controlled Substance Agreement 08/07/15      Patient-Level CSA:    There are no patient-level csa.           Last Urine Drug Screen: No results found for: CDAUT, No results found for: COMDAT,           Cannabinoids (87-bih-7-carboxy-9-THC)   Date Value Ref Range Status   01/22/2020 Not Detected NDET^Not Detected ng/mL Final       Comment:       Cutoff for a negative cannabinoid is 50 ng/mL or less.              Phencyclidine (Phencyclidine)    Date Value Ref Range Status   01/22/2020 Not Detected NDET^Not Detected ng/mL Final       Comment:       Cutoff for a negative PCP is 25 ng/mL or less.              Cocaine (Benzoylecgonine)   Date Value Ref Range Status   01/22/2020 Not Detected NDET^Not Detected ng/mL Final       Comment:       Cutoff for a negative cocaine is 150 ng/ml or less.      Methamphetamine (d-Methamphetamine)   Date Value Ref Range Status   01/22/2020 Not Detected NDET^Not Detected ng/mL Final       Comment:       Cutoff for a negative methamphetamine is 500 ng/ml or less.              Opiates (Morphine)   Date Value Ref Range Status   01/22/2020 Not Detected NDET^Not Detected ng/mL Final       Comment:       Cutoff for a negative opiate is 100 ng/ml or less.              Amphetamine (d-Amphetamine)   Date Value Ref Range Status   01/22/2020 Not Detected NDET^Not Detected ng/mL Final       Comment:       Cutoff for a negative amphetamine is 500 ng/mL or less.              Benzodiazepines (Nordiazepam)   Date Value Ref Range Status   01/22/2020 Detected, Abnormal Result (A) NDET^Not Detected ng/mL Final       Comment:       Cutoff for a positive benzodiazepines is greater than 150 ng/ml.  This is an unconfirmed screening result to be used for medical purposes only.   Order [id number] for confirmation or individual confirmation tests to [facility].                 Tricyclic Antidepressants (Desipramine)   Date Value Ref Range Status   01/22/2020 Detected, Abnormal Result (A) NDET^Not Detected ng/mL Final       Comment:       Cutoff for a positive tricyclic antidepressant is greater than 300 ng/ml.  This is an unconfirmed screening result to be used for medical purposes only.   Order [id number] for confirmation or individual confirmation tests to [facility].                 Methadone (Methadone)   Date Value Ref Range Status   01/22/2020 Not Detected NDET^Not Detected ng/mL Final       Comment:       Cutoff for a negative methadone is 200 ng/ml or  less.              Barbiturates (Butalbital)   Date Value Ref Range Status   01/22/2020 Not Detected NDET^Not Detected ng/mL Final       Comment:       Cutoff for a negative barbituate is 200 ng/ml or less.      Oxycodone (Oxycodone)   Date Value Ref Range Status   01/22/2020 Not Detected NDET^Not Detected ng/mL Final       Comment:       Cutoff for a negative Oxycodone is 100 ng/mL or less.              Propoxyphene (Norpropoxyphene)   Date Value Ref Range Status   01/22/2020 Not Detected NDET^Not Detected ng/mL Final       Comment:       Cutoff for a negative propoxyphene is 300 ng/ml or less              Buprenorphine (Buprenorphine)   Date Value Ref Range Status   01/22/2020 Detected, Abnormal Result (A) NDET^Not Detected ng/mL Final       Comment:       Cutoff for a positive buprenorphine is greater than 10 ng/ml.  This is an unconfirmed screening result to be used for medical purposes only.   Order [id number] for confirmation or individual confirmation tests to [facility]

## 2020-05-20 RX ORDER — CYCLOBENZAPRINE HCL 10 MG
10 TABLET ORAL 3 TIMES DAILY PRN
Qty: 90 TABLET | Refills: 0 | Status: SHIPPED | OUTPATIENT
Start: 2020-05-20 | End: 2020-07-03

## 2020-05-20 RX ORDER — ZOLPIDEM TARTRATE 10 MG/1
10 TABLET ORAL
Qty: 30 TABLET | Refills: 0 | Status: SHIPPED | OUTPATIENT
Start: 2020-05-20 | End: 2020-06-05

## 2020-05-20 NOTE — TELEPHONE ENCOUNTER
Refill(s) for 1 month only.  Please call the patient and schedule a followup appointment within the next month. - see refill on 5/10/2020 too

## 2020-05-22 ENCOUNTER — VIRTUAL VISIT (OUTPATIENT)
Dept: ADDICTION MEDICINE | Facility: CLINIC | Age: 50
End: 2020-05-22
Payer: MEDICARE

## 2020-05-22 DIAGNOSIS — F11.20 UNCOMPLICATED OPIOID DEPENDENCE (H): Primary | ICD-10-CM

## 2020-05-22 DIAGNOSIS — M54.2 NECK PAIN: ICD-10-CM

## 2020-05-22 DIAGNOSIS — M54.42 LEFT-SIDED LOW BACK PAIN WITH LEFT-SIDED SCIATICA, UNSPECIFIED CHRONICITY: ICD-10-CM

## 2020-05-22 DIAGNOSIS — F33.1 MAJOR DEPRESSIVE DISORDER, RECURRENT EPISODE, MODERATE (H): ICD-10-CM

## 2020-05-22 DIAGNOSIS — F41.1 GENERALIZED ANXIETY DISORDER: ICD-10-CM

## 2020-05-22 DIAGNOSIS — M54.9 UPPER BACK PAIN ON RIGHT SIDE: ICD-10-CM

## 2020-05-22 DIAGNOSIS — F90.9 ATTENTION DEFICIT HYPERACTIVITY DISORDER (ADHD), UNSPECIFIED ADHD TYPE: ICD-10-CM

## 2020-05-22 DIAGNOSIS — F41.0 PANIC DISORDER WITHOUT AGORAPHOBIA: ICD-10-CM

## 2020-05-22 PROCEDURE — 99442: CPT | Performed by: PEDIATRICS

## 2020-05-22 RX ORDER — BUPRENORPHINE AND NALOXONE 8; 2 MG/1; MG/1
FILM, SOLUBLE BUCCAL; SUBLINGUAL
Qty: 60 FILM | Refills: 1 | Status: SHIPPED | OUTPATIENT
Start: 2020-05-22 | End: 2020-06-19
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
## 2020-06-02 DIAGNOSIS — F41.0 PANIC DISORDER WITHOUT AGORAPHOBIA: ICD-10-CM

## 2020-06-02 DIAGNOSIS — F41.1 GENERALIZED ANXIETY DISORDER: ICD-10-CM

## 2020-06-02 NOTE — TELEPHONE ENCOUNTER
LORazepam (ATIVAN) 1 MG tablet             Last Written Prescription Date:  4.7.20  Last Fill Quantity: 60 tablet,  # refills: 1   Last office visit: 8/29/2019 with prescribing provider:  Luis Armando Segovia MD     Future Office Visit:        Routing refill request to provider for review/approval because:  Drug not on the [facility], [facility] or [facility] refill protocol or controlled substance

## 2020-06-03 NOTE — TELEPHONE ENCOUNTER
Problem List Complete:    Yes    Controlled substance agreement:   Encounter-Level CSA - 11/16/2017:    Controlled Substance Agreement - Scan on 12/1/2017 10:43 AM: CONTROLLED SUBSTANCE AGREEMENT     Encounter-Level CSA - 08/07/2015:    Controlled Substance Agreement - Scan on 8/19/2015 11:27 AM: Controlled Substance Agreement 08/07/15     Patient-Level CSA:    There are no patient-level csa.       Last Urine Drug Screen: No results found for: CDAUT, No results found for: COMDAT,   Cannabinoids (04-lwo-6-carboxy-9-THC)   Date Value Ref Range Status   01/22/2020 Not Detected NDET^Not Detected ng/mL Final     Comment:     Cutoff for a negative cannabinoid is 50 ng/mL or less.     Phencyclidine (Phencyclidine)   Date Value Ref Range Status   01/22/2020 Not Detected NDET^Not Detected ng/mL Final     Comment:     Cutoff for a negative PCP is 25 ng/mL or less.     Cocaine (Benzoylecgonine)   Date Value Ref Range Status   01/22/2020 Not Detected NDET^Not Detected ng/mL Final     Comment:     Cutoff for a negative cocaine is 150 ng/ml or less.     Methamphetamine (d-Methamphetamine)   Date Value Ref Range Status   01/22/2020 Not Detected NDET^Not Detected ng/mL Final     Comment:     Cutoff for a negative methamphetamine is 500 ng/ml or less.     Opiates (Morphine)   Date Value Ref Range Status   01/22/2020 Not Detected NDET^Not Detected ng/mL Final     Comment:     Cutoff for a negative opiate is 100 ng/ml or less.     Amphetamine (d-Amphetamine)   Date Value Ref Range Status   01/22/2020 Not Detected NDET^Not Detected ng/mL Final     Comment:     Cutoff for a negative amphetamine is 500 ng/mL or less.     Benzodiazepines (Nordiazepam)   Date Value Ref Range Status   01/22/2020 Detected, Abnormal Result (A) NDET^Not Detected ng/mL Final     Comment:     Cutoff for a positive benzodiazepines is greater than 150 ng/ml.  This is an unconfirmed screening result to be used for medical purposes only.   Order [id number] for  confirmation or individual confirmation tests to [facility].       Tricyclic Antidepressants (Desipramine)   Date Value Ref Range Status   01/22/2020 Detected, Abnormal Result (A) NDET^Not Detected ng/mL Final     Comment:     Cutoff for a positive tricyclic antidepressant is greater than 300 ng/ml.  This is an unconfirmed screening result to be used for medical purposes only.   Order [id number] for confirmation or individual confirmation tests to [facility].       Methadone (Methadone)   Date Value Ref Range Status   01/22/2020 Not Detected NDET^Not Detected ng/mL Final     Comment:     Cutoff for a negative methadone is 200 ng/ml or less.     Barbiturates (Butalbital)   Date Value Ref Range Status   01/22/2020 Not Detected NDET^Not Detected ng/mL Final     Comment:     Cutoff for a negative barbituate is 200 ng/ml or less.     Oxycodone (Oxycodone)   Date Value Ref Range Status   01/22/2020 Not Detected NDET^Not Detected ng/mL Final     Comment:     Cutoff for a negative Oxycodone is 100 ng/mL or less.     Propoxyphene (Norpropoxyphene)   Date Value Ref Range Status   01/22/2020 Not Detected NDET^Not Detected ng/mL Final     Comment:     Cutoff for a negative propoxyphene is 300 ng/ml or less     Buprenorphine (Buprenorphine)   Date Value Ref Range Status   01/22/2020 Detected, Abnormal Result (A) NDET^Not Detected ng/mL Final     Comment:     Cutoff for a positive buprenorphine is greater than 10 ng/ml.  This is an unconfirmed screening result to be used for medical purposes only.   Order [id number] for confirmation or individual confirmation tests to [facility].       [email or web address]      Routing refill request to provider for review/approval because:  Drug not on the [facility] refill protocol         Asmita Marinelli RN  [facility]

## 2020-06-04 DIAGNOSIS — F41.0 PANIC DISORDER WITHOUT AGORAPHOBIA: ICD-10-CM

## 2020-06-04 DIAGNOSIS — F41.1 GENERALIZED ANXIETY DISORDER: ICD-10-CM

## 2020-06-04 RX ORDER — LORAZEPAM 1 MG/1
TABLET ORAL
Qty: 60 TABLET | Refills: 1 | Status: CANCELLED | OUTPATIENT
Start: 2020-06-04

## 2020-06-04 NOTE — TELEPHONE ENCOUNTER
Duplicate    Asmita Marinelli RN  [facility]   presents to ED after being called back for retained products of conception via US. c/o abdominal cramping at this time

## 2020-06-04 NOTE — TELEPHONE ENCOUNTER
Reason for Call:  Medication or medication refill:    Do you use a [facility]?  Name of the pharmacy and phone number for the current request:  [facility]    Name of the medication requested: Elizabeth Versicolor & LOrazepam (ATIVAN) 1 MG tablet color     Other request: Please fill. I couldn't find the Elizabeth Versicolor on his med list    Can we leave a detailed message on this number? YES    Phone number patient can be reached at: Cell number on file:    Telephone Information:   Mobile [phone]       Best Time: any      Call taken on 6/4/2020 at 1:49 PM by Swati Richards

## 2020-06-05 ENCOUNTER — VIRTUAL VISIT (OUTPATIENT)
Dept: FAMILY MEDICINE | Facility: CLINIC | Age: 50
End: 2020-06-05
Payer: MEDICARE

## 2020-06-05 DIAGNOSIS — G47.00 INSOMNIA, UNSPECIFIED TYPE: ICD-10-CM

## 2020-06-05 DIAGNOSIS — J45.20 INTERMITTENT ASTHMA, UNCOMPLICATED: ICD-10-CM

## 2020-06-05 DIAGNOSIS — L65.9 ALOPECIA: ICD-10-CM

## 2020-06-05 DIAGNOSIS — F41.1 GENERALIZED ANXIETY DISORDER: ICD-10-CM

## 2020-06-05 DIAGNOSIS — I10 ESSENTIAL HYPERTENSION WITH GOAL BLOOD PRESSURE LESS THAN 140/90: Primary | ICD-10-CM

## 2020-06-05 DIAGNOSIS — E29.1 HYPOGONADISM MALE: ICD-10-CM

## 2020-06-05 DIAGNOSIS — F41.0 PANIC DISORDER WITHOUT AGORAPHOBIA: ICD-10-CM

## 2020-06-05 DIAGNOSIS — F11.20 UNCOMPLICATED OPIOID DEPENDENCE (H): ICD-10-CM

## 2020-06-05 DIAGNOSIS — B36.0 TINEA VERSICOLOR: ICD-10-CM

## 2020-06-05 DIAGNOSIS — G43.109 MIGRAINE WITH AURA AND WITHOUT STATUS MIGRAINOSUS, NOT INTRACTABLE: ICD-10-CM

## 2020-06-05 DIAGNOSIS — F90.2 ATTENTION DEFICIT HYPERACTIVITY DISORDER (ADHD), COMBINED TYPE: ICD-10-CM

## 2020-06-05 DIAGNOSIS — F33.1 MAJOR DEPRESSIVE DISORDER, RECURRENT EPISODE, MODERATE (H): ICD-10-CM

## 2020-06-05 DIAGNOSIS — Z12.11 SCREENING FOR COLON CANCER: ICD-10-CM

## 2020-06-05 DIAGNOSIS — B07.8 FLAT WART: ICD-10-CM

## 2020-06-05 DIAGNOSIS — M62.838 MUSCLE SPASM: ICD-10-CM

## 2020-06-05 DIAGNOSIS — Z12.5 SCREENING FOR PROSTATE CANCER: ICD-10-CM

## 2020-06-05 DIAGNOSIS — Z13.6 CARDIOVASCULAR SCREENING; LDL GOAL LESS THAN 100: ICD-10-CM

## 2020-06-05 DIAGNOSIS — K21.9 GASTROESOPHAGEAL REFLUX DISEASE WITHOUT ESOPHAGITIS: ICD-10-CM

## 2020-06-05 PROCEDURE — 99443 ZZC PHYSICIAN TELEPHONE EVALUATION 21-30 MIN: CPT | Performed by: FAMILY MEDICINE

## 2020-06-05 RX ORDER — LORAZEPAM 1 MG/1
1 TABLET ORAL 2 TIMES DAILY PRN
Qty: 60 TABLET | Refills: 1 | Status: SHIPPED | OUTPATIENT
Start: 2020-06-05 | End: 2020-07-31

## 2020-06-05 RX ORDER — ZOLPIDEM TARTRATE 10 MG/1
10 TABLET ORAL
Qty: 30 TABLET | Refills: 0 | Status: SHIPPED | OUTPATIENT
Start: 2020-06-05 | End: 2020-07-03

## 2020-06-05 RX ORDER — METOPROLOL TARTRATE 100 MG
TABLET ORAL
Qty: 180 TABLET | Refills: 1 | Status: SHIPPED | OUTPATIENT
Start: 2020-06-05 | End: 2020-08-24

## 2020-06-05 RX ORDER — IMIQUIMOD 12.5 MG/.25G
CREAM TOPICAL
Qty: 36 PACKET | Refills: 6 | Status: SHIPPED | OUTPATIENT
Start: 2020-06-05 | End: 2020-06-09

## 2020-06-05 RX ORDER — AMLODIPINE BESYLATE 10 MG/1
10 TABLET ORAL DAILY
Qty: 90 TABLET | Refills: 1 | Status: SHIPPED | OUTPATIENT
Start: 2020-06-05 | End: 2020-08-24

## 2020-06-05 RX ORDER — LORAZEPAM 1 MG/1
TABLET ORAL
Qty: 60 TABLET | Refills: 1 | OUTPATIENT
Start: 2020-06-05

## 2020-06-05 RX ORDER — TESTOSTERONE CYPIONATE 200 MG/ML
100 INJECTION, SOLUTION INTRAMUSCULAR WEEKLY
Qty: 6 ML | Refills: 0 | Status: SHIPPED | OUTPATIENT
Start: 2020-06-05 | End: 2020-10-14

## 2020-06-05 RX ORDER — ALBUTEROL SULFATE 90 UG/1
2 AEROSOL, METERED RESPIRATORY (INHALATION) EVERY 4 HOURS PRN
Qty: 18 INHALER | Refills: 3 | Status: SHIPPED | OUTPATIENT
Start: 2020-06-05 | End: 2020-09-16 | Stop reason: ALTCHOICE

## 2020-06-05 RX ORDER — FLUCONAZOLE 150 MG/1
300 TABLET ORAL
Qty: 4 TABLET | Refills: 1 | Status: SHIPPED | OUTPATIENT
Start: 2020-06-05 | End: 2020-09-23

## 2020-06-05 RX ORDER — BUPROPION HYDROCHLORIDE 300 MG/1
300 TABLET ORAL EVERY MORNING
Qty: 90 TABLET | Refills: 1 | Status: SHIPPED | OUTPATIENT
Start: 2020-06-05 | End: 2020-08-24

## 2020-06-05 RX ORDER — TIZANIDINE 2 MG/1
2-4 TABLET ORAL 3 TIMES DAILY PRN
Qty: 90 TABLET | Refills: 0 | OUTPATIENT
Start: 2020-06-05

## 2020-06-05 RX ORDER — DEXTROAMPHETAMINE SACCHARATE, AMPHETAMINE ASPARTATE, DEXTROAMPHETAMINE SULFATE AND AMPHETAMINE SULFATE 5; 5; 5; 5 MG/1; MG/1; MG/1; MG/1
20 TABLET ORAL 2 TIMES DAILY
Qty: 90 TABLET | Refills: 0 | Status: SHIPPED | OUTPATIENT
Start: 2020-06-05 | End: 2020-08-24

## 2020-06-05 RX ORDER — TIZANIDINE 2 MG/1
2-4 TABLET ORAL 3 TIMES DAILY PRN
Qty: 90 TABLET | Refills: 0 | Status: SHIPPED | OUTPATIENT
Start: 2020-06-05 | End: 2020-06-17

## 2020-06-05 RX ORDER — FINASTERIDE 5 MG/1
TABLET, FILM COATED ORAL
Qty: 90 TABLET | Refills: 1 | Status: SHIPPED | OUTPATIENT
Start: 2020-06-05 | End: 2020-08-24

## 2020-06-05 RX ORDER — ONDANSETRON 4 MG/1
4 TABLET, ORALLY DISINTEGRATING ORAL EVERY 8 HOURS PRN
Qty: 30 TABLET | Refills: 0 | Status: SHIPPED | OUTPATIENT
Start: 2020-06-05 | End: 2021-03-24

## 2020-06-05 RX ORDER — LOSARTAN POTASSIUM 100 MG/1
100 TABLET ORAL DAILY
Qty: 90 TABLET | Refills: 1 | Status: SHIPPED | OUTPATIENT
Start: 2020-06-19 | End: 2020-08-24

## 2020-06-05 NOTE — PROGRESS NOTES
"Subjective   Jeremi Damon is a 49 year old male who is being evaluated via a billable telephone visit.      The patient has been notified of following:     \"This telephone visit will be conducted via a call between you and your physician/provider. We have found that certain health care needs can be provided without the need for a physical exam.  This service lets us provide the care you need with a short phone conversation.  If a prescription is necessary we can send it directly to your pharmacy.  If lab work is needed we can place an order for that and you can then stop by our lab to have the test done at a later time.    Telephone visits are billed at different rates depending on your insurance coverage. During this emergency period, for some insurers they may be billed the same as an in-person visit.  Please reach out to your insurance provider with any questions.    If during the course of the call the physician/provider feels a telephone visit is not appropriate, you will not be charged for this service.\"     Patient has given verbal consent for Telephone visit?  Yes    How would you like to obtain your AVS? Mail a copy    Jeremi Damon is a 49 year old male who presents today for the following health issues:    Chief Complaint  Patient presents with:  Anxiety  Derm Problem: skin issue     Ambien-  Needs refill things- are going well  BP - 142/86 - Metoprolol refill - no problems    Anxiety Follow-Up    How are you doing with your anxiety since your last visit? Worsened but managable    Are you having other symptoms that might be associated with anxiety? Yes:  life stress    Have you had a significant life event? Financial concerns - waiting for his stimulus check, being under quarntine     Are you feeling depressed? Stable with the Wellbutrin    Do you have any concerns with your use of alcohol or other drugs? No      He is interested in seeing a psychiatrist agian.  He tried gabapentin but felt suicidal on " it.     Social History     Tobacco Use     Smoking status: Never Smoker     Smokeless tobacco: Never Used   Substance Use Topics     Alcohol use: No     Comment: rarely      Drug use: No     NANCY-7 SCORE 6/28/2019 8/8/2019 8/29/2019   Total Score - - -   Total Score - 14 (moderate anxiety) -   Total Score 19 14 18     PHQ 8/13/2019 8/29/2019 3/20/2020   PHQ-9 Total Score 19 20 14   Q9: Thoughts of better off dead/self-harm past 2 weeks More than half the days More than half the days Not at all   F/U: Thoughts of suicide or self-harm - - -   F/U: Safety concerns - - -     Last PHQ-9 3/20/2020   1.  Little interest or pleasure in doing things 3   2.  Feeling down, depressed, or hopeless 3   3.  Trouble falling or staying asleep, or sleeping too much 3   4.  Feeling tired or having little energy 1   5.  Poor appetite or overeating 0   6.  Feeling bad about yourself 1   7.  Trouble concentrating 3   8.  Moving slowly or restless 0   Q9: Thoughts of better off dead/self-harm past 2 weeks 0   PHQ-9 Total Score 14   Difficulty at work, home, or with people Somewhat difficult   In the past two weeks have you had thoughts of suicide or self harm? -   Do you have concerns about your personal safety or the safety of others? -     NANCY-7  8/29/2019   1. Feeling nervous, anxious, or on edge 3   2. Not being able to stop or control worrying 3   3. Worrying too much about different things 3   4. Trouble relaxing 3   5. Being so restless that it is hard to sit still 3   6. Becoming easily annoyed or irritable 2   7. Feeling afraid, as if something awful might happen 1   NANCY-7 Total Score 18   If you checked any problems, how difficult have they made it for you to do your work, take care of things at home, or get along with other people? Extremely difficult         He is having a tinea versicolor rash again on his chest.     ADHD symptoms  - he took medication from ~ 2011 to 2018 -- he is having a hard time studying for a [employer]  courses.    Reviewed and updated as needed this visit by Provider  Tobacco  Allergies  Meds  Problems  Med Hx  Surg Hx  Fam Hx         ROS: Constitutional, HEENT, cardiovascular, pulmonary, GI, , musculoskeletal, neuro, skin, endocrine and psych systems are negative, except as otherwise noted.       Objective   Reported vitals:  There were no vitals taken for this visit.   Gen: healthy, alert, and no distress  Psych: Alert and oriented times 3; coherent speech, normal   rate and volume, able to articulate logical thoughts, able   to abstract reason, no tangential thoughts, no hallucinations   or delusions.  His affect is normal.    Diagnostic Test Results:  Labs reviewed in Epic        Assessment/Plan:  Jeremi was seen today for anxiety and derm problem.    Diagnoses and all orders for this visit:    Essential hypertension with goal blood pressure less than 140/90 -   not well controlled per last in person visit will continue meds and recheck when he gets his labs drawn  -     metoprolol tartrate (LOPRESSOR) 100 MG tablet; TAKE 1 TABLET BY MOUTH TWICE A DAY  -     losartan (COZAAR) 100 MG tablet; Take 1 tablet (100 mg) by mouth daily  -     amLODIPine (NORVASC) 10 MG tablet; Take 1 tablet (10 mg) by mouth daily  -     Comprehensive metabolic panel; Future  -     Albumin Random Urine Quantitative with Creat Ratio; Future    Migraine with aura and without status migrainosus, not intractable intermittent symptoms occasionally     Attention deficit hyperactivity disorder (ADHD), combined type   -worsening focus lately and requests restarting this     amphetamine-dextroamphetamine (ADDERALL) 20 MG tablet; Take 1 tablet (20 mg) by mouth 2 times daily    Uncomplicated opioid dependence (H) -occasional nausea due to meds and this is helpful  -     ondansetron (ZOFRAN-ODT) 4 MG ODT tab; Take 1 tablet (4 mg) by mouth every 8 hours as needed for nausea    Muscle spasm -especially of the neck or back heat and  stretching may help as well  -     tiZANidine (ZANAFLEX) 2 MG tablet; Take 1-2 tablets (2-4 mg) by mouth 3 times daily as needed for muscle spasms    Panic disorder without agoraphobia ongoing and medication helps  -     LORazepam (ATIVAN) 1 MG tablet; Take 1 tablet (1 mg) by mouth 2 times daily as needed for anxiety    Generalized anxiety disorder see above  -     LORazepam (ATIVAN) 1 MG tablet; Take 1 tablet (1 mg) by mouth 2 times daily as needed for anxiety    Insomnia, unspecified type -very hard time sleeping if does not take this medicine will continue reviewed sedating properties and should not take along with muscle relaxers  -     zolpidem (AMBIEN) 10 MG tablet; Take 1 tablet (10 mg) by mouth nightly as needed for sleep    Intermittent asthma, uncomplicated mild symptoms would like refill of:  -     VENTOLIN  (90 Base) MCG/ACT inhaler; Inhale 2 puffs into the lungs every 4 hours as needed for shortness of breath / dyspnea or wheezing Profile Rx: patient will contact pharmacy when needed    Gastroesophageal reflux disease without esophagitis - controlled - continue medication.   -     omeprazole (PRILOSEC) 20 MG DR capsule; Take 1 capsule (20 mg) by mouth daily    Hypogonadism male - controlled - continue medication.  Due for labs  -     testosterone cypionate (DEPOTESTOSTERONE) 200 MG/ML injection; Inject 0.5 mLs (100 mg) into the muscle once a week  -     **Testosterone Free and Total FUTURE anytime; Future  -     Prostate spec antigen screen; Future    Screening for prostate cancer  -     Prostate spec antigen screen; Future    Tinea versicolor recurrent history and will send in another round of meds  -     fluconazole (DIFLUCAN) 150 MG tablet; Take 2 tablets (300 mg) by mouth every 14 days - for two doses    CARDIOVASCULAR SCREENING; LDL GOAL LESS THAN 100  -     Lipid panel reflex to direct LDL Fasting; Future    Major Depressive Disorder, Recurrent Episode, Mode slight better lately will  continue med  -     buPROPion (WELLBUTRIN XL) 300 MG 24 hr tablet; Take 1 tablet (300 mg) by mouth every morning    Alopecia - stable - continue medication(s)  -     finasteride (PROSCAR) 5 MG tablet; 1/2 tab daily    Screening for colon cancer  -     Fecal colorectal cancer screen (FIT); Future        Return in about 6 months (around 12/5/2020) for Medication Recheck.    Phone call duration:  21 minutes        Nate Segovia MD     [address] [address]  [email or web address]   Office: [phone]  Pager: [phone]

## 2020-06-05 NOTE — TELEPHONE ENCOUNTER
Routing refill request to provider for review/approval because:  Drug not on the [facility] refill protocol         Asmita Marinelli RN  [facility]

## 2020-06-09 ENCOUNTER — TELEPHONE (OUTPATIENT)
Dept: FAMILY MEDICINE | Facility: CLINIC | Age: 50
End: 2020-06-09

## 2020-06-09 DIAGNOSIS — B07.8 FLAT WART: ICD-10-CM

## 2020-06-09 NOTE — TELEPHONE ENCOUNTER
Called #   Telephone Information:   Mobile [phone]       Uses the cream 2-3 times a day on hands and feet     Pt stated that MD RAIMUNDO has been filling this for him for a while     Routing to PCP for review     Kari Mcleod RN, BSN  [facility]

## 2020-06-09 NOTE — TELEPHONE ENCOUNTER
Received fax from [facility] pharmacy [place] [phone].  Imiquimod 5% cream - frequency?    Please advise.  Emerald Zuniga

## 2020-06-10 RX ORDER — IMIQUIMOD 12.5 MG/.25G
CREAM TOPICAL AT BEDTIME
Qty: 36 PACKET | Refills: 11 | Status: SHIPPED | OUTPATIENT
Start: 2020-06-10 | End: 2021-02-18

## 2020-06-13 DIAGNOSIS — M62.838 MUSCLE SPASM: ICD-10-CM

## 2020-06-15 NOTE — TELEPHONE ENCOUNTER
Requested Prescriptions   Pending Prescriptions Disp Refills     tiZANidine (ZANAFLEX) 2 MG tablet [Pharmacy Med Name: TIZANIDINE HCL 2 MG TABLET] 90 tablet 0     Sig: TAKE 1 TO 2 TABLETS (2-4 MG) BY MOUTH 3 TIMES DAILY AS NEEDED FOR MUSCLE SPASMS       There is no refill protocol information for this order

## 2020-06-17 RX ORDER — TIZANIDINE 2 MG/1
TABLET ORAL
Qty: 90 TABLET | Refills: 0 | Status: SHIPPED | OUTPATIENT
Start: 2020-07-05 | End: 2020-06-19

## 2020-06-17 NOTE — TELEPHONE ENCOUNTER
this may be a duplicate request - I just sent this in 12 days ago.  Please check with him and I will place a postdated prescription too for next month.

## 2020-06-17 NOTE — TELEPHONE ENCOUNTER
Attempt #1  Called [facility] - [place] @ [phone] - was on hold for >5 min, will attempt again later.     Asmita Marinelli RN  [facility]

## 2020-06-19 ENCOUNTER — VIRTUAL VISIT (OUTPATIENT)
Dept: ADDICTION MEDICINE | Facility: CLINIC | Age: 50
End: 2020-06-19
Payer: MEDICARE

## 2020-06-19 DIAGNOSIS — F41.1 GENERALIZED ANXIETY DISORDER: ICD-10-CM

## 2020-06-19 DIAGNOSIS — M54.42 LEFT-SIDED LOW BACK PAIN WITH LEFT-SIDED SCIATICA, UNSPECIFIED CHRONICITY: ICD-10-CM

## 2020-06-19 DIAGNOSIS — M54.2 NECK PAIN: Primary | ICD-10-CM

## 2020-06-19 DIAGNOSIS — F33.1 MAJOR DEPRESSIVE DISORDER, RECURRENT EPISODE, MODERATE (H): ICD-10-CM

## 2020-06-19 DIAGNOSIS — F41.0 PANIC DISORDER WITHOUT AGORAPHOBIA: ICD-10-CM

## 2020-06-19 DIAGNOSIS — F90.9 ATTENTION DEFICIT HYPERACTIVITY DISORDER (ADHD), UNSPECIFIED ADHD TYPE: ICD-10-CM

## 2020-06-19 DIAGNOSIS — R29.898 WEAKNESS OF LEFT FOOT: ICD-10-CM

## 2020-06-19 DIAGNOSIS — F11.20 UNCOMPLICATED OPIOID DEPENDENCE (H): ICD-10-CM

## 2020-06-19 PROCEDURE — 99441 ZZC PHYSICIAN TELEPHONE EVALUATION 5-10 MIN: CPT | Performed by: PEDIATRICS

## 2020-06-19 RX ORDER — BUPRENORPHINE AND NALOXONE 8; 2 MG/1; MG/1
FILM, SOLUBLE BUCCAL; SUBLINGUAL
Qty: 60 FILM | Refills: 1 | Status: SHIPPED | OUTPATIENT
Start: 2020-06-19 | End: 2020-08-14

## 2020-06-19 RX ORDER — TIZANIDINE 2 MG/1
2-4 TABLET ORAL 3 TIMES DAILY PRN
Qty: 90 TABLET | Refills: 0 | Status: SHIPPED | OUTPATIENT
Start: 2020-07-05 | End: 2020-07-17

## 2020-06-19 NOTE — TELEPHONE ENCOUNTER
Called [facility] - [place] @ [phone] -     Stated they have the prescription that was written on 6/17 ready for patient to  (stated it does not note start date) - RN advised that was for start date of 7/5.   Pharmacist noted the Rx does not state that. Would need new Rx with that start dated  Last Rx picked up was 6/6 for #90     Routing to PCP for further review/recommendations/orders.    Asmita Marinelli RN  [facility]
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
## 2020-06-20 DIAGNOSIS — G47.00 INSOMNIA, UNSPECIFIED TYPE: ICD-10-CM

## 2020-06-23 RX ORDER — ZOLPIDEM TARTRATE 10 MG/1
10 TABLET ORAL
Qty: 30 TABLET | Refills: 0 | OUTPATIENT
Start: 2020-06-23

## 2020-07-03 DIAGNOSIS — M54.2 NECK PAIN: ICD-10-CM

## 2020-07-03 DIAGNOSIS — G47.00 INSOMNIA, UNSPECIFIED TYPE: ICD-10-CM

## 2020-07-03 RX ORDER — ZOLPIDEM TARTRATE 10 MG/1
10 TABLET ORAL
Qty: 30 TABLET | Refills: 0 | Status: SHIPPED | OUTPATIENT
Start: 2020-07-03 | End: 2020-07-31

## 2020-07-03 RX ORDER — CYCLOBENZAPRINE HCL 10 MG
10 TABLET ORAL 3 TIMES DAILY PRN
Qty: 90 TABLET | Refills: 0 | Status: SHIPPED | OUTPATIENT
Start: 2020-07-03 | End: 2020-08-03

## 2020-07-03 NOTE — TELEPHONE ENCOUNTER
Routing refill request to provider for review/approval because:  Drug not on the [facility] refill protocol     REJI GonzalezN, RN  [employer] [facility]

## 2020-07-17 DIAGNOSIS — M62.838 MUSCLE SPASM: ICD-10-CM

## 2020-07-17 RX ORDER — TIZANIDINE 2 MG/1
TABLET ORAL
Qty: 90 TABLET | Refills: 0 | Status: SHIPPED | OUTPATIENT
Start: 2020-07-17 | End: 2020-08-24

## 2020-07-17 NOTE — TELEPHONE ENCOUNTER
Outpatient Medication Detail      Disp  Refills  Start  End  COLLETTE    tiZANidine (ZANAFLEX) 2 MG tablet  90 tablet  0  7/5/2020   No    Sig - Route: Take 1-2 tablets (2-4 mg) by mouth 3 times daily as needed for muscle spasms - start date 7/5/20 - Oral      Problem List Complete:    Yes    Last Office Visit with [facility] primary care provider: 6/5/20 VV    Future Office visit:   Next 5 appointments (look out 90 days)    Jul 30, 2020  2:00 PM CDT  Office Visit with Luis Armando Segovia MD  [facility] [address] [place] [id number]  [phone]        Controlled substance agreement:   Encounter-Level CSA - 11/16/2017:    Controlled Substance Agreement - Scan on 12/1/2017 10:43 AM: CONTROLLED SUBSTANCE AGREEMENT     Encounter-Level CSA - 08/07/2015:    Controlled Substance Agreement - Scan on 8/19/2015 11:27 AM: Controlled Substance Agreement 08/07/15     Patient-Level CSA:    There are no patient-level csa.       Last Urine Drug Screen: No results found for: CDAUT, No results found for: COMDAT,   Cannabinoids ([id number]-9-[id number]-9-THC)   Date Value Ref Range Status   01/22/2020 Not Detected NDET^Not Detected ng/mL Final     Comment:     Cutoff for a negative cannabinoid is 50 ng/mL or less.     Phencyclidine (Phencyclidine)   Date Value Ref Range Status   01/22/2020 Not Detected NDET^Not Detected ng/mL Final     Comment:     Cutoff for a negative PCP is 25 ng/mL or less.     Cocaine (Benzoylecgonine)   Date Value Ref Range Status   01/22/2020 Not Detected NDET^Not Detected ng/mL Final     Comment:     Cutoff for a negative cocaine is 150 ng/ml or less.     Methamphetamine (d-Methamphetamine)   Date Value Ref Range Status   01/22/2020 Not Detected NDET^Not Detected ng/mL Final     Comment:     Cutoff for a negative methamphetamine is 500 ng/ml or less.     Opiates (Morphine)   Date Value Ref Range Status   01/22/2020 Not Detected NDET^Not Detected ng/mL Final      Comment:     Cutoff for a negative opiate is 100 ng/ml or less.     Amphetamine (d-Amphetamine)   Date Value Ref Range Status   01/22/2020 Not Detected NDET^Not Detected ng/mL Final     Comment:     Cutoff for a negative amphetamine is 500 ng/mL or less.     Benzodiazepines (Nordiazepam)   Date Value Ref Range Status   01/22/2020 Detected, Abnormal Result (A) NDET^Not Detected ng/mL Final     Comment:     Cutoff for a positive benzodiazepines is greater than 150 ng/ml.  This is an unconfirmed screening result to be used for medical purposes only.   Order [id number] for confirmation or individual confirmation tests to [employer].       Tricyclic Antidepressants (Desipramine)   Date Value Ref Range Status   01/22/2020 Detected, Abnormal Result (A) NDET^Not Detected ng/mL Final     Comment:     Cutoff for a positive tricyclic antidepressant is greater than 300 ng/ml.  This is an unconfirmed screening result to be used for medical purposes only.   Order [id number] for confirmation or individual confirmation tests to [employer].       Methadone (Methadone)   Date Value Ref Range Status   01/22/2020 Not Detected NDET^Not Detected ng/mL Final     Comment:     Cutoff for a negative methadone is 200 ng/ml or less.     Barbiturates (Butalbital)   Date Value Ref Range Status   01/22/2020 Not Detected NDET^Not Detected ng/mL Final     Comment:     Cutoff for a negative barbituate is 200 ng/ml or less.     Oxycodone (Oxycodone)   Date Value Ref Range Status   01/22/2020 Not Detected NDET^Not Detected ng/mL Final     Comment:     Cutoff for a negative Oxycodone is 100 ng/mL or less.     Propoxyphene (Norpropoxyphene)   Date Value Ref Range Status   01/22/2020 Not Detected NDET^Not Detected ng/mL Final     Comment:     Cutoff for a negative propoxyphene is 300 ng/ml or less     Buprenorphine (Buprenorphine)   Date Value Ref Range Status   01/22/2020 Detected, Abnormal Result (A) NDET^Not Detected ng/mL Final     Comment:     Cutoff for a  positive buprenorphine is greater than 10 ng/ml.  This is an unconfirmed screening result to be used for medical purposes only.   Order [id number] for confirmation or individual confirmation tests to [employer].         [email or web address]      Routing refill request to provider for review/approval because:  Drug not on the [facility] refill protocol         Asmita Marinelli RN  [facility]

## 2020-07-18 DIAGNOSIS — I10 HYPERTENSION GOAL BP (BLOOD PRESSURE) < 140/90: ICD-10-CM

## 2020-07-20 RX ORDER — CLONIDINE HYDROCHLORIDE 0.1 MG/1
0.1 TABLET ORAL 2 TIMES DAILY
Qty: 180 TABLET | Refills: 1 | Status: SHIPPED | OUTPATIENT
Start: 2020-07-20 | End: 2020-08-24

## 2020-07-31 DIAGNOSIS — F41.1 GENERALIZED ANXIETY DISORDER: ICD-10-CM

## 2020-07-31 DIAGNOSIS — G47.00 INSOMNIA, UNSPECIFIED TYPE: ICD-10-CM

## 2020-07-31 DIAGNOSIS — M54.2 NECK PAIN: ICD-10-CM

## 2020-07-31 DIAGNOSIS — F41.0 PANIC DISORDER WITHOUT AGORAPHOBIA: ICD-10-CM

## 2020-07-31 RX ORDER — LORAZEPAM 1 MG/1
1 TABLET ORAL 2 TIMES DAILY PRN
Qty: 60 TABLET | Refills: 1 | Status: SHIPPED | OUTPATIENT
Start: 2020-08-02 | End: 2020-08-24

## 2020-07-31 RX ORDER — ZOLPIDEM TARTRATE 10 MG/1
10 TABLET ORAL
Qty: 30 TABLET | Refills: 0 | Status: SHIPPED | OUTPATIENT
Start: 2020-08-03 | End: 2020-08-24

## 2020-07-31 NOTE — TELEPHONE ENCOUNTER
Last Written Prescription Date:  6/5/2020  Last Fill Quantity: 60,  # refills: 1   Last office visit: 8/29/2019 with prescribing provider:     Future Office Visit:   Next 5 appointments (look out 90 days)    Aug 24, 2020 10:00 AM CDT  PHYSICAL with Luis Armando Segovia MD  [facility] ([facility]) [address] [place] [id number] [phone]           Requested Prescriptions   Pending Prescriptions Disp Refills     LORazepam (ATIVAN) 1 MG tablet 60 tablet 1     Sig: Take 1 tablet (1 mg) by mouth 2 times daily as needed for anxiety       There is no refill protocol information for this order   Last Written Prescription Date:  7/3/2020  Last Fill Quantity: 30,  # refills: 0   Last office visit: 8/29/2019 with prescribing provider:     Future Office Visit:   Next 5 appointments (look out 90 days)    Aug 24, 2020 10:00 AM CDT  PHYSICAL with Luis Armando Segovia MD  [facility] ([facility]) [address] [place] [id number]  [phone]             zolpidem (AMBIEN) 10 MG tablet 30 tablet 0     Sig: Take 1 tablet (10 mg) by mouth nightly as needed for sleep       There is no refill protocol information for this order        Routing refill request to provider for review/approval because:  Drug not on the [facility] refill protocol       [place] reviewed:  reviewed- recommend provider review    [place]:  [email or web address]    Laurie Caba RN, BSN  [facility]

## 2020-07-31 NOTE — TELEPHONE ENCOUNTER
Medication Question or Refill  Who is calling: Jeremi Damon  What medication are you calling about (include dose and sig)?: zolpidem (AMBIEN) 10 MG tablet   & LORazepam (ATIVAN) 1 MG tablet  Controlled Substance Agreement on file:   Who prescribed the medication?: Dr. Segovia  Do you need a refill? Yes: zolpidem (AMBIEN) 10 MG tablet & LORazepam (ATIVAN) 1 MG tablet  When did you use the medication last? N/A  Patient offered an appointment? No  Do you have any questions or concerns?  No  Requested Pharmacy:    [facility]/PHARMACY #[id number] [facility] [address] [facility]  Okay to leave a detailed message?: Yes at Cell number on file:    Telephone Information:   Mobile [phone]

## 2020-08-01 RX ORDER — LORAZEPAM 1 MG/1
TABLET ORAL
Qty: 60 TABLET | Refills: 1 | OUTPATIENT
Start: 2020-08-01

## 2020-08-01 NOTE — TELEPHONE ENCOUNTER
[place]) reviewed:  not reviewed/not due - last done on 7/31/20    [place]:  [email or web address]    Requested Prescriptions   Pending Prescriptions Disp Refills     cyclobenzaprine (FLEXERIL) 10 MG tablet [Pharmacy Med Name: CYCLOBENZAPRINE 10 MG TABLET] 90 tablet 0     Sig: TAKE 1 TABLET (10 MG) BY MOUTH 3 TIMES DAILY AS NEEDED FOR OTHER (HICCUPS)       There is no refill protocol information for this order        Last Written Prescription Date:  7/3/20  Last Fill Quantity: 90,  # refills: 0   Last office visit: 8/29/2019 with prescribing provider:     Future Office Visit:   Next 5 appointments (look out 90 days)    Aug 24, 2020 10:00 AM CDT  PHYSICAL with Luis Armando [facility]  [facility] [address] [place] [phone]

## 2020-08-03 RX ORDER — CYCLOBENZAPRINE HCL 10 MG
10 TABLET ORAL 3 TIMES DAILY PRN
Qty: 90 TABLET | Refills: 0 | Status: SHIPPED | OUTPATIENT
Start: 2020-08-03 | End: 2020-08-24
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
## 2020-08-14 ENCOUNTER — VIRTUAL VISIT (OUTPATIENT)
Dept: ADDICTION MEDICINE | Facility: CLINIC | Age: 50
End: 2020-08-14
Payer: MEDICARE

## 2020-08-14 DIAGNOSIS — F90.9 ATTENTION DEFICIT HYPERACTIVITY DISORDER (ADHD), UNSPECIFIED ADHD TYPE: ICD-10-CM

## 2020-08-14 DIAGNOSIS — F41.0 PANIC DISORDER WITHOUT AGORAPHOBIA: ICD-10-CM

## 2020-08-14 DIAGNOSIS — M54.42 LEFT-SIDED LOW BACK PAIN WITH LEFT-SIDED SCIATICA, UNSPECIFIED CHRONICITY: ICD-10-CM

## 2020-08-14 DIAGNOSIS — Z79.899 HIGH RISK MEDICATION USE: ICD-10-CM

## 2020-08-14 DIAGNOSIS — F33.1 MAJOR DEPRESSIVE DISORDER, RECURRENT EPISODE, MODERATE (H): ICD-10-CM

## 2020-08-14 DIAGNOSIS — R29.898 WEAKNESS OF LEFT FOOT: ICD-10-CM

## 2020-08-14 DIAGNOSIS — M54.2 NECK PAIN: ICD-10-CM

## 2020-08-14 DIAGNOSIS — F11.20 UNCOMPLICATED OPIOID DEPENDENCE (H): Primary | ICD-10-CM

## 2020-08-14 DIAGNOSIS — F41.1 GENERALIZED ANXIETY DISORDER: ICD-10-CM

## 2020-08-14 PROCEDURE — 99442 ZZC PHYSICIAN TELEPHONE EVALUATION 11-20 MIN: CPT | Performed by: PEDIATRICS

## 2020-08-14 RX ORDER — BUPRENORPHINE AND NALOXONE 8; 2 MG/1; MG/1
FILM, SOLUBLE BUCCAL; SUBLINGUAL
Qty: 60 FILM | Refills: 1 | Status: SHIPPED | OUTPATIENT
Start: 2020-08-14 | End: 2020-10-09

## 2020-08-21 NOTE — PATIENT INSTRUCTIONS
Preventive Health Recommendations  Male Ages 50 - 64    Yearly exam:             See your health care provider every year in order to  o   Review health changes.   o   Discuss preventive care.    o   Review your medicines if your doctor has prescribed any.     Have a cholesterol test every 5 years, or more frequently if you are at risk for high cholesterol/heart disease.     Have a diabetes test (fasting glucose) every three years. If you are at risk for diabetes, you should have this test more often.     Have a colonoscopy at age 50, or have a yearly FIT test (stool test). These exams will check for colon cancer.      Talk with your health care provider about whether or not a prostate cancer screening test (PSA) is right for you.    You should be tested each year for STDs (sexually transmitted diseases), if you re at risk.     Shots: Get a flu shot each year. Get a tetanus shot every 10 years.     Nutrition:    Eat at least 5 servings of fruits and vegetables daily.     Eat whole-grain bread, whole-wheat pasta and brown rice instead of white grains and rice.     Get adequate Calcium and Vitamin D.     Lifestyle    Exercise for at least 150 minutes a week (30 minutes a day, 5 days a week). This will help you control your weight and prevent disease.     Limit alcohol to one drink per day.     No smoking.     Wear sunscreen to prevent skin cancer.     See your dentist every six months for an exam and cleaning.     See your eye doctor every 1 to 2 years.    Patient Education   Personalized Prevention Plan  You are due for the preventive services outlined below.  Your care team is available to assist you in scheduling these services.  If you have already completed any of these items, please share that information with your care team to update in your medical record.  Health Maintenance Due   Topic Date Due     Hepatitis B Vaccine (1 of 3 - Risk 3-dose series) 06/13/1987     Colorectal Cancer Screening  12/28/2015      "Prostate Test  01/06/2018     Zoster (Shingles) Vaccine (1 of 2) 06/13/2018     Cholesterol Lab  10/10/2018     Kidney Microalbumin Urine Test  06/28/2020     Asthma Action Plan - yearly  06/28/2020     Asthma Control Test  07/16/2020     Basic Metabolic Panel  08/29/2020        Patient Education   Tips for Sleep Hygiene  \"Sleep hygiene\" means having good sleep habits.Follow these tips to sleep better at night:     Get on a schedule. Go to bed and get up at about the same time every day.    Listen to your body. Only try to sleep when you actually feel tired or sleepy.    Be patient. If you haven't been able to get to sleep after about 30 minutes or more, get up and do something calming or boring until you feel sleepy. Then return to bed and try again.    Don't have caffeine (coffee, tea, cola drinks, chocolate and some medicines), alcohol or nicotine (cigarettes). These can make it harder for you to fall asleep and stay asleep.    Use your bed for sleeping only. That means no TV, computer or homework in bed, especially during the evening and before bedtime.    Don't nap during the day. If you must nap, make sure it is for less than 20 minutes.    Create sleep rituals that remind your body it is time to sleep. Examples include breathing exercises, stretching or reading a book.    Avoid all electronic media (smart phone, computer, tablet) within 2 hours of bed time. The \"blue light\" in these devices activates the part of the brain that keeps you awake.    Dim the lights at night.    Get early morning sources of light (walk in the sunshine) to help set sleep patterns at night.    Try a bath or shower before bed. Having a warm bath 1 to 2 hours before bedtime can help you feel sleepy. Hot baths can make you alert, so be mindful of the temperature.    Don't watch the clock. Checking the clock during the night can wake you up. It can also lead to negative thoughts such as, \"I will never fall asleep,\" which can increase " anxiety and sleeplessness.    Use a sleep diary. Track your sleep schedule to know your sleep patterns and to see where you can improve.    Get regular exercise every day. Try not to do heavy exercise in the 4 hours before bedtime.    Eat a healthy, balanced diet.    Try eating a light, healthy snack before bed, but avoid eating a heavy meal.    Create the right sleeping area. A cool, dark, quiet room is best. If needed, try earplugs, fans and blackout curtains.    Keep your daytime routine the same even if you have a bad night sleep. Avoiding activities the next day can make it harder to sleep.  For informational purposes only. Not to replace the advice of your health care provider.   Copyright   2013 [facility]. All rights reserved. [employer] 561539 - 01/16.
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
## 2020-08-24 ENCOUNTER — OFFICE VISIT (OUTPATIENT)
Dept: FAMILY MEDICINE | Facility: CLINIC | Age: 50
End: 2020-08-24
Payer: MEDICARE

## 2020-08-24 VITALS
HEART RATE: 73 BPM | WEIGHT: 253 LBS | TEMPERATURE: 97.7 F | OXYGEN SATURATION: 95 % | SYSTOLIC BLOOD PRESSURE: 172 MMHG | DIASTOLIC BLOOD PRESSURE: 100 MMHG | HEIGHT: 72 IN | BODY MASS INDEX: 34.27 KG/M2

## 2020-08-24 DIAGNOSIS — G47.00 INSOMNIA, UNSPECIFIED TYPE: ICD-10-CM

## 2020-08-24 DIAGNOSIS — M54.2 NECK PAIN: ICD-10-CM

## 2020-08-24 DIAGNOSIS — F41.1 GENERALIZED ANXIETY DISORDER: ICD-10-CM

## 2020-08-24 DIAGNOSIS — J45.30 MILD PERSISTENT ASTHMA WITHOUT COMPLICATION: ICD-10-CM

## 2020-08-24 DIAGNOSIS — Z00.00 ENCOUNTER FOR MEDICARE ANNUAL WELLNESS EXAM: Primary | ICD-10-CM

## 2020-08-24 DIAGNOSIS — K21.9 GASTROESOPHAGEAL REFLUX DISEASE WITHOUT ESOPHAGITIS: ICD-10-CM

## 2020-08-24 DIAGNOSIS — Z12.5 SCREENING FOR PROSTATE CANCER: ICD-10-CM

## 2020-08-24 DIAGNOSIS — I10 HYPERTENSION GOAL BP (BLOOD PRESSURE) < 140/90: ICD-10-CM

## 2020-08-24 DIAGNOSIS — F90.2 ATTENTION DEFICIT HYPERACTIVITY DISORDER (ADHD), COMBINED TYPE: ICD-10-CM

## 2020-08-24 DIAGNOSIS — F33.1 MAJOR DEPRESSIVE DISORDER, RECURRENT EPISODE, MODERATE (H): ICD-10-CM

## 2020-08-24 DIAGNOSIS — Z12.11 SCREENING FOR COLON CANCER: ICD-10-CM

## 2020-08-24 DIAGNOSIS — F40.243 FLYING PHOBIA: ICD-10-CM

## 2020-08-24 DIAGNOSIS — R61 GENERALIZED HYPERHIDROSIS: ICD-10-CM

## 2020-08-24 DIAGNOSIS — N18.30 CKD (CHRONIC KIDNEY DISEASE) STAGE 3, GFR 30-59 ML/MIN (H): ICD-10-CM

## 2020-08-24 DIAGNOSIS — G47.33 OSA (OBSTRUCTIVE SLEEP APNEA): ICD-10-CM

## 2020-08-24 DIAGNOSIS — L65.9 ALOPECIA: ICD-10-CM

## 2020-08-24 DIAGNOSIS — F41.0 PANIC DISORDER WITHOUT AGORAPHOBIA: ICD-10-CM

## 2020-08-24 PROCEDURE — 99214 OFFICE O/P EST MOD 30 MIN: CPT | Mod: 25 | Performed by: FAMILY MEDICINE

## 2020-08-24 PROCEDURE — 99396 PREV VISIT EST AGE 40-64: CPT | Performed by: FAMILY MEDICINE

## 2020-08-24 RX ORDER — ZOLPIDEM TARTRATE 10 MG/1
10 TABLET ORAL
Qty: 30 TABLET | Refills: 0 | Status: SHIPPED | OUTPATIENT
Start: 2020-09-01 | End: 2020-09-29

## 2020-08-24 RX ORDER — AMLODIPINE BESYLATE 10 MG/1
10 TABLET ORAL DAILY
Qty: 90 TABLET | Refills: 1 | Status: SHIPPED | OUTPATIENT
Start: 2020-08-24 | End: 2020-12-03

## 2020-08-24 RX ORDER — CLONAZEPAM 1 MG/1
1 TABLET ORAL 2 TIMES DAILY PRN
Qty: 4 TABLET | Refills: 0 | Status: SHIPPED | OUTPATIENT
Start: 2020-08-24 | End: 2021-01-12

## 2020-08-24 RX ORDER — BUPROPION HYDROCHLORIDE 300 MG/1
300 TABLET ORAL EVERY MORNING
Qty: 90 TABLET | Refills: 1 | Status: SHIPPED | OUTPATIENT
Start: 2020-08-24 | End: 2020-12-03

## 2020-08-24 RX ORDER — CYCLOBENZAPRINE HCL 10 MG
10 TABLET ORAL 3 TIMES DAILY PRN
Qty: 90 TABLET | Refills: 0 | Status: SHIPPED | OUTPATIENT
Start: 2020-08-24 | End: 2020-10-09

## 2020-08-24 RX ORDER — METOPROLOL TARTRATE 100 MG
TABLET ORAL
Qty: 180 TABLET | Refills: 1 | Status: SHIPPED | OUTPATIENT
Start: 2020-08-24 | End: 2020-12-03

## 2020-08-24 RX ORDER — FINASTERIDE 5 MG/1
TABLET, FILM COATED ORAL
Qty: 90 TABLET | Refills: 1 | Status: SHIPPED | OUTPATIENT
Start: 2020-08-24 | End: 2020-12-03

## 2020-08-24 RX ORDER — AMITRIPTYLINE HYDROCHLORIDE 50 MG/1
50 TABLET ORAL AT BEDTIME
Qty: 270 TABLET | Refills: 1 | Status: SHIPPED | OUTPATIENT
Start: 2020-08-24 | End: 2020-09-28

## 2020-08-24 RX ORDER — CLONIDINE HYDROCHLORIDE 0.1 MG/1
0.1 TABLET ORAL 2 TIMES DAILY
Qty: 180 TABLET | Refills: 1 | Status: SHIPPED | OUTPATIENT
Start: 2020-08-24 | End: 2020-12-03

## 2020-08-24 RX ORDER — LOSARTAN POTASSIUM 100 MG/1
100 TABLET ORAL DAILY
Qty: 90 TABLET | Refills: 1 | Status: SHIPPED | OUTPATIENT
Start: 2020-08-24 | End: 2020-12-03

## 2020-08-24 RX ORDER — DEXTROAMPHETAMINE SACCHARATE, AMPHETAMINE ASPARTATE, DEXTROAMPHETAMINE SULFATE AND AMPHETAMINE SULFATE 5; 5; 5; 5 MG/1; MG/1; MG/1; MG/1
20 TABLET ORAL 2 TIMES DAILY
Qty: 90 TABLET | Refills: 0 | Status: SHIPPED | OUTPATIENT
Start: 2020-08-24 | End: 2020-12-03

## 2020-08-24 RX ORDER — LORAZEPAM 1 MG/1
1 TABLET ORAL 2 TIMES DAILY PRN
Qty: 60 TABLET | Refills: 1 | Status: SHIPPED | OUTPATIENT
Start: 2020-08-24 | End: 2020-11-25

## 2020-08-24 ASSESSMENT — ANXIETY QUESTIONNAIRES
6. BECOMING EASILY ANNOYED OR IRRITABLE: NOT AT ALL
IF YOU CHECKED OFF ANY PROBLEMS ON THIS QUESTIONNAIRE, HOW DIFFICULT HAVE THESE PROBLEMS MADE IT FOR YOU TO DO YOUR WORK, TAKE CARE OF THINGS AT HOME, OR GET ALONG WITH OTHER PEOPLE: EXTREMELY DIFFICULT
1. FEELING NERVOUS, ANXIOUS, OR ON EDGE: NEARLY EVERY DAY
7. FEELING AFRAID AS IF SOMETHING AWFUL MIGHT HAPPEN: SEVERAL DAYS
GAD7 TOTAL SCORE: 13
5. BEING SO RESTLESS THAT IT IS HARD TO SIT STILL: SEVERAL DAYS
3. WORRYING TOO MUCH ABOUT DIFFERENT THINGS: NEARLY EVERY DAY
2. NOT BEING ABLE TO STOP OR CONTROL WORRYING: NEARLY EVERY DAY

## 2020-08-24 ASSESSMENT — MIFFLIN-ST. JEOR: SCORE: 2035.6

## 2020-08-24 ASSESSMENT — PATIENT HEALTH QUESTIONNAIRE - PHQ9
5. POOR APPETITE OR OVEREATING: MORE THAN HALF THE DAYS
SUM OF ALL RESPONSES TO PHQ QUESTIONS 1-9: 18

## 2020-08-25 ASSESSMENT — ANXIETY QUESTIONNAIRES: GAD7 TOTAL SCORE: 13

## 2020-09-08 DIAGNOSIS — K21.9 GASTROESOPHAGEAL REFLUX DISEASE WITHOUT ESOPHAGITIS: ICD-10-CM

## 2020-09-08 DIAGNOSIS — F33.1 MAJOR DEPRESSIVE DISORDER, RECURRENT EPISODE, MODERATE (H): ICD-10-CM

## 2020-09-08 NOTE — TELEPHONE ENCOUNTER
Medication Question or Refill    Who is calling: Jeremi    What medication are you calling about (include dose and sig)?: fluoxetine and omeprazole    Controlled Substance Agreement on file:     Who prescribed the medication?: Dr Segovia    Do you need a refill? No    When did you use the medication last?     Patient offered an appointment? No    Do you have any questions or concerns?  Yes: Pt is having a problem getting the rx from the pharmacy    Requested Pharmacy:     Okay to leave a detailed message?: Yes at Home number on file [phone] (home)

## 2020-09-09 NOTE — TELEPHONE ENCOUNTER
Current prescription at same pharmacy ordered 8/24/20 for 90 with 1 refill..  Patient states that he is having difficulty picking up from the pharmacy.   Estefania Christian RN

## 2020-09-16 DIAGNOSIS — J45.20 INTERMITTENT ASTHMA, UNCOMPLICATED: Primary | ICD-10-CM

## 2020-09-16 RX ORDER — ALBUTEROL SULFATE 90 UG/1
2 AEROSOL, METERED RESPIRATORY (INHALATION) EVERY 6 HOURS
Qty: 18 G | Refills: 3 | Status: SHIPPED | OUTPATIENT
Start: 2020-09-16 | End: 2020-12-03

## 2020-09-16 NOTE — PROGRESS NOTES
Received a form from Medicare advantage to advise needing to change Ventolin to Proair based on coverage. Rx change to reflect. Rx sent in.    Koffi BURNHAM RN   [facility]

## 2020-09-23 ENCOUNTER — MYC MEDICAL ADVICE (OUTPATIENT)
Dept: FAMILY MEDICINE | Facility: CLINIC | Age: 50
End: 2020-09-23

## 2020-09-23 DIAGNOSIS — J45.30 MILD PERSISTENT ASTHMA WITHOUT COMPLICATION: Primary | ICD-10-CM

## 2020-09-23 DIAGNOSIS — B36.0 TINEA VERSICOLOR: ICD-10-CM

## 2020-09-23 RX ORDER — FLUCONAZOLE 150 MG/1
300 TABLET ORAL
Qty: 4 TABLET | Refills: 1 | Status: SHIPPED | OUTPATIENT
Start: 2020-09-23 | End: 2020-12-03

## 2020-09-23 NOTE — TELEPHONE ENCOUNTER
Validic message sent to update ACT    Koffi BURNHAM RN   [facility]

## 2020-09-24 NOTE — TELEPHONE ENCOUNTER
ACT updated    ACT Total Scores 12/18/2019 1/20/2020 9/24/2020   ACT TOTAL SCORE - - -   ASTHMA ER VISITS - - -   ASTHMA HOSPITALIZATIONS - - -   ACT TOTAL SCORE (Goal Greater than or Equal to 20) 11 13 9   In the past 12 months, how many times did you visit the emergency room for your asthma without being admitted to the hospital? 0 0 0   In the past 12 months, how many times were you hospitalized overnight because of your asthma? 0 0 0     See refill encounter      Asmita Marinelli RN  [facility]

## 2020-09-24 NOTE — TELEPHONE ENCOUNTER
ACT Total Scores 12/18/2019 1/20/2020 9/24/2020   ACT TOTAL SCORE - - -   ASTHMA ER VISITS - - -   ASTHMA HOSPITALIZATIONS - - -   ACT TOTAL SCORE (Goal Greater than or Equal to 20) 11 13 9   In the past 12 months, how many times did you visit the emergency room for your asthma without being admitted to the hospital? 0 0 0   In the past 12 months, how many times were you hospitalized overnight because of your asthma? 0 0 0     MyChart Message from patient:   Nikki Ambrocio. I came up with answers 1-5 as 2,1,4,1,1 =9    0,0. I'm not sure if it is the weather change or a recent change is problematic foods that cause congestion.    Routing refill request to provider for review/approval because:  ACT Score        Asmita Marinelli RN  [facility]

## 2020-09-25 ASSESSMENT — ASTHMA QUESTIONNAIRES: ACT_TOTALSCORE: 9

## 2020-10-07 ENCOUNTER — MYC MEDICAL ADVICE (OUTPATIENT)
Dept: ADDICTION MEDICINE | Facility: CLINIC | Age: 50
End: 2020-10-07
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
## 2020-10-09 ENCOUNTER — VIRTUAL VISIT (OUTPATIENT)
Dept: ADDICTION MEDICINE | Facility: CLINIC | Age: 50
End: 2020-10-09
Payer: COMMERCIAL

## 2020-10-09 DIAGNOSIS — M54.42 LEFT-SIDED LOW BACK PAIN WITH LEFT-SIDED SCIATICA, UNSPECIFIED CHRONICITY: ICD-10-CM

## 2020-10-09 DIAGNOSIS — R29.898 WEAKNESS OF LEFT FOOT: ICD-10-CM

## 2020-10-09 DIAGNOSIS — Z79.899 HIGH RISK MEDICATION USE: ICD-10-CM

## 2020-10-09 DIAGNOSIS — F41.0 PANIC DISORDER WITHOUT AGORAPHOBIA: ICD-10-CM

## 2020-10-09 DIAGNOSIS — F90.9 ATTENTION DEFICIT HYPERACTIVITY DISORDER (ADHD), UNSPECIFIED ADHD TYPE: ICD-10-CM

## 2020-10-09 DIAGNOSIS — F11.20 UNCOMPLICATED OPIOID DEPENDENCE (H): Primary | ICD-10-CM

## 2020-10-09 DIAGNOSIS — F33.1 MAJOR DEPRESSIVE DISORDER, RECURRENT EPISODE, MODERATE (H): ICD-10-CM

## 2020-10-09 DIAGNOSIS — M54.2 NECK PAIN: ICD-10-CM

## 2020-10-09 DIAGNOSIS — F41.1 GENERALIZED ANXIETY DISORDER: ICD-10-CM

## 2020-10-09 PROCEDURE — 99442 PR PHYSICIAN TELEPHONE EVALUATION 11-20 MIN: CPT | Mod: 95 | Performed by: PEDIATRICS

## 2020-10-09 RX ORDER — BUPRENORPHINE AND NALOXONE 8; 2 MG/1; MG/1
FILM, SOLUBLE BUCCAL; SUBLINGUAL
Qty: 60 FILM | Refills: 1 | Status: SHIPPED | OUTPATIENT
Start: 2020-10-09 | End: 2021-01-05

## 2020-10-09 RX ORDER — CYCLOBENZAPRINE HCL 10 MG
10 TABLET ORAL 3 TIMES DAILY PRN
Qty: 90 TABLET | Refills: 3 | Status: SHIPPED | OUTPATIENT
Start: 2020-10-09 | End: 2021-02-19

## 2020-10-15 DIAGNOSIS — Z12.11 SCREENING FOR COLON CANCER: ICD-10-CM

## 2020-10-15 PROCEDURE — 82274 ASSAY TEST FOR BLOOD FECAL: CPT | Performed by: FAMILY MEDICINE

## 2020-10-16 LAB — HEMOCCULT STL QL IA: NEGATIVE

## 2020-10-19 DIAGNOSIS — E29.1 HYPOGONADISM MALE: ICD-10-CM

## 2020-10-20 NOTE — TELEPHONE ENCOUNTER
Routing refill request to provider for review/approval because:  Labs out of range:  BP  Labs not current:     Lipid panel on file in past 12 mos Protocol Details    ALT on file within past 12 mos     HCT less than 54% on file within past 12 mos     Serum Testosterone on file within past 12 mos     Serum PSA on file within past 12 mos     Refills for this classification require provider review          AST on file within past 12 mos        Provider needs to review    BP Readings from Last 3 Encounters:   08/24/20 (!) 172/100   05/13/20 (!) 173/103   02/04/20 (!) 173/97

## 2020-10-21 RX ORDER — TESTOSTERONE CYPIONATE 200 MG/ML
100 INJECTION, SOLUTION INTRAMUSCULAR WEEKLY
Qty: 6 ML | Refills: 0 | Status: SHIPPED | OUTPATIENT
Start: 2020-10-21 | End: 2021-01-27

## 2020-10-22 ENCOUNTER — TELEPHONE (OUTPATIENT)
Dept: FAMILY MEDICINE | Facility: CLINIC | Age: 50
End: 2020-10-22

## 2020-10-22 NOTE — TELEPHONE ENCOUNTER
"Patient calling stating [facility] did not receive Rx for testosterone.  Informed patient of date and time stamp confirming it was received by pharmacy.  Patient also states he needs syringes to administer medication.     Called [facility] pharmacy and spoke with pharmacist and was advised the rx has been received and it is on a regulatory hold based on when he last had the prescription filled.  Next Thursday every 12 weeks because its controlled substance-[facility] pharmacy protocol.  Rx to be released on 10/29/2020.  Pharmacist stated.  Asked pharmacist about syringes and was told as long as there is an order for an injectable the pharmacy will sell syringes to the patient if insurance does not cover.    Called and advised patient of pharmacy's response, patient stated syringes are available with an injectable and patient stated \"i've never gotten syringes from them\".    Advised patient call and speak with a pharmacy representative as clinic is unable to control pharmacy protocol.  Patient stated understanding and was agreeable with plan.    REJI GonzalezN, RN  [employer] [facility]            "

## 2020-10-23 ENCOUNTER — OFFICE VISIT (OUTPATIENT)
Dept: PALLIATIVE MEDICINE | Facility: CLINIC | Age: 50
End: 2020-10-23
Payer: COMMERCIAL

## 2020-10-23 VITALS — HEART RATE: 71 BPM | DIASTOLIC BLOOD PRESSURE: 110 MMHG | OXYGEN SATURATION: 95 % | SYSTOLIC BLOOD PRESSURE: 178 MMHG

## 2020-10-23 DIAGNOSIS — G43.119 INTRACTABLE MIGRAINE WITH AURA WITHOUT STATUS MIGRAINOSUS: Primary | ICD-10-CM

## 2020-10-23 PROCEDURE — 64615 CHEMODENERV MUSC MIGRAINE: CPT | Performed by: ANESTHESIOLOGY

## 2020-10-23 ASSESSMENT — PAIN SCALES - GENERAL: PAINLEVEL: SEVERE PAIN (7)
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
## 2020-10-23 NOTE — PATIENT INSTRUCTIONS
[facility]   Botox Injection Discharge Instructions    Do not rub or put extended pressure on the injection sites. You may gently touch the sites to remove any excess blood.    Monitor the injection sites for signs and symptoms of infection such as redness, swelling, warmth, fever, chills, or drainage to areas.    You may have soreness at the injection sites for up to 24 hours.    If you are able to use anti-inflammatory medications or Tylenol for pain control, you can take these as directed.    It may take up to 1 month to notice benefit from the 1st treatment    If you do notice relief, botox can be done every 3 months.  It may require insurance authorization everytime.      For questions about insurance coverage, please call the main clinic number and ask to speak with someone about botox coverage.     Pain Clinic phone number during work hours Monday-Friday:  [phone]    After hours provider line: [phone]

## 2020-11-16 ENCOUNTER — HEALTH MAINTENANCE LETTER (OUTPATIENT)
Age: 50
End: 2020-11-16

## 2020-11-29 ENCOUNTER — HOSPITAL ENCOUNTER (INPATIENT)
Facility: CLINIC | Age: 50
LOS: 1 days | Discharge: HOME | DRG: 281 | End: 2020-11-30
Attending: EMERGENCY MEDICINE | Admitting: STUDENT IN AN ORGANIZED HEALTH CARE EDUCATION/TRAINING PROGRAM
Payer: COMMERCIAL

## 2020-11-29 ENCOUNTER — APPOINTMENT (OUTPATIENT)
Dept: GENERAL RADIOLOGY | Facility: CLINIC | Age: 50
DRG: 281 | End: 2020-11-29
Attending: EMERGENCY MEDICINE
Payer: COMMERCIAL

## 2020-11-29 DIAGNOSIS — N28.9 RENAL INSUFFICIENCY: ICD-10-CM

## 2020-11-29 DIAGNOSIS — I21.4 NSTEMI (NON-ST ELEVATED MYOCARDIAL INFARCTION) (H): ICD-10-CM

## 2020-11-29 DIAGNOSIS — I25.10 CORONARY ARTERY DISEASE INVOLVING NATIVE HEART WITHOUT ANGINA PECTORIS, UNSPECIFIED VESSEL OR LESION TYPE: Primary | ICD-10-CM

## 2020-11-29 LAB
ALBUMIN SERPL-MCNC: 3.5 G/DL (ref 3.4–5)
ALP SERPL-CCNC: 102 U/L (ref 40–150)
ALT SERPL W P-5'-P-CCNC: 119 U/L (ref 0–70)
ANION GAP SERPL CALCULATED.3IONS-SCNC: 3 MMOL/L (ref 3–14)
APTT PPP: 94 SEC (ref 22–37)
AST SERPL W P-5'-P-CCNC: 59 U/L (ref 0–45)
BASOPHILS # BLD AUTO: 0 10E9/L (ref 0–0.2)
BASOPHILS NFR BLD AUTO: 0.4 %
BILIRUB SERPL-MCNC: 0.4 MG/DL (ref 0.2–1.3)
BUN SERPL-MCNC: 19 MG/DL (ref 7–30)
CALCIUM SERPL-MCNC: 8.6 MG/DL (ref 8.5–10.1)
CHLORIDE SERPL-SCNC: 104 MMOL/L (ref 94–109)
CO2 SERPL-SCNC: 31 MMOL/L (ref 20–32)
CREAT SERPL-MCNC: 1.79 MG/DL (ref 0.66–1.25)
D DIMER PPP FEU-MCNC: 0.3 UG/ML FEU (ref 0–0.5)
DIFFERENTIAL METHOD BLD: NORMAL
EOSINOPHIL # BLD AUTO: 0.5 10E9/L (ref 0–0.7)
EOSINOPHIL NFR BLD AUTO: 6 %
ERYTHROCYTE [DISTWIDTH] IN BLOOD BY AUTOMATED COUNT: 14.9 % (ref 10–15)
ERYTHROCYTE [DISTWIDTH] IN BLOOD BY AUTOMATED COUNT: 15.1 % (ref 10–15)
GFR SERPL CREATININE-BSD FRML MDRD: 42 ML/MIN/{1.73_M2}
GLUCOSE SERPL-MCNC: 92 MG/DL (ref 70–99)
HCT VFR BLD AUTO: 48.3 % (ref 40–53)
HCT VFR BLD AUTO: 48.7 % (ref 40–53)
HGB BLD-MCNC: 16.2 G/DL (ref 13.3–17.7)
HGB BLD-MCNC: 16.3 G/DL (ref 13.3–17.7)
IMM GRANULOCYTES # BLD: 0 10E9/L (ref 0–0.4)
IMM GRANULOCYTES NFR BLD: 0.4 %
INTERPRETATION ECG - MUSE: NORMAL
LABORATORY COMMENT REPORT: NORMAL
LYMPHOCYTES # BLD AUTO: 2 10E9/L (ref 0.8–5.3)
LYMPHOCYTES NFR BLD AUTO: 23 %
MCH RBC QN AUTO: 29.9 PG (ref 26.5–33)
MCH RBC QN AUTO: 30.1 PG (ref 26.5–33)
MCHC RBC AUTO-ENTMCNC: 33.3 G/DL (ref 31.5–36.5)
MCHC RBC AUTO-ENTMCNC: 33.7 G/DL (ref 31.5–36.5)
MCV RBC AUTO: 89 FL (ref 78–100)
MCV RBC AUTO: 90 FL (ref 78–100)
MONOCYTES # BLD AUTO: 0.9 10E9/L (ref 0–1.3)
MONOCYTES NFR BLD AUTO: 9.9 %
NEUTROPHILS # BLD AUTO: 5.2 10E9/L (ref 1.6–8.3)
NEUTROPHILS NFR BLD AUTO: 60.3 %
NRBC # BLD AUTO: 0 10*3/UL
NRBC BLD AUTO-RTO: 0 /100
PLATELET # BLD AUTO: 150 10E9/L (ref 150–450)
PLATELET # BLD AUTO: 161 10E9/L (ref 150–450)
POTASSIUM SERPL-SCNC: 4.4 MMOL/L (ref 3.4–5.3)
PROT SERPL-MCNC: 6.6 G/DL (ref 6.8–8.8)
RBC # BLD AUTO: 5.42 10E12/L (ref 4.4–5.9)
RBC # BLD AUTO: 5.42 10E12/L (ref 4.4–5.9)
SARS-COV-2 RNA SPEC QL NAA+PROBE: NEGATIVE
SARS-COV-2 RNA SPEC QL NAA+PROBE: NORMAL
SODIUM SERPL-SCNC: 138 MMOL/L (ref 133–144)
SPECIMEN SOURCE: NORMAL
SPECIMEN SOURCE: NORMAL
TROPONIN I SERPL-MCNC: 0.07 UG/L (ref 0–0.04)
TROPONIN I SERPL-MCNC: 13.78 UG/L (ref 0–0.04)
UFH PPP CHRO-ACNC: 0.21 IU/ML
WBC # BLD AUTO: 8.6 10E9/L (ref 4–11)
WBC # BLD AUTO: 9.9 10E9/L (ref 4–11)

## 2020-11-29 PROCEDURE — U0003 INFECTIOUS AGENT DETECTION BY NUCLEIC ACID (DNA OR RNA); SEVERE ACUTE RESPIRATORY SYNDROME CORONAVIRUS 2 (SARS-COV-2) (CORONAVIRUS DISEASE [COVID-19]), AMPLIFIED PROBE TECHNIQUE, MAKING USE OF HIGH THROUGHPUT TECHNOLOGIES AS DESCRIBED BY CMS-2020-01-R: HCPCS | Performed by: EMERGENCY MEDICINE

## 2020-11-29 PROCEDURE — 93010 ELECTROCARDIOGRAM REPORT: CPT | Performed by: INTERNAL MEDICINE

## 2020-11-29 PROCEDURE — 85379 FIBRIN DEGRADATION QUANT: CPT | Performed by: EMERGENCY MEDICINE

## 2020-11-29 PROCEDURE — 93005 ELECTROCARDIOGRAM TRACING: CPT

## 2020-11-29 PROCEDURE — 84484 ASSAY OF TROPONIN QUANT: CPT | Performed by: STUDENT IN AN ORGANIZED HEALTH CARE EDUCATION/TRAINING PROGRAM

## 2020-11-29 PROCEDURE — 250N000013 HC RX MED GY IP 250 OP 250 PS 637: Performed by: STUDENT IN AN ORGANIZED HEALTH CARE EDUCATION/TRAINING PROGRAM

## 2020-11-29 PROCEDURE — 85027 COMPLETE CBC AUTOMATED: CPT | Performed by: EMERGENCY MEDICINE

## 2020-11-29 PROCEDURE — 71046 X-RAY EXAM CHEST 2 VIEWS: CPT

## 2020-11-29 PROCEDURE — 85730 THROMBOPLASTIN TIME PARTIAL: CPT | Performed by: EMERGENCY MEDICINE

## 2020-11-29 PROCEDURE — 85520 HEPARIN ASSAY: CPT | Performed by: EMERGENCY MEDICINE

## 2020-11-29 PROCEDURE — 250N000013 HC RX MED GY IP 250 OP 250 PS 637: Performed by: EMERGENCY MEDICINE

## 2020-11-29 PROCEDURE — 96375 TX/PRO/DX INJ NEW DRUG ADDON: CPT

## 2020-11-29 PROCEDURE — 80053 COMPREHEN METABOLIC PANEL: CPT | Performed by: EMERGENCY MEDICINE

## 2020-11-29 PROCEDURE — 99291 CRITICAL CARE FIRST HOUR: CPT | Mod: 25

## 2020-11-29 PROCEDURE — 250N000011 HC RX IP 250 OP 636: Performed by: EMERGENCY MEDICINE

## 2020-11-29 PROCEDURE — 84484 ASSAY OF TROPONIN QUANT: CPT | Performed by: EMERGENCY MEDICINE

## 2020-11-29 PROCEDURE — 99223 1ST HOSP IP/OBS HIGH 75: CPT | Mod: AI | Performed by: STUDENT IN AN ORGANIZED HEALTH CARE EDUCATION/TRAINING PROGRAM

## 2020-11-29 PROCEDURE — 250N000013 HC RX MED GY IP 250 OP 250 PS 637: Performed by: HOSPITALIST

## 2020-11-29 PROCEDURE — 250N000011 HC RX IP 250 OP 636: Performed by: STUDENT IN AN ORGANIZED HEALTH CARE EDUCATION/TRAINING PROGRAM

## 2020-11-29 PROCEDURE — 85025 COMPLETE CBC W/AUTO DIFF WBC: CPT | Performed by: EMERGENCY MEDICINE

## 2020-11-29 PROCEDURE — 36415 COLL VENOUS BLD VENIPUNCTURE: CPT | Performed by: STUDENT IN AN ORGANIZED HEALTH CARE EDUCATION/TRAINING PROGRAM

## 2020-11-29 PROCEDURE — 96367 TX/PROPH/DG ADDL SEQ IV INF: CPT

## 2020-11-29 PROCEDURE — 210N000002 HC R&B HEART CARE

## 2020-11-29 PROCEDURE — 96365 THER/PROPH/DIAG IV INF INIT: CPT

## 2020-11-29 PROCEDURE — 36415 COLL VENOUS BLD VENIPUNCTURE: CPT | Performed by: EMERGENCY MEDICINE

## 2020-11-29 RX ORDER — BUPROPION HYDROCHLORIDE 150 MG/1
300 TABLET ORAL EVERY MORNING
Status: DISCONTINUED | OUTPATIENT
Start: 2020-11-30 | End: 2020-11-30 | Stop reason: HOSPADM

## 2020-11-29 RX ORDER — HEPARIN SODIUM 10000 [USP'U]/100ML
12 INJECTION, SOLUTION INTRAVENOUS ONCE
Status: DISCONTINUED | OUTPATIENT
Start: 2020-11-29 | End: 2020-11-29

## 2020-11-29 RX ORDER — LORAZEPAM 1 MG/1
1 TABLET ORAL 2 TIMES DAILY PRN
Status: DISCONTINUED | OUTPATIENT
Start: 2020-11-29 | End: 2020-11-30 | Stop reason: HOSPADM

## 2020-11-29 RX ORDER — AMOXICILLIN 250 MG
2 CAPSULE ORAL 2 TIMES DAILY
Status: DISCONTINUED | OUTPATIENT
Start: 2020-11-29 | End: 2020-11-30 | Stop reason: HOSPADM

## 2020-11-29 RX ORDER — AMLODIPINE BESYLATE 10 MG/1
10 TABLET ORAL DAILY
Status: DISCONTINUED | OUTPATIENT
Start: 2020-11-30 | End: 2020-11-30 | Stop reason: HOSPADM

## 2020-11-29 RX ORDER — METOPROLOL TARTRATE 100 MG
100 TABLET ORAL 2 TIMES DAILY
Status: DISCONTINUED | OUTPATIENT
Start: 2020-11-30 | End: 2020-11-30 | Stop reason: HOSPADM

## 2020-11-29 RX ORDER — HEPARIN SODIUM 10000 [USP'U]/100ML
0-5000 INJECTION, SOLUTION INTRAVENOUS CONTINUOUS
Status: DISCONTINUED | OUTPATIENT
Start: 2020-11-29 | End: 2020-11-29

## 2020-11-29 RX ORDER — LIDOCAINE 40 MG/G
CREAM TOPICAL
Status: DISCONTINUED | OUTPATIENT
Start: 2020-11-29 | End: 2020-11-30 | Stop reason: HOSPADM

## 2020-11-29 RX ORDER — ASPIRIN 81 MG/1
81 TABLET ORAL DAILY
Status: DISCONTINUED | OUTPATIENT
Start: 2020-11-30 | End: 2020-11-30 | Stop reason: HOSPADM

## 2020-11-29 RX ORDER — LORAZEPAM 0.5 MG/1
0.5 TABLET ORAL ONCE
Status: COMPLETED | OUTPATIENT
Start: 2020-11-29 | End: 2020-11-29

## 2020-11-29 RX ORDER — NITROGLYCERIN 0.4 MG/1
0.4 TABLET SUBLINGUAL EVERY 5 MIN PRN
Status: COMPLETED | OUTPATIENT
Start: 2020-11-29 | End: 2020-11-29

## 2020-11-29 RX ORDER — LOSARTAN POTASSIUM 100 MG/1
100 TABLET ORAL DAILY
Status: DISCONTINUED | OUTPATIENT
Start: 2020-11-30 | End: 2020-11-30 | Stop reason: HOSPADM

## 2020-11-29 RX ORDER — AMOXICILLIN 250 MG
1 CAPSULE ORAL 2 TIMES DAILY
Status: DISCONTINUED | OUTPATIENT
Start: 2020-11-29 | End: 2020-11-30 | Stop reason: HOSPADM

## 2020-11-29 RX ORDER — HEPARIN SODIUM 10000 [USP'U]/100ML
0-5000 INJECTION, SOLUTION INTRAVENOUS CONTINUOUS
Status: DISCONTINUED | OUTPATIENT
Start: 2020-11-29 | End: 2020-11-30

## 2020-11-29 RX ORDER — NITROGLYCERIN 20 MG/100ML
0-200 INJECTION INTRAVENOUS CONTINUOUS
Status: DISCONTINUED | OUTPATIENT
Start: 2020-11-29 | End: 2020-11-30

## 2020-11-29 RX ORDER — NITROGLYCERIN 20 MG/100ML
10-200 INJECTION INTRAVENOUS CONTINUOUS
Status: DISCONTINUED | OUTPATIENT
Start: 2020-11-29 | End: 2020-11-29

## 2020-11-29 RX ORDER — NITROGLYCERIN 0.4 MG/1
0.4 TABLET SUBLINGUAL EVERY 5 MIN PRN
Status: DISCONTINUED | OUTPATIENT
Start: 2020-11-29 | End: 2020-11-30 | Stop reason: HOSPADM

## 2020-11-29 RX ORDER — LORAZEPAM 2 MG/ML
2 INJECTION INTRAMUSCULAR
Status: DISCONTINUED | OUTPATIENT
Start: 2020-11-29 | End: 2020-11-30 | Stop reason: HOSPADM

## 2020-11-29 RX ORDER — OXYCODONE HYDROCHLORIDE 5 MG/1
5 TABLET ORAL ONCE
Status: COMPLETED | OUTPATIENT
Start: 2020-11-29 | End: 2020-11-29

## 2020-11-29 RX ORDER — OXYCODONE AND ACETAMINOPHEN 5; 325 MG/1; MG/1
1-2 TABLET ORAL ONCE
Status: COMPLETED | OUTPATIENT
Start: 2020-11-29 | End: 2020-11-29

## 2020-11-29 RX ORDER — ZOLPIDEM TARTRATE 5 MG/1
10 TABLET ORAL
Status: DISCONTINUED | OUTPATIENT
Start: 2020-11-29 | End: 2020-11-30 | Stop reason: HOSPADM

## 2020-11-29 RX ORDER — POLYETHYLENE GLYCOL 3350 17 G/17G
17 POWDER, FOR SOLUTION ORAL DAILY
Status: DISCONTINUED | OUTPATIENT
Start: 2020-11-29 | End: 2020-11-30 | Stop reason: HOSPADM

## 2020-11-29 RX ADMIN — HEPARIN SODIUM 1200 UNITS/HR: 10000 INJECTION, SOLUTION INTRAVENOUS at 21:10

## 2020-11-29 RX ADMIN — NITROGLYCERIN 0.4 MG: 0.4 TABLET SUBLINGUAL at 15:05

## 2020-11-29 RX ADMIN — NITROGLYCERIN 10 MCG/MIN: 20 INJECTION INTRAVENOUS at 16:48

## 2020-11-29 RX ADMIN — LORAZEPAM 0.5 MG: 0.5 TABLET ORAL at 15:29

## 2020-11-29 RX ADMIN — HEPARIN SODIUM 1200 UNITS/HR: 10000 INJECTION, SOLUTION INTRAVENOUS at 16:45

## 2020-11-29 RX ADMIN — LORAZEPAM 1 MG: 1 TABLET ORAL at 21:11

## 2020-11-29 RX ADMIN — NITROGLYCERIN 0.4 MG: 0.4 TABLET SUBLINGUAL at 16:01

## 2020-11-29 RX ADMIN — OXYCODONE HYDROCHLORIDE 5 MG: 5 TABLET ORAL at 22:29

## 2020-11-29 RX ADMIN — OXYCODONE HYDROCHLORIDE AND ACETAMINOPHEN 1 TABLET: 5; 325 TABLET ORAL at 16:28

## 2020-11-29 RX ADMIN — NITROGLYCERIN 5 MCG/MIN: 20 INJECTION INTRAVENOUS at 21:51

## 2020-11-29 RX ADMIN — NITROGLYCERIN 0.4 MG: 0.4 TABLET SUBLINGUAL at 15:56

## 2020-11-29 RX ADMIN — ZOLPIDEM TARTRATE 10 MG: 5 TABLET ORAL at 21:52

## 2020-11-29 ASSESSMENT — ENCOUNTER SYMPTOMS
ABDOMINAL PAIN: 0
DIARRHEA: 0
SHORTNESS OF BREATH: 1
COUGH: 1
NAUSEA: 1
DIAPHORESIS: 0
HEADACHES: 1
BACK PAIN: 1
LIGHT-HEADEDNESS: 1

## 2020-11-29 ASSESSMENT — MIFFLIN-ST. JEOR: SCORE: 2044.67

## 2020-11-29 ASSESSMENT — ACTIVITIES OF DAILY LIVING (ADL): ADLS_ACUITY_SCORE: 10

## 2020-11-29 NOTE — ED TRIAGE NOTES
"Patient with worsening mid sternal  \"9/10 \" CP, radiates to right arm, back and bilateral jaw. Took 324 ASA  "

## 2020-11-29 NOTE — PHARMACY-ADMISSION MEDICATION HISTORY
Pharmacy Medication History  Admission medication history interview status for the 11/29/2020  admission is complete. See EPIC admission navigator for prior to admission medications       Medication history sources: Patient, Surescripts and Care Everywhere  Location of interview: Phone  Adherence Assessment: Good    Significant changes made to the medication list:  No significant changes       Additional medication history information:   Patient's meds are up to date and he was able to recall last doses    Medication reconciliation completed by provider prior to medication history? No    Time spent in this activity: 25min      Prior to Admission medications    Medication Sig Last Dose Taking? Auth Provider   albuterol (PROAIR HFA/PROVENTIL HFA/VENTOLIN HFA) 108 (90 Base) MCG/ACT inhaler Inhale 2 puffs into the lungs every 6 hours 11/29/2020 at Unknown time Yes Luis Armando Segovia MD   amitriptyline (ELAVIL) 50 MG tablet TAKE 1 TO 3 TABLETS ( MG) BY MOUTH AT BEDTIME 11/28/2020 at pm Yes Luis Armando Segovia MD   amLODIPine (NORVASC) 10 MG tablet Take 1 tablet (10 mg) by mouth daily 11/29/2020 at am Yes Luis Armando Segovia MD   amphetamine-dextroamphetamine (ADDERALL) 20 MG tablet Take 1 tablet (20 mg) by mouth 2 times daily 11/28/2020 at am Yes Luis Armando Segovia MD   budesonide-formoterol (SYMBICORT) 160-4.5 MCG/ACT Inhaler Inhale 2 puffs into the lungs 2 times daily 11/29/2020 at am Yes Kasandra Pizano PA-C   buprenorphine HCl-naloxone HCl (SUBOXONE) 8-2 MG per film 1/2 film QID 11/29/2020 at am Yes Cleopatra Carreon MD   buPROPion (WELLBUTRIN XL) 300 MG 24 hr tablet Take 1 tablet (300 mg) by mouth every morning 11/29/2020 at am Yes Luis Armando Segovia MD   clonazePAM (KLONOPIN) 1 MG tablet Take 1 tablet (1 mg) by mouth 2 times daily as needed (flying phobia) prn at prn Yes Luis Armando Segovia MD   cloNIDine (CATAPRES) 0.1 MG tablet Take 1 tablet (0.1 mg) by mouth 2 times daily 11/29/2020 at am Yes Luca,  Luis Armando MCCULLOUGH MD   cyclobenzaprine (FLEXERIL) 10 MG tablet Take 1 tablet (10 mg) by mouth 3 times daily as needed for other (hiccups) 11/28/2020 at pm Yes Cleopatra Carreon MD   finasteride (PROSCAR) 5 MG tablet 1/2 tab daily 11/28/2020 at pm Yes Luis Armando Segovia MD   fluconazole (DIFLUCAN) 150 MG tablet TAKE 2 TABLETS (300 MG) BY MOUTH EVERY 14 DAYS - FOR TWO DOSES prn at prn Yes Luis Armando Segovia MD   FLUoxetine (PROZAC) 20 MG capsule Take 1 capsule (20 mg) by mouth daily 11/29/2020 at am Yes Luis Armando Segovia MD   fluticasone-salmeterol (ADVAIR DISKUS) 500-50 MCG/DOSE inhaler 1 inhalation 2 times per day 11/29/2020 at am Yes Luis Armando Segovia MD   imiquimod (ALDARA) 5 % external cream Apply topically At Bedtime -wash off after 8 hours and my use for up to 16 weeks. 11/28/2020 at pm Yes Luis Armando Segovia MD   loperamide (IMODIUM A-D) 2 MG tablet Take 2 tabs (4 mg) after first loose stool, and then take one tab (2 mg) after each diarrheal stool.  Max of 8 tabs (16 mg) per day. prn at prn Yes Luis Armando Segovia MD   LORazepam (ATIVAN) 1 MG tablet TAKE 1 TABLET (1 MG) BY MOUTH 2 TIMES DAILY AS NEEDED FOR ANXIETY 11/28/2020 at Unknown time Yes Luis Armando Segovia MD   losartan (COZAAR) 100 MG tablet Take 1 tablet (100 mg) by mouth daily 11/29/2020 at am Yes Luis Armando Segovia MD   metoprolol tartrate (LOPRESSOR) 100 MG tablet TAKE 1 TABLET BY MOUTH TWICE A DAY 11/29/2020 at am Yes Luis Armando Segovia MD   MULTIVITAMIN TABS   OR  11/29/2020 at am Yes Luciano Mota MD   naloxone (NARCAN) 4 MG/0.1ML nasal spray Spray 1 spray (4 mg) into one nostril alternating nostrils as needed for opioid reversal every 2-3 minutes until assistance arrives prn at prn Yes Tamara Mcneil APRN CNP   nitroglycerin (NITROSTAT) 0.4 MG sublingual tablet Place 1 tablet (0.4 mg) under the tongue every 5 minutes as needed for chest pain If you are still having symptoms after 3 doses (15 minutes) call 911. 11/29/2020 at Unknown time Yes  "Luis Armando Segovia MD   omeprazole (PRILOSEC) 20 MG DR capsule Take 1 capsule (20 mg) by mouth daily 11/29/2020 at am Yes Luis Armando Segovia MD   ondansetron (ZOFRAN-ODT) 4 MG ODT tab Take 1 tablet (4 mg) by mouth every 8 hours as needed for nausea prn at prn Yes Luis Armando Segovia MD   sildenafil (REVATIO) 20 MG tablet TAKE 2 TO 5 TABLETS BY MOUTH DAILY AS NEEDED prn at prn Yes Luis Armando Segovia MD   Syringe/Needle, Disp, (SYRINGE LUER LOCK) 20G X 1-1/2\" 3 ML MISC 1 Device once a week - and also needs 22 G needles 1.5 inch #30 with 1 refill dme at dme Yes Luis Armando Segovia MD   testosterone cypionate (DEPOTESTOSTERONE) 200 MG/ML injection INJECT 0.5 MLS (100 MG) INTO THE MUSCLE ONCE A WEEK 11/23/2020 at n/a Yes Luis Armando Segovia MD   VITAMIN C 100 MG OR TABS 1 TABLET 3 TIMES DAILY  Patient taking differently: Take 1 mg by mouth 2 times daily  11/28/2020 at Unknown time Yes BARTOLOME Clarke MD   zolpidem (AMBIEN) 10 MG tablet Take 1 tablet (10 mg) by mouth nightly as needed for sleep 11/27/2020 at pm Yes Luis Armando Segovia MD       The information provided in this note is only as accurate as the sources available at the time of the update(s).   "

## 2020-11-29 NOTE — ED NOTES
[facility]  ED Nurse Handoff Report    ED Chief complaint: Chest Pain      ED Diagnosis:   Final diagnoses:   NSTEMI (non-ST elevated myocardial infarction) (H)   Renal insufficiency       Code Status: Full Code    Allergies:   Allergies   Allergen Reactions     Acetaminophen Other (See Comments)     headache     Gabapentin      Suicidal thoughts     Morphine Other (See Comments)     headache     Sulfa Drugs      Theophylline Hives       Patient Story: chest pain  Focused Assessment:  Patient presents to ED with chest pain started yesterday, pain radiating to right neck, shoulder and arm. Took ativan last night and felt better for short period of time and woke up with pain again.     Treatments and/or interventions provided: Ativan, Nitroglycerin sl  Patient's response to treatments and/or interventions: Pain has been improving    To be done/followed up on inpatient unit:  close monitoring    Does this patient have any cognitive concerns?: na    Activity level - Baseline/Home:  Independent  Activity Level - Current:   Independent    Patient's Preferred language: English   Needed?: No    Isolation: None  Infection: Not Applicable  Patient tested for COVID 19 prior to admission: YES  Bariatric?: No    Vital Signs:   Vitals:    11/29/20 1412 11/29/20 1440 11/29/20 1530   BP: (!) 154/87 (!) 118/99    Pulse: 81 82 79   Resp: 20 18 10   Temp: 98.5  F (36.9  C) 98.4  F (36.9  C)    TempSrc: Temporal Oral    SpO2: 97% 96% 93%   Weight: 115.7 kg (255 lb)     Height: 1.829 m (6')         Cardiac Rhythm:     Was the PSS-3 completed:   Yes  What interventions are required if any?               Family Comments: na  OBS brochure/video discussed/provided to patient/family: N/A              Name of person given brochure if not patient: na              Relationship to patient: na    For the majority of the shift this patient's behavior was green.   Behavioral interventions performed were none.    ED NURSE  PHONE NUMBER: *[phone]
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
## 2020-11-29 NOTE — PROGRESS NOTES
RECEIVING UNIT ED HANDOFF REVIEW    ED Nurse Handoff Report was reviewed by: Apple Johnson RN on November 29, 2020 at 4:26 PM

## 2020-11-30 ENCOUNTER — APPOINTMENT (OUTPATIENT)
Dept: CARDIOLOGY | Facility: CLINIC | Age: 50
DRG: 281 | End: 2020-11-30
Attending: STUDENT IN AN ORGANIZED HEALTH CARE EDUCATION/TRAINING PROGRAM
Payer: COMMERCIAL

## 2020-11-30 VITALS
DIASTOLIC BLOOD PRESSURE: 77 MMHG | OXYGEN SATURATION: 96 % | RESPIRATION RATE: 16 BRPM | HEIGHT: 72 IN | WEIGHT: 255 LBS | HEART RATE: 86 BPM | SYSTOLIC BLOOD PRESSURE: 136 MMHG | BODY MASS INDEX: 34.54 KG/M2 | TEMPERATURE: 98.2 F

## 2020-11-30 DIAGNOSIS — G47.00 INSOMNIA, UNSPECIFIED TYPE: ICD-10-CM

## 2020-11-30 LAB
ANION GAP SERPL CALCULATED.3IONS-SCNC: 5 MMOL/L (ref 3–14)
BUN SERPL-MCNC: 29 MG/DL (ref 7–30)
CALCIUM SERPL-MCNC: 8.2 MG/DL (ref 8.5–10.1)
CHLORIDE SERPL-SCNC: 103 MMOL/L (ref 94–109)
CHOLEST SERPL-MCNC: 132 MG/DL
CO2 SERPL-SCNC: 30 MMOL/L (ref 20–32)
CREAT SERPL-MCNC: 2.62 MG/DL (ref 0.66–1.25)
ERYTHROCYTE [DISTWIDTH] IN BLOOD BY AUTOMATED COUNT: 15.2 % (ref 10–15)
GFR SERPL CREATININE-BSD FRML MDRD: 27 ML/MIN/{1.73_M2}
GLUCOSE BLDC GLUCOMTR-MCNC: 89 MG/DL (ref 70–99)
GLUCOSE SERPL-MCNC: 85 MG/DL (ref 70–99)
HBA1C MFR BLD: 5.2 % (ref 0–5.6)
HCT VFR BLD AUTO: 44.2 % (ref 40–53)
HDLC SERPL-MCNC: 27 MG/DL
HGB BLD-MCNC: 14.5 G/DL (ref 13.3–17.7)
LDLC SERPL CALC-MCNC: 50 MG/DL
MCH RBC QN AUTO: 29.6 PG (ref 26.5–33)
MCHC RBC AUTO-ENTMCNC: 32.8 G/DL (ref 31.5–36.5)
MCV RBC AUTO: 90 FL (ref 78–100)
NONHDLC SERPL-MCNC: 105 MG/DL
PLATELET # BLD AUTO: 154 10E9/L (ref 150–450)
POTASSIUM SERPL-SCNC: 4.1 MMOL/L (ref 3.4–5.3)
RBC # BLD AUTO: 4.9 10E12/L (ref 4.4–5.9)
SODIUM SERPL-SCNC: 138 MMOL/L (ref 133–144)
TRIGL SERPL-MCNC: 275 MG/DL
TROPONIN I SERPL-MCNC: 13.25 UG/L (ref 0–0.04)
TROPONIN I SERPL-MCNC: 7.9 UG/L (ref 0–0.04)
UFH PPP CHRO-ACNC: 0.18 IU/ML
WBC # BLD AUTO: 10 10E9/L (ref 4–11)

## 2020-11-30 PROCEDURE — 93005 ELECTROCARDIOGRAM TRACING: CPT

## 2020-11-30 PROCEDURE — 99239 HOSP IP/OBS DSCHRG MGMT >30: CPT | Performed by: INTERNAL MEDICINE

## 2020-11-30 PROCEDURE — C1894 INTRO/SHEATH, NON-LASER: HCPCS | Performed by: INTERNAL MEDICINE

## 2020-11-30 PROCEDURE — 36415 COLL VENOUS BLD VENIPUNCTURE: CPT | Performed by: STUDENT IN AN ORGANIZED HEALTH CARE EDUCATION/TRAINING PROGRAM

## 2020-11-30 PROCEDURE — 272N000001 HC OR GENERAL SUPPLY STERILE: Performed by: INTERNAL MEDICINE

## 2020-11-30 PROCEDURE — 250N000013 HC RX MED GY IP 250 OP 250 PS 637: Performed by: STUDENT IN AN ORGANIZED HEALTH CARE EDUCATION/TRAINING PROGRAM

## 2020-11-30 PROCEDURE — 250N000011 HC RX IP 250 OP 636: Performed by: INTERNAL MEDICINE

## 2020-11-30 PROCEDURE — 250N000013 HC RX MED GY IP 250 OP 250 PS 637: Performed by: INTERNAL MEDICINE

## 2020-11-30 PROCEDURE — B2111ZZ FLUOROSCOPY OF MULTIPLE CORONARY ARTERIES USING LOW OSMOLAR CONTRAST: ICD-10-PCS | Performed by: INTERNAL MEDICINE

## 2020-11-30 PROCEDURE — 999N001017 HC STATISTIC GLUCOSE BY METER IP

## 2020-11-30 PROCEDURE — 80048 BASIC METABOLIC PNL TOTAL CA: CPT | Performed by: STUDENT IN AN ORGANIZED HEALTH CARE EDUCATION/TRAINING PROGRAM

## 2020-11-30 PROCEDURE — 99153 MOD SED SAME PHYS/QHP EA: CPT | Performed by: INTERNAL MEDICINE

## 2020-11-30 PROCEDURE — 250N000011 HC RX IP 250 OP 636

## 2020-11-30 PROCEDURE — 80061 LIPID PANEL: CPT | Performed by: STUDENT IN AN ORGANIZED HEALTH CARE EDUCATION/TRAINING PROGRAM

## 2020-11-30 PROCEDURE — 85520 HEPARIN ASSAY: CPT | Performed by: INTERNAL MEDICINE

## 2020-11-30 PROCEDURE — 84484 ASSAY OF TROPONIN QUANT: CPT | Performed by: STUDENT IN AN ORGANIZED HEALTH CARE EDUCATION/TRAINING PROGRAM

## 2020-11-30 PROCEDURE — 85027 COMPLETE CBC AUTOMATED: CPT | Performed by: STUDENT IN AN ORGANIZED HEALTH CARE EDUCATION/TRAINING PROGRAM

## 2020-11-30 PROCEDURE — 258N000003 HC RX IP 258 OP 636: Performed by: INTERNAL MEDICINE

## 2020-11-30 PROCEDURE — 93306 TTE W/DOPPLER COMPLETE: CPT | Mod: 26 | Performed by: INTERNAL MEDICINE

## 2020-11-30 PROCEDURE — 83036 HEMOGLOBIN GLYCOSYLATED A1C: CPT | Performed by: STUDENT IN AN ORGANIZED HEALTH CARE EDUCATION/TRAINING PROGRAM

## 2020-11-30 PROCEDURE — C1887 CATHETER, GUIDING: HCPCS | Performed by: INTERNAL MEDICINE

## 2020-11-30 PROCEDURE — 99152 MOD SED SAME PHYS/QHP 5/>YRS: CPT | Performed by: INTERNAL MEDICINE

## 2020-11-30 PROCEDURE — 93010 ELECTROCARDIOGRAM REPORT: CPT | Mod: 76 | Performed by: INTERNAL MEDICINE

## 2020-11-30 PROCEDURE — 99223 1ST HOSP IP/OBS HIGH 75: CPT | Mod: 25 | Performed by: INTERNAL MEDICINE

## 2020-11-30 PROCEDURE — 250N000011 HC RX IP 250 OP 636: Performed by: STUDENT IN AN ORGANIZED HEALTH CARE EDUCATION/TRAINING PROGRAM

## 2020-11-30 PROCEDURE — 85520 HEPARIN ASSAY: CPT | Performed by: STUDENT IN AN ORGANIZED HEALTH CARE EDUCATION/TRAINING PROGRAM

## 2020-11-30 PROCEDURE — 255N000002 HC RX 255 OP 636: Performed by: STUDENT IN AN ORGANIZED HEALTH CARE EDUCATION/TRAINING PROGRAM

## 2020-11-30 PROCEDURE — 250N000009 HC RX 250: Performed by: INTERNAL MEDICINE

## 2020-11-30 PROCEDURE — 999N000208 ECHOCARDIOGRAM COMPLETE

## 2020-11-30 PROCEDURE — 93454 CORONARY ARTERY ANGIO S&I: CPT | Performed by: INTERNAL MEDICINE

## 2020-11-30 RX ORDER — FENTANYL CITRATE 50 UG/ML
25 INJECTION, SOLUTION INTRAMUSCULAR; INTRAVENOUS ONCE
Status: COMPLETED | OUTPATIENT
Start: 2020-11-30 | End: 2020-11-30

## 2020-11-30 RX ORDER — BUDESONIDE AND FORMOTEROL FUMARATE DIHYDRATE 160; 4.5 UG/1; UG/1
2 AEROSOL RESPIRATORY (INHALATION) 2 TIMES DAILY
Status: DISCONTINUED | OUTPATIENT
Start: 2020-11-30 | End: 2020-11-30 | Stop reason: CLARIF

## 2020-11-30 RX ORDER — ACETAMINOPHEN 325 MG/1
650 TABLET ORAL EVERY 4 HOURS PRN
Status: DISCONTINUED | OUTPATIENT
Start: 2020-11-30 | End: 2020-11-30 | Stop reason: HOSPADM

## 2020-11-30 RX ORDER — LORAZEPAM 0.5 MG/1
0.5 TABLET ORAL
Status: DISCONTINUED | OUTPATIENT
Start: 2020-11-30 | End: 2020-11-30 | Stop reason: HOSPADM

## 2020-11-30 RX ORDER — ATROPINE SULFATE 0.1 MG/ML
0.5 INJECTION INTRAVENOUS
Status: DISCONTINUED | OUTPATIENT
Start: 2020-11-30 | End: 2020-11-30 | Stop reason: HOSPADM

## 2020-11-30 RX ORDER — SODIUM CHLORIDE 9 MG/ML
INJECTION, SOLUTION INTRAVENOUS CONTINUOUS
Status: DISCONTINUED | OUTPATIENT
Start: 2020-11-30 | End: 2020-11-30 | Stop reason: HOSPADM

## 2020-11-30 RX ORDER — FENTANYL CITRATE 50 UG/ML
INJECTION, SOLUTION INTRAMUSCULAR; INTRAVENOUS
Status: DISCONTINUED | OUTPATIENT
Start: 2020-11-30 | End: 2020-11-30 | Stop reason: HOSPADM

## 2020-11-30 RX ORDER — NITROGLYCERIN 20 MG/100ML
INJECTION INTRAVENOUS
Status: COMPLETED
Start: 2020-11-30 | End: 2020-11-30

## 2020-11-30 RX ORDER — ONDANSETRON 2 MG/ML
INJECTION INTRAMUSCULAR; INTRAVENOUS
Status: COMPLETED
Start: 2020-11-30 | End: 2020-11-30

## 2020-11-30 RX ORDER — VERAPAMIL HYDROCHLORIDE 2.5 MG/ML
INJECTION, SOLUTION INTRAVENOUS
Status: DISCONTINUED | OUTPATIENT
Start: 2020-11-30 | End: 2020-11-30 | Stop reason: HOSPADM

## 2020-11-30 RX ORDER — HEPARIN SODIUM 1000 [USP'U]/ML
INJECTION, SOLUTION INTRAVENOUS; SUBCUTANEOUS
Status: DISCONTINUED | OUTPATIENT
Start: 2020-11-30 | End: 2020-11-30 | Stop reason: HOSPADM

## 2020-11-30 RX ORDER — IOPAMIDOL 755 MG/ML
INJECTION, SOLUTION INTRAVASCULAR
Status: DISCONTINUED | OUTPATIENT
Start: 2020-11-30 | End: 2020-11-30 | Stop reason: HOSPADM

## 2020-11-30 RX ORDER — ONDANSETRON 2 MG/ML
4 INJECTION INTRAMUSCULAR; INTRAVENOUS EVERY 6 HOURS PRN
Status: DISCONTINUED | OUTPATIENT
Start: 2020-11-30 | End: 2020-11-30 | Stop reason: HOSPADM

## 2020-11-30 RX ORDER — OXYCODONE AND ACETAMINOPHEN 5; 325 MG/1; MG/1
1 TABLET ORAL EVERY 4 HOURS PRN
Status: DISCONTINUED | OUTPATIENT
Start: 2020-11-30 | End: 2020-11-30

## 2020-11-30 RX ORDER — NITROGLYCERIN 5 MG/ML
VIAL (ML) INTRAVENOUS
Status: DISCONTINUED | OUTPATIENT
Start: 2020-11-30 | End: 2020-11-30 | Stop reason: HOSPADM

## 2020-11-30 RX ORDER — OXYCODONE HYDROCHLORIDE 5 MG/1
5 TABLET ORAL EVERY 4 HOURS PRN
Status: DISCONTINUED | OUTPATIENT
Start: 2020-11-30 | End: 2020-11-30 | Stop reason: HOSPADM

## 2020-11-30 RX ORDER — LIDOCAINE 40 MG/G
CREAM TOPICAL
Status: DISCONTINUED | OUTPATIENT
Start: 2020-11-30 | End: 2020-11-30

## 2020-11-30 RX ORDER — FENTANYL CITRATE 50 UG/ML
25-50 INJECTION, SOLUTION INTRAMUSCULAR; INTRAVENOUS
Status: ACTIVE | OUTPATIENT
Start: 2020-11-30 | End: 2020-11-30

## 2020-11-30 RX ORDER — ASPIRIN 81 MG/1
81 TABLET ORAL DAILY
Status: DISCONTINUED | OUTPATIENT
Start: 2020-11-30 | End: 2020-11-30

## 2020-11-30 RX ORDER — FLUMAZENIL 0.1 MG/ML
0.2 INJECTION, SOLUTION INTRAVENOUS
Status: DISCONTINUED | OUTPATIENT
Start: 2020-11-30 | End: 2020-11-30 | Stop reason: HOSPADM

## 2020-11-30 RX ORDER — POTASSIUM CHLORIDE 1500 MG/1
20 TABLET, EXTENDED RELEASE ORAL
Status: DISCONTINUED | OUTPATIENT
Start: 2020-11-30 | End: 2020-11-30 | Stop reason: HOSPADM

## 2020-11-30 RX ORDER — CLOPIDOGREL BISULFATE 75 MG/1
TABLET ORAL
Status: DISCONTINUED | OUTPATIENT
Start: 2020-11-30 | End: 2020-11-30 | Stop reason: HOSPADM

## 2020-11-30 RX ORDER — NALOXONE HYDROCHLORIDE 0.4 MG/ML
.2-.4 INJECTION, SOLUTION INTRAMUSCULAR; INTRAVENOUS; SUBCUTANEOUS
Status: DISCONTINUED | OUTPATIENT
Start: 2020-11-30 | End: 2020-11-30 | Stop reason: HOSPADM

## 2020-11-30 RX ORDER — ZOLPIDEM TARTRATE 10 MG/1
10 TABLET ORAL
Qty: 30 TABLET | Refills: 1 | OUTPATIENT
Start: 2020-11-30

## 2020-11-30 RX ORDER — LORAZEPAM 2 MG/ML
0.5 INJECTION INTRAMUSCULAR
Status: DISCONTINUED | OUTPATIENT
Start: 2020-11-30 | End: 2020-11-30 | Stop reason: HOSPADM

## 2020-11-30 RX ORDER — ONDANSETRON 4 MG/1
4 TABLET, ORALLY DISINTEGRATING ORAL EVERY 6 HOURS PRN
Status: DISCONTINUED | OUTPATIENT
Start: 2020-11-30 | End: 2020-11-30 | Stop reason: HOSPADM

## 2020-11-30 RX ORDER — CLOPIDOGREL BISULFATE 75 MG/1
75 TABLET ORAL DAILY
Qty: 30 TABLET | Refills: 0 | Status: SHIPPED | OUTPATIENT
Start: 2020-11-30 | End: 2020-12-03

## 2020-11-30 RX ADMIN — HEPARIN SODIUM 1350 UNITS/HR: 10000 INJECTION, SOLUTION INTRAVENOUS at 06:14

## 2020-11-30 RX ADMIN — OXYCODONE HYDROCHLORIDE 5 MG: 5 TABLET ORAL at 13:52

## 2020-11-30 RX ADMIN — OXYCODONE HYDROCHLORIDE 5 MG: 5 TABLET ORAL at 18:20

## 2020-11-30 RX ADMIN — AMLODIPINE BESYLATE 10 MG: 10 TABLET ORAL at 18:23

## 2020-11-30 RX ADMIN — OXYCODONE HYDROCHLORIDE AND ACETAMINOPHEN 1 TABLET: 5; 325 TABLET ORAL at 03:40

## 2020-11-30 RX ADMIN — OXYCODONE HYDROCHLORIDE AND ACETAMINOPHEN 1 TABLET: 5; 325 TABLET ORAL at 07:41

## 2020-11-30 RX ADMIN — LOSARTAN POTASSIUM 100 MG: 100 TABLET, FILM COATED ORAL at 18:23

## 2020-11-30 RX ADMIN — LORAZEPAM 1 MG: 1 TABLET ORAL at 07:42

## 2020-11-30 RX ADMIN — ASPIRIN 325 MG: 325 TABLET, COATED ORAL at 12:09

## 2020-11-30 RX ADMIN — SODIUM CHLORIDE: 9 INJECTION, SOLUTION INTRAVENOUS at 13:52

## 2020-11-30 RX ADMIN — ONDANSETRON 4 MG: 2 INJECTION INTRAMUSCULAR; INTRAVENOUS at 00:32

## 2020-11-30 RX ADMIN — NITROGLYCERIN 90 MCG: 20 INJECTION INTRAVENOUS at 09:18

## 2020-11-30 RX ADMIN — FENTANYL CITRATE 25 MCG: 50 INJECTION, SOLUTION INTRAMUSCULAR; INTRAVENOUS at 01:49

## 2020-11-30 RX ADMIN — ASPIRIN 81 MG: 81 TABLET ORAL at 18:23

## 2020-11-30 RX ADMIN — HUMAN ALBUMIN MICROSPHERES AND PERFLUTREN 9 ML: 10; .22 INJECTION, SOLUTION INTRAVENOUS at 09:30

## 2020-11-30 ASSESSMENT — ACTIVITIES OF DAILY LIVING (ADL)
ADLS_ACUITY_SCORE: 10

## 2020-11-30 NOTE — TELEPHONE ENCOUNTER
zolpidem (AMBIEN) tablet 10 mg          Last Written Prescription Date:  11-29-20  Last Fill Quantity: 1,  # refills: prn  Last office visit: 8/24/2020 with prescribing provider:  Luis Armando Segovia MD       Future Office Visit:        Routing refill request to provider for review/approval because:  Drug not on the [facility], [facility] or [facility] refill protocol or controlled substance

## 2020-11-30 NOTE — PROGRESS NOTES
SUBJECTIVE:   CC: Jeremi Damon is an 47 year old male who presents for preventative health visit.     Healthy Habits:    Do you get at least three servings of calcium containing foods daily (dairy, green leafy vegetables, etc.)? yes    Amount of exercise or daily activities, outside of work: 1-2 day(s) per week    Problems taking medications regularly No    Medication side effects: No    Have you had an eye exam in the past two years? yes    Do you see a dentist twice per year? yes    Do you have sleep apnea, excessive snoring or daytime drowsiness?snore      Chronic Pain -- he has been at  pain - on oxy 10 mg TID prn -   Working with [facility] but they are not taking his insurance anymore and has an appointment at [facility] on 11/2/2017 (24 days form now)   He had a fusion L5/S1 and has had steroid injections that will help for a couple of weeks.  He has been on cymbalta (off now) has tried lyrica, gabapentin    Asthma -- uses Symbicort  BID and albuterol 2 times per day.  Symptoms worse with anxiety, exercise -   No cough lately.     GERD -- some bloating    Loose stools - no blood - onset years ago.  No mucous.  No t tried imodium.  Uses lipase OTC - gluten and lactose sensitive.     HTN -- elevated lately.     Anxiety/Depression -- -some suicidal thoughts. Feels like he is in a constant state of panic.  Lorazepam 1 mg does not seem to relieve his panic.     Today's PHQ-2 Score:   PHQ-2 ( 1999 Pfizer) 7/19/2012 2/21/2012   Q1: Little interest or pleasure in doing things 0 0   Q2: Feeling down, depressed or hopeless 0 0   PHQ-2 Score 0 0     Abuse: Current or Past(Physical, Sexual or Emotional)- No  Do you feel safe in your environment - Yes    Social History   Substance Use Topics     Smoking status: Never Smoker     Smokeless tobacco: Never Used     Alcohol use No      Comment: rarely      The patient does not drink >3 drinks per day nor >7 drinks per week.    Last PSA:   PSA   Date Value Ref Range Status    01/06/2017 0.42 0 - 4 ug/L Final     Comment:     Assay Method:  Chemiluminescence using Siemens Vista analyzer       Reviewed orders with patient. Reviewed health maintenance and updated orders accordingly - Yes  Labs reviewed in EPIC    Reviewed and updated as needed this visit by clinical staff  Tobacco  Allergies  Meds  Med Hx  Surg Hx  Fam Hx  Soc Hx      Reviewed and updated as needed this visit by Provider  Allergies        Past Medical History:   Diagnosis Date     Allergic rhinitis, cause unspecified     Allergic rhinitis     Benign hypertension      Hiccough      Hypersomnia with sleep apnea, unspecified      Major depressive disorder, single episode, moderate (H) 3/08     Mild persistent asthma      Other primary cardiomyopathies     Cardiomyopathy -- unknown etiology        ROS:  Constitutional, HEENT, cardiovascular, pulmonary, GI, , musculoskeletal, neuro, skin, endocrine and psych systems are negative, except as otherwise noted.    OBJECTIVE:   BP (!) 160/102 (BP Location: Left arm, Patient Position: Chair, Cuff Size: Adult Large)  Pulse 100  Temp 98.3  F (36.8  C) (Oral)  Ht 6' (1.829 m)  Wt 259 lb (117.5 kg)  SpO2 92%  BMI 35.13 kg/m2  EXAM:  GENERAL: healthy, alert and no distress  EYES: Eyes grossly normal to inspection, PERRL and conjunctivae and sclerae normal  HENT: ear canals and TM's normal, nose and mouth without ulcers or lesions  NECK: no adenopathy, no asymmetry, masses, or scars and thyroid normal to palpation  RESP: lungs clear to auscultation - no rales, rhonchi or wheezes  BREAST: normal without masses, tenderness or nipple discharge and no palpable axillary masses or adenopathy  CV: regular rate and rhythm, normal S1 S2, no S3 or S4, no murmur, click or rub, no peripheral edema and peripheral pulses strong  ABDOMEN: soft, nontender, no hepatosplenomegaly, no masses and bowel sounds normal   (male): testicles normal without atrophy or masses, no hernias and penis  normal without urethral discharge  RECTAL: normal sphincter tone, no rectal masses and prostate of normal size for age, smooth, nontender without masses/nodules  MS: no gross musculoskeletal defects noted, no edema  SKIN: no suspicious lesions or rashes  NEURO: Normal strength and tone, mentation intact and speech normal  PSYCH: mentation appears normal, affect normal/bright    ASSESSMENT/PLAN:   Jeremi was seen today for physical.    Diagnoses and all orders for this visit:    Routine general medical examination at a health care facility  -     Lipid panel reflex to direct LDL  -     Comprehensive metabolic panel (BMP + Alb, Alk Phos, ALT, AST, Total. Bili, TP)    Essential hypertension with goal blood pressure less than 140/90  -     metoprolol (LOPRESSOR) 50 MG tablet; Take 1 tablet (50 mg) by mouth 2 times daily  -     hydrochlorothiazide (HYDRODIURIL) 25 MG tablet; Take 1 tablet (25 mg) by mouth daily  -     losartan (COZAAR) 100 MG tablet; Take 1 tablet (100 mg) by mouth daily  -     amLODIPine (NORVASC) 10 MG tablet; Take 1 tablet (10 mg) by mouth daily  -     Comprehensive metabolic panel (BMP + Alb, Alk Phos, ALT, AST, Total. Bili, TP)    Elevated hemoglobin (H)  -     CBC with platelets and differential    Left-sided low back pain with left-sided sciatica, unspecified chronicity  -     oxyCODONE (ROXICODONE) 10 MG IR tablet; Take 1-2 tablets (10-20 mg) by mouth every 6 hours as needed for severe pain - max 6 tabs / day    Mild persistent asthma without complication - not doing the best - continue inhalers    Major Depressive Disorder, Recurrent Episode, Mode - not doing well will start:  -     buPROPion (WELLBUTRIN XL) 150 MG 24 hr tablet; Take 1 tablet (150 mg) by mouth every morning    CKD (chronic kidney disease) stage 3, GFR 30-59 ml/min  -     Comprehensive metabolic panel (BMP + Alb, Alk Phos, ALT, AST, Total. Bili, TP)    Hypertension goal BP (blood pressure) < 140/90  -     Comprehensive metabolic  "panel (BMP + Alb, Alk Phos, ALT, AST, Total. Bili, TP)    Generalized anxiety disorder    -     LORazepam (ATIVAN) 1 MG tablet; Take 1 tablet (1 mg) by mouth 2 times daily as needed for anxiety attacks.   -     clonazePAM (KLONOPIN) 1 MG tablet; Take 0.5-1 tablets (0.5-1 mg) by mouth daily    Need for prophylactic vaccination and inoculation against influenza  -     HC FLU VAC PRESRV FREE QUAD SPLIT VIR 3+YRS IM  [[id number]]  -     VACCINE ADMINISTRATION, INITIAL      Screening for colon cancer  -     Fecal colorectal cancer screen (FIT); Future    Seasonal affective disorder (H)  -     buPROPion (WELLBUTRIN XL) 150 MG 24 hr tablet; Take 1 tablet (150 mg) by mouth every morning    Panic disorder without agoraphobia  -     LORazepam (ATIVAN) 1 MG tablet; Take 1 tablet (1 mg) by mouth 2 times daily as needed for anxiety    Hypogonadism in male  -     Testosterone Free and Total  -     Syringe/Needle, Disp, (SYRINGE LUER LOCK) 20G X 1-1/2\" 3 ML MISC; 1 Device once a week - and also needs 22 G needles 1.5 inch #30 with 1 refill    Diarrhea, unspecified type    Loose stools  -     loperamide (IMODIUM A-D) 2 MG tablet; Take 1 tablet (2 mg) by mouth 3 times daily as needed for diarrhea    Other orders  -     Cancel: ONC/HEME ADULT REFERRAL        COUNSELING:  Reviewed preventive health counseling, as reflected in patient instructions   reports that he has never smoked. He has never used smokeless tobacco.  Estimated body mass index is 35.13 kg/(m^2) as calculated from the following:    Height as of this encounter: 6' (1.829 m).    Weight as of this encounter: 259 lb (117.5 kg).   Weight management plan: Discussed healthy diet and exercise guidelines and patient will follow up in 12 months in clinic to re-evaluate.    Counseling Resources:  ATP IV Guidelines  Pooled Cohorts Equation Calculator  FRAX Risk Assessment  ICSI Preventive Guidelines  Dietary Guidelines for Americans, 2010  USDA's MyPlate  ASA Prophylaxis  Lung CA " Screening      Luis Armando Segovia MD  [facility] [place]   Hemigard Intro: Due to skin fragility and wound tension, it was decided to use HEMIGARD adhesive retention suture devices to permit a linear closure. The skin was cleaned and dried for a 6cm distance away from the wound. Excessive hair, if present, was removed to allow for adhesion.

## 2020-11-30 NOTE — CONSULTS
[facility]    Cardiology Consultation     Date of Admission:  11/29/2020    Assessment & Plan     This is a 50 year old with history of CKD, HTN here for chest pain and found to have TN peak to 13.775, new BBB on EKG, normal echocardiogram.     1. NSTEMI with troponin of 13.775 peak     -Aspirin loaded with 325 mg --> aspirin 81 mg daily  -heparin and nitroglycerin infusion   -coronary angiogram today   -echocardiogram normal LV function and no wall motion abnormalities but severe concentric hypertrophy. In setting of troponin elevation, infiltrative (e.g. amyloid) cardiomyopathy needs to be considered versus HOCM. Details unclear of cardiomyopathy as patient denies cardiac history or seeing cardiologist.  -will attempt to obtain cardiac MRI (versus outpatient)     2. PEDRO: Creatinine 2.6, pre-cath and post-cath hydration     -recommend renal consult (unclear etiology)    3. HTN: continue home regimen, goal SBP < 130 mmHg      Gilda Keita MD    Code Status    Full Code    Reason for Consult     Chest pain     Primary Care Physician   *Luis Armando Segovia    Chief Complaint     Chest pain/jaw pain     History of Present Illness     This is a 50 year old male with PMH CKD, HTN, hypertrophic cardiomyopathy (details unclear), HTN, CKD here for evaluation of chest pain. Patient reports that he was in his usual state of health until the day prior to admission when he experienced onset of centralized chest pain that radiated into his shoulder/right upper arm and his jaw.  He took some lorazepam with no improvement in symptoms.  He then had symptoms again today around 9:30 AM, he describes it as a chest tightness and again radiated into his shoulder/arms and jaw. In total he had four episodes in past few days, all at rest. He has been quarantining he says prior to seeing a friend coming to town. He denies any COVID-19 contacts. Denies fevers, chills, ns. No LE edema, orthopnea or PND.       Past  "Medical History   I have reviewed this patient's medical history and updated it with pertinent information if needed.   Past Medical History:   Diagnosis Date     Allergic rhinitis, cause unspecified     Allergic rhinitis     Benign hypertension      Chronic back pain      Hiccough      Hypersomnia with sleep apnea, unspecified      Major depressive disorder, single episode, moderate (H) 3/08     Mild persistent asthma      Other primary cardiomyopathies     Cardiomyopathy -- unknown etiology       Past Surgical History   I have reviewed this patient's surgical history and updated it with pertinent information if needed.  Past Surgical History:   Procedure Laterality Date     APPENDECTOMY       BACK SURGERY      L5-S1 fusion     HC REPAIR OF NASAL SEPTUM       LAPAROSCOPIC CHOLECYSTECTOMY      Cholecystectomy, Laparoscopic     SURGICAL HISTORY OF -       torn pectoral muscle     SURGICAL HISTORY OF -       Bilateral radiofrequency volume reduction of the inferior turbinates.     SURGICAL HISTORY OF -       rhinoplasty- septoplasty       Prior to Admission Medications   Prior to Admission Medications   Prescriptions Last Dose Informant Patient Reported? Taking?   FLUoxetine (PROZAC) 20 MG capsule 2020 at am  No Yes   Sig: Take 1 capsule (20 mg) by mouth daily   LORazepam (ATIVAN) 1 MG tablet 2020 at Unknown time  No Yes   Sig: TAKE 1 TABLET (1 MG) BY MOUTH 2 TIMES DAILY AS NEEDED FOR ANXIETY   MULTIVITAMIN TABS   OR 2020 at am  No Yes   Patient taking differently: 1 tablet daily   Syringe/Needle, Disp, (SYRINGE LUER LOCK) 20G X 1-1/2\" 3 ML MISC dme at dme  No Yes   Si Device once a week - and also needs 22 G needles 1.5 inch #30 with 1 refill   VITAMIN C 100 MG OR TABS 2020 at Unknown time  No Yes   Si TABLET 3 TIMES DAILY   Patient taking differently: Take 1 mg by mouth 2 times daily    albuterol (PROAIR HFA/PROVENTIL HFA/VENTOLIN HFA) 108 (90 Base) MCG/ACT inhaler " 2020 at Unknown time  No Yes   Sig: Inhale 2 puffs into the lungs every 6 hours   amLODIPine (NORVASC) 10 MG tablet 2020 at am  No Yes   Sig: Take 1 tablet (10 mg) by mouth daily   amitriptyline (ELAVIL) 50 MG tablet 2020 at pm  No Yes   Sig: TAKE 1 TO 3 TABLETS ( MG) BY MOUTH AT BEDTIME   amphetamine-dextroamphetamine (ADDERALL) 20 MG tablet 2020 at am  No Yes   Sig: Take 1 tablet (20 mg) by mouth 2 times daily   buPROPion (WELLBUTRIN XL) 300 MG 24 hr tablet 2020 at am  No Yes   Sig: Take 1 tablet (300 mg) by mouth every morning   budesonide-formoterol (SYMBICORT) 160-4.5 MCG/ACT Inhaler 2020 at am  No Yes   Sig: Inhale 2 puffs into the lungs 2 times daily   buprenorphine HCl-naloxone HCl (SUBOXONE) 8-2 MG per film 2020 at am  No Yes   Si/2 film QID   cloNIDine (CATAPRES) 0.1 MG tablet 2020 at am  No Yes   Sig: Take 1 tablet (0.1 mg) by mouth 2 times daily   clonazePAM (KLONOPIN) 1 MG tablet prn at prn  No Yes   Sig: Take 1 tablet (1 mg) by mouth 2 times daily as needed (flying phobia)   cyclobenzaprine (FLEXERIL) 10 MG tablet 2020 at pm  No Yes   Sig: Take 1 tablet (10 mg) by mouth 3 times daily as needed for other (hiccups)   finasteride (PROSCAR) 5 MG tablet 2020 at pm  No Yes   Si/2 tab daily   fluconazole (DIFLUCAN) 150 MG tablet prn at prn  No Yes   Sig: TAKE 2 TABLETS (300 MG) BY MOUTH EVERY 14 DAYS - FOR TWO DOSES   fluticasone-salmeterol (ADVAIR DISKUS) 500-50 MCG/DOSE inhaler 2020 at am  No Yes   Si inhalation 2 times per day   imiquimod (ALDARA) 5 % external cream 2020 at pm  No Yes   Sig: Apply topically At Bedtime -wash off after 8 hours and my use for up to 16 weeks.   loperamide (IMODIUM A-D) 2 MG tablet prn at prn  No Yes   Sig: Take 2 tabs (4 mg) after first loose stool, and then take one tab (2 mg) after each diarrheal stool.  Max of 8 tabs (16 mg) per day.   losartan (COZAAR) 100 MG tablet 2020 at am   No Yes   Sig: Take 1 tablet (100 mg) by mouth daily   metoprolol tartrate (LOPRESSOR) 100 MG tablet 11/29/2020 at am  No Yes   Sig: TAKE 1 TABLET BY MOUTH TWICE A DAY   naloxone (NARCAN) 4 MG/0.1ML nasal spray prn at prn  No Yes   Sig: Spray 1 spray (4 mg) into one nostril alternating nostrils as needed for opioid reversal every 2-3 minutes until assistance arrives   nitroglycerin (NITROSTAT) 0.4 MG sublingual tablet 11/29/2020 at Unknown time  No Yes   Sig: Place 1 tablet (0.4 mg) under the tongue every 5 minutes as needed for chest pain If you are still having symptoms after 3 doses (15 minutes) call 911.   omeprazole (PRILOSEC) 20 MG DR capsule 11/29/2020 at am  No Yes   Sig: Take 1 capsule (20 mg) by mouth daily   ondansetron (ZOFRAN-ODT) 4 MG ODT tab prn at prn  No Yes   Sig: Take 1 tablet (4 mg) by mouth every 8 hours as needed for nausea   sildenafil (REVATIO) 20 MG tablet prn at prn  No Yes   Sig: TAKE 2 TO 5 TABLETS BY MOUTH DAILY AS NEEDED   testosterone cypionate (DEPOTESTOSTERONE) 200 MG/ML injection 11/23/2020 at n/a  No Yes   Sig: INJECT 0.5 MLS (100 MG) INTO THE MUSCLE ONCE A WEEK   zolpidem (AMBIEN) 10 MG tablet   No No   Sig: TAKE 1 TABLET (10 MG) BY MOUTH NIGHTLY AS NEEDED FOR SLEEP      Facility-Administered Medications Last Administration Doses Remaining   Botulinum Toxin Type A (BOTOX) 200 units injection 200 Units None recorded 1        Allergies   Allergies   Allergen Reactions     Acetaminophen Other (See Comments)     headache     Gabapentin      Suicidal thoughts     Morphine Other (See Comments)     headache     Sulfa Drugs      Theophylline Hives       Social History   I have reviewed this patient's social history and updated it with pertinent information if needed. Jeremi BARTOLOME Damon  reports that he has never smoked. He has never used smokeless tobacco. He reports that he does not drink alcohol or use drugs.    Family History   I have reviewed this patient's family history and updated it  with pertinent information if needed.   Family History   Problem Relation Age of Onset     Cancer Father         hodgkins     Hypertension Father      Coronary Artery Disease Father      Cardiovascular Maternal Grandmother      Cardiovascular Sister      Cardiovascular Brother         question what     Coronary Artery Disease Mother          55; MI x 2     Hypertension Brother      Prostate Cancer No family hx of      Cancer - colorectal No family hx of        Review of Systems   The 10 point Review of Systems is negative other than noted in the HPI or here.     Physical Exam   Temp: 98.3  F (36.8  C) Temp src: Oral BP: 113/81 Pulse: 75   Resp: 16 SpO2: 92 % O2 Device: None (Room air)    Vital Signs with Ranges  Temp:  [98.3  F (36.8  C)-98.5  F (36.9  C)] 98.3  F (36.8  C)  Pulse:  [] 75  Resp:  [10-20] 16  BP: ()/(53-99) 113/81  SpO2:  [89 %-97 %] 92 %  255 lbs 0 oz    Constitutional: Awake, alert, cooperative, no apparent distress.  Eyes: Conjunctiva and pupils examined and normal.  HEENT: Moist mucous membranes, normal dentition.  Respiratory: Clear to auscultation bilaterally, no crackles or wheezing.  Cardiovascular: Regular rate and rhythm, Systolic ejection murmur  GI: Soft, non-distended, non-tender, normal bowel sounds.  Lymph/Hematologic: No anterior cervical or supraclavicular adenopathy.  Skin: No rashes, no cyanosis, no edema.  Musculoskeletal: No joint swelling, erythema or tenderness.  Neurologic: Cranial nerves 2-12 intact, normal strength and sensation.  Psychiatric: Alert, oriented to person, place and time, no obvious anxiety or depression.     Data   Results for orders placed or performed during the hospital encounter of 20 (from the past 24 hour(s))   Asymptomatic COVID-19 Virus (Coronavirus) by PCR    Specimen: Nasopharyngeal   Result Value Ref Range    COVID-19 Virus PCR to [facility] - Source Nasopharyngeal     COVID-19 Virus PCR to [facility] - Result       Test received-See  reflex to IDDL test SARS CoV2 (COVID-19) Virus RT-PCR   SARS-CoV-2 COVID-19 Virus (Coronavirus) RT-PCR Nasopharyngeal    Specimen: Nasopharyngeal   Result Value Ref Range    SARS-CoV-2 Virus Specimen Source Nasopharyngeal     SARS-CoV-2 PCR Result NEGATIVE     SARS-CoV-2 PCR Comment       Testing was performed using the Xpert Xpress SARS-CoV-2 Assay on the Cepheid Gene-Xpert   Instrument Systems. Additional information about this Emergency Use Authorization (EUA)   assay can be found via the Lab Guide.     Partial thromboplastin time   Result Value Ref Range    PTT 94 (H) 22 - 37 sec   CBC with platelets   Result Value Ref Range    WBC 9.9 4.0 - 11.0 10e9/L    RBC Count 5.42 4.4 - 5.9 10e12/L    Hemoglobin 16.2 13.3 - 17.7 g/dL    Hematocrit 48.7 40.0 - 53.0 %    MCV 90 78 - 100 fl    MCH 29.9 26.5 - 33.0 pg    MCHC 33.3 31.5 - 36.5 g/dL    RDW 15.1 (H) 10.0 - 15.0 %    Platelet Count 161 150 - 450 10e9/L   Troponin I   Result Value Ref Range    Troponin I ES 13.775 (HH) 0.000 - 0.045 ug/L   Heparin Unfractionated Anti Xa Level   Result Value Ref Range    Heparin Unfractionated Anti Xa Level 0.21 IU/mL   EKG 12-lead, tracing only   Result Value Ref Range    Interpretation ECG Click View Image link to view waveform and result    EKG 12-lead, tracing only   Result Value Ref Range    Interpretation ECG Click View Image link to view waveform and result    Glucose by meter   Result Value Ref Range    Glucose 89 70 - 99 mg/dL   EKG 12-lead, tracing only   Result Value Ref Range    Interpretation ECG Click View Image link to view waveform and result    Basic metabolic panel   Result Value Ref Range    Sodium 138 133 - 144 mmol/L    Potassium 4.1 3.4 - 5.3 mmol/L    Chloride 103 94 - 109 mmol/L    Carbon Dioxide 30 20 - 32 mmol/L    Anion Gap 5 3 - 14 mmol/L    Glucose 85 70 - 99 mg/dL    Urea Nitrogen 29 7 - 30 mg/dL    Creatinine 2.62 (H) 0.66 - 1.25 mg/dL    GFR Estimate 27 (L) >60 mL/min/[1.73_m2]    GFR Estimate If  Black 31 (L) >60 mL/min/[1.73_m2]    Calcium 8.2 (L) 8.5 - 10.1 mg/dL   CBC with platelets   Result Value Ref Range    WBC 10.0 4.0 - 11.0 10e9/L    RBC Count 4.90 4.4 - 5.9 10e12/L    Hemoglobin 14.5 13.3 - 17.7 g/dL    Hematocrit 44.2 40.0 - 53.0 %    MCV 90 78 - 100 fl    MCH 29.6 26.5 - 33.0 pg    MCHC 32.8 31.5 - 36.5 g/dL    RDW 15.2 (H) 10.0 - 15.0 %    Platelet Count 154 150 - 450 10e9/L   Troponin I   Result Value Ref Range    Troponin I ES 13.249 (HH) 0.000 - 0.045 ug/L   Lipid panel reflex to direct LDL   Result Value Ref Range    Cholesterol 132 <200 mg/dL    Triglycerides 275 (H) <150 mg/dL    HDL Cholesterol 27 (L) >39 mg/dL    LDL Cholesterol Calculated 50 <100 mg/dL    Non HDL Cholesterol 105 <130 mg/dL   Hemoglobin A1c   Result Value Ref Range    Hemoglobin A1C 5.2 0 - 5.6 %   Heparin Unfractionated Anti Xa Level   Result Value Ref Range    Heparin Unfractionated Anti Xa Level 0.18 IU/mL   Echocardiogram Complete    Narrative    [id number]  [facility]  [id number]  [id number]^PATTI^GABRIELLA           [facility]  Echocardiography Laboratory  [address] [place] [id number]        Name: FRANCISCO KELLER  MRN: [id number]  : 1970  Study Date: 2020 09:10 AM  Age: 50 yrs  Gender: Male  Patient Location: [facility]  Reason For Study: Chest Pain  Ordering Physician: GABRIELLA ARMSTRONG  Performed By: Kristi Case     BSA: 2.4 m2  Height: 72 in  Weight: 255 lb  BP: 135/72 mmHg  _____________________________________________________________________________  __        Procedure  Complete Portable Echo Adult. Optison (NDC #2802-3330) given intravenously.  _____________________________________________________________________________  __        Interpretation Summary     The left ventricle is mildly dilated.  There is severe concentric left ventricular hypertrophy. Consider infiltrative  cardiomyopathy.  Left ventricular systolic function is normal.  The visual ejection fraction is estimated at  55-60%.  Diastolic Doppler findings (E/E' ratio and/or other parameters) suggest left  ventricular filling pressures are increased.  The right ventricle is normal in structure, function and size.     Compared to the previous study, severe concetric LVH and LV dilation were seen  previously in 2009. LV function was slighlty lower on the previous study.  _____________________________________________________________________________  __        Left Ventricle  The left ventricle is mildly dilated. There is severe concentric left  ventricular hypertrophy. Consider infiltrative cardiomyopathy. Left  ventricular systolic function is normal. The visual ejection fraction is  estimated at 55-60%. Grade I or early diastolic dysfunction. Diastolic Doppler  findings (E/E' ratio and/or other parameters) suggest left ventricular filling  pressures are increased. Normal left ventricular wall motion.     Right Ventricle  The right ventricle is normal in structure, function and size.     Atria  Normal left atrial size. Right atrial size is normal. There is no atrial shunt  seen.        Mitral Valve  The mitral valve is normal in structure and function. There is trace mitral  regurgitation.     Tricuspid Valve  The tricuspid valve is normal in structure and function. There is trace  tricuspid regurgitation. IVC diameter <2.1 cm collapsing >50% with sniff  suggests a normal RA pressure of 3 mmHg. Right ventricular systolic pressure  could not be approximated due to inadequate tricuspid regurgitation.     Aortic Valve  The aortic valve is normal in structure and function. No aortic regurgitation  is present. No aortic stenosis is present.     Pulmonic Valve  The pulmonic valve is not well seen, but is grossly normal. There is trace  pulmonic valvular regurgitation.     Vessels  The ascending aorta is Mildly dilated.     Pericardium  There is no pericardial effusion.      _____________________________________________________________________________  __  MMode/2D Measurements & Calculations  IVSd: 1.7 cm  LVIDd: 6.3 cm  LVIDs: 4.0 cm  LVPWd: 2.0 cm     FS: 36.7 %  LV mass(C)d: 637.7 grams  LV mass(C)dI: 269.9 grams/m2  Ao root diam: 3.8 cm  LA dimension: 4.5 cm  asc Aorta Diam: 4.0 cm  LA/Ao: 1.2  RWT: 0.65        Doppler Measurements & Calculations  MV E max vincent: 72.3 cm/sec  MV A max vincent: 91.2 cm/sec  MV E/A: 0.79  MV dec time: 0.27 sec  PA acc time: 0.08 sec  E/E' av.6  Lateral E/e': 13.2  Medial E/e': 16.1              _____________________________________________________________________________  __        Report approved by: Anna Acosta 2020 12:21 PM      Cardiac Catheterization    Narrative    Conclusions:   -Multivessel coronary artery disease involving moderate mid LAD and severe   mid-distal LAD lesion, AV groove distal circumflex severe stenosis,   moderate large obtuse marginal lesion, and right PL moderate  stenosis.    All of the severe disease is in the distal segments.  -ACS/non-STEMI culprit lesion is likely AV groove circumflex lesion as   there is slow filling and emptying of the segment.  There is ARLINE-3 flow   in the distal LAD.  -Acute on chronic renal insufficiency.  Recommend to try to delay any   intervention, if at all until renal function is improved or stable.    Distal circumflex lesion which is the apparent culprit lesion is a very   small territory.

## 2020-11-30 NOTE — DISCHARGE SUMMARY
[facility]  Hospitalist Discharge Summary      Date of Admission:  11/29/2020  Date of Discharge:  11/30/2020  Discharging Provider: Mina Barrera,       Discharge Diagnoses   NSTEMI, concerning for type I   Hypertension  Hypertrophic cardiomyopathy   PEDRO on CKD stage III    Hypersomnia with sleep apnea  Panic disorder without agoraphobia  Attention deficit hyperactivity disorder   Bipolar 2 disorder  Depressive Disorder  GERD   Mild persistent asthma without complication    Follow-ups Needed After Discharge   Follow-up Appointments     Follow-up and recommended labs and tests       Follow up with primary care provider, Luis Armando Segovia, within 7 days for   hospital follow- up.  The following labs/tests are recommended: BMP.  Follow up with cardiology as planned           Unresulted Labs Ordered in the Past 30 Days of this Admission     No orders found for last 31 day(s).        Discharge Disposition   Discharged to home  Condition at discharge: Stable    Hospital Course   NSTEMI, concerning for type I   Patient presented with 2-day history of centralized chest pain with radiation into his shoulder/back/arms and jaw.  On admission labs pertinent for a troponin of 0.069 and a creatinine of 1.79.  His EKG admission shows a sinus rhythm with first-degree AV block along with a right bundle branch block.  He otherwise has a chest x-ray that shows no acute airspace disease.    * Serial troponins peaked at 13.775  - Monitor on telemetry  - Echocardiogram ordered  - Continue Heparin drip   - Continue Nitro drip as needed   - ASA   - Lopressor 100 mg BID   - Cardiology consulted and appreciate their recommendations.  Angiogram done and which showed multivessel coronary artery disease involving moderate mid LAD and severe mid-distal LAD lesion, AV groove distal circumflex severe stenosis, moderate large obtuse marginal lesion, and right PL moderate  stenosis.  All of the severe disease is in  the distal segments.  ACS/non-STEMI culprit lesion is likely AV groove circumflex lesion as there is slow filling and emptying of the segment.  There is ARLINE-3 flow in the distal LAD.    After patient returned from angiogram he was requesting discharge.  Nursing spoke with cardiology who cleared the patient for discharge.  They recommended DAPT with outpatient cardiology follow up and referral to nephrology.  Patient was prescribed 30 days of ASA and Plavix with referral to nephrology.     Hypertension  Hypertrophic cardiomyopathy  Prior to mission patient is on Norvasc 10 mg daily, losartan 100 mg daily, metoprolol 100 mg twice daily, and clonidine 0.1 mg twice daily  - Continue prior to admission Norvasc/losartan/metoprolol  - Holding Clonidine for now     PEDRO on CKD stage III   Creatinine admission of 1.79, with baseline around 1.5-1.8  - Avoid NSAIDs/nephrotoxins as able   - Patient had a bump in his Cr to 2.62 on day of discharge.  Wanted to keep patient for further evaluation but requesting discharge.  Per cardiology okay with discharge with follow up with outpatient nephrology and this was arranged.  Will recheck BMP later this week      Hypersomnia with sleep apnea  Panic disorder without agoraphobia  Attention deficit hyperactivity disorder   Bipolar 2 disorder  Depressive Disorder  Complicated psychiatric history, regimen includes a combination of amitriptyline, Klonopin as needed for panic disorder, Prozac, lorazepam as needed for anxiety, Wellbutrin, Ambien for hypersomnia  - Continue PTA regimen once verified     GERD  Stable  - Continue PPI as needed     Mild persistent asthma without complication      Consultations This Hospital Stay   PHARMACY IP CONSULT  PHARMACY IP CONSULT  PHARMACY IP CONSULT  PHARMACY IP CONSULT  CARDIOLOGY IP CONSULT  PHARMACY IP CONSULT  PHARMACY IP CONSULT  SMOKING CESSATION PROGRAM IP CONSULT    Code Status   Full Code    Time Spent on this Encounter   Mina MANRIQUE,  , personally saw the patient today and spent greater than 30 minutes discharging this patient.       Mina Barrera DO  [facility]  [address]  [place] [address]  Phone: [phone]  ______________________________________________________________________    Physical Exam   Vital Signs: Temp: 98.2  F (36.8  C) Temp src: Oral BP: 136/77 Pulse: 86   Resp: 16 SpO2: 93 % O2 Device: None (Room air) Oxygen Delivery: 2 LPM  Weight: 255 lbs 0 oz  General Appearance: Resting comfortably.  NAD   Respiratory: Clear to auscultation.  No respiratory distress  Cardiovascular: RRR.  No murmurs  GI: Bowel sounds present.  Non-tender  Skin: No rashes.  No cyanosis  Other: No edema.  No calf tenderness         Primary Care Physician   Luis Armando Segovia    Discharge Orders      Nephrology Adult Referral      Reason for your hospital stay    Coronary artery disease     Follow-up and recommended labs and tests     Follow up with primary care provider, Luis Armando Segovia, within 7 days for hospital follow- up.  The following labs/tests are recommended: BMP.  Follow up with cardiology as planned     Activity    Your activity upon discharge: activity as tolerated     Diet    Follow this diet upon discharge: Orders Placed This Encounter      Regular Diet Adult       Significant Results and Procedures   ,   Results for orders placed or performed   Most Recent 3 CBC's:  Recent Labs   Lab Test 11/30/20  0700 11/29/20  1950 11/29/20  1440   WBC 10.0 9.9 8.6   HGB 14.5 16.2 16.3   MCV 90 90 89    161 150     Most Recent 3 BMP's:  Recent Labs   Lab Test 11/30/20  0700 11/29/20  1440 08/29/19  1416    138 139   POTASSIUM 4.1 4.4 5.0   CHLORIDE 103 104 102   CO2 30 31 33*   BUN 29 19 19   CR 2.62* 1.79* 1.49*   ANIONGAP 5 3 4   MIKO 8.2* 8.6 10.1   GLC 85 92 63*     Most Recent 3 Troponin's:  Recent Labs   Lab Test 11/30/20  1651 11/30/20  0700 11/29/20  2258   TROPI 7.901*  13.249* 13.775*   during the hospital encounter of 20   XR Chest 2 Views    Narrative    CHEST TWO VIEWS 2020 3:03 PM     HISTORY: Chest pain.    COMPARISON: 2008.       Impression    IMPRESSION: There are no acute infiltrates. The cardiac silhouette  appears generous but similar to previous. Pulmonary vasculature is  unremarkable.    CURRY MAGANA MD   Echocardiogram Complete    Narrative    [id number]  [id number]  [id number]  [id number]^PATTI^GABRIELLA           [facility]  Echocardiography Laboratory  [address] [place], [place] [address]        Name: FRANCISCO KELLER  MRN: [id number]  : 1970  Study Date: 2020 09:10 AM  Age: 50 yrs  Gender: Male  Patient Location: [facility]  Reason For Study: Chest Pain  Ordering Physician: GABRIELLA ARMSTRONG  Performed By: Kristi Case     BSA: 2.4 m2  Height: 72 in  Weight: 255 lb  BP: 135/72 mmHg  _____________________________________________________________________________  __        Procedure  Complete Portable Echo Adult. Optison (NDC #2211-8917) given intravenously.  _____________________________________________________________________________  __        Interpretation Summary     The left ventricle is mildly dilated.  There is severe concentric left ventricular hypertrophy. Consider infiltrative  cardiomyopathy.  Left ventricular systolic function is normal.  The visual ejection fraction is estimated at 55-60%.  Diastolic Doppler findings (E/E' ratio and/or other parameters) suggest left  ventricular filling pressures are increased.  The right ventricle is normal in structure, function and size.     Compared to the previous study, severe concetric LVH and LV dilation were seen  previously in . LV function was slighlty lower on the previous study.  _____________________________________________________________________________  __        Left Ventricle  The left ventricle is mildly dilated. There is severe concentric  left  ventricular hypertrophy. Consider infiltrative cardiomyopathy. Left  ventricular systolic function is normal. The visual ejection fraction is  estimated at 55-60%. Grade I or early diastolic dysfunction. Diastolic Doppler  findings (E/E' ratio and/or other parameters) suggest left ventricular filling  pressures are increased. Normal left ventricular wall motion.     Right Ventricle  The right ventricle is normal in structure, function and size.     Atria  Normal left atrial size. Right atrial size is normal. There is no atrial shunt  seen.        Mitral Valve  The mitral valve is normal in structure and function. There is trace mitral  regurgitation.     Tricuspid Valve  The tricuspid valve is normal in structure and function. There is trace  tricuspid regurgitation. IVC diameter <2.1 cm collapsing >50% with sniff  suggests a normal RA pressure of 3 mmHg. Right ventricular systolic pressure  could not be approximated due to inadequate tricuspid regurgitation.     Aortic Valve  The aortic valve is normal in structure and function. No aortic regurgitation  is present. No aortic stenosis is present.     Pulmonic Valve  The pulmonic valve is not well seen, but is grossly normal. There is trace  pulmonic valvular regurgitation.     Vessels  The ascending aorta is Mildly dilated.     Pericardium  There is no pericardial effusion.     _____________________________________________________________________________  __  MMode/2D Measurements & Calculations  IVSd: 1.7 cm  LVIDd: 6.3 cm  LVIDs: 4.0 cm  LVPWd: 2.0 cm     FS: 36.7 %  LV mass(C)d: 637.7 grams  LV mass(C)dI: 269.9 grams/m2  Ao root diam: 3.8 cm  LA dimension: 4.5 cm  asc Aorta Diam: 4.0 cm  LA/Ao: 1.2  RWT: 0.65        Doppler Measurements & Calculations  MV E max vincent: 72.3 cm/sec  MV A max vincent: 91.2 cm/sec  MV E/A: 0.79  MV dec time: 0.27 sec  PA acc time: 0.08 sec  E/E' av.6  Lateral E/e': 13.2  Medial E/e': 16.1               _____________________________________________________________________________  __        Report approved by: Anna Acosta 11/30/2020 12:21 PM      Cardiac Catheterization    Narrative    Conclusions:   -Multivessel coronary artery disease involving moderate mid LAD and severe   mid-distal LAD lesion, AV groove distal circumflex severe stenosis,   moderate large obtuse marginal lesion, and right PL moderate  stenosis.    All of the severe disease is in the distal segments.  -ACS/non-STEMI culprit lesion is likely AV groove circumflex lesion as   there is slow filling and emptying of the segment.  There is ARLINE-3 flow   in the distal LAD.  -Acute on chronic renal insufficiency.  Recommend to try to delay any   intervention, if at all until renal function is improved or stable.    Distal circumflex lesion which is the apparent culprit lesion is a very   small territory.           Discharge Medications   Current Discharge Medication List      START taking these medications    Details   aspirin (ASA) 81 MG EC tablet Take 1 tablet (81 mg) by mouth daily  Qty: 30 tablet, Refills: 0    Comments: Future refills by PCP Dr. Luis Armando Segovia with phone number [phone].  Associated Diagnoses: Coronary artery disease involving native heart without angina pectoris, unspecified vessel or lesion type      clopidogrel (PLAVIX) 75 MG tablet Take 1 tablet (75 mg) by mouth daily  Qty: 30 tablet, Refills: 0    Comments: Future refills by PCP Dr. Luis Armando Segovia with phone number [phone].  Associated Diagnoses: Coronary artery disease involving native heart without angina pectoris, unspecified vessel or lesion type         CONTINUE these medications which have NOT CHANGED    Details   albuterol (PROAIR HFA/PROVENTIL HFA/VENTOLIN HFA) 108 (90 Base) MCG/ACT inhaler Inhale 2 puffs into the lungs every 6 hours  Qty: 18 g, Refills: 3    Comments: Pharmacy may dispense brand covered by insurance (Proair, or proventil or ventolin  or generic albuterol inhaler)  Associated Diagnoses: Intermittent asthma, uncomplicated      amitriptyline (ELAVIL) 50 MG tablet TAKE 1 TO 3 TABLETS ( MG) BY MOUTH AT BEDTIME  Qty: 90 tablet, Refills: 0    Associated Diagnoses: Insomnia, unspecified type      amLODIPine (NORVASC) 10 MG tablet Take 1 tablet (10 mg) by mouth daily  Qty: 90 tablet, Refills: 1    Associated Diagnoses: Hypertension goal BP (blood pressure) < 140/90      amphetamine-dextroamphetamine (ADDERALL) 20 MG tablet Take 1 tablet (20 mg) by mouth 2 times daily  Qty: 90 tablet, Refills: 0    Associated Diagnoses: Attention deficit hyperactivity disorder (ADHD), combined type      budesonide-formoterol (SYMBICORT) 160-4.5 MCG/ACT Inhaler Inhale 2 puffs into the lungs 2 times daily  Qty: 3 Inhaler, Refills: 1    Associated Diagnoses: Mild persistent asthma with exacerbation      buprenorphine HCl-naloxone HCl (SUBOXONE) 8-2 MG per film 1/2 film QID  Qty: 60 Film, Refills: 1    Comments: OLEG: [id number]  Associated Diagnoses: Uncomplicated opioid dependence (H)      buPROPion (WELLBUTRIN XL) 300 MG 24 hr tablet Take 1 tablet (300 mg) by mouth every morning  Qty: 90 tablet, Refills: 1    Associated Diagnoses: Major depressive disorder, recurrent episode, moderate (H); Generalized anxiety disorder      clonazePAM (KLONOPIN) 1 MG tablet Take 1 tablet (1 mg) by mouth 2 times daily as needed (flying phobia)  Qty: 4 tablet, Refills: 0    Associated Diagnoses: Flying phobia      cloNIDine (CATAPRES) 0.1 MG tablet Take 1 tablet (0.1 mg) by mouth 2 times daily  Qty: 180 tablet, Refills: 1    Associated Diagnoses: Hypertension goal BP (blood pressure) < 140/90      cyclobenzaprine (FLEXERIL) 10 MG tablet Take 1 tablet (10 mg) by mouth 3 times daily as needed for other (hiccups)  Qty: 90 tablet, Refills: 3    Associated Diagnoses: Neck pain      finasteride (PROSCAR) 5 MG tablet 1/2 tab daily  Qty: 90 tablet, Refills: 1    Associated Diagnoses: Alopecia       fluconazole (DIFLUCAN) 150 MG tablet TAKE 2 TABLETS (300 MG) BY MOUTH EVERY 14 DAYS - FOR TWO DOSES  Qty: 4 tablet, Refills: 1    Associated Diagnoses: Tinea versicolor      FLUoxetine (PROZAC) 20 MG capsule Take 1 capsule (20 mg) by mouth daily  Qty: 90 capsule, Refills: 1    Associated Diagnoses: Major depressive disorder, recurrent episode, moderate (H)      fluticasone-salmeterol (ADVAIR DISKUS) 500-50 MCG/DOSE inhaler 1 inhalation 2 times per day  Qty: 1 Inhaler, Refills: 2    Associated Diagnoses: Mild persistent asthma without complication      imiquimod (ALDARA) 5 % external cream Apply topically At Bedtime -wash off after 8 hours and my use for up to 16 weeks.  Qty: 36 packet, Refills: 11    Associated Diagnoses: Flat wart      loperamide (IMODIUM A-D) 2 MG tablet Take 2 tabs (4 mg) after first loose stool, and then take one tab (2 mg) after each diarrheal stool.  Max of 8 tabs (16 mg) per day.  Qty: 30 tablet, Refills: 0    Associated Diagnoses: Loose stools      LORazepam (ATIVAN) 1 MG tablet TAKE 1 TABLET (1 MG) BY MOUTH 2 TIMES DAILY AS NEEDED FOR ANXIETY  Qty: 60 tablet, Refills: 1    Comments: Not to exceed 4 additional fills before 02/20/2021  Associated Diagnoses: Panic disorder without agoraphobia; Generalized anxiety disorder      losartan (COZAAR) 100 MG tablet Take 1 tablet (100 mg) by mouth daily  Qty: 90 tablet, Refills: 1    Associated Diagnoses: CKD (chronic kidney disease) stage 3, GFR 30-59 ml/min; Hypertension goal BP (blood pressure) < 140/90      metoprolol tartrate (LOPRESSOR) 100 MG tablet TAKE 1 TABLET BY MOUTH TWICE A DAY  Qty: 180 tablet, Refills: 1    Associated Diagnoses: Hypertension goal BP (blood pressure) < 140/90      MULTIVITAMIN TABS   OR Refills: 0      naloxone (NARCAN) 4 MG/0.1ML nasal spray Spray 1 spray (4 mg) into one nostril alternating nostrils as needed for opioid reversal every 2-3 minutes until assistance arrives  Qty: 0.2 mL, Refills: 11    Associated  "Diagnoses: Opioid type dependence, continuous (H)      nitroglycerin (NITROSTAT) 0.4 MG sublingual tablet Place 1 tablet (0.4 mg) under the tongue every 5 minutes as needed for chest pain If you are still having symptoms after 3 doses (15 minutes) call 911.  Qty: 25 tablet, Refills: 0    Associated Diagnoses: Other chest pain      omeprazole (PRILOSEC) 20 MG DR capsule Take 1 capsule (20 mg) by mouth daily  Qty: 90 capsule, Refills: 1    Associated Diagnoses: Gastroesophageal reflux disease without esophagitis      ondansetron (ZOFRAN-ODT) 4 MG ODT tab Take 1 tablet (4 mg) by mouth every 8 hours as needed for nausea  Qty: 30 tablet, Refills: 0    Comments: Profile Rx: patient will contact pharmacy when needed  Associated Diagnoses: Uncomplicated opioid dependence (H)      Syringe/Needle, Disp, (SYRINGE LUER LOCK) 20G X 1-1/2\" 3 ML MISC 1 Device once a week - and also needs 22 G needles 1.5 inch #30 with 1 refill  Qty: 30 each, Refills: 1    Associated Diagnoses: Hypogonadism in male      testosterone cypionate (DEPOTESTOSTERONE) 200 MG/ML injection INJECT 0.5 MLS (100 MG) INTO THE MUSCLE ONCE A WEEK  Qty: 6 mL, Refills: 0    Comments: Not to exceed 5 additional fills before 10/31/2020  Associated Diagnoses: Hypogonadism male      VITAMIN C 100 MG OR TABS 1 TABLET 3 TIMES DAILY  Qty: 90, Refills: 0      zolpidem (AMBIEN) 10 MG tablet TAKE 1 TABLET (10 MG) BY MOUTH NIGHTLY AS NEEDED FOR SLEEP  Qty: 30 tablet, Refills: 0    Comments: Profile - Not to exceed 5 additional fills before 01/27/2021  Associated Diagnoses: Insomnia, unspecified type         STOP taking these medications       sildenafil (REVATIO) 20 MG tablet Comments:   Reason for Stopping:             Allergies   Allergies   Allergen Reactions     Acetaminophen Other (See Comments)     headache     Gabapentin      Suicidal thoughts     Morphine Other (See Comments)     headache     Sulfa Drugs      Theophylline Hives     "

## 2020-11-30 NOTE — H&P
[facility]    History and Physical - Hospitalist Service       Date of Admission:  11/29/2020    Assessment & Plan   Jeremi Damon is a 50 year old male admitted on 11/29/2020. He presents with CP.       NSTEMI (non-ST elevated myocardial infarction) (H)    Assessment: Presents with 2-day history of centralized chest pain with radiation into his shoulder/back/arms and jaw.  On admission labs pertinent for a troponin of 0.069 and a creatinine of 1.79.  His EKG admission shows a sinus rhythm with first-degree AV block along with a right bundle branch block.  He otherwise has a chest x-ray that shows no acute airspace disease.  Overall clinical presentation is consistent with an NSTEMI, he will be admitted for a comprehensive cardiology evaluation.    Plan:   -Admit inpatient  -Cardiology consult  -Trend troponin symptom  -aspirin/beta-blocker  -Obtain echocardiogram  -N.p.o. at midnight for possible angiogram  -Telemetry  -Check lipid panel/hemoglobin A1c      Hypertension goal BP (blood pressure) < 140/90    Hypertrophic cardiomyopathy (H)    Assessment: Prior to mission patient is on Norvasc 10 mg daily, losartan 100 mg daily, metoprolol 100 mg twice daily, and clonidine 0.1 mg twice daily    Plan:   -Continue prior to admission Norvasc/losartan/metoprolol  -Hold prior to admission clonidine for now      CKD (chronic kidney disease) stage 3, GFR 30-59 ml/min    Assessment: Creatinine admission of 1.79, with baseline around 1.5-1.8    Plan:   -BMP in a.m.  -Avoid NSAIDs/nephrotoxins      Hypersomnia with sleep apnea    Panic disorder without agoraphobia    Attention deficit hyperactivity disorder (ADHD)    Bipolar 2 disorder (H)      Major Depressive Disorder, Recurrent Episode, Mode    Assessment: Complicated psychiatric history, regimen includes a combination of amitriptyline, Klonopin as needed for panic disorder, Prozac, lorazepam as needed for anxiety, Wellbutrin, Ambien for  hypersomnia    Plan:   -Continue PTA regimen once verified      Gastroesophageal reflux disease without esophagitis    Assessment/Plan: Stable, continue PPI as needed      Mild persistent asthma without complication    Assessment/Plan: Stable, continue prior to admission Advair once verified         Diet: Combination Diet Low Saturated Fat Na <2400mg Diet, No Caffeine Diet  NPO for Medical/Clinical Reasons Except for: Meds, Ice Chips    DVT Prophylaxis: Heparin gtt  Yee Catheter: not present  Code Status: Full Code           Disposition Plan   Expected discharge: 2 - 3 days, recommended to prior living arrangement once Cardiology work up completed.  Entered: Tiago Arce MD 11/29/2020, 8:55 PM     The patient's care was discussed with the Patient and ED Provider.    Tiago Arce MD  [facility]  Contact information available via [facility] Paging/Directory      ______________________________________________________________________    Chief Complaint     Chest Pain    History is obtained from the patient    History of Present Illness      Jeremi Damon is a 50 year old male with past medical history of hypertrophic cardiomyopathy, hypertension, bipolar disorder, major depression, ADHD, chronic kidney disease who presents for evaluation of chest pain.    Patient reports that he was in his usual state of health until the day prior to admission when he experienced onset of centralized chest pain that radiated into his shoulder/right upper arm and his jaw.  He took some lorazepam with no improvement in symptoms.  He then had symptoms again today around 9:30 AM, he describes it as a chest tightness and again radiated into his shoulder/arms and jaw.  He denies any diaphoresis, he denies any shortness of breath.  He did endorse mild nausea, but no vomiting.  He denies any diaphoresis, no abdominal pain.  He denies any calf pain or leg swelling, he denies any PND or orthopnea.  He denies any COVID-19  contacts.  He denies any hemoptysis, he has no cough.  He denies any urinary complaints of urgency or frequency, he has no recent blood in his stool, no weight loss or night sweats.  He has no prior history of MIs that he is aware of.  He has no other complaints this time, he is currently chest pain-free.    Review of Systems    The 10 point Review of Systems is negative other than noted in the HPI or here.     Past Medical History    I have reviewed this patient's medical history and updated it with pertinent information if needed.   Past Medical History:   Diagnosis Date     Allergic rhinitis, cause unspecified     Allergic rhinitis     Benign hypertension      Chronic back pain      Hiccough      Hypersomnia with sleep apnea, unspecified      Major depressive disorder, single episode, moderate (H) 3/08     Mild persistent asthma      Other primary cardiomyopathies     Cardiomyopathy -- unknown etiology       Past Surgical History   I have reviewed this patient's surgical history and updated it with pertinent information if needed.  Past Surgical History:   Procedure Laterality Date     APPENDECTOMY  2007     BACK SURGERY  2014    L5-S1 fusion     HC REPAIR OF NASAL SEPTUM       LAPAROSCOPIC CHOLECYSTECTOMY  1/05    Cholecystectomy, Laparoscopic     SURGICAL HISTORY OF -       torn pectoral muscle     SURGICAL HISTORY OF -   2009    Bilateral radiofrequency volume reduction of the inferior turbinates.     SURGICAL HISTORY OF -   2012    rhinoplasty- septoplasty       Social History   I have reviewed this patient's social history and updated it with pertinent information if needed.  Social History     Tobacco Use     Smoking status: Never Smoker     Smokeless tobacco: Never Used   Substance Use Topics     Alcohol use: No     Comment: rarely      Drug use: No       Family History   I have reviewed this patient's family history and updated it with pertinent information if needed.  Family History   Problem Relation  "Age of Onset     Cancer Father         hodgkins     Hypertension Father      Coronary Artery Disease Father      Cardiovascular Maternal Grandmother      Cardiovascular Sister      Cardiovascular Brother         question what     Coronary Artery Disease Mother          55; MI x 2     Hypertension Brother      Prostate Cancer No family hx of      Cancer - colorectal No family hx of        Prior to Admission Medications   Prior to Admission Medications   Prescriptions Last Dose Informant Patient Reported? Taking?   FLUoxetine (PROZAC) 20 MG capsule 2020 at am  No Yes   Sig: Take 1 capsule (20 mg) by mouth daily   LORazepam (ATIVAN) 1 MG tablet 2020 at Unknown time  No Yes   Sig: TAKE 1 TABLET (1 MG) BY MOUTH 2 TIMES DAILY AS NEEDED FOR ANXIETY   MULTIVITAMIN TABS   OR 2020 at am  No Yes   Patient taking differently: 1 tablet daily   Syringe/Needle, Disp, (SYRINGE LUER LOCK) 20G X 1-1/2\" 3 ML MISC dme at dme  No Yes   Si Device once a week - and also needs 22 G needles 1.5 inch #30 with 1 refill   VITAMIN C 100 MG OR TABS 2020 at Unknown time  No Yes   Si TABLET 3 TIMES DAILY   Patient taking differently: Take 1 mg by mouth 2 times daily    albuterol (PROAIR HFA/PROVENTIL HFA/VENTOLIN HFA) 108 (90 Base) MCG/ACT inhaler 2020 at Unknown time  No Yes   Sig: Inhale 2 puffs into the lungs every 6 hours   amLODIPine (NORVASC) 10 MG tablet 2020 at am  No Yes   Sig: Take 1 tablet (10 mg) by mouth daily   amitriptyline (ELAVIL) 50 MG tablet 2020 at pm  No Yes   Sig: TAKE 1 TO 3 TABLETS ( MG) BY MOUTH AT BEDTIME   amphetamine-dextroamphetamine (ADDERALL) 20 MG tablet 2020 at am  No Yes   Sig: Take 1 tablet (20 mg) by mouth 2 times daily   buPROPion (WELLBUTRIN XL) 300 MG 24 hr tablet 2020 at am  No Yes   Sig: Take 1 tablet (300 mg) by mouth every morning   budesonide-formoterol (SYMBICORT) 160-4.5 MCG/ACT Inhaler 2020 at am  No Yes   Sig: Inhale 2 " puffs into the lungs 2 times daily   buprenorphine HCl-naloxone HCl (SUBOXONE) 8-2 MG per film 2020 at am  No Yes   Si/2 film QID   cloNIDine (CATAPRES) 0.1 MG tablet 2020 at am  No Yes   Sig: Take 1 tablet (0.1 mg) by mouth 2 times daily   clonazePAM (KLONOPIN) 1 MG tablet prn at prn  No Yes   Sig: Take 1 tablet (1 mg) by mouth 2 times daily as needed (flying phobia)   cyclobenzaprine (FLEXERIL) 10 MG tablet 2020 at pm  No Yes   Sig: Take 1 tablet (10 mg) by mouth 3 times daily as needed for other (hiccups)   finasteride (PROSCAR) 5 MG tablet 2020 at pm  No Yes   Si/2 tab daily   fluconazole (DIFLUCAN) 150 MG tablet prn at prn  No Yes   Sig: TAKE 2 TABLETS (300 MG) BY MOUTH EVERY 14 DAYS - FOR TWO DOSES   fluticasone-salmeterol (ADVAIR DISKUS) 500-50 MCG/DOSE inhaler 2020 at am  No Yes   Si inhalation 2 times per day   imiquimod (ALDARA) 5 % external cream 2020 at pm  No Yes   Sig: Apply topically At Bedtime -wash off after 8 hours and my use for up to 16 weeks.   loperamide (IMODIUM A-D) 2 MG tablet prn at prn  No Yes   Sig: Take 2 tabs (4 mg) after first loose stool, and then take one tab (2 mg) after each diarrheal stool.  Max of 8 tabs (16 mg) per day.   losartan (COZAAR) 100 MG tablet 2020 at am  No Yes   Sig: Take 1 tablet (100 mg) by mouth daily   metoprolol tartrate (LOPRESSOR) 100 MG tablet 2020 at am  No Yes   Sig: TAKE 1 TABLET BY MOUTH TWICE A DAY   naloxone (NARCAN) 4 MG/0.1ML nasal spray prn at prn  No Yes   Sig: Spray 1 spray (4 mg) into one nostril alternating nostrils as needed for opioid reversal every 2-3 minutes until assistance arrives   nitroglycerin (NITROSTAT) 0.4 MG sublingual tablet 2020 at Unknown time  No Yes   Sig: Place 1 tablet (0.4 mg) under the tongue every 5 minutes as needed for chest pain If you are still having symptoms after 3 doses (15 minutes) call 911.   omeprazole (PRILOSEC) 20 MG DR capsule 2020 at am   No Yes   Sig: Take 1 capsule (20 mg) by mouth daily   ondansetron (ZOFRAN-ODT) 4 MG ODT tab prn at prn  No Yes   Sig: Take 1 tablet (4 mg) by mouth every 8 hours as needed for nausea   sildenafil (REVATIO) 20 MG tablet prn at prn  No Yes   Sig: TAKE 2 TO 5 TABLETS BY MOUTH DAILY AS NEEDED   testosterone cypionate (DEPOTESTOSTERONE) 200 MG/ML injection 11/23/2020 at n/a  No Yes   Sig: INJECT 0.5 MLS (100 MG) INTO THE MUSCLE ONCE A WEEK   zolpidem (AMBIEN) 10 MG tablet 11/27/2020 at pm  No Yes   Sig: Take 1 tablet (10 mg) by mouth nightly as needed for sleep      Facility-Administered Medications Last Administration Doses Remaining   Botulinum Toxin Type A (BOTOX) 200 units injection 200 Units None recorded 1        Allergies   Allergies   Allergen Reactions     Acetaminophen Other (See Comments)     headache     Gabapentin      Suicidal thoughts     Morphine Other (See Comments)     headache     Sulfa Drugs      Theophylline Hives       Physical Exam   Vital Signs: Temp: 98.3  F (36.8  C) Temp src: Oral BP: 135/72 Pulse: 70   Resp: 20 SpO2: 94 % O2 Device: None (Room air)    Weight: 255 lbs 0 oz    Constitutional: awake, alert, cooperative, no apparent distress.   Eyes: Lids and lashes normal, pupils equal, round and reactive to light   ENT: Normocephalic, without obvious abnormality, atraumatic, sinuses nontender on palpation   Hematologic / Lymphatic: no cervical lymphadenopathy   Respiratory: CTABL   Cardiovascular: RRR with no m/r/g   GI: Normal bowel sounds, soft, non-distended, non-tender.   Skin: normal skin color, texture, turgor   Musculoskeletal: There is no redness, warmth, or swelling of the joints. Full range of motion noted.   Neurologic: Awake, alert, oriented to name, place and time. Cranial nerves II-XII are grossly intact. Motor is 5 out of 5 bilaterally. Sensory is intact.   Neuropsychiatric: normal mood and affect      Data   Data reviewed today: I reviewed all medications, new labs and imaging  results over the last 24 hours. I personally reviewed the EKG tracing showing SR with 1st degree AVB and the chest x-ray image(s) showing see below.    Most Recent 3 CBC's:  Recent Labs   Lab Test 11/29/20  1950 11/29/20  1440 07/20/19  1219   WBC 9.9 8.6 10.1   HGB 16.2 16.3 17.1   MCV 90 89 87    150 270     Most Recent 3 BMP's:  Recent Labs   Lab Test 11/29/20  1440 08/29/19  1416 07/20/19  1219    139 138   POTASSIUM 4.4 5.0 4.7   CHLORIDE 104 102 106   CO2 31 33* 29   BUN 19 19 17   CR 1.79* 1.49* 1.81*   ANIONGAP 3 4 3   MIKO 8.6 10.1 9.0   GLC 92 63* 142*     Most Recent 2 LFT's:  Recent Labs   Lab Test 11/29/20  1440 10/20/17  0828   AST 59* 88*   * 67   ALKPHOS 102 68   BILITOTAL 0.4 0.5     Most Recent 3 INR's:No lab results found.  Most Recent 3 Troponin's:  Recent Labs   Lab Test 11/29/20  1440   TROPI 0.066*     Recent Results (from the past 24 hour(s))   XR Chest 2 Views    Narrative    CHEST TWO VIEWS November 29, 2020 3:03 PM     HISTORY: Chest pain.    COMPARISON: February 18, 2008.       Impression    IMPRESSION: There are no acute infiltrates. The cardiac silhouette  appears generous but similar to previous. Pulmonary vasculature is  unremarkable.    CURRY MAGANA MD

## 2020-11-30 NOTE — PLAN OF CARE
[id number] A&O x 4. Patient c/o headache r/t to nitroglycerin gtt. VSS, on RA. Up with SBA/Ind. Tele: SR w/ 1AVB and RBBB. Nitroglycerin gtt 80 & Heparin gtt @1350 - Xa check for 0630. Trop bumped to 13 at 2300 - pt became diaphoretic, micheal w/ jaw/neck/shoulder pain. EKG done. Cards consulted - nitroglycerin increased. Tried several PRN meds for headache without relief. Scabs on legs, chest/back micheal. Plan for cards consult/echo/angio today. Continue to Monitor.

## 2020-11-30 NOTE — PROGRESS NOTES
Back from Cath lab around 1330. Drowsy. Arousal. VSS. On RA. Diet advanced to clears. TR band patent. CMS intact. Off heparin and Nitroo drip. IVF restarted until pt is up and ambulating well.

## 2020-11-30 NOTE — PROGRESS NOTES
[facility]    Medicine Progress Note - Hospitalist Service       Date of Admission:  11/29/2020  Assessment & Plan       Jeremi Damon is a 50 year old male admitted on 11/29/2020.  Admitted for NSTEMI      NSTEMI, concerning for type I   Patient presented with 2-day history of centralized chest pain with radiation into his shoulder/back/arms and jaw.  On admission labs pertinent for a troponin of 0.069 and a creatinine of 1.79.  His EKG admission shows a sinus rhythm with first-degree AV block along with a right bundle branch block.  He otherwise has a chest x-ray that shows no acute airspace disease.    * Serial troponins peaked at 13.775  - Monitor on telemetry  - Echocardiogram ordered  - Continue Heparin drip   - Continue Nitro drip as needed   - ASA   - Lopressor 100 mg BID   - Cardiology consulted and appreciate their recommendations.  Plan for angiogram today      Hypertension  Hypertrophic cardiomyopathy  Prior to mission patient is on Norvasc 10 mg daily, losartan 100 mg daily, metoprolol 100 mg twice daily, and clonidine 0.1 mg twice daily  - Continue prior to admission Norvasc/losartan/metoprolol  - Holding Clonidine for now     CKD stage III   Creatinine admission of 1.79, with baseline around 1.5-1.8  - BMP tomorrow post angiogram   - Avoid NSAIDs/nephrotoxins as able      Hypersomnia with sleep apnea  Panic disorder without agoraphobia  Attention deficit hyperactivity disorder   Bipolar 2 disorder  Depressive Disorder  Complicated psychiatric history, regimen includes a combination of amitriptyline, Klonopin as needed for panic disorder, Prozac, lorazepam as needed for anxiety, Wellbutrin, Ambien for hypersomnia  - Continue PTA regimen once verified     GERD  Stable  - Continue PPI as needed     Mild persistent asthma without complication  Stable  - PTA Advair          Diet: NPO for Medical/Clinical Reasons Except for: Meds, Ice Chips    DVT Prophylaxis: Heparin drip  Joselito  Catheter: not present  Code Status: Full Code           Disposition Plan   Expected discharge: 1-2 days, recommended to prior living arrangement once cardiac evaluation complete.  Entered: Mina Barrera DO 11/30/2020, 10:30 AM       The patient's care was discussed with the Bedside Nurse, Care Coordinator/ and Patient.    Mina Barrera DO  Hospitalist Service  [facility]  Contact information available via [facility] Paging/Directory    ______________________________________________________________________    Interval History   Patient seen and examined.  No acute events over night.  No fevers or chills.  Currently has a headache from the nitro.  No chest pain or SOB while on the nitro drip.    Data reviewed today: I reviewed all medications, new labs and imaging results over the last 24 hours. I personally reviewed no images or EKG's today.    Physical Exam   Vital Signs: Temp: 98.3  F (36.8  C) Temp src: Oral BP: 96/65 Pulse: 64   Resp: 16 SpO2: 93 % O2 Device: None (Room air)    Weight: 255 lbs 0 oz  General Appearance: Resting comfortably.  NAD   Respiratory: Clear to auscultation.  No respiratory distress  Cardiovascular: RRR.  No obvious murmurs  GI: Bowel sounds noted.  Non-tender  Skin: No obvious rashes or cyanosis  Other: No edema.  No calf tenderness      Data   Recent Labs   Lab 11/30/20  0700 11/29/20  2258 11/29/20  1950 11/29/20  1440   WBC 10.0  --  9.9 8.6   HGB 14.5  --  16.2 16.3   MCV 90  --  90 89     --  161 150     --   --  138   POTASSIUM 4.1  --   --  4.4   CHLORIDE 103  --   --  104   CO2 30  --   --  31   BUN 29  --   --  19   CR 2.62*  --   --  1.79*   ANIONGAP 5  --   --  3   MIKO 8.2*  --   --  8.6   GLC 85  --   --  92   ALBUMIN  --   --   --  3.5   PROTTOTAL  --   --   --  6.6*   BILITOTAL  --   --   --  0.4   ALKPHOS  --   --   --  102   ALT  --   --   --  119*   AST  --   --   --  59*   TROPI 13.249* 13.775*  --  0.066*      Recent Results (from the past 24 hour(s))   XR Chest 2 Views    Narrative    CHEST TWO VIEWS 2020 3:03 PM     HISTORY: Chest pain.    COMPARISON: 2008.       Impression    IMPRESSION: There are no acute infiltrates. The cardiac silhouette  appears generous but similar to previous. Pulmonary vasculature is  unremarkable.    CURRY MAGANA MD   Echocardiogram Complete    Narrative    [id number]  [id number]  [id number]  [id number]^PATTI^GABRIELLA           [facility]  Echocardiography Laboratory  [address] [place] [address]        Name: FRANCISCO KELLER  MRN: [id number]  : 1970  Study Date: 2020 09:10 AM  Age: 50 yrs  Gender: Male  Patient Location: [facility]  Reason For Study: Chest Pain  Ordering Physician: GABRIELLA ARMSTRONG  Performed By: Kristi Case     BSA: 2.4 m2  Height: 72 in  Weight: 255 lb  BP: 135/72 mmHg  _____________________________________________________________________________  __        Procedure  Complete Portable Echo Adult. Optison (NDC #[id number]) given intravenously.  _____________________________________________________________________________  __        Interpretation Summary     The left ventricle is mildly dilated.  There is severe concentric left ventricular hypertrophy. Consider infiltrative  cardiomyopathy.  Left ventricular systolic function is normal.  The visual ejection fraction is estimated at 55-60%.  Diastolic Doppler findings (E/E' ratio and/or other parameters) suggest left  ventricular filling pressures are increased.  The right ventricle is normal in structure, function and size.     Compared to the previous study, severe concetric LVH and LV dilation were seen  previously in . LV function was slighlty lower on the previous study.  _____________________________________________________________________________  __        Left Ventricle  The left ventricle is mildly dilated. There is severe concentric left  ventricular  hypertrophy. Consider infiltrative cardiomyopathy. Left  ventricular systolic function is normal. The visual ejection fraction is  estimated at 55-60%. Grade I or early diastolic dysfunction. Diastolic Doppler  findings (E/E' ratio and/or other parameters) suggest left ventricular filling  pressures are increased. Normal left ventricular wall motion.     Right Ventricle  The right ventricle is normal in structure, function and size.     Atria  Normal left atrial size. Right atrial size is normal. There is no atrial shunt  seen.        Mitral Valve  The mitral valve is normal in structure and function. There is trace mitral  regurgitation.     Tricuspid Valve  The tricuspid valve is normal in structure and function. There is trace  tricuspid regurgitation. IVC diameter <2.1 cm collapsing >50% with sniff  suggests a normal RA pressure of 3 mmHg. Right ventricular systolic pressure  could not be approximated due to inadequate tricuspid regurgitation.     Aortic Valve  The aortic valve is normal in structure and function. No aortic regurgitation  is present. No aortic stenosis is present.     Pulmonic Valve  The pulmonic valve is not well seen, but is grossly normal. There is trace  pulmonic valvular regurgitation.     Vessels  The ascending aorta is Mildly dilated.     Pericardium  There is no pericardial effusion.     _____________________________________________________________________________  __  MMode/2D Measurements & Calculations  IVSd: 1.7 cm  LVIDd: 6.3 cm  LVIDs: 4.0 cm  LVPWd: 2.0 cm     FS: 36.7 %  LV mass(C)d: 637.7 grams  LV mass(C)dI: 269.9 grams/m2  Ao root diam: 3.8 cm  LA dimension: 4.5 cm  asc Aorta Diam: 4.0 cm  LA/Ao: 1.2  RWT: 0.65        Doppler Measurements & Calculations  MV E max vincent: 72.3 cm/sec  MV A max vincent: 91.2 cm/sec  MV E/A: 0.79  MV dec time: 0.27 sec  PA acc time: 0.08 sec  E/E' av.6  Lateral E/e': 13.2  Medial E/e': 16.1               _____________________________________________________________________________  __        Report approved by: Anna Acosta 11/30/2020 12:21 PM

## 2020-11-30 NOTE — PLAN OF CARE
Pt expressed wanting to leave AMA due to not being able to eat/drink. After discussing the patient's condition with him he has decided to stay.

## 2020-11-30 NOTE — PROVIDER NOTIFICATION
MD Notification    Notified Person: MD    Notified Person Name:Dr. Kam    Notification Date/Time:11/29/20 1007    Notification Interaction:text/page    Purpose of Notification:Troponin 13.775    Orders Received:    Comments:Marely WHITE (Primary)  notified

## 2020-11-30 NOTE — PROGRESS NOTES
[facility]    Patient is a new admit for NSTEMI. Repeat troponin after admission returned significantly elevated at 13. I evaluated patient at beside on heparin and nitroglycerin gtt . Vitals were stable however he appeared uncomfortable. He was diaphorectic complaining of headache and neck/jaw pain but no overt chest pain. I repeated an EKG that returned without ST elevation but concerning Q waves. Cardiology was called by the nurse for further evaluation. Their recommendation include increasing nitroglycerin gtt as BP allows to relieve pain. Add on fentanyl PRN. If there are any further change in clinical status or new concerning EKG findings to call back. They will see patient in the AM.    Nursing will make changes and we will continue to closely monitor.     Lisa Kam DO  Hospitalist

## 2020-11-30 NOTE — PROVIDER NOTIFICATION
Pt requested to be discharge today, cardiology and [facility] notified. Dr. Keita recommended Pt to be discaged with asprin and plavix also nephrology referral done by [facility]. Dr. Barrera informed on cardiology's recommendations. Discharge pharmacy updated as requested by pt. Cardiology aslo stated that they will  set up other follow up and contact pt tomorrow.

## 2020-11-30 NOTE — PROVIDER NOTIFICATION
MD Notification    Notified Person: MD    Notified Person Name: Pamela     Notification Date/Time: 11/29/2020 21:00    Notification Interaction: Page     Purpose of Notification: 252 FR...G. Pt takes 10 mg of Ambien at night for sleep. Can we get that ordered? Thank you, Marely RN *[id number]    Orders Received: 10 mg Ambien ordered    Comments:

## 2020-11-30 NOTE — PLAN OF CARE
Neuro: A/o x4  CV/Rhythm: SR w 1 AVB and BBB  Resp/02: LS clear,  inspiratory wheeze LLL  GI/Diet: cardiac diet, npo at midnight  : voids independently  Skin/Incisions/Sites: multiple scabs on legs  Activity/Falls Risk: SBA  Lines/Drains/IVs: Hep 1200u/hr and nitroglycerin at 10 mcg/min  Labs/BGM: trending trops .066  Test/Procedures: echo ordered, possible angio tomorrow  VS/Pain: VSS, denies pain  DC Plan: pending cardiac workup

## 2020-11-30 NOTE — PROVIDER NOTIFICATION
MD Notification    Notified Person: MD    Notified Person Name: Pamela     Notification Date/Time: 11/29/2020 21:58    Notification Interaction: Page     Purpose of Notification: 252 FR...G. Pt on nitro gtt and having a 6/10 headache. Can we get something for pain? Thank you. CINDY Yap *[id number]    Orders Received:    Comments:

## 2020-12-01 ENCOUNTER — TELEPHONE (OUTPATIENT)
Dept: FAMILY MEDICINE | Facility: CLINIC | Age: 50
End: 2020-12-01

## 2020-12-01 DIAGNOSIS — I25.10 CORONARY ARTERY DISEASE INVOLVING NATIVE HEART WITHOUT ANGINA PECTORIS, UNSPECIFIED VESSEL OR LESION TYPE: ICD-10-CM

## 2020-12-01 DIAGNOSIS — R07.89 OTHER CHEST PAIN: ICD-10-CM

## 2020-12-01 DIAGNOSIS — N28.9 RENAL INSUFFICIENCY: ICD-10-CM

## 2020-12-01 DIAGNOSIS — I21.4 NSTEMI (NON-ST ELEVATED MYOCARDIAL INFARCTION) (H): Primary | ICD-10-CM

## 2020-12-01 RX ORDER — NITROGLYCERIN 0.4 MG/1
0.4 TABLET SUBLINGUAL EVERY 5 MIN PRN
Qty: 25 TABLET | Refills: 0 | Status: SHIPPED | OUTPATIENT
Start: 2020-12-01 | End: 2020-12-18

## 2020-12-01 NOTE — PROVIDER NOTIFICATION
Neuro- a&Ox4   Most Recent Vitals- Temp: 98.2  F (36.8  C) Temp src: Oral BP: 136/77 Pulse: 86   Resp: 16 SpO2: 96 % O2 Device: None (Room air) Oxygen Delivery: 2 LPM  Tele/Cardiac- SR with 1st degree and BBB  Resp- RA  Activity- Independent   Pain- chronic leg and back pain prn oxycodone given.  Drips- none   Drains/Tubes- P-IV removed before discharge   Skin- Right TR band CDI. CMS intact   GI/- WDL  Aggression Color- Green  COVID status- Negative  Plan- Pt was discharged home per his request. discharge instruction was reviewed with pt and answered all questions. pt expressed understanding of all discharge instructions and follow ups. Pt was discharged home with family at 1835.  Misc-     Denise Beltran RN

## 2020-12-01 NOTE — TELEPHONE ENCOUNTER
"    Called #   Telephone Information:   Mobile [phone]          ED/Discharge Protocol    \"Hi, my name is Kari Mcleod RN, a registered nurse, and I am calling on behalf of Dr. Segovia 's office at [place].  I am calling to follow up and see how things are going for you after your recent visit.\"    \"I see that you were in the (ER/UC/IP) on 11/29/2020.    How are you doing now that you are home?\" I am okay I need to see some people     Is patient experiencing symptoms that may require a hospital visit?  None     Discharge Instructions    \"Let's review your discharge instructions.  What is/are the follow-up recommendations?  Pt. Response: I think I need to see some drs for follow     \"Were you instructed to make a follow-up appointment?\"  Pt. Response: Yes.  Has appointment been made?   Yes      \"When you see the provider, I would recommend that you bring your discharge instructions with you.    Medications    \"How many new medications are you on since your hospitalization/ED visit?\"    2 or more - Epic MTM referral needed  \"How many of your current medicines changed (dose, timing, name, etc.) while you were in the hospital/ED visit?\"   0-1  \"Do you have questions about your medications?\"   No  \"Were you newly diagnosed with heart failure, COPD, diabetes or did you have a heart attack?\"   Yes - Care Coordination Referral needed  For patients on insulin: \"Did you start on insulin in the hospital or did you have your insulin dose changed?\"   No  Post Discharge Medication Reconciliation Status: discharge medications reconciled, continue medications without change.    Was MTM referral placed (*Make sure to put transitions as reason for referral)?   No    Call Summary    \"Do you have any questions or concerns about your condition or care plan at the moment?\"    Yes - I need my nytro filled again - no I am not having chest pain at this time I figured I better get it just in case   Triage nurse advice given: if " "things worsen or change please call the clinic   Patient stated an understanding and agreed with plan.      Patient was in ER 1 in the past year (assess appropriateness of ER visits.)      \"If you have questions or things don't continue to improve, we encourage you contact us through the main clinic number,  [phone].  Even if the clinic is not open, triage nurses are available 24/7 to help you.     We would like you to know that our clinic has extended hours (provide information).  We also have urgent care (provide details on closest location and hours/contact info)\"      \"Thank you for your time and take care!\"        "

## 2020-12-01 NOTE — DISCHARGE INSTRUCTIONS
Cardiac Angiogram Discharge Instructions - Radial    After you go home:      Have an adult stay with you until tomorrow.    Drink extra fluids for 2 days.    You may resume your normal diet.    No smoking       For 24 hours - due to the sedation you received:    Relax and take it easy.    Do NOT make any important or legal decisions.    Do NOT drive or operate machines at home or at work.    Do NOT drink alcohol.    Care of Wrist Puncture Site:      For the first 24 hrs - check the puncture site every 1-2 hours while awake.    It is normal to have soreness at the puncture site and mild tingling in your hand for up to 3 days.    Remove the bandaid after 24 hours. If there is minor oozing, apply another bandaid and remove it after 12 hours.    You may shower tomorrow.  Do NOT take a bath, or use a hot tub or pool for at least 3 days. Do NOT scrub the site. Do not use lotion or powder near the puncture site.           Activity:        For 2 days:     do not use your hand or arm to support your weight (such as rising from a chair)     do not bend your wrist (such as lifting a garage door).    do not lift more than 5 pounds or exercise your arm (such as tennis, golf or bowling).    Do NOT do any heavy activity such as exercise, lifting, or straining.     Bleeding:      If you start bleeding from the site in your wrist, sit down and press firmly on/above the site for 10 minutes.     Once bleeding stops, keep arm still for 2 hours.     Call [facility] as soon as you can.       Call 911 right away if you have heavy bleeding or bleeding that does not stop.      Medicines:      If you are taking an antiplatelet medication such as Plavix, Brilinta or Effient, do not stop taking it until you talk to your cardiologist.        If you are on Metformin (Glucophage), do not restart it until you have blood tests (within 2 to 3 days after discharge).  After you have your blood drawn, you may restart the Metformin.     Take your  medications, including blood thinners, unless your provider tells you not to.      If you take Coumadin (Warfarin), have your INR checked by your provider in  3-5 days. Call your clinic to schedule this.    If you have stopped any medicines, check with your provider about when to restart them.    Follow Up Appointments:      Follow up with [facility] Nurse Practitioner at [facility] of patient preference in 7-10 days.    Call the clinic if:      You have a large or growing hard lump around the site.    The site is red, swollen, hot or tender.    Blood or fluid is draining from the site.    You have chills or a fever greater than 101 F (38 C).    Your arm feels numb, cool or changes color.    You have hives, a rash or unusual itching.    Any questions or concerns.          [facility] [place]:    [phone] [facility] (7 days a week)

## 2020-12-01 NOTE — TELEPHONE ENCOUNTER
Patient discharged from [facility] for inpatient hospital stay on 11/30/2020 for Nstemi (Non-St Elevated Myocardial Infarction) (H), Coronary Artery Disease Involving Native Heart Without Angina Pect.      Please contact patient to follow up; Next appointment is scheduled for 12/24/2020.    ER / IP:  0 / 1    Care Coordination:  N/A    Mickie MOYER

## 2020-12-03 ENCOUNTER — OFFICE VISIT (OUTPATIENT)
Dept: FAMILY MEDICINE | Facility: CLINIC | Age: 50
End: 2020-12-03
Payer: COMMERCIAL

## 2020-12-03 VITALS
DIASTOLIC BLOOD PRESSURE: 80 MMHG | BODY MASS INDEX: 34.95 KG/M2 | TEMPERATURE: 95.3 F | WEIGHT: 258 LBS | SYSTOLIC BLOOD PRESSURE: 132 MMHG | HEIGHT: 72 IN | HEART RATE: 74 BPM | OXYGEN SATURATION: 95 %

## 2020-12-03 DIAGNOSIS — I21.4 NSTEMI (NON-ST ELEVATED MYOCARDIAL INFARCTION) (H): Primary | ICD-10-CM

## 2020-12-03 DIAGNOSIS — N18.30 STAGE 3 CHRONIC KIDNEY DISEASE, UNSPECIFIED WHETHER STAGE 3A OR 3B CKD (H): ICD-10-CM

## 2020-12-03 DIAGNOSIS — F41.1 GENERALIZED ANXIETY DISORDER: ICD-10-CM

## 2020-12-03 DIAGNOSIS — F90.2 ATTENTION DEFICIT HYPERACTIVITY DISORDER (ADHD), COMBINED TYPE: ICD-10-CM

## 2020-12-03 DIAGNOSIS — I10 HYPERTENSION GOAL BP (BLOOD PRESSURE) < 140/90: ICD-10-CM

## 2020-12-03 DIAGNOSIS — G47.00 INSOMNIA, UNSPECIFIED TYPE: ICD-10-CM

## 2020-12-03 DIAGNOSIS — K21.9 GASTROESOPHAGEAL REFLUX DISEASE WITHOUT ESOPHAGITIS: ICD-10-CM

## 2020-12-03 DIAGNOSIS — I25.10 CORONARY ARTERY DISEASE INVOLVING NATIVE HEART WITHOUT ANGINA PECTORIS, UNSPECIFIED VESSEL OR LESION TYPE: ICD-10-CM

## 2020-12-03 DIAGNOSIS — G47.33 OSA (OBSTRUCTIVE SLEEP APNEA): ICD-10-CM

## 2020-12-03 DIAGNOSIS — J45.30 MILD PERSISTENT ASTHMA WITHOUT COMPLICATION: ICD-10-CM

## 2020-12-03 DIAGNOSIS — M54.2 NECK PAIN, BILATERAL: ICD-10-CM

## 2020-12-03 DIAGNOSIS — L65.9 ALOPECIA: ICD-10-CM

## 2020-12-03 DIAGNOSIS — F33.1 MAJOR DEPRESSIVE DISORDER, RECURRENT EPISODE, MODERATE (H): ICD-10-CM

## 2020-12-03 DIAGNOSIS — E78.5 HYPERLIPIDEMIA LDL GOAL <70: ICD-10-CM

## 2020-12-03 LAB
INTERPRETATION ECG - MUSE: NORMAL

## 2020-12-03 PROCEDURE — 36415 COLL VENOUS BLD VENIPUNCTURE: CPT | Performed by: FAMILY MEDICINE

## 2020-12-03 PROCEDURE — 80048 BASIC METABOLIC PNL TOTAL CA: CPT | Performed by: FAMILY MEDICINE

## 2020-12-03 PROCEDURE — 99495 TRANSJ CARE MGMT MOD F2F 14D: CPT | Performed by: FAMILY MEDICINE

## 2020-12-03 RX ORDER — AMITRIPTYLINE HYDROCHLORIDE 50 MG/1
50-150 TABLET ORAL
Qty: 90 TABLET | Refills: 0 | Status: SHIPPED | OUTPATIENT
Start: 2020-12-03 | End: 2021-07-27

## 2020-12-03 RX ORDER — CLOPIDOGREL BISULFATE 75 MG/1
75 TABLET ORAL DAILY
Qty: 30 TABLET | Refills: 0 | Status: SHIPPED | OUTPATIENT
Start: 2020-12-03 | End: 2021-01-27

## 2020-12-03 RX ORDER — BUPROPION HYDROCHLORIDE 300 MG/1
300 TABLET ORAL EVERY MORNING
Qty: 90 TABLET | Refills: 1 | Status: SHIPPED | OUTPATIENT
Start: 2020-12-03 | End: 2021-05-06

## 2020-12-03 RX ORDER — CLONIDINE HYDROCHLORIDE 0.1 MG/1
0.1 TABLET ORAL 2 TIMES DAILY
Qty: 180 TABLET | Refills: 1 | Status: SHIPPED | OUTPATIENT
Start: 2020-12-03 | End: 2021-05-06

## 2020-12-03 RX ORDER — ALBUTEROL SULFATE 90 UG/1
2 AEROSOL, METERED RESPIRATORY (INHALATION) EVERY 6 HOURS
Qty: 18 G | Refills: 3 | Status: SHIPPED | OUTPATIENT
Start: 2020-12-03 | End: 2021-01-05

## 2020-12-03 RX ORDER — METOPROLOL TARTRATE 100 MG
TABLET ORAL
Qty: 180 TABLET | Refills: 1 | Status: SHIPPED | OUTPATIENT
Start: 2020-12-03 | End: 2021-05-06

## 2020-12-03 RX ORDER — FINASTERIDE 5 MG/1
TABLET, FILM COATED ORAL
Qty: 90 TABLET | Refills: 1 | Status: SHIPPED | OUTPATIENT
Start: 2020-12-03 | End: 2021-05-06

## 2020-12-03 RX ORDER — DEXTROAMPHETAMINE SACCHARATE, AMPHETAMINE ASPARTATE, DEXTROAMPHETAMINE SULFATE AND AMPHETAMINE SULFATE 5; 5; 5; 5 MG/1; MG/1; MG/1; MG/1
20 TABLET ORAL 2 TIMES DAILY
Qty: 90 TABLET | Refills: 0 | Status: SHIPPED | OUTPATIENT
Start: 2020-12-03 | End: 2021-08-23

## 2020-12-03 RX ORDER — ROSUVASTATIN CALCIUM 20 MG/1
20 TABLET, COATED ORAL DAILY
Qty: 90 TABLET | Refills: 3 | Status: SHIPPED | OUTPATIENT
Start: 2020-12-03 | End: 2021-11-06

## 2020-12-03 RX ORDER — LOSARTAN POTASSIUM 100 MG/1
100 TABLET ORAL DAILY
Qty: 90 TABLET | Refills: 1 | Status: SHIPPED | OUTPATIENT
Start: 2020-12-03 | End: 2021-05-06

## 2020-12-03 RX ORDER — AMLODIPINE BESYLATE 10 MG/1
10 TABLET ORAL DAILY
Qty: 90 TABLET | Refills: 1 | Status: SHIPPED | OUTPATIENT
Start: 2020-12-03 | End: 2020-12-18

## 2020-12-03 ASSESSMENT — ANXIETY QUESTIONNAIRES
2. NOT BEING ABLE TO STOP OR CONTROL WORRYING: NEARLY EVERY DAY
1. FEELING NERVOUS, ANXIOUS, OR ON EDGE: NEARLY EVERY DAY
6. BECOMING EASILY ANNOYED OR IRRITABLE: NOT AT ALL
IF YOU CHECKED OFF ANY PROBLEMS ON THIS QUESTIONNAIRE, HOW DIFFICULT HAVE THESE PROBLEMS MADE IT FOR YOU TO DO YOUR WORK, TAKE CARE OF THINGS AT HOME, OR GET ALONG WITH OTHER PEOPLE: SOMEWHAT DIFFICULT
GAD7 TOTAL SCORE: 14
5. BEING SO RESTLESS THAT IT IS HARD TO SIT STILL: SEVERAL DAYS
7. FEELING AFRAID AS IF SOMETHING AWFUL MIGHT HAPPEN: SEVERAL DAYS
3. WORRYING TOO MUCH ABOUT DIFFERENT THINGS: NEARLY EVERY DAY

## 2020-12-03 ASSESSMENT — PATIENT HEALTH QUESTIONNAIRE - PHQ9
5. POOR APPETITE OR OVEREATING: NEARLY EVERY DAY
SUM OF ALL RESPONSES TO PHQ QUESTIONS 1-9: 22

## 2020-12-03 ASSESSMENT — MIFFLIN-ST. JEOR: SCORE: 2058.28

## 2020-12-03 NOTE — PROGRESS NOTES
Assessment & Plan   Coronary artery disease involving native heart without angina pectoris, unspecified vessel or lesion type / NSTEMI (non-ST elevated myocardial infarction) (H)  Stable and has follow-up with cardiology; continue meds and refills given   - clopidogrel (PLAVIX) 75 MG tablet  Dispense: 30 tablet; Refill: 0      Hypertension goal BP (blood pressure) < 140/90 / Stage 3 chronic kidney disease, unspecified whether stage 3a or 3b CKD  Better controlled blood pressure, continue meds and will monitor kidney function.  He reports that some of his kidney issues could be related to a lot of ibuprofen use during his persistent neck pain that is uncontrolled with his Suboxone.  I stressed to him that he should not be taking ibuprofen whatsoever at this time.  Has referral to nephrology which I encouraged he make sure to see.  - Basic metabolic panel  (Ca, Cl, CO2, Creat, Gluc, K, Na, BUN)  - losartan (COZAAR) 100 MG tablet  Dispense: 90 tablet; Refill: 1  - metoprolol tartrate (LOPRESSOR) 100 MG tablet  Dispense: 180 tablet; Refill: 1  - amLODIPine (NORVASC) 10 MG tablet  Dispense: 90 tablet; Refill: 1  - cloNIDine (CATAPRES) 0.1 MG tablet  Dispense: 180 tablet; Refill: 1    Insomnia, unspecified type  Controlled - continue medication.   - amitriptyline (ELAVIL) 50 MG tablet  Dispense: 90 tablet; Refill: 0    Mild persistent asthma without complication  Off-and-on symptoms but does not use medicine on a regular basis which was encouraged.  - fluticasone-salmeterol (ADVAIR DISKUS) 500-50 MCG/DOSE inhaler  Dispense: 1 Inhaler; Refill: 2    Gastroesophageal reflux disease without esophagitis  Controlled - continue medication.   - omeprazole (PRILOSEC) 20 MG DR capsule  Dispense: 90 capsule; Refill: 1    Major Depressive Disorder, Recurrent Episode, Mode / Generalized anxiety disorderStable - continue medication(s)   - buPROPion (WELLBUTRIN XL) 300 MG 24 hr tablet  Dispense: 90 tablet; Refill: 1  - FLUoxetine  (PROZAC) 20 MG capsule  Dispense: 90 capsule; Refill: 1    Attention deficit hyperactivity disorder (ADHD), combined type  Occasional use of meds when he needs to focus but does not need refill at this point will put on file at pharmacy  - amphetamine-dextroamphetamine (ADDERALL) 20 MG tablet  Dispense: 90 tablet; Refill: 0    Alopecia  Stable - continue medication.  - finasteride (PROSCAR) 5 MG tablet  Dispense: 90 tablet; Refill: 1    Hyperlipidemia LDL goal <70  Not on statin currently and with presenting heart issues will initiate this:  - rosuvastatin (CRESTOR) 20 MG tablet  Dispense: 90 tablet; Refill: 3    Neck pain, bilateral  Ongoing chronic pain that fluctuates in intensity.  He is quite frustrated regarding management of this.  I will reach out to the addiction and pain clinic that he is seeing to see if they can help with this.    ERICA (obstructive sleep apnea)  Ongoing and needs new equipment:  - CPAP Order for DME - ONLY FOR DME       Return in about 1 month (around 1/3/2021) for recheck.      Nate Segovia MD      [address] [address]  [email or web address]   Office: [phone]  Pager: [phone]         Subjective   Jeremi Damon is a 50 year old male who presents to clinic today for the following health issues:    HPI     Hospital Follow-up Visit:    Hospital/Nursing Home/IP Rehab Facility: [facility]  Date of Admission: 11/29/2020  Date of Discharge: 11/30/2020  Reason(s) for Admission:     NSTEMI, concerning for type I   Hypertension  Hypertrophic cardiomyopathy   PEDRO on CKD stage IV  Hypersomnia with sleep apnea  Panic disorder without agoraphobia  Attention deficit hyperactivity disorder   Bipolar 2 disorder  Depressive Disorder  GERD   Mild persistent asthma without complication      Was your hospitalization related to COVID-19? No   Problems taking medications regularly:  None  Medication changes  since discharge: aspirin plavix  Problems adhering to non-medication therapy:  None    Summary of hospitalization:  [facility] discharge summary reviewed  Diagnostic Tests/Treatments reviewed.  Follow up needed: cardiology and nephrology  Other Healthcare Providers Involved in Patient s Care:         None  Update since discharge: stable.     Chronic - neck pain and headaches (better with Botox) - - he was on suboxone.        Post Discharge Medication Reconciliation: discharge medications reconciled, continue medications without change.  Plan of care communicated with patient                Reviewed and updated as needed this visit by provider:  Tobacco  Allergies  Meds  Problems  Med Hx  Surg Hx  Fam Hx          Review of Systems   Constitutional, HEENT, cardiovascular, pulmonary, GI, , musculoskeletal, neuro, skin, endocrine and psych systems are negative, except as otherwise noted.        Objective   /80   Pulse 74   Temp 95.3  F (35.2  C) (Tympanic)   Ht 1.829 m (6')   Wt 117 kg (258 lb)   SpO2 95%   BMI 34.99 kg/m   Body mass index is 34.99 kg/m .  Physical Exam   GENERAL: healthy, alert, well nourished, well hydrated, no distress  HENT: ear canals- normal; TMs- normal; Nose- normal; Mouth- no ulcers, no lesions  NECK: Right-sided paraspinal tenderness, no adenopathy, no asymmetry, no masses, no stiffness; thyroid- normal to palpation  RESP: lungs clear to auscultation - no rales, no rhonchi, no wheezes  CV: regular rates and rhythm, normal S1 S2, no S3 or S4 and no murmur, no click or rub -  ABDOMEN: soft, no tenderness, no  hepatosplenomegaly, no masses, normal bowel sounds  MS: extremities- no gross deformities noted, no edema  SKIN: no suspicious lesions, no rashes  NEURO: strength and tone- normal, sensory exam- grossly normal, mentation- intact, speech- normal, reflexes- symmetric  BACK: no CVA tenderness, no paralumbar tenderness  PSYCH: Alert and oriented times 3; speech-  coherent , normal rate and volume; able to articulate logical thoughts, able to abstract reason, no tangential thoughts, no hallucinations or delusions, affect- normal    Diagnostic Test Results  Pending
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
## 2020-12-04 ENCOUNTER — VIRTUAL VISIT (OUTPATIENT)
Dept: ADDICTION MEDICINE | Facility: CLINIC | Age: 50
End: 2020-12-04
Payer: COMMERCIAL

## 2020-12-04 DIAGNOSIS — Z79.899 HIGH RISK MEDICATION USE: ICD-10-CM

## 2020-12-04 DIAGNOSIS — F33.1 MAJOR DEPRESSIVE DISORDER, RECURRENT EPISODE, MODERATE (H): ICD-10-CM

## 2020-12-04 DIAGNOSIS — M54.42 LEFT-SIDED LOW BACK PAIN WITH LEFT-SIDED SCIATICA, UNSPECIFIED CHRONICITY: ICD-10-CM

## 2020-12-04 DIAGNOSIS — R29.898 WEAKNESS OF LEFT FOOT: ICD-10-CM

## 2020-12-04 DIAGNOSIS — F90.9 ATTENTION DEFICIT HYPERACTIVITY DISORDER (ADHD), UNSPECIFIED ADHD TYPE: ICD-10-CM

## 2020-12-04 DIAGNOSIS — M54.2 NECK PAIN: ICD-10-CM

## 2020-12-04 DIAGNOSIS — F41.0 PANIC DISORDER WITHOUT AGORAPHOBIA: ICD-10-CM

## 2020-12-04 DIAGNOSIS — F41.1 GENERALIZED ANXIETY DISORDER: ICD-10-CM

## 2020-12-04 DIAGNOSIS — F11.20 UNCOMPLICATED OPIOID DEPENDENCE (H): Primary | ICD-10-CM

## 2020-12-04 LAB
ANION GAP SERPL CALCULATED.3IONS-SCNC: 1 MMOL/L (ref 3–14)
BUN SERPL-MCNC: 18 MG/DL (ref 7–30)
CALCIUM SERPL-MCNC: 8.9 MG/DL (ref 8.5–10.1)
CHLORIDE SERPL-SCNC: 103 MMOL/L (ref 94–109)
CO2 SERPL-SCNC: 33 MMOL/L (ref 20–32)
CREAT SERPL-MCNC: 1.82 MG/DL (ref 0.66–1.25)
GFR SERPL CREATININE-BSD FRML MDRD: 42 ML/MIN/{1.73_M2}
GLUCOSE SERPL-MCNC: 108 MG/DL (ref 70–99)
POTASSIUM SERPL-SCNC: 4.3 MMOL/L (ref 3.4–5.3)
SODIUM SERPL-SCNC: 137 MMOL/L (ref 133–144)

## 2020-12-04 PROCEDURE — 99443 PR PHYSICIAN TELEPHONE EVALUATION 21-30 MIN: CPT | Performed by: PEDIATRICS

## 2020-12-04 RX ORDER — OXYCODONE HYDROCHLORIDE 5 MG/1
TABLET ORAL
Qty: 30 TABLET | Refills: 0 | Status: SHIPPED | OUTPATIENT
Start: 2020-12-04 | End: 2021-01-05

## 2020-12-04 ASSESSMENT — ANXIETY QUESTIONNAIRES: GAD7 TOTAL SCORE: 14

## 2020-12-04 ASSESSMENT — ASTHMA QUESTIONNAIRES: ACT_TOTALSCORE: 9

## 2020-12-07 NOTE — RESULT ENCOUNTER NOTE
Dear Jeremi,    Here is a summary of your recent test results:  -Kidney function (GFR) is decreased but improved from your hospitalization.  ADVISE: Staying well-hydrated, no ibuprofen use and rechecking this in 1-2 months  -Sodium is normal.  -Potassium is normal.  -Calcium is normal.  -Glucose is mildly elevated but you were nonfasting and this is okay..    For additional lab test information, labtestsonline.org is an excellent reference.    In addition, here is a list of due or overdue Health Maintenance reminders:  Pneumococcal Vaccine(1 of 1 - PPSV23) due on 06/13/1974  Hepatitis B Vaccine(1 of 3 - Risk 3-dose series) due on 06/13/1987  Prostate Test due on 01/06/2018  Zoster (Shingles) Vaccine(1 of 2) due on 06/13/2018  Kidney Microalbumin Urine Test due on 06/28/2020  Asthma Action Plan - yearly due on 06/28/2020  Flu Vaccine(1) due on 09/01/2020    Please call us at [phone] (or use [facility]) to address the above recommendations if needed.           Thank you very much for trusting me and [facility].     Have a peaceful day.    Healthy regards,  Nate Segovia MD

## 2020-12-09 DIAGNOSIS — I25.10 CORONARY ARTERY DISEASE INVOLVING NATIVE HEART WITHOUT ANGINA PECTORIS, UNSPECIFIED VESSEL OR LESION TYPE: ICD-10-CM

## 2020-12-09 DIAGNOSIS — N28.9 RENAL INSUFFICIENCY: ICD-10-CM

## 2020-12-09 DIAGNOSIS — I21.4 NSTEMI (NON-ST ELEVATED MYOCARDIAL INFARCTION) (H): ICD-10-CM

## 2020-12-09 LAB
ANION GAP SERPL CALCULATED.3IONS-SCNC: 2 MMOL/L (ref 3–14)
BUN SERPL-MCNC: 23 MG/DL (ref 7–30)
CALCIUM SERPL-MCNC: 8.9 MG/DL (ref 8.5–10.1)
CHLORIDE SERPL-SCNC: 104 MMOL/L (ref 94–109)
CO2 SERPL-SCNC: 34 MMOL/L (ref 20–32)
CREAT SERPL-MCNC: 2.01 MG/DL (ref 0.66–1.25)
GFR SERPL CREATININE-BSD FRML MDRD: 37 ML/MIN/{1.73_M2}
GLUCOSE SERPL-MCNC: 112 MG/DL (ref 70–99)
POTASSIUM SERPL-SCNC: 4.6 MMOL/L (ref 3.4–5.3)
SODIUM SERPL-SCNC: 140 MMOL/L (ref 133–144)

## 2020-12-09 PROCEDURE — 80048 BASIC METABOLIC PNL TOTAL CA: CPT | Performed by: NURSE PRACTITIONER

## 2020-12-09 PROCEDURE — 36415 COLL VENOUS BLD VENIPUNCTURE: CPT | Performed by: NURSE PRACTITIONER

## 2020-12-18 ENCOUNTER — OFFICE VISIT (OUTPATIENT)
Dept: CARDIOLOGY | Facility: CLINIC | Age: 50
End: 2020-12-18
Payer: COMMERCIAL

## 2020-12-18 VITALS
BODY MASS INDEX: 33.98 KG/M2 | WEIGHT: 250.9 LBS | DIASTOLIC BLOOD PRESSURE: 67 MMHG | SYSTOLIC BLOOD PRESSURE: 119 MMHG | HEART RATE: 54 BPM | HEIGHT: 72 IN | OXYGEN SATURATION: 96 %

## 2020-12-18 DIAGNOSIS — N28.9 RENAL INSUFFICIENCY: ICD-10-CM

## 2020-12-18 DIAGNOSIS — I21.4 NSTEMI (NON-ST ELEVATED MYOCARDIAL INFARCTION) (H): ICD-10-CM

## 2020-12-18 DIAGNOSIS — I25.10 CORONARY ARTERY DISEASE INVOLVING NATIVE HEART WITHOUT ANGINA PECTORIS, UNSPECIFIED VESSEL OR LESION TYPE: ICD-10-CM

## 2020-12-18 PROCEDURE — 99205 OFFICE O/P NEW HI 60 MIN: CPT | Performed by: INTERNAL MEDICINE

## 2020-12-18 RX ORDER — ISOSORBIDE MONONITRATE 30 MG/1
30 TABLET, EXTENDED RELEASE ORAL DAILY
Qty: 30 TABLET | Refills: 3 | Status: SHIPPED | OUTPATIENT
Start: 2020-12-18 | End: 2021-02-12

## 2020-12-18 ASSESSMENT — MIFFLIN-ST. JEOR: SCORE: 2026.07

## 2020-12-18 NOTE — PATIENT INSTRUCTIONS
1. Nephrology referral ASAP  2. Cardiac MRI with sedation  3. Hydrate before MRI   4. Follow up with Dr. Keita 1 month  5. Start imdur 30 mg daily and stop amlodipine

## 2020-12-18 NOTE — LETTER
12/18/2020    Luis Armando Segovia MD  [address] [address]    RE: Jeremi BARTOLOME Damon       Dear Colleague,    I had the pleasure of seeing Jeremi Codynz in the [facility].      HPI:     This is a 50 year old HTN, CKD here for establishment of care. He was seen in consultation during his hospitalization by cardiology.    Gurpreet presented with 2 day history of chest pain, radiating to his arm, back and jaw. He presented to the hospital and had a troponin of 0.069 which uptrended to a peak of 13 during the course of the hospitalization. He was started on heparin and nitroglycerin infusion and underwent angiogram which showed multivessel coronary artery disease involving moderate mid LAD and severe mid-distal LAD lesion, AV groove distal circumflex severe stenosis, moderate large obtuse marginal lesion, and right PL moderate stenosis. All of the severe disease is in the distal segments. ACS/non-STEMI culprit lesion was likely AV groove circumflex lesion as there was slow filling and emptying of the segment. ARLINE-3 flow was in the distal LAD. Decision was made that disease not amenable to PCI given distal nature. EKG showed RBBB. Echocardiogram without WMA but showed severe hypertrophic cardiomyopathy and findings concerning for infiltrative cardiomyopathy or HCM. There was no LVOT obstruction. After patient returned from angiogram he requested discharge. He was placed on DAPT and arranged for outpatient cardiology with me.     Patient today still with occasional chest pain. He is a very active patient in fitness and has concerns about his underlying diagnosis, ability to perform his baseline activity level.     PTA medications included Norvasc 10 mg daily, losartan 100 mg daily, metoprolol 100 mg twice daily, and clonidine 0.1 mg twice daily. At discharge clonidine was held. His blood pressure was controlled at discharge.  Of note patient has cKD. Prior to discharge patient  had a bump in his Cr to 2.62 on day of discharge. Nephrology referral was placed and plan for repeat BMP. His baseline is usually ~1.5 to 1.8.       ASSESSMENT/PLAN:    1. Multivessel CAD: moderate mid LAD and severe mid-distal LAD lesion, AV groove distal circumflex severe stenosis, moderate large obtuse marginal lesion, and right PL moderate stenosis. He is physically active and we discussed in detail that elevated heart rate, blood pressure can lead to ischemic based events due to demand-supply mismatch. Unfortunately with disease all distal there was minimal in regards to PCI options. The troponin elevation is possibly multifactorial and can be seen in underlying cardiomyopathy plus ischemic event which is likely for him. We discussed aggressive risk factor modification.   -would like to start nitrates for ischemic disease, imdur 30 mg daily and can uptitrate     2. Hypertrophic cardiomyopathy without LVOT obstruction: Differential includes hypertensive heart disease versus infiltrative CMY or HCM without obstruction. Did not have family history of SCD. He is on adequate beta blockade.   -would like to further define his CMY with Cardiac MRI if his kidney function is improved on repeat labs (GFR > 30). Recommend pre hydration. Will also need pre MRI sedation.   -no need for diuretic at this time  -aggressive BP control     3. Hypertension: On stable regimen of beta blocker, losartan and norvasc. Clonidine held at discharge. Overall BP is stable today at 119/67 mmHg which we are pleased with. Given nitrate initiation will stop norvasc.    4. CKD: nephrology consult was placed, Cr increased, repeated labs and Cr 2.0 which is improving but not to baseline. Could screen for renal artery stenosis if needed at future visit, but will wait what nephrology input is first.     5. RBBB on EKG: no concerning findings of advanced heart block by symptoms. Chronotropic competency is present based on patient's own physiologic  monitoring during exercise. Discussed heart rate monitoring.    Follow up in 1 month.    Gilda Keita MD MSc  [facility]      PAST MEDICAL HISTORY  Past Medical History:   Diagnosis Date     ADHD (attention deficit hyperactivity disorder)      Allergic rhinitis, cause unspecified     Allergic rhinitis     Benign hypertension      Chronic back pain      Hiccough      Hypersomnia with sleep apnea, unspecified      Major depressive disorder, single episode, moderate (H) 03/2008     Mild persistent asthma      Other primary cardiomyopathies     Cardiomyopathy -- unknown etiology       CURRENT MEDICATIONS  Current Outpatient Medications   Medication Sig Dispense Refill     albuterol (PROAIR HFA/PROVENTIL HFA/VENTOLIN HFA) 108 (90 Base) MCG/ACT inhaler Inhale 2 puffs into the lungs every 6 hours 18 g 3     amitriptyline (ELAVIL) 50 MG tablet Take 1-3 tablets ( mg) by mouth nightly as needed for sleep 90 tablet 0     amLODIPine (NORVASC) 10 MG tablet Take 1 tablet (10 mg) by mouth daily 90 tablet 1     aspirin (ASA) 81 MG EC tablet Take 1 tablet (81 mg) by mouth daily 30 tablet 0     buprenorphine HCl-naloxone HCl (SUBOXONE) 8-2 MG per film 1/2 film QID 60 Film 1     buPROPion (WELLBUTRIN XL) 300 MG 24 hr tablet Take 1 tablet (300 mg) by mouth every morning 90 tablet 1     cloNIDine (CATAPRES) 0.1 MG tablet Take 1 tablet (0.1 mg) by mouth 2 times daily 180 tablet 1     clopidogrel (PLAVIX) 75 MG tablet Take 1 tablet (75 mg) by mouth daily 30 tablet 0     finasteride (PROSCAR) 5 MG tablet 1/2 tab daily 90 tablet 1     FLUoxetine (PROZAC) 20 MG capsule Take 1 capsule (20 mg) by mouth daily 90 capsule 1     fluticasone-salmeterol (ADVAIR DISKUS) 500-50 MCG/DOSE inhaler 1 inhalation 2 times per day 1 Inhaler 2     imiquimod (ALDARA) 5 % external cream Apply topically At Bedtime -wash off after 8 hours and my use for up to 16 weeks. 36 packet 11     LORazepam (ATIVAN) 1 MG tablet TAKE 1 TABLET (1 MG) BY MOUTH  "2 TIMES DAILY AS NEEDED FOR ANXIETY 60 tablet 1     losartan (COZAAR) 100 MG tablet Take 1 tablet (100 mg) by mouth daily 90 tablet 1     metoprolol tartrate (LOPRESSOR) 100 MG tablet TAKE 1 TABLET BY MOUTH TWICE A  tablet 1     MULTIVITAMIN TABS   OR  (Patient taking differently: 1 tablet daily)  0     naloxone (NARCAN) 4 MG/0.1ML nasal spray Spray 1 spray (4 mg) into one nostril alternating nostrils as needed for opioid reversal every 2-3 minutes until assistance arrives 0.2 mL 11     omeprazole (PRILOSEC) 20 MG DR capsule Take 1 capsule (20 mg) by mouth daily 90 capsule 1     rosuvastatin (CRESTOR) 20 MG tablet Take 1 tablet (20 mg) by mouth daily 90 tablet 3     Syringe/Needle, Disp, (SYRINGE LUER LOCK) 20G X 1-1/2\" 3 ML MISC 1 Device once a week - and also needs 22 G needles 1.5 inch #30 with 1 refill 30 each 1     testosterone cypionate (DEPOTESTOSTERONE) 200 MG/ML injection INJECT 0.5 MLS (100 MG) INTO THE MUSCLE ONCE A WEEK 6 mL 0     VITAMIN C 100 MG OR TABS 1 TABLET 3 TIMES DAILY (Patient taking differently: Take 1 mg by mouth 2 times daily ) 90 0     zolpidem (AMBIEN) 10 MG tablet TAKE 1 TABLET (10 MG) BY MOUTH NIGHTLY AS NEEDED FOR SLEEP 30 tablet 0     amphetamine-dextroamphetamine (ADDERALL) 20 MG tablet Take 1 tablet (20 mg) by mouth 2 times daily (Patient not taking: Reported on 12/18/2020) 90 tablet 0     clonazePAM (KLONOPIN) 1 MG tablet Take 1 tablet (1 mg) by mouth 2 times daily as needed (flying phobia) (Patient not taking: Reported on 12/18/2020) 4 tablet 0     cyclobenzaprine (FLEXERIL) 10 MG tablet Take 1 tablet (10 mg) by mouth 3 times daily as needed for other (hiccups) (Patient not taking: Reported on 12/18/2020) 90 tablet 3     loperamide (IMODIUM A-D) 2 MG tablet Take 2 tabs (4 mg) after first loose stool, and then take one tab (2 mg) after each diarrheal stool.  Max of 8 tabs (16 mg) per day. (Patient not taking: Reported on 12/18/2020) 30 tablet 0     nitroGLYcerin (NITROSTAT) " 0.4 MG sublingual tablet Place 1 tablet (0.4 mg) under the tongue every 5 minutes as needed for chest pain If you are still having symptoms after 3 doses (15 minutes) call 911. (Patient not taking: Reported on 2020) 25 tablet 0     ondansetron (ZOFRAN-ODT) 4 MG ODT tab Take 1 tablet (4 mg) by mouth every 8 hours as needed for nausea (Patient not taking: Reported on 2020) 30 tablet 0     oxyCODONE (ROXICODONE) 5 MG tablet One tab -two tab bid prn severe pain.  RX to last at least 30 days (Patient not taking: Reported on 2020) 30 tablet 0       PAST SURGICAL HISTORY:  Past Surgical History:   Procedure Laterality Date     APPENDECTOMY       BACK SURGERY      L5-S1 fusion     CV CORONARY ANGIOGRAM N/A 2020    Procedure: Heart Catheterization with Possible Intervention;  Surgeon: Theo Donahue MD;  Location:  [facility]     HC REPAIR OF NASAL SEPTUM       LAPAROSCOPIC CHOLECYSTECTOMY      Cholecystectomy, Laparoscopic     SURGICAL HISTORY OF -       torn pectoral muscle     SURGICAL HISTORY OF -       Bilateral radiofrequency volume reduction of the inferior turbinates.     SURGICAL HISTORY OF -       rhinoplasty- septoplasty       ALLERGIES     Allergies   Allergen Reactions     Acetaminophen Other (See Comments)     headache     Gabapentin      Suicidal thoughts     Morphine Other (See Comments)     headache     Sulfa Drugs      Theophylline Hives       FAMILY HISTORY  Family History   Problem Relation Age of Onset     Cancer Father         hodgkins     Hypertension Father      Coronary Artery Disease Father      Cardiovascular Maternal Grandmother      Cardiovascular Sister      Cardiovascular Brother         question what     Coronary Artery Disease Mother          55; MI x 2     Hypertension Brother      Prostate Cancer No family hx of      Cancer - colorectal No family hx of            SOCIAL HISTORY  Social History     Socioeconomic History      Marital status: Single     Spouse name: Not on file     Number of children: 1     Years of education: Not on file     Highest education level: Not on file   Occupational History     Employer: [employer]    Social Needs     Financial resource strain: Not on file     Food insecurity     Worry: Not on file     Inability: Not on file     Transportation needs     Medical: Not on file     Non-medical: Not on file   Tobacco Use     Smoking status: Never Smoker     Smokeless tobacco: Never Used   Substance and Sexual Activity     Alcohol use: No     Comment: rarely      Drug use: No     Sexual activity: Yes     Partners: Female     Birth control/protection: Condom   Lifestyle     Physical activity     Days per week: Not on file     Minutes per session: Not on file     Stress: Not on file   Relationships     Social connections     Talks on phone: Not on file     Gets together: Not on file     Attends [employer] service: Not on file     Active member of club or organization: Not on file     Attends meetings of clubs or organizations: Not on file     Relationship status: Not on file     Intimate partner violence     Fear of current or ex partner: Not on file     Emotionally abused: Not on file     Physically abused: Not on file     Forced sexual activity: Not on file   Other Topics Concern     Parent/sibling w/ CABG, MI or angioplasty before 65F 55M? No   Social History Narrative     Not on file       ROS:   Constitutional: No fever, chills, or sweats. No weight gain/loss   ENT: No visual disturbance, ear ache, epistaxis, sore throat  Allergies/Immunologic: Negative  Respiratory: No cough, hemoptysia  Cardiovascular: As per HPI  GI: No nausea, vomiting, hematemesis, melena, or hematochezia  : No urinary frequency, dysuria, or hematuria  Integument: Negative  Psychiatric: Negative  Neuro: Negative  Endocrinology: Negative   Musculoskeletal: Negative  Vascular: No walking impairment, claudication, ischemic rest pain or  nonhealing wounds    EXAM:  /67 (BP Location: Right arm, Patient Position: Sitting, Cuff Size: Adult Large)   Pulse 54   Ht 1.829 m (6')   Wt 113.8 kg (250 lb 14.4 oz)   SpO2 96%   BMI 34.03 kg/m    In general, the patient is a pleasant male in no apparent distress.    HEENT: NC/AT.  PERRLA.  EOMI.  Sclerae white, not injected.    Neck: No adenopathy.  No thyromegaly. Carotids +2/2 bilaterally without bruits.  No jugular venous distension.   Heart: RRR. Normal S1, S2 splits physiologically. No murmur, rub, click, or gallop.   Lungs: CTA.  No ronchi, wheezes, rales.    Abdomen: Soft, nontender, nondistended.   Extremities: No clubbing, cyanosis, or edema.  No wounds.   Vascular: No bruits are noted.    Labs:  LIPID RESULTS:  Lab Results   Component Value Date    CHOL 132 11/30/2020    HDL 27 (L) 11/30/2020    LDL 50 11/30/2020    TRIG 275 (H) 11/30/2020    CHOLHDLRATIO 4.3 08/13/2013    NHDL 105 11/30/2020       LIVER ENZYME RESULTS:  Lab Results   Component Value Date    AST 59 (H) 11/29/2020     (H) 11/29/2020       CBC RESULTS:  Lab Results   Component Value Date    WBC 10.0 11/30/2020    RBC 4.90 11/30/2020    HGB 14.5 11/30/2020    HCT 44.2 11/30/2020    MCV 90 11/30/2020    MCH 29.6 11/30/2020    MCHC 32.8 11/30/2020    RDW 15.2 (H) 11/30/2020     11/30/2020       BMP RESULTS:  Lab Results   Component Value Date     12/09/2020    POTASSIUM 4.6 12/09/2020    CHLORIDE 104 12/09/2020    CO2 34 (H) 12/09/2020    ANIONGAP 2 (L) 12/09/2020     (H) 12/09/2020    BUN 23 12/09/2020    CR 2.01 (H) 12/09/2020    GFRESTIMATED 37 (L) 12/09/2020    GFRESTBLACK 43 (L) 12/09/2020    MIKO 8.9 12/09/2020        A1C RESULTS:  Lab Results   Component Value Date    A1C 5.2 11/30/2020       Thank you for allowing me to participate in the care of your patient.    Sincerely,     Gilda Keita MD     [facility]

## 2020-12-18 NOTE — PROGRESS NOTES
HPI:     This is a 50 year old HTN, CKD here for establishment of care. He was seen in consultation during his hospitalization by cardiology.    Gurpreet presented with 2 day history of chest pain, radiating to his arm, back and jaw. He presented to the hospital and had a troponin of 0.069 which uptrended to a peak of 13 during the course of the hospitalization. He was started on heparin and nitroglycerin infusion and underwent angiogram which showed multivessel coronary artery disease involving moderate mid LAD and severe mid-distal LAD lesion, AV groove distal circumflex severe stenosis, moderate large obtuse marginal lesion, and right PL moderate stenosis. All of the severe disease is in the distal segments. ACS/non-STEMI culprit lesion was likely AV groove circumflex lesion as there was slow filling and emptying of the segment. ARLINE-3 flow was in the distal LAD. Decision was made that disease not amenable to PCI given distal nature. EKG showed RBBB. Echocardiogram without WMA but showed severe hypertrophic cardiomyopathy and findings concerning for infiltrative cardiomyopathy or HCM. There was no LVOT obstruction. After patient returned from angiogram he requested discharge. He was placed on DAPT and arranged for outpatient cardiology with me.     Patient today still with occasional chest pain. He is a very active patient in fitness and has concerns about his underlying diagnosis, ability to perform his baseline activity level.     PTA medications included Norvasc 10 mg daily, losartan 100 mg daily, metoprolol 100 mg twice daily, and clonidine 0.1 mg twice daily. At discharge clonidine was held. His blood pressure was controlled at discharge.  Of note patient has cKD. Prior to discharge patient had a bump in his Cr to 2.62 on day of discharge. Nephrology referral was placed and plan for repeat BMP. His baseline is usually ~1.5 to 1.8.       ASSESSMENT/PLAN:    1. Multivessel CAD: moderate mid LAD and severe  mid-distal LAD lesion, AV groove distal circumflex severe stenosis, moderate large obtuse marginal lesion, and right PL moderate stenosis. He is physically active and we discussed in detail that elevated heart rate, blood pressure can lead to ischemic based events due to demand-supply mismatch. Unfortunately with disease all distal there was minimal in regards to PCI options. The troponin elevation is possibly multifactorial and can be seen in underlying cardiomyopathy plus ischemic event which is likely for him. We discussed aggressive risk factor modification.   -would like to start nitrates for ischemic disease, imdur 30 mg daily and can uptitrate     2. Hypertrophic cardiomyopathy without LVOT obstruction: Differential includes hypertensive heart disease versus infiltrative CMY or HCM without obstruction. Did not have family history of SCD. He is on adequate beta blockade.   -would like to further define his CMY with Cardiac MRI if his kidney function is improved on repeat labs (GFR > 30). Recommend pre hydration. Will also need pre MRI sedation.   -no need for diuretic at this time  -aggressive BP control     3. Hypertension: On stable regimen of beta blocker, losartan and norvasc. Clonidine held at discharge. Overall BP is stable today at 119/67 mmHg which we are pleased with. Given nitrate initiation will stop norvasc.    4. CKD: nephrology consult was placed, Cr increased, repeated labs and Cr 2.0 which is improving but not to baseline. Could screen for renal artery stenosis if needed at future visit, but will wait what nephrology input is first.     5. RBBB on EKG: no concerning findings of advanced heart block by symptoms. Chronotropic competency is present based on patient's own physiologic monitoring during exercise. Discussed heart rate monitoring.    Follow up in 1 month.    Gilda Keita MD MSc  [facility]      PAST MEDICAL HISTORY  Past Medical History:   Diagnosis Date     ADHD  (attention deficit hyperactivity disorder)      Allergic rhinitis, cause unspecified     Allergic rhinitis     Benign hypertension      Chronic back pain      Hiccough      Hypersomnia with sleep apnea, unspecified      Major depressive disorder, single episode, moderate (H) 03/2008     Mild persistent asthma      Other primary cardiomyopathies     Cardiomyopathy -- unknown etiology       CURRENT MEDICATIONS  Current Outpatient Medications   Medication Sig Dispense Refill     albuterol (PROAIR HFA/PROVENTIL HFA/VENTOLIN HFA) 108 (90 Base) MCG/ACT inhaler Inhale 2 puffs into the lungs every 6 hours 18 g 3     amitriptyline (ELAVIL) 50 MG tablet Take 1-3 tablets ( mg) by mouth nightly as needed for sleep 90 tablet 0     amLODIPine (NORVASC) 10 MG tablet Take 1 tablet (10 mg) by mouth daily 90 tablet 1     aspirin (ASA) 81 MG EC tablet Take 1 tablet (81 mg) by mouth daily 30 tablet 0     buprenorphine HCl-naloxone HCl (SUBOXONE) 8-2 MG per film 1/2 film QID 60 Film 1     buPROPion (WELLBUTRIN XL) 300 MG 24 hr tablet Take 1 tablet (300 mg) by mouth every morning 90 tablet 1     cloNIDine (CATAPRES) 0.1 MG tablet Take 1 tablet (0.1 mg) by mouth 2 times daily 180 tablet 1     clopidogrel (PLAVIX) 75 MG tablet Take 1 tablet (75 mg) by mouth daily 30 tablet 0     finasteride (PROSCAR) 5 MG tablet 1/2 tab daily 90 tablet 1     FLUoxetine (PROZAC) 20 MG capsule Take 1 capsule (20 mg) by mouth daily 90 capsule 1     fluticasone-salmeterol (ADVAIR DISKUS) 500-50 MCG/DOSE inhaler 1 inhalation 2 times per day 1 Inhaler 2     imiquimod (ALDARA) 5 % external cream Apply topically At Bedtime -wash off after 8 hours and my use for up to 16 weeks. 36 packet 11     LORazepam (ATIVAN) 1 MG tablet TAKE 1 TABLET (1 MG) BY MOUTH 2 TIMES DAILY AS NEEDED FOR ANXIETY 60 tablet 1     losartan (COZAAR) 100 MG tablet Take 1 tablet (100 mg) by mouth daily 90 tablet 1     metoprolol tartrate (LOPRESSOR) 100 MG tablet TAKE 1 TABLET BY MOUTH  "TWICE A  tablet 1     MULTIVITAMIN TABS   OR  (Patient taking differently: 1 tablet daily)  0     naloxone (NARCAN) 4 MG/0.1ML nasal spray Spray 1 spray (4 mg) into one nostril alternating nostrils as needed for opioid reversal every 2-3 minutes until assistance arrives 0.2 mL 11     omeprazole (PRILOSEC) 20 MG DR capsule Take 1 capsule (20 mg) by mouth daily 90 capsule 1     rosuvastatin (CRESTOR) 20 MG tablet Take 1 tablet (20 mg) by mouth daily 90 tablet 3     Syringe/Needle, Disp, (SYRINGE LUER LOCK) 20G X 1-1/2\" 3 ML MISC 1 Device once a week - and also needs 22 G needles 1.5 inch #30 with 1 refill 30 each 1     testosterone cypionate (DEPOTESTOSTERONE) 200 MG/ML injection INJECT 0.5 MLS (100 MG) INTO THE MUSCLE ONCE A WEEK 6 mL 0     VITAMIN C 100 MG OR TABS 1 TABLET 3 TIMES DAILY (Patient taking differently: Take 1 mg by mouth 2 times daily ) 90 0     zolpidem (AMBIEN) 10 MG tablet TAKE 1 TABLET (10 MG) BY MOUTH NIGHTLY AS NEEDED FOR SLEEP 30 tablet 0     amphetamine-dextroamphetamine (ADDERALL) 20 MG tablet Take 1 tablet (20 mg) by mouth 2 times daily (Patient not taking: Reported on 12/18/2020) 90 tablet 0     clonazePAM (KLONOPIN) 1 MG tablet Take 1 tablet (1 mg) by mouth 2 times daily as needed (flying phobia) (Patient not taking: Reported on 12/18/2020) 4 tablet 0     cyclobenzaprine (FLEXERIL) 10 MG tablet Take 1 tablet (10 mg) by mouth 3 times daily as needed for other (hiccups) (Patient not taking: Reported on 12/18/2020) 90 tablet 3     loperamide (IMODIUM A-D) 2 MG tablet Take 2 tabs (4 mg) after first loose stool, and then take one tab (2 mg) after each diarrheal stool.  Max of 8 tabs (16 mg) per day. (Patient not taking: Reported on 12/18/2020) 30 tablet 0     nitroGLYcerin (NITROSTAT) 0.4 MG sublingual tablet Place 1 tablet (0.4 mg) under the tongue every 5 minutes as needed for chest pain If you are still having symptoms after 3 doses (15 minutes) call 911. (Patient not taking: Reported " on 2020) 25 tablet 0     ondansetron (ZOFRAN-ODT) 4 MG ODT tab Take 1 tablet (4 mg) by mouth every 8 hours as needed for nausea (Patient not taking: Reported on 2020) 30 tablet 0     oxyCODONE (ROXICODONE) 5 MG tablet One tab -two tab bid prn severe pain.  RX to last at least 30 days (Patient not taking: Reported on 2020) 30 tablet 0       PAST SURGICAL HISTORY:  Past Surgical History:   Procedure Laterality Date     APPENDECTOMY       BACK SURGERY      L5-S1 fusion     CV CORONARY ANGIOGRAM N/A 2020    Procedure: Heart Catheterization with Possible Intervention;  Surgeon: Theo Donahue MD;  Location:  [facility]     HC REPAIR OF NASAL SEPTUM       LAPAROSCOPIC CHOLECYSTECTOMY      Cholecystectomy, Laparoscopic     SURGICAL HISTORY OF -       torn pectoral muscle     SURGICAL HISTORY OF -       Bilateral radiofrequency volume reduction of the inferior turbinates.     SURGICAL HISTORY OF -       rhinoplasty- septoplasty       ALLERGIES     Allergies   Allergen Reactions     Acetaminophen Other (See Comments)     headache     Gabapentin      Suicidal thoughts     Morphine Other (See Comments)     headache     Sulfa Drugs      Theophylline Hives       FAMILY HISTORY  Family History   Problem Relation Age of Onset     Cancer Father         hodgkins     Hypertension Father      Coronary Artery Disease Father      Cardiovascular Maternal Grandmother      Cardiovascular Sister      Cardiovascular Brother         question what     Coronary Artery Disease Mother          55; MI x 2     Hypertension Brother      Prostate Cancer No family hx of      Cancer - colorectal No family hx of            SOCIAL HISTORY  Social History     Socioeconomic History     Marital status: Single     Spouse name: Not on file     Number of children: 1     Years of education: Not on file     Highest education level: Not on file   Occupational History     Employer: [employer]     Social Needs     Financial resource strain: Not on file     Food insecurity     Worry: Not on file     Inability: Not on file     Transportation needs     Medical: Not on file     Non-medical: Not on file   Tobacco Use     Smoking status: Never Smoker     Smokeless tobacco: Never Used   Substance and Sexual Activity     Alcohol use: No     Comment: rarely      Drug use: No     Sexual activity: Yes     Partners: Female     Birth control/protection: Condom   Lifestyle     Physical activity     Days per week: Not on file     Minutes per session: Not on file     Stress: Not on file   Relationships     Social connections     Talks on phone: Not on file     Gets together: Not on file     Attends Je[employer] witness service: Not on file     Active member of club or organization: Not on file     Attends meetings of clubs or organizations: Not on file     Relationship status: Not on file     Intimate partner violence     Fear of current or ex partner: Not on file     Emotionally abused: Not on file     Physically abused: Not on file     Forced sexual activity: Not on file   Other Topics Concern     Parent/sibling w/ CABG, MI or angioplasty before 65F 55M? No   Social History Narrative     Not on file       ROS:   Constitutional: No fever, chills, or sweats. No weight gain/loss   ENT: No visual disturbance, ear ache, epistaxis, sore throat  Allergies/Immunologic: Negative  Respiratory: No cough, hemoptysia  Cardiovascular: As per HPI  GI: No nausea, vomiting, hematemesis, melena, or hematochezia  : No urinary frequency, dysuria, or hematuria  Integument: Negative  Psychiatric: Negative  Neuro: Negative  Endocrinology: Negative   Musculoskeletal: Negative  Vascular: No walking impairment, claudication, ischemic rest pain or nonhealing wounds    EXAM:  /67 (BP Location: Right arm, Patient Position: Sitting, Cuff Size: Adult Large)   Pulse 54   Ht 1.829 m (6')   Wt 113.8 kg (250 lb 14.4 oz)   SpO2 96%   BMI 34.03 kg/m    In  general, the patient is a pleasant male in no apparent distress.    HEENT: NC/AT.  PERRLA.  EOMI.  Sclerae white, not injected.    Neck: No adenopathy.  No thyromegaly. Carotids +2/2 bilaterally without bruits.  No jugular venous distension.   Heart: RRR. Normal S1, S2 splits physiologically. No murmur, rub, click, or gallop.   Lungs: CTA.  No ronchi, wheezes, rales.    Abdomen: Soft, nontender, nondistended.   Extremities: No clubbing, cyanosis, or edema.  No wounds.   Vascular: No bruits are noted.    Labs:  LIPID RESULTS:  Lab Results   Component Value Date    CHOL 132 11/30/2020    HDL 27 (L) 11/30/2020    LDL 50 11/30/2020    TRIG 275 (H) 11/30/2020    CHOLHDLRATIO 4.3 08/13/2013    NHDL 105 11/30/2020       LIVER ENZYME RESULTS:  Lab Results   Component Value Date    AST 59 (H) 11/29/2020     (H) 11/29/2020       CBC RESULTS:  Lab Results   Component Value Date    WBC 10.0 11/30/2020    RBC 4.90 11/30/2020    HGB 14.5 11/30/2020    HCT 44.2 11/30/2020    MCV 90 11/30/2020    MCH 29.6 11/30/2020    MCHC 32.8 11/30/2020    RDW 15.2 (H) 11/30/2020     11/30/2020       BMP RESULTS:  Lab Results   Component Value Date     12/09/2020    POTASSIUM 4.6 12/09/2020    CHLORIDE 104 12/09/2020    CO2 34 (H) 12/09/2020    ANIONGAP 2 (L) 12/09/2020     (H) 12/09/2020    BUN 23 12/09/2020    CR 2.01 (H) 12/09/2020    GFRESTIMATED 37 (L) 12/09/2020    GFRESTBLACK 43 (L) 12/09/2020    MIKO 8.9 12/09/2020        A1C RESULTS:  Lab Results   Component Value Date    A1C 5.2 11/30/2020

## 2020-12-21 DIAGNOSIS — R07.89 OTHER CHEST PAIN: ICD-10-CM

## 2020-12-23 RX ORDER — NITROGLYCERIN 0.4 MG/1
TABLET SUBLINGUAL
Qty: 25 TABLET | Refills: 0 | Status: SHIPPED | OUTPATIENT
Start: 2020-12-23 | End: 2021-01-12

## 2020-12-30 DIAGNOSIS — G47.00 INSOMNIA, UNSPECIFIED TYPE: ICD-10-CM

## 2020-12-30 DIAGNOSIS — B36.0 TINEA VERSICOLOR: ICD-10-CM

## 2020-12-31 ENCOUNTER — MYC MEDICAL ADVICE (OUTPATIENT)
Dept: FAMILY MEDICINE | Facility: CLINIC | Age: 50
End: 2020-12-31

## 2020-12-31 DIAGNOSIS — B36.0 TINEA VERSICOLOR: ICD-10-CM

## 2020-12-31 RX ORDER — FLUCONAZOLE 150 MG/1
300 TABLET ORAL
Qty: 4 TABLET | Refills: 1 | Status: SHIPPED | OUTPATIENT
Start: 2020-12-31 | End: 2021-01-05

## 2020-12-31 RX ORDER — ZOLPIDEM TARTRATE 10 MG/1
10 TABLET ORAL
Qty: 30 TABLET | Refills: 0 | Status: SHIPPED | OUTPATIENT
Start: 2020-12-31 | End: 2021-02-02

## 2020-12-31 NOTE — TELEPHONE ENCOUNTER
Outpatient Medication Detail   Disp Refills Start End COLLETTE   zolpidem (AMBIEN) 10 MG tablet 30 tablet 0 11/29/2020  No   Sig - Route: TAKE 1 TABLET (10 MG) BY MOUTH NIGHTLY AS NEEDED FOR SLEEP - Oral     Problem List Complete:    Yes    Last Office Visit with [facility] [place] primary care provider: 12/3/2020    Future Office visit:     Controlled substance agreement:   Encounter-Level CSA - 11/16/2017:    Controlled Substance Agreement - Scan on 12/1/2017 10:43 AM: CONTROLLED SUBSTANCE AGREEMENT     Encounter-Level CSA - 08/07/2015:    Controlled Substance Agreement - Scan on 8/19/2015 11:27 AM: Controlled Substance Agreement 08/07/15     Patient-Level CSA:    There are no patient-level csa.       Last Urine Drug Screen: No results found for: CDAUT, No results found for: COMDAT,   Cannabinoids (48-clz-3-carboxy-9-THC)   Date Value Ref Range Status   01/22/2020 Not Detected NDET^Not Detected ng/mL Final     Comment:     Cutoff for a negative cannabinoid is 50 ng/mL or less.     Phencyclidine (Phencyclidine)   Date Value Ref Range Status   01/22/2020 Not Detected NDET^Not Detected ng/mL Final     Comment:     Cutoff for a negative PCP is 25 ng/mL or less.     Cocaine (Benzoylecgonine)   Date Value Ref Range Status   01/22/2020 Not Detected NDET^Not Detected ng/mL Final     Comment:     Cutoff for a negative cocaine is 150 ng/ml or less.     Methamphetamine (d-Methamphetamine)   Date Value Ref Range Status   01/22/2020 Not Detected NDET^Not Detected ng/mL Final     Comment:     Cutoff for a negative methamphetamine is 500 ng/ml or less.     Opiates (Morphine)   Date Value Ref Range Status   01/22/2020 Not Detected NDET^Not Detected ng/mL Final     Comment:     Cutoff for a negative opiate is 100 ng/ml or less.     Amphetamine (d-Amphetamine)   Date Value Ref Range Status   01/22/2020 Not Detected NDET^Not Detected ng/mL Final     Comment:     Cutoff for a negative amphetamine is 500 ng/mL or less.     Benzodiazepines (Nordiazepam)    Date Value Ref Range Status   01/22/2020 Detected, Abnormal Result (A) NDET^Not Detected ng/mL Final     Comment:     Cutoff for a positive benzodiazepines is greater than 150 ng/ml.  This is an unconfirmed screening result to be used for medical purposes only.   Order [id number] for confirmation or individual confirmation tests to [facility].       Tricyclic Antidepressants (Desipramine)   Date Value Ref Range Status   01/22/2020 Detected, Abnormal Result (A) NDET^Not Detected ng/mL Final     Comment:     Cutoff for a positive tricyclic antidepressant is greater than 300 ng/ml.  This is an unconfirmed screening result to be used for medical purposes only.   Order [id number] for confirmation or individual confirmation tests to [facility].       Methadone (Methadone)   Date Value Ref Range Status   01/22/2020 Not Detected NDET^Not Detected ng/mL Final     Comment:     Cutoff for a negative methadone is 200 ng/ml or less.     Barbiturates (Butalbital)   Date Value Ref Range Status   01/22/2020 Not Detected NDET^Not Detected ng/mL Final     Comment:     Cutoff for a negative barbituate is 200 ng/ml or less.     Oxycodone (Oxycodone)   Date Value Ref Range Status   01/22/2020 Not Detected NDET^Not Detected ng/mL Final     Comment:     Cutoff for a negative Oxycodone is 100 ng/mL or less.     Propoxyphene (Norpropoxyphene)   Date Value Ref Range Status   01/22/2020 Not Detected NDET^Not Detected ng/mL Final     Comment:     Cutoff for a negative propoxyphene is 300 ng/ml or less     Buprenorphine (Buprenorphine)   Date Value Ref Range Status   01/22/2020 Detected, Abnormal Result (A) NDET^Not Detected ng/mL Final     Comment:     Cutoff for a positive buprenorphine is greater than 10 ng/ml.  This is an unconfirmed screening result to be used for medical purposes only.   Order [id number] for confirmation or individual confirmation tests to [facility].       [email or web address]    Routing refill request to provider for  review/approval because:  Drug not on the [facility] refill protocol     Fluconazole was discontinued - need to verify if still taking  MyChart sent      Asmita Marinelli RN  [facility]

## 2021-01-04 ENCOUNTER — MYC MEDICAL ADVICE (OUTPATIENT)
Dept: FAMILY MEDICINE | Facility: CLINIC | Age: 51
End: 2021-01-04

## 2021-01-04 DIAGNOSIS — J45.30 MILD PERSISTENT ASTHMA WITHOUT COMPLICATION: Primary | ICD-10-CM

## 2021-01-05 ENCOUNTER — VIRTUAL VISIT (OUTPATIENT)
Dept: ADDICTION MEDICINE | Facility: CLINIC | Age: 51
End: 2021-01-05
Payer: COMMERCIAL

## 2021-01-05 DIAGNOSIS — F33.1 MAJOR DEPRESSIVE DISORDER, RECURRENT EPISODE, MODERATE (H): ICD-10-CM

## 2021-01-05 DIAGNOSIS — I21.4 NSTEMI (NON-ST ELEVATED MYOCARDIAL INFARCTION) (H): ICD-10-CM

## 2021-01-05 DIAGNOSIS — F41.1 GENERALIZED ANXIETY DISORDER: ICD-10-CM

## 2021-01-05 DIAGNOSIS — R29.898 WEAKNESS OF LEFT FOOT: ICD-10-CM

## 2021-01-05 DIAGNOSIS — F41.0 PANIC DISORDER WITHOUT AGORAPHOBIA: ICD-10-CM

## 2021-01-05 DIAGNOSIS — Z79.899 HIGH RISK MEDICATION USE: ICD-10-CM

## 2021-01-05 DIAGNOSIS — F11.20 UNCOMPLICATED OPIOID DEPENDENCE (H): Primary | ICD-10-CM

## 2021-01-05 DIAGNOSIS — M54.42 LEFT-SIDED LOW BACK PAIN WITH LEFT-SIDED SCIATICA, UNSPECIFIED CHRONICITY: ICD-10-CM

## 2021-01-05 DIAGNOSIS — F90.9 ATTENTION DEFICIT HYPERACTIVITY DISORDER (ADHD), UNSPECIFIED ADHD TYPE: ICD-10-CM

## 2021-01-05 DIAGNOSIS — M54.2 NECK PAIN: ICD-10-CM

## 2021-01-05 DIAGNOSIS — G43.119 INTRACTABLE MIGRAINE WITH AURA WITHOUT STATUS MIGRAINOSUS: ICD-10-CM

## 2021-01-05 DIAGNOSIS — M54.9 UPPER BACK PAIN ON RIGHT SIDE: ICD-10-CM

## 2021-01-05 PROCEDURE — 99442 PR PHYSICIAN TELEPHONE EVALUATION 11-20 MIN: CPT | Performed by: PEDIATRICS

## 2021-01-05 RX ORDER — BUPRENORPHINE AND NALOXONE 8; 2 MG/1; MG/1
FILM, SOLUBLE BUCCAL; SUBLINGUAL
Qty: 60 FILM | Refills: 1 | Status: SHIPPED | OUTPATIENT
Start: 2021-01-05 | End: 2021-02-03

## 2021-01-05 RX ORDER — OXYCODONE HYDROCHLORIDE 5 MG/1
TABLET ORAL
Qty: 30 TABLET | Refills: 0 | Status: SHIPPED | OUTPATIENT
Start: 2021-01-05 | End: 2021-02-03

## 2021-01-05 RX ORDER — ALBUTEROL SULFATE 90 UG/1
2 AEROSOL, METERED RESPIRATORY (INHALATION) EVERY 6 HOURS
Qty: 18 G | Refills: 3 | Status: SHIPPED | OUTPATIENT
Start: 2021-01-05 | End: 2021-09-14

## 2021-01-05 RX ORDER — FLUCONAZOLE 150 MG/1
300 TABLET ORAL
Qty: 4 TABLET | Refills: 1 | Status: SHIPPED | OUTPATIENT
Start: 2021-01-05 | End: 2021-05-05
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
## 2021-01-05 NOTE — TELEPHONE ENCOUNTER
Please see my chart message below     Please review and advise     Thank you     Kari Mcleod RN, BSN  [place] [facility]

## 2021-01-05 NOTE — TELEPHONE ENCOUNTER
Please see my chart message below     Please advise     Thank you     Kari Mcleod RN, BSN  [place] [facility]

## 2021-01-08 DIAGNOSIS — F40.243 FLYING PHOBIA: ICD-10-CM

## 2021-01-08 DIAGNOSIS — R07.89 OTHER CHEST PAIN: ICD-10-CM

## 2021-01-11 ENCOUNTER — TELEPHONE (OUTPATIENT)
Dept: FAMILY MEDICINE | Facility: CLINIC | Age: 51
End: 2021-01-11

## 2021-01-11 ENCOUNTER — MYC MEDICAL ADVICE (OUTPATIENT)
Dept: FAMILY MEDICINE | Facility: CLINIC | Age: 51
End: 2021-01-11

## 2021-01-11 DIAGNOSIS — R07.89 OTHER CHEST PAIN: ICD-10-CM

## 2021-01-11 DIAGNOSIS — F40.243 FLYING PHOBIA: ICD-10-CM

## 2021-01-11 DIAGNOSIS — E29.1 HYPOGONADISM IN MALE: ICD-10-CM

## 2021-01-11 DIAGNOSIS — F41.1 GENERALIZED ANXIETY DISORDER: ICD-10-CM

## 2021-01-11 DIAGNOSIS — F41.0 PANIC DISORDER WITHOUT AGORAPHOBIA: ICD-10-CM

## 2021-01-11 NOTE — TELEPHONE ENCOUNTER
nitroGLYcerin (NITROSTAT) 0.4 MG sublingual tablet 25 tablet 0 12/23/2020  No   Sig: PLACE 1 TABLET UNDER TONGUE EVERY 5 MINS, UP TO 3 DOSES AS NEEDED FOR CHEST PAIN   Sent to pharmacy as: Nitroglycerin 0.4 MG Sublingual Tablet Sublingual (NITROSTAT)   Class: E-Prescribe   Order: [id number]   E-Prescribing Status: Receipt confirmed by pharmacy (12/23/2020  9:43 AM CST)     See Just[employer]otted[employer] messages as well.      Routing refill request to provider for review/approval because:  Drug not on the [facility] refill protocol       Koffi BURNHAM RN   [facility]

## 2021-01-11 NOTE — TELEPHONE ENCOUNTER
Reason for Call:  Form, our goal is to have forms completed with 72 hours, however, some forms may require a visit or additional information.    Type of letter, form or note:  medical    Who is the form from?: Insurance (if other please explain)    Where did the form come from: form was faxed in    What clinic location was the form placed at?: [place]    Where the form was placed: Dr. Segovia Box/Folder    What number is listed as a contact on the form?: [phone]       Call taken on 1/11/2021 at 10:40 AM by Ashley Panda

## 2021-01-12 RX ORDER — NITROGLYCERIN 0.4 MG/1
TABLET SUBLINGUAL
Qty: 25 TABLET | Refills: 0 | OUTPATIENT
Start: 2021-01-12

## 2021-01-12 RX ORDER — CLONAZEPAM 1 MG/1
1 TABLET ORAL 2 TIMES DAILY PRN
Qty: 4 TABLET | Refills: 0 | Status: SHIPPED | OUTPATIENT
Start: 2021-01-12 | End: 2021-05-06

## 2021-01-12 RX ORDER — NITROGLYCERIN 0.4 MG/1
TABLET SUBLINGUAL
Qty: 25 TABLET | Refills: 0 | Status: SHIPPED | OUTPATIENT
Start: 2021-01-12 | End: 2021-02-16

## 2021-01-12 NOTE — TELEPHONE ENCOUNTER
Outpatient Medication Detail   Disp Refills Start End COLLETTE   LORazepam (ATIVAN) 1 MG tablet 60 tablet 1 11/25/2020  No   Sig - Route: TAKE 1 TABLET (1 MG) BY MOUTH 2 TIMES DAILY AS NEEDED FOR ANXIETY - Oral     Problem List Complete:    Yes    Last Office Visit with [facility] primary care provider: 12/3/2020    Future Office visit:   Next 5 appointments (look out 90 days)    Feb 03, 2021 11:00 AM  Return Visit with Cleopatra Carreon MD  [facility] [address] [place] [address]  [address]  [facility] [address]  [phone]          Controlled substance agreement:   Encounter-Level CSA - 11/16/2017:    Controlled Substance Agreement - Scan on 12/1/2017 10:43 AM: CONTROLLED SUBSTANCE AGREEMENT     Encounter-Level CSA - 08/07/2015:    Controlled Substance Agreement - Scan on 8/19/2015 11:27 AM: Controlled Substance Agreement 08/07/15     Patient-Level CSA:    There are no patient-level csa.         Last Urine Drug Screen: No results found for: CDAUT, No results found for: COMDAT,   Cannabinoids (29-nno-4-carboxy-9-THC)   Date Value Ref Range Status   01/22/2020 Not Detected NDET^Not Detected ng/mL Final     Comment:     Cutoff for a negative cannabinoid is 50 ng/mL or less.     Phencyclidine (Phencyclidine)   Date Value Ref Range Status   01/22/2020 Not Detected NDET^Not Detected ng/mL Final     Comment:     Cutoff for a negative PCP is 25 ng/mL or less.     Cocaine (Benzoylecgonine)   Date Value Ref Range Status   01/22/2020 Not Detected NDET^Not Detected ng/mL Final     Comment:     Cutoff for a negative cocaine is 150 ng/ml or less.     Methamphetamine (d-Methamphetamine)   Date Value Ref Range Status   01/22/2020 Not Detected NDET^Not Detected ng/mL Final     Comment:     Cutoff for a negative methamphetamine is 500 ng/ml or less.     Opiates (Morphine)   Date Value Ref Range Status   01/22/2020 Not Detected NDET^Not Detected ng/mL Final     Comment:     Cutoff  for a negative opiate is 100 ng/ml or less.     Amphetamine (d-Amphetamine)   Date Value Ref Range Status   01/22/2020 Not Detected NDET^Not Detected ng/mL Final     Comment:     Cutoff for a negative amphetamine is 500 ng/mL or less.     Benzodiazepines (Nordiazepam)   Date Value Ref Range Status   01/22/2020 Detected, Abnormal Result (A) NDET^Not Detected ng/mL Final     Comment:     Cutoff for a positive benzodiazepines is greater than 150 ng/ml.  This is an unconfirmed screening result to be used for medical purposes only.   Order [id number] for confirmation or individual confirmation tests to [employer].       Tricyclic Antidepressants (Desipramine)   Date Value Ref Range Status   01/22/2020 Detected, Abnormal Result (A) NDET^Not Detected ng/mL Final     Comment:     Cutoff for a positive tricyclic antidepressant is greater than 300 ng/ml.  This is an unconfirmed screening result to be used for medical purposes only.   Order [id number] for confirmation or individual confirmation tests to [employer].       Methadone (Methadone)   Date Value Ref Range Status   01/22/2020 Not Detected NDET^Not Detected ng/mL Final     Comment:     Cutoff for a negative methadone is 200 ng/ml or less.     Barbiturates (Butalbital)   Date Value Ref Range Status   01/22/2020 Not Detected NDET^Not Detected ng/mL Final     Comment:     Cutoff for a negative barbituate is 200 ng/ml or less.     Oxycodone (Oxycodone)   Date Value Ref Range Status   01/22/2020 Not Detected NDET^Not Detected ng/mL Final     Comment:     Cutoff for a negative Oxycodone is 100 ng/mL or less.     Propoxyphene (Norpropoxyphene)   Date Value Ref Range Status   01/22/2020 Not Detected NDET^Not Detected ng/mL Final     Comment:     Cutoff for a negative propoxyphene is 300 ng/ml or less     Buprenorphine (Buprenorphine)   Date Value Ref Range Status   01/22/2020 Detected, Abnormal Result (A) NDET^Not Detected ng/mL Final     Comment:     Cutoff for a positive buprenorphine is  greater than 10 ng/ml.  This is an unconfirmed screening result to be used for medical purposes only.   Order [id number] for confirmation or individual confirmation tests to [employer].         [email or web address]    Routing refill request to provider for review/approval because:  Drug not on the [facility] refill protocol       nitroglycerin - #25 tabs sent 1/12/21  Syringe/Needle - Refilled per RN Protocol.       Asmita Marinelli RN  [facility]

## 2021-01-12 NOTE — TELEPHONE ENCOUNTER
Outpatient Medication Detail      Disp Refills Start End COLLETTE   zolpidem (AMBIEN) 10 MG tablet 30 tablet 1 1/12/2019  No   Sig: TAKE 1 TABLET BY MOUTH EVERYDAY AT BEDTIME     Problem List Complete:  Yes    Last Office Visit with [facility] [place] care provider: 02/21/2019    Future Office visit:     Controlled substance agreement:   Encounter-Level CSA - 11/16/2017:    Controlled Substance Agreement - Scan on 12/1/2017 10:43 AM: CONTROLLED SUBSTANCE AGREEMENT (below)       Encounter-Level CSA - 08/07/2015:    Controlled Substance Agreement - Scan on 8/19/2015 11:27 AM: Controlled Substance Agreement 08/07/15 (below)       Patient-Level CSA:    There are no patient-level csa.         Last Urine Drug Screen: No results found for: CDAUT, No results found for: COMDAT, No results found for: THC13, PCP13, COC13, MAMP13, OPI13, AMP13, BZO13, TCA13, MTD13, BAR13, OXY13, PPX13, BUP13     Processing:  [facility] - [facility]    [email or web address]      Routing refill request to provider for review/approval because:  Drug not on the [facility] refill protocol         Asmita Marinelli RN  [facility]   [phone]

## 2021-01-13 ENCOUNTER — TELEPHONE (OUTPATIENT)
Dept: FAMILY MEDICINE | Facility: CLINIC | Age: 51
End: 2021-01-13

## 2021-01-13 RX ORDER — LORAZEPAM 1 MG/1
1 TABLET ORAL 2 TIMES DAILY PRN
Qty: 60 TABLET | Refills: 1 | Status: SHIPPED | OUTPATIENT
Start: 2021-01-23 | End: 2021-03-18

## 2021-01-20 DIAGNOSIS — F41.1 GENERALIZED ANXIETY DISORDER: ICD-10-CM

## 2021-01-20 DIAGNOSIS — F41.0 PANIC DISORDER WITHOUT AGORAPHOBIA: ICD-10-CM

## 2021-01-20 RX ORDER — LORAZEPAM 1 MG/1
1 TABLET ORAL 2 TIMES DAILY PRN
Qty: 60 TABLET | Refills: 1 | OUTPATIENT
Start: 2021-01-20

## 2021-01-23 DIAGNOSIS — E29.1 HYPOGONADISM MALE: ICD-10-CM

## 2021-01-25 DIAGNOSIS — I25.10 CORONARY ARTERY DISEASE INVOLVING NATIVE HEART WITHOUT ANGINA PECTORIS, UNSPECIFIED VESSEL OR LESION TYPE: ICD-10-CM

## 2021-01-25 NOTE — TELEPHONE ENCOUNTER
Routing refill request to provider for review/approval because:        Asmita Marinelli RN  [facility]=

## 2021-01-26 ENCOUNTER — TELEPHONE (OUTPATIENT)
Dept: FAMILY MEDICINE | Facility: CLINIC | Age: 51
End: 2021-01-26

## 2021-01-26 NOTE — TELEPHONE ENCOUNTER
Routing refill request to provider for review/approval because:  Pt taking omeprazole    Koffi BURNHAM RN   [facility]

## 2021-01-26 NOTE — TELEPHONE ENCOUNTER
Reason for Call:  Form, our goal is to have forms completed with 72 hours, however, some forms may require a visit or additional information.    Type of letter, form or note:  medical    Who is the form from?: Insurance comp, [facility]    Where did the form come from: form was faxed in    What clinic location was the form placed at?: [place]    Where the form was placed: [facility] Segovia Box/Folder    What number is listed as a contact on the form?: [phone]       Call taken on 1/26/2021 at 6:38 AM by Ashley Panda

## 2021-01-27 ENCOUNTER — MYC MEDICAL ADVICE (OUTPATIENT)
Dept: FAMILY MEDICINE | Facility: CLINIC | Age: 51
End: 2021-01-27

## 2021-01-27 RX ORDER — TESTOSTERONE CYPIONATE 200 MG/ML
INJECTION, SOLUTION INTRAMUSCULAR
Qty: 6 ML | Refills: 0 | Status: SHIPPED | OUTPATIENT
Start: 2021-01-27 | End: 2021-02-12

## 2021-01-27 RX ORDER — CLOPIDOGREL BISULFATE 75 MG/1
TABLET ORAL
Qty: 90 TABLET | Refills: 3 | Status: SHIPPED | OUTPATIENT
Start: 2021-01-27 | End: 2022-01-18

## 2021-01-27 NOTE — TELEPHONE ENCOUNTER
He is overdue for a testosterone(preferred just prior to his next injection) and psa lab check - please schedule a lab only appointment.

## 2021-01-28 ENCOUNTER — TELEPHONE (OUTPATIENT)
Dept: FAMILY MEDICINE | Facility: CLINIC | Age: 51
End: 2021-01-28

## 2021-01-28 ENCOUNTER — OFFICE VISIT (OUTPATIENT)
Dept: PALLIATIVE MEDICINE | Facility: CLINIC | Age: 51
End: 2021-01-28
Payer: COMMERCIAL

## 2021-01-28 VITALS — DIASTOLIC BLOOD PRESSURE: 95 MMHG | SYSTOLIC BLOOD PRESSURE: 148 MMHG | OXYGEN SATURATION: 97 % | HEART RATE: 71 BPM

## 2021-01-28 DIAGNOSIS — G43.019 INTRACTABLE MIGRAINE WITHOUT AURA AND WITHOUT STATUS MIGRAINOSUS: Primary | ICD-10-CM

## 2021-01-28 DIAGNOSIS — G43.009 MIGRAINE WITHOUT AURA AND WITHOUT STATUS MIGRAINOSUS, NOT INTRACTABLE: ICD-10-CM

## 2021-01-28 PROCEDURE — 64615 CHEMODENERV MUSC MIGRAINE: CPT | Performed by: ANESTHESIOLOGY

## 2021-01-28 ASSESSMENT — PAIN SCALES - GENERAL: PAINLEVEL: SEVERE PAIN (6)
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
## 2021-01-28 NOTE — TELEPHONE ENCOUNTER
Reason for Call:  Form, our goal is to have forms completed with 72 hours, however, some forms may require a visit or additional information.    Type of letter, form or note:  medical    Who is the form from?: Insurance comp, [employer], Retrospective Drug Utilization Review    Where did the form come from: form was faxed in    What clinic location was the form placed at?: [facility]    Where the form was placed: Luis Armando Segovia MD Box/Folder    What number is listed as a contact on the form?: [phone]       Call taken on 1/28/2021 at 6:42 AM by Ashley Panda

## 2021-01-31 DIAGNOSIS — G47.00 INSOMNIA, UNSPECIFIED TYPE: ICD-10-CM

## 2021-02-01 NOTE — TELEPHONE ENCOUNTER
zolpidem (AMBIEN) 10 MG tablet          Last Written Prescription Date:  12.30.20  Last Fill Quantity: 30 tablet,  # refills: 0   Last office visit: 12/3/2020 with prescribing provider:  Luis Armando Segovia MD       Future Office Visit:   Next 5 appointments (look out 90 days)    Feb 03, 2021 11:00 AM  Return Visit with Cleopatra Carreon MD  [facility] ([facility]) [address] [place] [address]  [address]  [facility] [id number] [phone]   Apr 29, 2021 10:00 AM  Return Visit with Macy Subramanian MD  [facility] [place] ([facility]) [address] [address]  [address]  [facility] [address]  [phone]                  Routing refill request to provider for review/approval because:  Drug not on the [facility], [facility] or [facility] refill protocol or controlled substance

## 2021-02-02 RX ORDER — ZOLPIDEM TARTRATE 10 MG/1
10 TABLET ORAL
Qty: 30 TABLET | Refills: 0 | Status: SHIPPED | OUTPATIENT
Start: 2021-02-02 | End: 2021-03-04

## 2021-02-03 ENCOUNTER — TELEPHONE (OUTPATIENT)
Dept: FAMILY MEDICINE | Facility: CLINIC | Age: 51
End: 2021-02-03

## 2021-02-03 ENCOUNTER — OFFICE VISIT (OUTPATIENT)
Dept: ADDICTION MEDICINE | Facility: CLINIC | Age: 51
End: 2021-02-03
Payer: COMMERCIAL

## 2021-02-03 VITALS
TEMPERATURE: 97.6 F | HEART RATE: 59 BPM | WEIGHT: 253 LBS | BODY MASS INDEX: 34.27 KG/M2 | HEIGHT: 72 IN | DIASTOLIC BLOOD PRESSURE: 82 MMHG | SYSTOLIC BLOOD PRESSURE: 130 MMHG | OXYGEN SATURATION: 96 %

## 2021-02-03 DIAGNOSIS — M54.9 UPPER BACK PAIN ON RIGHT SIDE: ICD-10-CM

## 2021-02-03 DIAGNOSIS — F90.9 ATTENTION DEFICIT HYPERACTIVITY DISORDER (ADHD), UNSPECIFIED ADHD TYPE: ICD-10-CM

## 2021-02-03 DIAGNOSIS — F11.20 UNCOMPLICATED OPIOID DEPENDENCE (H): ICD-10-CM

## 2021-02-03 DIAGNOSIS — F41.0 PANIC DISORDER WITHOUT AGORAPHOBIA: ICD-10-CM

## 2021-02-03 DIAGNOSIS — F41.1 GENERALIZED ANXIETY DISORDER: ICD-10-CM

## 2021-02-03 DIAGNOSIS — M54.42 LEFT-SIDED LOW BACK PAIN WITH LEFT-SIDED SCIATICA, UNSPECIFIED CHRONICITY: ICD-10-CM

## 2021-02-03 DIAGNOSIS — F11.20 UNCOMPLICATED OPIOID DEPENDENCE (H): Primary | ICD-10-CM

## 2021-02-03 DIAGNOSIS — M54.2 NECK PAIN: ICD-10-CM

## 2021-02-03 DIAGNOSIS — I21.4 NSTEMI (NON-ST ELEVATED MYOCARDIAL INFARCTION) (H): ICD-10-CM

## 2021-02-03 DIAGNOSIS — R29.898 WEAKNESS OF LEFT FOOT: ICD-10-CM

## 2021-02-03 DIAGNOSIS — Z79.899 HIGH RISK MEDICATION USE: ICD-10-CM

## 2021-02-03 DIAGNOSIS — F33.1 MAJOR DEPRESSIVE DISORDER, RECURRENT EPISODE, MODERATE (H): ICD-10-CM

## 2021-02-03 PROCEDURE — 99215 OFFICE O/P EST HI 40 MIN: CPT | Performed by: PEDIATRICS

## 2021-02-03 PROCEDURE — 80306 DRUG TEST PRSMV INSTRMNT: CPT | Performed by: PEDIATRICS

## 2021-02-03 RX ORDER — OXYCODONE HYDROCHLORIDE 5 MG/1
TABLET ORAL
Qty: 30 TABLET | Refills: 0 | Status: SHIPPED | OUTPATIENT
Start: 2021-02-03 | End: 2021-03-03

## 2021-02-03 RX ORDER — BUPRENORPHINE AND NALOXONE 8; 2 MG/1; MG/1
FILM, SOLUBLE BUCCAL; SUBLINGUAL
Qty: 60 FILM | Refills: 1 | Status: SHIPPED | OUTPATIENT
Start: 2021-02-03 | End: 2021-04-06

## 2021-02-03 ASSESSMENT — MIFFLIN-ST. JEOR: SCORE: 2035.6

## 2021-02-03 NOTE — TELEPHONE ENCOUNTER
Routing to provider - Lauri/Addiction Team - please review and advise as appropriate    Lab order clarification:  Urine Drug Screen    Patient had office visit 2/3/2021 - left urine with lab on 7th floor for tox screen - no order - can we please clarify on plan and if you would like order processed please
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
## 2021-02-05 ENCOUNTER — CARE COORDINATION (OUTPATIENT)
Dept: CARDIOLOGY | Facility: CLINIC | Age: 51
End: 2021-02-05

## 2021-02-05 NOTE — PROGRESS NOTES
Call from patient regarding scheduling MRI w/ general anesthesia at [facility]. [facility] that [facility] in the middle of March. Patient called stating that he's had two episodes of chest pain since the last OV. The last one happening a few days ago. He states that he was at rest and all of a sudden developed chest pain on R side of chest radiating to L side. He states that it's not the same intensity that he experienced when he was admitted to the hospital. He states that he does have increasing GAMING. He states that his blood pressure and heart rate have been controlled, averages BP's 130's/80's. No other symptoms reported. Instructed patient that if this develops again to be evaluated in ED due to his history and previous hospital stay. Will route to Dr. Keita for review.     Last OV 12/18/20 w/ Dr. Keita:  1. Nephrology referral ASAP  2. Cardiac MRI with sedation  3. Hydrate before MRI   4. Follow up with Dr. Keita 1 month  5. Start imdur 30 mg daily and stop amlodipine      CINDY Ricks February 5, 2021 2:06 PM

## 2021-02-08 ENCOUNTER — MYC MEDICAL ADVICE (OUTPATIENT)
Dept: FAMILY MEDICINE | Facility: CLINIC | Age: 51
End: 2021-02-08

## 2021-02-08 DIAGNOSIS — I10 HYPERTENSION GOAL BP (BLOOD PRESSURE) < 140/90: ICD-10-CM

## 2021-02-08 DIAGNOSIS — I25.10 CORONARY ARTERY DISEASE INVOLVING NATIVE HEART WITHOUT ANGINA PECTORIS, UNSPECIFIED VESSEL OR LESION TYPE: ICD-10-CM

## 2021-02-08 DIAGNOSIS — R73.09 ELEVATED GLUCOSE: Primary | ICD-10-CM

## 2021-02-08 DIAGNOSIS — N18.30 CKD (CHRONIC KIDNEY DISEASE) STAGE 3, GFR 30-59 ML/MIN (H): ICD-10-CM

## 2021-02-08 DIAGNOSIS — Z12.5 SCREENING FOR PROSTATE CANCER: ICD-10-CM

## 2021-02-08 DIAGNOSIS — R61 GENERALIZED HYPERHIDROSIS: ICD-10-CM

## 2021-02-08 LAB
ERYTHROCYTE [DISTWIDTH] IN BLOOD BY AUTOMATED COUNT: 14.9 % (ref 10–15)
HCT VFR BLD AUTO: 52.9 % (ref 40–53)
HGB BLD-MCNC: 17.4 G/DL (ref 13.3–17.7)
MCH RBC QN AUTO: 31.4 PG (ref 26.5–33)
MCHC RBC AUTO-ENTMCNC: 32.9 G/DL (ref 31.5–36.5)
MCV RBC AUTO: 95 FL (ref 78–100)
PLATELET # BLD AUTO: 121 10E9/L (ref 150–450)
RBC # BLD AUTO: 5.55 10E12/L (ref 4.4–5.9)
WBC # BLD AUTO: 5.8 10E9/L (ref 4–11)

## 2021-02-08 PROCEDURE — 99000 SPECIMEN HANDLING OFFICE-LAB: CPT | Performed by: FAMILY MEDICINE

## 2021-02-08 PROCEDURE — 84443 ASSAY THYROID STIM HORMONE: CPT | Performed by: FAMILY MEDICINE

## 2021-02-08 PROCEDURE — 83721 ASSAY OF BLOOD LIPOPROTEIN: CPT | Mod: 59 | Performed by: FAMILY MEDICINE

## 2021-02-08 PROCEDURE — 80061 LIPID PANEL: CPT | Performed by: FAMILY MEDICINE

## 2021-02-08 PROCEDURE — 80053 COMPREHEN METABOLIC PANEL: CPT | Performed by: FAMILY MEDICINE

## 2021-02-08 PROCEDURE — 85027 COMPLETE CBC AUTOMATED: CPT | Performed by: FAMILY MEDICINE

## 2021-02-08 PROCEDURE — 36415 COLL VENOUS BLD VENIPUNCTURE: CPT | Performed by: FAMILY MEDICINE

## 2021-02-08 PROCEDURE — G0103 PSA SCREENING: HCPCS | Performed by: FAMILY MEDICINE

## 2021-02-08 PROCEDURE — 84403 ASSAY OF TOTAL TESTOSTERONE: CPT | Mod: 90 | Performed by: FAMILY MEDICINE

## 2021-02-08 PROCEDURE — 84270 ASSAY OF SEX HORMONE GLOBUL: CPT | Performed by: FAMILY MEDICINE

## 2021-02-09 LAB
ALBUMIN SERPL-MCNC: 3.9 G/DL (ref 3.4–5)
ALP SERPL-CCNC: 109 U/L (ref 40–150)
ALT SERPL W P-5'-P-CCNC: 57 U/L (ref 0–70)
ANION GAP SERPL CALCULATED.3IONS-SCNC: 4 MMOL/L (ref 3–14)
AST SERPL W P-5'-P-CCNC: 34 U/L (ref 0–45)
BILIRUB SERPL-MCNC: 0.5 MG/DL (ref 0.2–1.3)
BUN SERPL-MCNC: 26 MG/DL (ref 7–30)
CALCIUM SERPL-MCNC: 9.3 MG/DL (ref 8.5–10.1)
CHLORIDE SERPL-SCNC: 104 MMOL/L (ref 94–109)
CHOLEST SERPL-MCNC: 175 MG/DL
CO2 SERPL-SCNC: 30 MMOL/L (ref 20–32)
CREAT SERPL-MCNC: 1.5 MG/DL (ref 0.66–1.25)
GFR SERPL CREATININE-BSD FRML MDRD: 53 ML/MIN/{1.73_M2}
GLUCOSE SERPL-MCNC: 124 MG/DL (ref 70–99)
HDLC SERPL-MCNC: 41 MG/DL
LDLC SERPL CALC-MCNC: ABNORMAL MG/DL
LDLC SERPL DIRECT ASSAY-MCNC: 68 MG/DL
NONHDLC SERPL-MCNC: 134 MG/DL
POTASSIUM SERPL-SCNC: 4.3 MMOL/L (ref 3.4–5.3)
PROT SERPL-MCNC: 7.3 G/DL (ref 6.8–8.8)
PSA SERPL-ACNC: 0.34 UG/L (ref 0–4)
SODIUM SERPL-SCNC: 138 MMOL/L (ref 133–144)
TRIGL SERPL-MCNC: 537 MG/DL
TSH SERPL DL<=0.005 MIU/L-ACNC: 1.92 MU/L (ref 0.4–4)

## 2021-02-10 NOTE — PROGRESS NOTES
Okay can we have an JAM call him in next couple of days and do a quick phone visit to discuss his chest pain or see me soon? -Dr. Keita       Message sent to scheduling to get patient an OV scheduled to discuss chest pain.       CINDY Ricks February 10, 2021 8:52 AM

## 2021-02-11 ENCOUNTER — MYC MEDICAL ADVICE (OUTPATIENT)
Dept: FAMILY MEDICINE | Facility: CLINIC | Age: 51
End: 2021-02-11

## 2021-02-11 DIAGNOSIS — E29.1 HYPOGONADISM MALE: ICD-10-CM

## 2021-02-11 LAB
SHBG SERPL-SCNC: 27 NMOL/L (ref 11–80)
TESTOST FREE SERPL-MCNC: 1.67 NG/DL (ref 4.7–24.4)
TESTOST SERPL-MCNC: 82 NG/DL (ref 240–950)

## 2021-02-11 NOTE — RESULT ENCOUNTER NOTE
Dear Jeremi,    Here is a summary of your recent test results:  -Normal red blood cell (hgb) levels, normal white blood cell count and slight low platelet levels.  -PSA (prostate specific antigen) test is normal.  This indicates a low likelihood of prostate cancer.  ADVISE: rechecking this in 1 year.  -Triglycerides are elevated which can increase your heart disease risk.  A diet high in fat and simple carbohydrates, genetics and being overweight can contribute to this.  Your LDL(bad) cholesterol and HDL(good) cholesterol levels are normal.  ADVISE: Continue your cholesterol-lowering medication.  Exercising 150 minutes of aerobic exercise per week (30 minutes 5 days per week or 50 minutes 3 days per week are options), weight control, and omega-3 fatty acids (fish oil) 7298-3372 mg daily are helpful to improve this.  In 6 months, you should recheck your fasting cholesterol panel by scheduling a lab-only appointment.    -Liver and gallbladder tests (ALT,AST, Alk phos,bilirubin) are normal.  -Kidney function (GFR) is decreased but improving.   -Sodium is normal.  -Potassium is normal.  -Calcium is normal.  -Glucose is slight elevated and may be a sign of early diabetes (prediabetes). ADVISE:: eating a low carbohydrate diet, exercising, trying to lose weight (if necessary) and rechecking your glucose level in 12 months.  -TSH (thyroid stimulating hormone) level is normal which indicates normal thyroid function.  -Testosterone level is low.  ADVISE: Continuing with replacement therapy.  (Had you missed doses?)      For additional lab test information, labtestsonline.org is an excellent reference.     Thank you very much for trusting me and [facility].     Have a peaceful day.    Healthy regards,  Nate Segovia MD

## 2021-02-12 ENCOUNTER — OFFICE VISIT (OUTPATIENT)
Dept: CARDIOLOGY | Facility: CLINIC | Age: 51
End: 2021-02-12
Payer: COMMERCIAL

## 2021-02-12 VITALS
WEIGHT: 250 LBS | HEIGHT: 72 IN | HEART RATE: 67 BPM | BODY MASS INDEX: 33.86 KG/M2 | DIASTOLIC BLOOD PRESSURE: 93 MMHG | SYSTOLIC BLOOD PRESSURE: 159 MMHG

## 2021-02-12 DIAGNOSIS — I42.9 SECONDARY CARDIOMYOPATHY (H): ICD-10-CM

## 2021-02-12 DIAGNOSIS — N28.9 RENAL INSUFFICIENCY: ICD-10-CM

## 2021-02-12 DIAGNOSIS — I21.4 NSTEMI (NON-ST ELEVATED MYOCARDIAL INFARCTION) (H): ICD-10-CM

## 2021-02-12 DIAGNOSIS — I42.9 CARDIOMYOPATHY (H): Primary | ICD-10-CM

## 2021-02-12 DIAGNOSIS — I25.10 CORONARY ARTERY DISEASE INVOLVING NATIVE HEART WITHOUT ANGINA PECTORIS, UNSPECIFIED VESSEL OR LESION TYPE: Primary | ICD-10-CM

## 2021-02-12 PROCEDURE — 93000 ELECTROCARDIOGRAM COMPLETE: CPT | Performed by: INTERNAL MEDICINE

## 2021-02-12 PROCEDURE — 99214 OFFICE O/P EST MOD 30 MIN: CPT | Performed by: INTERNAL MEDICINE

## 2021-02-12 RX ORDER — TESTOSTERONE CYPIONATE 200 MG/ML
200 INJECTION, SOLUTION INTRAMUSCULAR WEEKLY
Qty: 6 ML | Refills: 0 | Status: SHIPPED | OUTPATIENT
Start: 2021-02-12 | End: 2021-02-23

## 2021-02-12 RX ORDER — ISOSORBIDE MONONITRATE 30 MG/1
60 TABLET, EXTENDED RELEASE ORAL DAILY
Qty: 30 TABLET | Refills: 3 | Status: SHIPPED | OUTPATIENT
Start: 2021-02-12 | End: 2021-05-24

## 2021-02-12 ASSESSMENT — MIFFLIN-ST. JEOR: SCORE: 2021.99

## 2021-02-12 NOTE — PROGRESS NOTES
HPI:     This is a 50 year old HTN, CKD here for follow up. He was seen originally in consultation during his hospitalization by cardiology and has a complex clinical presentation.     Gurpreet presented with 2 day history of chest pain, radiating to his arm, back and jaw. He presented to the hospital and had a troponin of 0.069 which uptrended to a peak of 13 during the course of the hospitalization. He was started on heparin and nitroglycerin infusion and underwent angiogram which showed multivessel coronary artery disease involving moderate mid LAD and severe mid-distal LAD lesion, AV groove distal circumflex severe stenosis, moderate large obtuse marginal lesion, and right PL moderate stenosis. All of the severe disease is in the distal segments. ACS/non-STEMI culprit lesion was likely AV groove circumflex lesion as there was slow filling and emptying of the segment. ARLINE-3 flow was in the distal LAD. Decision was made that disease not amenable to PCI given distal nature. EKG showed RBBB. Echocardiogram without WMA but showed severe hypertrophic cardiomyopathy and findings concerning for infiltrative cardiomyopathy or HCM. There was no LVOT obstruction. After patient returned from angiogram he requested discharge. He was placed on DAPT and arranged for outpatient cardiology with me.      Patient today still with occasional chest pain. He is a very active patient in fitness and has concerns about his underlying diagnosis, ability to perform his baseline activity level. He is down about not being able to return to his original fitness levels. We were attempting to obtain an MRI however he had PEDRO and thus he was referred to nephrology. He increased hydration and his kidney function improved. He was also wondering if heavy exercise in the past contributed to his episodic PEDRO. His baseline Cr runs around 1.5 which it is at today.      PTA medications included Norvasc 10 mg daily, losartan 100 mg daily, metoprolol  100 mg twice daily, and clonidine 0.1 mg twice daily. At discharge clonidine was held. His blood pressure was controlled at discharge. He was still hypertensive last visit so I started imdur 30 mg daily. He tolerated this well but is still hypertensive again today.         ASSESSMENT/PLAN:     1. Multivessel CAD: moderate mid LAD and severe mid-distal LAD lesion, AV groove distal circumflex severe stenosis, moderate large obtuse marginal lesion, and right PL moderate stenosis. He is physically active and we discussed in detail that elevated heart rate, blood pressure can lead to ischemic based events due to demand-supply mismatch. Unfortunately with disease all distal there was minimal in regards to PCI options however I will address this again with interventional cardiology after he gets Cardiac MRI. I fear he gets ischemic with exercise from possibly CAD along with his undefined cardiomyopathy, which I suspect may be hypertensive heart disease, but cannot exclude HCM and infiltrative process. The troponin elevation therefore is possibly multifactorial and can be seen in underlying cardiomyopathy. We discussed aggressive risk factor modification.   -continue dual antiplatelet therapy  -continue statin, and imdur. Will increase imdur to 60 mg daily.   -continue beta blockade     2. Hypertrophic cardiomyopathy without LVOT obstruction: Differential includes hypertensive heart disease versus infiltrative CMY or HCM without obstruction. Did not have family history of SCD. He is on adequate beta blockade.   -would like to further define his CMY with Cardiac MRI now that PEDRO resolved. Would like this as soon as possible.   -no need for diuretic at this time  -aggressive BP control      3. Hypertension: On stable regimen of beta blocker, losartan and imdur. Clonidine stopped along with norvasc. Will increase imdur to 60 mg daily.     4. CKD: nephrology consult was placed, Cr increased, repeated labs and Cr 2.0 which is  now back to baseline 1.5. Could screen for renal artery stenosis if needed at future visit, but for now will send SPEP/UPEP and NBA. Can refer to Dr. Christopher based on above studies.     5. RBBB on EKG: no concerning findings of advanced heart block by symptoms. Chronotropic competency is present based on patient's own physiologic monitoring during exercise. Discussed heart rate monitoring which we will perform after his MRI.     Follow up in 1 month.    Gilda Keita MD MSc  [facility]      PAST MEDICAL HISTORY  Past Medical History:   Diagnosis Date     ADHD (attention deficit hyperactivity disorder)      Allergic rhinitis, cause unspecified     Allergic rhinitis     Benign hypertension      Chronic back pain      Hiccough      Hypersomnia with sleep apnea, unspecified      Major depressive disorder, single episode, moderate (H) 03/2008     Mild persistent asthma      Other primary cardiomyopathies     Cardiomyopathy -- unknown etiology       CURRENT MEDICATIONS  Current Outpatient Medications   Medication Sig Dispense Refill     albuterol (PROAIR HFA/PROVENTIL HFA/VENTOLIN HFA) 108 (90 Base) MCG/ACT inhaler Inhale 2 puffs into the lungs every 6 hours 18 g 3     amitriptyline (ELAVIL) 50 MG tablet Take 1-3 tablets ( mg) by mouth nightly as needed for sleep 90 tablet 0     buprenorphine HCl-naloxone HCl (SUBOXONE) 8-2 MG per film 1/2 film QID 60 Film 1     buPROPion (WELLBUTRIN XL) 300 MG 24 hr tablet Take 1 tablet (300 mg) by mouth every morning 90 tablet 1     cloNIDine (CATAPRES) 0.1 MG tablet Take 1 tablet (0.1 mg) by mouth 2 times daily 180 tablet 1     clopidogrel (PLAVIX) 75 MG tablet TAKE 1 TABLET BY MOUTH EVERY DAY 90 tablet 3     cyclobenzaprine (FLEXERIL) 10 MG tablet Take 1 tablet (10 mg) by mouth 3 times daily as needed for other (hiccups) 90 tablet 3     finasteride (PROSCAR) 5 MG tablet 1/2 tab daily 90 tablet 1     fluconazole (DIFLUCAN) 150 MG tablet Take 2 tablets (300 mg) by  "mouth every 14 days - for two doses 4 tablet 1     fluticasone-salmeterol (ADVAIR DISKUS) 500-50 MCG/DOSE inhaler 1 inhalation 2 times per day 1 Inhaler 2     isosorbide mononitrate (IMDUR) 30 MG 24 hr tablet Take 1 tablet (30 mg) by mouth daily 30 tablet 3     LORazepam (ATIVAN) 1 MG tablet Take 1 tablet (1 mg) by mouth 2 times daily as needed for anxiety 60 tablet 1     losartan (COZAAR) 100 MG tablet Take 1 tablet (100 mg) by mouth daily 90 tablet 1     metoprolol tartrate (LOPRESSOR) 100 MG tablet TAKE 1 TABLET BY MOUTH TWICE A  tablet 1     MULTIVITAMIN TABS   OR  (Patient taking differently: 1 tablet daily)  0     naloxone (NARCAN) 4 MG/0.1ML nasal spray Spray 1 spray (4 mg) into one nostril alternating nostrils as needed for opioid reversal every 2-3 minutes until assistance arrives 0.2 mL 11     nitroGLYcerin (NITROSTAT) 0.4 MG sublingual tablet PLACE 1 TABLET UNDER TONGUE EVERY 5 MINS, UP TO 3 DOSES AS NEEDED FOR CHEST PAIN 25 tablet 0     omeprazole (PRILOSEC) 20 MG DR capsule Take 1 capsule (20 mg) by mouth daily 90 capsule 1     rosuvastatin (CRESTOR) 20 MG tablet Take 1 tablet (20 mg) by mouth daily 90 tablet 3     Syringe/Needle, Disp, (SYRINGE LUER LOCK) 20G X 1-1/2\" 3 ML MISC 1 Device once a week - and also needs 22 G needles 1.5 inch #30 with 1 refill 30 each 1     testosterone cypionate (DEPOTESTOSTERONE) 200 MG/ML injection INJECT 0.5 ML (100 MG) INTO THE MUSCLE ONCE A WEEK 6 mL 0     VITAMIN C 100 MG OR TABS 1 TABLET 3 TIMES DAILY (Patient taking differently: Take 1 mg by mouth 2 times daily ) 90 0     zolpidem (AMBIEN) 10 MG tablet TAKE 1 TABLET (10 MG) BY MOUTH NIGHTLY AS NEEDED FOR SLEEP 30 tablet 0     amphetamine-dextroamphetamine (ADDERALL) 20 MG tablet Take 1 tablet (20 mg) by mouth 2 times daily (Patient not taking: Reported on 2/3/2021) 90 tablet 0     aspirin (ASA) 81 MG EC tablet Take 1 tablet (81 mg) by mouth daily (Patient not taking: Reported on 2/12/2021) 30 tablet 0     " clonazePAM (KLONOPIN) 1 MG tablet TAKE 1 TABLET (1 MG) BY MOUTH 2 TIMES DAILY AS NEEDED (FLYING PHOBIA) (Patient not taking: Reported on 2/12/2021) 4 tablet 0     FLUoxetine (PROZAC) 20 MG capsule Take 1 capsule (20 mg) by mouth daily (Patient not taking: Reported on 2/12/2021) 90 capsule 1     imiquimod (ALDARA) 5 % external cream Apply topically At Bedtime -wash off after 8 hours and my use for up to 16 weeks. (Patient not taking: Reported on 2/12/2021) 36 packet 11     loperamide (IMODIUM A-D) 2 MG tablet Take 2 tabs (4 mg) after first loose stool, and then take one tab (2 mg) after each diarrheal stool.  Max of 8 tabs (16 mg) per day. (Patient not taking: Reported on 2/12/2021) 30 tablet 0     ondansetron (ZOFRAN-ODT) 4 MG ODT tab Take 1 tablet (4 mg) by mouth every 8 hours as needed for nausea (Patient not taking: Reported on 2/12/2021) 30 tablet 0     oxyCODONE (ROXICODONE) 5 MG tablet One tab -two tab bid prn severe pain.  RX to last at least 30 days (Patient not taking: Reported on 2/12/2021) 30 tablet 0       PAST SURGICAL HISTORY:  Past Surgical History:   Procedure Laterality Date     APPENDECTOMY  2007     BACK SURGERY  2014    L5-S1 fusion     CV CORONARY ANGIOGRAM N/A 11/30/2020    Procedure: Heart Catheterization with Possible Intervention;  Surgeon: Theo Donahue MD;  Location:  [facility]      REPAIR OF NASAL SEPTUM       LAPAROSCOPIC CHOLECYSTECTOMY  1/05    Cholecystectomy, Laparoscopic     SURGICAL HISTORY OF -       torn pectoral muscle     SURGICAL HISTORY OF -   2009    Bilateral radiofrequency volume reduction of the inferior turbinates.     SURGICAL HISTORY OF -   2012    rhinoplasty- septoplasty       ALLERGIES     Allergies   Allergen Reactions     Acetaminophen Other (See Comments)     headache     Gabapentin      Suicidal thoughts     Morphine Other (See Comments)     headache     Sulfa Drugs      Theophylline Hives       FAMILY HISTORY  Family History   Problem  Relation Age of Onset     Cancer Father         hodgkins     Hypertension Father      Coronary Artery Disease Father      Cardiovascular Maternal Grandmother      Cardiovascular Sister      Cardiovascular Brother         question what     Coronary Artery Disease Mother          55; MI x 2     Hypertension Brother      Prostate Cancer No family hx of      Cancer - colorectal No family hx of            SOCIAL HISTORY  Social History     Socioeconomic History     Marital status: Single     Spouse name: Not on file     Number of children: 1     Years of education: Not on file     Highest education level: Not on file   Occupational History     Employer: [employer]    Social Needs     Financial resource strain: Not on file     Food insecurity     Worry: Not on file     Inability: Not on file     Transportation needs     Medical: Not on file     Non-medical: Not on file   Tobacco Use     Smoking status: Never Smoker     Smokeless tobacco: Never Used   Substance and Sexual Activity     Alcohol use: No     Comment: rarely      Drug use: No     Sexual activity: Yes     Partners: Female     Birth control/protection: Condom   Lifestyle     Physical activity     Days per week: Not on file     Minutes per session: Not on file     Stress: Not on file   Relationships     Social connections     Talks on phone: Not on file     Gets together: Not on file     Attends Protestant service: Not on file     Active member of club or organization: Not on file     Attends meetings of clubs or organizations: Not on file     Relationship status: Not on file     Intimate partner violence     Fear of current or ex partner: Not on file     Emotionally abused: Not on file     Physically abused: Not on file     Forced sexual activity: Not on file   Other Topics Concern     Parent/sibling w/ CABG, MI or angioplasty before 65F 55M? No   Social History Narrative     Not on file       ROS:   Constitutional: No fever, chills, or sweats. No weight  gain/loss   ENT: No visual disturbance, ear ache, epistaxis, sore throat  Allergies/Immunologic: Negative  Respiratory: No cough, hemoptysia  Cardiovascular: As per HPI  GI: No nausea, vomiting, hematemesis, melena, or hematochezia  : No urinary frequency, dysuria, or hematuria  Integument: Negative  Psychiatric: Negative  Neuro: Negative  Endocrinology: Negative   Musculoskeletal: Negative  Vascular: No walking impairment, claudication, ischemic rest pain or nonhealing wounds    EXAM:  BP (!) 159/93   Pulse 67   Ht 1.829 m (6')   Wt 113.4 kg (250 lb)   BMI 33.91 kg/m    GENERAL: healthy, alert and no distress  EYES: Eyes grossly normal to inspection, conjunctivae and sclerae normal  RESP: no audible wheeze, cough, or visible cyanosis.  No visible retractions or increased work of breathing.  Able to speak fully in complete sentences  NEURO: Cranial nerves grossly intact, mentation intact and speech normal  PSYCH: mentation appears normal, affect normal/bright, judgement and insight intact, normal speech and appearance well-groomed  CARDIOVASCULAR: Neck veins not appreciated indicating probable normal JVP. No LE edema. Normal skin appearance, no stasis dermatitis.       Labs:  LIPID RESULTS:  Lab Results   Component Value Date    CHOL 175 02/08/2021    HDL 41 02/08/2021    LDL  02/08/2021     Cannot estimate LDL when triglyceride exceeds 400 mg/dL    LDL 68 02/08/2021    TRIG 537 (H) 02/08/2021    CHOLHDLRATIO 4.3 08/13/2013    NHDL 134 (H) 02/08/2021       LIVER ENZYME RESULTS:  Lab Results   Component Value Date    AST 34 02/08/2021    ALT 57 02/08/2021       CBC RESULTS:  Lab Results   Component Value Date    WBC 5.8 02/08/2021    RBC 5.55 02/08/2021    HGB 17.4 02/08/2021    HCT 52.9 02/08/2021    MCV 95 02/08/2021    MCH 31.4 02/08/2021    MCHC 32.9 02/08/2021    RDW 14.9 02/08/2021     (L) 02/08/2021       BMP RESULTS:  Lab Results   Component Value Date     02/08/2021    POTASSIUM 4.3  02/08/2021    CHLORIDE 104 02/08/2021    CO2 30 02/08/2021    ANIONGAP 4 02/08/2021     (H) 02/08/2021    BUN 26 02/08/2021    CR 1.50 (H) 02/08/2021    GFRESTIMATED 53 (L) 02/08/2021    GFRESTBLACK 61 02/08/2021    MIKO 9.3 02/08/2021        A1C RESULTS:  Lab Results   Component Value Date    A1C 5.2 11/30/2020

## 2021-02-12 NOTE — TELEPHONE ENCOUNTER
Routing to PCP to review and advise.    Koffi BURNHAM RN   [facility] [place] [facility]

## 2021-02-12 NOTE — PATIENT INSTRUCTIONS
1. Cardiac MRI with stress   2. Cardiac rhythm monitor   3. Follow up in one month with Dr. Keita  4. Continue current medications  5. Labs today for urine and blood protein studies (to work up your kidney dysfunction)   6. Increase imdur to 60 mg daily for blood pressure

## 2021-02-12 NOTE — LETTER
2/12/2021    Luis Armando Segovia MD  [address] [facility] [address]    RE: Jeremi Damon       Dear Colleague,    I had the pleasure of seeing Jeremi MANCUSO Nelson in the [facility].    HPI:     This is a 50 year old HTN, CKD here for follow up. He was seen originally in consultation during his hospitalization by cardiology and has a complex clinical presentation.     Gurpreet presented with 2 day history of chest pain, radiating to his arm, back and jaw. He presented to the hospital and had a troponin of 0.069 which uptrended to a peak of 13 during the course of the hospitalization. He was started on heparin and nitroglycerin infusion and underwent angiogram which showed multivessel coronary artery disease involving moderate mid LAD and severe mid-distal LAD lesion, AV groove distal circumflex severe stenosis, moderate large obtuse marginal lesion, and right PL moderate stenosis. All of the severe disease is in the distal segments. ACS/non-STEMI culprit lesion was likely AV groove circumflex lesion as there was slow filling and emptying of the segment. ARLINE-3 flow was in the distal LAD. Decision was made that disease not amenable to PCI given distal nature. EKG showed RBBB. Echocardiogram without WMA but showed severe hypertrophic cardiomyopathy and findings concerning for infiltrative cardiomyopathy or HCM. There was no LVOT obstruction. After patient returned from angiogram he requested discharge. He was placed on DAPT and arranged for outpatient cardiology with me.      Patient today still with occasional chest pain. He is a very active patient in fitness and has concerns about his underlying diagnosis, ability to perform his baseline activity level. He is down about not being able to return to his original fitness levels. We were attempting to obtain an MRI however he had PEDRO and thus he was referred to nephrology. He increased hydration and his kidney  function improved. He was also wondering if heavy exercise in the past contributed to his episodic PEDRO. His baseline Cr runs around 1.5 which it is at today.      PTA medications included Norvasc 10 mg daily, losartan 100 mg daily, metoprolol 100 mg twice daily, and clonidine 0.1 mg twice daily. At discharge clonidine was held. His blood pressure was controlled at discharge. He was still hypertensive last visit so I started imdur 30 mg daily. He tolerated this well but is still hypertensive again today.         ASSESSMENT/PLAN:     1. Multivessel CAD: moderate mid LAD and severe mid-distal LAD lesion, AV groove distal circumflex severe stenosis, moderate large obtuse marginal lesion, and right PL moderate stenosis. He is physically active and we discussed in detail that elevated heart rate, blood pressure can lead to ischemic based events due to demand-supply mismatch. Unfortunately with disease all distal there was minimal in regards to PCI options however I will address this again with interventional cardiology after he gets Cardiac MRI. I fear he gets ischemic with exercise from possibly CAD along with his undefined cardiomyopathy, which I suspect may be hypertensive heart disease, but cannot exclude HCM and infiltrative process. The troponin elevation therefore is possibly multifactorial and can be seen in underlying cardiomyopathy. We discussed aggressive risk factor modification.   -continue dual antiplatelet therapy  -continue statin, and imdur. Will increase imdur to 60 mg daily.   -continue beta blockade     2. Hypertrophic cardiomyopathy without LVOT obstruction: Differential includes hypertensive heart disease versus infiltrative CMY or HCM without obstruction. Did not have family history of SCD. He is on adequate beta blockade.   -would like to further define his CMY with Cardiac MRI now that PEDRO resolved. Would like this as soon as possible.   -no need for diuretic at this time  -aggressive BP control       3. Hypertension: On stable regimen of beta blocker, losartan and imdur. Clonidine stopped along with norvasc. Will increase imdur to 60 mg daily.     4. CKD: nephrology consult was placed, Cr increased, repeated labs and Cr 2.0 which is now back to baseline 1.5. Could screen for renal artery stenosis if needed at future visit, but for now will send SPEP/UPEP and NBA. Can refer to Dr. Christopher based on above studies.     5. RBBB on EKG: no concerning findings of advanced heart block by symptoms. Chronotropic competency is present based on patient's own physiologic monitoring during exercise. Discussed heart rate monitoring which we will perform after his MRI.     Follow up in 1 month.    Gilda Keita MD MSc  [facility]      PAST MEDICAL HISTORY  Past Medical History:   Diagnosis Date     ADHD (attention deficit hyperactivity disorder)      Allergic rhinitis, cause unspecified     Allergic rhinitis     Benign hypertension      Chronic back pain      Hiccough      Hypersomnia with sleep apnea, unspecified      Major depressive disorder, single episode, moderate (H) 03/2008     Mild persistent asthma      Other primary cardiomyopathies     Cardiomyopathy -- unknown etiology       CURRENT MEDICATIONS  Current Outpatient Medications   Medication Sig Dispense Refill     albuterol (PROAIR HFA/PROVENTIL HFA/VENTOLIN HFA) 108 (90 Base) MCG/ACT inhaler Inhale 2 puffs into the lungs every 6 hours 18 g 3     amitriptyline (ELAVIL) 50 MG tablet Take 1-3 tablets ( mg) by mouth nightly as needed for sleep 90 tablet 0     buprenorphine HCl-naloxone HCl (SUBOXONE) 8-2 MG per film 1/2 film QID 60 Film 1     buPROPion (WELLBUTRIN XL) 300 MG 24 hr tablet Take 1 tablet (300 mg) by mouth every morning 90 tablet 1     cloNIDine (CATAPRES) 0.1 MG tablet Take 1 tablet (0.1 mg) by mouth 2 times daily 180 tablet 1     clopidogrel (PLAVIX) 75 MG tablet TAKE 1 TABLET BY MOUTH EVERY DAY 90 tablet 3     cyclobenzaprine  "(FLEXERIL) 10 MG tablet Take 1 tablet (10 mg) by mouth 3 times daily as needed for other (hiccups) 90 tablet 3     finasteride (PROSCAR) 5 MG tablet 1/2 tab daily 90 tablet 1     fluconazole (DIFLUCAN) 150 MG tablet Take 2 tablets (300 mg) by mouth every 14 days - for two doses 4 tablet 1     fluticasone-salmeterol (ADVAIR DISKUS) 500-50 MCG/DOSE inhaler 1 inhalation 2 times per day 1 Inhaler 2     isosorbide mononitrate (IMDUR) 30 MG 24 hr tablet Take 1 tablet (30 mg) by mouth daily 30 tablet 3     LORazepam (ATIVAN) 1 MG tablet Take 1 tablet (1 mg) by mouth 2 times daily as needed for anxiety 60 tablet 1     losartan (COZAAR) 100 MG tablet Take 1 tablet (100 mg) by mouth daily 90 tablet 1     metoprolol tartrate (LOPRESSOR) 100 MG tablet TAKE 1 TABLET BY MOUTH TWICE A  tablet 1     MULTIVITAMIN TABS   OR  (Patient taking differently: 1 tablet daily)  0     naloxone (NARCAN) 4 MG/0.1ML nasal spray Spray 1 spray (4 mg) into one nostril alternating nostrils as needed for opioid reversal every 2-3 minutes until assistance arrives 0.2 mL 11     nitroGLYcerin (NITROSTAT) 0.4 MG sublingual tablet PLACE 1 TABLET UNDER TONGUE EVERY 5 MINS, UP TO 3 DOSES AS NEEDED FOR CHEST PAIN 25 tablet 0     omeprazole (PRILOSEC) 20 MG DR capsule Take 1 capsule (20 mg) by mouth daily 90 capsule 1     rosuvastatin (CRESTOR) 20 MG tablet Take 1 tablet (20 mg) by mouth daily 90 tablet 3     Syringe/Needle, Disp, (SYRINGE LUER LOCK) 20G X 1-1/2\" 3 ML MISC 1 Device once a week - and also needs 22 G needles 1.5 inch #30 with 1 refill 30 each 1     testosterone cypionate (DEPOTESTOSTERONE) 200 MG/ML injection INJECT 0.5 ML (100 MG) INTO THE MUSCLE ONCE A WEEK 6 mL 0     VITAMIN C 100 MG OR TABS 1 TABLET 3 TIMES DAILY (Patient taking differently: Take 1 mg by mouth 2 times daily ) 90 0     zolpidem (AMBIEN) 10 MG tablet TAKE 1 TABLET (10 MG) BY MOUTH NIGHTLY AS NEEDED FOR SLEEP 30 tablet 0     amphetamine-dextroamphetamine (ADDERALL) 20 " MG tablet Take 1 tablet (20 mg) by mouth 2 times daily (Patient not taking: Reported on 2/3/2021) 90 tablet 0     aspirin (ASA) 81 MG EC tablet Take 1 tablet (81 mg) by mouth daily (Patient not taking: Reported on 2/12/2021) 30 tablet 0     clonazePAM (KLONOPIN) 1 MG tablet TAKE 1 TABLET (1 MG) BY MOUTH 2 TIMES DAILY AS NEEDED (FLYING PHOBIA) (Patient not taking: Reported on 2/12/2021) 4 tablet 0     FLUoxetine (PROZAC) 20 MG capsule Take 1 capsule (20 mg) by mouth daily (Patient not taking: Reported on 2/12/2021) 90 capsule 1     imiquimod (ALDARA) 5 % external cream Apply topically At Bedtime -wash off after 8 hours and my use for up to 16 weeks. (Patient not taking: Reported on 2/12/2021) 36 packet 11     loperamide (IMODIUM A-D) 2 MG tablet Take 2 tabs (4 mg) after first loose stool, and then take one tab (2 mg) after each diarrheal stool.  Max of 8 tabs (16 mg) per day. (Patient not taking: Reported on 2/12/2021) 30 tablet 0     ondansetron (ZOFRAN-ODT) 4 MG ODT tab Take 1 tablet (4 mg) by mouth every 8 hours as needed for nausea (Patient not taking: Reported on 2/12/2021) 30 tablet 0     oxyCODONE (ROXICODONE) 5 MG tablet One tab -two tab bid prn severe pain.  RX to last at least 30 days (Patient not taking: Reported on 2/12/2021) 30 tablet 0       PAST SURGICAL HISTORY:  Past Surgical History:   Procedure Laterality Date     APPENDECTOMY  2007     BACK SURGERY  2014    L5-S1 fusion     CV CORONARY ANGIOGRAM N/A 11/30/2020    Procedure: Heart Catheterization with Possible Intervention;  Surgeon: Theo Donahue MD;  Location:  HE[facility]ARD[facility] CATH LAB      REPAIR OF NASAL SEPTUM       LAPAROSCOPIC CHOLECYSTECTOMY  1/05    Cholecystectomy, Laparoscopic     SURGICAL HISTORY OF -       torn pectoral muscle     SURGICAL HISTORY OF -   2009    Bilateral radiofrequency volume reduction of the inferior turbinates.     SURGICAL HISTORY OF -   2012    rhinoplasty- septoplasty       ALLERGIES     Allergies    Allergen Reactions     Acetaminophen Other (See Comments)     headache     Gabapentin      Suicidal thoughts     Morphine Other (See Comments)     headache     Sulfa Drugs      Theophylline Hives       FAMILY HISTORY  Family History   Problem Relation Age of Onset     Cancer Father         hodgkins     Hypertension Father      Coronary Artery Disease Father      Cardiovascular Maternal Grandmother      Cardiovascular Sister      Cardiovascular Brother         question what     Coronary Artery Disease Mother          55; MI x 2     Hypertension Brother      Prostate Cancer No family hx of      Cancer - colorectal No family hx of            SOCIAL HISTORY  Social History     Socioeconomic History     Marital status: Single     Spouse name: Not on file     Number of children: 1     Years of education: Not on file     Highest education level: Not on file   Occupational History     Employer: [employer]    Social Needs     Financial resource strain: Not on file     Food insecurity     Worry: Not on file     Inability: Not on file     Transportation needs     Medical: Not on file     Non-medical: Not on file   Tobacco Use     Smoking status: Never Smoker     Smokeless tobacco: Never Used   Substance and Sexual Activity     Alcohol use: No     Comment: rarely      Drug use: No     Sexual activity: Yes     Partners: Female     Birth control/protection: Condom   Lifestyle     Physical activity     Days per week: Not on file     Minutes per session: Not on file     Stress: Not on file   Relationships     Social connections     Talks on phone: Not on file     Gets together: Not on file     Attends Restoration service: Not on file     Active member of club or organization: Not on file     Attends meetings of clubs or organizations: Not on file     Relationship status: Not on file     Intimate partner violence     Fear of current or ex partner: Not on file     Emotionally abused: Not on file     Physically abused: Not on  file     Forced sexual activity: Not on file   Other Topics Concern     Parent/sibling w/ CABG, MI or angioplasty before 65F 55M? No   Social History Narrative     Not on file       ROS:   Constitutional: No fever, chills, or sweats. No weight gain/loss   ENT: No visual disturbance, ear ache, epistaxis, sore throat  Allergies/Immunologic: Negative  Respiratory: No cough, hemoptysia  Cardiovascular: As per HPI  GI: No nausea, vomiting, hematemesis, melena, or hematochezia  : No urinary frequency, dysuria, or hematuria  Integument: Negative  Psychiatric: Negative  Neuro: Negative  Endocrinology: Negative   Musculoskeletal: Negative  Vascular: No walking impairment, claudication, ischemic rest pain or nonhealing wounds    EXAM:  BP (!) 159/93   Pulse 67   Ht 1.829 m (6')   Wt 113.4 kg (250 lb)   BMI 33.91 kg/m    GENERAL: healthy, alert and no distress  EYES: Eyes grossly normal to inspection, conjunctivae and sclerae normal  RESP: no audible wheeze, cough, or visible cyanosis.  No visible retractions or increased work of breathing.  Able to speak fully in complete sentences  NEURO: Cranial nerves grossly intact, mentation intact and speech normal  PSYCH: mentation appears normal, affect normal/bright, judgement and insight intact, normal speech and appearance well-groomed  CARDIOVASCULAR: Neck veins not appreciated indicating probable normal JVP. No LE edema. Normal skin appearance, no stasis dermatitis.       Labs:  LIPID RESULTS:  Lab Results   Component Value Date    CHOL 175 02/08/2021    HDL 41 02/08/2021    LDL  02/08/2021     Cannot estimate LDL when triglyceride exceeds 400 mg/dL    LDL 68 02/08/2021    TRIG 537 (H) 02/08/2021    CHOLHDLRATIO 4.3 08/13/2013    NHDL 134 (H) 02/08/2021       LIVER ENZYME RESULTS:  Lab Results   Component Value Date    AST 34 02/08/2021    ALT 57 02/08/2021       CBC RESULTS:  Lab Results   Component Value Date    WBC 5.8 02/08/2021    RBC 5.55 02/08/2021    HGB 17.4  02/08/2021    HCT 52.9 02/08/2021    MCV 95 02/08/2021    MCH 31.4 02/08/2021    MCHC 32.9 02/08/2021    RDW 14.9 02/08/2021     (L) 02/08/2021       BMP RESULTS:  Lab Results   Component Value Date     02/08/2021    POTASSIUM 4.3 02/08/2021    CHLORIDE 104 02/08/2021    CO2 30 02/08/2021    ANIONGAP 4 02/08/2021     (H) 02/08/2021    BUN 26 02/08/2021    CR 1.50 (H) 02/08/2021    GFRESTIMATED 53 (L) 02/08/2021    GFRESTBLACK 61 02/08/2021    MIKO 9.3 02/08/2021        A1C RESULTS:  Lab Results   Component Value Date    A1C 5.2 11/30/2020         Thank you for allowing me to participate in the care of your patient.    Sincerely,     Gilda Keita MD     [facility]

## 2021-02-14 DIAGNOSIS — R07.89 OTHER CHEST PAIN: ICD-10-CM

## 2021-02-15 ENCOUNTER — HOSPITAL ENCOUNTER (OUTPATIENT)
Dept: LAB | Facility: CLINIC | Age: 51
Discharge: HOME | End: 2021-02-15
Attending: INTERNAL MEDICINE | Admitting: INTERNAL MEDICINE
Payer: COMMERCIAL

## 2021-02-15 DIAGNOSIS — R19.5 LOOSE STOOLS: ICD-10-CM

## 2021-02-15 DIAGNOSIS — I42.9 CARDIOMYOPATHY (H): ICD-10-CM

## 2021-02-15 DIAGNOSIS — E29.1 HYPOGONADISM MALE: ICD-10-CM

## 2021-02-15 PROCEDURE — 84165 PROTEIN E-PHORESIS SERUM: CPT | Mod: TC | Performed by: INTERNAL MEDICINE

## 2021-02-15 PROCEDURE — 82784 ASSAY IGA/IGD/IGG/IGM EACH: CPT | Performed by: INTERNAL MEDICINE

## 2021-02-15 PROCEDURE — 36415 COLL VENOUS BLD VENIPUNCTURE: CPT | Performed by: INTERNAL MEDICINE

## 2021-02-15 PROCEDURE — 86334 IMMUNOFIX E-PHORESIS SERUM: CPT | Mod: TC | Performed by: INTERNAL MEDICINE

## 2021-02-15 PROCEDURE — 999N001036 HC STATISTIC TOTAL PROTEIN: Performed by: INTERNAL MEDICINE

## 2021-02-15 PROCEDURE — 86335 IMMUNFIX E-PHORSIS/URINE/CSF: CPT | Mod: TC | Performed by: INTERNAL MEDICINE

## 2021-02-15 PROCEDURE — 86335 IMMUNFIX E-PHORSIS/URINE/CSF: CPT | Mod: 26 | Performed by: PATHOLOGY

## 2021-02-15 PROCEDURE — 86334 IMMUNOFIX E-PHORESIS SERUM: CPT | Mod: 26 | Performed by: PATHOLOGY

## 2021-02-15 PROCEDURE — 84270 ASSAY OF SEX HORMONE GLOBUL: CPT | Performed by: INTERNAL MEDICINE

## 2021-02-15 PROCEDURE — 84165 PROTEIN E-PHORESIS SERUM: CPT | Mod: 26 | Performed by: PATHOLOGY

## 2021-02-15 PROCEDURE — 84403 ASSAY OF TOTAL TESTOSTERONE: CPT | Performed by: INTERNAL MEDICINE

## 2021-02-15 RX ORDER — LOPERAMIDE HYDROCHLORIDE 2 MG/1
TABLET ORAL
Qty: 30 TABLET | Refills: 0 | Status: SHIPPED | OUTPATIENT
Start: 2021-02-15 | End: 2024-03-14

## 2021-02-15 NOTE — TELEPHONE ENCOUNTER
BP Readings from Last 3 Encounters:   02/12/21 (!) 159/93   02/03/21 130/82   01/28/21 (!) 148/95     Routing refill request to provider for review/approval because:  BP        Asmita Marinelli RN  [facility]

## 2021-02-16 LAB
IGA SERPL-MCNC: 218 MG/DL (ref 84–499)
IGG SERPL-MCNC: 873 MG/DL (ref 610–1616)
IGM SERPL-MCNC: 37 MG/DL (ref 35–242)
PROT ELPH PNL UR ELPH: NORMAL
PROT PATTERN SERPL IFE-IMP: NORMAL

## 2021-02-16 RX ORDER — NITROGLYCERIN 0.4 MG/1
TABLET SUBLINGUAL
Qty: 25 TABLET | Refills: 0 | Status: SHIPPED | OUTPATIENT
Start: 2021-02-16 | End: 2021-04-16

## 2021-02-17 LAB
ALBUMIN SERPL ELPH-MCNC: 4.3 G/DL (ref 3.7–5.1)
ALPHA1 GLOB SERPL ELPH-MCNC: 0.4 G/DL (ref 0.2–0.4)
ALPHA2 GLOB SERPL ELPH-MCNC: 1 G/DL (ref 0.5–0.9)
B-GLOBULIN SERPL ELPH-MCNC: 0.8 G/DL (ref 0.6–1)
GAMMA GLOB SERPL ELPH-MCNC: 0.8 G/DL (ref 0.7–1.6)
M PROTEIN SERPL ELPH-MCNC: 0 G/DL
PROT PATTERN SERPL ELPH-IMP: ABNORMAL

## 2021-02-17 NOTE — TELEPHONE ENCOUNTER
Voice message left 10- at 3:06 PM    Reason for call:  Symptom  Symptom or request:     Duration (how long have symptoms been present):   Have you been treated for this before? Yes    Additional comments: States he sees Matilde Berg and Cleopatra Carreon. Per patient, it s been about a week. Patient screwed up in gym - the preexisting issues have returned with his back. It s on fire. The radicular pain down leg, all stuff he had surgery for and everything. States more or less berated the last time he called when he said was using a lot of ibruprofen. He s been doing that again. He said he s tried to use Suboxone but that s not working. Doesn t know what else to do. States he s not there (clinics) for addictive medication because he s using or abusing this stuff. He s using for the side effects he s trying to avoid. Patient would not like to avoid needing to go to ER which costs $4000 which he can t afford. Requests to be called back with some advise at the number listed below.    Phone number to reach patient:  Cell number on file:    Telephone Information:   Mobile [phone]       Best Time:  n/a    Can we leave a detailed message on this number?  n/a     Cara Cottonwood  Patient Representative  [facility]   Self

## 2021-02-19 DIAGNOSIS — M54.2 NECK PAIN: ICD-10-CM

## 2021-02-19 RX ORDER — CYCLOBENZAPRINE HCL 10 MG
10 TABLET ORAL 3 TIMES DAILY PRN
Qty: 90 TABLET | Refills: 3 | Status: SHIPPED | OUTPATIENT
Start: 2021-02-19 | End: 2021-10-11

## 2021-02-19 NOTE — TELEPHONE ENCOUNTER
Date of Last Office Visit: 2/3/2021  Date of Next Office Visit: 3/3/2021  No shows since last visit: none  Cancellations since last visit: none    Medication requested: Flexeril 10 mg tablet Date last ordered: 10/9/2020 Qty: 90 Refills: 3     Review of MN ?: no    Lapse in medication adherence greater than 5 days?: no  If yes, call patient and gather details: no  Medication refill request verified as identical to current order?: yes  Result of Last DAM, VPA, Li+ Level, CBC, or Carbamazepine Level (at or since last visit): n/a    []Medication refilled per  Medication Refill in Ambulatory Care  policy.  [x]Medication unable to be refilled by RN due to criteria not met as indicated below:    []Eligibility - not seen in the last year   []Supervision - no future appointment   []Compliance - no shows, cancellations or lapse in therapy   []Verification - order discrepancy   []Controlled medication   [x]Medication not included in policy   []90-day supply request   []Other

## 2021-02-22 ENCOUNTER — TELEPHONE (OUTPATIENT)
Dept: CARDIOLOGY | Facility: CLINIC | Age: 51
End: 2021-02-22

## 2021-02-22 LAB
SHBG SERPL-SCNC: 11 NMOL/L (ref 11–80)
TESTOST FREE SERPL-MCNC: 93.28 NG/DL (ref 4.7–24.4)
TESTOST SERPL-MCNC: 2430 NG/DL (ref 240–950)

## 2021-02-22 NOTE — TELEPHONE ENCOUNTER
Lab results noted 2/15/21:    Component      Latest Ref Rng & Units 2/15/2021   Albumin Fraction      3.7 - 5.1 g/dL 4.3   Alpha 1 Fraction      0.2 - 0.4 g/dL 0.4   Alpha 2 Fraction      0.5 - 0.9 g/dL 1.0 (H)   Beta Fraction      0.6 - 1.0 g/dL 0.8   Gamma Fraction      0.7 - 1.6 g/dL 0.8   Monoclonal Peak      0.0 g/dL 0.0   ELP Interpretation:       Essentially normal electrophoretic pattern. No obvious monoclonal proteins seen. . . .   Immunofixation ELP       No monoclonal protein seen on immunofixation.  Pathological significance requires clinical . . .   IGG      610 - 1,616 mg/dL 873   IGA      84 - 499 mg/dL 218   IGM      35 - 242 mg/dL 37   Immunofix ELP Urine       No monoclonal protein seen on immunofixation.  Pathological significance requires clinical . . .       Will route to Dr. Keita for review.

## 2021-02-23 DIAGNOSIS — E29.1 HYPOGONADISM MALE: ICD-10-CM

## 2021-02-23 RX ORDER — TESTOSTERONE CYPIONATE 200 MG/ML
150 INJECTION, SOLUTION INTRAMUSCULAR WEEKLY
Qty: 6 ML | Refills: 0
Start: 2021-02-23 | End: 2021-05-11

## 2021-02-23 NOTE — RESULT ENCOUNTER NOTE
Dear Jeremi,    Here is a summary of your recent test results:  - Your testosterone level is elevated and I would recommend decreasing your testosterone dose to 150 mg weekly (I have down that you are taking 200 mg weekly) and then rechecking this in about 1 to 2 months.       Thank you very much for trusting me and [facility].     Have a peaceful day.    Healthy regards,  Nate Segovia MD

## 2021-03-03 ENCOUNTER — OFFICE VISIT (OUTPATIENT)
Dept: ADDICTION MEDICINE | Facility: CLINIC | Age: 51
End: 2021-03-03
Payer: COMMERCIAL

## 2021-03-03 VITALS
SYSTOLIC BLOOD PRESSURE: 148 MMHG | HEART RATE: 68 BPM | TEMPERATURE: 97.9 F | DIASTOLIC BLOOD PRESSURE: 78 MMHG | OXYGEN SATURATION: 96 %

## 2021-03-03 DIAGNOSIS — F11.20 UNCOMPLICATED OPIOID DEPENDENCE (H): Primary | ICD-10-CM

## 2021-03-03 DIAGNOSIS — F41.1 GENERALIZED ANXIETY DISORDER: ICD-10-CM

## 2021-03-03 DIAGNOSIS — G47.00 INSOMNIA, UNSPECIFIED TYPE: ICD-10-CM

## 2021-03-03 DIAGNOSIS — I21.4 NSTEMI (NON-ST ELEVATED MYOCARDIAL INFARCTION) (H): ICD-10-CM

## 2021-03-03 DIAGNOSIS — G43.119 INTRACTABLE MIGRAINE WITH AURA WITHOUT STATUS MIGRAINOSUS: ICD-10-CM

## 2021-03-03 DIAGNOSIS — Z79.899 HIGH RISK MEDICATION USE: ICD-10-CM

## 2021-03-03 DIAGNOSIS — F90.9 ATTENTION DEFICIT HYPERACTIVITY DISORDER (ADHD), UNSPECIFIED ADHD TYPE: ICD-10-CM

## 2021-03-03 DIAGNOSIS — M54.42 LEFT-SIDED LOW BACK PAIN WITH LEFT-SIDED SCIATICA, UNSPECIFIED CHRONICITY: ICD-10-CM

## 2021-03-03 DIAGNOSIS — F33.1 MAJOR DEPRESSIVE DISORDER, RECURRENT EPISODE, MODERATE (H): ICD-10-CM

## 2021-03-03 DIAGNOSIS — F41.0 PANIC DISORDER WITHOUT AGORAPHOBIA: ICD-10-CM

## 2021-03-03 DIAGNOSIS — M54.9 UPPER BACK PAIN ON RIGHT SIDE: ICD-10-CM

## 2021-03-03 DIAGNOSIS — M54.2 NECK PAIN: ICD-10-CM

## 2021-03-03 PROCEDURE — 99214 OFFICE O/P EST MOD 30 MIN: CPT | Performed by: PEDIATRICS

## 2021-03-03 RX ORDER — OXYCODONE HYDROCHLORIDE 5 MG/1
TABLET ORAL
Qty: 30 TABLET | Refills: 0 | Status: SHIPPED | OUTPATIENT
Start: 2021-03-03 | End: 2021-04-29
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
## 2021-03-04 RX ORDER — ZOLPIDEM TARTRATE 10 MG/1
10 TABLET ORAL
Qty: 30 TABLET | Refills: 0 | Status: SHIPPED | OUTPATIENT
Start: 2021-03-04 | End: 2021-04-07

## 2021-03-04 NOTE — TELEPHONE ENCOUNTER
RN updated patient with results and Dr. Keita's recommendations via [employer].       Dr. Keita's response    Seems from interpretation this is likely normal. Good news.     Dr. Keita

## 2021-03-04 NOTE — TELEPHONE ENCOUNTER
Outpatient Medication Detail   Disp Refills Start End COLLETTE   zolpidem (AMBIEN) 10 MG tablet 30 tablet 0 2/2/2021  No   Sig - Route: TAKE 1 TABLET (10 MG) BY MOUTH NIGHTLY AS NEEDED FOR SLEEP - Oral     Problem List Complete:    Yes    [place] Office [place] [facility] [place]: 12/3/2020    Future Office visit:   Next 5 appointments (look out 90 days)    Apr 05, 2021  9:45 AM  Return Visit with Gilda Keita MD  [facility] ([facility] ) [address]  [facility] [address]  [phone]   Apr 06, 2021 11:00 AM  Return Visit with Macy Subramanian MD  [facility] ([facility] ) [address] [place] [address]  [address]  [facility] [address]  [phone]   Apr 29, 2021 10:00 AM  Return Visit with Macy Subramanian MD  [facility] ([facility] ) [address]  [address]  [facility] [address]  [phone]          Controlled substance agreement:   Encounter-Level CSA - 11/16/2017:    Controlled Substance Agreement - Scan on 12/1/2017 10:43 AM: CONTROLLED SUBSTANCE AGREEMENT     Encounter-Level CSA - 08/07/2015:    Controlled Substance Agreement - Scan on 8/19/2015 11:27 AM: Controlled Substance Agreement 08/07/15     Patient-Level CSA:    There are no patient-level csa.         Last Urine Drug Screen: No results found for: CDAUT, No results found for: COMDAT,   Cannabinoids (72-hnf-3-carboxy-9-THC)   Date Value Ref Range Status   02/03/2021 Not Detected NDET^Not Detected ng/mL Final     Comment:     Cutoff for a negative cannabinoid is 50 ng/mL or less.     Phencyclidine (Phencyclidine)   Date Value Ref Range Status   02/03/2021 Not Detected NDET^Not Detected ng/mL Final     Comment:     Cutoff for a negative PCP is 25 ng/mL or less.     Cocaine (Benzoylecgonine)   Date Value Ref Range Status   02/03/2021 Not Detected  NDET^Not Detected ng/mL Final     Comment:     Cutoff for a negative cocaine is 150 ng/ml or less.     Methamphetamine (d-Methamphetamine)   Date Value Ref Range Status   02/03/2021 Not Detected NDET^Not Detected ng/mL Final     Comment:     Cutoff for a negative methamphetamine is 500 ng/ml or less.     Opiates (Morphine)   Date Value Ref Range Status   02/03/2021 Not Detected NDET^Not Detected ng/mL Final     Comment:     Cutoff for a negative opiate is 100 ng/ml or less.     Amphetamine (d-Amphetamine)   Date Value Ref Range Status   02/03/2021 Not Detected NDET^Not Detected ng/mL Final     Comment:     Cutoff for a negative amphetamine is 500 ng/mL or less.     Benzodiazepines (Nordiazepam)   Date Value Ref Range Status   02/03/2021 Detected, Abnormal Result (A) NDET^Not Detected ng/mL Final     Comment:     Cutoff for a positive benzodiazepines is greater than 150 ng/ml.  This is an unconfirmed screening result to be used for medical purposes only.   Order [id number] for confirmation or individual confirmation tests to [employer].       Tricyclic Antidepressants (Desipramine)   Date Value Ref Range Status   02/03/2021 Detected, Abnormal Result (A) NDET^Not Detected ng/mL Final     Comment:     Cutoff for a positive tricyclic antidepressant is greater than 300 ng/ml.  This is an unconfirmed screening result to be used for medical purposes only.   Order [id number] for confirmation or individual confirmation tests to [employer].       Methadone (Methadone)   Date Value Ref Range Status   02/03/2021 Not Detected NDET^Not Detected ng/mL Final     Comment:     Cutoff for a negative methadone is 200 ng/ml or less.     Barbiturates (Butalbital)   Date Value Ref Range Status   02/03/2021 Not Detected NDET^Not Detected ng/mL Final     Comment:     Cutoff for a negative barbituate is 200 ng/ml or less.     Oxycodone (Oxycodone)   Date Value Ref Range Status   02/03/2021 Not Detected NDET^Not Detected ng/mL Final     Comment:     Cutoff  for a negative Oxycodone is 100 ng/mL or less.     Propoxyphene (Norpropoxyphene)   Date Value Ref Range Status   02/03/2021 Not Detected NDET^Not Detected ng/mL Final     Comment:     Cutoff for a negative propoxyphene is 300 ng/ml or less     Buprenorphine (Buprenorphine)   Date Value Ref Range Status   02/03/2021 Detected, Abnormal Result (A) NDET^Not Detected ng/mL Final     Comment:     Cutoff for a positive buprenorphine is greater than 10 ng/ml.  This is an unconfirmed screening result to be used for medical purposes only.   Order [id number] for confirmation or individual confirmation tests to [employer].          Processing:  Rx to be electronically transmitted to pharmacy by provider     [email or web address]      Routing refill request to provider for review/approval because:  Drug not on the [facility] refill protocol         Asmita Marinelli RN  [facility]

## 2021-03-24 ENCOUNTER — VIRTUAL VISIT (OUTPATIENT)
Dept: FAMILY MEDICINE | Facility: CLINIC | Age: 51
End: 2021-03-24
Payer: COMMERCIAL

## 2021-03-24 ENCOUNTER — NURSE TRIAGE (OUTPATIENT)
Dept: FAMILY MEDICINE | Facility: CLINIC | Age: 51
End: 2021-03-24

## 2021-03-24 ENCOUNTER — HOSPITAL ENCOUNTER (OUTPATIENT)
Facility: CLINIC | Age: 51
End: 2021-03-24
Payer: COMMERCIAL

## 2021-03-24 DIAGNOSIS — M54.2 ACUTE NECK PAIN: ICD-10-CM

## 2021-03-24 DIAGNOSIS — G89.29 CHRONIC NECK PAIN: ICD-10-CM

## 2021-03-24 DIAGNOSIS — R22.0 RIGHT FACIAL SWELLING: Primary | ICD-10-CM

## 2021-03-24 DIAGNOSIS — K04.7 TOOTH ABSCESS: ICD-10-CM

## 2021-03-24 DIAGNOSIS — M54.2 CHRONIC NECK PAIN: ICD-10-CM

## 2021-03-24 PROCEDURE — 99441 PR PHYSICIAN TELEPHONE EVALUATION 5-10 MIN: CPT | Performed by: NURSE PRACTITIONER

## 2021-03-24 RX ORDER — CHLORHEXIDINE GLUCONATE ORAL RINSE 1.2 MG/ML
15 SOLUTION DENTAL 2 TIMES DAILY
Qty: 118 ML | Refills: 0 | Status: SHIPPED | OUTPATIENT
Start: 2021-03-24 | End: 2021-05-05
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
## 2021-04-06 ENCOUNTER — OFFICE VISIT (OUTPATIENT)
Dept: ADDICTION MEDICINE | Facility: CLINIC | Age: 51
End: 2021-04-06
Payer: COMMERCIAL

## 2021-04-06 VITALS
OXYGEN SATURATION: 94 % | DIASTOLIC BLOOD PRESSURE: 90 MMHG | TEMPERATURE: 99.3 F | SYSTOLIC BLOOD PRESSURE: 150 MMHG | BODY MASS INDEX: 35.13 KG/M2 | WEIGHT: 259 LBS | HEART RATE: 73 BPM

## 2021-04-06 DIAGNOSIS — G89.4 CHRONIC PAIN SYNDROME: ICD-10-CM

## 2021-04-06 DIAGNOSIS — F11.20 UNCOMPLICATED OPIOID DEPENDENCE (H): Primary | ICD-10-CM

## 2021-04-06 DIAGNOSIS — Z79.899 ENCOUNTER FOR LONG-TERM (CURRENT) USE OF HIGH-RISK MEDICATION: ICD-10-CM

## 2021-04-06 DIAGNOSIS — F41.1 GENERALIZED ANXIETY DISORDER: ICD-10-CM

## 2021-04-06 DIAGNOSIS — G43.019 INTRACTABLE MIGRAINE WITHOUT AURA AND WITHOUT STATUS MIGRAINOSUS: ICD-10-CM

## 2021-04-06 PROCEDURE — 99215 OFFICE O/P EST HI 40 MIN: CPT | Performed by: ANESTHESIOLOGY

## 2021-04-06 RX ORDER — BUPRENORPHINE AND NALOXONE 8; 2 MG/1; MG/1
FILM, SOLUBLE BUCCAL; SUBLINGUAL
Qty: 60 FILM | Refills: 1 | Status: SHIPPED | OUTPATIENT
Start: 2021-04-06 | End: 2021-07-05
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
## 2021-04-16 DIAGNOSIS — R07.89 OTHER CHEST PAIN: ICD-10-CM

## 2021-04-16 DIAGNOSIS — G47.00 INSOMNIA, UNSPECIFIED TYPE: ICD-10-CM

## 2021-04-16 RX ORDER — ZOLPIDEM TARTRATE 10 MG/1
10 TABLET ORAL
Qty: 7 TABLET | Refills: 0 | Status: SHIPPED | OUTPATIENT
Start: 2021-04-16 | End: 2021-07-07

## 2021-04-16 RX ORDER — NITROGLYCERIN 0.4 MG/1
TABLET SUBLINGUAL
Qty: 25 TABLET | Refills: 0 | Status: SHIPPED | OUTPATIENT
Start: 2021-04-16 | End: 2021-08-10

## 2021-04-16 NOTE — TELEPHONE ENCOUNTER
Outpatient Medication Detail   Disp Refills Start End COLLETTE   zolpidem (AMBIEN) 10 MG tablet 7 tablet 0 4/7/2021  No   Sig - Route: TAKE 1 TABLET (10 MG) BY MOUTH NIGHTLY AS NEEDED FOR SLEEP - Oral     Problem List Complete:    Yes    Last Office Visit with [facility] primary care provider: 3/24/2021    Future Office visit:   Next 5 appointments (look out 90 days)    Apr 29, 2021 10:00 AM  Return Visit with Macy Subramanian MD  [facility] [place] ([facility] [place] [facility] ) [address]  [address]  [place] [address]  [phone]          Controlled substance agreement:   Encounter-Level CSA - 11/16/2017:    Controlled Substance Agreement - Scan on 12/1/2017 10:43 AM: CONTROLLED SUBSTANCE AGREEMENT     Encounter-Level CSA - 08/07/2015:    Controlled Substance Agreement - Scan on 8/19/2015 11:27 AM: Controlled Substance Agreement 08/07/15     Patient-Level CSA:    There are no patient-level csa.         Last Urine Drug Screen: No results found for: CDAUT, No results found for: COMDAT,   Cannabinoids (62-ktc-7-carboxy-9-THC)   Date Value Ref Range Status   02/03/2021 Not Detected NDET^Not Detected ng/mL Final     Comment:     Cutoff for a negative cannabinoid is 50 ng/mL or less.     Phencyclidine (Phencyclidine)   Date Value Ref Range Status   02/03/2021 Not Detected NDET^Not Detected ng/mL Final     Comment:     Cutoff for a negative PCP is 25 ng/mL or less.     Cocaine (Benzoylecgonine)   Date Value Ref Range Status   02/03/2021 Not Detected NDET^Not Detected ng/mL Final     Comment:     Cutoff for a negative cocaine is 150 ng/ml or less.     Methamphetamine (d-Methamphetamine)   Date Value Ref Range Status   02/03/2021 Not Detected NDET^Not Detected ng/mL Final     Comment:     Cutoff for a negative methamphetamine is 500 ng/ml or less.     Opiates (Morphine)   Date Value Ref Range Status   02/03/2021 Not Detected NDET^Not Detected ng/mL Final     Comment:      Cutoff for a negative opiate is 100 ng/ml or less.     Amphetamine (d-Amphetamine)   Date Value Ref Range Status   02/03/2021 Not Detected NDET^Not Detected ng/mL Final     Comment:     Cutoff for a negative amphetamine is 500 ng/mL or less.     Benzodiazepines (Nordiazepam)   Date Value Ref Range Status   02/03/2021 Detected, Abnormal Result (A) NDET^Not Detected ng/mL Final     Comment:     Cutoff for a positive benzodiazepines is greater than 150 ng/ml.  This is an unconfirmed screening result to be used for medical purposes only.   Order [id number] for confirmation or individual confirmation tests to [employer].       Tricyclic Antidepressants (Desipramine)   Date Value Ref Range Status   02/03/2021 Detected, Abnormal Result (A) NDET^Not Detected ng/mL Final     Comment:     Cutoff for a positive tricyclic antidepressant is greater than 300 ng/ml.  This is an unconfirmed screening result to be used for medical purposes only.   Order [id number] for confirmation or individual confirmation tests to [facility].       Methadone (Methadone)   Date Value Ref Range Status   02/03/2021 Not Detected NDET^Not Detected ng/mL Final     Comment:     Cutoff for a negative methadone is 200 ng/ml or less.     Barbiturates (Butalbital)   Date Value Ref Range Status   02/03/2021 Not Detected NDET^Not Detected ng/mL Final     Comment:     Cutoff for a negative barbituate is 200 ng/ml or less.     Oxycodone (Oxycodone)   Date Value Ref Range Status   02/03/2021 Not Detected NDET^Not Detected ng/mL Final     Comment:     Cutoff for a negative Oxycodone is 100 ng/mL or less.     Propoxyphene (Norpropoxyphene)   Date Value Ref Range Status   02/03/2021 Not Detected NDET^Not Detected ng/mL Final     Comment:     Cutoff for a negative propoxyphene is 300 ng/ml or less     Buprenorphine (Buprenorphine)   Date Value Ref Range Status   02/03/2021 Detected, Abnormal Result (A) NDET^Not Detected ng/mL Final     Comment:     Cutoff for a positive  buprenorphine is greater than 10 ng/ml.  This is an unconfirmed screening result to be used for medical purposes only.   Order [id number] for confirmation or individual confirmation tests to [employer].          Processing:  Rx to be electronically transmitted to pharmacy by provider     [email or web address]    Routing refill request to provider for review/approval because:  Drug not on the [facility] refill protocol           Asmita Marinelli RN  [facility]

## 2021-04-25 DIAGNOSIS — Z11.59 ENCOUNTER FOR SCREENING FOR OTHER VIRAL DISEASES: ICD-10-CM

## 2021-04-27 DIAGNOSIS — G43.719 CHRONIC MIGRAINE WITHOUT AURA, INTRACTABLE, WITHOUT STATUS MIGRAINOSUS: ICD-10-CM

## 2021-04-27 DIAGNOSIS — G43.101 MIGRAINE WITH AURA AND WITH STATUS MIGRAINOSUS, NOT INTRACTABLE: Primary | ICD-10-CM

## 2021-04-27 DIAGNOSIS — G43.101 MIGRAINE WITH AURA AND WITH STATUS MIGRAINOSUS, NOT INTRACTABLE: ICD-10-CM

## 2021-04-27 DIAGNOSIS — G43.019 INTRACTABLE MIGRAINE WITHOUT AURA AND WITHOUT STATUS MIGRAINOSUS: Primary | ICD-10-CM

## 2021-04-29 ENCOUNTER — OFFICE VISIT (OUTPATIENT)
Dept: PALLIATIVE MEDICINE | Facility: CLINIC | Age: 51
End: 2021-04-29
Payer: COMMERCIAL

## 2021-04-29 VITALS — SYSTOLIC BLOOD PRESSURE: 151 MMHG | DIASTOLIC BLOOD PRESSURE: 67 MMHG | OXYGEN SATURATION: 93 % | HEART RATE: 70 BPM

## 2021-04-29 DIAGNOSIS — Z79.899 ENCOUNTER FOR LONG-TERM (CURRENT) USE OF HIGH-RISK MEDICATION: ICD-10-CM

## 2021-04-29 DIAGNOSIS — M54.12 CERVICAL RADICULOPATHY: ICD-10-CM

## 2021-04-29 DIAGNOSIS — F11.20 UNCOMPLICATED OPIOID DEPENDENCE (H): ICD-10-CM

## 2021-04-29 DIAGNOSIS — G43.701 CHRONIC MIGRAINE WITHOUT AURA, NOT INTRACTABLE, WITH STATUS MIGRAINOSUS: ICD-10-CM

## 2021-04-29 DIAGNOSIS — M47.812 FACET ARTHROPATHY, CERVICAL: ICD-10-CM

## 2021-04-29 DIAGNOSIS — G43.009 MIGRAINE WITHOUT AURA AND WITHOUT STATUS MIGRAINOSUS, NOT INTRACTABLE: Primary | ICD-10-CM

## 2021-04-29 DIAGNOSIS — G43.019 INTRACTABLE MIGRAINE WITHOUT AURA AND WITHOUT STATUS MIGRAINOSUS: ICD-10-CM

## 2021-04-29 PROCEDURE — 80306 DRUG TEST PRSMV INSTRMNT: CPT | Performed by: ANESTHESIOLOGY

## 2021-04-29 PROCEDURE — 64615 CHEMODENERV MUSC MIGRAINE: CPT | Performed by: ANESTHESIOLOGY

## 2021-04-29 NOTE — PATIENT INSTRUCTIONS
[facility] IMAGING:   SOUTH locations: [place], Specialty care building  Call: [phone]      [facility]   Botox Injection Discharge Instructions    Do not rub or put extended pressure on the injection sites. You may gently touch the sites to remove any excess blood.    Monitor the injection sites for signs and symptoms of infection such as redness, swelling, warmth, fever, chills, or drainage to areas.    You may have soreness at the injection sites for up to 24 hours.    If you are able to use anti-inflammatory medications or Tylenol for pain control, you can take these as directed.    It may take up to 1 month to notice benefit from the 1st treatment    If you do notice relief, botox can be done every 3 months.  It may require insurance authorization everytime.      For questions about insurance coverage, please call the main clinic number and ask to speak with someone about botox coverage.     Pain Clinic phone number during work hours Monday-Friday:  [phone]    After hours provider line: [phone]
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
## 2021-05-03 NOTE — OR NURSING
AA: H&P review request  Received: Today  Message Contents   Karine Yanez RN  P [facility]; Estefania Aj RN             Dear Anesthesia team,     I am writing to request your review of this pt's most recent H&P with Pain Medicine (Dr. Subramanian) on 4/6/21, ahead of MRI under anesthesia scheduled 5/6/21.     I am wondering if it would be appropriate to use at the PreOp H&P prior to the MRI. I could not find any evidence of a PreOp appointment for evaluation and Anesthesia will be the provider for the case. I thought this might be sufficient, especially given the patient's severe chronic pain issues - the patient will need an updated physical exam in PreOp DOS, and I have noted this in the chart.     If this is not sufficient, would it be better to schedule this patient for a Video PAC visit for a PreOp eval ahead of the procedure on Thursday? I have cc'd our [employer] manager, Estefania Crawford to assist with follow-up as I will be out of the office until Friday.     Thank you for your review,     Jaci Yanez RN   PreAdmission Screening   [facility]

## 2021-05-04 DIAGNOSIS — Z11.59 ENCOUNTER FOR SCREENING FOR OTHER VIRAL DISEASES: ICD-10-CM

## 2021-05-04 LAB
LABORATORY COMMENT REPORT: NORMAL
SARS-COV-2 RNA RESP QL NAA+PROBE: NEGATIVE
SARS-COV-2 RNA RESP QL NAA+PROBE: NORMAL
SPECIMEN SOURCE: NORMAL
SPECIMEN SOURCE: NORMAL

## 2021-05-04 PROCEDURE — U0005 INFEC AGEN DETEC AMPLI PROBE: HCPCS | Performed by: INTERNAL MEDICINE

## 2021-05-04 PROCEDURE — U0003 INFECTIOUS AGENT DETECTION BY NUCLEIC ACID (DNA OR RNA); SEVERE ACUTE RESPIRATORY SYNDROME CORONAVIRUS 2 (SARS-COV-2) (CORONAVIRUS DISEASE [COVID-19]), AMPLIFIED PROBE TECHNIQUE, MAKING USE OF HIGH THROUGHPUT TECHNOLOGIES AS DESCRIBED BY CMS-2020-01-R: HCPCS | Performed by: INTERNAL MEDICINE

## 2021-05-05 ENCOUNTER — MYC REFILL (OUTPATIENT)
Dept: FAMILY MEDICINE | Facility: CLINIC | Age: 51
End: 2021-05-05

## 2021-05-05 ENCOUNTER — OFFICE VISIT (OUTPATIENT)
Dept: FAMILY MEDICINE | Facility: CLINIC | Age: 51
End: 2021-05-05
Payer: COMMERCIAL

## 2021-05-05 VITALS
BODY MASS INDEX: 34.4 KG/M2 | DIASTOLIC BLOOD PRESSURE: 78 MMHG | RESPIRATION RATE: 20 BRPM | OXYGEN SATURATION: 95 % | TEMPERATURE: 98.8 F | SYSTOLIC BLOOD PRESSURE: 128 MMHG | WEIGHT: 254 LBS | HEART RATE: 76 BPM | HEIGHT: 72 IN

## 2021-05-05 DIAGNOSIS — M54.2 NECK PAIN, BILATERAL: ICD-10-CM

## 2021-05-05 DIAGNOSIS — M54.42 LEFT-SIDED LOW BACK PAIN WITH LEFT-SIDED SCIATICA, UNSPECIFIED CHRONICITY: ICD-10-CM

## 2021-05-05 DIAGNOSIS — F40.243 FLYING PHOBIA: ICD-10-CM

## 2021-05-05 DIAGNOSIS — F41.1 GENERALIZED ANXIETY DISORDER: ICD-10-CM

## 2021-05-05 DIAGNOSIS — G47.00 INSOMNIA, UNSPECIFIED TYPE: ICD-10-CM

## 2021-05-05 DIAGNOSIS — I25.10 CORONARY ARTERY DISEASE INVOLVING NATIVE HEART WITHOUT ANGINA PECTORIS, UNSPECIFIED VESSEL OR LESION TYPE: ICD-10-CM

## 2021-05-05 DIAGNOSIS — I10 HYPERTENSION GOAL BP (BLOOD PRESSURE) < 140/90: ICD-10-CM

## 2021-05-05 DIAGNOSIS — F31.81 BIPOLAR 2 DISORDER (H): ICD-10-CM

## 2021-05-05 DIAGNOSIS — E78.5 HYPERLIPIDEMIA LDL GOAL <70: ICD-10-CM

## 2021-05-05 DIAGNOSIS — F90.2 ATTENTION DEFICIT HYPERACTIVITY DISORDER (ADHD), COMBINED TYPE: ICD-10-CM

## 2021-05-05 DIAGNOSIS — J45.30 MILD PERSISTENT ASTHMA WITHOUT COMPLICATION: ICD-10-CM

## 2021-05-05 DIAGNOSIS — Z51.81 MEDICATION MONITORING ENCOUNTER: ICD-10-CM

## 2021-05-05 DIAGNOSIS — F33.1 MAJOR DEPRESSIVE DISORDER, RECURRENT EPISODE, MODERATE (H): ICD-10-CM

## 2021-05-05 DIAGNOSIS — N18.30 STAGE 3 CHRONIC KIDNEY DISEASE, UNSPECIFIED WHETHER STAGE 3A OR 3B CKD (H): ICD-10-CM

## 2021-05-05 DIAGNOSIS — L65.9 ALOPECIA: ICD-10-CM

## 2021-05-05 DIAGNOSIS — Z01.818 PREOPERATIVE EXAMINATION: Primary | ICD-10-CM

## 2021-05-05 DIAGNOSIS — G89.29 CHRONIC NECK PAIN: ICD-10-CM

## 2021-05-05 DIAGNOSIS — M54.2 CHRONIC NECK PAIN: ICD-10-CM

## 2021-05-05 DIAGNOSIS — G89.4 CHRONIC PAIN SYNDROME: ICD-10-CM

## 2021-05-05 LAB
ALBUMIN SERPL-MCNC: 3.4 G/DL (ref 3.4–5)
ALP SERPL-CCNC: 70 U/L (ref 40–150)
ALT SERPL W P-5'-P-CCNC: 99 U/L (ref 0–70)
ANION GAP SERPL CALCULATED.3IONS-SCNC: <1 MMOL/L (ref 3–14)
AST SERPL W P-5'-P-CCNC: 67 U/L (ref 0–45)
BILIRUB SERPL-MCNC: 0.7 MG/DL (ref 0.2–1.3)
BUN SERPL-MCNC: 15 MG/DL (ref 7–30)
CALCIUM SERPL-MCNC: 9.1 MG/DL (ref 8.5–10.1)
CHLORIDE SERPL-SCNC: 103 MMOL/L (ref 94–109)
CO2 SERPL-SCNC: 35 MMOL/L (ref 20–32)
CREAT SERPL-MCNC: 2.05 MG/DL (ref 0.66–1.25)
ERYTHROCYTE [DISTWIDTH] IN BLOOD BY AUTOMATED COUNT: 13.8 % (ref 10–15)
GFR SERPL CREATININE-BSD FRML MDRD: 36 ML/MIN/{1.73_M2}
GLUCOSE SERPL-MCNC: 74 MG/DL (ref 70–99)
HCT VFR BLD AUTO: 53.3 % (ref 40–53)
HGB BLD-MCNC: 17 G/DL (ref 13.3–17.7)
MCH RBC QN AUTO: 29 PG (ref 26.5–33)
MCHC RBC AUTO-ENTMCNC: 31.9 G/DL (ref 31.5–36.5)
MCV RBC AUTO: 91 FL (ref 78–100)
PLATELET # BLD AUTO: 197 10E9/L (ref 150–450)
POTASSIUM SERPL-SCNC: 4.8 MMOL/L (ref 3.4–5.3)
PROT SERPL-MCNC: 6.9 G/DL (ref 6.8–8.8)
RBC # BLD AUTO: 5.87 10E12/L (ref 4.4–5.9)
SODIUM SERPL-SCNC: 138 MMOL/L (ref 133–144)
WBC # BLD AUTO: 8.9 10E9/L (ref 4–11)

## 2021-05-05 PROCEDURE — 93000 ELECTROCARDIOGRAM COMPLETE: CPT | Performed by: FAMILY MEDICINE

## 2021-05-05 PROCEDURE — 99214 OFFICE O/P EST MOD 30 MIN: CPT | Performed by: FAMILY MEDICINE

## 2021-05-05 PROCEDURE — 85027 COMPLETE CBC AUTOMATED: CPT | Performed by: FAMILY MEDICINE

## 2021-05-05 PROCEDURE — 80053 COMPREHEN METABOLIC PANEL: CPT | Performed by: FAMILY MEDICINE

## 2021-05-05 PROCEDURE — 36415 COLL VENOUS BLD VENIPUNCTURE: CPT | Performed by: FAMILY MEDICINE

## 2021-05-05 RX ORDER — CLONAZEPAM 1 MG/1
1 TABLET ORAL 2 TIMES DAILY PRN
Qty: 4 TABLET | Refills: 0 | Status: CANCELLED | OUTPATIENT
Start: 2021-05-05

## 2021-05-05 RX ORDER — FINASTERIDE 5 MG/1
TABLET, FILM COATED ORAL
Qty: 90 TABLET | Refills: 1 | Status: CANCELLED | OUTPATIENT
Start: 2021-05-05

## 2021-05-05 RX ORDER — ZOLPIDEM TARTRATE 10 MG/1
10 TABLET ORAL
Qty: 7 TABLET | Refills: 0 | Status: CANCELLED | OUTPATIENT
Start: 2021-05-05

## 2021-05-05 RX ORDER — ZOLPIDEM TARTRATE 10 MG/1
10 TABLET ORAL
Qty: 30 TABLET | Refills: 0 | Status: SHIPPED | OUTPATIENT
Start: 2021-05-05 | End: 2021-06-08

## 2021-05-05 ASSESSMENT — MIFFLIN-ST. JEOR: SCORE: 2040.14

## 2021-05-05 NOTE — OR NURSING
"RE: AA: H&P review request  Received: Yesterday  Message Contents   Mariah Rice MD Dalebroux, Deborah E, RN; Estefania Aj RN             Jaci - this patient has a rather complex cardiac history, and that is not covered in the Pain note, so he needs a \"real\" H&P. He can be delayed for this MRI (neck pain) and should be if we cannot get him in to the PAC before Thurs.     Estefania - can you see if we have any availability?     Mariah    Previous Messages    ----- Message -----   From: Karine Yanez RN   Sent: 5/3/2021   4:51 PM CDT   To: Estefania Aj RN, [facility]   Subject: AA: H&P review request                           Dear Anesthesia team,     I am writing to request your review of this pt's most recent H&P with Pain Medicine (Dr. Subramanian) on 4/6/21, ahead of MRI under anesthesia scheduled 5/6/21.     I am wondering if it would be appropriate to use at the PreOp H&P prior to the MRI. I could not find any evidence of a PreOp appointment for evaluation and Anesthesia will be the provider for the case. I thought this might be sufficient, especially given the patient's severe chronic pain issues - the patient will need an updated physical exam in PreOp DOS, and I have noted this in the chart.     If this is not sufficient, would it be better to schedule this patient for a Video PAC visit for a PreOp eval ahead of the procedure on Thursday? I have cc'd our [employer] manager, Estefania Crawford to assist with follow-up as I will be out of the office until Friday.     Thank you for your review,     Jaci Yanez RN   PreAdmission Screening   [facility]           "

## 2021-05-05 NOTE — OR NURSING
RE: AA: Meds-Suboxone  Received: Today  Message Contents   Marcel Srivastava MD Johnson, Judy, RN             yes    Previous Messages    ----- Message -----   From: Cely Hope RN   Sent: 5/5/2021  11:06 AM CDT   To: Marcel Srivastava MD, [facility]   Subject: AA: Meds-Suboxone                                 Hi Dr. Srivastava,     Pt is scheduled for a C-Spine MRI with General Anesthesia on the [place] tomorrow at 1230.     He has a history of chronic pain and chronic opioid use. Note by pain clinic MD ob 4/5 says he takes Suboxone 8mg BID, among other meds.     Do you want him to take Suboxone as scheduled today and tomorrow am?     Thanks so much.     Cely Hope, RN, BSN   Preadmission Nursing   [phone] office   [phone] cell (working remotely).

## 2021-05-05 NOTE — TELEPHONE ENCOUNTER
Outpatient Medication Detail   Disp Refills Start End COLLETTE   zolpidem (AMBIEN) 10 MG tablet 7 tablet 0 4/16/2021  No   Sig - Route: TAKE 1 TABLET (10 MG) BY MOUTH NIGHTLY AS NEEDED FOR SLEEP - Oral     Problem List Complete:    Yes    Last Office Visit with [facility] primary care provider: 5/5/2021    Future Office visit:   Next 5 appointments (look out 90 days)    Jul 29, 2021 10:00 AM  Return Visit with Macy Subramanian MD  [facility] [place] ([facility] [place] ) [address]  [address]  [place] [address]  [phone]          Controlled substance agreement:   Encounter-Level CSA - 11/16/2017:    Controlled Substance Agreement - Scan on 12/1/2017 10:43 AM: CONTROLLED SUBSTANCE AGREEMENT     Encounter-Level CSA - 08/07/2015:    Controlled Substance Agreement - Scan on 8/19/2015 11:27 AM: Controlled Substance Agreement 08/07/15     Patient-Level CSA:    There are no patient-level csa.         Last Urine Drug Screen: No results found for: CDAUT, No results found for: COMDAT,   Cannabinoids (46-eps-9-carboxy-9-THC)   Date Value Ref Range Status   04/29/2021 Not Detected NDET^Not Detected ng/mL Final     Comment:     Cutoff for a negative cannabinoid is 50 ng/mL or less.     Phencyclidine (Phencyclidine)   Date Value Ref Range Status   04/29/2021 Not Detected NDET^Not Detected ng/mL Final     Comment:     Cutoff for a negative PCP is 25 ng/mL or less.     Cocaine (Benzoylecgonine)   Date Value Ref Range Status   04/29/2021 Not Detected NDET^Not Detected ng/mL Final     Comment:     Cutoff for a negative cocaine is 150 ng/ml or less.     Methamphetamine (d-Methamphetamine)   Date Value Ref Range Status   04/29/2021 Not Detected NDET^Not Detected ng/mL Final     Comment:     Cutoff for a negative methamphetamine is 500 ng/ml or less.     Opiates (Morphine)   Date Value Ref Range Status   04/29/2021 Not Detected NDET^Not Detected ng/mL Final     Comment:      Cutoff for a negative opiate is 100 ng/ml or less.     Amphetamine (d-Amphetamine)   Date Value Ref Range Status   04/29/2021 Detected, Abnormal Result (A) NDET^Not Detected ng/mL Final     Comment:     Cutoff for a positive amphetamine is greater than 500 ng/ml.  This is an unconfirmed screening result to be used for medical purposes only.   Order [id number] for confirmation or individual confirmation tests to [facility].       Benzodiazepines (Nordiazepam)   Date Value Ref Range Status   04/29/2021 Detected, Abnormal Result (A) NDET^Not Detected ng/mL Final     Comment:     Cutoff for a positive benzodiazepines is greater than 150 ng/ml.  This is an unconfirmed screening result to be used for medical purposes only.   Order [id number] for confirmation or individual confirmation tests to [facility].       Tricyclic Antidepressants (Desipramine)   Date Value Ref Range Status   04/29/2021 Detected, Abnormal Result (A) NDET^Not Detected ng/mL Final     Comment:     Cutoff for a positive tricyclic antidepressant is greater than 300 ng/ml.  This is an unconfirmed screening result to be used for medical purposes only.   Order [id number] for confirmation or individual confirmation tests to [facility].       Methadone (Methadone)   Date Value Ref Range Status   04/29/2021 Not Detected NDET^Not Detected ng/mL Final     Comment:     Cutoff for a negative methadone is 200 ng/ml or less.     Barbiturates (Butalbital)   Date Value Ref Range Status   04/29/2021 Not Detected NDET^Not Detected ng/mL Final     Comment:     Cutoff for a negative barbituate is 200 ng/ml or less.     Oxycodone (Oxycodone)   Date Value Ref Range Status   04/29/2021 Not Detected NDET^Not Detected ng/mL Final     Comment:     Cutoff for a negative Oxycodone is 100 ng/mL or less.     Propoxyphene (Norpropoxyphene)   Date Value Ref Range Status   04/29/2021 Not Detected NDET^Not Detected ng/mL Final     Comment:     Cutoff for a negative propoxyphene is 300 ng/ml or less      Buprenorphine (Buprenorphine)   Date Value Ref Range Status   04/29/2021 Detected, Abnormal Result (A) NDET^Not Detected ng/mL Final     Comment:     Cutoff for a positive buprenorphine is greater than 10 ng/ml.  This is an unconfirmed screening result to be used for medical purposes only.   Order [id number] for confirmation or individual confirmation tests to [employer].          Processing:  Rx to be electronically transmitted to pharmacy by provider     [email or web address]      Routing refill request to provider for review/approval because:  Drug not on the [facility] refill protocol         Asmita Marinelli RN  [facility]

## 2021-05-05 NOTE — OR NURSING
I spoke with Dr Gibson, [facility], to notify him that pt is on Suboxone and has an MRI with General Anesthesia tomorrow. He said pt should take Suboxone as scheduled today and tomorrow.  [phone] for pt to take Suboxone as scheduled today and tomorrow.
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
## 2021-05-05 NOTE — OR NURSING
RE: AA: H&P review request  Received: 2 days ago  Message Contents   Corin Alas MD Dalebroux, Karine ROWELL RN; P Pas Anesthesiology; Estefania Aj RN             Hi Jaci,     I briefly reviewed his chart. He has some significant cardiac history that I do not think his pain visit note is sufficient.     Thanks,   Corin

## 2021-05-05 NOTE — OR NURSING
AA: Meds-Suboxone  Received: Today  Message Contents   Cely Hope, Marcel Carl MD; P Pas Anesthesiology             Hi Dr. Srivastava,     Pt is scheduled for a C-Spine MRI with General Anesthesia on the [place] tomorrow at 1230.     He has a history of chronic pain and chronic opioid use. Note by pain clinic MD ob 4/5 says he takes Suboxone 8mg BID, among other meds.     Do you want him to take Suboxone as scheduled today and tomorrow am?     Thanks so much.     Cely Hope, RN, BSN   [facility]    office   [phone] cell (working remotely).

## 2021-05-06 DIAGNOSIS — G47.00 INSOMNIA, UNSPECIFIED TYPE: ICD-10-CM

## 2021-05-06 DIAGNOSIS — F40.243 FLYING PHOBIA: ICD-10-CM

## 2021-05-06 DIAGNOSIS — N18.30 STAGE 3 CHRONIC KIDNEY DISEASE, UNSPECIFIED WHETHER STAGE 3A OR 3B CKD (H): ICD-10-CM

## 2021-05-06 DIAGNOSIS — F33.1 MAJOR DEPRESSIVE DISORDER, RECURRENT EPISODE, MODERATE (H): ICD-10-CM

## 2021-05-06 DIAGNOSIS — F41.1 GENERALIZED ANXIETY DISORDER: ICD-10-CM

## 2021-05-06 DIAGNOSIS — I10 HYPERTENSION GOAL BP (BLOOD PRESSURE) < 140/90: ICD-10-CM

## 2021-05-06 DIAGNOSIS — L65.9 ALOPECIA: ICD-10-CM

## 2021-05-06 RX ORDER — BUPROPION HYDROCHLORIDE 300 MG/1
300 TABLET ORAL EVERY MORNING
Qty: 90 TABLET | Refills: 1 | Status: SHIPPED | OUTPATIENT
Start: 2021-05-06 | End: 2021-08-23

## 2021-05-06 RX ORDER — CLONIDINE HYDROCHLORIDE 0.1 MG/1
0.1 TABLET ORAL 2 TIMES DAILY
Qty: 180 TABLET | Refills: 1 | Status: SHIPPED | OUTPATIENT
Start: 2021-05-06 | End: 2021-08-23

## 2021-05-06 RX ORDER — METOPROLOL TARTRATE 100 MG
TABLET ORAL
Qty: 180 TABLET | Refills: 1 | Status: SHIPPED | OUTPATIENT
Start: 2021-05-06 | End: 2021-08-23

## 2021-05-06 RX ORDER — CLONAZEPAM 1 MG/1
1 TABLET ORAL 2 TIMES DAILY PRN
Qty: 4 TABLET | Refills: 0 | Status: SHIPPED | OUTPATIENT
Start: 2021-05-06 | End: 2021-08-10

## 2021-05-06 RX ORDER — LOSARTAN POTASSIUM 100 MG/1
100 TABLET ORAL DAILY
Qty: 90 TABLET | Refills: 1 | Status: SHIPPED | OUTPATIENT
Start: 2021-05-06 | End: 2021-08-23

## 2021-05-06 RX ORDER — FINASTERIDE 5 MG/1
TABLET, FILM COATED ORAL
Qty: 90 TABLET | Refills: 1 | Status: SHIPPED | OUTPATIENT
Start: 2021-05-06 | End: 2021-08-23

## 2021-05-06 ASSESSMENT — ASTHMA QUESTIONNAIRES: ACT_TOTALSCORE: 20

## 2021-05-07 DIAGNOSIS — E29.1 HYPOGONADISM MALE: ICD-10-CM

## 2021-05-07 RX ORDER — ZOLPIDEM TARTRATE 10 MG/1
10 TABLET ORAL
Qty: 7 TABLET | Refills: 0 | OUTPATIENT
Start: 2021-05-07

## 2021-05-07 RX ORDER — CLONAZEPAM 1 MG/1
1 TABLET ORAL 2 TIMES DAILY PRN
Qty: 4 TABLET | Refills: 0 | OUTPATIENT
Start: 2021-05-07

## 2021-05-07 NOTE — TELEPHONE ENCOUNTER
Outpatient Medication Detail   Disp Refills Start End COLLETTE   zolpidem (AMBIEN) 10 MG tablet 30 tablet 0 5/5/2021  No   Sig - Route: Take 1 tablet (10 mg) by mouth nightly as needed for sleep - Oral     Outpatient Medication Detail   Disp Refills Start End COLLETTE   clonazePAM (KLONOPIN) 1 MG tablet 4 tablet 0 5/6/2021  No   Sig - Route: Take 1 tablet (1 mg) by mouth 2 times daily as needed (flying phobia) - Oral     Duplicate      Asmita Marinelli RN  [facility]

## 2021-05-11 PROBLEM — F11.90 CHRONIC, CONTINUOUS USE OF OPIOIDS: Status: ACTIVE | Noted: 2021-04-06

## 2021-05-11 RX ORDER — TESTOSTERONE CYPIONATE 200 MG/ML
INJECTION, SOLUTION INTRAMUSCULAR
Qty: 6 ML | Refills: 0 | Status: SHIPPED | OUTPATIENT
Start: 2021-05-11 | End: 2021-07-06

## 2021-05-11 NOTE — TELEPHONE ENCOUNTER
Routing refill request to provider for review/approval because:    Androgen Agents Kwqkcu0705/07/2021 03:08 PM   Refills for this classification require provider review     Koffi BURNHAM RN   [facility]

## 2021-05-17 ENCOUNTER — TELEPHONE (OUTPATIENT)
Dept: FAMILY MEDICINE | Facility: CLINIC | Age: 51
End: 2021-05-17

## 2021-05-17 DIAGNOSIS — F41.0 PANIC DISORDER WITHOUT AGORAPHOBIA: ICD-10-CM

## 2021-05-17 DIAGNOSIS — F41.1 GENERALIZED ANXIETY DISORDER: ICD-10-CM

## 2021-05-17 RX ORDER — LORAZEPAM 1 MG/1
1 TABLET ORAL 2 TIMES DAILY PRN
Qty: 60 TABLET | Refills: 1 | Status: SHIPPED | OUTPATIENT
Start: 2021-05-17 | End: 2021-07-14

## 2021-05-17 NOTE — TELEPHONE ENCOUNTER
Patient is flying out tomorrow and would like a refill of his Lorazepam to help with anxiety over flying.     Please send to [facility] in [place]

## 2021-05-21 ENCOUNTER — MYC MEDICAL ADVICE (OUTPATIENT)
Dept: PALLIATIVE MEDICINE | Facility: CLINIC | Age: 51
End: 2021-05-21

## 2021-05-21 DIAGNOSIS — G47.33 OSA (OBSTRUCTIVE SLEEP APNEA): Primary | ICD-10-CM
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
## 2021-05-21 NOTE — TELEPHONE ENCOUNTER
Lona message from patient on 05/21/2021 at 1505:  Ok. I'm not sure what an appointment will take us without imaging at this point.  I was just thinking of reducing emails with a call. lol So back to square one with an MRI? She, you, and her team was very gracious and diligent with providing me with an Anesthesiologist to sedate me from being claustrophobic for the last appointment. The increased pain didn't jump from a 4/10 to a sudden 10/10. It has an ebb and flow reaching 9/10 and going as low as 4/10. So the 10/10 is somewhat relative. As the degeneration continues I find myself in the 8/10 region most often that can be somewhat controlled with meds.   Thank you for your concern, it has just been something I know how to manage and mask while trying to avoid an inevitable surgery/fusion.   So, if you can steer me in the direction of getting another MRI scheduled per Farhat Cavazos, I will do it asap.  Thank you once again.  _________________________________________    Routing to provider for MRI orders    Haritha Bruno RN  Care Coordinator  [facility]
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
## 2021-05-24 DIAGNOSIS — M54.12 CERVICAL RADICULOPATHY: Primary | ICD-10-CM

## 2021-05-24 NOTE — TELEPHONE ENCOUNTER
Please contact the patient and let him know that I ordered a cervical MRI under general anesthesia.  He will probably need to have a pre-operative history and physical done by his PCP prior to receiving a general anesthetic.  He will need to call radiology to coordinate all of this.     [facility] IMAGING:   [facility]  Call: [phone]      Macy Subramanian MD

## 2021-05-25 ENCOUNTER — TELEPHONE (OUTPATIENT)
Dept: INTERVENTIONAL RADIOLOGY/VASCULAR | Facility: CLINIC | Age: 51
End: 2021-05-25

## 2021-05-25 NOTE — TELEPHONE ENCOUNTER
Will leave encounter open for patient response/chart review by nursing.     Below to pt  Bonny Blood,   Dr Subramanian has ordered a cervical MRI under general anesthesia.  You will probably need to have a pre-operative history and physical done by your PCP prior to receiving a general anesthetic. You will need to call radiology and your primary care to coordinate all of this. Below is their contact information. Thank you     [facility] IMAGING:   [facility]  Call: [phone]        Trixie ASTORGA, RN Care Coordinator  [facility]  Pain Management

## 2021-05-25 NOTE — TELEPHONE ENCOUNTER
Order for MRI with general anesthesia reviewed and approved, patient would need to have a current history and physical on file prior to exam.

## 2021-05-26 ENCOUNTER — DOCUMENTATION ONLY (OUTPATIENT)
Dept: SLEEP MEDICINE | Facility: CLINIC | Age: 51
End: 2021-05-26

## 2021-05-26 NOTE — PROGRESS NOTES
Patient was offered choice of vendor and chose [facility].  Patient Jeremi Damon was set up at [facility]  on May 26, 2021. Patient received a Resmed AirSense 10 Auto. Pressures were set at 15-20 cm H2O.   Patient s ramp is 7 cm H2O for Auto and FLEX/EPR is 2.  Patient received a Resmed Mask name: airfit f20  Full Face mask size Large, heated tubing and heated humidifier.  Patient does need to meet compliance. Patient has a follow up on 7/14/2021 with Bennett Goltz, PA-C. Andrew Philip Kraemer

## 2021-06-01 ENCOUNTER — DOCUMENTATION ONLY (OUTPATIENT)
Dept: SLEEP MEDICINE | Facility: CLINIC | Age: 51
End: 2021-06-01

## 2021-06-06 DIAGNOSIS — G47.00 INSOMNIA, UNSPECIFIED TYPE: ICD-10-CM

## 2021-06-07 NOTE — TELEPHONE ENCOUNTER
zolpidem (AMBIEN) 10 MG tablet 30 tablet 0 5/5/2021  No   Sig - Route: Take 1 tablet (10 mg) by mouth nightly as needed for sleep - Oral   Sent to pharmacy as: Zolpidem Tartrate 10 MG Oral Tablet (AMBIEN)   Class: E-Prescribe       Last office visit: 5/5/2021 with prescribing provider:   Future Office Visit:   Next 5 appointments (look out 90 days)    Jul 29, 2021 10:00 AM  Return Visit with Macy Subramanian MD  [facility] [place] ([facility] [place] ) [address] [address] [address]  [phone]               Encounter-Level CSA - 11/16/2017:    Controlled Substance Agreement - Scan on 12/1/2017 10:43 AM: CONTROLLED SUBSTANCE AGREEMENT     Encounter-Level CSA - 08/07/2015:    Controlled Substance Agreement - Scan on 8/19/2015 11:27 AM: Controlled Substance Agreement 08/07/15     Patient-Level CSA:    There are no patient-level csa.       Routing refill request to provider for review/approval because:  Drug not on the [facility] refill protocol       Kari Mcleod RN, BSN  [facility]

## 2021-06-08 RX ORDER — ZOLPIDEM TARTRATE 10 MG/1
10 TABLET ORAL
Qty: 30 TABLET | Refills: 0 | Status: SHIPPED | OUTPATIENT
Start: 2021-06-08 | End: 2021-07-07

## 2021-07-04 DIAGNOSIS — E29.1 HYPOGONADISM MALE: ICD-10-CM

## 2021-07-05 DIAGNOSIS — G47.00 INSOMNIA, UNSPECIFIED TYPE: ICD-10-CM

## 2021-07-05 DIAGNOSIS — F11.20 UNCOMPLICATED OPIOID DEPENDENCE (H): ICD-10-CM

## 2021-07-05 DIAGNOSIS — E29.1 HYPOGONADISM MALE: ICD-10-CM

## 2021-07-05 DIAGNOSIS — F90.2 ATTENTION DEFICIT HYPERACTIVITY DISORDER (ADHD), COMBINED TYPE: ICD-10-CM

## 2021-07-05 RX ORDER — BUPRENORPHINE AND NALOXONE 8; 2 MG/1; MG/1
FILM, SOLUBLE BUCCAL; SUBLINGUAL
Qty: 48 FILM | Refills: 0 | Status: SHIPPED | OUTPATIENT
Start: 2021-07-05 | End: 2021-07-28

## 2021-07-05 NOTE — TELEPHONE ENCOUNTER
Received call from patient requesting refill(s) of buprenorphine HCl-naloxone HCl (SUBOXONE) 8-2 MG per film     Last dispensed from pharmacy on 05/12/21    Patient's last office/virtual visit by prescribing provider on 04/29/21  Next office/virtual appointment scheduled for 07/29/21    Last urine drug screen date 04/29/21  Current opioid agreement on file (completed within the last year) No Date of opioid agreement: None    E-prescribe to     [facility] - [address] [address]  Phone: [phone] Fax: [phone]    Will route to nursing pool for review and preparation of prescription(s).       Ofelia Laurent MA  [facility] [place]

## 2021-07-05 NOTE — TELEPHONE ENCOUNTER
zolpidem (AMBIEN) 10 MG tablet          Last Written Prescription Date:  6.8.21  Last Fill Quantity: 30 TABLET,  # refills: 0   Last office visit: 5/5/2021 with prescribing provider:  Luis Armando Segovia MD       Future Office Visit:   Next 5 appointments (look out 90 days)    Jul 29, 2021 10:00 AM  Return Visit with Macy Subramanian MD  [facility] [place] ([facility] [place] ) [address] [address] [address]  [phone]               Routing refill request to provider for review/approval because:  Drug not on the [facility], [facility] or [facility] refill protocol or controlled substance

## 2021-07-05 NOTE — TELEPHONE ENCOUNTER
Script Eprescribed to pharmacy  [place] Prescription Monitoring Program checked      Signed Prescriptions:                        Disp   Refills    buprenorphine HCl-naloxone HCl (SUBOXONE) *48 Film0        Si/2 film QID. Bridge to appointment. Ok to           dispense/start 21. Brand name only  Authorizing Provider: ABENA MEDELLIN MD

## 2021-07-05 NOTE — TELEPHONE ENCOUNTER
Jennifer is calling him to reschedule as he scheduled a virtual visit through [employer] (??? What????) which they are not supposed to be able to do.      I need to see him in person and do a UDS as he has not picked up his suboxone for almost a month now and I dont know what he is doing.      I will wait till this is rescheduled to make a bridge to get him only to that appointment.      Can you call him and ask why he has not picked up his suboxone since 5/12/2021?  Which was a one month supply?  Also ask what happened with the cervical MRI that was ordered.  It looks like he did not do it.     Thanks, Macy

## 2021-07-05 NOTE — TELEPHONE ENCOUNTER
Called pt. He states that he started to try to wean off suxboxone because he was having stomach issues and wanted to be off for the summer. He tried to half his dose periodically- he was [place]  and realizes he can not do this. He is out of medication, out as of this morning. Took one strip so far today. Advised would bridge to appt. Scheduled inperson appt 07/28/21.  He states that he has stomach problems with suboxone. In discussion with friend who is also on suboxone-they mentioned only being able to use brand name due to GI upset. He wonders about using brand vs generic to see if this helps.   He would [facility] [address], he is ok with picking up supply on generic is that is what pharmacy has in stock but would like ordered and to be able to try brand name. Advised will route for review but he may have insurance issue with brand vs generic. [facility] do not [facility] generic in stock but can order for future, call pt to notify    He said he was called by heart doctor (Dr KeitaEakkyk-[phone]) and advised that he should have an MRI of his heart. They wanted to combine the MRI of neck and MRI of heart as he wants done under sedation due to clausterphobia. This has not been able to be coordinated for him yet.      Suboxone 8-2, #48  1/2 film QID. Bridge to appointment. Ok to dispense/start 07/05/21. Brand name only      Trixie ASTORGA, RN Care Coordinator  [facility]  Pain Management

## 2021-07-05 NOTE — TELEPHONE ENCOUNTER
Routing to provider to review medication prepped per below- When do you want pt to follow up next?      Suboxone 8-2, #60, Refill:no  Last picked up 05/12/21   Due-overdue    Per last OV note 04/29/21- Botox

## 2021-07-05 NOTE — TELEPHONE ENCOUNTER
testosterone cypionate (DEPOTESTOSTERONE) 200 MG/ML injection 6 mL 0 5/11/2021  No   Sig: INJECT 1 ML (200 MG) INTO THE MUSCLE ONCE A WEEK   Sent to pharmacy as: Testosterone Cypionate 200 MG/ML Intramuscular Solution (DEPOTESTOSTERONE)       Last office visit: 5/5/2021 with prescribing provider:     Future Office Visit:   Next 5 appointments (look out 90 days)    Jul 29, 2021 10:00 AM  Return Visit with Macy Subrmaanian MD  [facility] [place] ([facility] [place] ) [address] [address] [address]  [phone]               Encounter-Level CSA - 11/16/2017:    Controlled Substance Agreement - Scan on 12/1/2017 10:43 AM: CONTROLLED SUBSTANCE AGREEMENT     Encounter-Level CSA - 08/07/2015:    Controlled Substance Agreement - Scan on 8/19/2015 11:27 AM: Controlled Substance Agreement 08/07/15     Patient-Level CSA:    There are no patient-level csa.           Routing refill request to provider for review/approval because:  Drug not on the [facility] refill protocol     Kari Mcleod RN, BSN  [facility]

## 2021-07-06 DIAGNOSIS — E29.1 HYPOGONADISM MALE: ICD-10-CM

## 2021-07-06 RX ORDER — TESTOSTERONE CYPIONATE 200 MG/ML
INJECTION, SOLUTION INTRAMUSCULAR
Qty: 6 ML | Refills: 0 | OUTPATIENT
Start: 2021-07-06

## 2021-07-06 RX ORDER — TESTOSTERONE CYPIONATE 200 MG/ML
INJECTION, SOLUTION INTRAMUSCULAR
Qty: 6 ML | Refills: 0 | Status: SHIPPED | OUTPATIENT
Start: 2021-07-06 | End: 2021-09-02

## 2021-07-06 NOTE — TELEPHONE ENCOUNTER
Routing refill request to provider for review/approval because:  Drug not on the [facility] refill protocol Flakito Randall RN, BSN

## 2021-07-07 RX ORDER — ZOLPIDEM TARTRATE 10 MG/1
10 TABLET ORAL
Qty: 30 TABLET | Refills: 0 | Status: SHIPPED | OUTPATIENT
Start: 2021-07-08 | End: 2021-08-10

## 2021-07-07 RX ORDER — ZOLPIDEM TARTRATE 10 MG/1
10 TABLET ORAL
Qty: 7 TABLET | Refills: 0 | OUTPATIENT
Start: 2021-07-07

## 2021-07-15 DIAGNOSIS — G47.00 INSOMNIA, UNSPECIFIED TYPE: ICD-10-CM

## 2021-07-16 RX ORDER — ZOLPIDEM TARTRATE 10 MG/1
10 TABLET ORAL
Qty: 30 TABLET | Refills: 0 | OUTPATIENT
Start: 2021-07-16

## 2021-07-16 NOTE — TELEPHONE ENCOUNTER
Recent prescription sent on 7/8/21. Rx refused, patient has requested too early.     Rola Sullivan RN  [facility]

## 2021-07-19 ENCOUNTER — MYC REFILL (OUTPATIENT)
Dept: FAMILY MEDICINE | Facility: CLINIC | Age: 51
End: 2021-07-19

## 2021-07-19 DIAGNOSIS — G47.00 INSOMNIA, UNSPECIFIED TYPE: ICD-10-CM

## 2021-07-27 ENCOUNTER — MYC REFILL (OUTPATIENT)
Dept: FAMILY MEDICINE | Facility: CLINIC | Age: 51
End: 2021-07-27

## 2021-07-27 DIAGNOSIS — G47.00 INSOMNIA, UNSPECIFIED TYPE: ICD-10-CM

## 2021-07-27 RX ORDER — AMITRIPTYLINE HYDROCHLORIDE 50 MG/1
50-150 TABLET ORAL
Qty: 90 TABLET | Refills: 0 | Status: SHIPPED | OUTPATIENT
Start: 2021-07-27 | End: 2021-08-23

## 2021-07-27 NOTE — TELEPHONE ENCOUNTER
Please see my chart message on refill     Review and advise     Thank you     Kari Mcleod RN, BSN  [facility]

## 2021-07-28 ENCOUNTER — OFFICE VISIT (OUTPATIENT)
Dept: PALLIATIVE MEDICINE | Facility: CLINIC | Age: 51
End: 2021-07-28
Payer: COMMERCIAL

## 2021-07-28 VITALS — DIASTOLIC BLOOD PRESSURE: 87 MMHG | SYSTOLIC BLOOD PRESSURE: 143 MMHG | OXYGEN SATURATION: 94 % | HEART RATE: 65 BPM

## 2021-07-28 DIAGNOSIS — G43.709 CHRONIC MIGRAINE WITHOUT AURA, NOT INTRACTABLE, WITHOUT STATUS MIGRAINOSUS: ICD-10-CM

## 2021-07-28 DIAGNOSIS — F11.20 UNCOMPLICATED OPIOID DEPENDENCE (H): Primary | ICD-10-CM

## 2021-07-28 DIAGNOSIS — F41.1 GENERALIZED ANXIETY DISORDER: ICD-10-CM

## 2021-07-28 DIAGNOSIS — Z79.899 ENCOUNTER FOR LONG-TERM (CURRENT) USE OF HIGH-RISK MEDICATION: ICD-10-CM

## 2021-07-28 DIAGNOSIS — G89.4 CHRONIC PAIN SYNDROME: ICD-10-CM

## 2021-07-28 PROCEDURE — 80306 DRUG TEST PRSMV INSTRMNT: CPT

## 2021-07-28 PROCEDURE — 64615 CHEMODENERV MUSC MIGRAINE: CPT | Performed by: ANESTHESIOLOGY

## 2021-07-28 PROCEDURE — 99215 OFFICE O/P EST HI 40 MIN: CPT | Mod: 25 | Performed by: ANESTHESIOLOGY

## 2021-07-28 RX ORDER — BUPRENORPHINE AND NALOXONE 8; 2 MG/1; MG/1
FILM, SOLUBLE BUCCAL; SUBLINGUAL
Qty: 60 FILM | Refills: 1 | Status: SHIPPED | OUTPATIENT
Start: 2021-07-28 | End: 2021-09-29

## 2021-07-28 ASSESSMENT — PAIN SCALES - GENERAL: PAINLEVEL: EXTREME PAIN (8)

## 2021-07-29 ENCOUNTER — LAB (OUTPATIENT)
Dept: LAB | Facility: CLINIC | Age: 51
End: 2021-07-29

## 2021-07-29 DIAGNOSIS — Z79.899 ENCOUNTER FOR LONG-TERM (CURRENT) USE OF HIGH-RISK MEDICATION: ICD-10-CM

## 2021-07-29 LAB
AMPHETAMINES UR QL: NOT DETECTED
BARBITURATES UR QL SCN: NOT DETECTED
BENZODIAZ UR QL SCN: DETECTED
BUPRENORPHINE UR QL: DETECTED
CANNABINOIDS UR QL: NOT DETECTED
COCAINE UR QL SCN: NOT DETECTED
D-METHAMPHET UR QL: NOT DETECTED
METHADONE UR QL SCN: NOT DETECTED
OPIATES UR QL SCN: NOT DETECTED
OXYCODONE UR QL SCN: NOT DETECTED
PCP UR QL SCN: NOT DETECTED
PROPOXYPH UR QL: NOT DETECTED
TRICYCLICS UR QL SCN: DETECTED

## 2021-07-29 RX ORDER — AMITRIPTYLINE HYDROCHLORIDE 50 MG/1
50-150 TABLET ORAL
Qty: 270 TABLET | Refills: 1 | Status: SHIPPED | OUTPATIENT
Start: 2021-07-29 | End: 2021-11-24

## 2021-08-10 DIAGNOSIS — F40.243 FLYING PHOBIA: ICD-10-CM

## 2021-08-10 RX ORDER — CLONAZEPAM 1 MG/1
1 TABLET ORAL 2 TIMES DAILY PRN
Qty: 4 TABLET | Refills: 0 | Status: SHIPPED | OUTPATIENT
Start: 2021-08-10 | End: 2022-02-10

## 2021-08-10 NOTE — TELEPHONE ENCOUNTER
clonazePAM (KLONOPIN) 1 MG tablet 4 tablet 0 5/6/2021  No   Sig - Route: Take 1 tablet (1 mg) by mouth 2 times daily as needed (flying phobia) - Oral   Sent to pharmacy as: clonazePAM 1 MG Oral Tablet (klonoPIN)       Last office visit: 5/5/2021 with prescribing provider:   Future Office Visit:   Next 5 appointments (look out 90 days)    Oct 27, 2021  1:00 PM  Return Visit with Macy Subramanian MD  [facility] [place] ([facility] [place] ) [address] [address] [address]  [phone]               Encounter-Level CSA - 11/16/2017:    Controlled Substance Agreement - Scan on 12/1/2017 10:43 AM: CONTROLLED SUBSTANCE AGREEMENT     Encounter-Level CSA - 08/07/2015:    Controlled Substance Agreement - Scan on 8/19/2015 11:27 AM: Controlled Substance Agreement 08/07/15     Patient-Level CSA:    There are no patient-level csa.           Routing refill request to provider for review/approval because:  Drug not on the [facility] refill protocol     Kari Mcleod RN, BSN  [facility]

## 2021-08-11 DIAGNOSIS — K21.9 GASTROESOPHAGEAL REFLUX DISEASE WITHOUT ESOPHAGITIS: ICD-10-CM

## 2021-08-16 NOTE — TELEPHONE ENCOUNTER
Called and spoke with patient.     Refill request for Augmentin was an error. Patient wants refill of omeprazole though. This was discontinued in December.     Routing refill request to provider for review/approval because:  Drug not active on patient's medication list      Rola Sullivan RN  [facility]

## 2021-08-17 RX ORDER — PANTOPRAZOLE SODIUM 40 MG/1
40 TABLET, DELAYED RELEASE ORAL DAILY
Qty: 90 TABLET | Refills: 1 | Status: SHIPPED | OUTPATIENT
Start: 2021-08-17 | End: 2022-02-20

## 2021-08-17 NOTE — TELEPHONE ENCOUNTER
Will switch to pantoprazole from omeprazole due to interaction of omeprazole and Plavix (omeprazole can lower the effectiveness of the Plavix)

## 2021-08-19 NOTE — TELEPHONE ENCOUNTER
Called and spoke with patient.     Informed him of med change. Telephone visit scheduled to renew his adderall prescription per patient request.     Rola Sullivan RN  [facility]

## 2021-08-23 ENCOUNTER — VIRTUAL VISIT (OUTPATIENT)
Dept: FAMILY MEDICINE | Facility: CLINIC | Age: 51
End: 2021-08-23
Payer: COMMERCIAL

## 2021-08-23 DIAGNOSIS — L65.9 ALOPECIA: ICD-10-CM

## 2021-08-23 DIAGNOSIS — H04.123 DRY EYES, BILATERAL: ICD-10-CM

## 2021-08-23 DIAGNOSIS — E66.01 MORBID OBESITY (H): ICD-10-CM

## 2021-08-23 DIAGNOSIS — F41.0 PANIC DISORDER WITHOUT AGORAPHOBIA: ICD-10-CM

## 2021-08-23 DIAGNOSIS — I10 HYPERTENSION GOAL BP (BLOOD PRESSURE) < 140/90: ICD-10-CM

## 2021-08-23 DIAGNOSIS — N18.30 STAGE 3 CHRONIC KIDNEY DISEASE, UNSPECIFIED WHETHER STAGE 3A OR 3B CKD (H): ICD-10-CM

## 2021-08-23 DIAGNOSIS — B07.8 FLAT WART: ICD-10-CM

## 2021-08-23 DIAGNOSIS — F33.1 MAJOR DEPRESSIVE DISORDER, RECURRENT EPISODE, MODERATE (H): ICD-10-CM

## 2021-08-23 DIAGNOSIS — F90.2 ATTENTION DEFICIT HYPERACTIVITY DISORDER (ADHD), COMBINED TYPE: ICD-10-CM

## 2021-08-23 DIAGNOSIS — F41.1 GENERALIZED ANXIETY DISORDER: ICD-10-CM

## 2021-08-23 PROCEDURE — 99442 PR PHYSICIAN TELEPHONE EVALUATION 11-20 MIN: CPT | Performed by: FAMILY MEDICINE

## 2021-08-23 RX ORDER — LOSARTAN POTASSIUM 100 MG/1
100 TABLET ORAL DAILY
Qty: 90 TABLET | Refills: 1 | Status: SHIPPED | OUTPATIENT
Start: 2021-08-23 | End: 2022-06-23

## 2021-08-23 RX ORDER — DEXTROAMPHETAMINE SACCHARATE, AMPHETAMINE ASPARTATE, DEXTROAMPHETAMINE SULFATE AND AMPHETAMINE SULFATE 5; 5; 5; 5 MG/1; MG/1; MG/1; MG/1
20 TABLET ORAL 2 TIMES DAILY
Qty: 90 TABLET | Refills: 0 | Status: SHIPPED | OUTPATIENT
Start: 2021-08-23 | End: 2022-01-24

## 2021-08-23 RX ORDER — LORAZEPAM 1 MG/1
1 TABLET ORAL 2 TIMES DAILY PRN
Qty: 60 TABLET | Refills: 1 | Status: SHIPPED | OUTPATIENT
Start: 2021-08-23 | End: 2021-11-10

## 2021-08-23 RX ORDER — SYRINGE WITH NEEDLE, 1 ML 25GX5/8"
SYRINGE, EMPTY DISPOSABLE MISCELLANEOUS
COMMUNITY
Start: 2021-06-14 | End: 2023-09-08

## 2021-08-23 RX ORDER — BUPROPION HYDROCHLORIDE 300 MG/1
300 TABLET ORAL EVERY MORNING
Qty: 90 TABLET | Refills: 1 | Status: SHIPPED | OUTPATIENT
Start: 2021-08-23 | End: 2022-06-23

## 2021-08-23 RX ORDER — FINASTERIDE 5 MG/1
TABLET, FILM COATED ORAL
Qty: 90 TABLET | Refills: 1 | Status: SHIPPED | OUTPATIENT
Start: 2021-08-23 | End: 2022-06-23

## 2021-08-23 RX ORDER — CLONIDINE HYDROCHLORIDE 0.1 MG/1
0.1 TABLET ORAL 2 TIMES DAILY
Qty: 180 TABLET | Refills: 1 | Status: SHIPPED | OUTPATIENT
Start: 2021-08-23 | End: 2021-08-23

## 2021-08-23 RX ORDER — CYCLOSPORINE 0.5 MG/ML
1 EMULSION OPHTHALMIC 2 TIMES DAILY
Qty: 1.5 ML | Refills: 4 | Status: SHIPPED | OUTPATIENT
Start: 2021-08-23 | End: 2022-02-08

## 2021-08-23 RX ORDER — METOPROLOL TARTRATE 100 MG
TABLET ORAL
Qty: 180 TABLET | Refills: 1 | Status: SHIPPED | OUTPATIENT
Start: 2021-08-23 | End: 2022-06-23

## 2021-08-23 RX ORDER — IMIQUIMOD 12.5 MG/.25G
CREAM TOPICAL AT BEDTIME
Qty: 48 PACKET | Refills: 5 | Status: SHIPPED | OUTPATIENT
Start: 2021-08-23 | End: 2021-10-19

## 2021-08-23 NOTE — PROGRESS NOTES
Jeremi is a 51 year old who is being evaluated via a billable telephone visit.  Would like adderall prescription renewal and would like to discuss imiquimod. Patient states that he has been taking his blood pressure and it is controlled.      What phone number would you like to be contacted at? [phone]  How would you like to obtain your AVS? MyChart    Assessment & Plan   Morbid obesity (H)  As active as possible.    Flat wart  Recurrence on hands forearms and feet and would like refill:  - imiquimod (ALDARA) 5 % external cream  Dispense: 48 packet; Refill: 5    Stage 3 chronic kidney disease, unspecified whether stage 3a or 3b CKD  Stable - continue medication.  - losartan (COZAAR) 100 MG tablet  Dispense: 90 tablet; Refill: 1    Hypertension goal BP (blood pressure) < 140/90  Controlled but due for recheck in the clinic soon  - losartan (COZAAR) 100 MG tablet  Dispense: 90 tablet; Refill: 1  stop by patient  - metoprolol tartrate (LOPRESSOR) 100 MG tablet  Dispense: 180 tablet; Refill: 1    Major Depressive Disorder, Recurrent Episode, Mode  / Generalized anxiety disorder  Ongoing symptoms and would like to continue meds:  - buPROPion (WELLBUTRIN XL) 300 MG 24 hr tablet  Dispense: 90 tablet; Refill: 1  - FLUoxetine (PROZAC) 20 MG capsule  Dispense: 90 capsule; Refill: 1  - LORazepam (ATIVAN) 1 MG tablet  Dispense: 60 tablet; Refill: 1    Alopecia  Ongoing and would like to continue them  - finasteride (PROSCAR) 5 MG tablet-half tab daily-dispense: 90 tablet; Refill: 1    Attention deficit hyperactivity disorder (ADHD), combined type  Prior diagnosis and has been off meds.  Would like to restart to help with work that he is doing.  - amphetamine-dextroamphetamine (ADDERALL) 20 MG tablet  Dispense: 90 tablet; Refill: 0    Dry eyes, bilateral  Persistent history, possibility of medication side effect did discuss certain meds including amitriptyline, clonidine (he reports he already stopped this) and would like  to see if Restasis is covered (previously sent in 2016 and was not covered)  - cycloSPORINE (RESTASIS) 0.05 % ophthalmic emulsion  Dispense: 1.5 mL; Refill: 4      BMI:   Estimated body mass index is 34.45 kg/m  as calculated from the following:    Height as of 5/5/21: 1.829 m (6').    Weight as of 5/5/21: 115.2 kg (254 lb).   Weight management plan: Discussed healthy diet and exercise guidelines      Return in about 3 months (around 11/23/2021) for medication recheck, in person, with Dr Nate Segovia.      Nate Segovia MD      [address] [address]  [employer]   Office: [phone]       Justin Blood is a 51 year old who presents for the following health issues     HPI     Depression and Anxiety Follow-Up    How are you doing with your depression since your last visit? No change    How are you doing with your anxiety since your last visit?  No change    Are you having other symptoms that might be associated with depression or anxiety? No    Have you had a significant life event? Relationship Concerns     Do you have any concerns with your use of alcohol or other drugs? No    Social History     Tobacco Use     Smoking status: Never Smoker     Smokeless tobacco: Never Used   Substance Use Topics     Alcohol use: No     Comment: rarely      Drug use: No     PHQ 3/20/2020 8/24/2020 12/3/2020   PHQ-9 Total Score 14 18 22   Q9: Thoughts of better off dead/self-harm past 2 weeks Not at all Several days Several days   F/U: Thoughts of suicide or self-harm - - -   F/U: Safety concerns - - -     NANCY-7 SCORE 8/29/2019 8/24/2020 12/3/2020   Total Score - - -   Total Score - - -   Total Score 18 13 14     Suicide Assessment Five-step Evaluation and Treatment (SAFE-T)      How many servings of fruits and vegetables do you eat daily?  2-3    On average, how many sweetened beverages do you drink each day (Examples: soda, juice, sweet tea, etc.  Do NOT count diet or  artificially sweetened beverages)?   0    How many days per week do you exercise enough to make your heart beat faster? 4    How many minutes a day do you exercise enough to make your heart beat faster? 10 - 19    How many days per week do you miss taking your medication? 0    Medication Followup of ADHD/ Adderall    Taking Medication as prescribed: ran out and would like to restart it    Side Effects:  None    Medication Helping Symptoms:  yes     Flat warts  On his hands - Vinay has helped and refilled. Needs more.    Dry eyes - possibly he his medications are causeing    Review of Systems   Constitutional, HEENT, cardiovascular, pulmonary, GI, , musculoskeletal, neuro, skin, endocrine and psych systems are negative, except as otherwise noted.      Objective         Vitals:  No vitals were obtained today due to virtual visit.    Physical Exam   healthy, alert and no distress  PSYCH: Alert and oriented times 3; coherent speech, normal   rate and volume, able to articulate logical thoughts, able   to abstract reason, no tangential thoughts, no hallucinations   or delusions  His affect is normal  RESP: No cough, no audible wheezing, able to talk in full sentences  Remainder of exam unable to be completed due to telephone visits          Phone call duration: 16 minutes

## 2021-08-31 DIAGNOSIS — E29.1 HYPOGONADISM MALE: ICD-10-CM

## 2021-09-01 NOTE — TELEPHONE ENCOUNTER
testosterone cypionate (DEPOTESTOSTERONE) 200 MG/ML injection 6 mL 0 7/6/2021  No   Sig: INJECT 1 ML (200 MG) INTO THE MUSCLE ONCE A WEEK   Sent to pharmacy as: Testosterone Cypionate 200 MG/ML Intramuscular Solution (DEPOTESTOSTERONE)   Class: E-Prescribe   Notes to Pharmacy: Not to exceed 5 additional fills before 11/07/2021       Last office visit: 5/5/2021 with prescribing provider:     Future Office Visit:   Next 5 appointments (look out 90 days)    Oct 27, 2021  1:00 PM  Return Visit with Macy Subramanian MD  [facility] [place] ([facility] [place] ) [address] [address] [id number]  [phone]               Encounter-Level CSA - 11/16/2017:    Controlled Substance Agreement - Scan on 12/1/2017 10:43 AM: CONTROLLED SUBSTANCE AGREEMENT     Encounter-Level CSA - 08/07/2015:    Controlled Substance Agreement - Scan on 8/19/2015 11:27 AM: Controlled Substance Agreement 08/07/15     Patient-Level CSA:    There are no patient-level csa.           Routing refill request to provider for review/approval because:  Drug not on the [facility] refill protocol     Kari Mcleod RN, BSN  [facility]

## 2021-09-02 RX ORDER — TESTOSTERONE CYPIONATE 200 MG/ML
INJECTION, SOLUTION INTRAMUSCULAR
Qty: 6 ML | Refills: 0 | Status: SHIPPED | OUTPATIENT
Start: 2021-09-02 | End: 2021-11-03

## 2021-09-11 DIAGNOSIS — J45.30 MILD PERSISTENT ASTHMA WITHOUT COMPLICATION: ICD-10-CM

## 2021-09-11 DIAGNOSIS — G47.00 INSOMNIA, UNSPECIFIED TYPE: ICD-10-CM

## 2021-09-13 ENCOUNTER — MYC REFILL (OUTPATIENT)
Dept: FAMILY MEDICINE | Facility: CLINIC | Age: 51
End: 2021-09-13

## 2021-09-13 DIAGNOSIS — E29.1 HYPOGONADISM MALE: ICD-10-CM

## 2021-09-13 NOTE — TELEPHONE ENCOUNTER
Failed protocol.  please route to  team if patient needs an appointment     Bernarda VACARN BSN  [facility]  [phone]

## 2021-09-14 RX ORDER — ALBUTEROL SULFATE 90 UG/1
2 AEROSOL, METERED RESPIRATORY (INHALATION) EVERY 6 HOURS
Qty: 18 G | Refills: 5 | Status: SHIPPED | OUTPATIENT
Start: 2021-09-14 | End: 2022-06-23

## 2021-09-14 RX ORDER — ZOLPIDEM TARTRATE 10 MG/1
10 TABLET ORAL
Qty: 30 TABLET | Refills: 0 | Status: SHIPPED | OUTPATIENT
Start: 2021-09-14 | End: 2021-10-11

## 2021-09-14 RX ORDER — TESTOSTERONE CYPIONATE 200 MG/ML
INJECTION, SOLUTION INTRAMUSCULAR
Qty: 6 ML | Refills: 0 | OUTPATIENT
Start: 2021-09-14

## 2021-09-18 ENCOUNTER — HEALTH MAINTENANCE LETTER (OUTPATIENT)
Age: 51
End: 2021-09-18

## 2021-09-29 ENCOUNTER — VIRTUAL VISIT (OUTPATIENT)
Dept: PALLIATIVE MEDICINE | Facility: CLINIC | Age: 51
End: 2021-09-29
Payer: COMMERCIAL

## 2021-09-29 DIAGNOSIS — Z79.899 ENCOUNTER FOR LONG-TERM (CURRENT) USE OF HIGH-RISK MEDICATION: ICD-10-CM

## 2021-09-29 DIAGNOSIS — G43.709 CHRONIC MIGRAINE WITHOUT AURA, NOT INTRACTABLE, WITHOUT STATUS MIGRAINOSUS: ICD-10-CM

## 2021-09-29 DIAGNOSIS — F11.20 UNCOMPLICATED OPIOID DEPENDENCE (H): Primary | ICD-10-CM

## 2021-09-29 DIAGNOSIS — G89.4 CHRONIC PAIN SYNDROME: ICD-10-CM

## 2021-09-29 DIAGNOSIS — F41.1 GENERALIZED ANXIETY DISORDER: ICD-10-CM

## 2021-09-29 PROCEDURE — 99214 OFFICE O/P EST MOD 30 MIN: CPT | Mod: 95 | Performed by: ANESTHESIOLOGY

## 2021-09-29 RX ORDER — BUPRENORPHINE AND NALOXONE 8; 2 MG/1; MG/1
FILM, SOLUBLE BUCCAL; SUBLINGUAL
Qty: 60 FILM | Refills: 1 | Status: SHIPPED | OUTPATIENT
Start: 2021-09-29 | End: 2021-10-27

## 2021-09-29 ASSESSMENT — PAIN SCALES - GENERAL: PAINLEVEL: SEVERE PAIN (6)
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
## 2021-10-11 DIAGNOSIS — M54.2 NECK PAIN: ICD-10-CM

## 2021-10-11 RX ORDER — CYCLOBENZAPRINE HCL 10 MG
10 TABLET ORAL 3 TIMES DAILY PRN
Qty: 90 TABLET | Refills: 3 | Status: SHIPPED | OUTPATIENT
Start: 2021-10-11 | End: 2022-02-09

## 2021-10-11 NOTE — TELEPHONE ENCOUNTER
Date of Last Office Visit: 9/29/21  Date of Next Office Visit: 10/27/21  No shows since last visit: none  Cancellations since last visit: none    Medication requested: cyclobenzaprine 10mg Date last ordered: 2/19/21 Qty: 90 Refills: 3     Lapse in medication adherence greater than 5 days?: yes  If yes, call patient and gather details: PRN med  Medication refill request verified as identical to current order?: yes  Result of Last DAM, VPA, Li+ Level, CBC, or Carbamazepine Level (at or since last visit): N/A    Last visit treatment plan:   PLAN:                                                       1. Uncomplicated opioid dependence (H)  COUNSELING done today:  Patient is in denial of any addiction to opioids and does not do any type of recovery program.        Reinterated the importance of having a recovery program in addition to Suboxone maintenance.  This can include having a sober support network, not isolating, being open, honest, and willing to change, avoiding triggers and managing cravings.      In addition, he should abstain from alcohol, benzodiazepines, THC, opioids and other drugs of abuse. Risk of overdose following a period of abstinence due to decrease tolerance was discussed including risk of death. Risk of overdose if using Suboxone with other substances particuarly benzodiazepines/alcohol was reviewed.     - Continue buprenorphine HCl-naloxone HCl (SUBOXONE) 8-2 MG per film; 1/2 film QID      2. Chronic pain syndrome  Explained that I do not prescribe chronic opioids for chronic pain secondary to tolerance and OIH.  I do occasionally prescribe oxycodone or other traditional opioids for acute pain for a max of 3-7 days but not long term.  I cannot continue dr. Carreon's plan of care in giving him oxycodone in addition to his suboxone.  I did offer to increase his suboxone which he does not wish to do.  He is in agreement with this plan.      Cervical MRI ordered.  Referral to neurosurgery was offered,  will wait until we see MRI results.  He needs to have his MRI done with general anesthesia so it has been difficult to get it done.      3. Generalized anxiety disorder  Continue management by PCP     4. Chronic migraine without aura, not intractable, without status migrainosus  Continue Botox injections every 3 months     5. Encounter for long-term (current) use of high-risk medication  High Risk Drug Monitoring?  YES  Drug being monitored: Suboxone   Reason for drug: Opioid Use Disorder  What is being monitored?: Dosage, Cravings, Trigger, side effects, and abstinence.         []Medication refilled per  Medication Refill in Ambulatory Care  policy.  [x]Medication unable to be refilled by RN due to criteria not met as indicated below:    []Eligibility - not seen in the last year   []Supervision - no future appointment   []Compliance - no shows, cancellations or lapse in therapy   []Verification - order discrepancy   []Controlled medication   [x]Medication not included in policy   []90-day supply request   []Other

## 2021-10-16 ENCOUNTER — NURSE TRIAGE (OUTPATIENT)
Dept: NURSING | Facility: CLINIC | Age: 51
End: 2021-10-16

## 2021-10-16 NOTE — TELEPHONE ENCOUNTER
Left hip pain    Has been dealing with Bursitis for a month on Monday    Heat/ice, Ibuprofen and Tylenol are not really helping    The pain is not radiating but there is a constant pain in the glute    The pain seems to be getting worse    Now he states that he cannot walk without a lot of pain    No fever    The glute and up the iliac crest is swollen. He also states that it is very tender to the touch.     Denies any redness    Did recommend that he be seen in [facility] today but the patient also would like a message sent to his primary as he would like to see him ASAP for an injection into the hip.    Please advise.    Asmita Worthington RN  [place] Nurse Advisor  10:32 AM  10/16/2021      COVID 19 Nurse Triage Plan/Patient Instructions    Please be aware that novel coronavirus (COVID-19) may be circulating in the community. If you develop symptoms such as fever, cough, or SOB or if you have concerns about the presence of another infection including coronavirus (COVID-19), please contact your health care provider or visit [email or web address].     Disposition/Instructions    In-Person Visit with provider recommended. Reference Visit Selection Guide.    Thank you for taking steps to prevent the spread of this virus.  o Limit your contact with others.  o Wear a simple mask to cover your cough.  o Wash your hands well and often.    Resources    [facility]: About COVID-19: [email or web address]    CDC: What to Do If You're Sick: www.cdc.gov/coronavirus/2019-ncov/about/steps-when-sick.html    CDC: Ending Home Isolation: www.cdc.gov/coronavirus/2019-ncov/hcp/disposition-in-home-patients.html     CDC: Caring for Someone: www.cdc.gov/coronavirus/2019-ncov/if-you-are-sick/care-for-someone.html     [facility]: Interim Guidance for Hospital Discharge to Home: www.health.[facility].[place]/diseases/coronavirus/hcp/hospdischarge.pdf    [facility] clinical trials (COVID-19 research studies):  clinicalaffairs.[facility]-clinical-trials     Below are the COVID-19 hotlines at the [place] Department of Health ([facility]). Interpreters are available.   o For health questions: Call 076-661-0003 or 1-556.991.5667 (7 a.m. to 7 p.m.)  o For questions about schools and childcare: Call 646-581-8559 or 1-954.296.6521 (7 a.m. to 7 p.m.)                           Reason for Disposition    [1] SEVERE pain (e.g., excruciating, unable to do any normal activities) AND [2] not improved after 2 hours of pain medicine    Additional Information    Negative: [1] SEVERE pain (e.g., excruciating, unable to do any normal activities) AND [2] fever    Negative: [1] Red area or streak AND [2] fever    Negative: Patient sounds very sick or weak to the triager    Negative: Can't stand (bear weight) or walk    Protocols used: HIP PAIN-A-AH

## 2021-10-18 NOTE — TELEPHONE ENCOUNTER
Pt calling     stating he did not go to the  because they will not do anything he just wants to get a hip injection with Dr. Segovia.       Rn helped pt schedule an appointment     Kari Mcleod RN, BSN  [facility]

## 2021-10-18 NOTE — TELEPHONE ENCOUNTER
Attempt # 1    Mailbox is full, unable to leave message    Called # [phone]     Rubi MOYER RN, BSN  [facility]

## 2021-10-19 ENCOUNTER — ANCILLARY PROCEDURE (OUTPATIENT)
Dept: GENERAL RADIOLOGY | Facility: CLINIC | Age: 51
End: 2021-10-19
Attending: FAMILY MEDICINE
Payer: COMMERCIAL

## 2021-10-19 ENCOUNTER — OFFICE VISIT (OUTPATIENT)
Dept: FAMILY MEDICINE | Facility: CLINIC | Age: 51
End: 2021-10-19
Payer: COMMERCIAL

## 2021-10-19 VITALS
SYSTOLIC BLOOD PRESSURE: 152 MMHG | TEMPERATURE: 97.9 F | HEIGHT: 72 IN | HEART RATE: 78 BPM | DIASTOLIC BLOOD PRESSURE: 92 MMHG | RESPIRATION RATE: 16 BRPM | WEIGHT: 256 LBS | OXYGEN SATURATION: 95 % | BODY MASS INDEX: 34.67 KG/M2

## 2021-10-19 DIAGNOSIS — M25.552 BILATERAL HIP PAIN: ICD-10-CM

## 2021-10-19 DIAGNOSIS — M70.61 TROCHANTERIC BURSITIS OF BOTH HIPS: Primary | ICD-10-CM

## 2021-10-19 DIAGNOSIS — M70.62 TROCHANTERIC BURSITIS OF BOTH HIPS: Primary | ICD-10-CM

## 2021-10-19 DIAGNOSIS — M25.551 BILATERAL HIP PAIN: ICD-10-CM

## 2021-10-19 DIAGNOSIS — L30.9 DERMATITIS: ICD-10-CM

## 2021-10-19 PROCEDURE — 99213 OFFICE O/P EST LOW 20 MIN: CPT | Mod: 25 | Performed by: FAMILY MEDICINE

## 2021-10-19 PROCEDURE — 73502 X-RAY EXAM HIP UNI 2-3 VIEWS: CPT | Mod: FY | Performed by: RADIOLOGY

## 2021-10-19 PROCEDURE — 20610 DRAIN/INJ JOINT/BURSA W/O US: CPT | Mod: 50 | Performed by: FAMILY MEDICINE

## 2021-10-19 RX ORDER — BETAMETHASONE DIPROPIONATE 0.5 MG/G
CREAM TOPICAL 2 TIMES DAILY
Qty: 45 G | Refills: 1 | Status: SHIPPED | OUTPATIENT
Start: 2021-10-19 | End: 2022-01-14

## 2021-10-19 RX ORDER — TRIAMCINOLONE ACETONIDE 40 MG/ML
40 INJECTION, SUSPENSION INTRA-ARTICULAR; INTRAMUSCULAR ONCE
Status: COMPLETED | OUTPATIENT
Start: 2021-10-19 | End: 2021-10-19

## 2021-10-19 RX ADMIN — TRIAMCINOLONE ACETONIDE 20 MG: 40 INJECTION, SUSPENSION INTRA-ARTICULAR; INTRAMUSCULAR at 17:00

## 2021-10-19 RX ADMIN — TRIAMCINOLONE ACETONIDE 20 MG: 40 INJECTION, SUSPENSION INTRA-ARTICULAR; INTRAMUSCULAR at 18:45

## 2021-10-19 ASSESSMENT — MIFFLIN-ST. JEOR: SCORE: 2044.21

## 2021-10-19 NOTE — PROGRESS NOTES
Assessment & Plan   Trochanteric bursitis of both hips  Bilateral hip pain  Recurrent pain and patient desires steroid injection as not improving with conservative treatment  - XR Hip Left 2-3 Views  - XR Hip Right 2-3 Views  - DRAIN/INJECT LARGE JOINT/BURSA    Dermatitis  Recurrence of rash  - betamethasone dipropionate (DIPROSONE) 0.05 % external cream  Dispense: 45 g; Refill: 1        Return in about 1 month (around 11/19/2021) for symptoms failing to improve or sooner if worsening.      Nate Segovia MD      [address] [address]  [place]"   Office: [phone]           Justin Blood is a 51 year old who presents for the following health issues     HPI     Hip Pain    Onset: 1 month(s) ago - has been dealing with bursitis    Description:   Bilateral hips  Character: intense pain        Does it hurt to bear weight: YES        Does it cause you to limp: YES    Intensity:severe    History:  Any injury to hip: no  Any history of back problems: YES  Previous imaging studies: no    Accompanying Signs & Symptoms:   Fever: no                 Any weakness of leg: no                 Numbness or tingling of leg: no                 Is the pain worse with movement of the back or hip: no- makes it worse sitting in one position                 Any repetitive activity: YES- walking  Any swelling or tenderness: The glute and up the iliac crest is swollen                 Does activity worsen symptoms: YES- aggravates                 Does rest help: no                 Any other ongoing joint pain: no    Therapies tried and outcome: heat, ibuprofen- does help, ice and Tylenol with no relief    Left low back pain with radiation to the left foot at times.      Review of Systems         Objective    BP (!) 152/92   Pulse 78   Temp 97.9  F (36.6  C)   Resp 16   Ht 1.829 m (6')   Wt 116.1 kg (256 lb)   SpO2 95%   BMI 34.72 kg/m    Body mass index is 34.72  kg/m .  Physical Exam   GENERAL: healthy, alert and no distress  NECK: no adenopathy, no asymmetry, masses, or scars and thyroid normal to palpation  RESP: lungs clear to auscultation - no rales, rhonchi or wheezes  CV: regular rate and rhythm, normal S1 S2, no S3 or S4, no murmur, click or rub, no peripheral edema and peripheral pulses strong  ABDOMEN: soft, nontender, no hepatosplenomegaly, no masses and bowel sounds normal  MS: Bilateral hips tender over the trochanteric bursa's and increased pain with stretching of this area.  Otherwise fair internal and external rotation of both hips.  No gross musculoskeletal defects noted, no edema  SKIN: no suspicious lesions or rashes  NEURO: Normal strength and tone, mentation intact and speech normal  BACK: no CVA tenderness, no paralumbar tenderness    Xray of bilateral hips- Reviewed and interpreted by me.  No significant DJD with mild hypertrophic changes bilaterally.      After consent was obtained, using sterile technique the right lateral hip was prepped and 2.5cc's of 1% plain Lidocaine and 20 mg triamcinolone was then injected and the needle withdrawn.  And the procedure was repeated on the left side.  The procedure was well tolerated.  The patient is asked to continue to rest the leg for a few more days before resuming regular activities.  It may be more painful for the first 1-2 days.  Watch for fever, or increased swelling or persistent pain in knee. Call or return to clinic prn if such symptoms occur or the pain fails to improve as anticipated.

## 2021-10-22 ASSESSMENT — ANXIETY QUESTIONNAIRES
5. BEING SO RESTLESS THAT IT IS HARD TO SIT STILL: NOT AT ALL
6. BECOMING EASILY ANNOYED OR IRRITABLE: NOT AT ALL
GAD7 TOTAL SCORE: 6
3. WORRYING TOO MUCH ABOUT DIFFERENT THINGS: SEVERAL DAYS
1. FEELING NERVOUS, ANXIOUS, OR ON EDGE: MORE THAN HALF THE DAYS
7. FEELING AFRAID AS IF SOMETHING AWFUL MIGHT HAPPEN: NOT AT ALL
2. NOT BEING ABLE TO STOP OR CONTROL WORRYING: MORE THAN HALF THE DAYS
IF YOU CHECKED OFF ANY PROBLEMS ON THIS QUESTIONNAIRE, HOW DIFFICULT HAVE THESE PROBLEMS MADE IT FOR YOU TO DO YOUR WORK, TAKE CARE OF THINGS AT HOME, OR GET ALONG WITH OTHER PEOPLE: SOMEWHAT DIFFICULT

## 2021-10-22 ASSESSMENT — PATIENT HEALTH QUESTIONNAIRE - PHQ9
5. POOR APPETITE OR OVEREATING: SEVERAL DAYS
SUM OF ALL RESPONSES TO PHQ QUESTIONS 1-9: 14

## 2021-10-23 ASSESSMENT — ANXIETY QUESTIONNAIRES: GAD7 TOTAL SCORE: 6

## 2021-10-23 ASSESSMENT — ASTHMA QUESTIONNAIRES: ACT_TOTALSCORE: 15

## 2021-10-23 NOTE — TELEPHONE ENCOUNTER
Put through for 2/7/17.  Amelia Flores RN     GENERAL SURGERY PROGRESS NOTE    Events of past 24 hours:   Patient seen bedside for post-operative check.  Now POD 1 s/p lap david.  +void, +OOB, pain controlled, -BM, -Flatus, tolerating sips of water.  No nausea, no emesis.    Vitals:   T(F): 98 (10-23-21 @ 02:00), Max: 98.6 (10-22-21 @ 03:36)  HR: 104 (10-23-21 @ 02:00)  BP: 103/67 (10-23-21 @ 02:00)  RR: 18 (10-23-21 @ 02:00)  SpO2: 97% (10-23-21 @ 02:00)      Diet, Regular:   Low Fat (LOWFAT)      Fluids: lactated ringers.: Solution, 1000 milliLiter(s) infuse at 100 mL/Hr, Stop After 10 Hours      I & O's:    PHYSICAL EXAM:  General Appearance: NAD  Heart: RR  Lungs: No increased work of breathing or accessory muscle use. Symmetric chest wall rise and fall.   Abdomen:  S/AT/ND  MSK/Extremities: Warm & well-perfused.   Skin: Warm, dry. No jaundice.   Incision/wound: healing well, dressings in place, clean, dry and intact    MEDICATIONS  (STANDING):  acetaminophen     Tablet .. 650 milliGRAM(s) Oral every 6 hours  enoxaparin Injectable 40 milliGRAM(s) SubCutaneous daily  lactated ringers. 1000 milliLiter(s) (100 mL/Hr) IV Continuous <Continuous>  pantoprazole    Tablet 40 milliGRAM(s) Oral before breakfast    MEDICATIONS  (PRN):  ibuprofen  Tablet. 400 milliGRAM(s) Oral every 8 hours PRN Mild Pain (1 - 3)  oxyCODONE    IR 5 milliGRAM(s) Oral every 6 hours PRN Moderate Pain (4 - 6)      DVT PROPHYLAXIS: enoxaparin Injectable 40 milliGRAM(s) SubCutaneous daily    GI PROPHYLAXIS: pantoprazole    Tablet 40 milliGRAM(s) Oral before breakfast    ANTICOAGULATION:   ANTIBIOTICS:            LAB/STUDIES:  Labs:  CAPILLARY BLOOD GLUCOSE                              13.9   8.12  )-----------( 208      ( 22 Oct 2021 04:30 )             41.5       Auto Neutrophil %: 73.1 % (10-22-21 @ 04:30)  Auto Immature Granulocyte %: 0.1 % (10-22-21 @ 04:30)    10-22    139  |  101  |  3<L>  ----------------------------<  71  4.0   |  21  |  0.5<L>      Calcium, Total Serum: 9.1 mg/dL (10-22-21 @ 04:30)      LFTs:             x    | x    | x        ------------------[x       ( 22 Oct 2021 10:31 )  x    | x    | x           Lipase:19     Amylase:x             Coags:     11.80  ----< 1.03    ( 22 Oct 2021 04:30 )     41.1

## 2021-10-27 ENCOUNTER — OFFICE VISIT (OUTPATIENT)
Dept: PALLIATIVE MEDICINE | Facility: CLINIC | Age: 51
End: 2021-10-27
Payer: COMMERCIAL

## 2021-10-27 VITALS — OXYGEN SATURATION: 94 % | SYSTOLIC BLOOD PRESSURE: 152 MMHG | DIASTOLIC BLOOD PRESSURE: 98 MMHG | HEART RATE: 77 BPM

## 2021-10-27 DIAGNOSIS — G43.709 CHRONIC MIGRAINE WITHOUT AURA, NOT INTRACTABLE, WITHOUT STATUS MIGRAINOSUS: Primary | ICD-10-CM

## 2021-10-27 DIAGNOSIS — Z79.899 ENCOUNTER FOR LONG-TERM (CURRENT) USE OF HIGH-RISK MEDICATION: ICD-10-CM

## 2021-10-27 DIAGNOSIS — F11.20 UNCOMPLICATED OPIOID DEPENDENCE (H): ICD-10-CM

## 2021-10-27 PROCEDURE — 80306 DRUG TEST PRSMV INSTRMNT: CPT

## 2021-10-27 PROCEDURE — 64615 CHEMODENERV MUSC MIGRAINE: CPT | Performed by: ANESTHESIOLOGY

## 2021-10-27 RX ORDER — BUPRENORPHINE AND NALOXONE 8; 2 MG/1; MG/1
FILM, SOLUBLE BUCCAL; SUBLINGUAL
Qty: 60 FILM | Refills: 2 | Status: SHIPPED | OUTPATIENT
Start: 2021-10-27 | End: 2022-03-16

## 2021-10-27 ASSESSMENT — PAIN SCALES - GENERAL: PAINLEVEL: SEVERE PAIN (6)
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
## 2021-10-27 NOTE — NURSING NOTE
Obtained urine specimen.  Specimen sent to the lab.      Mami Sage [facility] [place]

## 2021-10-28 ENCOUNTER — LAB (OUTPATIENT)
Dept: LAB | Facility: CLINIC | Age: 51
End: 2021-10-28
Payer: COMMERCIAL

## 2021-10-28 DIAGNOSIS — Z79.899 ENCOUNTER FOR LONG-TERM (CURRENT) USE OF HIGH-RISK MEDICATION: ICD-10-CM

## 2021-10-28 LAB
AMPHETAMINES UR QL: DETECTED
BARBITURATES UR QL SCN: NOT DETECTED
BENZODIAZ UR QL SCN: DETECTED
BUPRENORPHINE UR QL: DETECTED
CANNABINOIDS UR QL: NOT DETECTED
COCAINE UR QL SCN: NOT DETECTED
D-METHAMPHET UR QL: NOT DETECTED
METHADONE UR QL SCN: NOT DETECTED
OPIATES UR QL SCN: NOT DETECTED
OXYCODONE UR QL SCN: NOT DETECTED
PCP UR QL SCN: NOT DETECTED
PROPOXYPH UR QL: NOT DETECTED
TRICYCLICS UR QL SCN: DETECTED

## 2021-10-29 DIAGNOSIS — B07.8 FLAT WART: ICD-10-CM

## 2021-10-29 DIAGNOSIS — E29.1 HYPOGONADISM MALE: ICD-10-CM

## 2021-10-29 DIAGNOSIS — R07.89 OTHER CHEST PAIN: ICD-10-CM

## 2021-11-02 RX ORDER — IMIQUIMOD 12.5 MG/.25G
CREAM TOPICAL AT BEDTIME
Qty: 24 PACKET | Refills: 0 | OUTPATIENT
Start: 2021-11-02

## 2021-11-02 NOTE — TELEPHONE ENCOUNTER
Routing refill request to provider for review/approval because:  Drug not on the [facility] refill protocol   Labs out of range:    BP Readings from Last 3 Encounters:   10/27/21 (!) 152/98   10/19/21 (!) 152/92   07/28/21 (!) 143/87     Flakito Randall RN, BSN

## 2021-11-03 RX ORDER — NITROGLYCERIN 0.4 MG/1
TABLET SUBLINGUAL
Qty: 25 TABLET | Refills: 0 | Status: SHIPPED | OUTPATIENT
Start: 2021-11-03 | End: 2022-04-16

## 2021-11-03 RX ORDER — TESTOSTERONE CYPIONATE 200 MG/ML
INJECTION, SOLUTION INTRAMUSCULAR
Qty: 6 ML | Refills: 0 | Status: SHIPPED | OUTPATIENT
Start: 2021-11-03 | End: 2021-12-30

## 2021-11-05 RX ORDER — TRIAMCINOLONE ACETONIDE 40 MG/ML
20 INJECTION, SUSPENSION INTRA-ARTICULAR; INTRAMUSCULAR ONCE
Status: COMPLETED | OUTPATIENT
Start: 2021-11-05 | End: 2021-10-19

## 2021-11-08 DIAGNOSIS — F41.0 PANIC DISORDER WITHOUT AGORAPHOBIA: ICD-10-CM

## 2021-11-08 DIAGNOSIS — F41.1 GENERALIZED ANXIETY DISORDER: ICD-10-CM

## 2021-11-10 RX ORDER — LORAZEPAM 1 MG/1
1 TABLET ORAL 2 TIMES DAILY PRN
Qty: 60 TABLET | Refills: 1 | Status: SHIPPED | OUTPATIENT
Start: 2021-11-10 | End: 2022-01-14

## 2021-11-13 ENCOUNTER — HEALTH MAINTENANCE LETTER (OUTPATIENT)
Age: 51
End: 2021-11-13

## 2021-11-20 DIAGNOSIS — G47.00 INSOMNIA, UNSPECIFIED TYPE: ICD-10-CM

## 2021-11-23 NOTE — TELEPHONE ENCOUNTER
Failed protocol.  please route to  team if patient needs an appointment     Bernarda VACARN BSN  [facility]  [phone]

## 2021-11-24 RX ORDER — AMITRIPTYLINE HYDROCHLORIDE 50 MG/1
TABLET ORAL
Qty: 270 TABLET | Refills: 1 | Status: SHIPPED | OUTPATIENT
Start: 2021-11-24 | End: 2022-04-01

## 2021-11-30 ENCOUNTER — TELEPHONE (OUTPATIENT)
Dept: PALLIATIVE MEDICINE | Facility: CLINIC | Age: 51
End: 2021-11-30
Payer: COMMERCIAL

## 2021-11-30 NOTE — TELEPHONE ENCOUNTER
Received call from patient requesting refill(s) of buprenorphine HCl-naloxone HCl (SUBOXONE) 8-2 MG per film    Request received from [facility] pharmacy. Patient is now filling at a [place] pharmacy. Note faxed back to [facility]. Encounter closed.

## 2021-12-27 DIAGNOSIS — E29.1 HYPOGONADISM MALE: ICD-10-CM

## 2021-12-30 ENCOUNTER — MYC REFILL (OUTPATIENT)
Dept: FAMILY MEDICINE | Facility: CLINIC | Age: 51
End: 2021-12-30
Payer: COMMERCIAL

## 2021-12-30 DIAGNOSIS — E29.1 HYPOGONADISM MALE: ICD-10-CM

## 2021-12-30 DIAGNOSIS — F41.1 GENERALIZED ANXIETY DISORDER: ICD-10-CM

## 2021-12-30 DIAGNOSIS — F41.0 PANIC DISORDER WITHOUT AGORAPHOBIA: ICD-10-CM

## 2021-12-30 RX ORDER — TESTOSTERONE CYPIONATE 200 MG/ML
INJECTION, SOLUTION INTRAMUSCULAR
Qty: 6 ML | Refills: 0 | Status: SHIPPED | OUTPATIENT
Start: 2021-12-30 | End: 2021-12-30

## 2021-12-30 NOTE — TELEPHONE ENCOUNTER
Routing refill request to provider for review/approval because:   Androgen Agents Failed 12/27/2021 10:26 PM   Protocol Details  Refills for this classification require provider review    Blood pressure under 140/90 in past 6 months

## 2022-01-02 RX ORDER — TESTOSTERONE CYPIONATE 200 MG/ML
200 INJECTION, SOLUTION INTRAMUSCULAR WEEKLY
Qty: 4 ML | Refills: 2 | Status: SHIPPED | OUTPATIENT
Start: 2022-01-02 | End: 2022-05-17

## 2022-01-10 ENCOUNTER — MYC MEDICAL ADVICE (OUTPATIENT)
Dept: FAMILY MEDICINE | Facility: CLINIC | Age: 52
End: 2022-01-10
Payer: COMMERCIAL

## 2022-01-10 DIAGNOSIS — F41.0 PANIC DISORDER WITHOUT AGORAPHOBIA: ICD-10-CM

## 2022-01-10 DIAGNOSIS — F41.1 GENERALIZED ANXIETY DISORDER: ICD-10-CM

## 2022-01-10 DIAGNOSIS — L30.9 DERMATITIS: ICD-10-CM

## 2022-01-10 NOTE — TELEPHONE ENCOUNTER
"  Routed to Dr. Segovia. Please review MyChart/Ativan request and advise.    \"Hi, I didn't know how else to send a renewal because everything said it couldn't be sent. Could you please call [facility] and kindly tell them there is still one refill left on Lorazepam. If they still.say no, then could you ask Dr. Segovia to call one in. It's been past due, but [facility]. Lol thank you\"    Routing refill request to provider for review/approval because:  Drug not on the [facility] refill protocol     Writer called [facility] [place]. The pharmacy stated they do not have a refill on file for Ativan original 60 tablet rx was picked up 11/10/21 and the one refill was picked up 12/11/21.    LORazepam (ATIVAN) 1 MG tablet 60 tablet 1 11/10/2021  No   Sig - Route: TAKE 1 TABLET (1 MG) BY MOUTH 2 TIMES DAILY AS NEEDED FOR ANXIETY - Oral   Sent to pharmacy as: LORazepam 1 MG Oral Tablet (ATIVAN)   Class: E-Prescribe   Notes to Pharmacy: Not to exceed 4 additional fills before 02/19/2022   Order: [id number]   E-Prescribing Status: Receipt confirmed by pharmacy (11/10/2021  8:59 PM CST)       [facility]/PHARMACY #[id number] - [place], [place] - [address]     Cinda RIOS RN   [facility] Triage       "

## 2022-01-12 DIAGNOSIS — G47.00 INSOMNIA, UNSPECIFIED TYPE: ICD-10-CM

## 2022-01-14 RX ORDER — LORAZEPAM 1 MG/1
TABLET ORAL
Qty: 60 TABLET | Refills: 1 | OUTPATIENT
Start: 2022-01-14

## 2022-01-14 RX ORDER — LORAZEPAM 1 MG/1
1 TABLET ORAL 2 TIMES DAILY PRN
Qty: 60 TABLET | Refills: 1 | Status: SHIPPED | OUTPATIENT
Start: 2022-01-14 | End: 2022-03-11

## 2022-01-14 RX ORDER — ZOLPIDEM TARTRATE 10 MG/1
10 TABLET ORAL
Qty: 30 TABLET | Refills: 2 | Status: SHIPPED | OUTPATIENT
Start: 2022-01-14 | End: 2022-03-11

## 2022-01-14 RX ORDER — BETAMETHASONE DIPROPIONATE 0.5 MG/G
CREAM TOPICAL
Qty: 45 G | Refills: 1 | Status: SHIPPED | OUTPATIENT
Start: 2022-01-14 | End: 2022-04-16

## 2022-01-14 NOTE — TELEPHONE ENCOUNTER
Routing refill request to provider for review/approval because:  Drug not on the [facility] refill protocol     Amelia Nur RN

## 2022-01-24 ENCOUNTER — MYC REFILL (OUTPATIENT)
Dept: FAMILY MEDICINE | Facility: CLINIC | Age: 52
End: 2022-01-24
Payer: COMMERCIAL

## 2022-01-24 DIAGNOSIS — F90.2 ATTENTION DEFICIT HYPERACTIVITY DISORDER (ADHD), COMBINED TYPE: ICD-10-CM

## 2022-01-25 NOTE — TELEPHONE ENCOUNTER
Patient requesting refill   Adderall medication is not on [facility]/RN protocol. Routed to Dr Segovia    Last office visit was 10/19/2021  Appointments in Next Year    Jan 26, 2022  1:00 PM  Return Visit with Macy Subramanian MD  [facility] [place] ([facility] [place] ) [phone]        controlled substance agreement 12/1/2017    amphetamine-dextroamphetamine (ADDERALL) 20 MG tablet 90 tablet 0 8/23/2021  No   Sig - Route: Take 1 tablet (20 mg) by mouth 2 times daily - Oral   Sent to pharmacy as: Amphetamine-Dextroamphetamine 20 MG Oral Tablet (Adderall)   Class: E-Prescribe   Earliest Fill Date: 8/23/2021   Order: [id number]   E-Prescribing Status: Receipt confirmed by pharmacy (8/23/2021  2:01 PM CDT)   Prior authorization: Closed - Prior Authorization duplicate/in process       Pharmacy    [facility]/PHARMACY #[id number] - [place], [place] - [address]     Cinda RIOS RN   [facility] Triage

## 2022-01-26 RX ORDER — DEXTROAMPHETAMINE SACCHARATE, AMPHETAMINE ASPARTATE, DEXTROAMPHETAMINE SULFATE AND AMPHETAMINE SULFATE 5; 5; 5; 5 MG/1; MG/1; MG/1; MG/1
20 TABLET ORAL 2 TIMES DAILY
Qty: 90 TABLET | Refills: 0 | Status: SHIPPED | OUTPATIENT
Start: 2022-01-26 | End: 2022-05-09

## 2022-02-06 DIAGNOSIS — H04.123 DRY EYES, BILATERAL: ICD-10-CM

## 2022-02-07 NOTE — TELEPHONE ENCOUNTER
cycloSPORINE (RESTASIS) 0.05 % ophthalmic emulsion 1.5 mL 4 8/23/2021  No   Sig - Route: Place 1 drop into both eyes 2 times daily - Both Eyes   Sent to pharmacy as: cycloSPORINE 0.05 % Ophthalmic Emulsion (RESTASIS)       Last office visit: 10/19/2021     Future Office Visit:        CSA -- Patient Level:    CSA: None found at the patient level.             Routing refill request to provider for review/approval because:  Drug not on the [facility] refill protocol     Kari Mcleod RN, BSN  [facility]

## 2022-02-08 RX ORDER — CYCLOSPORINE 0.5 MG/ML
EMULSION OPHTHALMIC
Qty: 60 ML | Refills: 4 | Status: SHIPPED | OUTPATIENT
Start: 2022-02-08 | End: 2022-08-11

## 2022-02-09 DIAGNOSIS — M54.2 NECK PAIN: ICD-10-CM

## 2022-02-09 RX ORDER — CYCLOBENZAPRINE HCL 10 MG
10 TABLET ORAL 3 TIMES DAILY PRN
Qty: 90 TABLET | Refills: 3 | Status: SHIPPED | OUTPATIENT
Start: 2022-02-09 | End: 2022-05-31

## 2022-02-09 NOTE — TELEPHONE ENCOUNTER
Received fax request from [facility]/pharmacy #[id number] - [place], [place] - [address]  pharmacy requesting refill(s) for cyclobenzaprine (FLEXERIL) 10 MG tablet    Last refilled on 01/12/2022    Pt last seen on 12/27/2021  Next appt scheduled for NA    Will facilitate refill.    Mariza Randhawa MA  [facility] [place]

## 2022-02-10 ENCOUNTER — MYC REFILL (OUTPATIENT)
Dept: FAMILY MEDICINE | Facility: CLINIC | Age: 52
End: 2022-02-10
Payer: COMMERCIAL

## 2022-02-10 DIAGNOSIS — F40.243 FLYING PHOBIA: ICD-10-CM

## 2022-02-11 NOTE — TELEPHONE ENCOUNTER
Routing refill request to provider for review/approval because:  Drug not on the [facility] refill protocol     Koffi BURNHAM RN   [facility]

## 2022-02-14 NOTE — TELEPHONE ENCOUNTER
Controlled Substance Refill Request for   clonazePAM (KLONOPIN) 1 MG tablet 4 tablet 0 8/10/2021  No   Sig - Route: TAKE 1 TABLET (1 MG) BY MOUTH 2 TIMES DAILY AS NEEDED (FLYING PHOBIA) - Oral   Sent to pharmacy as: clonazePAM 1 MG Oral Tablet (klonoPIN)   Class: E-Prescribe       Problem List Complete:    Yes    Last Office Visit with [facility] primary care provider: 10/19/2021     Future Office visit: No future appointments.     Controlled substance agreement:   CSA -- Encounter Level on 11/16/2017:    Controlled Substance Agreement - Scan on 12/1/2017 10:43 AM: CONTROLLED SUBSTANCE AGREEMENT     CSA -- Encounter Level on 08/07/2015:    Controlled Substance Agreement - Scan on 8/19/2015 11:27 AM: Controlled Substance Agreement 08/07/15     CSA -- Patient Level:    CSA: None found at the patient level.         Last Urine Drug Screen: No results found for: CDAUT, No results found for: COMDAT,   Cannabinoids (98-fmp-6-carboxy-9-THC)   Date Value Ref Range Status   10/27/2021 Not Detected Not Detected, Indeterminate Final     Comment:     Cutoff for a negative cannabinoid is 50 ng/mL or less.   04/29/2021 Not Detected NDET^Not Detected ng/mL Final     Comment:     Cutoff for a negative cannabinoid is 50 ng/mL or less.     Phencyclidine (Phencyclidine)   Date Value Ref Range Status   04/29/2021 Not Detected NDET^Not Detected ng/mL Final     Comment:     Cutoff for a negative PCP is 25 ng/mL or less.     Phencyclidine   Date Value Ref Range Status   10/27/2021 Not Detected Not Detected, Indeterminate Final     Comment:     Cutoff for a negative PCP is 25 ng/mL or less.     Cocaine (Benzoylecgonine)   Date Value Ref Range Status   10/27/2021 Not Detected Not Detected, Indeterminate Final     Comment:     Cutoff for a negative cocaine is 150 ng/ml or less.   04/29/2021 Not Detected NDET^Not Detected ng/mL Final     Comment:     Cutoff for a negative cocaine is 150 ng/ml or less.     Methamphetamine (d-Methamphetamine)   Date  Value Ref Range Status   10/27/2021 Not Detected Not Detected, Indeterminate Final     Comment:     Cutoff for a negative methamphetamine is 500 ng/ml or less.   04/29/2021 Not Detected NDET^Not Detected ng/mL Final     Comment:     Cutoff for a negative methamphetamine is 500 ng/ml or less.     Opiates (Morphine)   Date Value Ref Range Status   10/27/2021 Not Detected Not Detected, Indeterminate Final     Comment:     Cutoff for a negative opiate is 100 ng/ml or less.   04/29/2021 Not Detected NDET^Not Detected ng/mL Final     Comment:     Cutoff for a negative opiate is 100 ng/ml or less.     Amphetamine (d-Amphetamine)   Date Value Ref Range Status   10/27/2021 Detected (A) Not Detected, Indeterminate Final     Comment:     Cutoff for a positive amphetamine is greater than 500 ng/ml.  This is an unconfirmed screening result to be used for medical purposes only.    04/29/2021 Detected, Abnormal Result (A) NDET^Not Detected ng/mL Final     Comment:     Cutoff for a positive amphetamine is greater than 500 ng/ml.  This is an unconfirmed screening result to be used for medical purposes only.   Order [id number] for confirmation or individual confirmation tests to [employer].       Benzodiazepines (Nordiazepam)   Date Value Ref Range Status   10/27/2021 Detected (A) Not Detected, Indeterminate Final     Comment:     Cutoff for a positive benzodiazepines is greater than 150 ng/ml.  This is an unconfirmed screening result to be used for medical purposes only.    04/29/2021 Detected, Abnormal Result (A) NDET^Not Detected ng/mL Final     Comment:     Cutoff for a positive benzodiazepines is greater than 150 ng/ml.  This is an unconfirmed screening result to be used for medical purposes only.   Order [id number] for confirmation or individual confirmation tests to [employer].       Tricyclic Antidepressants (Desipramine)   Date Value Ref Range Status   10/27/2021 Detected (A) Not Detected, Indeterminate Final     Comment:     Cutoff for a  positive tricyclic antidepressant is greater than 300 ng/ml.   This is an unconfirmed screening result to be used for medical purposes only.    04/29/2021 Detected, Abnormal Result (A) NDET^Not Detected ng/mL Final     Comment:     Cutoff for a positive tricyclic antidepressant is greater than 300 ng/ml.  This is an unconfirmed screening result to be used for medical purposes only.   Order [id number] for confirmation or individual confirmation tests to [employer].       Methadone (Methadone)   Date Value Ref Range Status   04/29/2021 Not Detected NDET^Not Detected ng/mL Final     Comment:     Cutoff for a negative methadone is 200 ng/ml or less.     Methadone   Date Value Ref Range Status   10/27/2021 Not Detected Not Detected, Indeterminate Final     Comment:     Cutoff for a negative methadone is 200 ng/ml or less.     Barbiturates (Butalbital)   Date Value Ref Range Status   10/27/2021 Not Detected Not Detected, Indeterminate Final     Comment:     Cutoff for a negative barbituate is 200 ng/ml or less.   04/29/2021 Not Detected NDET^Not Detected ng/mL Final     Comment:     Cutoff for a negative barbituate is 200 ng/ml or less.     Oxycodone (Oxycodone)   Date Value Ref Range Status   04/29/2021 Not Detected NDET^Not Detected ng/mL Final     Comment:     Cutoff for a negative Oxycodone is 100 ng/mL or less.     Oxycodone   Date Value Ref Range Status   10/27/2021 Not Detected Not Detected, Indeterminate Final     Comment:     Cutoff for a negative oxycodone is 100 ng/mL or less.     Propoxyphene (Norpropoxyphene)   Date Value Ref Range Status   10/27/2021 Not Detected Not Detected, Indeterminate Final     Comment:     Cutoff for a negative propoxyphene is 300 ng/ml or less.   04/29/2021 Not Detected NDET^Not Detected ng/mL Final     Comment:     Cutoff for a negative propoxyphene is 300 ng/ml or less     Buprenorphine (Buprenorphine)   Date Value Ref Range Status   04/29/2021 Detected, Abnormal Result (A) NDET^Not  Detected ng/mL Final     Comment:     Cutoff for a positive buprenorphine is greater than 10 ng/ml.  This is an unconfirmed screening result to be used for medical purposes only.   Order [id number] for confirmation or individual confirmation tests to [employer].       Buprenorphine   Date Value Ref Range Status   10/27/2021 Detected (A) Not Detected, Indeterminate Final     Comment:     Cutoff for a positive buprenorphine is greater than 10 ng/ml.  This is an unconfirmed screening result to be used for medical purposes only.         Processing:  Rx to be electronically transmitted to pharmacy by provider     [email or web address]     Routing refill request to provider for review/approval because:  Drug not on the [facility] refill protocol      Rola Sullivan RN  [facility]

## 2022-02-15 RX ORDER — CLONAZEPAM 1 MG/1
1 TABLET ORAL 2 TIMES DAILY PRN
Qty: 4 TABLET | Refills: 0 | Status: SHIPPED | OUTPATIENT
Start: 2022-02-15 | End: 2022-04-16

## 2022-02-20 DIAGNOSIS — K21.9 GASTROESOPHAGEAL REFLUX DISEASE WITHOUT ESOPHAGITIS: ICD-10-CM

## 2022-02-20 RX ORDER — PANTOPRAZOLE SODIUM 40 MG/1
TABLET, DELAYED RELEASE ORAL
Qty: 90 TABLET | Refills: 1 | Status: SHIPPED | OUTPATIENT
Start: 2022-02-20 | End: 2022-06-23

## 2022-02-28 ENCOUNTER — MYC MEDICAL ADVICE (OUTPATIENT)
Dept: PALLIATIVE MEDICINE | Facility: CLINIC | Age: 52
End: 2022-02-28
Payer: COMMERCIAL

## 2022-02-28 NOTE — TELEPHONE ENCOUNTER
To: PAIN NURSE      From: Jeremi Damon      Created: 2/28/2022 2:26 PM        *-*-*This message was handled on 2/28/2022 2:49 PM by CARMINA PERSAUD*-*-*    Hello. I have gotten a few surprise bills that  I have had not expected and have had to decrease my visits despite having withdrawal symptoms and increasing neck pain.  However, I now need the next available appointment to continue suboxone therapy. Can I have someone call to schedule because I can't seem to get an appointment through the online site? Thank you [phone]        -------------------------      To: Jeremi Damon      From: Carmina Persaud, CINDY      Created: 2/28/2022 2:49 PM        Jeremi     The number to schedule with Dr Subramanian is [phone]. You can call at your convenience to schedule.     Thanks,   Carmina MANRIQUE RN Care Coordinator  [facility]

## 2022-02-28 NOTE — TELEPHONE ENCOUNTER
Will keep encounter open at this time for pt communication and nursing response.    Carmina MANRIQUE RN Care Coordinator  [facility]

## 2022-03-05 DIAGNOSIS — F33.1 MAJOR DEPRESSIVE DISORDER, RECURRENT EPISODE, MODERATE (H): ICD-10-CM

## 2022-03-07 ENCOUNTER — MYC MEDICAL ADVICE (OUTPATIENT)
Dept: FAMILY MEDICINE | Facility: CLINIC | Age: 52
End: 2022-03-07
Payer: COMMERCIAL

## 2022-03-07 DIAGNOSIS — F41.0 PANIC DISORDER WITHOUT AGORAPHOBIA: ICD-10-CM

## 2022-03-07 DIAGNOSIS — G47.00 INSOMNIA, UNSPECIFIED TYPE: ICD-10-CM

## 2022-03-07 DIAGNOSIS — F41.1 GENERALIZED ANXIETY DISORDER: ICD-10-CM

## 2022-03-07 NOTE — TELEPHONE ENCOUNTER
Chart review: has appt to follow up.  Closing    Trixie ASTORGA, RN Care Coordinator  [facility]  Pain [facility]

## 2022-03-07 NOTE — TELEPHONE ENCOUNTER
[employer] message sent with PHQ9 and zbigniew 7 questionnaires    Last office visit 10/19/2021      Name from pharmacy: FLUOXETINE HCL 20 MG CAPSULE          Will file in chart as: FLUoxetine (PROZAC) 20 MG capsule    Sig: TAKE 1 CAPSULE BY MOUTH EVERY DAY    Disp:  90 capsule    Refills:  1    Start: 3/5/2022    Class: E-Prescribe    Non-formulary For: Major Depressive Disorder, Recurrent Episode, Mode    Last ordered: 6 months ago by Luis Armando Segovia MD Last refill: 11/23/2021    Rx #: [id number]    SSRIs Protocol Failed 03/05/2022 07:32 AM   Protocol Details  PHQ-9 score less than 5 in past 6 months    Medication is active on med list    Patient is age 18 or older    Recent (6 mo) or future (30 days) visit within the authorizing provider's specialty      To be filled at: [facility]/pharmacy #[id number] - [place], [place] - [address]       Cinda RIOS RN   [facility] Triage

## 2022-03-08 NOTE — TELEPHONE ENCOUNTER
Routing refill request to provider for review/approval because:  PHQ-9 score:    PHQ 10/22/2021   PHQ-9 Total Score 14   Q9: Thoughts of better off dead/self-harm past 2 weeks Several days   F/U: Thoughts of suicide or self-harm -   F/U: Safety concerns -   Some encounter information is confidential and restricted. Go to Review Flowsheets activity to see all data.     Pt is also due for 6 month follow up - he has been sent my chart.    Amelia Nur RN

## 2022-03-10 NOTE — TELEPHONE ENCOUNTER
zolpidem (AMBIEN) 10 MG tablet 30 tablet 2 1/14/2022  No   Sig - Route: TAKE 1 TABLET (10 MG) BY MOUTH NIGHTLY AS NEEDED FOR SLEEP - Oral   Sent to pharmacy as: Zolpidem Tartrate 10 MG Oral Tablet (AMBIEN)   Class: E-Prescribe   Notes to Pharmacy: Not to exceed 3 additional fills before 04/09/2022       Last office visit: 10/19/2021     Future Office Visit:   Next 5 appointments (look out 90 days)    Mar 16, 2022  1:00 PM  Return Visit with Macy Subramanian MD  [facility] [place] ([facility] [place] ) [address] [address]  [phone]             CSA -- Patient Level:    CSA: None found at the patient level.             Routing refill request to provider for review/approval because:  Drug not on the [facility] refill protocol     Kari Mcleod RN, BSN  [facility]

## 2022-03-11 RX ORDER — LORAZEPAM 1 MG/1
1 TABLET ORAL 2 TIMES DAILY PRN
Qty: 60 TABLET | Refills: 1 | Status: SHIPPED | OUTPATIENT
Start: 2022-03-11 | End: 2022-05-04

## 2022-03-11 RX ORDER — ZOLPIDEM TARTRATE 10 MG/1
10 TABLET ORAL
Qty: 30 TABLET | Refills: 2 | Status: SHIPPED | OUTPATIENT
Start: 2022-03-11 | End: 2022-06-23

## 2022-03-16 ENCOUNTER — LAB (OUTPATIENT)
Dept: LAB | Facility: CLINIC | Age: 52
End: 2022-03-16

## 2022-03-16 ENCOUNTER — OFFICE VISIT (OUTPATIENT)
Dept: PALLIATIVE MEDICINE | Facility: CLINIC | Age: 52
End: 2022-03-16
Payer: COMMERCIAL

## 2022-03-16 VITALS — SYSTOLIC BLOOD PRESSURE: 149 MMHG | DIASTOLIC BLOOD PRESSURE: 84 MMHG | HEART RATE: 71 BPM | OXYGEN SATURATION: 94 %

## 2022-03-16 DIAGNOSIS — G89.4 CHRONIC PAIN SYNDROME: ICD-10-CM

## 2022-03-16 DIAGNOSIS — Z79.899 ENCOUNTER FOR LONG-TERM (CURRENT) USE OF HIGH-RISK MEDICATION: ICD-10-CM

## 2022-03-16 DIAGNOSIS — M47.812 CERVICAL SPONDYLOSIS WITHOUT MYELOPATHY: ICD-10-CM

## 2022-03-16 DIAGNOSIS — F11.20 UNCOMPLICATED OPIOID DEPENDENCE (H): Primary | ICD-10-CM

## 2022-03-16 DIAGNOSIS — F41.1 GENERALIZED ANXIETY DISORDER: ICD-10-CM

## 2022-03-16 DIAGNOSIS — F11.20 UNCOMPLICATED OPIOID DEPENDENCE (H): ICD-10-CM

## 2022-03-16 LAB — CREAT UR-MCNC: 272 MG/DL

## 2022-03-16 PROCEDURE — 99214 OFFICE O/P EST MOD 30 MIN: CPT | Performed by: ANESTHESIOLOGY

## 2022-03-16 PROCEDURE — 99000 SPECIMEN HANDLING OFFICE-LAB: CPT

## 2022-03-16 PROCEDURE — 80307 DRUG TEST PRSMV CHEM ANLYZR: CPT

## 2022-03-16 PROCEDURE — 80307 DRUG TEST PRSMV CHEM ANLYZR: CPT | Mod: 90

## 2022-03-16 RX ORDER — BUPRENORPHINE AND NALOXONE 8; 2 MG/1; MG/1
FILM, SOLUBLE BUCCAL; SUBLINGUAL
Qty: 60 FILM | Refills: 2 | Status: SHIPPED | OUTPATIENT
Start: 2022-03-16 | End: 2022-07-05

## 2022-03-16 ASSESSMENT — PAIN SCALES - GENERAL: PAINLEVEL: SEVERE PAIN (7)

## 2022-03-16 NOTE — PATIENT INSTRUCTIONS
[facility] IMAG[facility]:     [facility]  Call: [phone]    Your going to have to have a pre-op done by a primary care physician within 30 days of the procedure.     Call Cardiology to order the other exam that was to be done.       Follow up in 3 months - virtual visit is okay.     Macy Subramanian MD

## 2022-03-16 NOTE — NURSING NOTE
Obtained urine specimen.  Specimen sent to the lab.      Mami Sage [facility] [place]
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
## 2022-03-17 LAB — ETHYL GLUCURONIDE UR QL SCN: NEGATIVE NG/ML

## 2022-03-18 ENCOUNTER — TELEPHONE (OUTPATIENT)
Dept: PALLIATIVE MEDICINE | Facility: CLINIC | Age: 52
End: 2022-03-18
Payer: COMMERCIAL

## 2022-03-18 LAB
AMPHET UR CFM-MCNC: 1920 NG/ML
AMPHET/CREAT UR: 706 NG/MG {CREAT}
BUPRENORPHINE UR CFM-MCNC: 152 NG/ML
BUPRENORPHINE/CREAT UR: 56 NG/MG {CREAT}
LORAZEPAM UR QL CFM: PRESENT
NALOXONE UR CFM-MCNC: 110 NG/ML
NALOXONE: 40 NG/MG {CREAT}
NORBUPRENORPHINE UR CFM-MCNC: 364 NG/ML
NORBUPRENORPHINE/CREAT UR: 134 NG/MG {CREAT}

## 2022-03-18 NOTE — TELEPHONE ENCOUNTER
Everything that was positive was supposed to be positive.  He has a prescription for these things.  What specifically is he talking about?     Macy Subramanian MD

## 2022-03-18 NOTE — TELEPHONE ENCOUNTER
Explained to pt that UDS as expected and no need for alarm or repeat testing.    Pt expresses understanding.    Carmina MANRIQUE RN Care Coordinator  [facility]

## 2022-03-18 NOTE — TELEPHONE ENCOUNTER
Pt calling to discuss UDS results - he feels that test is abnormal and wants provider to review and order another confirmation UDS if needed.    Will route to review.    Carmina MANRIQUE RN Care Coordinator  [facility]

## 2022-03-28 ENCOUNTER — TELEPHONE (OUTPATIENT)
Dept: FAMILY MEDICINE | Facility: CLINIC | Age: 52
End: 2022-03-28
Payer: COMMERCIAL

## 2022-03-28 NOTE — TELEPHONE ENCOUNTER
Makenna RN calling from [facility]       Pt has started a care maintence program and has to do a daily BP from home, but is sent in to nurse.  She reports his BP has been running 158-168 (systolic) to  (diastolic).     He denies symptoms to Richie.     She also wanted to report that pt does not take the metoprolol 100 mg BID he is trying to loose body fat and cannot get his heart rate above 100 if he takes the metoprolol.     Last time Metoprolol or isosorbide were filled were NOV for 90 days.     Routing to PCP and station to assist in scheduling pt

## 2022-03-30 DIAGNOSIS — G47.00 INSOMNIA, UNSPECIFIED TYPE: ICD-10-CM

## 2022-03-31 NOTE — TELEPHONE ENCOUNTER
Routing refill request to provider for review/approval because:  Drug does not on the [facility] refill protocol     BP Readings from Last 3 Encounters:   03/16/22 (!) 149/84   10/27/21 (!) 152/98   10/19/21 (!) 152/92     Do you want to see if his blood pressures at home or nurse visit?     Thank you!  Cinda RIOS RN   [facility] Triage

## 2022-03-31 NOTE — TELEPHONE ENCOUNTER
Attempt # 1  Called # [phone]     No answer for pt. No VM set up, will attempt again later.    Koffi Jones RN   [facility]

## 2022-03-31 NOTE — TELEPHONE ENCOUNTER
Perhaps he can try the metoprolol 50 mg (half a tablet) twice daily to see if that will help with blood pressure some and also protect his heart due to coronary artery disease   He should continue his losartan and imbdur and f/u with cardiology

## 2022-04-01 RX ORDER — AMITRIPTYLINE HYDROCHLORIDE 50 MG/1
TABLET ORAL
Qty: 270 TABLET | Refills: 1 | Status: SHIPPED | OUTPATIENT
Start: 2022-04-01 | End: 2022-06-23

## 2022-04-06 NOTE — TELEPHONE ENCOUNTER
Called # [phone]     Pt called, advised of note below. Pt still concerned with not being able to increase HR while working out. Pt noted will trial metoprolol at 50 mg but stated only will trial at 1 time per day.   Pt was reporting BP in ranges 115-125/70-80's    Advised to discuss further with cardiology.    Koffi Jones RN   [facility]

## 2022-04-12 ENCOUNTER — TELEPHONE (OUTPATIENT)
Dept: FAMILY MEDICINE | Facility: CLINIC | Age: 52
End: 2022-04-12
Payer: COMMERCIAL

## 2022-04-12 ENCOUNTER — MYC MEDICAL ADVICE (OUTPATIENT)
Dept: FAMILY MEDICINE | Facility: CLINIC | Age: 52
End: 2022-04-12
Payer: COMMERCIAL

## 2022-04-12 NOTE — TELEPHONE ENCOUNTER
Needs of attention regarding:  -Multiple     Health Maintenance Topics with due status: Overdue       Topic Date Due    ADVANCE CARE PLANNING Never done    TREATMENT AGREEMENT FOR CHRONIC PAIN MANAGEMENT Never done    Pneumococcal Vaccine: Pediatrics (0 to 5 Years) and At-Risk Patients (6 to 64 Years) Never done    HEPATITIS B IMMUNIZATION Never done    ZOSTER IMMUNIZATION Never done    MICROALBUMIN 06/28/2020    ASTHMA ACTION PLAN 06/28/2020    MEDICARE ANNUAL WELLNESS VISIT 08/24/2021    INFLUENZA VACCINE 09/01/2021    COLORECTAL CANCER SCREENING 10/15/2021    ANNUAL REVIEW OF HM ORDERS 01/13/2022    LIPID 02/08/2022    PSA 02/08/2022    COVID-19 Vaccine 02/24/2022     Health Maintenance Topics with due status: Due Soon       Topic Date Due    ASTHMA CONTROL TEST 04/19/2022    PHQ-9 04/19/2022    BMP 05/05/2022    CMP 05/05/2022    CBC 05/05/2022    HEMOGLOBIN 05/05/2022       Communication:  See MyChart message

## 2022-04-18 ENCOUNTER — MYC MEDICAL ADVICE (OUTPATIENT)
Dept: FAMILY MEDICINE | Facility: CLINIC | Age: 52
End: 2022-04-18
Payer: COMMERCIAL

## 2022-04-18 NOTE — TELEPHONE ENCOUNTER
"Requested Prescriptions   Pending Prescriptions Disp Refills     albuterol (PROAIR HFA/PROVENTIL HFA/VENTOLIN HFA) 108 (90 Base) MCG/ACT inhaler [Pharmacy Med Name: VENTOLIN HFA 90 MCG INHALER] 54 Inhaler 0      Last Written Prescription Date:  7.10.18  Last Fill Quantity: 3 inhaler,  # refills: 0   Last office visit: 9/11/2018 with prescribing provider:     Future Office Visit:      ACT Total Scores 6/8/2017 10/10/2017 2/22/2018   ACT TOTAL SCORE - - -   ASTHMA ER VISITS - - -   ASTHMA HOSPITALIZATIONS - - -   ACT TOTAL SCORE (Goal Greater than or Equal to 20) 6 18 20   In the past 12 months, how many times did you visit the emergency room for your asthma without being admitted to the hospital? 0 0 0   In the past 12 months, how many times were you hospitalized overnight because of your asthma? 0 0 0      Sig: INHALE 2 PUFFS INTO THE LUNGS EVERY 4 HOURS AS NEEDED    Asthma Maintenance Inhalers - Anticholinergics Failed    10/4/2018  4:30 PM       Failed - Asthma control assessment score within normal limits in last 6 months    Please review ACT score.          Passed - Patient is age 12 years or older       Passed - Recent (6 mo) or future (30 days) visit within the authorizing provider's specialty    Patient had office visit in the last 6 months or has a visit in the next 30 days with authorizing provider or within the authorizing provider's specialty.  See \"Patient Info\" tab in inbasket, or \"Choose Columns\" in Meds & Orders section of the refill encounter.              " Hyperlipidemia

## 2022-04-21 ENCOUNTER — OFFICE VISIT (OUTPATIENT)
Dept: PALLIATIVE MEDICINE | Facility: CLINIC | Age: 52
End: 2022-04-21
Payer: COMMERCIAL

## 2022-04-21 VITALS — HEART RATE: 74 BPM | OXYGEN SATURATION: 96 % | DIASTOLIC BLOOD PRESSURE: 110 MMHG | SYSTOLIC BLOOD PRESSURE: 187 MMHG

## 2022-04-21 DIAGNOSIS — G43.709 CHRONIC MIGRAINE WITHOUT AURA, NOT INTRACTABLE, WITHOUT STATUS MIGRAINOSUS: ICD-10-CM

## 2022-04-21 DIAGNOSIS — F11.20 UNCOMPLICATED OPIOID DEPENDENCE (H): Primary | ICD-10-CM

## 2022-04-21 DIAGNOSIS — G89.4 CHRONIC PAIN SYNDROME: ICD-10-CM

## 2022-04-21 DIAGNOSIS — F41.1 GENERALIZED ANXIETY DISORDER: ICD-10-CM

## 2022-04-21 DIAGNOSIS — Z79.899 ENCOUNTER FOR LONG-TERM (CURRENT) USE OF HIGH-RISK MEDICATION: ICD-10-CM

## 2022-04-21 PROCEDURE — 99213 OFFICE O/P EST LOW 20 MIN: CPT | Performed by: ANESTHESIOLOGY

## 2022-04-21 ASSESSMENT — PAIN SCALES - GENERAL: PAINLEVEL: SEVERE PAIN (7)
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
## 2022-04-21 NOTE — NURSING NOTE
PEG Score 3/16/2022 4/21/2022   PEG Total Score 9.33 9.33         Ofelia Laurent MA  [facility] [place]

## 2022-04-21 NOTE — PATIENT INSTRUCTIONS
----------------------------------------------------------------  [facility] Number:  [phone]   Call with any questions about your care and for scheduling assistance.   Calls are returned Monday through Friday between 8 AM and 4:30 PM. We usually get back to you within 2 business days depending on the issue/request.    If we are prescribing your medications:  For opioid medication refills, call the clinic or send a [employer] message 7 days in advance.  Please include:  Name of requested medication  Name of the pharmacy.  For non-opioid medications, call your pharmacy directly to request a refill. Please allow 3-4 days to be processed.   Per [place] State Law:  All controlled substance prescriptions must be filled within 30 days of being written.    For those controlled substances allowing refills, pickup must occur within 30 days of last fill.      We believe regular attendance is key to your success in our program!    Any time you are unable to keep your appointment we ask that you call us at least 24 hours in advance to cancel.This will allow us to offer the appointment time to another patient.   Multiple missed appointments may lead to dismissal from the clinic.
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
## 2022-04-28 ENCOUNTER — RADIOLOGY INJECTION OFFICE VISIT (OUTPATIENT)
Dept: PALLIATIVE MEDICINE | Facility: CLINIC | Age: 52
End: 2022-04-28
Payer: COMMERCIAL

## 2022-04-28 VITALS — OXYGEN SATURATION: 94 % | HEART RATE: 77 BPM | DIASTOLIC BLOOD PRESSURE: 72 MMHG | SYSTOLIC BLOOD PRESSURE: 143 MMHG

## 2022-04-28 PROCEDURE — 64615 CHEMODENERV MUSC MIGRAINE: CPT | Performed by: ANESTHESIOLOGY

## 2022-04-28 ASSESSMENT — PAIN SCALES - GENERAL: PAINLEVEL: SEVERE PAIN (7)

## 2022-04-28 NOTE — PATIENT INSTRUCTIONS
[facility]  Botox Injection Discharge Instructions    Do not rub or put extended pressure on the injection sites. You may gently touch the sites to remove any excess blood.  Monitor the injection sites for signs and symptoms of infection such as redness, swelling, warmth, fever, chills, or drainage to areas.  You may have soreness at the injection sites for up to 24 hours.  If you are able to use anti-inflammatory medications or Tylenol for pain control, you can take these as directed.  It may take up to 1 month to notice benefit from the 1st treatment  If you do notice relief, botox can be done every 3 months.  It may require insurance authorization everytime.    For questions about insurance coverage, please call the main clinic number and ask to speak with someone about botox coverage.     Pain Clinic phone number during work hours (Monday through Friday 8 am-4:30 pm) at [phone] or the Provider Line after hours at [phone]:

## 2022-05-02 ASSESSMENT — ANXIETY QUESTIONNAIRES
7. FEELING AFRAID AS IF SOMETHING AWFUL MIGHT HAPPEN: NEARLY EVERY DAY
GAD7 TOTAL SCORE: 14
1. FEELING NERVOUS, ANXIOUS, OR ON EDGE: NEARLY EVERY DAY
6. BECOMING EASILY ANNOYED OR IRRITABLE: NOT AT ALL
5. BEING SO RESTLESS THAT IT IS HARD TO SIT STILL: MORE THAN HALF THE DAYS
3. WORRYING TOO MUCH ABOUT DIFFERENT THINGS: SEVERAL DAYS
GAD7 TOTAL SCORE: 14
2. NOT BEING ABLE TO STOP OR CONTROL WORRYING: MORE THAN HALF THE DAYS
7. FEELING AFRAID AS IF SOMETHING AWFUL MIGHT HAPPEN: NEARLY EVERY DAY
GAD7 TOTAL SCORE: 14
4. TROUBLE RELAXING: NEARLY EVERY DAY

## 2022-05-02 ASSESSMENT — PATIENT HEALTH QUESTIONNAIRE - PHQ9
10. IF YOU CHECKED OFF ANY PROBLEMS, HOW DIFFICULT HAVE THESE PROBLEMS MADE IT FOR YOU TO DO YOUR WORK, TAKE CARE OF THINGS AT HOME, OR GET ALONG WITH OTHER PEOPLE: VERY DIFFICULT
SUM OF ALL RESPONSES TO PHQ QUESTIONS 1-9: 15
SUM OF ALL RESPONSES TO PHQ QUESTIONS 1-9: 15

## 2022-05-03 ASSESSMENT — PATIENT HEALTH QUESTIONNAIRE - PHQ9: SUM OF ALL RESPONSES TO PHQ QUESTIONS 1-9: 15

## 2022-05-03 ASSESSMENT — ANXIETY QUESTIONNAIRES: GAD7 TOTAL SCORE: 14

## 2022-05-04 ENCOUNTER — TELEPHONE (OUTPATIENT)
Dept: FAMILY MEDICINE | Facility: CLINIC | Age: 52
End: 2022-05-04
Payer: COMMERCIAL

## 2022-05-04 DIAGNOSIS — F41.1 GENERALIZED ANXIETY DISORDER: ICD-10-CM

## 2022-05-04 DIAGNOSIS — F41.0 PANIC DISORDER WITHOUT AGORAPHOBIA: ICD-10-CM

## 2022-05-04 NOTE — TELEPHONE ENCOUNTER
Pt has upcoming appt with PCP. Abida Duran CMA    PHQ 12/3/2020 10/22/2021 5/2/2022   PHQ-9 Total Score 22 14 15   Q9: Thoughts of better off dead/self-harm past 2 weeks Several days Several days Several days   F/U: Thoughts of suicide or self-harm - - No   F/U: Safety concerns - - No   Some encounter information is confidential and restricted. Go to Review Flowsheets activity to see all data.       NANCY-7 SCORE 12/3/2020 10/22/2021 5/2/2022   Total Score - - -   Total Score - - 14 (moderate anxiety)   Total Score 14 6 14

## 2022-05-04 NOTE — TELEPHONE ENCOUNTER
Routing refill request to provider for review/approval because:  Drug not on the [facility] [place]     LOV:  10/19/2021    Last filled:   LORazepam (ATIVAN) 1 MG tablet 60 tablet 1 3/11/2022  No   Sig - Route: Take 1 tablet (1 mg) by mouth 2 times daily as needed for anxiety - Oral   Sent to pharmacy as: LORazepam 1 MG Oral Tablet (ATIVAN)     Future order placed to reflect appropriate fill of 5/10/22       CSA -- Patient Level:    CSA: None found at the patient level.         Koffi BURNHAM RN   [facility]

## 2022-05-04 NOTE — TELEPHONE ENCOUNTER
Reason for Call:  Medication or medication refill:    Do you use a [facility]?  Name of the pharmacy and phone number for the current request:  [facility][id number] [phone]    Name of the medication requested: Lorazepam     Other request: n/a/    Can we leave a detailed message on this number? YES    Phone number patient can be reached at: Home number on file [phone] (home)    Best Time: Anytime    Call taken on 5/4/2022 at 12:52 AM by Ada Castillo

## 2022-05-05 DIAGNOSIS — F90.2 ATTENTION DEFICIT HYPERACTIVITY DISORDER (ADHD), COMBINED TYPE: ICD-10-CM

## 2022-05-05 RX ORDER — LORAZEPAM 1 MG/1
1 TABLET ORAL 2 TIMES DAILY PRN
Qty: 60 TABLET | Refills: 1 | Status: SHIPPED | OUTPATIENT
Start: 2022-05-10 | End: 2022-06-23

## 2022-05-05 NOTE — TELEPHONE ENCOUNTER
Pt will run out of Adderall before his next appt on May 19, wants to make sure it gets refilled on time.

## 2022-05-05 NOTE — TELEPHONE ENCOUNTER
Due for med check and/or wellness visit, please schedule prescription sent for now.    Last visit in this dept:    10/19/2021     Next visit in this dept:       Health Maintenance   Topic Date Due     ADVANCE CARE PLANNING  Never done     TREATMENT AGREEMENT FOR CHRONIC PAIN MANAGEMENT  Never done     Pneumococcal Vaccine: Pediatrics (0 to 5 Years) and At-Risk Patients (6 to 64 Years) (1 - PCV) Never done     HEPATITIS B IMMUNIZATION (1 of 3 - Risk 3-dose series) Never done     DTAP/TDAP/TD IMMUNIZATION (1 - Tdap) 05/20/2017     ZOSTER IMMUNIZATION (1 of 2) Never done     MICROALBUMIN  06/28/2020     ASTHMA ACTION PLAN  06/28/2020     MEDICARE ANNUAL WELLNESS VISIT  08/24/2021     COLORECTAL CANCER SCREENING  10/15/2021     ANNUAL REVIEW OF HM ORDERS  01/13/2022     LIPID  02/08/2022     PSA  02/08/2022     COVID-19 Vaccine (3 - Booster for Moderna series) 02/24/2022     ASTHMA CONTROL TEST  04/19/2022     BMP  05/05/2022     CMP  05/05/2022     CBC  05/05/2022     HEMOGLOBIN  05/05/2022     INFLUENZA VACCINE (Season Ended) 09/01/2022     PHQ-9  10/12/2022     URINE DRUG SCREEN  03/16/2023     NANCY ASSESSMENT  04/12/2023     HEPATITIS C SCREENING  Completed     HIV SCREENING  Completed     URINALYSIS  Completed     IPV IMMUNIZATION  Aged Out     MENINGITIS IMMUNIZATION  Aged Out

## 2022-05-06 NOTE — TELEPHONE ENCOUNTER
Routing refill request to provider for review/approval because:  Drug not on the [facility] refill protocol     LOV:  10/19/2021     Future Appointments   Date Time Provider Department Center   5/19/2022  2:40 PM Luis Armando Segovia MD [facility]          CSA -- Patient Level:    CSA: None found at the patient level.         Rola Sullivan RN  [facility]

## 2022-05-09 RX ORDER — DEXTROAMPHETAMINE SACCHARATE, AMPHETAMINE ASPARTATE, DEXTROAMPHETAMINE SULFATE AND AMPHETAMINE SULFATE 5; 5; 5; 5 MG/1; MG/1; MG/1; MG/1
20 TABLET ORAL 2 TIMES DAILY
Qty: 90 TABLET | Refills: 0 | Status: SHIPPED | OUTPATIENT
Start: 2022-05-09 | End: 2022-06-23

## 2022-05-09 NOTE — TELEPHONE ENCOUNTER
The prescription(s) has been sent to the requested pharmacy.  Please notify the patient if needed.    Tdap; 28-Jun-2021 17:24; Alexsandra Musa); [employer]; [id number] (Exp. Date: 25-Nov-2022); IntraMuscular; Deltoid Left.; 0.5 milliLiter(s); VIS (VIS Published: 09-May-2013, VIS Presented: 28-Jun-2021);

## 2022-05-13 DIAGNOSIS — E29.1 HYPOGONADISM MALE: ICD-10-CM

## 2022-05-13 DIAGNOSIS — R07.89 OTHER CHEST PAIN: ICD-10-CM

## 2022-05-16 NOTE — TELEPHONE ENCOUNTER
Routing refill request to provider for review/approval because:  Fails for multiple reasons.    Left message to verify if pt has used an entire bottle of nitroglycerin since April.    Flakito VACA RN, BSN

## 2022-05-17 RX ORDER — NITROGLYCERIN 0.4 MG/1
TABLET SUBLINGUAL
Qty: 25 TABLET | Refills: 0 | Status: ON HOLD | OUTPATIENT
Start: 2022-05-17 | End: 2022-11-24

## 2022-05-17 RX ORDER — TESTOSTERONE CYPIONATE 200 MG/ML
200 INJECTION, SOLUTION INTRAMUSCULAR WEEKLY
Qty: 4 ML | Refills: 2 | Status: SHIPPED | OUTPATIENT
Start: 2022-05-17 | End: 2022-06-23

## 2022-05-17 NOTE — TELEPHONE ENCOUNTER
Due for follow-up appointment, greater than 6 months since last appointment.  Please schedule.  Med sent for now    Last visit in this dept:    10/19/2021     Next visit in this dept:       Health Maintenance   Topic Date Due     ADVANCE CARE PLANNING  Never done     TREATMENT AGREEMENT FOR CHRONIC PAIN MANAGEMENT  Never done     Pneumococcal Vaccine: Pediatrics (0 to 5 Years) and At-Risk Patients (6 to 64 Years) (1 - PCV) Never done     HEPATITIS B IMMUNIZATION (1 of 3 - Risk 3-dose series) Never done     ZOSTER IMMUNIZATION (1 of 2) Never done     MICROALBUMIN  06/28/2020     ASTHMA ACTION PLAN  06/28/2020     MEDICARE ANNUAL WELLNESS VISIT  08/24/2021     COLORECTAL CANCER SCREENING  10/15/2021     ANNUAL REVIEW OF HM ORDERS  01/13/2022     LIPID  02/08/2022     PSA  02/08/2022     COVID-19 Vaccine (3 - Booster for Moderna series) 02/24/2022     ASTHMA CONTROL TEST  04/19/2022     BMP  05/05/2022     CMP  05/05/2022     CBC  05/05/2022     HEMOGLOBIN  05/05/2022     INFLUENZA VACCINE (Season Ended) 09/01/2022     PHQ-9  10/12/2022     URINE DRUG SCREEN  03/16/2023     NANCY ASSESSMENT  04/12/2023     DTAP/TDAP/TD IMMUNIZATION (3 - Td or Tdap) 05/20/2027     HEPATITIS C SCREENING  Completed     HIV SCREENING  Completed     URINALYSIS  Completed     IPV IMMUNIZATION  Aged Out     MENINGITIS IMMUNIZATION  Aged Out

## 2022-05-31 DIAGNOSIS — M54.2 NECK PAIN: ICD-10-CM

## 2022-05-31 RX ORDER — CYCLOBENZAPRINE HCL 10 MG
10 TABLET ORAL 3 TIMES DAILY PRN
Qty: 90 TABLET | Refills: 3 | Status: SHIPPED | OUTPATIENT
Start: 2022-05-31 | End: 2022-09-29

## 2022-05-31 NOTE — TELEPHONE ENCOUNTER
Received fax request from [facility]/pharmacy #[id number] - [place], [place] - [address]  pharmacy requesting refill(s) for cyclobenzaprine (FLEXERIL) 10 MG tablet    Last refilled on 05/05/2022    Pt last seen on 04/08/2022  Next appt scheduled for None    Will facilitate refill.    Mariza Hernandez MA  [facility] [place]

## 2022-06-23 ENCOUNTER — OFFICE VISIT (OUTPATIENT)
Dept: FAMILY MEDICINE | Facility: CLINIC | Age: 52
End: 2022-06-23
Payer: COMMERCIAL

## 2022-06-23 VITALS
BODY MASS INDEX: 33.2 KG/M2 | OXYGEN SATURATION: 95 % | TEMPERATURE: 98.8 F | WEIGHT: 244.8 LBS | HEART RATE: 68 BPM | SYSTOLIC BLOOD PRESSURE: 120 MMHG | RESPIRATION RATE: 16 BRPM | DIASTOLIC BLOOD PRESSURE: 80 MMHG

## 2022-06-23 DIAGNOSIS — M67.912 DISORDER OF LEFT ROTATOR CUFF: ICD-10-CM

## 2022-06-23 DIAGNOSIS — F41.1 GENERALIZED ANXIETY DISORDER: ICD-10-CM

## 2022-06-23 DIAGNOSIS — Z12.5 SCREENING FOR PROSTATE CANCER: ICD-10-CM

## 2022-06-23 DIAGNOSIS — I10 HYPERTENSION GOAL BP (BLOOD PRESSURE) < 140/90: ICD-10-CM

## 2022-06-23 DIAGNOSIS — I42.9 SECONDARY CARDIOMYOPATHY (H): ICD-10-CM

## 2022-06-23 DIAGNOSIS — F90.2 ATTENTION DEFICIT HYPERACTIVITY DISORDER (ADHD), COMBINED TYPE: ICD-10-CM

## 2022-06-23 DIAGNOSIS — E29.1 HYPOGONADISM MALE: ICD-10-CM

## 2022-06-23 DIAGNOSIS — Z00.00 ENCOUNTER FOR MEDICARE ANNUAL WELLNESS EXAM: Primary | ICD-10-CM

## 2022-06-23 DIAGNOSIS — I21.4 NSTEMI (NON-ST ELEVATED MYOCARDIAL INFARCTION) (H): ICD-10-CM

## 2022-06-23 DIAGNOSIS — E78.5 HYPERLIPIDEMIA LDL GOAL <70: ICD-10-CM

## 2022-06-23 DIAGNOSIS — F33.1 MAJOR DEPRESSIVE DISORDER, RECURRENT EPISODE, MODERATE (H): ICD-10-CM

## 2022-06-23 DIAGNOSIS — F40.243 FLYING PHOBIA: ICD-10-CM

## 2022-06-23 DIAGNOSIS — J45.30 MILD PERSISTENT ASTHMA WITHOUT COMPLICATION: ICD-10-CM

## 2022-06-23 DIAGNOSIS — L65.9 ALOPECIA: ICD-10-CM

## 2022-06-23 DIAGNOSIS — F41.0 PANIC DISORDER WITHOUT AGORAPHOBIA: ICD-10-CM

## 2022-06-23 DIAGNOSIS — Z12.11 SCREEN FOR COLON CANCER: ICD-10-CM

## 2022-06-23 DIAGNOSIS — I25.10 CORONARY ARTERY DISEASE INVOLVING NATIVE HEART WITHOUT ANGINA PECTORIS, UNSPECIFIED VESSEL OR LESION TYPE: ICD-10-CM

## 2022-06-23 DIAGNOSIS — N18.30 STAGE 3 CHRONIC KIDNEY DISEASE, UNSPECIFIED WHETHER STAGE 3A OR 3B CKD (H): ICD-10-CM

## 2022-06-23 DIAGNOSIS — K21.9 GASTROESOPHAGEAL REFLUX DISEASE WITHOUT ESOPHAGITIS: ICD-10-CM

## 2022-06-23 DIAGNOSIS — G47.00 INSOMNIA, UNSPECIFIED TYPE: ICD-10-CM

## 2022-06-23 DIAGNOSIS — F11.20 OPIOID TYPE DEPENDENCE, CONTINUOUS (H): ICD-10-CM

## 2022-06-23 LAB
ERYTHROCYTE [DISTWIDTH] IN BLOOD BY AUTOMATED COUNT: 14.4 % (ref 10–15)
HCT VFR BLD AUTO: 50 % (ref 40–53)
HGB BLD-MCNC: 16.5 G/DL (ref 13.3–17.7)
MCH RBC QN AUTO: 28.3 PG (ref 26.5–33)
MCHC RBC AUTO-ENTMCNC: 33 G/DL (ref 31.5–36.5)
MCV RBC AUTO: 86 FL (ref 78–100)
PLATELET # BLD AUTO: 183 10E3/UL (ref 150–450)
RBC # BLD AUTO: 5.83 10E6/UL (ref 4.4–5.9)
WBC # BLD AUTO: 8.3 10E3/UL (ref 4–11)

## 2022-06-23 PROCEDURE — 99214 OFFICE O/P EST MOD 30 MIN: CPT | Mod: 25 | Performed by: FAMILY MEDICINE

## 2022-06-23 PROCEDURE — G0103 PSA SCREENING: HCPCS | Performed by: FAMILY MEDICINE

## 2022-06-23 PROCEDURE — 82043 UR ALBUMIN QUANTITATIVE: CPT | Performed by: FAMILY MEDICINE

## 2022-06-23 PROCEDURE — 80061 LIPID PANEL: CPT | Performed by: FAMILY MEDICINE

## 2022-06-23 PROCEDURE — 80053 COMPREHEN METABOLIC PANEL: CPT | Performed by: FAMILY MEDICINE

## 2022-06-23 PROCEDURE — 99396 PREV VISIT EST AGE 40-64: CPT | Mod: 25 | Performed by: FAMILY MEDICINE

## 2022-06-23 PROCEDURE — 20610 DRAIN/INJ JOINT/BURSA W/O US: CPT | Performed by: FAMILY MEDICINE

## 2022-06-23 PROCEDURE — 85027 COMPLETE CBC AUTOMATED: CPT | Performed by: FAMILY MEDICINE

## 2022-06-23 PROCEDURE — 36415 COLL VENOUS BLD VENIPUNCTURE: CPT | Performed by: FAMILY MEDICINE

## 2022-06-23 RX ORDER — TRIAMCINOLONE ACETONIDE 40 MG/ML
40 INJECTION, SUSPENSION INTRA-ARTICULAR; INTRAMUSCULAR ONCE
Status: COMPLETED | OUTPATIENT
Start: 2022-06-23 | End: 2022-06-23

## 2022-06-23 RX ORDER — LOSARTAN POTASSIUM 100 MG/1
100 TABLET ORAL DAILY
Qty: 90 TABLET | Refills: 1 | Status: ON HOLD | OUTPATIENT
Start: 2022-06-23 | End: 2022-11-24

## 2022-06-23 RX ORDER — METOPROLOL TARTRATE 100 MG
TABLET ORAL
Qty: 180 TABLET | Refills: 1 | Status: ON HOLD | OUTPATIENT
Start: 2022-06-23 | End: 2022-11-24

## 2022-06-23 RX ORDER — LORAZEPAM 1 MG/1
1 TABLET ORAL 2 TIMES DAILY PRN
Qty: 60 TABLET | Refills: 1 | Status: SHIPPED | OUTPATIENT
Start: 2022-07-01 | End: 2022-08-23

## 2022-06-23 RX ORDER — PANTOPRAZOLE SODIUM 40 MG/1
40 TABLET, DELAYED RELEASE ORAL DAILY
Qty: 90 TABLET | Refills: 3 | Status: SHIPPED | OUTPATIENT
Start: 2022-06-23 | End: 2023-07-25

## 2022-06-23 RX ORDER — BUPROPION HYDROCHLORIDE 300 MG/1
300 TABLET ORAL EVERY MORNING
Qty: 90 TABLET | Refills: 1 | Status: SHIPPED | OUTPATIENT
Start: 2022-06-23 | End: 2023-03-23

## 2022-06-23 RX ORDER — ROSUVASTATIN CALCIUM 20 MG/1
20 TABLET, COATED ORAL DAILY
Qty: 90 TABLET | Refills: 3 | Status: ON HOLD | OUTPATIENT
Start: 2022-06-23 | End: 2022-11-24

## 2022-06-23 RX ORDER — FINASTERIDE 5 MG/1
TABLET, FILM COATED ORAL
Qty: 90 TABLET | Refills: 1 | Status: SHIPPED | OUTPATIENT
Start: 2022-06-23 | End: 2023-09-08

## 2022-06-23 RX ORDER — ZOLPIDEM TARTRATE 10 MG/1
10 TABLET ORAL
Qty: 30 TABLET | Refills: 2 | Status: SHIPPED | OUTPATIENT
Start: 2022-06-23 | End: 2022-09-29

## 2022-06-23 RX ORDER — LORAZEPAM 1 MG/1
1 TABLET ORAL 2 TIMES DAILY PRN
Qty: 60 TABLET | Refills: 1 | Status: SHIPPED | OUTPATIENT
Start: 2022-06-23 | End: 2022-06-23

## 2022-06-23 RX ORDER — ISOSORBIDE MONONITRATE 30 MG/1
30 TABLET, EXTENDED RELEASE ORAL DAILY
Qty: 90 TABLET | Refills: 3 | Status: ON HOLD | OUTPATIENT
Start: 2022-06-23 | End: 2022-11-24

## 2022-06-23 RX ORDER — TESTOSTERONE CYPIONATE 200 MG/ML
200 INJECTION, SOLUTION INTRAMUSCULAR WEEKLY
Qty: 4 ML | Refills: 5 | Status: SHIPPED | OUTPATIENT
Start: 2022-06-23 | End: 2023-01-13

## 2022-06-23 RX ORDER — ALBUTEROL SULFATE 90 UG/1
2 AEROSOL, METERED RESPIRATORY (INHALATION) EVERY 6 HOURS
Qty: 18 G | Refills: 5 | Status: SHIPPED | OUTPATIENT
Start: 2022-06-23 | End: 2023-06-21

## 2022-06-23 RX ORDER — CLONAZEPAM 1 MG/1
1 TABLET ORAL 2 TIMES DAILY PRN
Qty: 4 TABLET | Refills: 0 | Status: SHIPPED | OUTPATIENT
Start: 2022-06-23 | End: 2022-07-26

## 2022-06-23 RX ORDER — DEXTROAMPHETAMINE SACCHARATE, AMPHETAMINE ASPARTATE, DEXTROAMPHETAMINE SULFATE AND AMPHETAMINE SULFATE 5; 5; 5; 5 MG/1; MG/1; MG/1; MG/1
20 TABLET ORAL 2 TIMES DAILY
Qty: 90 TABLET | Refills: 0 | Status: SHIPPED | OUTPATIENT
Start: 2022-06-23 | End: 2022-12-30

## 2022-06-23 RX ORDER — CLOPIDOGREL BISULFATE 75 MG/1
75 TABLET ORAL DAILY
Qty: 90 TABLET | Refills: 3 | Status: ON HOLD | OUTPATIENT
Start: 2022-06-23 | End: 2022-11-24

## 2022-06-23 RX ORDER — AMITRIPTYLINE HYDROCHLORIDE 50 MG/1
150 TABLET ORAL AT BEDTIME
Qty: 270 TABLET | Refills: 3 | Status: SHIPPED | OUTPATIENT
Start: 2022-06-23 | End: 2023-07-05

## 2022-06-23 RX ADMIN — TRIAMCINOLONE ACETONIDE 40 MG: 40 INJECTION, SUSPENSION INTRA-ARTICULAR; INTRAMUSCULAR at 14:20

## 2022-06-23 ASSESSMENT — ENCOUNTER SYMPTOMS
EYE PAIN: 1
MYALGIAS: 1
HEARTBURN: 1
JOINT SWELLING: 1
ARTHRALGIAS: 1
NERVOUS/ANXIOUS: 1
DIZZINESS: 1
HEADACHES: 1
SHORTNESS OF BREATH: 1

## 2022-06-23 ASSESSMENT — PATIENT HEALTH QUESTIONNAIRE - PHQ9
SUM OF ALL RESPONSES TO PHQ QUESTIONS 1-9: 10
SUM OF ALL RESPONSES TO PHQ QUESTIONS 1-9: 10
10. IF YOU CHECKED OFF ANY PROBLEMS, HOW DIFFICULT HAVE THESE PROBLEMS MADE IT FOR YOU TO DO YOUR WORK, TAKE CARE OF THINGS AT HOME, OR GET ALONG WITH OTHER PEOPLE: SOMEWHAT DIFFICULT

## 2022-06-23 ASSESSMENT — ANXIETY QUESTIONNAIRES
3. WORRYING TOO MUCH ABOUT DIFFERENT THINGS: NEARLY EVERY DAY
6. BECOMING EASILY ANNOYED OR IRRITABLE: NOT AT ALL
4. TROUBLE RELAXING: NEARLY EVERY DAY
GAD7 TOTAL SCORE: 12
2. NOT BEING ABLE TO STOP OR CONTROL WORRYING: MORE THAN HALF THE DAYS
1. FEELING NERVOUS, ANXIOUS, OR ON EDGE: MORE THAN HALF THE DAYS
7. FEELING AFRAID AS IF SOMETHING AWFUL MIGHT HAPPEN: SEVERAL DAYS
8. IF YOU CHECKED OFF ANY PROBLEMS, HOW DIFFICULT HAVE THESE MADE IT FOR YOU TO DO YOUR WORK, TAKE CARE OF THINGS AT HOME, OR GET ALONG WITH OTHER PEOPLE?: VERY DIFFICULT
GAD7 TOTAL SCORE: 12
5. BEING SO RESTLESS THAT IT IS HARD TO SIT STILL: SEVERAL DAYS
GAD7 TOTAL SCORE: 12
7. FEELING AFRAID AS IF SOMETHING AWFUL MIGHT HAPPEN: SEVERAL DAYS

## 2022-06-23 ASSESSMENT — ACTIVITIES OF DAILY LIVING (ADL): CURRENT_FUNCTION: NO ASSISTANCE NEEDED

## 2022-06-23 ASSESSMENT — ASTHMA QUESTIONNAIRES: ACT_TOTALSCORE: 17

## 2022-06-23 NOTE — PATIENT INSTRUCTIONS
Patient Education   Personalized Prevention Plan  You are due for the preventive services outlined below.  Your care team is available to assist you in scheduling these services.  If you have already completed any of these items, please share that information with your care team to update in your medical record.  Health Maintenance Due   Topic Date Due     TREATMENT AGREEMENT FOR CHRONIC PAIN MANAGEMENT  Never done     Pneumococcal Vaccine (1 - PCV) Never done     Hepatitis B Vaccine (1 of 3 - Risk 3-dose series) Never done     Zoster (Shingles) Vaccine (1 of 2) Never done     Kidney Microalbumin Urine Test  06/28/2020     Asthma Action Plan - yearly  06/28/2020     Annual Wellness Visit  08/24/2021     Colorectal Cancer Screening  10/15/2021     ANNUAL REVIEW OF HM ORDERS  01/13/2022     Cholesterol Lab  02/08/2022     Prostate Test  02/08/2022     COVID-19 Vaccine (3 - Booster for Moderna series) 02/24/2022     Asthma Control Test  04/19/2022     Basic Metabolic Panel  05/05/2022     Comprehensive Metabolic Panel  05/05/2022     Complete Blood Count  05/05/2022     Hemoglobin  05/05/2022       Understanding USDA MyPlate  The USDA has guidelines to help you make healthy food choices. These are called MyPlate. MyPlate shows the food groups that make up healthy meals using the image of a place setting. Before you eat, think about the healthiest choices for what to put on your plate or in your cup or bowl. To learn more about building a healthy plate, visit www.choosemyplate.gov.    The food groups    Fruits. Any fruit or 100% fruit juice counts as part of the Fruit Group. Fruits may be fresh, canned, frozen, or dried, and may be whole, cut-up, or pureed. Make 1/2 of your plate fruits and vegetables.    Vegetables. Any vegetable or 100% vegetable juice counts as a member of the Vegetable Group. Vegetables may be fresh, frozen, canned, or dried. They can be served raw or cooked and may be whole, cut-up, or mashed.  Make 1/2 of your plate fruits and vegetables.    Grains. All foods made from grains are part of the Grains Group. These include wheat, rice, oats, cornmeal, and barley. Grains are often used to make foods such as bread, pasta, oatmeal, cereal, tortillas, and grits. Grains should be no more than 1/4 of your plate. At least half of your grains should be whole grains.    Protein. This group includes meat, poultry, seafood, beans and peas, eggs, processed soy products (such as tofu), nuts (including nut butters), and seeds. Make protein choices no more than 1/4 of your plate. Meat and poultry choices should be lean or low fat.    Dairy. The Dairy Group includes all fluid milk products and foods made from milk that contain calcium, such as yogurt and cheese. (Foods that have little calcium, such as cream, butter, and cream cheese, are not part of this group.) Most dairy choices should be low-fat or fat-free.    Oils. Oils aren't a food group, but they do contain essential nutrients. However it's important to watch your intake of oils. These are fats that are liquid at room temperature. They include canola, corn, olive, soybean, vegetable, and sunflower oil. Foods that are mainly oil include mayonnaise, certain salad dressings, and soft margarines. You likely already get your daily oil allowance from the foods you eat.  Things to limit  Eating healthy also means limiting these things in your diet:       Salt (sodium). Many processed foods have a lot of sodium. To keep sodium intake down, eat fresh vegetables, meats, poultry, and seafood when possible. Purchase low-sodium, reduced-sodium, or no-salt-added food products at the store. And don't add salt to your meals at home. Instead, season them with herbs and spices such as dill, oregano, cumin, and paprika. Or try adding flavor with lemon or lime zest and juice.    Saturated fat. Saturated fats are most often found in animal products such as beef, pork, and chicken. They  are often solid at room temperature, such as butter. To reduce your saturated fat intake, choose leaner cuts of meat and poultry. And try healthier cooking methods such as grilling, broiling, roasting, or baking. For a simple lower-fat swap, use plain nonfat yogurt instead of mayonnaise when making potato salad or macaroni salad.    Added sugars. These are sugars added to foods. They are in foods such as ice cream, candy, soda, fruit drinks, sports drinks, energy drinks, cookies, pastries, jams, and syrups. Cut down on added sugars by sharing sweet treats with a family member or friend. You can also choose fruit for dessert, and drink water or other unsweetened beverages.     [employer] last reviewed this educational content on 6/1/2020 2000-2021 [employer]. All rights reserved. This information is not intended as a substitute for professional medical care. Always follow your healthcare professional's instructions.          Signs of Hearing Loss      Hearing much better with one ear can be a sign of hearing loss.   Hearing loss is a problem shared by many people. In fact, it is one of the most common health problems, particularly as people age. Most people age 65 and older have some hearing loss. By age 80, almost everyone does. Hearing loss often occurs slowly over the years. So you may not realize your hearing has gotten worse.  Have your hearing checked  Call your healthcare provider if you:    Have to strain to hear normal conversation    Have to watch other people s faces very carefully to follow what they re saying    Need to ask people to repeat what they ve said    Often misunderstand what people are saying    Turn the volume of the television or radio up so high that others complain    Feel that people are mumbling when they re talking to you    Find that the effort to hear leaves you feeling tired and irritated    Notice, when using the phone, that you hear better with one ear than the  other  [employer] last reviewed this educational content on 1/1/2020 2000-2021 [employer]. All rights reserved. This information is not intended as a substitute for professional medical care. Always follow your healthcare professional's instructions.        Your Health Risk Assessment indicates you feel you are not in good emotional health.    Recreation   Recreation is not limited to sports and team events. It includes any activity that provides relaxation, interest, enjoyment, and exercise. Recreation provides an outlet for physical, mental, and social energy. It can give a sense of worth and achievement. It can help you stay healthy.    Mental Exercise and Social Involvement  Mental and emotional health is as important as physical health. Keep in touch with friends and family. Stay as active as possible. Continue to learn and challenge yourself.   Things you can do to stay mentally active are:    Learn something new, like a foreign language or musical instrument.     Play SCRABBLE or do crossword puzzles. If you cannot find people to play these games with you at home, you can play them with others on your computer through the Internet.     Join a games club--anything from card games to chess or checkers or lawn bowling.     Start a new hobby.     Go back to school.     Volunteer.     Read.   Keep up with world events.    Depression and Suicide in Older Adults    Nearly 2 million older Americans have some type of depression. Some of them even take their own lives. Yet depression among older adults is often ignored. Learn the warning signs. You may help spare a loved one needless pain. You may also save a life.   What is depression?  Depression is a common and serious illness that affects the way you think and feel. It is not a normal part of aging, nor is it a sign of weakness, a character flaw, or something you can snap out of. Most people with depression need treatment to get better. The most  "common symptom is a feeling of deep sadness. People who are depressed also may seem tired and listless. And nothing seems to give them pleasure. It s normal to grieve or be sad sometimes. But sadness lessens or passes with time. Depression rarely goes away or improves on its own. A person with clinical depression can't \"snap out of it.\" Other symptoms of depression are:     Sleeping more or less than normal    Eating more or less than normal    Having headaches, stomachaches, or other pains that don t go away    Feeling nervous,  empty,  or worthless    Crying a great deal    Thinking or talking about suicide or death    Loss of interest in activities previously enjoyed    Social isolation    Feeling confused or forgetful  What causes it?  The causes of depression aren t fully known. But it is thought to result from a complex blend of these factors:     Biochemistry. Certain chemicals in the brain play a role.    Genes. Depression does run in families.    Life stress. Life stresses can also trigger depression in some people. Older adults often face many stressors, such as death of friends or a spouse, health problems, and financial concerns.    Chronic conditions. This includes conditions such as diabetes, heart disease, or cancer. These can cause symptoms of depression. Medicine side effects can cause changes in thoughts and behaviors.  How you can help  Often, depressed people may not want to ask for help. When they do, they may be ignored. Or, they may receive the wrong treatment. You can help by showing parents and older friends love and support. If they seem depressed, don t lecture the person, ignore the symptoms, or discount the symptoms as a  normal  part of aging -which they are not. Get involved, listen, and show interest and support.   Help them understand that depression is a treatable illness. Tell them you can help them find the right treatment. Offer to go to their healthcare provider's appointment " with them for support when the symptoms are discussed. With their approval, contact a local mental health center, social service agency, or hospital about services.   You can be an advocate for him or her at healthcare appointments. Many older adults have chronic illnesses that can cause symptoms of depression. Medicine side effects can change thoughts and behaviors. You can help make sure that the healthcare provider looks at all of these factors. He or she should refer your family member or friend to a mental healthcare provider when needed. in some cases, untreated depression can lead to a misdiagnosis. A person may be diagnosed with a brain disorder such as dementia. If the healthcare provider does not take the issue of depression seriously, help your family member or friend to find another provider.   Don't be afraid to ask  If you think an older person you care about could be suicidal, ask,  Have you thought about suicide?  Most people will tell you the truth. If they say  yes,  they may already have a plan for how and when they will attempt it. Find out as much as you can. The more detailed the plan, and the easier it is to carry out, the more danger the person is in right now. Tell the person you are there for them and do not want them to harm him or herself. Don't wait to get help for the person. Call the person's healthcare provider, local hospital, or emergency services.   To learn more    National Suicide Prevention Lifeline (crisis hotline) 522-957-LYNE (464-606-6446)    National [place] of Mental Nnsreu094-083-4371ypz.[facility] Hospital[facility].nih.gov    National E[place]ile on Mental Auyljok352-508-8251fpb.fabian.org    Mental Health Pouakde333-743-5358yej.[facility].org    National Suicide Kdcqvga132-PSFDJJH (760-094-3471)    Call 041  Never leave the person alone. A person who is actively suicidal needs psychiatric care right away. They will need constant supervision. Never leave the person out of sight. Call 911 or the national  24-hour suicide crisis hotline at 093-500-DILT (767-986-9226). You can also take the person to the closest emergency room.   Molly last reviewed this educational content on 5/1/2020 2000-2021 [employer]. All rights reserved. This information is not intended as a substitute for professional medical care. Always follow your healthcare professional's instructions.
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
## 2022-06-23 NOTE — LETTER
[facility] [place]  06/23/22  Patient: Jeremi Damon  YOB: 1970  Medical Record Number: [id number]                                                                                  Non-Opioid Controlled Substance Agreement    This is an agreement between you and your provider regarding safe and appropriate use of controlled substances prescribed by your care team. Controlled substances are?medicines that can cause physical and mental dependence (abuse).     There are strict laws about having and using these medicines. We here at [facility] are  committed to working with you in your efforts to get better. To support you in this work, we'll help you schedule regular office appointments for medicine refills. If we must cancel or change your appointment for any reason, we'll make sure you have enough medicine to last until your next appointment.     As a Provider, I will:     Listen carefully to your concerns while treating you with respect.     Recommend a treatment plan that I believe is in your best interest and may involve therapies other than medicine.      Talk with you often about the possible benefits and the risk of harm of any medicine that we prescribe for you.    Assess the safety of this medicine and check how well it works.      Provide a plan on how to taper (discontinue or go off) using this medicine if the decision is made to stop its use.      ::  As a Patient, I understand controlled substances:       Are prescribed by my care provider to help me function or work and manage my condition(s).?    Are strong medicines and can cause serious side effects.       Need to be taken exactly as prescribed.?Combining controlled substances with certain medicines or chemicals (such as illegal drugs, alcohol, sedatives, sleeping pills, and benzodiazepines) can be dangerous or even fatal.? If I stop taking my medicines suddenly, I may have severe withdrawal symptoms.     The  risks, benefits, and side effects of these medicine(s) were explained to me. I agree that:    1. I will take part in other treatments as advised by my care team. This may be psychiatry or counseling, physical therapy, behavioral therapy, group treatment or a referral to specialist.  2. I will keep all my appointments and understand this is part of the monitoring of controlled substances.?My care team may require an office visit for EVERY controlled substance refill. If I miss appointments or don t follow instructions, my care team may stop my medicine  3. I will take my medicines as prescribed. I will not change the dose or schedule unless my care team tells me to. There will be no refills if I run out early.    4. I may be asked to come to the clinic and complete a urine drug test or complete a pill count. If I don t give a urine sample or participate in a pill count, the care team may stop my medicine.    5. I will only receive controlled substance prescriptions from this clinic. If I am treated by another provider, I will tell them that I am taking controlled substances and that I have a treatment agreement with this provider. I will inform my [facility] care team within one business day if I am given a prescription for any controlled substance by another healthcare provider. My [facility] care team can contact other providers and pharmacists about my use of any medicines.    6. It is up to me to make sure that I don't run out of my medicines on weekends or holidays.?If my care team is willing to refill my prescription without a visit, I must request refills only during office hours. Refills may take up to 3 business days to process. I will use one pharmacy to fill all my controlled substance prescriptions. I will notify the clinic about any changes to my insurance or medicine availability.    7. I am responsible for my prescriptions. If the medicine/prescription is lost, stolen or destroyed, it  will not be replaced.?I also agree not to share controlled substance medicines with anyone.     8. I am aware I should not use any illegal or recreational drugs. I agree not to drink alcohol unless my care team says I can.     9. If I enroll in the [place] Medical Cannabis program, I will tell my care team before my next refill.    10. I will tell my care team right away if I become pregnant, have a new medical problem treated outside of my regular clinic, or have a change in my medicines.     11. I understand that this medicine can affect my thinking, judgment and reaction time.? Alcohol and drugs affect the brain and body, which can affect the safety of my driving. Being under the influence of alcohol or drugs can affect my decision-making, behaviors, personal safety and the safety of others. Driving while impaired (DWI) can occur if a person is driving, operating or in physical control of a car, motorcycle, boat, snowmobile, ATV, motorbike, off-road vehicle or any other motor vehicle ([place] Statute 169A.20). I understand the risk if I choose to drive or operate any vehicle or machinery.    I understand that if I do not follow any of the conditions above, my prescriptions or treatment may be stopped or changed.   I agree that my provider, clinic care team and pharmacy may work with any city, state or federal law enforcement agency that investigates the misuse, sale or other diversion of my controlled medicine. I will allow my provider to discuss my care with, or share a copy of, this agreement with any other treating provider, pharmacy or emergency room where I receive care.     I have read this agreement and have asked questions about anything I did not understand.    ________________________________________________________  Patient Signature - Jeremi Damon     ___________________                   Date     ________________________________________________________  Provider Signature - Luis Armando Segovia MD        ___________________                   Date     ________________________________________________________  Witness Signature (required if provider not present while patient signing)          ___________________                   Date

## 2022-06-23 NOTE — PROGRESS NOTES
The patient was counseled and encouraged to consider modifying their diet and eating habits. He was provided with information on recommended healthy diet options.  The patient was provided with written information regarding signs of hearing loss.  The patient was provided with suggestions to help him develop a healthy emotional lifestyle.  The patient s PHQ-9 score is consistent with moderate depression. He was provided with information regarding depression

## 2022-06-26 LAB
ALBUMIN SERPL-MCNC: 3.7 G/DL (ref 3.4–5)
ALP SERPL-CCNC: 68 U/L (ref 40–150)
ALT SERPL W P-5'-P-CCNC: 68 U/L (ref 0–70)
ANION GAP SERPL CALCULATED.3IONS-SCNC: 4 MMOL/L (ref 3–14)
AST SERPL W P-5'-P-CCNC: 60 U/L (ref 0–45)
BILIRUB SERPL-MCNC: 0.8 MG/DL (ref 0.2–1.3)
BUN SERPL-MCNC: 19 MG/DL (ref 7–30)
CALCIUM SERPL-MCNC: 8.4 MG/DL (ref 8.5–10.1)
CHLORIDE BLD-SCNC: 104 MMOL/L (ref 94–109)
CHOLEST SERPL-MCNC: 90 MG/DL
CO2 SERPL-SCNC: 31 MMOL/L (ref 20–32)
CREAT SERPL-MCNC: 1.6 MG/DL (ref 0.66–1.25)
CREAT UR-MCNC: 227 MG/DL
FASTING STATUS PATIENT QL REPORTED: ABNORMAL
GFR SERPL CREATININE-BSD FRML MDRD: 51 ML/MIN/1.73M2
GLUCOSE BLD-MCNC: 61 MG/DL (ref 70–99)
HDLC SERPL-MCNC: 28 MG/DL
LDLC SERPL CALC-MCNC: 19 MG/DL
MICROALBUMIN UR-MCNC: 1450 MG/L
MICROALBUMIN/CREAT UR: 638.77 MG/G CR (ref 0–17)
NONHDLC SERPL-MCNC: 62 MG/DL
POTASSIUM BLD-SCNC: 4.3 MMOL/L (ref 3.4–5.3)
PROT SERPL-MCNC: 6.6 G/DL (ref 6.8–8.8)
PSA SERPL-MCNC: 1.76 UG/L (ref 0–4)
SODIUM SERPL-SCNC: 139 MMOL/L (ref 133–144)
TRIGL SERPL-MCNC: 215 MG/DL

## 2022-06-27 NOTE — RESULT ENCOUNTER NOTE
Dear Jeremi,    Here is a summary of your recent test results:  -Normal red blood cell (hgb) levels, normal white blood cell count and normal platelet levels.  -PSA (prostate specific antigen) test is normal.  This indicates a low likelihood of prostate cancer.  ADVISE: rechecking this in 1 year.  -Cholesterol levels are at your goal levels.  ADVISE: continuing your medication, a regular exercise program with at least 150 minutes of aerobic exercise per week, and eating a low saturated fat/low carbohydrate diet.  Also, you should recheck this fasting cholesterol panel in 12 months.  -Liver and gallbladder tests (ALT,AST, Alk phos,bilirubin) mildly elevated  -Kidney function (GFR) is slight decreased, but better than last time.  ADVISE: Staying well-hydrated and keeping blood pressure under good control.  -Sodium is normal.  -Potassium is normal.  -Calcium is normal.  -Glucose (diabetic screening test) is normal.  -Microalbumin (urine protein) level is elevated. This is suggestive of early damage to your kidneys from high blood pressure.  ADVISE: avoiding anti-inflamatory agents such as ibuprofen (Advil, Motrin) or naproxen (Aleve) as much as possible, keeping your blood pressure in a normal range, and continuing your medication (losartan) that helps protect your kidneys.  Also, this should be rechecked in 1 year.     For additional lab test information, www.testing.com is a very good reference.    In addition, here is a list of due or overdue Health Maintenance reminders:  TREATMENT AGREEMENT FOR CHRONIC PAIN MANAGEMENT Never done  Pneumococcal Vaccine(1 - PCV) Never done  Hepatitis B Vaccine(1 of 3 - Risk 3-dose series) Never done  Zoster (Shingles) Vaccine(1 of 2) Never done  Asthma Action Plan - yearly due on 06/28/2020  Colorectal Cancer Screening due on 10/15/2021  COVID-19 Vaccine(3 - Booster for Moderna series) due on 02/24/2022    Please call us at [phone] (or use [employer]) to address the above  recommendations if needed.           Thank you very much for trusting me and [facility].     Have a peaceful day.    Healthy regards,  Nate Segovia MD

## 2022-06-30 DIAGNOSIS — F11.20 UNCOMPLICATED OPIOID DEPENDENCE (H): ICD-10-CM

## 2022-06-30 NOTE — TELEPHONE ENCOUNTER
Routed to MA pool to gather required information for opioid refill.Suboxone      Called pt. He states that he used the last film today. Inquired how he has been taking his medication, reports that take 2 film/day. Advised that he should not be due until 07/08. Pt reports he should not be out early as he never over takes his medication. Pt recalled throwing his medications into a gym bag, was able to find #14 films. Not out early. Pt also has concerns that he could not get into an appointment until Aug with provider. Advised that we are aware she is booked out, ok to process his next refill starting 07/08 and this will last until his 08/02 appt.     Per  last picked up 06/08/22. Due 07/08/22.

## 2022-06-30 NOTE — TELEPHONE ENCOUNTER
Reason for call:  Other   Patient called regarding (reason for call):   Additional comments: Pt requesting to speak with Dr Subramanian regarding his pain, per pt he needs a bridge. Please call to advise.    Phone number to reach patient:  Home number on file [phone] (home)    Can we leave a detailed message on this number?  YES    Travel screening: Not Applicable          Jacinda Saldivar    [facility] [place]

## 2022-07-05 RX ORDER — BUPRENORPHINE AND NALOXONE 8; 2 MG/1; MG/1
FILM, SOLUBLE BUCCAL; SUBLINGUAL
Qty: 60 FILM | Refills: 0 | Status: SHIPPED | OUTPATIENT
Start: 2022-07-05 | End: 2022-08-02

## 2022-07-05 NOTE — TELEPHONE ENCOUNTER
Lona message sent with Rx approval from provider.  Evie ROCK [facility] Team'

## 2022-07-05 NOTE — TELEPHONE ENCOUNTER
Received call from patient  requesting refill(s) for  buprenorphine HCl-naloxone HCl (SUBOXONE) 8-2 MG per film    Last dispensed from pharmacy on 6/8/22    Patient's last office/virtual visit by prescribing provider on 4/21/22  Next office/virtual appointment scheduled for 8/2/22    Last urine drug screen date 3/16/22  Current opioid agreement on file? NO    E-prescribe to:    [id number] - [place], [place] - [address]    Will route to [facility] for review and preparation of prescription(s).

## 2022-07-05 NOTE — TELEPHONE ENCOUNTER
Script Eprescribed to pharmacy  [place] Prescription Monitoring Program checked    Will send this to MA team to notify patient.    Signed Prescriptions:                        Disp   Refills    buprenorphine HCl-naloxone HCl (SUBOXONE) *60 Film0        Si2 film QID - Brand name only  Authorizing Provider: ABENA MEDELLIN MD

## 2022-07-05 NOTE — TELEPHONE ENCOUNTER
Routing to provider to review medication prepped per below    buprenorphine HCl-naloxone HCl (SUBOXONE) 8-2 MG per film #60, Refill:0  Si/2 film QID - Brand name only  Last picked up 22  Due: 22    Per last OV note 22:  Continue buprenorphine HCl-naloxone HCl (SUBOXONE) 8-2 MG per film; 1/2 film QID       [id number] - [place], [place] - [address]  [address] [address]  Phone: [phone] Fax: [phone]    Carmina MANRIQUE RN Care Coordinator  [facility]

## 2022-07-26 ENCOUNTER — MYC REFILL (OUTPATIENT)
Dept: FAMILY MEDICINE | Facility: CLINIC | Age: 52
End: 2022-07-26

## 2022-07-26 DIAGNOSIS — F40.243 FLYING PHOBIA: ICD-10-CM

## 2022-07-26 RX ORDER — CLONAZEPAM 1 MG/1
1 TABLET ORAL 2 TIMES DAILY PRN
Qty: 4 TABLET | Refills: 0 | Status: SHIPPED | OUTPATIENT
Start: 2022-07-26 | End: 2022-09-14

## 2022-07-26 NOTE — TELEPHONE ENCOUNTER
clonazePAM (KLONOPIN) 1 MG tablet 4 tablet 0 6/23/2022  No   Sig - Route: Take 1 tablet (1 mg) by mouth 2 times daily as needed (flying phobia) - Oral   Sent to pharmacy as: clonazePAM 1 MG Oral Tablet (klonoPIN)   Class: E-Prescribe       Last office visit: 6/23/2022     Future Office Visit:        CSA -- Patient Level:    Controlled Substance Agreement - Non - Opioid - Scan on 6/25/2022  5:47 AM: NON-OPIOID CONTROLLED SUBSTANCE AGREEMENT             Routing refill request to provider for review/approval because:  Drug not on the [facility] refill protocol     Kari Mcleod RN, BSN  [facility]
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
## 2022-08-02 ENCOUNTER — VIRTUAL VISIT (OUTPATIENT)
Dept: PALLIATIVE MEDICINE | Facility: CLINIC | Age: 52
End: 2022-08-02
Payer: COMMERCIAL

## 2022-08-02 DIAGNOSIS — F41.1 GENERALIZED ANXIETY DISORDER: ICD-10-CM

## 2022-08-02 DIAGNOSIS — G43.709 CHRONIC MIGRAINE WITHOUT AURA, NOT INTRACTABLE, WITHOUT STATUS MIGRAINOSUS: ICD-10-CM

## 2022-08-02 DIAGNOSIS — F11.20 UNCOMPLICATED OPIOID DEPENDENCE (H): Primary | ICD-10-CM

## 2022-08-02 DIAGNOSIS — Z79.899 ENCOUNTER FOR LONG-TERM (CURRENT) USE OF HIGH-RISK MEDICATION: ICD-10-CM

## 2022-08-02 DIAGNOSIS — G89.4 CHRONIC PAIN SYNDROME: ICD-10-CM

## 2022-08-02 PROCEDURE — 99214 OFFICE O/P EST MOD 30 MIN: CPT | Mod: 95 | Performed by: ANESTHESIOLOGY

## 2022-08-02 RX ORDER — BUPRENORPHINE AND NALOXONE 8; 2 MG/1; MG/1
FILM, SOLUBLE BUCCAL; SUBLINGUAL
Qty: 60 FILM | Refills: 2 | Status: SHIPPED | OUTPATIENT
Start: 2022-08-02 | End: 2022-10-12

## 2022-08-02 ASSESSMENT — PAIN SCALES - GENERAL: PAINLEVEL: EXTREME PAIN (8)

## 2022-08-02 NOTE — PATIENT INSTRUCTIONS
----------------------------------------------------------------  [facility] Number:  [phone]   Call with any questions about your care and for scheduling assistance.   Calls are returned Monday through Friday between 8 AM and 4:30 PM. We usually get back to you within 2 business days depending on the issue/request.    If we are prescribing your medications:  For opioid medication refills, call the clinic or send a [employer] message 7 days in advance.  Please include:  Name of requested medication  Name of the pharmacy.  For non-opioid medications, call your pharmacy directly to request a refill. Please allow 3-4 days to be processed.   Per [place] State Law:  All controlled substance prescriptions must be filled within 30 days of being written.    For those controlled substances allowing refills, pickup must occur within 30 days of last fill.      We believe regular attendance is key to your success in our program!    Any time you are unable to keep your appointment we ask that you call us at least 24 hours in advance to cancel.This will allow us to offer the appointment time to another patient.   Multiple missed appointments may lead to dismissal from the clinic.

## 2022-08-11 DIAGNOSIS — H04.123 DRY EYES, BILATERAL: ICD-10-CM

## 2022-08-11 RX ORDER — CYCLOSPORINE 0.5 MG/ML
EMULSION OPHTHALMIC
Qty: 60 ML | Refills: 4 | Status: SHIPPED | OUTPATIENT
Start: 2022-08-11 | End: 2023-01-11

## 2022-08-11 NOTE — TELEPHONE ENCOUNTER
Routing refill request to provider for review/approval because:  Drug not on the [facility] refill protocol     Rola Sullivan RN  [facility]

## 2022-08-22 DIAGNOSIS — F41.0 PANIC DISORDER WITHOUT AGORAPHOBIA: ICD-10-CM

## 2022-08-22 DIAGNOSIS — F41.1 GENERALIZED ANXIETY DISORDER: ICD-10-CM

## 2022-08-22 NOTE — TELEPHONE ENCOUNTER
Lorazepam     Last Written Prescription Date:  07/01/22  Last Fill Quantity: 60,   # refills: 1  Last Office Visit: 06/23/22  Future Office visit:       Routing refill request to provider for review/approval because:  Drug not on the [facility], [facility] or [facility] refill protocol or controlled substance.    Patient wants RX to go over today because his Pharmacy said they will refill on 08/24.   Patient wants RX's for Lorazepam to always go over early and sit until ready to refill.

## 2022-08-23 ENCOUNTER — MYC MEDICAL ADVICE (OUTPATIENT)
Dept: FAMILY MEDICINE | Facility: CLINIC | Age: 52
End: 2022-08-23

## 2022-08-23 RX ORDER — LORAZEPAM 1 MG/1
1 TABLET ORAL 2 TIMES DAILY PRN
Qty: 60 TABLET | Refills: 1 | Status: SHIPPED | OUTPATIENT
Start: 2022-08-27 | End: 2022-12-16

## 2022-08-23 NOTE — TELEPHONE ENCOUNTER
PDMP shows that his last lorazepam prescription was filled on 7/28/2022 and therefore his next 1 can be filled on 8/27/2022-sent
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
## 2022-09-13 DIAGNOSIS — F40.243 FLYING PHOBIA: ICD-10-CM

## 2022-09-14 RX ORDER — CLONAZEPAM 1 MG/1
1 TABLET ORAL 2 TIMES DAILY PRN
Qty: 4 TABLET | Refills: 0 | Status: SHIPPED | OUTPATIENT
Start: 2022-09-14 | End: 2022-12-30

## 2022-09-18 DIAGNOSIS — F40.243 FLYING PHOBIA: ICD-10-CM

## 2022-09-19 ENCOUNTER — MYC MEDICAL ADVICE (OUTPATIENT)
Dept: FAMILY MEDICINE | Facility: CLINIC | Age: 52
End: 2022-09-19

## 2022-09-19 DIAGNOSIS — B36.0 TINEA VERSICOLOR: ICD-10-CM

## 2022-09-19 RX ORDER — CLONAZEPAM 1 MG/1
1 TABLET ORAL 2 TIMES DAILY PRN
Qty: 4 TABLET | Refills: 0 | OUTPATIENT
Start: 2022-09-19

## 2022-09-21 RX ORDER — FLUCONAZOLE 150 MG/1
300 TABLET ORAL
Qty: 4 TABLET | Refills: 1 | Status: ON HOLD | OUTPATIENT
Start: 2022-09-21 | End: 2022-11-24

## 2022-09-21 NOTE — TELEPHONE ENCOUNTER
Please see my chart message below     Please review and advise     Thank you     Kari Mcleod RN, BSN  [facility]

## 2022-09-22 NOTE — TELEPHONE ENCOUNTER
Routing to pcp  Please review MyChart response    Thank you!  Cinda RIOS RN   [facility]

## 2022-09-29 DIAGNOSIS — G47.00 INSOMNIA, UNSPECIFIED TYPE: ICD-10-CM

## 2022-09-29 DIAGNOSIS — M54.2 NECK PAIN: ICD-10-CM

## 2022-09-29 RX ORDER — ZOLPIDEM TARTRATE 10 MG/1
10 TABLET ORAL
Qty: 30 TABLET | Refills: 2 | Status: SHIPPED | OUTPATIENT
Start: 2022-09-29 | End: 2022-12-12

## 2022-09-29 RX ORDER — CYCLOBENZAPRINE HCL 10 MG
10 TABLET ORAL 3 TIMES DAILY PRN
Qty: 90 TABLET | Refills: 3 | Status: SHIPPED | OUTPATIENT
Start: 2022-09-29 | End: 2023-01-30

## 2022-09-29 NOTE — TELEPHONE ENCOUNTER
Received fax request from [facility]/pharmacy #[id number] - [place], [place] - [address]  pharmacy requesting refill(s) for    cyclobenzaprine (FLEXERIL) 10 MG tablet     Last refilled on 08/24/2022    Pt last seen on 08/02/2022  Next appt scheduled for 10/12/2022    Will facilitate refill.    Mariza Hernandez MA  [facility] [place]

## 2022-10-12 ENCOUNTER — OFFICE VISIT (OUTPATIENT)
Dept: PALLIATIVE MEDICINE | Facility: CLINIC | Age: 52
End: 2022-10-12
Payer: COMMERCIAL

## 2022-10-12 VITALS — HEART RATE: 81 BPM | DIASTOLIC BLOOD PRESSURE: 92 MMHG | SYSTOLIC BLOOD PRESSURE: 136 MMHG | OXYGEN SATURATION: 94 %

## 2022-10-12 DIAGNOSIS — F11.20 UNCOMPLICATED OPIOID DEPENDENCE (H): ICD-10-CM

## 2022-10-12 DIAGNOSIS — G43.709 CHRONIC MIGRAINE WITHOUT AURA, NOT INTRACTABLE, WITHOUT STATUS MIGRAINOSUS: Primary | ICD-10-CM

## 2022-10-12 PROCEDURE — 64615 CHEMODENERV MUSC MIGRAINE: CPT | Performed by: ANESTHESIOLOGY

## 2022-10-12 RX ORDER — BUPRENORPHINE AND NALOXONE 8; 2 MG/1; MG/1
FILM, SOLUBLE BUCCAL; SUBLINGUAL
Qty: 60 FILM | Refills: 1 | Status: SHIPPED | OUTPATIENT
Start: 2022-10-12 | End: 2023-01-12

## 2022-10-12 ASSESSMENT — PAIN SCALES - GENERAL: PAINLEVEL: EXTREME PAIN (8)

## 2022-10-12 NOTE — PATIENT INSTRUCTIONS
[facility]  Botox Injection Discharge Instructions    Do not rub or put extended pressure on the injection sites. You may gently touch the sites to remove any excess blood.  Monitor the injection sites for signs and symptoms of infection such as redness, swelling, warmth, fever, chills, or drainage to areas.  You may have soreness at the injection sites for up to 24 hours.  If you are able to use anti-inflammatory medications or Tylenol for pain control, you can take these as directed.  It may take up to 1 month to notice benefit from the 1st treatment  If you do notice relief, botox can be done every 3 months.  It may require insurance authorization everytime.    For questions about insurance coverage, please call the main clinic number and ask to speak with someone about botox coverage.     Pain Clinic phone number during work hours (Monday through Friday 8 am-4:30 pm) at [phone] or the Provider Line after hours at [phone]:
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
## 2022-11-03 ASSESSMENT — ASTHMA QUESTIONNAIRES
ACT_TOTALSCORE: 17
QUESTION_5 LAST FOUR WEEKS HOW WOULD YOU RATE YOUR ASTHMA CONTROL: SOMEWHAT CONTROLLED
QUESTION_4 LAST FOUR WEEKS HOW OFTEN HAVE YOU USED YOUR RESCUE INHALER OR NEBULIZER MEDICATION (SUCH AS ALBUTEROL): ONE OR TWO TIMES PER DAY
QUESTION_1 LAST FOUR WEEKS HOW MUCH OF THE TIME DID YOUR ASTHMA KEEP YOU FROM GETTING AS MUCH DONE AT WORK, SCHOOL OR AT HOME: A LITTLE OF THE TIME
QUESTION_3 LAST FOUR WEEKS HOW OFTEN DID YOUR ASTHMA SYMPTOMS (WHEEZING, COUGHING, SHORTNESS OF BREATH, CHEST TIGHTNESS OR PAIN) WAKE YOU UP AT NIGHT OR EARLIER THAN USUAL IN THE MORNING: ONCE OR TWICE
ACT_TOTALSCORE: 17
QUESTION_2 LAST FOUR WEEKS HOW OFTEN HAVE YOU HAD SHORTNESS OF BREATH: ONCE OR TWICE A WEEK

## 2022-11-04 ENCOUNTER — OFFICE VISIT (OUTPATIENT)
Dept: FAMILY MEDICINE | Facility: CLINIC | Age: 52
End: 2022-11-04
Payer: COMMERCIAL

## 2022-11-04 VITALS
WEIGHT: 243 LBS | RESPIRATION RATE: 18 BRPM | TEMPERATURE: 98.2 F | SYSTOLIC BLOOD PRESSURE: 110 MMHG | HEART RATE: 103 BPM | BODY MASS INDEX: 32.91 KG/M2 | HEIGHT: 72 IN | OXYGEN SATURATION: 94 % | DIASTOLIC BLOOD PRESSURE: 84 MMHG

## 2022-11-04 DIAGNOSIS — M75.102 ROTATOR CUFF SYNDROME, LEFT: ICD-10-CM

## 2022-11-04 DIAGNOSIS — Z12.11 SCREEN FOR COLON CANCER: ICD-10-CM

## 2022-11-04 DIAGNOSIS — N18.30 STAGE 3 CHRONIC KIDNEY DISEASE, UNSPECIFIED WHETHER STAGE 3A OR 3B CKD (H): ICD-10-CM

## 2022-11-04 DIAGNOSIS — F41.0 PANIC DISORDER WITHOUT AGORAPHOBIA: ICD-10-CM

## 2022-11-04 DIAGNOSIS — J20.9 ACUTE BRONCHITIS WITH SYMPTOMS > 10 DAYS: ICD-10-CM

## 2022-11-04 DIAGNOSIS — M75.52 SUBACROMIAL BURSITIS OF LEFT SHOULDER JOINT: ICD-10-CM

## 2022-11-04 DIAGNOSIS — F41.1 GENERALIZED ANXIETY DISORDER: ICD-10-CM

## 2022-11-04 DIAGNOSIS — M25.512 LEFT SHOULDER PAIN, UNSPECIFIED CHRONICITY: Primary | ICD-10-CM

## 2022-11-04 DIAGNOSIS — I10 HYPERTENSION GOAL BP (BLOOD PRESSURE) < 140/90: ICD-10-CM

## 2022-11-04 DIAGNOSIS — R07.9 CHEST PAIN, UNSPECIFIED TYPE: ICD-10-CM

## 2022-11-04 PROBLEM — E66.01 MORBID OBESITY (H): Status: RESOLVED | Noted: 2019-08-29 | Resolved: 2022-11-04

## 2022-11-04 LAB
ALBUMIN SERPL-MCNC: 3.1 G/DL (ref 3.4–5)
ALP SERPL-CCNC: 72 U/L (ref 40–150)
ALT SERPL W P-5'-P-CCNC: 79 U/L (ref 0–70)
ANION GAP SERPL CALCULATED.3IONS-SCNC: 4 MMOL/L (ref 3–14)
AST SERPL W P-5'-P-CCNC: 54 U/L (ref 0–45)
BASOPHILS # BLD AUTO: 0.1 10E3/UL (ref 0–0.2)
BASOPHILS NFR BLD AUTO: 1 %
BILIRUB SERPL-MCNC: 0.5 MG/DL (ref 0.2–1.3)
BUN SERPL-MCNC: 16 MG/DL (ref 7–30)
CALCIUM SERPL-MCNC: 9.4 MG/DL (ref 8.5–10.1)
CHLORIDE BLD-SCNC: 106 MMOL/L (ref 94–109)
CO2 SERPL-SCNC: 30 MMOL/L (ref 20–32)
CREAT SERPL-MCNC: 1.45 MG/DL (ref 0.66–1.25)
EOSINOPHIL # BLD AUTO: 0.5 10E3/UL (ref 0–0.7)
EOSINOPHIL NFR BLD AUTO: 5 %
ERYTHROCYTE [DISTWIDTH] IN BLOOD BY AUTOMATED COUNT: 15.7 % (ref 10–15)
GFR SERPL CREATININE-BSD FRML MDRD: 57 ML/MIN/1.73M2
GLUCOSE BLD-MCNC: 105 MG/DL (ref 70–99)
HCT VFR BLD AUTO: 52.9 % (ref 40–53)
HGB BLD-MCNC: 17 G/DL (ref 13.3–17.7)
IMM GRANULOCYTES # BLD: 0 10E3/UL
IMM GRANULOCYTES NFR BLD: 0 %
LYMPHOCYTES # BLD AUTO: 1.5 10E3/UL (ref 0.8–5.3)
LYMPHOCYTES NFR BLD AUTO: 18 %
MCH RBC QN AUTO: 27.6 PG (ref 26.5–33)
MCHC RBC AUTO-ENTMCNC: 32.1 G/DL (ref 31.5–36.5)
MCV RBC AUTO: 86 FL (ref 78–100)
MONOCYTES # BLD AUTO: 0.9 10E3/UL (ref 0–1.3)
MONOCYTES NFR BLD AUTO: 10 %
NEUTROPHILS # BLD AUTO: 5.7 10E3/UL (ref 1.6–8.3)
NEUTROPHILS NFR BLD AUTO: 66 %
PLATELET # BLD AUTO: 188 10E3/UL (ref 150–450)
POTASSIUM BLD-SCNC: 4.5 MMOL/L (ref 3.4–5.3)
PROT SERPL-MCNC: 6.2 G/DL (ref 6.8–8.8)
RBC # BLD AUTO: 6.16 10E6/UL (ref 4.4–5.9)
SODIUM SERPL-SCNC: 140 MMOL/L (ref 133–144)
WBC # BLD AUTO: 8.7 10E3/UL (ref 4–11)

## 2022-11-04 PROCEDURE — 93000 ELECTROCARDIOGRAM COMPLETE: CPT | Performed by: FAMILY MEDICINE

## 2022-11-04 PROCEDURE — 36415 COLL VENOUS BLD VENIPUNCTURE: CPT | Performed by: FAMILY MEDICINE

## 2022-11-04 PROCEDURE — 96127 BRIEF EMOTIONAL/BEHAV ASSMT: CPT | Mod: 59 | Performed by: FAMILY MEDICINE

## 2022-11-04 PROCEDURE — 80053 COMPREHEN METABOLIC PANEL: CPT | Performed by: FAMILY MEDICINE

## 2022-11-04 PROCEDURE — 20610 DRAIN/INJ JOINT/BURSA W/O US: CPT | Mod: 59 | Performed by: FAMILY MEDICINE

## 2022-11-04 PROCEDURE — 99215 OFFICE O/P EST HI 40 MIN: CPT | Mod: 25 | Performed by: FAMILY MEDICINE

## 2022-11-04 PROCEDURE — 85025 COMPLETE CBC W/AUTO DIFF WBC: CPT | Performed by: FAMILY MEDICINE

## 2022-11-04 RX ORDER — AZITHROMYCIN 250 MG/1
TABLET, FILM COATED ORAL
Qty: 6 TABLET | Refills: 0 | Status: SHIPPED | OUTPATIENT
Start: 2022-11-04 | End: 2023-04-30

## 2022-11-04 RX ORDER — TRIAMCINOLONE ACETONIDE 40 MG/ML
40 INJECTION, SUSPENSION INTRA-ARTICULAR; INTRAMUSCULAR ONCE
Status: COMPLETED | OUTPATIENT
Start: 2022-11-04 | End: 2022-11-04

## 2022-11-04 RX ORDER — BUSPIRONE HYDROCHLORIDE 5 MG/1
5 TABLET ORAL 2 TIMES DAILY PRN
Qty: 30 TABLET | Refills: 1 | Status: SHIPPED | OUTPATIENT
Start: 2022-11-04 | End: 2022-12-16

## 2022-11-04 RX ADMIN — TRIAMCINOLONE ACETONIDE 40 MG: 40 INJECTION, SUSPENSION INTRA-ARTICULAR; INTRAMUSCULAR at 13:34

## 2022-11-04 ASSESSMENT — ANXIETY QUESTIONNAIRES
8. IF YOU CHECKED OFF ANY PROBLEMS, HOW DIFFICULT HAVE THESE MADE IT FOR YOU TO DO YOUR WORK, TAKE CARE OF THINGS AT HOME, OR GET ALONG WITH OTHER PEOPLE?: VERY DIFFICULT
IF YOU CHECKED OFF ANY PROBLEMS ON THIS QUESTIONNAIRE, HOW DIFFICULT HAVE THESE PROBLEMS MADE IT FOR YOU TO DO YOUR WORK, TAKE CARE OF THINGS AT HOME, OR GET ALONG WITH OTHER PEOPLE: VERY DIFFICULT
2. NOT BEING ABLE TO STOP OR CONTROL WORRYING: NEARLY EVERY DAY
6. BECOMING EASILY ANNOYED OR IRRITABLE: MORE THAN HALF THE DAYS
5. BEING SO RESTLESS THAT IT IS HARD TO SIT STILL: MORE THAN HALF THE DAYS
4. TROUBLE RELAXING: NEARLY EVERY DAY
GAD7 TOTAL SCORE: 18
GAD7 TOTAL SCORE: 18
7. FEELING AFRAID AS IF SOMETHING AWFUL MIGHT HAPPEN: MORE THAN HALF THE DAYS
7. FEELING AFRAID AS IF SOMETHING AWFUL MIGHT HAPPEN: MORE THAN HALF THE DAYS
3. WORRYING TOO MUCH ABOUT DIFFERENT THINGS: NEARLY EVERY DAY
GAD7 TOTAL SCORE: 18
1. FEELING NERVOUS, ANXIOUS, OR ON EDGE: NEARLY EVERY DAY

## 2022-11-04 ASSESSMENT — PAIN SCALES - GENERAL: PAINLEVEL: SEVERE PAIN (6)

## 2022-11-04 ASSESSMENT — PATIENT HEALTH QUESTIONNAIRE - PHQ9
SUM OF ALL RESPONSES TO PHQ QUESTIONS 1-9: 10
SUM OF ALL RESPONSES TO PHQ QUESTIONS 1-9: 10
10. IF YOU CHECKED OFF ANY PROBLEMS, HOW DIFFICULT HAVE THESE PROBLEMS MADE IT FOR YOU TO DO YOUR WORK, TAKE CARE OF THINGS AT HOME, OR GET ALONG WITH OTHER PEOPLE: VERY DIFFICULT

## 2022-11-04 NOTE — PROGRESS NOTES
Assessment & Plan   Left shoulder pain, unspecified chronicity  Subacromial bursitis of left shoulder joint  Rotator cuff syndrome, left  Recurrent, had some relief from previous subacromial steroid injection it like that again today.  He has been trying to work on range of motion.  Recommended physical therapy but he is done this before and did not I do it at this time.  Also offered referral to orthopedics.  Eventual MRI is probably needed but would appreciate their input.  See procedure note below  - DRAIN/INJECT LARGE JOINT/BURSA    Chest pain, unspecified type  Patient with intermittent chest pains, on Imdur, does take occasional nitro and has relief of symptoms.  Recommended he see cardiology to discuss next steps including possible angiogram.  He did not want to do a stress test at this time as he felt very anxious when he did this last time.  EKG without acute changes today.  911 and to ED if severe is worsening of symptoms or unresponsive to nitro.  - EKG 12-lead complete w/read - Clinics  - Adult Cardiology al [facility] Referral  - CBC with platelets and differential  - CBC with platelets and differential    Stage 3 chronic kidney disease, unspecified whether stage 3a or 3b CKD (H)  Hypertension goal BP (blood pressure) < 140/90  Stable lately and will continue to monitor:  - Comprehensive metabolic panel (BMP + Alb, Alk Phos, ALT, AST, Total. Bili, TP)  - Comprehensive metabolic panel (BMP + Alb, Alk Phos, ALT, AST, Total. Bili, TP)    Acute bronchitis with symptoms > 10 days  Family cough for the last 3+ weeks and he desires a Z-Reynaldo and I think that is fine and prescription sent in case it is caused by bacteria  - azithromycin (ZITHROMAX) 250 MG tablet  Dispense: 6 tablet; Refill: 0    Generalized anxiety disorder  Panic disorder without agoraphobia  Increase symptoms lately, currently on Wellbutrin for mood disorder.  Does drink a lot of caffeine and recommended drinking less.  We will add BuSpar 5  mg twice daily as needed panic and continue with lorazepam 1 mg twice daily as needed (he is requesting more lorazepam and instead will try the BuSpar as needed)  - busPIRone (BUSPAR) 5 MG tablet  Dispense: 30 tablet; Refill: 1    Screen for colon cancer  - Fecal colorectal cancer screen FIT - Future (S+30)  - Fecal colorectal cancer screen FIT - Future (S+30)    I spent a total of 40 minutes on the day of the visit.  -Doing chart review, history and exam, documentation and further activities as noted.      Return in about 1 month (around 12/4/2022) for symptoms failing to improve or sooner if worsening.      Nate Segovia MD      [address] [address]  [employer]   Office: [phone]       Justin Blood is a 52 year old, presenting for the following health issues:  Shoulder Pain      Shoulder Pain    History of Present Illness       Reason for visit:  Left shoulder injury/impingement;   Symptom onset: ~2 months ago   Symptoms include:  Extreme shoulder pain. Chest pain that occasionally radiates to left shoulder  Symptom intensity:  Moderate  Symptom progression:  Staying the same  Had these symptoms before:  No  What makes it worse:  Certain directional shoulder movement. Deepened anxiety  What makes it better:  Deep tissue and shoulder stretching/ice. Taking anti-anxiety meds -  Sterid injection     He eats 2-3 servings of fruits and vegetables daily.He consumes 4 sweetened beverage(s) daily.He exercises with enough effort to increase his heart rate 9 or less minutes per day.  He exercises with enough effort to increase his heart rate 3 or less days per week. He is missing 1 dose(s) of medications per week.  He is not taking prescribed medications regularly due to side effects.    Today's PHQ-9         PHQ-9 Total Score: 10    PHQ-9 Q9 Thoughts of better off dead/self-harm past 2 weeks :   Not at all    How difficult have these problems made it  for you to do your work, take care of things at home, or get along with other people: Very difficult  Today's NANCY-7 Score: 18       Chest tightness/pain over the last 3 months.  Uses a nitroglycerin and that relieves it. On  Imdur - previous angiogram 11/29/2020:    -Multivessel coronary artery disease involving moderate mid LAD and severe mid-distal LAD lesion, AV groove distal circumflex severe stenosis, moderate large obtuse marginal lesion, and right PL moderate  stenosis.  All of the severe disease is in the distal segments.  -ACS/non-STEMI culprit lesion is likely AV groove circumflex lesion as there is slow filling and emptying of the segment.  There is ARLINE-3 flow in the distal LAD.  -Acute on chronic renal insufficiency.  Recommend to try to delay any intervention, if at all until renal function is improved or stable.  Distal circumflex lesion which is the apparent culprit lesion is a very small territory.       Anxiety - increased lately - has morning symptoms and also later in the day he struggles with shoulder pain    Review of Systems         Objective    /84 (BP Location: Left arm, Cuff Size: Adult Large)   Pulse 103   Temp 98.2  F (36.8  C) (Tympanic)   Resp 18   Ht 1.829 m (6')   Wt 110.2 kg (243 lb)   SpO2 94%   BMI 32.96 kg/m    Body mass index is 32.96 kg/m .  Physical Exam   GENERAL: healthy, alert and no distress  NECK: no adenopathy, no asymmetry, masses, or scars and thyroid normal to palpation  RESP: lungs clear to auscultation - no rales, rhonchi or wheezes, no chest wall tenderness  CV: regular rate and rhythm, normal S1 S2, no S3 or S4, no murmur, click or rub, no peripheral edema and peripheral pulses strong  ABDOMEN: soft, nontender, no hepatosplenomegaly, no masses and bowel sounds normal  MS: Left shoulder with reduced range of motion-abduction to about 90 degrees.  Positive painful arc.  Tenderness at the insertion of the supraspinatus, subacromial region as well as  infraspinatus.  Reduced range of motion with internal rotation to the lower back  otherwise o gross musculoskeletal defects noted, no edema  SKIN: no suspicious lesions or rashes  NEURO: Normal strength and tone, mentation intact and speech normal  BACK: no CVA tenderness, no paralumbar tenderness    Ekg:  Sinus  Rhythm  -First degree A-V block   -Left atrial enlargement.    -Right bundle branch block and right axis -possible right ventricular hypertrophy   -Old lateral infarct.  Essentially unchanged from 5/5/2021.      After consent was obtained, using sterile technique the left shoulder was prepped and using a 5ml syringe with 23 gauge 1.5 inch needle - 4 ml of 1% plain Lidocaine and 1 ml of 40 mg/ml Kenalog was then injected into the subacromial space and the needle withdrawn.  The procedure was well tolerated.  The patient is asked to continue to rest the treated area for 5-7 more days before resuming regular activities.  It may be more painful for the first 1-2 days.  Watch for fever, or increased swelling or persistent pain in knee. Call or return to clinic prn if such symptoms occur or the pain fails to improve as anticipated.

## 2022-11-07 NOTE — RESULT ENCOUNTER NOTE
Dear Jeremi,    Here is a summary of your recent test results:  -Normal red blood cell (hgb) levels, normal white blood cell count and normal platelet levels.  -Liver and gallbladder tests (ALT,AST, Alk phos,bilirubin) are minimally elevated.  -Kidney function (GFR) is decreased, but stable.  ADVISE: rechecking this in 6 months  -Sodium is normal.  -Potassium is normal.  -Calcium is normal.  -Glucose is slight elevated and may be a sign of early diabetes (prediabetes). ADVISE:: eating a low carbohydrate diet, exercising, trying to lose weight (if necessary) and rechecking your glucose level in 12 months.    For additional lab test information, www.testing.com is a very good reference.    In addition, here is a list of due or overdue Health Maintenance reminders:    Pneumococcal Vaccine(1 - PCV) Never done  Zoster (Shingles) Vaccine(1 of 2) Never done  Asthma Action Plan - yearly due on 06/28/2020  Colorectal Cancer Screening due on 10/15/2021  COVID-19 Vaccine(3 - Booster for Moderna series) due on 11/19/2021  Flu Vaccine(1) due on 09/01/2022    Please call us at [phone] (or use OtherInbox) to address the above recommendations if needed.           Thank you very much for trusting me and [facility].     Have a peaceful day.    Healthy regards,  Nate Segovia MD

## 2022-11-18 NOTE — TELEPHONE ENCOUNTER
ED / Discharge Outreach Protocol    Patient Contact    Attempt # 1    Was call answered?  No.  Left message on voicemail with information to call me back.    Called # [phone]       Kari Mcleod RN, BSN  [facility]        Pr from [facility] 23 this morning around 0930 s/p TAVR. Initially pt sleepy from anesthesia but wakes easily and oriented. Recovered per protocol and orders. Labs sent, EKG and chest xray done. NSR on monitor, new BBB, repeat ekg done at 1300 as ordered. BP high, NGT started and titrated to keep MAP less then 90. Bilateral groins intact, no bleeding or hematoma. Pt denies pain. Unable to void laying down, bladder scan showing >850. Straight cath done until able to get oob. Flat for 3 hours then [facility] elevated slowly monitoring groin sites. OOB this evening to chair. Taking clear liquids and advanced this evening. Plan of care discussed, questions encouraged and answered. Problem: CARDIOVASCULAR - ADULT  Goal: Maintains optimal cardiac output and hemodynamic stability  Description: INTERVENTIONS:  - Monitor vital signs, rhythm, and trends  - Monitor for bleeding, hypotension and signs of decreased cardiac output  - Evaluate effectiveness of vasoactive medications to optimize hemodynamic stability  - Monitor arterial and/or venous puncture sites for bleeding and/or hematoma  - Assess quality of pulses, skin color and temperature  - Assess for signs of decreased coronary artery perfusion - ex.  Angina  - Evaluate fluid balance, assess for edema, trend weights  Outcome: Progressing  Goal: Absence of cardiac arrhythmias or at baseline  Description: INTERVENTIONS:  - Continuous cardiac monitoring, monitor vital signs, obtain 12 lead EKG if indicated  - Evaluate effectiveness of antiarrhythmic and heart rate control medications as ordered  - Initiate emergency measures for life threatening arrhythmias  - Monitor electrolytes and administer replacement therapy as ordered  Outcome: Progressing

## 2022-11-19 ENCOUNTER — HEALTH MAINTENANCE LETTER (OUTPATIENT)
Age: 52
End: 2022-11-19

## 2022-11-19 ENCOUNTER — HOSPITAL ENCOUNTER (INPATIENT)
Facility: CLINIC | Age: 52
LOS: 5 days | Discharge: HOME | DRG: 246 | End: 2022-11-24
Attending: EMERGENCY MEDICINE | Admitting: INTERNAL MEDICINE
Payer: COMMERCIAL

## 2022-11-19 ENCOUNTER — APPOINTMENT (OUTPATIENT)
Dept: GENERAL RADIOLOGY | Facility: CLINIC | Age: 52
DRG: 246 | End: 2022-11-19
Attending: EMERGENCY MEDICINE
Payer: COMMERCIAL

## 2022-11-19 ENCOUNTER — APPOINTMENT (OUTPATIENT)
Dept: CT IMAGING | Facility: CLINIC | Age: 52
DRG: 246 | End: 2022-11-19
Attending: EMERGENCY MEDICINE
Payer: COMMERCIAL

## 2022-11-19 DIAGNOSIS — I25.10 CORONARY ARTERY DISEASE INVOLVING NATIVE HEART WITHOUT ANGINA PECTORIS, UNSPECIFIED VESSEL OR LESION TYPE: ICD-10-CM

## 2022-11-19 DIAGNOSIS — R79.89 ELEVATED TROPONIN: ICD-10-CM

## 2022-11-19 DIAGNOSIS — D72.829 LEUKOCYTOSIS, UNSPECIFIED TYPE: ICD-10-CM

## 2022-11-19 DIAGNOSIS — I42.2 HYPERTROPHIC CARDIOMYOPATHY (H): ICD-10-CM

## 2022-11-19 DIAGNOSIS — R07.89 OTHER CHEST PAIN: ICD-10-CM

## 2022-11-19 DIAGNOSIS — R09.02 HYPOXIA: ICD-10-CM

## 2022-11-19 DIAGNOSIS — I16.1 HYPERTENSIVE EMERGENCY: ICD-10-CM

## 2022-11-19 DIAGNOSIS — I42.8 OTHER CARDIOMYOPATHY (H): ICD-10-CM

## 2022-11-19 DIAGNOSIS — I21.4 NSTEMI (NON-ST ELEVATED MYOCARDIAL INFARCTION) (H): ICD-10-CM

## 2022-11-19 DIAGNOSIS — R79.89 ELEVATED BRAIN NATRIURETIC PEPTIDE (BNP) LEVEL: ICD-10-CM

## 2022-11-19 DIAGNOSIS — I25.119 CORONARY ARTERY DISEASE INVOLVING NATIVE CORONARY ARTERY OF NATIVE HEART WITH ANGINA PECTORIS (H): ICD-10-CM

## 2022-11-19 DIAGNOSIS — R94.5 NONSPECIFIC ABNORMAL RESULTS OF LIVER FUNCTION STUDY: ICD-10-CM

## 2022-11-19 DIAGNOSIS — I10 HYPERTENSION GOAL BP (BLOOD PRESSURE) < 140/90: ICD-10-CM

## 2022-11-19 DIAGNOSIS — N18.30 CKD (CHRONIC KIDNEY DISEASE) STAGE 3, GFR 30-59 ML/MIN (H): Primary | ICD-10-CM

## 2022-11-19 LAB
ALBUMIN SERPL-MCNC: 3.2 G/DL (ref 3.4–5)
ALP SERPL-CCNC: 89 U/L (ref 40–150)
ALT SERPL W P-5'-P-CCNC: 142 U/L (ref 0–70)
ANION GAP SERPL CALCULATED.3IONS-SCNC: 5 MMOL/L (ref 3–14)
AST SERPL W P-5'-P-CCNC: 93 U/L (ref 0–45)
ATRIAL RATE - MUSE: 84 BPM
BASOPHILS # BLD AUTO: 0 10E3/UL (ref 0–0.2)
BASOPHILS NFR BLD AUTO: 0 %
BILIRUB SERPL-MCNC: 0.6 MG/DL (ref 0.2–1.3)
BUN SERPL-MCNC: 18 MG/DL (ref 7–30)
CALCIUM SERPL-MCNC: 9.4 MG/DL (ref 8.5–10.1)
CHLORIDE BLD-SCNC: 104 MMOL/L (ref 94–109)
CO2 SERPL-SCNC: 30 MMOL/L (ref 20–32)
CREAT SERPL-MCNC: 1.63 MG/DL (ref 0.66–1.25)
D DIMER PPP FEU-MCNC: <0.27 UG/ML FEU (ref 0–0.5)
DIASTOLIC BLOOD PRESSURE - MUSE: NORMAL MMHG
EOSINOPHIL # BLD AUTO: 0.2 10E3/UL (ref 0–0.7)
EOSINOPHIL NFR BLD AUTO: 1 %
ERYTHROCYTE [DISTWIDTH] IN BLOOD BY AUTOMATED COUNT: 16.2 % (ref 10–15)
ETHANOL SERPL-MCNC: <0.01 G/DL
GFR SERPL CREATININE-BSD FRML MDRD: 50 ML/MIN/1.73M2
GLUCOSE BLD-MCNC: 100 MG/DL (ref 70–99)
HCT VFR BLD AUTO: 55 % (ref 40–53)
HGB BLD-MCNC: 18 G/DL (ref 13.3–17.7)
HOLD SPECIMEN: NORMAL
HOLD SPECIMEN: NORMAL
IMM GRANULOCYTES # BLD: 0.1 10E3/UL
IMM GRANULOCYTES NFR BLD: 1 %
INTERPRETATION ECG - MUSE: NORMAL
LYMPHOCYTES # BLD AUTO: 1.6 10E3/UL (ref 0.8–5.3)
LYMPHOCYTES NFR BLD AUTO: 11 %
MCH RBC QN AUTO: 27.9 PG (ref 26.5–33)
MCHC RBC AUTO-ENTMCNC: 32.7 G/DL (ref 31.5–36.5)
MCV RBC AUTO: 85 FL (ref 78–100)
MONOCYTES # BLD AUTO: 1.6 10E3/UL (ref 0–1.3)
MONOCYTES NFR BLD AUTO: 11 %
NEUTROPHILS # BLD AUTO: 11.7 10E3/UL (ref 1.6–8.3)
NEUTROPHILS NFR BLD AUTO: 76 %
NRBC # BLD AUTO: 0 10E3/UL
NRBC BLD AUTO-RTO: 0 /100
NT-PROBNP SERPL-MCNC: 1852 PG/ML (ref 0–900)
P AXIS - MUSE: 27 DEGREES
PLATELET # BLD AUTO: 242 10E3/UL (ref 150–450)
POTASSIUM BLD-SCNC: 4.7 MMOL/L (ref 3.4–5.3)
PR INTERVAL - MUSE: 240 MS
PROT SERPL-MCNC: 6.7 G/DL (ref 6.8–8.8)
QRS DURATION - MUSE: 216 MS
QT - MUSE: 430 MS
QTC - MUSE: 508 MS
R AXIS - MUSE: 158 DEGREES
RBC # BLD AUTO: 6.45 10E6/UL (ref 4.4–5.9)
SARS-COV-2 RNA RESP QL NAA+PROBE: NEGATIVE
SODIUM SERPL-SCNC: 139 MMOL/L (ref 133–144)
SYSTOLIC BLOOD PRESSURE - MUSE: NORMAL MMHG
T AXIS - MUSE: 24 DEGREES
TROPONIN I SERPL HS-MCNC: 242 NG/L
TROPONIN I SERPL HS-MCNC: 281 NG/L
VENTRICULAR RATE- MUSE: 84 BPM
WBC # BLD AUTO: 15.2 10E3/UL (ref 4–11)

## 2022-11-19 PROCEDURE — 82077 ASSAY SPEC XCP UR&BREATH IA: CPT | Performed by: EMERGENCY MEDICINE

## 2022-11-19 PROCEDURE — 36415 COLL VENOUS BLD VENIPUNCTURE: CPT | Performed by: EMERGENCY MEDICINE

## 2022-11-19 PROCEDURE — 200N000001 HC R&B ICU

## 2022-11-19 PROCEDURE — 250N000011 HC RX IP 250 OP 636: Performed by: EMERGENCY MEDICINE

## 2022-11-19 PROCEDURE — 84484 ASSAY OF TROPONIN QUANT: CPT | Performed by: EMERGENCY MEDICINE

## 2022-11-19 PROCEDURE — U0005 INFEC AGEN DETEC AMPLI PROBE: HCPCS | Performed by: EMERGENCY MEDICINE

## 2022-11-19 PROCEDURE — 96365 THER/PROPH/DIAG IV INF INIT: CPT

## 2022-11-19 PROCEDURE — 99223 1ST HOSP IP/OBS HIGH 75: CPT | Mod: AI | Performed by: INTERNAL MEDICINE

## 2022-11-19 PROCEDURE — 250N000009 HC RX 250: Performed by: EMERGENCY MEDICINE

## 2022-11-19 PROCEDURE — 93005 ELECTROCARDIOGRAM TRACING: CPT

## 2022-11-19 PROCEDURE — 96375 TX/PRO/DX INJ NEW DRUG ADDON: CPT

## 2022-11-19 PROCEDURE — 250N000013 HC RX MED GY IP 250 OP 250 PS 637: Performed by: INTERNAL MEDICINE

## 2022-11-19 PROCEDURE — 70450 CT HEAD/BRAIN W/O DYE: CPT

## 2022-11-19 PROCEDURE — 99291 CRITICAL CARE FIRST HOUR: CPT | Mod: 25

## 2022-11-19 PROCEDURE — 85379 FIBRIN DEGRADATION QUANT: CPT | Performed by: EMERGENCY MEDICINE

## 2022-11-19 PROCEDURE — 80053 COMPREHEN METABOLIC PANEL: CPT | Performed by: EMERGENCY MEDICINE

## 2022-11-19 PROCEDURE — C9803 HOPD COVID-19 SPEC COLLECT: HCPCS

## 2022-11-19 PROCEDURE — 85004 AUTOMATED DIFF WBC COUNT: CPT | Performed by: EMERGENCY MEDICINE

## 2022-11-19 PROCEDURE — 71046 X-RAY EXAM CHEST 2 VIEWS: CPT

## 2022-11-19 PROCEDURE — 83880 ASSAY OF NATRIURETIC PEPTIDE: CPT | Performed by: EMERGENCY MEDICINE

## 2022-11-19 RX ORDER — ONDANSETRON 4 MG/1
4 TABLET, ORALLY DISINTEGRATING ORAL EVERY 6 HOURS PRN
Status: DISCONTINUED | OUTPATIENT
Start: 2022-11-19 | End: 2022-11-24 | Stop reason: HOSPADM

## 2022-11-19 RX ORDER — ALBUTEROL SULFATE 90 UG/1
2 AEROSOL, METERED RESPIRATORY (INHALATION) EVERY 6 HOURS
Status: DISCONTINUED | OUTPATIENT
Start: 2022-11-19 | End: 2022-11-24 | Stop reason: HOSPADM

## 2022-11-19 RX ORDER — PROCHLORPERAZINE MALEATE 5 MG
10 TABLET ORAL EVERY 6 HOURS PRN
Status: DISCONTINUED | OUTPATIENT
Start: 2022-11-19 | End: 2022-11-24 | Stop reason: HOSPADM

## 2022-11-19 RX ORDER — METOPROLOL TARTRATE 1 MG/ML
5 INJECTION, SOLUTION INTRAVENOUS ONCE
Status: COMPLETED | OUTPATIENT
Start: 2022-11-19 | End: 2022-11-19

## 2022-11-19 RX ORDER — PANTOPRAZOLE SODIUM 40 MG/1
40 TABLET, DELAYED RELEASE ORAL DAILY
Status: DISCONTINUED | OUTPATIENT
Start: 2022-11-20 | End: 2022-11-24 | Stop reason: HOSPADM

## 2022-11-19 RX ORDER — HEPARIN SODIUM 10000 [USP'U]/100ML
0-5000 INJECTION, SOLUTION INTRAVENOUS CONTINUOUS
Status: DISCONTINUED | OUTPATIENT
Start: 2022-11-19 | End: 2022-11-22

## 2022-11-19 RX ORDER — BUPRENORPHINE AND NALOXONE 4; 1 MG/1; MG/1
1 FILM, SOLUBLE BUCCAL; SUBLINGUAL 4 TIMES DAILY
Status: DISCONTINUED | OUTPATIENT
Start: 2022-11-20 | End: 2022-11-24 | Stop reason: HOSPADM

## 2022-11-19 RX ORDER — AMITRIPTYLINE HYDROCHLORIDE 75 MG/1
150 TABLET ORAL AT BEDTIME
Status: DISCONTINUED | OUTPATIENT
Start: 2022-11-20 | End: 2022-11-24 | Stop reason: HOSPADM

## 2022-11-19 RX ORDER — FINASTERIDE 5 MG/1
2.5 TABLET, FILM COATED ORAL DAILY
Status: DISCONTINUED | OUTPATIENT
Start: 2022-11-20 | End: 2022-11-24 | Stop reason: HOSPADM

## 2022-11-19 RX ORDER — ONDANSETRON 2 MG/ML
4 INJECTION INTRAMUSCULAR; INTRAVENOUS EVERY 6 HOURS PRN
Status: DISCONTINUED | OUTPATIENT
Start: 2022-11-19 | End: 2022-11-24 | Stop reason: HOSPADM

## 2022-11-19 RX ORDER — BUSPIRONE HYDROCHLORIDE 5 MG/1
5 TABLET ORAL 2 TIMES DAILY PRN
Status: DISCONTINUED | OUTPATIENT
Start: 2022-11-19 | End: 2022-11-24 | Stop reason: HOSPADM

## 2022-11-19 RX ORDER — ACETAMINOPHEN 650 MG/1
650 SUPPOSITORY RECTAL EVERY 6 HOURS PRN
Status: DISCONTINUED | OUTPATIENT
Start: 2022-11-19 | End: 2022-11-24 | Stop reason: HOSPADM

## 2022-11-19 RX ORDER — FLUTICASONE FUROATE AND VILANTEROL 200; 25 UG/1; UG/1
1 POWDER RESPIRATORY (INHALATION) DAILY
Status: DISCONTINUED | OUTPATIENT
Start: 2022-11-20 | End: 2022-11-24 | Stop reason: HOSPADM

## 2022-11-19 RX ORDER — ISOSORBIDE MONONITRATE 30 MG/1
30 TABLET, EXTENDED RELEASE ORAL DAILY
Status: DISCONTINUED | OUTPATIENT
Start: 2022-11-20 | End: 2022-11-22

## 2022-11-19 RX ORDER — ACETAMINOPHEN 325 MG/1
650 TABLET ORAL EVERY 6 HOURS PRN
Status: DISCONTINUED | OUTPATIENT
Start: 2022-11-19 | End: 2022-11-24 | Stop reason: HOSPADM

## 2022-11-19 RX ORDER — LABETALOL HYDROCHLORIDE 5 MG/ML
10 INJECTION, SOLUTION INTRAVENOUS
Status: DISCONTINUED | OUTPATIENT
Start: 2022-11-19 | End: 2022-11-20

## 2022-11-19 RX ORDER — PROCHLORPERAZINE 25 MG
25 SUPPOSITORY, RECTAL RECTAL EVERY 12 HOURS PRN
Status: DISCONTINUED | OUTPATIENT
Start: 2022-11-19 | End: 2022-11-24 | Stop reason: HOSPADM

## 2022-11-19 RX ORDER — FUROSEMIDE 10 MG/ML
40 INJECTION INTRAMUSCULAR; INTRAVENOUS ONCE
Status: COMPLETED | OUTPATIENT
Start: 2022-11-19 | End: 2022-11-20

## 2022-11-19 RX ORDER — LIDOCAINE 40 MG/G
CREAM TOPICAL
Status: DISCONTINUED | OUTPATIENT
Start: 2022-11-19 | End: 2022-11-20

## 2022-11-19 RX ORDER — LOSARTAN POTASSIUM 100 MG/1
100 TABLET ORAL DAILY
Status: DISCONTINUED | OUTPATIENT
Start: 2022-11-20 | End: 2022-11-24 | Stop reason: HOSPADM

## 2022-11-19 RX ORDER — CLOPIDOGREL BISULFATE 75 MG/1
75 TABLET ORAL DAILY
Status: DISCONTINUED | OUTPATIENT
Start: 2022-11-20 | End: 2022-11-24 | Stop reason: HOSPADM

## 2022-11-19 RX ORDER — GLIPIZIDE 10 MG/1
1 TABLET ORAL 2 TIMES DAILY
Status: DISCONTINUED | OUTPATIENT
Start: 2022-11-19 | End: 2022-11-24 | Stop reason: HOSPADM

## 2022-11-19 RX ORDER — POLYETHYLENE GLYCOL 3350 17 G/17G
17 POWDER, FOR SOLUTION ORAL DAILY PRN
Status: DISCONTINUED | OUTPATIENT
Start: 2022-11-19 | End: 2022-11-24 | Stop reason: HOSPADM

## 2022-11-19 RX ORDER — ZOLPIDEM TARTRATE 5 MG/1
10 TABLET ORAL
Status: DISCONTINUED | OUTPATIENT
Start: 2022-11-19 | End: 2022-11-24 | Stop reason: HOSPADM

## 2022-11-19 RX ORDER — METOPROLOL TARTRATE 100 MG
100 TABLET ORAL 2 TIMES DAILY
Status: DISCONTINUED | OUTPATIENT
Start: 2022-11-19 | End: 2022-11-21

## 2022-11-19 RX ORDER — ASPIRIN 81 MG/1
81 TABLET ORAL DAILY
Status: DISCONTINUED | OUTPATIENT
Start: 2022-11-20 | End: 2022-11-24 | Stop reason: HOSPADM

## 2022-11-19 RX ORDER — BISACODYL 10 MG
10 SUPPOSITORY, RECTAL RECTAL DAILY PRN
Status: DISCONTINUED | OUTPATIENT
Start: 2022-11-19 | End: 2022-11-24 | Stop reason: HOSPADM

## 2022-11-19 RX ORDER — LORAZEPAM 1 MG/1
1 TABLET ORAL 2 TIMES DAILY PRN
Status: DISCONTINUED | OUTPATIENT
Start: 2022-11-19 | End: 2022-11-24 | Stop reason: HOSPADM

## 2022-11-19 RX ORDER — BUPROPION HYDROCHLORIDE 150 MG/1
300 TABLET ORAL EVERY MORNING
Status: DISCONTINUED | OUTPATIENT
Start: 2022-11-20 | End: 2022-11-24 | Stop reason: HOSPADM

## 2022-11-19 RX ORDER — ROSUVASTATIN CALCIUM 20 MG/1
20 TABLET, COATED ORAL DAILY
Status: DISCONTINUED | OUTPATIENT
Start: 2022-11-20 | End: 2022-11-24 | Stop reason: HOSPADM

## 2022-11-19 RX ADMIN — NICARDIPINE HYDROCHLORIDE 2.5 MG/HR: 0.2 INJECTION INTRAVENOUS at 19:07

## 2022-11-19 RX ADMIN — ACETAMINOPHEN 650 MG: 325 TABLET, FILM COATED ORAL at 23:41

## 2022-11-19 RX ADMIN — METOPROLOL TARTRATE 5 MG: 5 INJECTION INTRAVENOUS at 17:08

## 2022-11-19 ASSESSMENT — ENCOUNTER SYMPTOMS: HEADACHES: 1

## 2022-11-19 ASSESSMENT — ACTIVITIES OF DAILY LIVING (ADL)
ADLS_ACUITY_SCORE: 37

## 2022-11-19 NOTE — Clinical Note
Potential access sites were evaluated for patency using ultrasound.   The right radial artery was selected. Access was obtained under with Sonosite and Fluoroscopic guidance using a micropuncture 21 gauge needle with direct visualization of needle entry.

## 2022-11-19 NOTE — Clinical Note
The first balloon was inserted into the left anterior descending.Max pressure = 6 dinh. Total duration = 20 seconds.     Max pressure = 6 dinh. Total duration = 15 seconds.  Balloon reinflated a fourth time: Max pressure = 6 dinh. Total duration = 15 seconds.

## 2022-11-19 NOTE — Clinical Note
The first balloon was inserted into the left anterior descending.Max pressure = 16 dinh. Total duration = 20 seconds.     Max pressure = 18 dinh. Total duration = 15 seconds.

## 2022-11-19 NOTE — Clinical Note
Max pressure = 11 dinh. Total duration = 15 seconds. Balloon reinflated a second time: Max pressure = 11 dinh. Total duration = 15 seconds.

## 2022-11-19 NOTE — ED TRIAGE NOTES
"Patient reports high BP at home, came in due to concern of elevated BP and feeling nauseated. States he took his BP medications after he realized his BP was elevated. Also tacypnic on arrival, states he has asthma and was \"freaking out\" due to high BP.      Triage Assessment     Row Name 11/19/22 1548       Triage Assessment (Adult)    Airway WDL WDL       Respiratory WDL    Respiratory WDL X;rhythm/pattern    Rhythm/Pattern, Respiratory tachypneic       Cardiac WDL    Cardiac WDL X  Hypertension       Cognitive/Neuro/Behavioral WDL    Cognitive/Neuro/Behavioral WDL WDL       Hamilton Coma Scale    Best Eye Response 4-->(E4) spontaneous    Best Motor Response 6-->(M6) obeys commands    Best Verbal Response 5-->(V5) oriented    Shi Coma Scale Score 15              "

## 2022-11-19 NOTE — Clinical Note
Max pressure = 12 dinh. Total duration = 20 seconds.     Max pressure = 12 dinh. Total duration = 15 seconds.

## 2022-11-19 NOTE — ED PROVIDER NOTES
"  History   Chief Complaint:  Hypertension        The history is provided by the patient.      Jeremi Damon is a 52 year old male with history of hypertrophic cardiomyopathy, HTN, and chronic pain who presents with hypertension. Jeremi has been monitoring his blood pressure over the past 4 to 5 days as he has been feeling anxious and not his usual self. He took his blood pressure today and it was very high, >200/>120. He took his antihypertensives (Cozaar, Lopressor, and Imdur) and continued to check his blood pressure at home an additional 5 times. Each reading was greater than 200 systolic which prompted his visit. He has headache with no change in chronic blurry vision. He denies chest pain aside from \"muscle twitches\" in his right upper chest. He denies leg swelling and has had no recent changes to his medications.    Review of Systems   Eyes: Negative for visual disturbance (chronic blurred).   Cardiovascular: Negative for chest pain and leg swelling.   Neurological: Positive for headaches.   Psychiatric/Behavioral: The patient is nervous/anxious.    All other systems reviewed and are negative.    Allergies:  Acetaminophen  Morphine  Sulfa Drugs  Theophylline  Amlodipine  Metropol    Medications:  Albuterol  Elavil  Wellbutrin XL  Buspar  Klonopin  Plavix  Proscar  Diflucan  Imdur  Ativan  Cozaar  Lopressor  Nitrostat  Protonix  Crestor  Ambien  Flonase    Past Medical History:     Hypersomnia with sleep apnea  Hypertension  Panic disorder  ADHD  Hypertrophic cardiomyopathy  MDD  GERD  Stage 3 CKD  NANCY  Asthma  Microalbuminuria  Hypogonadism  Bipolar 2 disorder  NSTEMI  Left ventricular hypertrophy  Chronic pain syndrome  Degeneration of lumbosacral intervertebral disc  Self reported TBI    Past Surgical History:    Appendectomy  L5-S1 fusion  CV coronary Angiogram  Laparoscopic Cholecystectomy  Rhioplasty-septoplasty  Gallbladder removal     Family History:    CAD  Hodgkins  Hypertension    Social " History:  The patient presents to the ED alone.  Does not smoke or drink alcohol.  Has a cat with diabetes.    Physical Exam     Patient Vitals for the past 24 hrs:   BP Temp Temp src Pulse Resp SpO2   11/19/22 2220 (!) 180/98 -- -- 84 10 92 %   11/19/22 2045 (!) 156/107 -- -- 92 -- 90 %   11/19/22 2000 (!) 162/88 -- -- 89 -- (!) 88 %   11/19/22 1945 (!) 145/110 -- -- 91 30 (!) 88 %   11/19/22 1930 (!) 164/105 -- -- 89 15 (!) 88 %   11/19/22 1915 (!) 173/114 -- -- 86 14 92 %   11/19/22 1900 (!) 177/124 -- -- 89 18 90 %   11/19/22 1845 -- -- -- 87 12 91 %   11/19/22 1830 (!) 174/125 -- -- 87 -- --   11/19/22 1815 -- -- -- -- -- 92 %   11/19/22 1800 (!) 165/126 -- -- 88 -- --   11/19/22 1730 (!) 177/136 -- -- 89 -- 91 %   11/19/22 1630 (!) 184/127 -- -- 82 16 92 %   11/19/22 1615 (!) 175/122 -- -- 81 22 93 %   11/19/22 1547 (!) 184/128 97.8  F (36.6  C) Temporal 92 22 96 %     Physical Exam  General: Well-developed and well-nourished. Well appearing middle aged  man. Cooperative.  Head:  Atraumatic.  Eyes:  Conjunctivae, lids, and sclerae are normal.  Neck:  Supple. Normal range of motion.  CV:  Regular rate and rhythm. Normal heart sounds with no murmurs, rubs, or gallops detected.  Resp:  No respiratory distress. Clear to auscultation bilaterally without decreased breath sounds, wheezing, rales, or rhonchi.  GI:  Soft. Non-distended. Non-tender.    MS:  Normal ROM. Trace bilateral lower extremity edema.  Skin:  Warm. Non-diaphoretic. No pallor.  Neuro:  Awake. A&Ox3. Normal strength.  Psych: Normal mood and affect. Normal speech.  Vitals reviewed.    Emergency Department Course   EKG  Indication: hypertensive  Time: 1609  Rate 84 bpm. DC interval 240. QRS duration 216. QT/QTc 430/508.   Sinus rhythm with 1st degree AV block  Possible Left atrial enlargement  Right bundle branch block  Septal infarct, age undeterined  Lateral infarct, age undetermined  Abnormal ECG   No acute ST changes.  No change as  compared to prior, dated 11/04/22.    Imaging:  CT Head w/o Contrast   Final Result   IMPRESSION:   1.  No CT evidence for acute intracranial process.   2.  Chronic left frontal sinus disease.      XR Chest 2 Views   Final Result   IMPRESSION: Moderate coarsened bilateral perihilar opacities have developed since 11/29/2020, suspicious for edema or atypical infectious process. Chest otherwise negative.     Report per radiology    Laboratory:  Labs Ordered and Resulted from Time of ED Arrival to Time of ED Departure   COMPREHENSIVE METABOLIC PANEL - Abnormal       Result Value    Sodium 139      Potassium 4.7      Chloride 104      Carbon Dioxide (CO2) 30      Anion Gap 5      Urea Nitrogen 18      Creatinine 1.63 (*)     Calcium 9.4      Glucose 100 (*)     Alkaline Phosphatase 89      AST 93 (*)      (*)     Protein Total 6.7 (*)     Albumin 3.2 (*)     Bilirubin Total 0.6      GFR Estimate 50 (*)    TROPONIN I - Abnormal    Troponin I High Sensitivity 242 (*)    CBC WITH PLATELETS AND DIFFERENTIAL - Abnormal    WBC Count 15.2 (*)     RBC Count 6.45 (*)     Hemoglobin 18.0 (*)     Hematocrit 55.0 (*)     MCV 85      MCH 27.9      MCHC 32.7      RDW 16.2 (*)     Platelet Count 242      % Neutrophils 76      % Lymphocytes 11      % Monocytes 11      % Eosinophils 1      % Basophils 0      % Immature Granulocytes 1      NRBCs per 100 WBC 0      Absolute Neutrophils 11.7 (*)     Absolute Lymphocytes 1.6      Absolute Monocytes 1.6 (*)     Absolute Eosinophils 0.2      Absolute Basophils 0.0      Absolute Immature Granulocytes 0.1      Absolute NRBCs 0.0     TROPONIN I - Abnormal    Troponin I High Sensitivity 281 (*)    NT PROBNP INPATIENT - Abnormal    N terminal Pro BNP Inpatient 1,852 (*)    ETHYL ALCOHOL LEVEL - Normal    Alcohol ethyl <0.01     D DIMER QUANTITATIVE - Normal    D-Dimer Quantitative <0.27     COVID-19 VIRUS (CORONAVIRUS) BY PCR - Normal    SARS CoV2 PCR Negative        Emergency Department  "Course:  Reviewed:  I reviewed nursing notes, vitals, and past medical history.    Assessments:  1605 I obtained history and examined the patient as noted above.   1723 I rechecked the patient and explained findings and discussed admission. He is hesitant to be admitted as he needs to care for his cat.    Consults:  1935 I consulted with Dr. Del Valle of the hospitalist team regarding the patient. He accepted the patient for admission.    Interventions:  1708 Lopressor, 5mg IV  1907 Cardene, 2.5mg/hr IV    Disposition:  The patient was admitted to the hospital under the care of Dr. Del Valle.     Impression & Plan   Medical Decision Making:  Jeremi is a 52 year old man with hypertension who has been checking his blood pressure more frequently lately because he has been feeling \"not like my usual self.\"  When he woke up this morning it was greater than 200 systolic.  He took all of his antihypertensives and his blood pressure of greater than 200 systolic and greater than 120 diastolic persisted which prompted his visit.  He does have headache but no new vision changes or chest pain.  He appears well here but is hypertensive, particularly notable for diastolic hypertension of greater than 120 reliably.  He has trace lower extremity edema.      His EKG is unchanged from prior without ischemic changes.  However, his troponin is elevated at 242 with delta increasing to 281.  This represents endorgan damage from his hypertensive emergency.  Before we had this data back I had treated him with metoprolol but once it was determined that this was hypertensive emergency I initiated a Cardene infusion.  It should be noted that his head CT is without intracranial hemorrhage as result of his severe hypertension.  During his emergency department course he did become hypoxic with an undetectable D-dimer.  Chest x-ray revealed coarsened bilateral perihilar opacities suspicious for edema so BNP was obtained which is elevated at " 1,852.    Aside from the aforementioned abnormalities, his work-up is actually quite reassuring.  Creatinine is near his baseline of 1.45 at 1.63 today and there are no significant electrolyte derangements.  He has abnormal transaminases which are quite similar to recent values without evidence of biliary obstruction.  He has leukocytosis to 15.2 which is likely stress demargination.  He does not describe infectious symptoms to warrant antibiotics and COVID-19 is negative.    Clearly Jeremi requires admission for Cardene infusion for his hypertensive emergency.  He was very hesitant to be admitted given he has a diabetic cat at home but ultimately he acquiesced, particularly because his troponin was rising.  I answered all his questions and he verbalized understanding.  I discussed the patient's case with Dr. Del Valle, hospitalist, who accepts admission and has no further orders.    Diagnosis:    ICD-10-CM    1. Hypertensive emergency  I16.1       2. Elevated troponin  R77.8       3. Elevated brain natriuretic peptide (BNP) level  R79.89       4. Hypoxia  R09.02       5. Nonspecific abnormal results of liver function study  R94.5       6. Leukocytosis, unspecified type  D72.829         Scribe Disclosure:  I, René Welch, am serving as a scribe at 4:04 PM on 11/19/2022 to document services personally performed by Lyndsay Up MD, based on my observations and the provider's statements to me.            Lyndsay Up MD  11/21/22 [id number]

## 2022-11-19 NOTE — Clinical Note
Stent deployed in the left anterior descending. Max pressure = 11 dinh. Total duration = 15 seconds.

## 2022-11-20 ENCOUNTER — APPOINTMENT (OUTPATIENT)
Dept: CARDIOLOGY | Facility: CLINIC | Age: 52
DRG: 246 | End: 2022-11-20
Attending: INTERNAL MEDICINE
Payer: COMMERCIAL

## 2022-11-20 LAB
ALBUMIN UR-MCNC: 50 MG/DL
ANION GAP SERPL CALCULATED.3IONS-SCNC: 3 MMOL/L (ref 3–14)
APPEARANCE UR: CLEAR
BILIRUB UR QL STRIP: NEGATIVE
BUN SERPL-MCNC: 18 MG/DL (ref 7–30)
CALCIUM SERPL-MCNC: 9.4 MG/DL (ref 8.5–10.1)
CHLORIDE BLD-SCNC: 100 MMOL/L (ref 94–109)
CO2 SERPL-SCNC: 31 MMOL/L (ref 20–32)
COLOR UR AUTO: YELLOW
CREAT SERPL-MCNC: 1.43 MG/DL (ref 0.66–1.25)
ERYTHROCYTE [DISTWIDTH] IN BLOOD BY AUTOMATED COUNT: 16.6 % (ref 10–15)
GFR SERPL CREATININE-BSD FRML MDRD: 58 ML/MIN/1.73M2
GLUCOSE BLD-MCNC: 74 MG/DL (ref 70–99)
GLUCOSE UR STRIP-MCNC: NEGATIVE MG/DL
HCT VFR BLD AUTO: 56 % (ref 40–53)
HGB BLD-MCNC: 18.1 G/DL (ref 13.3–17.7)
HGB UR QL STRIP: ABNORMAL
KETONES UR STRIP-MCNC: ABNORMAL MG/DL
LEUKOCYTE ESTERASE UR QL STRIP: NEGATIVE
LVEF ECHO: NORMAL
MCH RBC QN AUTO: 27.2 PG (ref 26.5–33)
MCHC RBC AUTO-ENTMCNC: 32.3 G/DL (ref 31.5–36.5)
MCV RBC AUTO: 84 FL (ref 78–100)
NITRATE UR QL: NEGATIVE
PH UR STRIP: 5.5 [PH] (ref 5–7)
PLATELET # BLD AUTO: 199 10E3/UL (ref 150–450)
POTASSIUM BLD-SCNC: 4.5 MMOL/L (ref 3.4–5.3)
PROCALCITONIN SERPL-MCNC: 0.1 NG/ML
RBC # BLD AUTO: 6.66 10E6/UL (ref 4.4–5.9)
SODIUM SERPL-SCNC: 134 MMOL/L (ref 133–144)
SP GR UR STRIP: 1.01 (ref 1–1.03)
TROPONIN I SERPL HS-MCNC: 258 NG/L
TROPONIN I SERPL HS-MCNC: 281 NG/L
UFH PPP CHRO-ACNC: 0.17 IU/ML
UFH PPP CHRO-ACNC: 0.22 IU/ML
UFH PPP CHRO-ACNC: NORMAL
UROBILINOGEN UR STRIP-MCNC: NORMAL MG/DL
WBC # BLD AUTO: 15.8 10E3/UL (ref 4–11)

## 2022-11-20 PROCEDURE — 84145 PROCALCITONIN (PCT): CPT | Performed by: HOSPITALIST

## 2022-11-20 PROCEDURE — 255N000002 HC RX 255 OP 636: Performed by: INTERNAL MEDICINE

## 2022-11-20 PROCEDURE — 250N000011 HC RX IP 250 OP 636: Performed by: INTERNAL MEDICINE

## 2022-11-20 PROCEDURE — 84484 ASSAY OF TROPONIN QUANT: CPT | Performed by: INTERNAL MEDICINE

## 2022-11-20 PROCEDURE — 99232 SBSQ HOSP IP/OBS MODERATE 35: CPT | Performed by: HOSPITALIST

## 2022-11-20 PROCEDURE — 250N000013 HC RX MED GY IP 250 OP 250 PS 637: Performed by: INTERNAL MEDICINE

## 2022-11-20 PROCEDURE — 210N000002 HC R&B HEART CARE

## 2022-11-20 PROCEDURE — 36415 COLL VENOUS BLD VENIPUNCTURE: CPT | Performed by: INTERNAL MEDICINE

## 2022-11-20 PROCEDURE — 85027 COMPLETE CBC AUTOMATED: CPT | Performed by: INTERNAL MEDICINE

## 2022-11-20 PROCEDURE — 82310 ASSAY OF CALCIUM: CPT | Performed by: INTERNAL MEDICINE

## 2022-11-20 PROCEDURE — 250N000011 HC RX IP 250 OP 636: Performed by: HOSPITALIST

## 2022-11-20 PROCEDURE — 93306 TTE W/DOPPLER COMPLETE: CPT | Mod: 26 | Performed by: INTERNAL MEDICINE

## 2022-11-20 PROCEDURE — 81003 URINALYSIS AUTO W/O SCOPE: CPT | Performed by: HOSPITALIST

## 2022-11-20 PROCEDURE — 250N000013 HC RX MED GY IP 250 OP 250 PS 637: Performed by: HOSPITALIST

## 2022-11-20 PROCEDURE — 85520 HEPARIN ASSAY: CPT | Performed by: HOSPITALIST

## 2022-11-20 PROCEDURE — 85520 HEPARIN ASSAY: CPT | Performed by: INTERNAL MEDICINE

## 2022-11-20 PROCEDURE — 36415 COLL VENOUS BLD VENIPUNCTURE: CPT | Performed by: HOSPITALIST

## 2022-11-20 PROCEDURE — 87040 BLOOD CULTURE FOR BACTERIA: CPT | Performed by: HOSPITALIST

## 2022-11-20 PROCEDURE — 99291 CRITICAL CARE FIRST HOUR: CPT | Mod: 25 | Performed by: INTERNAL MEDICINE

## 2022-11-20 PROCEDURE — 999N000208 ECHOCARDIOGRAM COMPLETE

## 2022-11-20 RX ORDER — FUROSEMIDE 40 MG
40 TABLET ORAL DAILY
Status: DISCONTINUED | OUTPATIENT
Start: 2022-11-20 | End: 2022-11-24 | Stop reason: HOSPADM

## 2022-11-20 RX ORDER — NITROGLYCERIN 0.4 MG/1
0.4 TABLET SUBLINGUAL EVERY 5 MIN PRN
Status: DISCONTINUED | OUTPATIENT
Start: 2022-11-20 | End: 2022-11-24 | Stop reason: HOSPADM

## 2022-11-20 RX ORDER — LIDOCAINE 40 MG/G
CREAM TOPICAL
Status: DISCONTINUED | OUTPATIENT
Start: 2022-11-20 | End: 2022-11-24 | Stop reason: HOSPADM

## 2022-11-20 RX ORDER — LABETALOL HYDROCHLORIDE 5 MG/ML
10 INJECTION, SOLUTION INTRAVENOUS
Status: DISCONTINUED | OUTPATIENT
Start: 2022-11-20 | End: 2022-11-24 | Stop reason: HOSPADM

## 2022-11-20 RX ADMIN — ROSUVASTATIN CALCIUM 20 MG: 20 TABLET, FILM COATED ORAL at 08:06

## 2022-11-20 RX ADMIN — ASPIRIN 81 MG: 81 TABLET, COATED ORAL at 08:06

## 2022-11-20 RX ADMIN — BUPRENORPHINE HYDROCHLORIDE, NALOXONE HYDROCHLORIDE 1 FILM: 4; 1 FILM, SOLUBLE BUCCAL; SUBLINGUAL at 16:32

## 2022-11-20 RX ADMIN — HEPARIN SODIUM 1200 UNITS/HR: 10000 INJECTION, SOLUTION INTRAVENOUS at 01:17

## 2022-11-20 RX ADMIN — ACETAMINOPHEN 650 MG: 325 TABLET, FILM COATED ORAL at 08:12

## 2022-11-20 RX ADMIN — HEPARIN SODIUM 1500 UNITS/HR: 10000 INJECTION, SOLUTION INTRAVENOUS at 17:28

## 2022-11-20 RX ADMIN — BUPROPION HYDROCHLORIDE 300 MG: 300 TABLET, EXTENDED RELEASE ORAL at 08:06

## 2022-11-20 RX ADMIN — PANTOPRAZOLE SODIUM 40 MG: 40 TABLET, DELAYED RELEASE ORAL at 08:06

## 2022-11-20 RX ADMIN — METOPROLOL TARTRATE 100 MG: 100 TABLET, FILM COATED ORAL at 01:36

## 2022-11-20 RX ADMIN — BUPRENORPHINE HYDROCHLORIDE, NALOXONE HYDROCHLORIDE 1 FILM: 4; 1 FILM, SOLUBLE BUCCAL; SUBLINGUAL at 08:06

## 2022-11-20 RX ADMIN — ISOSORBIDE MONONITRATE 30 MG: 30 TABLET, EXTENDED RELEASE ORAL at 08:06

## 2022-11-20 RX ADMIN — LORAZEPAM 1 MG: 1 TABLET ORAL at 08:06

## 2022-11-20 RX ADMIN — ALBUTEROL SULFATE 2 PUFF: 90 AEROSOL, METERED RESPIRATORY (INHALATION) at 11:55

## 2022-11-20 RX ADMIN — HUMAN ALBUMIN MICROSPHERES AND PERFLUTREN 6 ML: 10; .22 INJECTION, SOLUTION INTRAVENOUS at 10:54

## 2022-11-20 RX ADMIN — ALBUTEROL SULFATE 2 PUFF: 90 AEROSOL, METERED RESPIRATORY (INHALATION) at 01:28

## 2022-11-20 RX ADMIN — AMITRIPTYLINE HYDROCHLORIDE 150 MG: 75 TABLET, FILM COATED ORAL at 21:46

## 2022-11-20 RX ADMIN — LOSARTAN POTASSIUM 100 MG: 100 TABLET, FILM COATED ORAL at 08:06

## 2022-11-20 RX ADMIN — NICARDIPINE HYDROCHLORIDE 12.5 MG/HR: 0.2 INJECTION INTRAVENOUS at 08:17

## 2022-11-20 RX ADMIN — FUROSEMIDE 40 MG: 40 TABLET ORAL at 11:53

## 2022-11-20 RX ADMIN — DEXTRAN 70, GLYCERIN, HYPROMELLOSE 1 DROP: 1; 2; 3 SOLUTION/ DROPS OPHTHALMIC at 01:28

## 2022-11-20 RX ADMIN — NICARDIPINE HYDROCHLORIDE 10 MG/HR: 0.2 INJECTION INTRAVENOUS at 04:13

## 2022-11-20 RX ADMIN — METOPROLOL TARTRATE 100 MG: 100 TABLET, FILM COATED ORAL at 20:01

## 2022-11-20 RX ADMIN — DEXTRAN 70, GLYCERIN, HYPROMELLOSE 1 DROP: 1; 2; 3 SOLUTION/ DROPS OPHTHALMIC at 20:01

## 2022-11-20 RX ADMIN — ALBUTEROL SULFATE 2 PUFF: 90 AEROSOL, METERED RESPIRATORY (INHALATION) at 14:19

## 2022-11-20 RX ADMIN — FLUTICASONE FUROATE AND VILANTEROL TRIFENATATE 1 PUFF: 200; 25 POWDER RESPIRATORY (INHALATION) at 14:20

## 2022-11-20 RX ADMIN — NICARDIPINE HYDROCHLORIDE 5 MG/HR: 0.2 INJECTION INTRAVENOUS at 00:48

## 2022-11-20 RX ADMIN — ZOLPIDEM TARTRATE 10 MG: 5 TABLET ORAL at 02:28

## 2022-11-20 RX ADMIN — ZOLPIDEM TARTRATE 10 MG: 5 TABLET ORAL at 22:31

## 2022-11-20 RX ADMIN — AMITRIPTYLINE HYDROCHLORIDE 150 MG: 75 TABLET, FILM COATED ORAL at 01:29

## 2022-11-20 RX ADMIN — DEXTRAN 70, GLYCERIN, HYPROMELLOSE 1 DROP: 1; 2; 3 SOLUTION/ DROPS OPHTHALMIC at 08:10

## 2022-11-20 RX ADMIN — METOPROLOL TARTRATE 100 MG: 100 TABLET, FILM COATED ORAL at 08:06

## 2022-11-20 RX ADMIN — LABETALOL HYDROCHLORIDE 10 MG: 5 INJECTION INTRAVENOUS at 06:02

## 2022-11-20 RX ADMIN — HEPARIN SODIUM 1350 UNITS/HR: 10000 INJECTION, SOLUTION INTRAVENOUS at 14:18

## 2022-11-20 RX ADMIN — NICARDIPINE HYDROCHLORIDE 10 MG/HR: 0.2 INJECTION INTRAVENOUS at 10:45

## 2022-11-20 RX ADMIN — CLOPIDOGREL BISULFATE 75 MG: 75 TABLET ORAL at 08:06

## 2022-11-20 RX ADMIN — FINASTERIDE 2.5 MG: 5 TABLET, FILM COATED ORAL at 08:06

## 2022-11-20 RX ADMIN — LABETALOL HYDROCHLORIDE 10 MG: 5 INJECTION INTRAVENOUS at 01:40

## 2022-11-20 RX ADMIN — FUROSEMIDE 40 MG: 10 INJECTION, SOLUTION INTRAMUSCULAR; INTRAVENOUS at 01:28

## 2022-11-20 ASSESSMENT — ACTIVITIES OF DAILY LIVING (ADL)
ADLS_ACUITY_SCORE: 20
ADLS_ACUITY_SCORE: 20
CHANGE_IN_FUNCTIONAL_STATUS_SINCE_ONSET_OF_CURRENT_ILLNESS/INJURY: NO
ADLS_ACUITY_SCORE: 20
ADLS_ACUITY_SCORE: 20
WALKING_OR_CLIMBING_STAIRS_DIFFICULTY: NO
ADLS_ACUITY_SCORE: 20
DIFFICULTY_EATING/SWALLOWING: NO
ADLS_ACUITY_SCORE: 20
ADLS_ACUITY_SCORE: 33
DRESSING/BATHING_DIFFICULTY: NO
ADLS_ACUITY_SCORE: 20
WEAR_GLASSES_OR_BLIND: NO
CONCENTRATING,_REMEMBERING_OR_MAKING_DECISIONS_DIFFICULTY: NO
DOING_ERRANDS_INDEPENDENTLY_DIFFICULTY: NO
ADLS_ACUITY_SCORE: 20
ADLS_ACUITY_SCORE: 20
TOILETING_ISSUES: NO
ADLS_ACUITY_SCORE: 20
ADLS_ACUITY_SCORE: 20
FALL_HISTORY_WITHIN_LAST_SIX_MONTHS: NO

## 2022-11-20 NOTE — PROGRESS NOTES
BP labile on nicardipine. Pt c/o HA, gave PRN tylenol with minimal relief. A&O, PERJESSICA. Denies CP. Tele AVB1 w/ BBB. Echo done. [facility] and [facility]. Report given to receiving RNVictorina.

## 2022-11-20 NOTE — ED NOTES
"[facility]  ED Nurse Handoff Report    ED Chief complaint: Hypertension and Nausea      ED Diagnosis:   Final diagnoses:   None       Code Status: Admitting MD to establish with patient.    Allergies:   Allergies   Allergen Reactions    Acetaminophen Other (See Comments)     Patient states he does not have any reaction to this    Morphine Other (See Comments)     Patient states he had a headache one time but has used since with no adverse reactions    Sulfa Drugs     Theophylline Hives       Patient Story: Patient reports high BP at home, came in due to concern of elevated BP and feeling nauseated. States he took his BP medications after he realized his BP was elevated. Also tacypnic on arrival, states he has asthma and was \"freaking out\" due to high BP.    Focused Assessment:  HTN, O2 sats 88-90% on RA, anxious    Treatments and/or interventions provided: 2L via NC; labs, meds    Patient's response to treatments and/or interventions: See results; see MAR    To be done/followed up on inpatient unit:  See orders.    Does this patient have any cognitive concerns?:  N/A    Activity level - Baseline/Home:  Independent  Activity Level - Current:   Independent    Patient's Preferred language: English   Needed?: No    Isolation: None  Infection: Not Applicable  Patient tested for COVID 19 prior to admission: YES  Bariatric?: No    Vital Signs:   Vitals:    11/19/22 1900 11/19/22 1915 11/19/22 1930 11/19/22 1945   BP: (!) 177/124 (!) 173/114 (!) 164/105 (!) 145/110   Pulse: 89 86 89 91   Resp: 18 14 15 30   Temp:       TempSrc:       SpO2: 90% 92% (!) 88% (!) 88%       Cardiac Rhythm:     Was the PSS-3 completed:   Yes  What interventions are required if any?               Family Comments:   OBS brochure/video discussed/provided to patient/family: N/A              Name of person given brochure if not patient:               Relationship to patient:     For the majority of the shift this patient's " behavior was Green.   No behavioral interventions performed.    ED NURSE PHONE NUMBER: [phone]

## 2022-11-20 NOTE — PROGRESS NOTES
[facility]    Medicine Progress Note - Hospitalist Service    Date of Admission:  11/19/2022    Assessment & Plan        Jeremi Damon is a 52 year old male with history of multivessel coronary artery disease with prior NSTEMI managed medically, hypertension, hypertrophic cardiomyopathy, mild persistent asthma who presents with elevated BP associated with headache, chest pain and was admitted on 11/19/2022.      Hypertensive emergency  Pulmonary edema  Acute hypoxic respiratory failure  Coronary artery disease, multivessel, with prior NSTEMI medically managed  NSTEMI  Hypertrophic cardiomyopathy  Presented with elevated SBP >230 at home, associated with headache, intermittent left sided chest pain occurring at rest  -Hypoxic to 88% in ED, CXR concerning for pulmonary edema  -Head CT negative  -Troponin 242->281-258; NtpBNP 1852  -Last echocardiogram with EF 55-60%, severe LVH, no note of outflow tract obstruction   - He was also started on nicardipine gtt , Heparin gtt and asa and was given 40 mg iv lasix and he is -1.3 L since admission as documented in EMR  - we will continue to monitor for now and his home meds have been started though medicine adherence is an ongoing concern    -continue  with aspirin, statin, Plavix,  - echo has been ordered and results pending for now   -Cardiology has been consulted and  Appreciate input and continue with nicardipine drip over the next 24 hours , continue with beta blocker and ARB and he will need coronary angiography once his bp is more stable and continue with oral lasix and we will monitor his renal function     Chronic kidney disease, stage 3  Baseline creatinine 1.4-1.6 with GFR in 50s.   -Creatinine 1.63 on admission and is 1.43 today  - Currently around baseline  - Avoid nephrotoxins  - Monitor BMP      Leucocytosis   - he has wbc count of 15.2 on admission and chest x-ray was more consistent with fluid overload and he had neutrophilic  "Predominance and has no fever and tested negative for COVID-19 on admission  -Patient continues to be afebrile but his WBC count did increase to 15.8 and ordered further infectious work-up including UA, blood cultures and procalcitonin and we will continue to monitor him closely    Major depression  - we will continue with PTA well butyrin and other prn home meds       ADHD  - Hold stimulants while BP elevated     Panic disorder   -  We will continue with  PTA lorazepam prn     GERD  - we will continue with PTA     Opioid dependence   -continue with PTA suboxone          Diet: Combination Diet 2 gm NA Diet    DVT Prophylaxis: heparin drip   Yee Catheter: Not present  Central Lines: None  Cardiac Monitoring: ACTIVE order. Indication: Chest pain/ ACS rule out (24 hours)  Code Status: Full Code      Disposition Plan      Expected Discharge Date: 11/21/2022                The patient's care was discussed with the Bedside Nurse and Patient.    Angelo Lorenzo MD  Hospitalist Service  [facility]  Securely message with the Vocera Web Console (learn more here)  Text page via [employer] Paging/Directory         Clinically Significant Risk Factors Present on Admission         # Hyponatremia: Lowest Na = 134 mmol/L (Ref range: 136-145) in last 2 days, will monitor as appropriate      # Hypoalbuminemia: Lowest albumin = 3.2 g/dL (Ref range: 3.5-5.2) at 11/19/2022  4:23 PM, will monitor as appropriate   # Drug Induced Platelet Defect: home medication list includes an antiplatelet medication   # Hypertension: home medication list includes antihypertensive(s)   # Acute Respiratory Failure: Documented O2 saturation < 91%.  Continue supplemental oxygen as needed    # Overweight: Estimated body mass index is 29.84 kg/m  as calculated from the following:    Height as of this encounter: 1.93 m (6' 4\").    Weight as of this encounter: 111.2 kg (245 lb 2.4 oz).       "     ______________________________________________________________________    Interval History     I did see the patient today and he was little angry and said you are the fourth doctor who is asking same questions and he is tired of answering them. He had some headache ongoing    He has novision changes, tingling or numbness anywhere else in the body.  He did admit to me that he has been intermittently taking his medications    Data reviewed today: I reviewed all medications, new labs and imaging results over the last 24 hours. I personally reviewed the chest x-ray image(s) showing Moderate coarsened bilateral perihilar opacities have developed since 11/29/2020, suspicious for edema or atypical infectious process. Chest otherwise negative..    CT scan of the head was done on admission which did not show any acute process with no intracranial hemorrhage    Physical Exam   Vital Signs: Temp: 98.6  F (37  C) Temp src: Oral BP: (!) 154/92 Pulse: 70   Resp: 24 SpO2: 95 % O2 Device: Nasal cannula Oxygen Delivery: 2 LPM  Weight: 245 lbs 2.42 oz        General: Patient was laying in bed and was having headache  HEENT: Head is atraumatic, normocephalic.  Pupils are equal, round and reactive to light.  No scleral icterus. Oral mucosa is moist   Neck: Neck is supple   Respiratory: ctla no crackles   Cardiovascular: Regular rate , S1 and S2 normal with no murmer or rubs or gallops  Abdomen:   soft , non tender , non distended and bowel sound present   Skin: No skin rashes or lesions to inspection or palpation.  Neurologic: Higher functions are within normal limits. No obvious defects in speech, language and memory. No facial droop  Musculoskeletal: Normal Range of motion over upper and lower extremities bilaterally   Psychiatric: little irritable and uncooperative    Data   Recent Labs   Lab 11/20/22  0529 11/19/22  1623   WBC 15.8* 15.2*   HGB 18.1* 18.0*   MCV 84 85    242    139   POTASSIUM 4.5 4.7   CHLORIDE  100 104   CO2 31 30   BUN 18 18   CR 1.43* 1.63*   ANIONGAP 3 5   MIKO 9.4 9.4   GLC 74 100*   ALBUMIN  --  3.2*   PROTTOTAL  --  6.7*   BILITOTAL  --  0.6   ALKPHOS  --  89   ALT  --  142*   AST  --  93*     Recent Results (from the past 24 hour(s))   XR Chest 2 Views    Narrative    EXAM: XR CHEST 2 VIEWS  LOCATION: [facility]  DATE/TIME: 11/19/2022 9:07 PM    INDICATION: hypoxia  COMPARISON: 11/29/2020      Impression    IMPRESSION: Moderate coarsened bilateral perihilar opacities have developed since 11/29/2020, suspicious for edema or atypical infectious process. Chest otherwise negative.   CT Head w/o Contrast    Narrative    EXAM: CT HEAD W/O CONTRAST  LOCATION: [facility]  DATE/TIME: 11/19/2022 9:44 PM    INDICATION: headaches, hypertension, eval bleed  COMPARISON: CT head 04/14/2017  TECHNIQUE: Routine CT Head without IV contrast. Multiplanar reformats. Dose reduction techniques were used.    FINDINGS:  INTRACRANIAL CONTENTS: No intracranial hemorrhage, extraaxial collection, or mass effect.  No CT evidence of acute infarct. Mild presumed chronic small vessel ischemic changes. Mild generalized volume loss. No hydrocephalus.     VISUALIZED ORBITS/SINUSES/MASTOIDS: No intraorbital abnormality. Complete opacification left frontal sinus. No middle ear or mastoid effusion.    BONES/SOFT TISSUES: No acute abnormality.      Impression    IMPRESSION:  1.  No CT evidence for acute intracranial process.  2.  Chronic left frontal sinus disease.     Medications     heparin 1,350 Units/hr (11/20/22 0825)     niCARdipine 12.5 mg/hr (11/20/22 0817)     - MEDICATION INSTRUCTIONS -         albuterol  2 puff Inhalation Q6H     amitriptyline  150 mg Oral At Bedtime     artificial tears  1 drop Both Eyes BID     aspirin  81 mg Oral Daily     buprenorphine HCl-naloxone HCl  1 Film Sublingual 4x Daily     buPROPion  300 mg Oral QAM     clopidogrel  75 mg Oral Daily      finasteride  2.5 mg Oral Daily     fluticasone-vilanterol  1 puff Inhalation Daily     heparin ANTICOAGULANT Loading dose  3,000 Units Intravenous Once     isosorbide mononitrate  30 mg Oral Daily     losartan  100 mg Oral Daily     metoprolol tartrate  100 mg Oral BID     pantoprazole  40 mg Oral Daily     rosuvastatin  20 mg Oral Daily     sodium chloride (PF)  3 mL Intracatheter Q8H

## 2022-11-20 NOTE — PLAN OF CARE
Admitted to ICU due to need for nicardipine drip to help control high blood pressure. Neurologically intact.Denies pain. NSR 1st degree AV block, BBB on telemetry. SBP<160- Nicardipine drip and PRN labetalol given.  2 liters nasal cannula. Voiding in urinal.

## 2022-11-20 NOTE — CONSULTS
[facility]    Cardiology Consultation     Date of Admission:  11/19/2022    Assessment & Plan   Jeremi Damon is a 52 year old male who was admitted on 11/19/2022.    Assessment:  1.  Symptomatic hypertensive crisis mainly due to the fact the patient stopped taking his medications because he felt that they were contributing to his dry eyes.  This is associated with chest discomfort and a rise in troponin in a patient with known significant coronary artery disease in the LAD and circumflex.  Patient's blood pressure is now well controlled and the patient does not have chest discomfort.  Agree with the systolic goal of 160 to prevent neurological events with dropping the blood pressure too low.  2.  Raised troponin.  This may be type II from hypertensive crisis but this patient does have coronary artery disease and this may also represent an acute coronary syndrome.  3.  Medication noncompliance.  Patient decided to stop his own medications because he thought it was contributing to dry eyes which most likely precipitated this hypertensive crisis.  4.  Renal insufficiency.  His renal function is significantly better than it had been in the past.  5.  Hypertrophic cardiomyopathy probably related to hypertension rather than actual idiopathic hypertrophic cardiomyopathy given the history of significant hypertension and renal insufficiency.  6.  Hypoxia and pulmonary edema on the chest x-ray.    Plan:  1.  Agree with continue with a nicardipine over the next 24 hours and maintain the blood pressure around 60.  Also agree with continuation of the patient's metoprolol and losartan.  2.  After that we will start titrating the losartan or may be switched to a more powerful angiotensin receptor blocker such as valsartan or olmesartan.  3.  Would probably perform a coronary angiogram on this admission once his blood pressure comes under better control and then consider intervention if appropriate.   Will be important to see if there has been progression of his already extensive coronary artery disease.  I suspect that risk factor modification has been less than optimal.  4.  Awaiting the results of the echocardiogram.  5.  Already received intravenous Lasix.  We will start the patient on oral furosemide 40 mg/day and keep a close eye on renal function.  Critical Care: Total critical care time spent: 62    Harshal Triston Dykes MD, MD, MD    Primary Care Physician   Luis Armando Segovia    Reason for Consult   Reason for consult: I was asked by hospitalist service to evaluate this patient for severe hypertension and chest discomfort..    History of Present Illness   Jeremi Damon is a 52 year old male who presents with headache and left-sided chest discomfort which lasted for an hour.  Patient was noted to be markedly hypertensive with systolic pressures greater than 230.  The patient had stopped taking his antihypertensive medications because he was complaining of dry eyes.  Patient was also agitated with his telephone company because he was on the phone for approximately an hour with them.  Patient is pain-free now.  He does not have a headache now.  His CT scan was negative.  This patient is normally followed by Dr Keita.  The patient had a coronary angiogram performed on 30 November of 2020 in the setting of a non-ST elevation myocardial infarct.  The patient had significant coronary artery disease.  It is 60% tandem mid LAD lesions and a distal 90% stenosis distal circumflex with 90% stenosis the first obtuse marginal artery was 60% stenosed the second obtuse marginal artery was 50% stenosed in the lateral first obtuse marginal artery branch was 40% stenosed the proximal right coronary artery had a 40% stenosis the distal right coronary artery was 25% stenosed the posterior descending artery was 25% stenosed and the posterolateral branch had 60% stenosis.  At the time of presentation in November 2020 he  had significant renal insufficiency and it was decided to postpone intervention and to have the patient follow-up with Dr. Keita.  The patient was seen by Dr. Keita on 18 December of 2020.  Because the patient had significant hypertrophy of the left ventricle she wanted to consider cardiac MRI and this was ordered for the patient.  However he was never performed and I believe the patient may have been claustrophobic and canceled the test.  She also discussed with the interventionalists about intervention and it was felt that all the disease was distal and aggressive risk factor modification was suggested..  The renal dysfunction was a significant issue in the decision making.  Renal function has improved on this admission.  Creatinine is now 1.43 with a BUN of 18.  Troponins were elevated on this admission with first troponin being 242, second 281 third 281 and fourth 258.  This may be consistent with a demand infarct or true ACS given that he had chest discomfort in the setting of severe hypertension.  Blood pressure is significantly improved and he is in the high 150s over 70s.  Given the hypertensive crisis the plan was to bring the blood pressure down to the 160 eryn so as not to cause watershed neurological issues.  Patient is on intravenous heparin and intravenous nicardipine and has been started on he is home cardiac meds.  Evidence of pulmonary edema on the chest x-ray probably related to hypertensive emergency.    Past Medical History   Past Medical History:   Diagnosis Date     ADHD (attention deficit hyperactivity disorder)      Allergic rhinitis, cause unspecified     Allergic rhinitis     Benign hypertension      Chronic back pain      Hiccough      Hypersomnia with sleep apnea, unspecified      Major depressive disorder, single episode, moderate (H) 03/2008     Mild persistent asthma      Other primary cardiomyopathies     Cardiomyopathy -- unknown etiology         Past Surgical History   Past  "Surgical History:   Procedure Laterality Date     APPENDECTOMY  2007     BACK SURGERY      L5-S1 fusion     CV CORONARY ANGIOGRAM N/A 2020    Procedure: Heart Catheterization with Possible Intervention;  Surgeon: Theo Donahue MD;  Location:  [facility]      REPAIR OF NASAL SEPTUM       LAPAROSCOPIC CHOLECYSTECTOMY      Cholecystectomy, Laparoscopic     SURGICAL HISTORY OF -       torn pectoral muscle     SURGICAL HISTORY OF -       Bilateral radiofrequency volume reduction of the inferior turbinates.     SURGICAL HISTORY OF -       rhinoplasty- septoplasty         Prior to Admission Medications   Prior to Admission Medications   Prescriptions Last Dose Informant Patient Reported? Taking?   B-D LUER-HIWOT SYRINGE 21G X 1-1/2\" 3 ML MISC   Yes No   Si DEVICE ONCE A WEEK   AND ALSO NEEDS 22 G NEEDLES 1.5 INCH   LORazepam (ATIVAN) 1 MG tablet  at PRN  No Yes   Sig: Take 1 tablet (1 mg) by mouth 2 times daily as needed for anxiety   RESTASIS 0.05 % ophthalmic emulsion 2022  No Yes   Sig: INSTILL 1 DROP INTO BOTH EYES TWICE A DAY   Syringe/Needle, Disp, (SYRINGE LUER LOCK) 20G X 1-/2\" 3 ML MISC   No No   Si Device once a week - and also needs 22 G needles 1.5 inch #30 with 1 refill   albuterol (PROAIR HFA/PROVENTIL HFA/VENTOLIN HFA) 108 (90 Base) MCG/ACT inhaler  at PRN  No Yes   Sig: Inhale 2 puffs into the lungs every 6 hours   amitriptyline (ELAVIL) 50 MG tablet 2022 at PM  No Yes   Sig: Take 3 tablets (150 mg) by mouth At Bedtime   amphetamine-dextroamphetamine (ADDERALL) 20 MG tablet   No Yes   Sig: Take 1 tablet (20 mg) by mouth 2 times daily   aspirin (ASA) 81 MG EC tablet 2022 at AM  No Yes   Sig: Take 1 tablet (81 mg) by mouth daily   betamethasone dipropionate (DIPROSONE) 0.05 % external cream Not Taking  No No   Sig: APPLY TOPICALLY TO THE AFFECTED AREA TWICE A DAY   Patient not taking: Reported on 2022   buPROPion (WELLBUTRIN XL) 300 " MG 24 hr tablet 2022 at AM  No Yes   Sig: Take 1 tablet (300 mg) by mouth every morning   buprenorphine HCl-naloxone HCl (SUBOXONE) 8-2 MG per film 2022  No Yes   Si/2 film QID - Brand name only   busPIRone (BUSPAR) 5 MG tablet  at PRN  No Yes   Sig: Take 1 tablet (5 mg) by mouth 2 times daily as needed (panic attack)   clonazePAM (KLONOPIN) 1 MG tablet  at PRN  No Yes   Sig: TAKE 1 TABLET (1 MG) BY MOUTH 2 TIMES DAILY AS NEEDED (FLYING PHOBIA)   clopidogrel (PLAVIX) 75 MG tablet 2022 at AM  No Yes   Sig: Take 1 tablet (75 mg) by mouth daily   cyclobenzaprine (FLEXERIL) 10 MG tablet  at PRN  No Yes   Sig: Take 1 tablet (10 mg) by mouth 3 times daily as needed for other (hiccups)   finasteride (PROSCAR) 5 MG tablet  at AM  No Yes   Si/2 tab daily   fluconazole (DIFLUCAN) 150 MG tablet Not Taking  No No   Sig: Take 2 tablets (300 mg) by mouth every 14 days - for two doses   Patient not taking: Reported on 2022   fluticasone-salmeterol (ADVAIR DISKUS) 500-50 MCG/DOSE inhaler   No No   Si inhalation 2 times per day   imiquimod (ALDARA) 5 % external cream 2022 at HS  No Yes   Sig: Apply topically At Bedtime -wash off after 8 hours and my use for up to 16 weeks.   isosorbide mononitrate (IMDUR) 30 MG 24 hr tablet 2022 at AM  No Yes   Sig: Take 1 tablet (30 mg) by mouth daily   loperamide (IMODIUM A-D) 2 MG tablet  at PRN  No No   Sig: Take 2 tabs (4 mg) after first loose stool, and then take one tab (2 mg) after each diarrheal stool.  Max of 8 tabs (16 mg) per day.   losartan (COZAAR) 100 MG tablet 2022 at AM  No Yes   Sig: Take 1 tablet (100 mg) by mouth daily   metoprolol tartrate (LOPRESSOR) 100 MG tablet 2022  No Yes   Sig: TAKE 1 TABLET BY MOUTH TWICE A DAY   naloxone (NARCAN) 4 MG/0.1ML nasal spray  at PRN  No No   Sig: Spray 1 spray (4 mg) into one nostril alternating nostrils as needed for opioid reversal every 2-3 minutes until assistance arrives    nitroGLYcerin (NITROSTAT) 0.4 MG sublingual tablet  at PRN  No No   Sig: PLACE 1 TABLET UNDER TONGUE EVERY 5 MINS, UP TO 3 DOSES AS NEEDED FOR CHEST PAIN   pantoprazole (PROTONIX) 40 MG EC tablet 11/18/2022 at PM  No Yes   Sig: Take 1 tablet (40 mg) by mouth daily   rosuvastatin (CRESTOR) 20 MG tablet 11/19/2022 at AM  No Yes   Sig: Take 1 tablet (20 mg) by mouth daily   testosterone cypionate (DEPOTESTOSTERONE) 200 MG/ML injection Past Week  No Yes   Sig: Inject 1 mL (200 mg) into the muscle once a week   vitamin C (ASCORBIC ACID) 1000 MG TABS 11/19/2022  No Yes   Sig: Take 1,000 mg by mouth daily   zolpidem (AMBIEN) 10 MG tablet  at PRN  No Yes   Sig: TAKE 1 TABLET (10 MG) BY MOUTH NIGHTLY AS NEEDED FOR SLEEP      Facility-Administered Medications: None     Current Facility-Administered Medications   Medication Dose Route Frequency     albuterol  2 puff Inhalation Q6H     amitriptyline  150 mg Oral At Bedtime     artificial tears  1 drop Both Eyes BID     aspirin  81 mg Oral Daily     buprenorphine HCl-naloxone HCl  1 Film Sublingual 4x Daily     buPROPion  300 mg Oral QAM     clopidogrel  75 mg Oral Daily     finasteride  2.5 mg Oral Daily     fluticasone-vilanterol  1 puff Inhalation Daily     isosorbide mononitrate  30 mg Oral Daily     losartan  100 mg Oral Daily     metoprolol tartrate  100 mg Oral BID     pantoprazole  40 mg Oral Daily     rosuvastatin  20 mg Oral Daily     sodium chloride (PF)  3 mL Intracatheter Q8H     Current Facility-Administered Medications   Medication Last Rate     heparin 1,200 Units/hr (11/20/22 0600)     niCARdipine 12.5 mg/hr (11/20/22 0740)     - MEDICATION INSTRUCTIONS -       Allergies   Allergies   Allergen Reactions     Acetaminophen Other (See Comments)     Patient states he does not have any reaction to this     Morphine Other (See Comments)     Patient states he had a headache one time but has used since with no adverse reactions     Sulfa Drugs      Theophylline Hives  "      Social History    reports that he has never smoked. He has never used smokeless tobacco. He reports that he does not drink alcohol and does not use drugs.      Family History   I have reviewed this patient's family history and updated it with pertinent information if needed.  Family History   Problem Relation Age of Onset     Coronary Artery Disease Mother          55; MI x 2     Cancer Father         hodgkins     Hypertension Father      Coronary Artery Disease Father      Cardiovascular Maternal Grandmother      Hypertension Brother      Cardiovascular Sister      No Known Problems Daughter      Prostate Cancer No family hx of      Cancer - colorectal No family hx of           Review of Systems   A comprehensive review of system was performed and is negative other than that noted in the HPI or here.     Physical Exam   Vital Signs with Ranges  Temp:  [97.8  F (36.6  C)-98.6  F (37  C)] 98.6  F (37  C)  Pulse:  [68-92] 70  Resp:  [10-48] 24  BP: (108-184)/() 154/92  SpO2:  [88 %-96 %] 95 %  Wt Readings from Last 4 Encounters:   22 111.2 kg (245 lb 2.4 oz)   22 110.2 kg (243 lb)   22 111 kg (244 lb 12.8 oz)   10/19/21 116.1 kg (256 lb)     I/O last 3 completed shifts:  In: 163.48 [I.V.:163.48]  Out: 1500 [Urine:1500]      Vitals: BP (!) 154/92   Pulse 70   Temp 98.6  F (37  C) (Oral)   Resp 24   Ht 1.93 m (6' 4\")   Wt 111.2 kg (245 lb 2.4 oz)   SpO2 95%   BMI 29.84 kg/m      Physical Exam:   General - Alert and oriented to time place and person in no acute distress  Eyes - No scleral icterus  HEENT - Neck supple, moist mucous membranes  Cardiovascular -heart sounds 1 and 2 are normal with no murmurs.  Carotids are normal with no bruits.  Jugular venous pulse is not raised.  Extremities - There is no peripheral edema  Respiratory -chest is clear to percussion and auscultation.  Skin - No pallor or cyanosis  Gastrointestinal - Non tender and non distended without rebound or " guarding  Psych - Appropriate affect   Neurological - No gross motor neurological focal deficits    No lab results found in last 7 days.    Invalid input(s): TROPONINIES    Recent Labs   Lab 11/20/22  0529 11/19/22  1623   WBC 15.8* 15.2*   HGB 18.1* 18.0*   MCV 84 85    242    139   POTASSIUM 4.5 4.7   CHLORIDE 100 104   CO2 31 30   BUN 18 18   CR 1.43* 1.63*   GFRESTIMATED 58* 50*   ANIONGAP 3 5   MIKO 9.4 9.4   GLC 74 100*   ALBUMIN  --  3.2*   PROTTOTAL  --  6.7*   BILITOTAL  --  0.6   ALKPHOS  --  89   ALT  --  142*   AST  --  93*     Recent Labs   Lab Test 06/23/22  1426 02/08/21  1550   CHOL 90 175   HDL 28* 41   LDL 19 Cannot estimate LDL when triglyceride exceeds 400 mg/dL  68   TRIG 215* 537*     Recent Labs   Lab 11/20/22  0529 11/19/22  1623   WBC 15.8* 15.2*   HGB 18.1* 18.0*   HCT 56.0* 55.0*   MCV 84 85    242     No results for input(s): PH, PHV, PO2, PO2V, SAT, PCO2, PCO2V, HCO3, HCO3V in the last 168 hours.  Recent Labs   Lab 11/19/22 2001   NTBNPI 1,852*     Recent Labs   Lab 11/19/22  1623   DD <0.27     No results for input(s): SED, CRP in the last 168 hours.  Recent Labs   Lab 11/20/22  0529 11/19/22  1623    242     No results for input(s): TSH in the last 168 hours.  No results for input(s): COLOR, APPEARANCE, URINEGLC, URINEBILI, URINEKETONE, SG, UBLD, URINEPH, PROTEIN, UROBILINOGEN, NITRITE, LEUKEST, RBCU, WBCU in the last 168 hours.    Imaging:  Recent Results (from the past 48 hour(s))   XR Chest 2 Views    Narrative    EXAM: XR CHEST 2 VIEWS  LOCATION: [facility]  DATE/TIME: 11/19/2022 9:07 PM    INDICATION: hypoxia  COMPARISON: 11/29/2020      Impression    IMPRESSION: Moderate coarsened bilateral perihilar opacities have developed since 11/29/2020, suspicious for edema or atypical infectious process. Chest otherwise negative.   CT Head w/o Contrast    Narrative    EXAM: CT HEAD W/O CONTRAST  LOCATION: [facility]  "HOSPITAL  DATE/TIME: 11/19/2022 9:44 PM    INDICATION: headaches, hypertension, eval bleed  COMPARISON: CT head 04/14/2017  TECHNIQUE: Routine CT Head without IV contrast. Multiplanar reformats. Dose reduction techniques were used.    FINDINGS:  INTRACRANIAL CONTENTS: No intracranial hemorrhage, extraaxial collection, or mass effect.  No CT evidence of acute infarct. Mild presumed chronic small vessel ischemic changes. Mild generalized volume loss. No hydrocephalus.     VISUALIZED ORBITS/SINUSES/MASTOIDS: No intraorbital abnormality. Complete opacification left frontal sinus. No middle ear or mastoid effusion.    BONES/SOFT TISSUES: No acute abnormality.      Impression    IMPRESSION:  1.  No CT evidence for acute intracranial process.  2.  Chronic left frontal sinus disease.       Echo:  No results found for this or any previous visit (from the past 4320 hour(s)).    Clinically Significant Risk Factors Present on Admission        # Hyponatremia: Lowest Na = 134 mmol/L (Ref range: 136-145) in last 2 days, will monitor as appropriate      # Hypoalbuminemia: Lowest albumin = 3.2 g/dL (Ref range: 3.5-5.2) at 11/19/2022  4:23 PM, will monitor as appropriate    # Drug Induced Platelet Defect: home medication list includes an antiplatelet medication   # Overweight: Estimated body mass index is 29.84 kg/m  as calculated from the following:    Height as of this encounter: 1.93 m (6' 4\").    Weight as of this encounter: 111.2 kg (245 lb 2.4 oz).        Fluid & Electrolyte Disorders: Fluid overload, unspecified            Nephrology: Acute kidney failure, unspecified                                  "

## 2022-11-20 NOTE — PHARMACY-ADMISSION MEDICATION HISTORY
Pharmacy Medication History  Admission medication history interview status for the 11/19/2022  admission is complete. See EPIC admission navigator for prior to admission medications     Location of Interview: Patient room  Medication history sources: Patient, Surescripts and Care Everywhere    Significant changes made to the medication list:  none    In the past week, patient estimated taking medication this percent of the time: less than 50% due to other    Additional medication history information:   Adherence unclear - fill history is not consistent for many maintenance medications. Overall doesn't like taking medications, but reported he took all medications today. Per conversation, he likely doesn't take all blood pressure medications as prescribed    Medication reconciliation completed by provider prior to medication history? Yes    Time spent in this activity: 45 minutes    Prior to Admission medications    Medication Sig Last Dose Taking? Auth Provider Long Term End Date   albuterol (PROAIR HFA/PROVENTIL HFA/VENTOLIN HFA) 108 (90 Base) MCG/ACT inhaler Inhale 2 puffs into the lungs every 6 hours  at PRN Yes Luis Armando Segovia MD Yes    amitriptyline (ELAVIL) 50 MG tablet Take 3 tablets (150 mg) by mouth At Bedtime 11/18/2022 at PM Yes Luis Armando Segovia MD Yes    amphetamine-dextroamphetamine (ADDERALL) 20 MG tablet Take 1 tablet (20 mg) by mouth 2 times daily  Yes Luis Armando Segovia MD     aspirin (ASA) 81 MG EC tablet Take 1 tablet (81 mg) by mouth daily 11/19/2022 at AM Yes Luis Armando Segovia MD     buprenorphine HCl-naloxone HCl (SUBOXONE) 8-2 MG per film 1/2 film QID - Brand name only 11/19/2022 Yes Macy Subramanian MD     buPROPion (WELLBUTRIN XL) 300 MG 24 hr tablet Take 1 tablet (300 mg) by mouth every morning 11/19/2022 at AM Yes Luis Armando Segovia MD Yes    busPIRone (BUSPAR) 5 MG tablet Take 1 tablet (5 mg) by mouth 2 times daily as needed (panic attack)  at PRN Yes Luis Armando Segovia MD Yes     clonazePAM (KLONOPIN) 1 MG tablet TAKE 1 TABLET (1 MG) BY MOUTH 2 TIMES DAILY AS NEEDED (FLYING PHOBIA)  at PRN Yes Luis Armando Segovia MD Yes    clopidogrel (PLAVIX) 75 MG tablet Take 1 tablet (75 mg) by mouth daily 11/19/2022 at AM Yes Luis Armando Segovia MD Yes    cyclobenzaprine (FLEXERIL) 10 MG tablet Take 1 tablet (10 mg) by mouth 3 times daily as needed for other (hiccups)  at PRN Yes Macy Subramanian MD     finasteride (PROSCAR) 5 MG tablet 1/2 tab daily  at AM Yes Luis Armando Segovia MD     imiquimod (ALDARA) 5 % external cream Apply topically At Bedtime -wash off after 8 hours and my use for up to 16 weeks. 11/18/2022 at HS Yes Luis Armando Segovia MD     isosorbide mononitrate (IMDUR) 30 MG 24 hr tablet Take 1 tablet (30 mg) by mouth daily 11/19/2022 at AM Yes Luis Armando Segovia MD Yes    LORazepam (ATIVAN) 1 MG tablet Take 1 tablet (1 mg) by mouth 2 times daily as needed for anxiety  at PRN Yes Luis Armando Segovia MD No    losartan (COZAAR) 100 MG tablet Take 1 tablet (100 mg) by mouth daily 11/19/2022 at AM Yes Luis Armando Segovia MD Yes    metoprolol tartrate (LOPRESSOR) 100 MG tablet TAKE 1 TABLET BY MOUTH TWICE A DAY 11/19/2022 Yes Luis Armando Segovia MD Yes    pantoprazole (PROTONIX) 40 MG EC tablet Take 1 tablet (40 mg) by mouth daily 11/18/2022 at PM Yes Luis Armando Segovia MD     RESTASIS 0.05 % ophthalmic emulsion INSTILL 1 DROP INTO BOTH EYES TWICE A DAY 11/19/2022 Yes Luis Armando Segovia MD     rosuvastatin (CRESTOR) 20 MG tablet Take 1 tablet (20 mg) by mouth daily 11/19/2022 at AM Yes Luis Armando Segovia MD Yes    testosterone cypionate (DEPOTESTOSTERONE) 200 MG/ML injection Inject 1 mL (200 mg) into the muscle once a week Past Week Yes Luis Armando Segovia MD Yes    vitamin C (ASCORBIC ACID) 1000 MG TABS Take 1,000 mg by mouth daily 11/19/2022 Yes BARTOLOME Clarke MD     zolpidem (AMBIEN) 10 MG tablet TAKE 1 TABLET (10 MG) BY MOUTH NIGHTLY AS NEEDED FOR SLEEP  at PRN Yes Lehrer, Robert D, MD B-D LUER-LOK  "SYRINGE 21G X 1-1/2\" 3 ML MISC 1 DEVICE ONCE A WEEK   AND ALSO NEEDS 22 G NEEDLES 1.5 INCH   Reported, Patient     betamethasone dipropionate (DIPROSONE) 0.05 % external cream APPLY TOPICALLY TO THE AFFECTED AREA TWICE A DAY  Patient not taking: Reported on 11/19/2022 Not Taking  Luis Armando Segovia MD     fluconazole (DIFLUCAN) 150 MG tablet Take 2 tablets (300 mg) by mouth every 14 days - for two doses  Patient not taking: Reported on 11/19/2022 Not Taking  Luis Armando Segovia MD     fluticasone-salmeterol (ADVAIR DISKUS) 500-50 MCG/DOSE inhaler 1 inhalation 2 times per day   Luis Armando Segovia MD Yes    loperamide (IMODIUM A-D) 2 MG tablet Take 2 tabs (4 mg) after first loose stool, and then take one tab (2 mg) after each diarrheal stool.  Max of 8 tabs (16 mg) per day.  at PRN  Luis Armando Segovia MD     naloxone (NARCAN) 4 MG/0.1ML nasal spray Spray 1 spray (4 mg) into one nostril alternating nostrils as needed for opioid reversal every 2-3 minutes until assistance arrives  at PRN  Luis Armando Segovia MD Yes    nitroGLYcerin (NITROSTAT) 0.4 MG sublingual tablet PLACE 1 TABLET UNDER TONGUE EVERY 5 MINS, UP TO 3 DOSES AS NEEDED FOR CHEST PAIN  at PRN  Luis Armando Segovia MD Yes    Syringe/Needle, Disp, (SYRINGE LUER LOCK) 20G X 1-1/2\" 3 ML MISC 1 Device once a week - and also needs 22 G needles 1.5 inch #30 with 1 refill   Luis Armando Segovia MD         The information provided in this note is only as accurate as the sources available at the time of update(s)     "

## 2022-11-20 NOTE — PROVIDER NOTIFICATION
Brief update:    Paged re: duplicate nicardipine order from ER/admitting provider    Discontinued 1 nicardipine order; goal blood pressure 160 systolic left in place per admitting provider.    If hypoxia, persistent pain despite correction of blood pressure to 160 systolic goal, could further adjust orders    Adjusted IV labetalol to be administered for blood pressure greater than 160 to help facilitate discontinuation of nicardipine.    Sheldon Mota MD  2:51 AM

## 2022-11-20 NOTE — H&P
[facility]    History and Physical - Hospitalist Service       Date of Admission:  11/19/2022    Assessment & Plan   Jeremi Damon is a 52 year old male with history of multivessel coronary artery disease with prior NSTEMI managed medically, hypertension, hypertrophic cardiomyopathy, mild persistent asthma who presents with elevated BP associated with headache, chest pain. Admitted on 11/19/2022.     Hypertensive emergency  Pulmonary edema  Acute hypoxic respiratory failure  Coronary artery disease, multivessel, with prior NSTEMI medically managed  Hypertrophic cardiomyopathy  *Presents with elevated SBP >230 at home, associated with headache, intermittent left sided chest pain occurring at rest  *Hypoxic to 88% in ED, CXR concerning for pulmonary edema  *Head CT negative  *Troponin 242->281; NtpBNP 1852  *Last echocardiogram with EF 55-60%, severe LVH, no note of outflow tract obstruction   - Cardiology consulted  - Continue nicardipine gtt  - Heparin gtt  - Aspirin   - Furosemide 40 mg IV x1  - Resume prior to admission regimen when confirmed by pharmacy   - Trend troponins   - Echocardiogram     Chronic kidney disease, stage 3  Baseline creatinine 1.4-1.6 with GFR in 50s. Creatinine 1.63 on admission.   - Currently around baseline  - Avoid nephrotoxins  - Monitor BMP      Major depression  - Resume prior to admission cardiac regimen when confirmed by pharmacy     ADHD  - Hold stimulants while BP elevated    Panic disorder   - Resume PTA lorazepam when confirmed by pharmacy     GERD  - Resume prior to admission PPI when confirmed by pharrmacy           Diet:   2 gram sodium restricted diet   DVT Prophylaxis: Heparin   Yee Catheter: Not present  Code Status:   Full code    Disposition Plan   Admit to inpatient. Anticipate greater than or equal to 2 midnights prior to discharge.     Entered: Francisco Del Valle MD 11/19/2022, 10:09 PM     The patient's care was discussed with the ED  provider, patient      Clinically Significant Risk Factors Present on Admission              # Hypoalbuminemia: Lowest albumin = 3.2 g/dL (Ref range: 3.5-5.2) at 11/19/2022  4:23 PM, will monitor as appropriate   # Drug Induced Platelet Defect: home medication list includes an antiplatelet medication   # Hypertension: home medication list includes antihypertensive(s)   # Acute Respiratory Failure: Documented O2 saturation < 91%.  Continue supplemental oxygen as needed    # Obesity: Estimated body mass index is 32.96 kg/m  as calculated from the following:    Height as of 11/4/22: 1.829 m (6').    Weight as of 11/4/22: 110.2 kg (243 lb).                Francisco Del Valle MD  [facility]    ______________________________________________________________________    Chief Complaint   Elevated blood pressure    History of Present Illness   Jeremi Damon is a 52 year old male who presents with the above chief complaint.    History is obtained from the patient. The patient reports he has recently been feeling generally uneasy and anxious.  He decided to check his blood pressure to see if this was contributing and measured multiple blood pressures greater than 230.  He reports a frontal headache that feels different than his typical migraines associated with this.  He has also had intermittent left-sided squeezing chest pain, present at rest, not worsened with exertion.  He reports due to concern for dry eyes that he attributes to his blood pressure medications, he typically takes on average about 4 of the 6 medications he says he is prescribed.  He denies any shortness of breath at present.    In the Emergency Department, the patient was seen by Dr. Up, with whom I discussed the patient's presenting symptoms and emergency department course.  Initial vital signs were a temperature of 97.8F, HR 92, /128, RR 22, SpO2 96% on room air. Work-up included troponin 242, CXR concerning for pulmonary  edema. He was given metoprolol, aspirin and started on nicardipine gtt. Hospitalists were contacted for admission to the hospital.     Review of Systems    Complete 10 point review of systems assessed and negative except as noted in HPI.    Past Medical History    I have reviewed this patient's medical history and updated it with pertinent information if needed.   Past Medical History:   Diagnosis Date     ADHD (attention deficit hyperactivity disorder)      Allergic rhinitis, cause unspecified     Allergic rhinitis     Benign hypertension      Chronic back pain      Hiccough      Hypersomnia with sleep apnea, unspecified      Major depressive disorder, single episode, moderate (H) 03/2008     Mild persistent asthma      Other primary cardiomyopathies     Cardiomyopathy -- unknown etiology       Past Surgical History   I have reviewed this patient's surgical history and updated it with pertinent information if needed.  Past Surgical History:   Procedure Laterality Date     APPENDECTOMY  2007     BACK SURGERY  2014    L5-S1 fusion     CV CORONARY ANGIOGRAM N/A 11/30/2020    Procedure: Heart Catheterization with Possible Intervention;  Surgeon: Theo Donahue MD;  Location:  HEART CARDI[facility] CATH LAB     HC REPAIR OF NASAL SEPTUM       LAPAROSCOPIC CHOLECYSTECTOMY  1/05    Cholecystectomy, Laparoscopic     SURGICAL HISTORY OF -       torn pectoral muscle     SURGICAL HISTORY OF -   2009    Bilateral radiofrequency volume reduction of the inferior turbinates.     SURGICAL HISTORY OF -   2012    rhinoplasty- septoplasty         Social History   I have reviewed this patient's social history and updated it with pertinent information if needed.  Social History     Tobacco Use     Smoking status: Never     Smokeless tobacco: Never   Vaping Use     Vaping Use: Never used   Substance Use Topics     Alcohol use: No     Comment: rarely      Drug use: No          Lives in [place]    Family History   I have reviewed this  "patient's family history and updated it with pertinent information if needed.   Family History   Problem Relation Age of Onset     Coronary Artery Disease Mother          55; MI x 2     Cancer Father         hodgkins     Hypertension Father      Coronary Artery Disease Father      Cardiovascular Maternal Grandmother      Hypertension Brother      Cardiovascular Sister      No Known Problems Daughter      Prostate Cancer No family hx of      Cancer - colorectal No family hx of         Prior to Admission Medications   Prior to Admission Medications   Prescriptions Last Dose Informant Patient Reported? Taking?   B-D LUER-HIWOT SYRINGE 21G X 1-1/2\" 3 ML MISC   Yes No   Si DEVICE ONCE A WEEK   AND ALSO NEEDS 22 G NEEDLES 1.5 INCH   LORazepam (ATIVAN) 1 MG tablet  at PRN  No Yes   Sig: Take 1 tablet (1 mg) by mouth 2 times daily as needed for anxiety   MULTIVITAMIN TABS   OR   No Yes   Sig: Take 1 tablet by mouth daily   RESTASIS 0.05 % ophthalmic emulsion   No Yes   Sig: INSTILL 1 DROP INTO BOTH EYES TWICE A DAY   Syringe/Needle, Disp, (SYRINGE LUER LOCK) 20G X 1-1/2\" 3 ML MISC   No No   Si Device once a week - and also needs 22 G needles 1.5 inch #30 with 1 refill   VITAMIN C 100 MG OR TABS   No Yes   Si TABLET 3 TIMES DAILY   albuterol (PROAIR HFA/PROVENTIL HFA/VENTOLIN HFA) 108 (90 Base) MCG/ACT inhaler  at PRN  No Yes   Sig: Inhale 2 puffs into the lungs every 6 hours   amitriptyline (ELAVIL) 50 MG tablet   No Yes   Sig: Take 3 tablets (150 mg) by mouth At Bedtime   amphetamine-dextroamphetamine (ADDERALL) 20 MG tablet   No Yes   Sig: Take 1 tablet (20 mg) by mouth 2 times daily   aspirin (ASA) 81 MG EC tablet   No Yes   Sig: Take 1 tablet (81 mg) by mouth daily   betamethasone dipropionate (DIPROSONE) 0.05 % external cream Not Taking  No No   Sig: APPLY TOPICALLY TO THE AFFECTED AREA TWICE A DAY   Patient not taking: Reported on 2022   buPROPion (WELLBUTRIN XL) 300 MG 24 hr tablet   No Yes   Sig: " Take 1 tablet (300 mg) by mouth every morning   buprenorphine HCl-naloxone HCl (SUBOXONE) 8-2 MG per film   No Yes   Si/2 film QID - Brand name only   busPIRone (BUSPAR) 5 MG tablet  at PRN  No Yes   Sig: Take 1 tablet (5 mg) by mouth 2 times daily as needed (panic attack)   clonazePAM (KLONOPIN) 1 MG tablet  at PRN  No Yes   Sig: TAKE 1 TABLET (1 MG) BY MOUTH 2 TIMES DAILY AS NEEDED (FLYING PHOBIA)   clopidogrel (PLAVIX) 75 MG tablet   No Yes   Sig: Take 1 tablet (75 mg) by mouth daily   cyclobenzaprine (FLEXERIL) 10 MG tablet  at PRN  No Yes   Sig: Take 1 tablet (10 mg) by mouth 3 times daily as needed for other (hiccups)   finasteride (PROSCAR) 5 MG tablet   No Yes   Si/2 tab daily   fluconazole (DIFLUCAN) 150 MG tablet Not Taking  No No   Sig: Take 2 tablets (300 mg) by mouth every 14 days - for two doses   Patient not taking: Reported on 2022   fluticasone-salmeterol (ADVAIR DISKUS) 500-50 MCG/DOSE inhaler   No No   Si inhalation 2 times per day   imiquimod (ALDARA) 5 % external cream   No Yes   Sig: Apply topically At Bedtime -wash off after 8 hours and my use for up to 16 weeks.   isosorbide mononitrate (IMDUR) 30 MG 24 hr tablet   No Yes   Sig: Take 1 tablet (30 mg) by mouth daily   loperamide (IMODIUM A-D) 2 MG tablet  at PRN  No No   Sig: Take 2 tabs (4 mg) after first loose stool, and then take one tab (2 mg) after each diarrheal stool.  Max of 8 tabs (16 mg) per day.   losartan (COZAAR) 100 MG tablet   No Yes   Sig: Take 1 tablet (100 mg) by mouth daily   metoprolol tartrate (LOPRESSOR) 100 MG tablet   No Yes   Sig: TAKE 1 TABLET BY MOUTH TWICE A DAY   naloxone (NARCAN) 4 MG/0.1ML nasal spray  at PRN  No No   Sig: Spray 1 spray (4 mg) into one nostril alternating nostrils as needed for opioid reversal every 2-3 minutes until assistance arrives   nitroGLYcerin (NITROSTAT) 0.4 MG sublingual tablet  at PRN  No No   Sig: PLACE 1 TABLET UNDER TONGUE EVERY 5 MINS, UP TO 3 DOSES AS NEEDED FOR  CHEST PAIN   pantoprazole (PROTONIX) 40 MG EC tablet   No Yes   Sig: Take 1 tablet (40 mg) by mouth daily   rosuvastatin (CRESTOR) 20 MG tablet   No Yes   Sig: Take 1 tablet (20 mg) by mouth daily   testosterone cypionate (DEPOTESTOSTERONE) 200 MG/ML injection   No Yes   Sig: Inject 1 mL (200 mg) into the muscle once a week   zolpidem (AMBIEN) 10 MG tablet  at PRN  No Yes   Sig: TAKE 1 TABLET (10 MG) BY MOUTH NIGHTLY AS NEEDED FOR SLEEP      Facility-Administered Medications: None     Allergies   Allergies   Allergen Reactions     Acetaminophen Other (See Comments)     Patient states he does not have any reaction to this     Morphine Other (See Comments)     Patient states he had a headache one time but has used since with no adverse reactions     Sulfa Drugs      Theophylline Hives       Physical Exam   Vital Signs: Temp: 97.8  F (36.6  C) Temp src: Temporal BP: (!) 145/110 Pulse: 91   Resp: 30 SpO2: (!) 88 % O2 Device: None (Room air)    Weight: 0 lbs 0 oz    Constitutional: Well-appearing, NAD  Eyes: PERRL, EOMI  HENT: Oropharynx clear, MMM  Respiratory: Scattered inspiratory crackles at bases, no wheezes  Cardiovascular: RRR, no m/r/g. No peripheral edema.   GI: Soft, non-tender, non-distended. No rebound tenderness or guarding.    Skin: Warm, dry   Neurologic: Alert. Responding to questions appropriately. Following commands.    Psychiatric: Normal affect, appropriate      Data   Data reviewed today: I reviewed all medications, new labs and imaging results over the last 24 hours. I personally reviewed the EKG tracing showing sinus rhythm with RBBB.    Recent Labs   Lab 11/19/22  1623   WBC 15.2*   HGB 18.0*   MCV 85         POTASSIUM 4.7   CHLORIDE 104   CO2 30   BUN 18   CR 1.63*   ANIONGAP 5   MIKO 9.4   *   ALBUMIN 3.2*   PROTTOTAL 6.7*   BILITOTAL 0.6   ALKPHOS 89   *   AST 93*       Recent Results (from the past 24 hour(s))   XR Chest 2 Views    Narrative    EXAM: XR CHEST 2  VIEWS  LOCATION: [facility]  DATE/TIME: 11/19/2022 9:07 PM    INDICATION: hypoxia  COMPARISON: 11/29/2020      Impression    IMPRESSION: Moderate coarsened bilateral perihilar opacities have developed since 11/29/2020, suspicious for edema or atypical infectious process. Chest otherwise negative.   CT Head w/o Contrast    Narrative    EXAM: CT HEAD W/O CONTRAST  LOCATION: [facility]  DATE/TIME: 11/19/2022 9:44 PM    INDICATION: headaches, hypertension, eval bleed  COMPARISON: CT head 04/14/2017  TECHNIQUE: Routine CT Head without IV contrast. Multiplanar reformats. Dose reduction techniques were used.    FINDINGS:  INTRACRANIAL CONTENTS: No intracranial hemorrhage, extraaxial collection, or mass effect.  No CT evidence of acute infarct. Mild presumed chronic small vessel ischemic changes. Mild generalized volume loss. No hydrocephalus.     VISUALIZED ORBITS/SINUSES/MASTOIDS: No intraorbital abnormality. Complete opacification left frontal sinus. No middle ear or mastoid effusion.    BONES/SOFT TISSUES: No acute abnormality.      Impression    IMPRESSION:  1.  No CT evidence for acute intracranial process.  2.  Chronic left frontal sinus disease.

## 2022-11-21 ENCOUNTER — APPOINTMENT (OUTPATIENT)
Dept: ULTRASOUND IMAGING | Facility: CLINIC | Age: 52
DRG: 246 | End: 2022-11-21
Attending: INTERNAL MEDICINE
Payer: COMMERCIAL

## 2022-11-21 LAB
ANION GAP SERPL CALCULATED.3IONS-SCNC: 5 MMOL/L (ref 3–14)
BUN SERPL-MCNC: 22 MG/DL (ref 7–30)
CALCIUM SERPL-MCNC: 8.7 MG/DL (ref 8.5–10.1)
CHLORIDE BLD-SCNC: 101 MMOL/L (ref 94–109)
CO2 SERPL-SCNC: 30 MMOL/L (ref 20–32)
CREAT SERPL-MCNC: 1.75 MG/DL (ref 0.66–1.25)
ERYTHROCYTE [DISTWIDTH] IN BLOOD BY AUTOMATED COUNT: 16.6 % (ref 10–15)
GFR SERPL CREATININE-BSD FRML MDRD: 46 ML/MIN/1.73M2
GLUCOSE BLD-MCNC: 102 MG/DL (ref 70–99)
HCT VFR BLD AUTO: 59 % (ref 40–53)
HGB BLD-MCNC: 18 G/DL (ref 13.3–17.7)
MCH RBC QN AUTO: 26.8 PG (ref 26.5–33)
MCHC RBC AUTO-ENTMCNC: 30.5 G/DL (ref 31.5–36.5)
MCV RBC AUTO: 88 FL (ref 78–100)
PLATELET # BLD AUTO: 198 10E3/UL (ref 150–450)
POTASSIUM BLD-SCNC: 4.5 MMOL/L (ref 3.4–5.3)
RBC # BLD AUTO: 6.72 10E6/UL (ref 4.4–5.9)
SODIUM SERPL-SCNC: 136 MMOL/L (ref 133–144)
UFH PPP CHRO-ACNC: 0.21 IU/ML
UFH PPP CHRO-ACNC: 0.26 IU/ML
UFH PPP CHRO-ACNC: 0.34 IU/ML
WBC # BLD AUTO: 12.1 10E3/UL (ref 4–11)

## 2022-11-21 PROCEDURE — 84156 ASSAY OF PROTEIN URINE: CPT | Performed by: INTERNAL MEDICINE

## 2022-11-21 PROCEDURE — 250N000013 HC RX MED GY IP 250 OP 250 PS 637: Performed by: PHYSICIAN ASSISTANT

## 2022-11-21 PROCEDURE — 250N000013 HC RX MED GY IP 250 OP 250 PS 637: Performed by: INTERNAL MEDICINE

## 2022-11-21 PROCEDURE — 76770 US EXAM ABDO BACK WALL COMP: CPT

## 2022-11-21 PROCEDURE — 250N000011 HC RX IP 250 OP 636: Performed by: HOSPITALIST

## 2022-11-21 PROCEDURE — 250N000013 HC RX MED GY IP 250 OP 250 PS 637: Performed by: HOSPITALIST

## 2022-11-21 PROCEDURE — 85520 HEPARIN ASSAY: CPT | Performed by: HOSPITALIST

## 2022-11-21 PROCEDURE — 99232 SBSQ HOSP IP/OBS MODERATE 35: CPT | Performed by: HOSPITALIST

## 2022-11-21 PROCEDURE — 36415 COLL VENOUS BLD VENIPUNCTURE: CPT | Performed by: HOSPITALIST

## 2022-11-21 PROCEDURE — 85027 COMPLETE CBC AUTOMATED: CPT | Performed by: HOSPITALIST

## 2022-11-21 PROCEDURE — 80048 BASIC METABOLIC PNL TOTAL CA: CPT | Performed by: HOSPITALIST

## 2022-11-21 PROCEDURE — 36415 COLL VENOUS BLD VENIPUNCTURE: CPT | Performed by: INTERNAL MEDICINE

## 2022-11-21 PROCEDURE — 99233 SBSQ HOSP IP/OBS HIGH 50: CPT | Performed by: PHYSICIAN ASSISTANT

## 2022-11-21 PROCEDURE — 99222 1ST HOSP IP/OBS MODERATE 55: CPT | Performed by: INTERNAL MEDICINE

## 2022-11-21 PROCEDURE — 210N000002 HC R&B HEART CARE

## 2022-11-21 PROCEDURE — 85520 HEPARIN ASSAY: CPT | Performed by: INTERNAL MEDICINE

## 2022-11-21 RX ORDER — FLUTICASONE PROPIONATE 50 MCG
1 SPRAY, SUSPENSION (ML) NASAL DAILY
Status: DISCONTINUED | OUTPATIENT
Start: 2022-11-21 | End: 2022-11-24 | Stop reason: HOSPADM

## 2022-11-21 RX ORDER — CARVEDILOL 25 MG/1
25 TABLET ORAL 2 TIMES DAILY WITH MEALS
Status: DISCONTINUED | OUTPATIENT
Start: 2022-11-21 | End: 2022-11-24 | Stop reason: HOSPADM

## 2022-11-21 RX ORDER — HYDRALAZINE HYDROCHLORIDE 25 MG/1
25 TABLET, FILM COATED ORAL EVERY 8 HOURS SCHEDULED
Status: DISCONTINUED | OUTPATIENT
Start: 2022-11-21 | End: 2022-11-23

## 2022-11-21 RX ADMIN — FUROSEMIDE 40 MG: 40 TABLET ORAL at 08:50

## 2022-11-21 RX ADMIN — DEXTRAN 70, GLYCERIN, HYPROMELLOSE 1 DROP: 1; 2; 3 SOLUTION/ DROPS OPHTHALMIC at 20:26

## 2022-11-21 RX ADMIN — CLOPIDOGREL BISULFATE 75 MG: 75 TABLET ORAL at 08:50

## 2022-11-21 RX ADMIN — LABETALOL HYDROCHLORIDE 10 MG: 5 INJECTION INTRAVENOUS at 20:22

## 2022-11-21 RX ADMIN — HEPARIN SODIUM 1650 UNITS/HR: 10000 INJECTION, SOLUTION INTRAVENOUS at 18:16

## 2022-11-21 RX ADMIN — ISOSORBIDE MONONITRATE 30 MG: 30 TABLET, EXTENDED RELEASE ORAL at 08:50

## 2022-11-21 RX ADMIN — AMITRIPTYLINE HYDROCHLORIDE 150 MG: 75 TABLET, FILM COATED ORAL at 21:35

## 2022-11-21 RX ADMIN — CARVEDILOL 25 MG: 25 TABLET, FILM COATED ORAL at 18:06

## 2022-11-21 RX ADMIN — BUPRENORPHINE HYDROCHLORIDE, NALOXONE HYDROCHLORIDE 1 FILM: 4; 1 FILM, SOLUBLE BUCCAL; SUBLINGUAL at 08:54

## 2022-11-21 RX ADMIN — NICARDIPINE HYDROCHLORIDE 2.5 MG/HR: 0.2 INJECTION INTRAVENOUS at 04:29

## 2022-11-21 RX ADMIN — ZOLPIDEM TARTRATE 10 MG: 5 TABLET ORAL at 21:40

## 2022-11-21 RX ADMIN — HEPARIN SODIUM 1650 UNITS/HR: 10000 INJECTION, SOLUTION INTRAVENOUS at 11:20

## 2022-11-21 RX ADMIN — DEXTRAN 70, GLYCERIN, HYPROMELLOSE 1 DROP: 1; 2; 3 SOLUTION/ DROPS OPHTHALMIC at 08:54

## 2022-11-21 RX ADMIN — ASPIRIN 81 MG: 81 TABLET, COATED ORAL at 08:50

## 2022-11-21 RX ADMIN — BUPRENORPHINE HYDROCHLORIDE, NALOXONE HYDROCHLORIDE 1 FILM: 4; 1 FILM, SOLUBLE BUCCAL; SUBLINGUAL at 18:05

## 2022-11-21 RX ADMIN — FINASTERIDE 2.5 MG: 5 TABLET, FILM COATED ORAL at 08:51

## 2022-11-21 RX ADMIN — ROSUVASTATIN CALCIUM 20 MG: 20 TABLET, FILM COATED ORAL at 08:50

## 2022-11-21 RX ADMIN — BUPROPION HYDROCHLORIDE 300 MG: 300 TABLET, EXTENDED RELEASE ORAL at 08:50

## 2022-11-21 RX ADMIN — PANTOPRAZOLE SODIUM 40 MG: 40 TABLET, DELAYED RELEASE ORAL at 08:50

## 2022-11-21 RX ADMIN — HYDRALAZINE HYDROCHLORIDE 25 MG: 25 TABLET ORAL at 22:06

## 2022-11-21 RX ADMIN — METOPROLOL TARTRATE 100 MG: 100 TABLET, FILM COATED ORAL at 08:50

## 2022-11-21 RX ADMIN — LOSARTAN POTASSIUM 100 MG: 100 TABLET, FILM COATED ORAL at 08:51

## 2022-11-21 RX ADMIN — LORAZEPAM 1 MG: 1 TABLET ORAL at 22:08

## 2022-11-21 RX ADMIN — HEPARIN SODIUM 1500 UNITS/HR: 10000 INJECTION, SOLUTION INTRAVENOUS at 04:29

## 2022-11-21 RX ADMIN — FLUTICASONE PROPIONATE 1 SPRAY: 50 SPRAY, METERED NASAL at 20:22

## 2022-11-21 ASSESSMENT — ACTIVITIES OF DAILY LIVING (ADL)
ADLS_ACUITY_SCORE: 20

## 2022-11-21 ASSESSMENT — ENCOUNTER SYMPTOMS: NERVOUS/ANXIOUS: 1

## 2022-11-21 NOTE — PROGRESS NOTES
"MD Notification    Notified Person: MD Davenport  Notification Date/Time: 11- 1846  Notification Interaction: Page  Purpose of Notification:     270-1: CHANI Damon: Pt c/o 7/10 migraine, chronic hx, refusing PRN tylenol because he states it \"doesn't work\" and \"want[s] something stronger\". Ice provided. Please advise Allen [phone]    Orders Received:  Comments:      "

## 2022-11-21 NOTE — PLAN OF CARE
Goal Outcome Evaluation:      Plan of Care Reviewed With: patient    Overall Patient Progress: improvingOverall Patient Progress: improving     Neuro:intact  CV/Rhythm:SR BBB  Resp/02:RA  GI/Diet:regular, refused 2 G Na  :voiding  Skin/Incisions/Sites:R foot bruised  Pulses/CMS:intact  Edema:none  Activity/Falls Risk:independent  Lines/Drains/IVs:PIV  Labs/BGM:K in am  Test/Procedures:needs angio once BP stablized and neph sees  VS/Pain:stable, denies pain, refused suboxone  DC Plan:post cards neph work up  Other:showered, off IMC status now

## 2022-11-21 NOTE — PROGRESS NOTES
Laboratory staff attempted to draw blood from patient for morning labs including heparin Xa. Lab unable to get successful draw after 1 stick, pt became angry and uncooperative and refused re-attempt. Writer spoke with lab staff, plan for re-approach at 0700 to 0730. Writer spoke with patient and reinforced importance of getting labs this AM for safe heparin dosing: pt states it's ok for a different person to come and draw his blood.

## 2022-11-21 NOTE — PLAN OF CARE
Alert and oriented x 4. VS stable, on 2L while asleep and some complaints of a headache this afternoon. He was given his Suboxone with good pain control. His blood pressure has been well controlled this afternoon and Nicardipine was discontinued this afternoon for a blood pressure of 97/88. Heparin at 1500  with a recheck at 2300. Plan for further monitoring and and adjustment of cardiac medications tomorrow.

## 2022-11-21 NOTE — PLAN OF CARE
Goal Outcome Evaluation:      Plan of Care Reviewed With: patient    Overall Patient Progress: improvingOverall Patient Progress: improving       [facility] Nursing [facility]    Patient Information  Name: Jeremi Damon  Age: 52 year old    Assessment  Orientation/Neuro: Alert and Oriented x4  Cardiac/Tele: SR w/ 1 degree AVB and BBB  Resp: WDL  GI/: WDL  CMS: WDL  Mobility: ambulates independently  Pain: Denies  Diet: Orders Placed This Encounter      Combination Diet 2 gm NA Diet  Skin: Bruising on right foot    Vital Signs  VSS on room air  Nicardipine gtt stopped    Plan  BP management  Diuresing  Angiogram when BP and fluid are managed    Ada Mcmahon

## 2022-11-21 NOTE — CONSULTS
"RENAL CONSULTATION NOTE    REFERRING MD:  Angelo Lorenzo MD    REASON FOR CONSULTATION:  ckd stage 3, has new heart failure diagnosis , will need cath, kidbarbray fucntion worsening with diuresis    HPI:  52 y.o man with CAD and CKD IIIA presumed due to hypertensive nephrosclerosis, who was admitted on 11/19 for hypertensive urgency/emergency.     Patient says he was taking metoprolol, losartan and Norvasc.   He has dry eyes, so he was trying to see which medication could be causing.  \"I was in the process of elimination,\" he says.  He stopped taking losartan for a least a week and metoprolol for a few days.  He is not very forthcoming.   Anyhow, he was not feeling well, so he took his BP and it was quite high.   BP was 184/128 in the ER.   Today, he says he feels better.  Still having dry eyes.   No chest or abdominal pain.  Denies alcohol, tobacco or drug use.     ROS:  A complete review of systems was performed and is negative except as noted above.    PMH:    Past Medical History:   Diagnosis Date     ADHD (attention deficit hyperactivity disorder)      Allergic rhinitis, cause unspecified     Allergic rhinitis     Benign hypertension      Chronic back pain      Hiccough      Hypersomnia with sleep apnea, unspecified      Major depressive disorder, single episode, moderate (H) 03/2008     Mild persistent asthma      Other primary cardiomyopathies     Cardiomyopathy -- unknown etiology       PSH:    Past Surgical History:   Procedure Laterality Date     APPENDECTOMY  2007     BACK SURGERY  2014    L5-S1 fusion     CV CORONARY ANGIOGRAM N/A 11/30/2020    Procedure: Heart Catheterization with Possible Intervention;  Surgeon: Theo Donahue MD;  Location:  [facility]ATH LAB     HC REPAIR OF NASAL SEPTUM       LAPAROSCOPIC CHOLECYSTECTOMY  1/05    Cholecystectomy, Laparoscopic     SURGICAL HISTORY OF -       torn pectoral muscle     SURGICAL HISTORY OF -   2009    Bilateral radiofrequency volume reduction " of the inferior turbinates.     SURGICAL HISTORY OF -       rhinoplasty- septoplasty       MEDICATIONS:      albuterol  2 puff Inhalation Q6H     amitriptyline  150 mg Oral At Bedtime     artificial tears  1 drop Both Eyes BID     aspirin  81 mg Oral Daily     buprenorphine HCl-naloxone HCl  1 Film Sublingual 4x Daily     buPROPion  300 mg Oral QAM     carvedilol  25 mg Oral BID w/meals     clopidogrel  75 mg Oral Daily     finasteride  2.5 mg Oral Daily     fluticasone-vilanterol  1 puff Inhalation Daily     [Held by provider] furosemide  40 mg Oral Daily     isosorbide mononitrate  30 mg Oral Daily     [Held by provider] losartan  100 mg Oral Daily     pantoprazole  40 mg Oral Daily     rosuvastatin  20 mg Oral Daily     sodium chloride (PF)  10 mL Intracatheter Once     sodium chloride (PF)  3 mL Intracatheter Q8H       ALLERGIES:    Allergies as of 2022 - Reviewed 2022   Allergen Reaction Noted     Acetaminophen Other (See Comments) 2015     Morphine Other (See Comments) 2015     Sulfa drugs  2014     Theophylline Hives 2016       FH:    Family History   Problem Relation Age of Onset     Coronary Artery Disease Mother          55; MI x 2     Cancer Father         hodgkins     Hypertension Father      Coronary Artery Disease Father      Cardiovascular Maternal Grandmother      Hypertension Brother      Cardiovascular Sister      No Known Problems Daughter      Prostate Cancer No family hx of      Cancer - colorectal No family hx of        SH:    Social History     Socioeconomic History     Marital status:      Spouse name: Not on file     Number of children: 1     Years of education: Not on file     Highest education level: Not on file   Occupational History     Employer: [employer]    Tobacco Use     Smoking status: Never     Smokeless tobacco: Never   Vaping Use     Vaping Use: Never used   Substance and Sexual Activity     Alcohol use: No     Comment: rarely  "     Drug use: No     Sexual activity: Yes     Partners: Female     Birth control/protection: Condom   Other Topics Concern     Parent/sibling w/ CABG, MI or angioplasty before 65F 55M? No   Social History Narrative     Not on file     Social Determinants of Health     Financial Resource Strain: Not on file   Food Insecurity: Not on file   Transportation Needs: Not on file   Physical Activity: Not on file   Stress: Not on file   Social Connections: Not on file   Intimate Partner Violence: Not on file   Housing Stability: Not on file       PHYSICAL EXAM:    BP (!) 123/94   Pulse 63   Temp 98.3  F (36.8  C) (Oral)   Resp 16   Ht 1.93 m (6' 4\")   Wt 110 kg (242 lb 9.6 oz)   SpO2 95%   BMI 29.53 kg/m    GENERAL: NAD  HEENT:  Normocephalic. No gross abnormalities.  Pupils equal.  MMM.  Dentition is ok.  CV: RRR, no murmurs, no clicks, gallops, or rubs, no edema  RESP: Clear bilaterally with good efforts. No wheezes or crackles.  GI: Abdomen Soft, NT. ND.  MUSCULOSKELETAL: extremities nl - no gross deformities noted. No edema.   SKIN: no suspicious lesions or rashes, dry to touch  NEURO:  Strength normal and symmetric. Awake, alert and conversing normally.  PSYCH: Not very forthcoming.   LYMPH: No palpable ant/post cervical     LABS:      CBC RESULTS:     Recent Labs   Lab 11/21/22  0724 11/20/22  0529 11/19/22  1623   WBC 12.1* 15.8* 15.2*   RBC 6.72* 6.66* 6.45*   HGB 18.0* 18.1* 18.0*   HCT 59.0* 56.0* 55.0*    199 242       BMP RESULTS:  Recent Labs   Lab 11/21/22  1045 11/20/22  0529 11/19/22  1623    134 139   POTASSIUM 4.5 4.5 4.7   CHLORIDE 101 100 104   CO2 30 31 30   BUN 22 18 18   CR 1.75* 1.43* 1.63*   * 74 100*   MIKO 8.7 9.4 9.4       INRNo lab results found in last 7 days.     DIAGNOSTICS:  Reviewed    TTE:  The visual ejection fraction is 30-35%.  Left ventricular systolic function is moderately reduced.  There is severe apical wall hypokinesis.  The right ventricular systolic " function is mild to moderately reduced.  There is mild to moderate (1-2+) mitral regurgitation.  Right ventricular systolic pressure is elevated, consistent with mild  pulmonary hypertension.  Mild aortic root dilatation.  The ascending aorta is Mildly dilated.  Grade III or advanced diastolic dysfunction.    Head CT:  No intracranial hemorrhage, extraaxial collection, or mass effect.  No CT evidence of acute infarct. Mild presumed chronic small vessel ischemic changes. Mild generalized volume loss. No hydrocephalus.      VISUALIZED ORBITS/SINUSES/MASTOIDS: No intraorbital abnormality. Complete opacification left frontal sinus. No middle ear or mastoid effusion.     BONES/SOFT TISSUES: No acute abnormality.       A/P:  52 y.o man with CKD IIIA presumed due to HTN and cardiovascular disease.     # CKD IIIA: Baseline Scr ~ 1.5 mg/dl.     # Hypertensive urgency/emergency:    -noncompliance and was not taking his BP medications    -BP is better   -Coreg 25 mg bid   -Imdur 30 mg daily   -losartan on hold    # NSTEMI: From hypertensive emergency vs ischemia?    -heparin gtt    # Severe cLVH (old) but EF of 30-35% is new. Hypertensive heart disease vs infiltrative process?   -SPEP/UPEP and immmofixation in 2021 was wo monoclonal protein    # Severe claustrophobia:    Plan:  # No diuretics for now  # Okays with goal SBP <140/90 for now and can bring it lower down the line.   # Agree with holding ACEi/ARB for now  # Do not start SGLT inhibitor just yet  # Aggressive IVF pre/post IV contrast exposure. Hold LCH if Scr trends up tomorrow.   # Check UPCR   # Check renal ultrasound  # 2 grams Na restricted diet    Thank you and will follow with you.       David Randall MD  [facility] - Nephrology  Office Phone: [phone]  Pager: [phone]

## 2022-11-21 NOTE — PROGRESS NOTES
[facility]    Medicine Progress Note - Hospitalist Service    Date of Admission:  11/19/2022    Assessment & Plan        Jeremi Damon is a 52 year old male with history of multivessel coronary artery disease with prior NSTEMI managed medically, hypertension, hypertrophic cardiomyopathy, mild persistent asthma who presents with elevated BP associated with headache, chest pain and was admitted on 11/19/2022.      Hypertensive emergency  Pulmonary edema  Acute hypoxic respiratory failure due to new onset biventricular and diastolic heart failure in exacerbation- improving  New mild Pulmonary HTN  Coronary artery disease, multivessel, with prior NSTEMI medically managed  NSTEMI  Hypertrophic cardiomyopathy  Presented with elevated SBP >230 at home, associated with headache, intermittent left sided chest pain occurring at rest  -Hypoxic to 88% in ED, CXR concerning for pulmonary edema  -Head CT negative  -Troponin 242->281-258; NtpBNP 1852  -Last echocardiogram with EF 55-60%, severe LVH, no note of outflow tract obstruction   - He was also started on nicardipine gtt , Heparin gtt and asa and was given 40 mg iv lasix  - Patient was seen by cardiology on 11/20 and was started on oral lasix 40 mg daily   - ECHO on 11/20 showed ef of 30-35%, severe apical wall hypokinesis , RV systolic function is decreased , moderated MR , RVSP is elevated and has mild pul htn , grade 2 diastolic dysfunction  - I Did review his echo in 11/202o and ef was 55-60% and RV was normal and above changes are new  - continue with beta blocker and ARB and he will need coronary angiography once his bp is more stable   -cards recs pending      Chronic kidney disease, stage 3  Baseline creatinine 1.4-1.6 with GFR in 50s.   -Creatinine 1.63 on admission and is  Today is 1.75  - Currently around baseline  - Avoid nephrotoxins  - Monitor BMP   - will consult nephrology in case he need cath and ongoing medication management     "  Leucocytosis - improving  - he has wbc count of 15.2 on admission and chest x-ray was more consistent with fluid overload and he had neutrophilic Predominance and has no fever and tested negative for COVID-19 on admission  - Blood culture done on 11/20 have been negative and UA does not show any infection   - he has been afebrile and bp is stable and wbc count  Has started to trend down and is 12.1   - we will continue to monitor       Major depression  - we will continue with PTA well butyrin and other prn home meds       ADHD  - Hold stimulants while BP elevated     Panic disorder   -  We will continue with  PTA lorazepam prn     GERD  - we will continue with PTA     Opioid dependence   -continue with PTA suboxone          Diet: Combination Diet 2 gm NA Diet    DVT Prophylaxis: heparin drip   Yee Catheter: Not present  Central Lines: None  Cardiac Monitoring: ACTIVE order. Indication: Acute decompensated heart failure (48 hours)  Code Status: Full Code      Disposition Plan      Expected Discharge Date: 11/22/2022                The patient's care was discussed with the Bedside Nurse and Patient.    Angelo Lorenzo MD  Hospitalist Service  [facility]  Securely message with the Vocera Web Console (learn more here)  Text page via NextMedium Paging/Directory         Clinically Significant Risk Factors         # Hyponatremia: Lowest Na = 134 mmol/L (Ref range: 136-145) in last 2 days, will monitor as appropriate      # Hypoalbuminemia: Lowest albumin = 3.2 g/dL (Ref range: 3.5-5.2) at 11/19/2022  4:23 PM, will monitor as appropriate           # Overweight: Estimated body mass index is 29.53 kg/m  as calculated from the following:    Height as of this encounter: 1.93 m (6' 4\").    Weight as of this encounter: 110 kg (242 lb 9.6 oz)., PRESENT ON ADMISSION         ______________________________________________________________________    Interval History     I did see him today and he denies any " chest pain , sob is improved  No fever or chills   He is eating and drinking   I discussed with results of wbc count which have improved and cause behind elevation  I also discussed results of the echo and DD possible  Also stressed to him about importance of medication compliance  He also does not want low salt diet     Data reviewed today: I reviewed all medications, new labs and imaging results over the last 24 hours. I personally reviewed     Physical Exam   Vital Signs: Temp: 98.3  F (36.8  C) Temp src: Oral BP: (!) 144/94 Pulse: 71   Resp: 20 SpO2: 92 % O2 Device: Nasal cannula Oxygen Delivery: 2 LPM  Weight: 242 lbs 9.6 oz        General: Patient was laying in bed and was having headache  HEENT: Head is atraumatic, normocephalic.    Neck: Neck is supple   Respiratory: ctla no crackles   Cardiovascular: Regular rate , S1 and S2 normal with no murmer or rubs or gallops  Abdomen:   soft , non tender , non distended and bowel sound present   Skin: No skin rashes   Neurologic: No facial droop  Musculoskeletal: Normal Range of motion over upper and lower extremities bilaterally   Psychiatric: little irritable and uncooperative    Data   Recent Labs   Lab 11/21/22  0724 11/20/22  0529 11/19/22  1623   WBC 12.1* 15.8* 15.2*   HGB 18.0* 18.1* 18.0*   MCV 88 84 85    199 242   NA  --  134 139   POTASSIUM  --  4.5 4.7   CHLORIDE  --  100 104   CO2  --  31 30   BUN  --  18 18   CR  --  1.43* 1.63*   ANIONGAP  --  3 5   MIKO  --  9.4 9.4   GLC  --  74 100*   ALBUMIN  --   --  3.2*   PROTTOTAL  --   --  6.7*   BILITOTAL  --   --  0.6   ALKPHOS  --   --  89   ALT  --   --  142*   AST  --   --  93*     Recent Results (from the past 24 hour(s))   Echocardiogram Complete   Result Value    LVEF  30-35%    Narrative    [id number]  [id number]  [id number]  [id number]^JOAQUIN^LUIS^PRAMOD     [facility]  Echocardiography Laboratory  [address] [place] [address]     Name: FRANCISCO KELLER  MRN:  [id number]  : 1970  Study Date: 2022 10:28 AM  Age: 52 yrs  Gender: Male  Patient Location: [facility]  Reason For Study: Chest Pain  Ordering Physician: LUIS NUÑEZ  Referring Physician: LUIS NUÑEZ  Performed By: Kendall Gallegos     BSA: 2.4 m2  Height: 76 in  Weight: 245 lb  HR: 59  ______________________________________________________________________________  Procedure  Complete Echo Adult. Optison (NDC #[id number]) given intravenously.  ______________________________________________________________________________  Interpretation Summary     The visual ejection fraction is 30-35%.  Left ventricular systolic function is moderately reduced.  There is severe apical wall hypokinesis.  The right ventricular systolic function is mild to moderately reduced.  There is mild to moderate (1-2+) mitral regurgitation.  Right ventricular systolic pressure is elevated, consistent with mild  pulmonary hypertension.  Mild aortic root dilatation.  The ascending aorta is Mildly dilated.  Grade III or advanced diastolic dysfunction.  ______________________________________________________________________________  Left Ventricle  The left ventricle is moderately dilated. There is moderate to severe  concentric left ventricular hypertrophy. Diastolic Doppler findings (E/E'  ratio and/or other parameters) suggest left ventricular filling pressures are  increased. The visual ejection fraction is 30-35%. Grade III or advanced  diastolic dysfunction. Left ventricular systolic function is moderately  reduced. There is severe apical wall hypokinesis. A false chord is noted  (normal variant).     Right Ventricle  The right ventricle is normal size. The right ventricular systolic function is  mild to moderately reduced. The right ventricle was not well seeen.     Atria  The left atrium is mildly dilated. Right atrial size is normal. There is no  color Doppler evidence of an atrial shunt.     Mitral Valve  There  is mild to moderate (1-2+) mitral regurgitation.     Tricuspid Valve  There is mild (1+) tricuspid regurgitation. The right ventricular systolic  pressure is approximated at 32.5 mmHg plus the right atrial pressure. IVC  diameter and respiratory changes fall into an intermediate range suggesting an  RA pressure of 8 mmHg. Right ventricular systolic pressure is elevated,  consistent with mild pulmonary hypertension.     Aortic Valve  There is trivial trileaflet aortic sclerosis. No aortic regurgitation is  present. No hemodynamically significant valvular aortic stenosis.     Pulmonic Valve  There is mild (1+) pulmonic valvular regurgitation. Normal pulmonic valve  velocity.     Vessels  Mild aortic root dilatation. The ascending aorta is Mildly dilated. Dilation  of the inferior vena cava is present with normal respiratory variation in  diameter. IVC diameter and respiratory changes fall into an intermediate range  suggesting an RA pressure of 8 mmHg.     Pericardium  There is no pericardial effusion.     Rhythm  The rhythm was sinus bradycardia.  ______________________________________________________________________________  MMode/2D Measurements & Calculations     IVSd: 1.5 cm  LVIDd: 6.4 cm  LVIDs: 4.6 cm  LVPWd: 1.8 cm  FS: 27.5 %  LV mass(C)d: 554.7 grams  LV mass(C)dI: 229.7 grams/m2  Ao root diam: 3.9 cm  asc Aorta Diam: 3.6 cm  LVOT diam: 2.3 cm  LVOT area: 4.2 cm2  LA Volume (BP): 89.6 ml  LA Volume Index (BP): 37.0 ml/m2  RWT: 0.57     Doppler Measurements & Calculations  MV E max vincent: 96.9 cm/sec  MV A max vincent: 59.5 cm/sec  MV E/A: 1.6  MV max P.0 mmHg  MV mean P.0 mmHg  MV V2 VTI: 36.6 cm  MVA(VTI): 2.3 cm2  MV P1/2t max vincent: 124.0 cm/sec  MV P1/2t: 72.2 msec  MVA(P1/2t): 3.0 cm2  MV dec slope: 503.0 cm/sec2  MV dec time: 0.16 sec  Ao V2 max: 143.5 cm/sec  Ao max P.0 mmHg  EDDI(V,D): 3.3 cm2  LV V1 max P.2 mmHg  LV V1 max: 114.0 cm/sec  LV V1 VTI: 20.3 cm  SV(LVOT): 84.3 ml  SI(LVOT): 34.9  ml/m2  PA V2 max: 112.0 cm/sec  PA max P.0 mmHg  PA acc time: 0.12 sec  TR max danis: 285.0 cm/sec  TR max P.5 mmHg  AV Danis Ratio (DI): 0.79  E/E' av.8  Lateral E/e': 13.9  Medial E/e': 15.6     ______________________________________________________________________________  Report approved by: Anna Lee 2022 02:40 PM           Medications     heparin 1,500 Units/hr (22 0723)     niCARdipine 2.5 mg/hr (22 0848)     - MEDICATION INSTRUCTIONS -         albuterol  2 puff Inhalation Q6H     amitriptyline  150 mg Oral At Bedtime     artificial tears  1 drop Both Eyes BID     aspirin  81 mg Oral Daily     buprenorphine HCl-naloxone HCl  1 Film Sublingual 4x Daily     buPROPion  300 mg Oral QAM     clopidogrel  75 mg Oral Daily     finasteride  2.5 mg Oral Daily     fluticasone-vilanterol  1 puff Inhalation Daily     furosemide  40 mg Oral Daily     isosorbide mononitrate  30 mg Oral Daily     losartan  100 mg Oral Daily     metoprolol tartrate  100 mg Oral BID     pantoprazole  40 mg Oral Daily     rosuvastatin  20 mg Oral Daily     sodium chloride (PF)  10 mL Intracatheter Once     sodium chloride (PF)  3 mL Intracatheter Q8H

## 2022-11-21 NOTE — PROGRESS NOTES
"After 2000 med pass, patient refusing to return scheduled BID Genteal artifical tears (1 drop) bottle to RN, states \"No, I'm not fucking around\". Pt refusing to return bottle to RN to lock in CCU med room pt medication locker. Pt states he takes eye drops 3-4 times per hour at home, refuses to adhere to BID schedule. Escalated to charge RN. Spoke with pharmacist, states no concerns or risk if patient keeps eye drops bedside, could switch to single-use eye drop packaging in future if problem persists. Medication remains in pt possession.  "

## 2022-11-21 NOTE — PROGRESS NOTES
"A&O x4, irritable and frustrated. See 1098 note. Nicardipine and heparin gtt. Up independent in room. IMC status. C/o 7/10 headache at HS but refused tylenol stating it \"doesn't work\" and \"you're a [employer] dollar company you can do better than that - I need something stronger\". Hospitalist paged, ice provided. Pt able to fall asleep and slept well after approximately 0100 overnight.  "

## 2022-11-21 NOTE — PROGRESS NOTES
[facility]  Cardiology Progress Note  Date of Service: 11/21/2022  Primary Cardiologist: Dr. Keita    Assessment & Plan    Jeremi Damon is a 52 year old male with past medical history significant for HTN, CAD, LVH, asathma admitted on 11/19/2022 with headach and chest pain.    Assessment:  1.  NSTEMI, unclear if secondary to hypertensive emergency or type 1 NSTEMI.  Trop relatively flat at 281.  Pt now denies having cp recently.  None here    2.  Hypertesive emergency as pt off meds due to perceived side effects of dry eyes    3.  Cardiomyopathy EF 30-35%, with some degree of heart failure on admission including pulmonary edema on imaging.  No symptoms of heart failure today euvolemic on exam.    4.  CAD, angio in 2020 showed 60% mid LAD, 90% distal LAD, 90% distal circ, 60% rpl, Lmain normal- managed medicall during NSTEMI at that time due to renal function with consideration of staged pci pending improvement in renal function and ischemic eval.       5.  Hypertrophic cardiomyopathy, likely htn induced as no LVOT gradient.  Had been referred for MRI in 2020 but not completed    6.  Acute on chronic renal insufficiency, unclear baseline likely between 1.5-2.0, risk of proceeding with angiogram discussed      Plan:   1. Stop Lopressor, start Coreg 25 mg twice daily.  2. Hold losartan and Lasix in the morning  3. Will get pricing on Entresto and SGLT2 inhibitors.  4. Coronary angiogram tomorrow if creatinine is better and acceptable to nephrology. Risks and benefits of coronary angiogram discussed today including, bleeding, bruising, infection, allergic reaction, kidney damage (including need for dialysis), stroke, heart attack, vascular damage, emergency open heart surgery, up to and including death.  Patient indicates understanding and is agreeable to proceed.     5. We'll continue to follow with you.      Chrissy Josue PA-C  [facility]  Pager: [phone]     I spent  60 minutes  "face-to-face and/or coordinating care of Jeremi BARTOLOME Damon. Over 50% of our time on the unit was spent counseling the patient and/or coordinating care regarding his cardiac condition.    Interval History   He feels well.  He denies chest pain.  He had multiple concerns about his medications including them possibly causing dry eyes that was much better yesterday and seems worse today.    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  Had spinal fusion.    Physical Exam   Temp: 98.3  F (36.8  C) Temp src: Oral BP: (!) 123/94 Pulse: 63   Resp: 16 SpO2: 93 % O2 Device: Nasal cannula Oxygen Delivery: 2 LPM  Vitals:    11/20/22 0115 11/21/22 0700   Weight: 111.2 kg (245 lb 2.4 oz) 110 kg (242 lb 9.6 oz)   Well-developed well-nourished gentleman who appears fatigued.  Normocephalic atraumatic.  Heart is regular with 2 out of 6 systolic murmur heard best at the left sternal border.  Lungs are clear without wheezes rales or rhonchi extremities without peripheral edema.  Skin is warm and dry.  Clinically Significant Risk Factors Present on Admission            # Overweight: Estimated body mass index is 29.53 kg/m  as calculated from the following:    Height as of this encounter: 1.93 m (6' 4\").    Weight as of this encounter: 110 kg (242 lb 9.6 oz).    Fluid & Electrolyte Disorders: Fluid overload, unspecified    Nephrology: Acute kidney failure, unspecified           Medications     heparin 1,650 Units/hr (11/21/22 1120)     niCARdipine Stopped (11/21/22 0900)     - MEDICATION INSTRUCTIONS -         albuterol  2 puff Inhalation Q6H     amitriptyline  150 mg Oral At Bedtime     artificial tears  1 drop Both Eyes BID     aspirin  81 mg Oral Daily     buprenorphine HCl-naloxone HCl  1 Film Sublingual 4x Daily     buPROPion  300 mg Oral QAM     clopidogrel  75 mg Oral Daily     finasteride  2.5 mg Oral Daily     fluticasone-vilanterol  1 puff Inhalation Daily     furosemide  40 mg Oral Daily     isosorbide mononitrate  " 30 mg Oral Daily     losartan  100 mg Oral Daily     metoprolol tartrate  100 mg Oral BID     pantoprazole  40 mg Oral Daily     rosuvastatin  20 mg Oral Daily     sodium chloride (PF)  10 mL Intracatheter Once     sodium chloride (PF)  3 mL Intracatheter Q8H       Data   Last 24 hours labs reviewed   EKG: (reviewed personally) sinus with RBB, st depression in III, avF, q waves in v1-v4    Imaging: cxr  Moderate coarsened bilateral perihilar opacities have developed since 11/29/2020, suspicious for edema or atypical infectious process. Chest otherwise negative.    Tele: sinus    Echo:   The visual ejection fraction is 30-35%.  Left ventricular systolic function is moderately reduced.  There is severe apical wall hypokinesis.  The right ventricular systolic function is mild to moderately reduced.  There is mild to moderate (1-2+) mitral regurgitation.  Right ventricular systolic pressure is elevated, consistent with mild  pulmonary hypertension.  Mild aortic root dilatation.  The ascending aorta is Mildly dilated.  Grade III or advanced diastolic dysfunction.    Last ischemic eval: angio 2020

## 2022-11-22 DIAGNOSIS — N28.89 RENAL MASS: Primary | ICD-10-CM

## 2022-11-22 LAB
ANION GAP SERPL CALCULATED.3IONS-SCNC: 3 MMOL/L (ref 3–14)
BUN SERPL-MCNC: 23 MG/DL (ref 7–30)
CALCIUM SERPL-MCNC: 8.4 MG/DL (ref 8.5–10.1)
CHLORIDE BLD-SCNC: 102 MMOL/L (ref 94–109)
CO2 SERPL-SCNC: 31 MMOL/L (ref 20–32)
CREAT SERPL-MCNC: 1.74 MG/DL (ref 0.66–1.25)
CREAT UR-MCNC: 158 MG/DL
ERYTHROCYTE [DISTWIDTH] IN BLOOD BY AUTOMATED COUNT: 15.6 % (ref 10–15)
GFR SERPL CREATININE-BSD FRML MDRD: 46 ML/MIN/1.73M2
GLUCOSE BLD-MCNC: 92 MG/DL (ref 70–99)
HCT VFR BLD AUTO: 53 % (ref 40–53)
HGB BLD-MCNC: 16.7 G/DL (ref 13.3–17.7)
MCH RBC QN AUTO: 27.5 PG (ref 26.5–33)
MCHC RBC AUTO-ENTMCNC: 31.5 G/DL (ref 31.5–36.5)
MCV RBC AUTO: 87 FL (ref 78–100)
PLATELET # BLD AUTO: 201 10E3/UL (ref 150–450)
POTASSIUM BLD-SCNC: 4.4 MMOL/L (ref 3.4–5.3)
PROT UR-MCNC: 0.87 G/L
PROT/CREAT 24H UR: 0.55 G/G CR (ref 0–0.2)
RBC # BLD AUTO: 6.07 10E6/UL (ref 4.4–5.9)
SODIUM SERPL-SCNC: 136 MMOL/L (ref 133–144)
UFH PPP CHRO-ACNC: 0.23 IU/ML
UFH PPP CHRO-ACNC: 0.32 IU/ML
UFH PPP CHRO-ACNC: 0.44 IU/ML
WBC # BLD AUTO: 11.4 10E3/UL (ref 4–11)

## 2022-11-22 PROCEDURE — 250N000013 HC RX MED GY IP 250 OP 250 PS 637: Performed by: PHYSICIAN ASSISTANT

## 2022-11-22 PROCEDURE — 99231 SBSQ HOSP IP/OBS SF/LOW 25: CPT | Performed by: INTERNAL MEDICINE

## 2022-11-22 PROCEDURE — 80048 BASIC METABOLIC PNL TOTAL CA: CPT | Performed by: HOSPITALIST

## 2022-11-22 PROCEDURE — 250N000011 HC RX IP 250 OP 636: Performed by: HOSPITALIST

## 2022-11-22 PROCEDURE — 85027 COMPLETE CBC AUTOMATED: CPT | Performed by: HOSPITALIST

## 2022-11-22 PROCEDURE — 99232 SBSQ HOSP IP/OBS MODERATE 35: CPT | Performed by: PHYSICIAN ASSISTANT

## 2022-11-22 PROCEDURE — 99221 1ST HOSP IP/OBS SF/LOW 40: CPT | Performed by: UROLOGY

## 2022-11-22 PROCEDURE — 250N000013 HC RX MED GY IP 250 OP 250 PS 637: Performed by: HOSPITALIST

## 2022-11-22 PROCEDURE — 99233 SBSQ HOSP IP/OBS HIGH 50: CPT | Performed by: HOSPITALIST

## 2022-11-22 PROCEDURE — 210N000002 HC R&B HEART CARE

## 2022-11-22 PROCEDURE — 250N000013 HC RX MED GY IP 250 OP 250 PS 637: Performed by: INTERNAL MEDICINE

## 2022-11-22 PROCEDURE — 36415 COLL VENOUS BLD VENIPUNCTURE: CPT | Performed by: HOSPITALIST

## 2022-11-22 PROCEDURE — 36415 COLL VENOUS BLD VENIPUNCTURE: CPT | Performed by: INTERNAL MEDICINE

## 2022-11-22 PROCEDURE — 85520 HEPARIN ASSAY: CPT | Performed by: HOSPITALIST

## 2022-11-22 PROCEDURE — 85520 HEPARIN ASSAY: CPT | Performed by: INTERNAL MEDICINE

## 2022-11-22 RX ORDER — ISOSORBIDE MONONITRATE 60 MG/1
60 TABLET, EXTENDED RELEASE ORAL DAILY
Status: DISCONTINUED | OUTPATIENT
Start: 2022-11-23 | End: 2022-11-24 | Stop reason: HOSPADM

## 2022-11-22 RX ADMIN — ISOSORBIDE MONONITRATE 30 MG: 30 TABLET, EXTENDED RELEASE ORAL at 09:18

## 2022-11-22 RX ADMIN — DEXTRAN 70, GLYCERIN, HYPROMELLOSE 1 DROP: 1; 2; 3 SOLUTION/ DROPS OPHTHALMIC at 19:51

## 2022-11-22 RX ADMIN — CLOPIDOGREL BISULFATE 75 MG: 75 TABLET ORAL at 09:19

## 2022-11-22 RX ADMIN — FINASTERIDE 2.5 MG: 5 TABLET, FILM COATED ORAL at 09:19

## 2022-11-22 RX ADMIN — ALBUTEROL SULFATE 2 PUFF: 90 AEROSOL, METERED RESPIRATORY (INHALATION) at 09:17

## 2022-11-22 RX ADMIN — BUPROPION HYDROCHLORIDE 300 MG: 300 TABLET, EXTENDED RELEASE ORAL at 09:18

## 2022-11-22 RX ADMIN — FLUTICASONE PROPIONATE 1 SPRAY: 50 SPRAY, METERED NASAL at 09:17

## 2022-11-22 RX ADMIN — CARVEDILOL 25 MG: 25 TABLET, FILM COATED ORAL at 09:19

## 2022-11-22 RX ADMIN — PANTOPRAZOLE SODIUM 40 MG: 40 TABLET, DELAYED RELEASE ORAL at 09:18

## 2022-11-22 RX ADMIN — HEPARIN SODIUM 1800 UNITS/HR: 10000 INJECTION, SOLUTION INTRAVENOUS at 09:17

## 2022-11-22 RX ADMIN — LABETALOL HYDROCHLORIDE 10 MG: 5 INJECTION INTRAVENOUS at 02:10

## 2022-11-22 RX ADMIN — HYDRALAZINE HYDROCHLORIDE 25 MG: 25 TABLET ORAL at 05:05

## 2022-11-22 RX ADMIN — LABETALOL HYDROCHLORIDE 10 MG: 5 INJECTION INTRAVENOUS at 23:51

## 2022-11-22 RX ADMIN — FLUTICASONE FUROATE AND VILANTEROL TRIFENATATE 1 PUFF: 200; 25 POWDER RESPIRATORY (INHALATION) at 09:20

## 2022-11-22 RX ADMIN — ROSUVASTATIN CALCIUM 20 MG: 20 TABLET, FILM COATED ORAL at 09:19

## 2022-11-22 RX ADMIN — HYDRALAZINE HYDROCHLORIDE 25 MG: 25 TABLET ORAL at 19:48

## 2022-11-22 RX ADMIN — ASPIRIN 81 MG: 81 TABLET, COATED ORAL at 09:19

## 2022-11-22 RX ADMIN — LORAZEPAM 1 MG: 1 TABLET ORAL at 23:50

## 2022-11-22 RX ADMIN — BUPRENORPHINE HYDROCHLORIDE, NALOXONE HYDROCHLORIDE 1 FILM: 4; 1 FILM, SOLUBLE BUCCAL; SUBLINGUAL at 09:26

## 2022-11-22 RX ADMIN — AMITRIPTYLINE HYDROCHLORIDE 150 MG: 75 TABLET, FILM COATED ORAL at 19:47

## 2022-11-22 RX ADMIN — LABETALOL HYDROCHLORIDE 10 MG: 5 INJECTION INTRAVENOUS at 00:04

## 2022-11-22 RX ADMIN — ZOLPIDEM TARTRATE 10 MG: 5 TABLET ORAL at 23:50

## 2022-11-22 RX ADMIN — CARVEDILOL 25 MG: 25 TABLET, FILM COATED ORAL at 17:05

## 2022-11-22 RX ADMIN — HYDRALAZINE HYDROCHLORIDE 25 MG: 25 TABLET ORAL at 14:59

## 2022-11-22 ASSESSMENT — ACTIVITIES OF DAILY LIVING (ADL)
ADLS_ACUITY_SCORE: 20

## 2022-11-22 NOTE — CONSULTS
[facility] Consultation by [facility]    Jeremi Damon MRN# [id number]   Age: 52 year old YOB: 1970     Date of Admission:  11/19/2022    Reason for consult: Renal lesion       Requesting PA/MD: Dr. Lorenzo       Level of consult: Consult, follow and place orders           Assessment and Plan:   Assessment:   Left renal lesion on US      Plan:   Will defer contrast load to Cardiology, if needed. Given the small size of the lesion, can defer CT Renal protocol 1-2 months from our standpoint. Will arrange CT and follow-up with Dr. Howard as outpatient (Contact info entered into Discharge Navigator).    Please call with questions.     Haritha Robledo PA-C  [facility]  [phone]  Securely message with the Vocera Web Console   Monday-Tuesday this week                   Chief Complaint:   HTN emergency     History is obtained from the patient and EMR.         History of Present Illness:   This patient is a 52 year old male admitted with hypertensive urgency/emergency, NSTEMI and not taking BP meds. Renal US done 11/21/22 due to continued renal dysfunction. This noted a mass in the medial inferior aspect of the left kidney (2.2 x 2.2 x 2.5 cm) and prostatomegaly. Nonsmoker. No family hx of urologic cancers.      Micro hematuria noted as far back as 2007 (referred to Urology in 2002 and 2011, but not evaluated). Most recent UA 11/20, does not have a micro exam.  Cr 1.74 today (1.75 yesterday).     Has slower stream. Nocturia. No gross hematuria.            Past Medical History:     Past Medical History:   Diagnosis Date     ADHD (attention deficit hyperactivity disorder)      Allergic rhinitis, cause unspecified     Allergic rhinitis     Benign hypertension      Chronic back pain      Hiccough      Hypersomnia with sleep apnea, unspecified      Major depressive disorder, single episode, moderate (H) 03/2008     Mild persistent asthma      Other primary cardiomyopathies     Cardiomyopathy  -- unknown etiology             Past Surgical History:     Past Surgical History:   Procedure Laterality Date     APPENDECTOMY  2007     BACK SURGERY      L5-S1 fusion     CV CORONARY ANGIOGRAM N/A 2020    Procedure: Heart Catheterization with Possible Intervention;  Surgeon: Theo Donahue MD;  Location:  HEART CARDIAC CATH LAB      REPAIR OF NASAL SEPTUM       LAPAROSCOPIC CHOLECYSTECTOMY      Cholecystectomy, Laparoscopic     SURGICAL HISTORY OF -       torn pectoral muscle     SURGICAL HISTORY OF -       Bilateral radiofrequency volume reduction of the inferior turbinates.     SURGICAL HISTORY OF -       rhinoplasty- septoplasty             Social History:     Social History     Tobacco Use     Smoking status: Never     Smokeless tobacco: Never   Substance Use Topics     Alcohol use: No     Comment: rarely              Family History:     Family History   Problem Relation Age of Onset     Coronary Artery Disease Mother          55; MI x 2     Cancer Father         hodgkins     Hypertension Father      Coronary Artery Disease Father      Cardiovascular Maternal Grandmother      Hypertension Brother      Cardiovascular Sister      No Known Problems Daughter      Prostate Cancer No family hx of      Cancer - colorectal No family hx of      Family history reviewed.             Allergies:     Allergies   Allergen Reactions     Acetaminophen Other (See Comments)     Patient states he does not have any reaction to this     Morphine Other (See Comments)     Patient states he had a headache one time but has used since with no adverse reactions     Sulfa Drugs      Theophylline Hives             Medications:     Current Facility-Administered Medications   Medication     acetaminophen (TYLENOL) tablet 650 mg    Or     acetaminophen (TYLENOL) Suppository 650 mg     albuterol (PROVENTIL HFA/VENTOLIN HFA) inhaler     amitriptyline (ELAVIL) tablet 150 mg     artificial tears (GENTEAL)  "0.1-0.2-0.3 % ophthalmic solution 1 drop     aspirin EC tablet 81 mg     bisacodyl (DULCOLAX) suppository 10 mg     buprenorphine HCl-naloxone HCl (SUBOXONE) 4-1 MG per film 1 Film     buPROPion (WELLBUTRIN XL) 24 hr tablet 300 mg     busPIRone (BUSPAR) tablet 5 mg     carvedilol (COREG) tablet 25 mg     clopidogrel (PLAVIX) tablet 75 mg     finasteride (PROSCAR) half-tab 2.5 mg     fluticasone (FLONASE) 50 MCG/ACT spray 1 spray     fluticasone-vilanterol (BREO ELLIPTA) 200-25 MCG/ACT inhaler 1 puff     [Held by provider] furosemide (LASIX) tablet 40 mg     heparin 25,000 units in 0.45% NaCl 250 mL ANTICOAGULANT infusion     hydrALAZINE (APRESOLINE) tablet 25 mg     [START ON 11/23/2022] isosorbide mononitrate (IMDUR) 24 hr tablet 60 mg     labetalol (NORMODYNE/TRANDATE) injection 10 mg     lidocaine (LMX4) cream     lidocaine 1 % 0.1-1 mL     LORazepam (ATIVAN) tablet 1 mg     [Held by provider] losartan (COZAAR) tablet 100 mg     melatonin tablet 1 mg     nitroGLYcerin (NITROSTAT) sublingual tablet 0.4 mg     ondansetron (ZOFRAN ODT) ODT tab 4 mg    Or     ondansetron (ZOFRAN) injection 4 mg     pantoprazole (PROTONIX) EC tablet 40 mg     Patient is already receiving anticoagulation with heparin, enoxaparin (LOVENOX), warfarin (COUMADIN)  or other anticoagulant medication     polyethylene glycol (MIRALAX) Packet 17 g     prochlorperazine (COMPAZINE) injection 10 mg    Or     prochlorperazine (COMPAZINE) tablet 10 mg    Or     prochlorperazine (COMPAZINE) suppository 25 mg     rosuvastatin (CRESTOR) tablet 20 mg     sodium chloride (PF) 0.9% PF flush 10 mL     sodium chloride (PF) 0.9% PF flush 3 mL     sodium chloride (PF) 0.9% PF flush 3 mL     zolpidem (AMBIEN) tablet 10 mg        BP (!) 133/90   Pulse 66   Temp 97.9  F (36.6  C) (Oral)   Resp 18   Ht 1.93 m (6' 4\")   Wt 109.2 kg (240 lb 12.8 oz)   SpO2 97%   BMI 29.31 kg/m    PSYCH: NAD  EYES: EOMI  MOUTH: MMM  NEURO: AAO x3            Data:     Lab " Results   Component Value Date    WBC 11.4 (H) 11/22/2022    HGB 16.7 11/22/2022    HCT 53.0 11/22/2022    MCV 87 11/22/2022     11/22/2022     Lab Results   Component Value Date    CR 1.74 (H) 11/22/2022     EXAM: US RENAL COMPLETE NON-VASCULAR  LOCATION: [facility]  DATE/TIME: 11/21/2022 4:55 PM     INDICATION: Harvey  COMPARISON: None.  TECHNIQUE: Routine Bilateral Renal and Bladder Ultrasound.     FINDINGS:     RIGHT KIDNEY: 13.0 x 7.0 x 5.6 cm. Normal without hydronephrosis or masses.      LEFT KIDNEY: 12.4 x 6.1 x 7.9 cm. Normal without hydronephrosis or masses. Cyst identified in the lateral interpolar region measuring 2.2 x 2.1 x 1.8 cm in size. This appears to have layering calcifications within it. In the medial superior region, a   mass is identified which has blood flow. This measures 2.2 x 2.2 x 2.5 cm. A dedicated CT renal protocol is recommended.     BLADDER: Normal. Prostate gland contains consultations and measures 4.2 x 5.0 x 3.9 cm.                                                                      IMPRESSION:  1.  Mass identified in the medial inferior aspect of the left kidney. CT scan with a renal protocol is recommended.  2.  Prostatomegaly.
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
## 2022-11-22 NOTE — PROVIDER NOTIFICATION
Notification     Notified Person: Bibiana MIKE    Notification Time: 0038     Notification Interaction: AmCom     Purpose of Notification:     rm [address]    continues to be hypertensive. last /106, received hydralazine and prn labetalol x2 little to no improvement. was on nicardipine gtt that was stopped 11/21 0900. do we want to restart nicardipine ?   cam WHITE *[id number]    Orders received: continue current poc

## 2022-11-22 NOTE — PROGRESS NOTES
Renal Medicine Progress Note            Assessment/Plan:     52 y.o man with CKD IIIA presumed due to HTN and cardiovascular disease.      # CKD IIIA: Baseline Scr ~ 1.5 mg/dl.      # Hypertensive urgency/emergency:                -noncompliance and was not taking his BP medications                -BP is better               -Coreg 25 mg bid               -Imdur 30 mg daily               -losartan on hold     # NSTEMI: From hypertensive emergency vs ischemia?                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 Hypertensive heart disease vs infiltrative process?               -SPEP/UPEP and immmofixation in 2021 was wo monoclonal protein     # Left renal mass,2.5 cm: urology evaluated the patient.      Plan:  # Kidney function is essentially around his baseline. He has an eGFR of 46 ml/min. Okay to proceed with LHC if cardiology would like to proceed. Patient understands the risks and benefits . He wants to get the LHC if that is recommended by cardiology.   # Aggressive pre/post NS infusion with contrast exposure. Use the least amount of contrast as possible.  # Leave some BP room to eventually add ACEi, ARB or Entresto.     I discussed the case with cardiology and with Dr. Lorenzo at length.         Interval History:     Afebrile. BP is better.   Patient was upset with cardiology.  He wants to get the LHC, but cardiology is deferring it for now.   Cardiology has high concern regarding his compliance.   Urology has seen him for the renal mass.     Patient is very irritable. I explained to him that the decision to do the LHC is not my to make and that is up to the cardiology team.          Medications and Allergies:       albuterol  2 puff Inhalation Q6H     amitriptyline  150 mg Oral At Bedtime     artificial tears  1 drop Both Eyes BID     aspirin  81 mg Oral Daily     buprenorphine HCl-naloxone HCl  1 Film Sublingual 4x Daily     buPROPion  300 mg Oral QAM     carvedilol  25 mg Oral BID  "w/meals     clopidogrel  75 mg Oral Daily     finasteride  2.5 mg Oral Daily     fluticasone  1 spray Both Nostrils Daily     fluticasone-vilanterol  1 puff Inhalation Daily     [Held by provider] furosemide  40 mg Oral Daily     hydrALAZINE  25 mg Oral Q8H [facility]     [START ON 11/23/2022] isosorbide mononitrate  60 mg Oral Daily     [Held by provider] losartan  100 mg Oral Daily     pantoprazole  40 mg Oral Daily     rosuvastatin  20 mg Oral Daily     sodium chloride (PF)  10 mL Intracatheter Once     sodium chloride (PF)  3 mL Intracatheter Q8H        Allergies   Allergen Reactions     Acetaminophen Other (See Comments)     Patient states he does not have any reaction to this     Morphine Other (See Comments)     Patient states he had a headache one time but has used since with no adverse reactions     Sulfa Drugs      Theophylline Hives            Physical Exam:   Vitals were reviewed   , Blood pressure (!) 133/90, pulse 66, temperature 97.9  F (36.6  C), temperature source Oral, resp. rate 18, height 1.93 m (6' 4\"), weight 109.2 kg (240 lb 12.8 oz), SpO2 97 %.    Wt Readings from Last 3 Encounters:   11/22/22 109.2 kg (240 lb 12.8 oz)   11/04/22 110.2 kg (243 lb)   06/23/22 111 kg (244 lb 12.8 oz)       Intake/Output Summary (Last 24 hours) at 11/22/2022 1332  Last data filed at 11/22/2022 0507  Gross per 24 hour   Intake 324.25 ml   Output 500 ml   Net -175.75 ml       GENERAL APPEARANCE: Very irritable  HEENT:  Eyes/ears/nose/neck grossly normal  RESP: Not labored. Not needing supplemental O2.   CV: RRR on tele  ABDOMEN: obese.  EXTREMITIES/SKIN: no rashes/lesions on observed skin; no edema  NEURO: Awake, alert and conversing.  PSYCH: Irritable and can be a bit rude.         Data:     CBC RESULTS:     Recent Labs   Lab 11/22/22  0608 11/21/22  0724 11/20/22  0529 11/19/22  1623   WBC 11.4* 12.1* 15.8* 15.2*   RBC 6.07* 6.72* 6.66* 6.45*   HGB 16.7 18.0* 18.1* 18.0*   HCT 53.0 59.0* 56.0* 55.0*    198 199 " 242       Basic Metabolic Panel:  Recent Labs   Lab 11/22/22  0608 11/21/22  1045 11/20/22  0529 11/19/22  1623    136 134 139   POTASSIUM 4.4 4.5 4.5 4.7   CHLORIDE 102 101 100 104   CO2 31 30 31 30   BUN 23 22 18 18   CR 1.74* 1.75* 1.43* 1.63*   GLC 92 102* 74 100*   MIKO 8.4* 8.7 9.4 9.4       INRNo lab results found in last 7 days.   Attestation:   I have reviewed today's relevant vital signs, notes, medications, labs and imaging.    Renal ultrasound 11/21    RIGHT KIDNEY: 13.0 x 7.0 x 5.6 cm. Normal without hydronephrosis or masses.      LEFT KIDNEY: 12.4 x 6.1 x 7.9 cm. Normal without hydronephrosis or masses. Cyst identified in the lateral interpolar region measuring 2.2 x 2.1 x 1.8 cm in size. This appears to have layering calcifications within it. In the medial superior region, a   mass is identified which has blood flow. This measures 2.2 x 2.2 x 2.5 cm. A dedicated CT renal protocol is recommended.     BLADDER: Normal. Prostate gland contains consultations and measures 4.2 x 5.0 x 3.9 cm.    David Randall MD  [facility] - Nephrology  Office phone :[phone]  Pager: [phone]

## 2022-11-22 NOTE — PROGRESS NOTES
[facility]  Cardiology Progress Note  Date of Service: 11/22/2022  Primary Cardiologist: Dr. Keita    Assessment & Plan    Jeremi Damon is a 52 year old male with past medical history significant for HTN, CAD, LVH, asathma admitted on 11/19/2022 with headach and chest pain.    Assessment:  1.  NSTEMI, unclear if secondary to hypertensive emergency or type 1 NSTEMI.  Trop relatively flat at 281.  Pt now denies having cp recently.  None here    2.  Hypertesive emergency as pt off meds due to perceived side effects of dry eyes    3.  Cardiomyopathy EF 30-35%, with some degree of heart failure on admission including pulmonary edema on imaging.  No symptoms of heart failure today euvolemic on exam.    4.  CAD, angio in 2020 showed 60% mid LAD, 90% distal LAD, 90% distal circ, 60% rpl, Lmain normal- managed medicall during NSTEMI at that time due to renal function with consideration of staged pci pending improvement in renal function and ischemic eval.      -Creatinine remains stable but elevated here at 1.74.  There is concern that the patient will be compliant with DAPT in addition to risk to his kidneys with contrast dye and we do not recommend angiogram at this time.  Recommend optimization of medications possible reverse remodeling, close following her renal function and outpatient angiogram.    5.  Hypertrophic cardiomyopathy, likely htn induced as no LVOT gradient.  Had been referred for MRI in 2020 but not completed    6.  Acute on chronic renal insufficiency, unclear baseline likely between 1.5-2.0, risk of proceeding with angiogram discussed      Plan:   1. Renal function remains elevated.  Ideally would start Entresto, if acceptable to nephrology.  2. In the interim, recommend hydralazine and nitrates, increase Imdur to 60 mg a day, agree with hydralazine 25 mg 3 times daily already added.  3. Request patient follow-up as an outpatient for optimization of medications and repeat  "angiogram once renal function has improved a bit.  States \"you are playing games \"and request to leave AMA.  4. Will arrange follow-up in core clinic, patient encouraged to return, but given his level of agitation, concern for worsening outburst, and I left the room without additional discussion.  5. Cardiology will sign off, please call with questions.     Chrissy Josue PA-C  [facility]  Pager: [phone]     I spent 30 minutes face-to-face and/or coordinating care of Jeremi Damon. Over 50% of our time on the unit was spent counseling the patient and/or coordinating care regarding his cardiac condition.    Interval History   He states he feels well he denies chest pain.  He is irritated that we want to the angiogram.  He states for \"playing games\".  He states this is the same thing that happened 2 years ago and we just want him to keep coming back.  I indicated that this is true, this is the nature of taking care of of his medical conditions that requires routine follow-up.  He states he needs to leave.  He states \"I will just go home and die\".    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  Had spinal fusion.    Physical Exam   Temp: 97.9  F (36.6  C) Temp src: Oral BP: (!) 133/90 Pulse: 66   Resp: 18 SpO2: 97 % O2 Device: None (Room air)    Vitals:    11/20/22 0115 11/21/22 0700 11/22/22 0501   Weight: 111.2 kg (245 lb 2.4 oz) 110 kg (242 lb 9.6 oz) 109.2 kg (240 lb 12.8 oz)   Exam deferred the patient is agitated and clearly frustrated.  Speech was normal, patient was clearly angry.  Clinically Significant Risk Factors Present on Admission            # Overweight: Estimated body mass index is 29.31 kg/m  as calculated from the following:    Height as of this encounter: 1.93 m (6' 4\").    Weight as of this encounter: 109.2 kg (240 lb 12.8 oz).    Fluid & Electrolyte Disorders: Fluid overload, unspecified    Nephrology: Acute kidney failure, unspecified           Medications     heparin 1,800 " Units/hr (11/22/22 0917)     - MEDICATION INSTRUCTIONS -         albuterol  2 puff Inhalation Q6H     amitriptyline  150 mg Oral At Bedtime     artificial tears  1 drop Both Eyes BID     aspirin  81 mg Oral Daily     buprenorphine HCl-naloxone HCl  1 Film Sublingual 4x Daily     buPROPion  300 mg Oral QAM     carvedilol  25 mg Oral BID w/meals     clopidogrel  75 mg Oral Daily     finasteride  2.5 mg Oral Daily     fluticasone  1 spray Both Nostrils Daily     fluticasone-vilanterol  1 puff Inhalation Daily     [Held by provider] furosemide  40 mg Oral Daily     hydrALAZINE  25 mg Oral Q8H SOHEILA     [START ON 11/23/2022] isosorbide mononitrate  60 mg Oral Daily     [Held by provider] losartan  100 mg Oral Daily     pantoprazole  40 mg Oral Daily     rosuvastatin  20 mg Oral Daily     sodium chloride (PF)  10 mL Intracatheter Once     sodium chloride (PF)  3 mL Intracatheter Q8H       Data   Last 24 hours labs reviewed   EKG: (reviewed personally) sinus with RBB, st depression in III, avF, q waves in v1-v4    Imaging: cxr  Moderate coarsened bilateral perihilar opacities have developed since 11/29/2020, suspicious for edema or atypical infectious process. Chest otherwise negative.    Tele: sinus    Echo:   The visual ejection fraction is 30-35%.  Left ventricular systolic function is moderately reduced.  There is severe apical wall hypokinesis.  The right ventricular systolic function is mild to moderately reduced.  There is mild to moderate (1-2+) mitral regurgitation.  Right ventricular systolic pressure is elevated, consistent with mild  pulmonary hypertension.  Mild aortic root dilatation.  The ascending aorta is Mildly dilated.  Grade III or advanced diastolic dysfunction.    Last ischemic eval: angio 2020

## 2022-11-22 NOTE — CONSULTS
Patient has Medicare Advantage through [employer] and Medical Assistance.    Farxiga: $0/mo.   Jardiance: $0/mo.       Entresto: $0/mo.     Leatha Robles  Pharmacy Technician/Liaison, Discharge Pharmacy   [phone] (voice or text)  [email or web address]

## 2022-11-22 NOTE — CONSULTS
[facility]- C.O.R.E. Clinic    Received CORE Clinic Consult from GAGNA Villegas.    Reviewed Jeremi's chart and note they are admitted for elevated BP associated with headache, chest pain. Echocardiogram on 11/20 shows LVEF 30-35%.  This is their 1st admission(s) in the past year for concerns of heart failure.    Given above information, Jeremi meets criteria for CORE Clinic as patients EF is =/<40%.     Reviewed consult with GAGAN Villegas. She requests a CORE enrollment appointment in 1-2 weeks with labs    Patient's primary [facility]:  [facility] [place]    Pt reports the following HF symptoms prior to admission:  elevated BP associated with headache, chest pain    Living situation:  W/ so    Med set up:  self    Scale available at home:  ?    Barriers to HF follow-up:  Med non-compliance    Medication review: Patient is not on both Entresto and a SGLT2 Inhibitor (Jardiance, Farxiga, Invokana), Pharmacy Liaison Consult placed for coverage review. ; done by provider    Education deferred this admission due to frustration with having to wait for angio r/t kidney function.     I have arranged a lab appointment and CORE visit with Shelli Guo CNP on 11/28, see below.     Future Appointments   Date Time Provider Department Center   11/25/2022  3:00 PM [facility]   11/28/2022 10:50 AM Shelli Guo APRN CNP [facility] CLIN   1/12/2023 11:00 AM Macy Subramanian MD [facility]       Please call with any further questions.    Tamia Jacques RN, BAN  [facility]- [facility]  C.O.R.E. Clinic Care Coordinator  [phone]

## 2022-11-22 NOTE — PLAN OF CARE
"Goal Outcome Evaluation:  Neuro- x4 , received ativan x1  Most Recent Vitals- BP (!) 148/104 (BP Location: Right arm)   Pulse 66   Temp 97.9  F (36.6  C) (Oral)   Resp 18   Ht 1.93 m (6' 4\")   Wt 109.2 kg (240 lb 12.8 oz)   SpO2 97%   BMI 29.31 kg/m    Hypertensive overnight, prn labetalol given and Q8 hydralazine, MD aware   Tele/Cardiac- SR 1AVB BBB  Resp- RA   Activity- independent   Pain- denies   Drips- Heparin infusing at 1800 units/hr, anti xa 1300  Skin- WDL  GI/- WDL, UA collected   Labs pending   Diet: NPO for Medical/Clinical Reasons Except for: Meds    Plan- monitor blood pressure, AM labs,NPO for possible angiogram in AM        "

## 2022-11-22 NOTE — PROGRESS NOTES
Shift: 330-700pm    Assumed care of pt this afternoon.  No C/o pain, VS stable.  Taken for renal US, clean urinal in room with instructions to pt to use at next void for sample (Random Protein) per order. 1600 Suboxone declined, gave scheduled 2000 dose early per his request. Flonase ordered for stuffy nose ( R/t consuming chocolate per pt).  Heparin gtt therapeutic x1 @ 1650 units/hr with next Xa at 0030..  NPO at 0000, with plan for CL 11/22, pending creatinine in AM. Neph following.

## 2022-11-22 NOTE — DISCHARGE INSTRUCTIONS
Recommended you follow up with Cardiology, Urology, Nephrology, and a Primary Care Physician.   Recommend a low salt diet/ Mediterranean diet.   DO NOT STOP TAKING YOUR BLOOD THINNER WITHOUT TALKING TO YOUR CARDIOLOGIST! YOUR STENT CAN RE OCCLUDE!         Future Appointments:    Date: Tuesday, 11/29/2022  Time: 1:00 pm - 2:00 pm  Provider: Patricia Conrad MD, MD  Location: [facility], [address] [place] [address]  Phone: [phone]  Type: Medication Mgmt - Initial (In-Person)    Patient Instructions  THIS IS ONLY A RESERVATION. PATIENT MUST CALL [phone] TO PRE-REGISTER AND CONFIRM APPOINTMENT. Please arrive 15 min early with ID and insurance card. Insurance accepted: Cristiane CHERY, commercial insurance. If pt has no insurance, we have a [facility] Navigator available to assist in applying for state insurance. We have 3 locations - intake appts available in [place] or [place], depending on day/time of week. Please call [phone] to verify location and pre-register to confirm appt.    Urology Follow Up  Renal CT scan in 1-2 months and follow-up with Dr. Howard as outpatient.   Please call with questions.   [facility]  [phone]  __________________________________________________        Please call [email or web address] (Heart Clinic) with any concerns:  Monday-Friday 8:00 AM to 4:30 PM   General Line: [phone] or  CORE Nurse Line: [phone]  After hours:  [phone]    -- Please start weighing self daily and write in wt log chart to bring into your cardiology/CORE clinic follow up appts.   -- Please bring in current medication bottles to cardiology/CORE appts for review.   -- Call RN with weight gain greater than 2 lbs overnight, and/or 5 lbs in a week, and/or worsening shortness of breath/edema/bloating.       Your labs are NOT FASTING on 11/25 @ 3 PM  OK to eat.
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
## 2022-11-22 NOTE — PROVIDER NOTIFICATION
Notification     Notified Person: Figueroa MIKE    Notification Time: 2148     Notification Interaction: AmCom     Purpose of Notification:     rm [address].F[address]    BP's 160-170's/ 100-120's, received labetolol 10 mg at 2020 w/ small improvement in BP's.     please advise   cam RN *[phone]    Orders Received: Hydralazine 25 mg Q8

## 2022-11-22 NOTE — PROGRESS NOTES
[facility]    Medicine Progress Note - Hospitalist Service    Date of Admission:  2022    Assessment & Plan        Jeremi Damon is a 52 year old male with history of multivessel coronary artery disease with prior NSTEMI managed medically, hypertension, hypertrophic cardiomyopathy, mild persistent asthma who presents with elevated BP associated with headache, chest pain and was admitted on 2022.      Hypertensive emergency  Pulmonary edema-resolved  Acute hypoxic respiratory failure due to new onset biventricular and diastolic heart failure in exacerbation- resolved  New mild Pulmonary HTN  Coronary artery disease, multivessel, with prior NSTEMI medically managed  NSTEMI  Hypertrophic cardiomyopathy  Presented with elevated SBP >230 at home, associated with headache, intermittent left sided chest pain occurring at rest  -Hypoxic to 88% in ED, CXR concerning for pulmonary edema  -Head CT negative  -Troponin 242->281-258; NtpBNP 1852  -Last echocardiogram with EF 55-60%, severe LVH, no note of outflow tract obstruction   - He was also started on nicardipine gtt , Heparin gtt and asa and was given 40 mg iv lasix daily and was transitioned to oral Lasix on   - ECHO on  showed ef of 30-35%, severe apical wall hypokinesis , RV systolic function is decreased , moderated MR , RVSP is elevated and has mild pul htn , grade 2 diastolic dysfunction  - I Did review his echo in  and ef was 55-60% and RV was normal and above changes are new  -Patient evaluated by cardiology team and yesterday his losartan and furosemide were held as there were plans for angiogram.  His creatinine continues to be high at 1.74 and cardiology team did recommend to patient that angiogram can be done as an outpatient.  -I spoke with the cardiology team and the patient multiple times today and patient is very adamant and wants angiogram to be done as he mentioned that his father  of heart  disease.  He is aware that his renal function has been the same in the past 2 years.  Patient understand the risk with contrast load including increased mortality and morbidity and him going on dialysis.  Patient also understands that in case he gets angiogram and get stent placement then he will have to be compliant with dual antiplatelet therapy.  The cardiology team did had reservations given his prior history of noncompliance which is reasonable.  I again talked with the patient and he has assured me that he will be compliant with dual antiplatelet therapy.  -Patient is in agreement with pursuing angiogram and understand the risk and benefits and this was communicated to Dr. Armenta and they will look into the schedule to see if he can get angiogram in the next 1 to 2 days.  Also patient has been on heparin for a long time and it was discontinued per cardiology  - continue with beta blocker and as needed hydralazine  -Appreciate input from the cardiology team     Chronic kidney disease, stage 3  Baseline creatinine 1.4-1.6 with GFR in 50s.   -Creatinine 1.63 on admission and is  Today is 1.74 and his GFR is 46  - Currently around baseline  - Avoid nephrotoxins  - Monitor BMP   -Nephrology was consulted and ultrasound renal has been ordered and the results are reviewed and shows left renal mass 2.5 cm for which urology has been consulted.  -I also discussed blood pressure medicines and they mentioned to keep the blood pressure on the higher side for now to have some room eventually add ACE/ARB/Entresto  -Discussed in detail with Dr. Andrade and appreciate input     Leucocytosis - improving  - he has wbc count of 15.2 on admission and chest x-ray was more consistent with fluid overload and he had neutrophilic Predominance and has no fever and tested negative for COVID-19 on admission  - Blood culture done on 11/20 have been negative and UA does not show any infection   - he has been afebrile and bp is stable and wbc  count  Has started to trend down and is 11.4  - we will continue to monitor       Major depression  - we will continue with PTA well butyrin and other prn home meds   -Off note patient did express to cardiology team that he wants to go home and die.  Of note patient is concerned that his father did had the same heart disease and he  and he does not want to suffer and when he was told that he can get angiogram as outpatient that is why he has had it but never meant to harm himself or harm somebody else and does not have a plan  -He was seen by mental health team and there is no need for any suicide precautions and patient will need to follow as outpatient with psychiatry     ADHD  - Hold stimulants while BP elevated     Panic disorder   -  We will continue with  PTA lorazepam prn     GERD  - we will continue with PTA     Opioid dependence   -continue with PTA suboxone    Left renal mass  -There is a mass in the medial inferior aspect of the left kidney, prostatomegaly and a mass has blood flow          Diet: Regular Diet Adult  NPO for Medical/Clinical Reasons Except for: Meds    DVT Prophylaxis: heparin drip   Yee Catheter: Not present  Central Lines: None  Cardiac Monitoring: ACTIVE order. Indication: Acute decompensated heart failure (48 hours)  Code Status: Full Code      Disposition Plan      Expected Discharge Date: 2022                The patient's care was discussed with the Bedside Nurse, Patient and Nephrology and cardiology Consultant.    Angelo Lorenzo MD  Hospitalist Service  [facility]  Securely message with the Vocera Web Console (learn more here)  Text page via [employer] Paging/Directory         Clinically Significant Risk Factors              # Hypoalbuminemia: Lowest albumin = 3.2 g/dL (Ref range: 3.5-5.2) at 2022  4:23 PM, will monitor as appropriate           # Overweight: Estimated body mass index is 29.31 kg/m  as calculated from the following:    Height as  "of this encounter: 1.93 m (6' 4\").    Weight as of this encounter: 109.2 kg (240 lb 12.8 oz)., PRESENT ON ADMISSION          Time spent in care of patient is 45 minutes and more than 50% of the time was spent in coordination of ongoing management of NSTEMI, chronic kidney disease stage III, hypertension and I discussed his case in detail multiple times with cardiology, nephrology and I reviewed all his labs, images and medications    ______________________________________________________________________    Interval History     Patient was seen by cardiology team and as they recommended outpatient cath patient was agitated and did mention to them that he may as well go home and die.  Patient mentioned to me that he never had any suicidal or homicidal intentions and does not want angiogram to be done as he is scared that he may have bad outcome as his father passed away from heart disease.    I also notified the patient about the results of the renal ultrasound and the plan for urology consult and he understands    Patient seen multiple times during the day with patient's nurse Nicky and plan of care discussed    Data reviewed today: I reviewed all medications, new labs and imaging results over the last 24 hours. I personally reviewed     Ultrasound renal       RIGHT KIDNEY: 13.0 x 7.0 x 5.6 cm. Normal without hydronephrosis or masses.      LEFT KIDNEY: 12.4 x 6.1 x 7.9 cm. Normal without hydronephrosis or masses. Cyst identified in the lateral interpolar region measuring 2.2 x 2.1 x 1.8 cm in size. This appears to have layering calcifications within it. In the medial superior region, a   mass is identified which has blood flow. This measures 2.2 x 2.2 x 2.5 cm. A dedicated CT renal protocol is recommended.     BLADDER: Normal. Prostate gland contains consultations and measures 4.2 x 5.0 x 3.9 cm.    Physical Exam   Vital Signs: Temp: 97.9  F (36.6  C) Temp src: Oral BP: (!) 151/92 Pulse: 71   Resp: 18 SpO2: 97 % O2 " Device: None (Room air)    Weight: 240 lbs 12.8 oz        General: Patient has been calm but was agitated this morning  HEENT: Head is atraumatic, normocephalic.    Neck: Neck is supple   Respiratory: ctla no crackles   Cardiovascular: Regular rate , S1 and S2 normal with no murmer or rubs or gallops  Abdomen:   soft , non tender , non distended and bowel sound present   Skin: No skin rashes   Neurologic: No facial droop  Musculoskeletal: Normal Range of motion over upper and lower extremities bilaterally   Psychiatric:  can be agitated at times but was much calmer later in the day    Data   Recent Labs   Lab 11/22/22  0608 11/21/22  1045 11/21/22  0724 11/20/22  0529 11/19/22  1623   WBC 11.4*  --  12.1* 15.8* 15.2*   HGB 16.7  --  18.0* 18.1* 18.0*   MCV 87  --  88 84 85     --  198 199 242    136  --  134 139   POTASSIUM 4.4 4.5  --  4.5 4.7   CHLORIDE 102 101  --  100 104   CO2 31 30  --  31 30   BUN 23 22  --  18 18   CR 1.74* 1.75*  --  1.43* 1.63*   ANIONGAP 3 5  --  3 5   MIKO 8.4* 8.7  --  9.4 9.4   GLC 92 102*  --  74 100*   ALBUMIN  --   --   --   --  3.2*   PROTTOTAL  --   --   --   --  6.7*   BILITOTAL  --   --   --   --  0.6   ALKPHOS  --   --   --   --  89   ALT  --   --   --   --  142*   AST  --   --   --   --  93*     Recent Results (from the past 24 hour(s))   US Renal Complete Non-Vascular    Narrative    EXAM: US RENAL COMPLETE NON-VASCULAR  LOCATION: [facility]  DATE/TIME: 11/21/2022 4:55 PM    INDICATION: Harvey  COMPARISON: None.  TECHNIQUE: Routine Bilateral Renal and Bladder Ultrasound.    FINDINGS:    RIGHT KIDNEY: 13.0 x 7.0 x 5.6 cm. Normal without hydronephrosis or masses.     LEFT KIDNEY: 12.4 x 6.1 x 7.9 cm. Normal without hydronephrosis or masses. Cyst identified in the lateral interpolar region measuring 2.2 x 2.1 x 1.8 cm in size. This appears to have layering calcifications within it. In the medial superior region, a   mass is identified which has  blood flow. This measures 2.2 x 2.2 x 2.5 cm. A dedicated CT renal protocol is recommended.    BLADDER: Normal. Prostate gland contains consultations and measures 4.2 x 5.0 x 3.9 cm.      Impression    IMPRESSION:  1.  Mass identified in the medial inferior aspect of the left kidney. CT scan with a renal protocol is recommended.  2.  Prostatomegaly.     Medications     - MEDICATION INSTRUCTIONS -         albuterol  2 puff Inhalation Q6H     amitriptyline  150 mg Oral At Bedtime     artificial tears  1 drop Both Eyes BID     aspirin  81 mg Oral Daily     buprenorphine HCl-naloxone HCl  1 Film Sublingual 4x Daily     buPROPion  300 mg Oral QAM     carvedilol  25 mg Oral BID w/meals     clopidogrel  75 mg Oral Daily     finasteride  2.5 mg Oral Daily     fluticasone  1 spray Both Nostrils Daily     fluticasone-vilanterol  1 puff Inhalation Daily     [Held by provider] furosemide  40 mg Oral Daily     hydrALAZINE  25 mg Oral Q8H [facility]     [START ON 11/23/2022] isosorbide mononitrate  60 mg Oral Daily     [Held by provider] losartan  100 mg Oral Daily     pantoprazole  40 mg Oral Daily     rosuvastatin  20 mg Oral Daily     sodium chloride (PF)  10 mL Intracatheter Once     sodium chloride (PF)  3 mL Intracatheter Q8H

## 2022-11-23 LAB
ACT BLD: 280 SECONDS (ref 74–150)
ACT BLD: 288 SECONDS (ref 74–150)
ANION GAP SERPL CALCULATED.3IONS-SCNC: <1 MMOL/L (ref 3–14)
BUN SERPL-MCNC: 22 MG/DL (ref 7–30)
CALCIUM SERPL-MCNC: 9.3 MG/DL (ref 8.5–10.1)
CHLORIDE BLD-SCNC: 101 MMOL/L (ref 94–109)
CO2 SERPL-SCNC: 35 MMOL/L (ref 20–32)
CREAT SERPL-MCNC: 1.94 MG/DL (ref 0.66–1.25)
ERYTHROCYTE [DISTWIDTH] IN BLOOD BY AUTOMATED COUNT: 17.3 % (ref 10–15)
GFR SERPL CREATININE-BSD FRML MDRD: 40 ML/MIN/1.73M2
GLUCOSE BLD-MCNC: 89 MG/DL (ref 70–99)
HCT VFR BLD AUTO: 56.6 % (ref 40–53)
HGB BLD-MCNC: 17.6 G/DL (ref 13.3–17.7)
MCH RBC QN AUTO: 27 PG (ref 26.5–33)
MCHC RBC AUTO-ENTMCNC: 31.1 G/DL (ref 31.5–36.5)
MCV RBC AUTO: 87 FL (ref 78–100)
PLATELET # BLD AUTO: 193 10E3/UL (ref 150–450)
POTASSIUM BLD-SCNC: 5 MMOL/L (ref 3.4–5.3)
RBC # BLD AUTO: 6.52 10E6/UL (ref 4.4–5.9)
SODIUM SERPL-SCNC: 136 MMOL/L (ref 133–144)
WBC # BLD AUTO: 9.8 10E3/UL (ref 4–11)

## 2022-11-23 PROCEDURE — 85014 HEMATOCRIT: CPT | Performed by: HOSPITALIST

## 2022-11-23 PROCEDURE — 250N000011 HC RX IP 250 OP 636: Performed by: HOSPITALIST

## 2022-11-23 PROCEDURE — 250N000011 HC RX IP 250 OP 636: Performed by: INTERNAL MEDICINE

## 2022-11-23 PROCEDURE — C1874 STENT, COATED/COV W/DEL SYS: HCPCS | Performed by: INTERNAL MEDICINE

## 2022-11-23 PROCEDURE — C9600 PERC DRUG-EL COR STENT SING: HCPCS | Performed by: INTERNAL MEDICINE

## 2022-11-23 PROCEDURE — 85347 COAGULATION TIME ACTIVATED: CPT

## 2022-11-23 PROCEDURE — 272N000001 HC OR GENERAL SUPPLY STERILE: Performed by: INTERNAL MEDICINE

## 2022-11-23 PROCEDURE — 93010 ELECTROCARDIOGRAM REPORT: CPT | Performed by: INTERNAL MEDICINE

## 2022-11-23 PROCEDURE — 93005 ELECTROCARDIOGRAM TRACING: CPT

## 2022-11-23 PROCEDURE — 210N000002 HC R&B HEART CARE

## 2022-11-23 PROCEDURE — 82310 ASSAY OF CALCIUM: CPT | Performed by: HOSPITALIST

## 2022-11-23 PROCEDURE — 250N000013 HC RX MED GY IP 250 OP 250 PS 637: Performed by: HOSPITALIST

## 2022-11-23 PROCEDURE — 99232 SBSQ HOSP IP/OBS MODERATE 35: CPT | Performed by: INTERNAL MEDICINE

## 2022-11-23 PROCEDURE — 99232 SBSQ HOSP IP/OBS MODERATE 35: CPT | Performed by: HOSPITALIST

## 2022-11-23 PROCEDURE — C1769 GUIDE WIRE: HCPCS | Performed by: INTERNAL MEDICINE

## 2022-11-23 PROCEDURE — C1887 CATHETER, GUIDING: HCPCS | Performed by: INTERNAL MEDICINE

## 2022-11-23 PROCEDURE — C1894 INTRO/SHEATH, NON-LASER: HCPCS | Performed by: INTERNAL MEDICINE

## 2022-11-23 PROCEDURE — 250N000013 HC RX MED GY IP 250 OP 250 PS 637: Performed by: INTERNAL MEDICINE

## 2022-11-23 PROCEDURE — 99152 MOD SED SAME PHYS/QHP 5/>YRS: CPT | Mod: GC | Performed by: INTERNAL MEDICINE

## 2022-11-23 PROCEDURE — 258N000003 HC RX IP 258 OP 636: Performed by: HOSPITALIST

## 2022-11-23 PROCEDURE — 250N000009 HC RX 250: Performed by: INTERNAL MEDICINE

## 2022-11-23 PROCEDURE — C1725 CATH, TRANSLUMIN NON-LASER: HCPCS | Performed by: INTERNAL MEDICINE

## 2022-11-23 PROCEDURE — 93454 CORONARY ARTERY ANGIO S&I: CPT | Mod: 26 | Performed by: INTERNAL MEDICINE

## 2022-11-23 PROCEDURE — 92928 PRQ TCAT PLMT NTRAC ST 1 LES: CPT | Mod: LD | Performed by: INTERNAL MEDICINE

## 2022-11-23 PROCEDURE — B2111ZZ FLUOROSCOPY OF MULTIPLE CORONARY ARTERIES USING LOW OSMOLAR CONTRAST: ICD-10-PCS | Performed by: INTERNAL MEDICINE

## 2022-11-23 PROCEDURE — 250N000013 HC RX MED GY IP 250 OP 250 PS 637: Performed by: PHYSICIAN ASSISTANT

## 2022-11-23 PROCEDURE — 027036Z DILATION OF CORONARY ARTERY, ONE ARTERY WITH THREE DRUG-ELUTING INTRALUMINAL DEVICES, PERCUTANEOUS APPROACH: ICD-10-PCS | Performed by: INTERNAL MEDICINE

## 2022-11-23 PROCEDURE — 99152 MOD SED SAME PHYS/QHP 5/>YRS: CPT | Performed by: INTERNAL MEDICINE

## 2022-11-23 PROCEDURE — 99153 MOD SED SAME PHYS/QHP EA: CPT | Performed by: INTERNAL MEDICINE

## 2022-11-23 PROCEDURE — 258N000003 HC RX IP 258 OP 636: Performed by: PHYSICIAN ASSISTANT

## 2022-11-23 PROCEDURE — 36415 COLL VENOUS BLD VENIPUNCTURE: CPT | Performed by: HOSPITALIST

## 2022-11-23 PROCEDURE — 93454 CORONARY ARTERY ANGIO S&I: CPT | Performed by: INTERNAL MEDICINE

## 2022-11-23 PROCEDURE — 99232 SBSQ HOSP IP/OBS MODERATE 35: CPT | Mod: 25 | Performed by: PHYSICIAN ASSISTANT

## 2022-11-23 DEVICE — STENT CORONARY DES SYNERGY XD MR US 3.00X8MM H7493941808300: Type: IMPLANTABLE DEVICE | Status: FUNCTIONAL

## 2022-11-23 DEVICE — STENT CORONARY DES SYNERGY XD MR US 3.00X38MM H7493941838300: Type: IMPLANTABLE DEVICE | Status: FUNCTIONAL

## 2022-11-23 DEVICE — STENT CORONARY DES SYNERGY XD MR US 3.00X32MM H7493941832300: Type: IMPLANTABLE DEVICE | Status: FUNCTIONAL

## 2022-11-23 RX ORDER — HYDRALAZINE HYDROCHLORIDE 50 MG/1
50 TABLET, FILM COATED ORAL EVERY 8 HOURS SCHEDULED
Status: DISCONTINUED | OUTPATIENT
Start: 2022-11-23 | End: 2022-11-24

## 2022-11-23 RX ORDER — HEPARIN SODIUM 1000 [USP'U]/ML
INJECTION, SOLUTION INTRAVENOUS; SUBCUTANEOUS
Status: DISCONTINUED | OUTPATIENT
Start: 2022-11-23 | End: 2022-11-23 | Stop reason: HOSPADM

## 2022-11-23 RX ORDER — ASPIRIN 81 MG/1
81 TABLET ORAL DAILY
Status: DISCONTINUED | OUTPATIENT
Start: 2022-11-24 | End: 2022-11-23

## 2022-11-23 RX ORDER — SODIUM CHLORIDE 9 MG/ML
INJECTION, SOLUTION INTRAVENOUS CONTINUOUS
Status: DISCONTINUED | OUTPATIENT
Start: 2022-11-23 | End: 2022-11-23

## 2022-11-23 RX ORDER — CLOPIDOGREL BISULFATE 75 MG/1
TABLET ORAL
Status: DISCONTINUED | OUTPATIENT
Start: 2022-11-23 | End: 2022-11-23 | Stop reason: HOSPADM

## 2022-11-23 RX ORDER — NALOXONE HYDROCHLORIDE 0.4 MG/ML
0.4 INJECTION, SOLUTION INTRAMUSCULAR; INTRAVENOUS; SUBCUTANEOUS
Status: ACTIVE | OUTPATIENT
Start: 2022-11-23 | End: 2022-11-23

## 2022-11-23 RX ORDER — FENTANYL CITRATE 50 UG/ML
25 INJECTION, SOLUTION INTRAMUSCULAR; INTRAVENOUS
Status: DISCONTINUED | OUTPATIENT
Start: 2022-11-23 | End: 2022-11-24 | Stop reason: HOSPADM

## 2022-11-23 RX ORDER — ASPIRIN 81 MG/1
243 TABLET, CHEWABLE ORAL ONCE
Status: COMPLETED | OUTPATIENT
Start: 2022-11-23 | End: 2022-11-23

## 2022-11-23 RX ORDER — HYDRALAZINE HYDROCHLORIDE 20 MG/ML
10 INJECTION INTRAMUSCULAR; INTRAVENOUS EVERY 4 HOURS PRN
Status: DISCONTINUED | OUTPATIENT
Start: 2022-11-23 | End: 2022-11-24 | Stop reason: HOSPADM

## 2022-11-23 RX ORDER — FENTANYL CITRATE 50 UG/ML
INJECTION, SOLUTION INTRAMUSCULAR; INTRAVENOUS
Status: DISCONTINUED | OUTPATIENT
Start: 2022-11-23 | End: 2022-11-23 | Stop reason: HOSPADM

## 2022-11-23 RX ORDER — ASPIRIN 325 MG
325 TABLET ORAL ONCE
Status: COMPLETED | OUTPATIENT
Start: 2022-11-23 | End: 2022-11-23

## 2022-11-23 RX ORDER — VERAPAMIL HYDROCHLORIDE 2.5 MG/ML
INJECTION, SOLUTION INTRAVENOUS
Status: DISCONTINUED | OUTPATIENT
Start: 2022-11-23 | End: 2022-11-23 | Stop reason: HOSPADM

## 2022-11-23 RX ORDER — NALOXONE HYDROCHLORIDE 0.4 MG/ML
0.2 INJECTION, SOLUTION INTRAMUSCULAR; INTRAVENOUS; SUBCUTANEOUS
Status: ACTIVE | OUTPATIENT
Start: 2022-11-23 | End: 2022-11-23

## 2022-11-23 RX ORDER — METOPROLOL TARTRATE 1 MG/ML
5 INJECTION, SOLUTION INTRAVENOUS
Status: DISCONTINUED | OUTPATIENT
Start: 2022-11-23 | End: 2022-11-24 | Stop reason: HOSPADM

## 2022-11-23 RX ORDER — SODIUM CHLORIDE 9 MG/ML
INJECTION, SOLUTION INTRAVENOUS CONTINUOUS
Status: ACTIVE | OUTPATIENT
Start: 2022-11-23 | End: 2022-11-24

## 2022-11-23 RX ORDER — NITROGLYCERIN 5 MG/ML
VIAL (ML) INTRAVENOUS
Status: DISCONTINUED | OUTPATIENT
Start: 2022-11-23 | End: 2022-11-23 | Stop reason: HOSPADM

## 2022-11-23 RX ORDER — POTASSIUM CHLORIDE 1500 MG/1
20 TABLET, EXTENDED RELEASE ORAL
Status: DISCONTINUED | OUTPATIENT
Start: 2022-11-23 | End: 2022-11-23 | Stop reason: HOSPADM

## 2022-11-23 RX ORDER — FLUMAZENIL 0.1 MG/ML
0.2 INJECTION, SOLUTION INTRAVENOUS
Status: ACTIVE | OUTPATIENT
Start: 2022-11-23 | End: 2022-11-23

## 2022-11-23 RX ORDER — SODIUM CHLORIDE 9 MG/ML
INJECTION, SOLUTION INTRAVENOUS CONTINUOUS
Status: DISCONTINUED | OUTPATIENT
Start: 2022-11-23 | End: 2022-11-23 | Stop reason: HOSPADM

## 2022-11-23 RX ORDER — LORAZEPAM 0.5 MG/1
0.5 TABLET ORAL
Status: DISCONTINUED | OUTPATIENT
Start: 2022-11-23 | End: 2022-11-23 | Stop reason: HOSPADM

## 2022-11-23 RX ORDER — ROSUVASTATIN CALCIUM 20 MG/1
20 TABLET, COATED ORAL DAILY
Qty: 90 TABLET | Refills: 3 | Status: SHIPPED | OUTPATIENT
Start: 2022-11-23 | End: 2023-09-08

## 2022-11-23 RX ORDER — LORAZEPAM 2 MG/ML
0.5 INJECTION INTRAMUSCULAR
Status: DISCONTINUED | OUTPATIENT
Start: 2022-11-23 | End: 2022-11-23 | Stop reason: HOSPADM

## 2022-11-23 RX ORDER — LIDOCAINE 40 MG/G
CREAM TOPICAL
Status: DISCONTINUED | OUTPATIENT
Start: 2022-11-23 | End: 2022-11-23

## 2022-11-23 RX ORDER — NITROGLYCERIN 0.4 MG/1
0.4 TABLET SUBLINGUAL EVERY 5 MIN PRN
Status: DISCONTINUED | OUTPATIENT
Start: 2022-11-23 | End: 2022-11-23

## 2022-11-23 RX ORDER — CLOPIDOGREL BISULFATE 75 MG/1
75 TABLET ORAL DAILY
Status: DISCONTINUED | OUTPATIENT
Start: 2022-11-24 | End: 2022-11-23

## 2022-11-23 RX ORDER — ASPIRIN 81 MG/1
81 TABLET, CHEWABLE ORAL ONCE
Status: DISCONTINUED | OUTPATIENT
Start: 2022-11-23 | End: 2022-11-23 | Stop reason: CLARIF

## 2022-11-23 RX ORDER — ATROPINE SULFATE 0.1 MG/ML
0.5 INJECTION INTRAVENOUS
Status: ACTIVE | OUTPATIENT
Start: 2022-11-23 | End: 2022-11-23

## 2022-11-23 RX ORDER — ONDANSETRON 4 MG/1
4 TABLET, ORALLY DISINTEGRATING ORAL EVERY 6 HOURS PRN
Status: DISCONTINUED | OUTPATIENT
Start: 2022-11-23 | End: 2022-11-23

## 2022-11-23 RX ORDER — ASPIRIN 81 MG/1
81 TABLET, CHEWABLE ORAL DAILY
Qty: 30 TABLET | Refills: 3 | Status: SHIPPED | OUTPATIENT
Start: 2022-11-24

## 2022-11-23 RX ORDER — ONDANSETRON 2 MG/ML
4 INJECTION INTRAMUSCULAR; INTRAVENOUS EVERY 6 HOURS PRN
Status: DISCONTINUED | OUTPATIENT
Start: 2022-11-23 | End: 2022-11-23

## 2022-11-23 RX ORDER — IOPAMIDOL 755 MG/ML
INJECTION, SOLUTION INTRAVASCULAR
Status: DISCONTINUED | OUTPATIENT
Start: 2022-11-23 | End: 2022-11-23 | Stop reason: HOSPADM

## 2022-11-23 RX ADMIN — ASPIRIN 81 MG CHEWABLE TABLET 243 MG: 81 TABLET CHEWABLE at 12:13

## 2022-11-23 RX ADMIN — ACETAMINOPHEN 650 MG: 325 TABLET, FILM COATED ORAL at 11:12

## 2022-11-23 RX ADMIN — FINASTERIDE 2.5 MG: 5 TABLET, FILM COATED ORAL at 09:22

## 2022-11-23 RX ADMIN — BUPROPION HYDROCHLORIDE 300 MG: 300 TABLET, EXTENDED RELEASE ORAL at 09:22

## 2022-11-23 RX ADMIN — ASPIRIN 81 MG: 81 TABLET, COATED ORAL at 09:23

## 2022-11-23 RX ADMIN — ISOSORBIDE MONONITRATE 60 MG: 60 TABLET, EXTENDED RELEASE ORAL at 09:23

## 2022-11-23 RX ADMIN — SODIUM CHLORIDE: 9 INJECTION, SOLUTION INTRAVENOUS at 12:13

## 2022-11-23 RX ADMIN — AMITRIPTYLINE HYDROCHLORIDE 150 MG: 75 TABLET, FILM COATED ORAL at 22:21

## 2022-11-23 RX ADMIN — CARVEDILOL 25 MG: 25 TABLET, FILM COATED ORAL at 18:12

## 2022-11-23 RX ADMIN — PANTOPRAZOLE SODIUM 40 MG: 40 TABLET, DELAYED RELEASE ORAL at 09:23

## 2022-11-23 RX ADMIN — FLUTICASONE FUROATE AND VILANTEROL TRIFENATATE 1 PUFF: 200; 25 POWDER RESPIRATORY (INHALATION) at 09:23

## 2022-11-23 RX ADMIN — CARVEDILOL 25 MG: 25 TABLET, FILM COATED ORAL at 09:22

## 2022-11-23 RX ADMIN — LABETALOL HYDROCHLORIDE 10 MG: 5 INJECTION INTRAVENOUS at 10:56

## 2022-11-23 RX ADMIN — FLUTICASONE PROPIONATE 1 SPRAY: 50 SPRAY, METERED NASAL at 09:20

## 2022-11-23 RX ADMIN — HYDRALAZINE HYDROCHLORIDE 25 MG: 25 TABLET ORAL at 06:19

## 2022-11-23 RX ADMIN — ROSUVASTATIN CALCIUM 20 MG: 20 TABLET, FILM COATED ORAL at 09:22

## 2022-11-23 RX ADMIN — CLOPIDOGREL BISULFATE 75 MG: 75 TABLET ORAL at 09:23

## 2022-11-23 RX ADMIN — ZOLPIDEM TARTRATE 10 MG: 5 TABLET ORAL at 22:21

## 2022-11-23 RX ADMIN — BUPRENORPHINE HYDROCHLORIDE, NALOXONE HYDROCHLORIDE 1 FILM: 4; 1 FILM, SOLUBLE BUCCAL; SUBLINGUAL at 09:29

## 2022-11-23 RX ADMIN — HYDRALAZINE HYDROCHLORIDE 50 MG: 50 TABLET, FILM COATED ORAL at 22:21

## 2022-11-23 RX ADMIN — SODIUM CHLORIDE: 9 INJECTION, SOLUTION INTRAVENOUS at 22:24

## 2022-11-23 ASSESSMENT — ACTIVITIES OF DAILY LIVING (ADL)
ADLS_ACUITY_SCORE: 20

## 2022-11-23 NOTE — PROGRESS NOTES
Renal Medicine Progress Note            Assessment/Plan:     52 y.o man with CKD IIIA presumed due to HTN and cardiovascular disease.      # CKD IIIA: Baseline Scr ~ 1.5 mg/dl. SCr is higher today.      # Hypertensive urgency/emergency:                -noncompliance and was not taking his BP medications as prescribed               -BP is better               -Coreg 25 mg bid               -Imdur 30 mg daily    -hydralazine 25 mg tid               -losartan on hold     # NSTEMI: From hypertensive emergency vs ischemia?      # Severe cLVH (old) but EF of 30-35% is new. Hypertensive heart disease vs infiltrative process?               -SPEP/UPEP and immmofixation in 2021 was wo monoclonal protein     # Left renal mass,2.5 cm: urology evaluated the patient.     # Risk of RCIN ~ 14% and risks of dialysis <1%     Plan:  # Use the lowest amount of iso-osmolar contrast  # NS 3 ml/kg bolus x 1 hr and 1-1.5 mg/kg/hr x 6 hrs post or 1 ml/kg/hr six hours pre and 6-12 hrs post contrast.  # Daily renal panel  # Leave some BP room to eventually add ACEi, ARB or Entresto.      I discussed the risks of contrast nephropathy with the patient. He is at higher risk of RCIN. I explained to him that I do not recommend contrast exposure when serum creatinine is heading in the wrong direction. However, he wants to get the procedure done. He understands the risks, and he wants to proceed. He does not have any other questions.           Interval History:      Afebrile.   He denies SOB and chest pain.  He understands the risks of contrast nephropathy.   He wants to proceed with the [facility].           Medications and Allergies:       albuterol  2 puff Inhalation Q6H     amitriptyline  150 mg Oral At Bedtime     artificial tears  1 drop Both Eyes BID     aspirin  81 mg Oral Daily     buprenorphine HCl-naloxone HCl  1 Film Sublingual 4x Daily     buPROPion  300 mg Oral QAM     carvedilol  25 mg Oral BID w/meals     clopidogrel  75 mg Oral Daily      "finasteride  2.5 mg Oral Daily     fluticasone  1 spray Both Nostrils Daily     fluticasone-vilanterol  1 puff Inhalation Daily     [Held by provider] furosemide  40 mg Oral Daily     hydrALAZINE  25 mg Oral Q8H SOHEILA     isosorbide mononitrate  60 mg Oral Daily     [Held by provider] losartan  100 mg Oral Daily     pantoprazole  40 mg Oral Daily     rosuvastatin  20 mg Oral Daily     sodium chloride (PF)  3 mL Intracatheter Q8H        Allergies   Allergen Reactions     Acetaminophen Other (See Comments)     Patient states he does not have any reaction to this     Morphine Other (See Comments)     Patient states he had a headache one time but has used since with no adverse reactions     Sulfa Drugs      Theophylline Hives            Physical Exam:   Vitals were reviewed   , Blood pressure (!) 144/97, pulse 66, temperature 98.1  F (36.7  C), temperature source Oral, resp. rate 18, height 1.93 m (6' 4\"), weight 108.9 kg (240 lb), SpO2 94 %.    Wt Readings from Last 3 Encounters:   11/23/22 108.9 kg (240 lb)   11/04/22 110.2 kg (243 lb)   06/23/22 111 kg (244 lb 12.8 oz)       Intake/Output Summary (Last 24 hours) at 11/23/2022 1259  Last data filed at 11/23/2022 1136  Gross per 24 hour   Intake 1580 ml   Output 450 ml   Net 1130 ml       GENERAL APPEARANCE: NAD  HEENT:  Eyes/ears/nose/neck grossly normal  RESP: lungs cta b c good efforts, no crackles, rhonchi or wheezes  CV: RRR, nl S1/S2  ABDOMEN: obese, soft.  EXTREMITIES/SKIN: no rashes/lesions on observed skin; no edema  NEURO: Awake, alert and conversing normally.           Data:     CBC RESULTS:     Recent Labs   Lab 11/23/22  0617 11/22/22  0608 11/21/22  0724 11/20/22  0529 11/19/22  1623   WBC 9.8 11.4* 12.1* 15.8* 15.2*   RBC 6.52* 6.07* 6.72* 6.66* 6.45*   HGB 17.6 16.7 18.0* 18.1* 18.0*   HCT 56.6* 53.0 59.0* 56.0* 55.0*    201 198 199 242       Basic Metabolic Panel:  Recent Labs   Lab 11/23/22  0617 11/22/22  0608 11/21/22  1045 11/20/22  0529 " 11/19/22  1623    136 136 134 139   POTASSIUM 5.0 4.4 4.5 4.5 4.7   CHLORIDE 101 102 101 100 104   CO2 35* 31 30 31 30   BUN 22 23 22 18 18   CR 1.94* 1.74* 1.75* 1.43* 1.63*   GLC 89 92 102* 74 100*   MIKO 9.3 8.4* 8.7 9.4 9.4       INRNo lab results found in last 7 days.   Attestation:   I have reviewed today's relevant vital signs, notes, medications, labs and imaging.    David Randall MD  [facility] - Nephrology  Office phone :[phone]  Pager: [phone]

## 2022-11-23 NOTE — PROGRESS NOTES
Cardiology    Patient will undergo angiogram later today.  Long discussion was had with hospitalist and patient regarding medical compliance and need for dual antiplatelet therapy if he has PCI.  He understands this.  In addition of this he also understands that he could have significant advancement of his coronary artery disease where CABG is a recommendation.  Has been cleared by nephrology to undergo coronary angiography.    Miriam Armenta MD

## 2022-11-23 NOTE — CONSULTS
Consulted for Heart Healthy diet education following a cath with PCI. Just out of cath lab, not able to provide education yet. Will attempt prior to discharge.

## 2022-11-23 NOTE — PLAN OF CARE
A&OX3, forgetful, calm,  RA, denies pain, hypertensive overnight, 163/110, labetalol PRN given. Tele- SR with 1st AVB, BBB. Independent in the room. Lorazepam and ambien given, pt slept intermittent.  Plan: NPO for angiogram.

## 2022-11-23 NOTE — PROGRESS NOTES
[facility]  Brief Cardiology Chart Note  Date of Service: 11/23/2022  Primary Cardiologist: Dr. Keita    Assessment & Plan    Jeremi Damon is a 52 year old male with past medical history significant for HTN, CAD, LVH, asathma admitted on 11/19/2022 with headache and chest pain.    Assessment:  1.  NSTEMI, unclear if secondary to hypertensive emergency or type 1 NSTEMI.  Trop relatively flat at 281.  Denies having cp recently.  None here   -hx of CAD, angio in 2020 showed 60% mid LAD, 90% distal LAD, 90% distal circ, 60% rpl, Lmain normal- managed medicall during NSTEMI at that time due to renal function with consideration of staged pci pending improvement in renal function and ischemic eval.      -Creatinine elevated here at 1.74 and 1.9 today.     - many discussion on appropriateness of angiogram in the context of CKD and medication non compliance if pt got stents.  Our initial recommendation was medical management and have the patient demonstrate clinic compliance before intervention as well as wait for improvement in the renal function.  Patient insisted that he did not care if he end up on dialysis after discussion with nephrology and internal medicine patient proceed with angiogram today   -Now status post drug-eluting stent to the LAD, final report pending    2.  Hypertesive emergency as pt off meds due to perceived side effects of dry eyes, remains elevated    3.  Cardiomyopathy EF 30-35%, with some degree of heart failure on admission including pulmonary edema on imaging.  No symptoms of heart failure    4 .  Hypertrophic cardiomyopathy, likely htn induced as no LVOT gradient.  Had been referred for MRI in 2020 but not completed    5.  Acute on chronic renal insufficiency, unclear baseline likely between 1.5-2.0, risk of proceeding with angiogram discussed      Plan:   1. Dual antiplatelet uninterrupted for 1 year   2. intpt outpt cardiac rehab  3. Will initially use hydralazine  "and nitrates for ICM given renal function and and recent dye load.    4. F/u arrange in core clinic with labs for optimization of medications  5. Possible discharge tomorrow if bp improves and no sx with cardia rehab    Chrissy Josue PA-C  [facility]  Pager: [phone]       Physical Exam   Temp: 98.1  F (36.7  C) Temp src: Oral BP: 126/84 Pulse: 63   Resp: 14 SpO2: 95 % O2 Device: None (Room air)    Vitals:    11/21/22 0700 11/22/22 0501 11/23/22 0621   Weight: 110 kg (242 lb 9.6 oz) 109.2 kg (240 lb 12.8 oz) 108.9 kg (240 lb)   Exam deferred .  Clinically Significant Risk Factors Present on Admission            # Overweight: Estimated body mass index is 29.21 kg/m  as calculated from the following:    Height as of this encounter: 1.93 m (6' 4\").    Weight as of this encounter: 108.9 kg (240 lb).    Fluid & Electrolyte Disorders: Fluid overload, unspecified    Nephrology: Acute kidney failure, unspecified           Medications     - MEDICATION INSTRUCTIONS -       - MEDICATION INSTRUCTIONS -       Percutaneous Coronary Intervention orders placed (this is information for BPA alerting)       sodium chloride         albuterol  2 puff Inhalation Q6H     amitriptyline  150 mg Oral At Bedtime     artificial tears  1 drop Both Eyes BID     aspirin  81 mg Oral Once     [START ON 11/24/2022] aspirin  81 mg Oral Daily     aspirin  81 mg Oral Daily     buprenorphine HCl-naloxone HCl  1 Film Sublingual 4x Daily     buPROPion  300 mg Oral QAM     carvedilol  25 mg Oral BID w/meals     [START ON 11/24/2022] clopidogrel  75 mg Oral Daily     clopidogrel  75 mg Oral Daily     finasteride  2.5 mg Oral Daily     fluticasone  1 spray Both Nostrils Daily     fluticasone-vilanterol  1 puff Inhalation Daily     [Held by provider] furosemide  40 mg Oral Daily     hydrALAZINE  25 mg Oral Q8H SOHEILA     isosorbide mononitrate  60 mg Oral Daily     [Held by provider] losartan  100 mg Oral Daily     pantoprazole  40 mg Oral Daily     rosuvastatin "  20 mg Oral Daily       Data   Last 24 hours labs reviewed   EKG: (reviewed personally) sinus with RBB, st depression in III, avF, q waves in v1-v4    Imaging: cxr  Moderate coarsened bilateral perihilar opacities have developed since 11/29/2020, suspicious for edema or atypical infectious process. Chest otherwise negative.    Tele: sinus    Echo:   The visual ejection fraction is 30-35%.  Left ventricular systolic function is moderately reduced.  There is severe apical wall hypokinesis.  The right ventricular systolic function is mild to moderately reduced.  There is mild to moderate (1-2+) mitral regurgitation.  Right ventricular systolic pressure is elevated, consistent with mild  pulmonary hypertension.  Mild aortic root dilatation.  The ascending aorta is Mildly dilated.  Grade III or advanced diastolic dysfunction.    Last ischemic eval: angio 2020

## 2022-11-23 NOTE — PRE-PROCEDURE
GENERAL PRE-PROCEDURE:   Procedure:  CAG possible PCI  Date/Time:  11/23/2022 1:31 PM    Verbal consent obtained?: Yes    Written consent obtained?: Yes    Risks and benefits: Risks, benefits and alternatives were discussed    DC Plan: Appropriate discharge home plan in place for patients who are going home after procedure   Consent given by:  Patient  Patient states understanding of procedure being performed: Yes    Patient's understanding of procedure matches consent: Yes    Procedure consent matches procedure scheduled: Yes    Expected level of sedation:  Moderate  Appropriately NPO:  Yes  ASA Class:  2  Mallampati  :  Grade 2- soft palate, base of uvula, tonsillar pillars, and portion of posterior pharyngeal wall visible  Lungs:  Lungs clear with good breath sounds bilaterally  Heart:  Normal heart sounds and rate  History & Physical reviewed:  History and physical reviewed and no updates needed  Statement of review:  I have reviewed the lab findings, diagnostic data, medications, and the plan for sedation  I have examined the patient, reviewed the history, medications and pre procedural tests.He has known CAD, severe hypertension and CKDZ. I have explained to the patient the risks of death, MI, renal failure stroke, hematoma, possible urgent bypass surgery for failed PCI, use of stents, thienopyridine agents, possible peripheral vascular complications, arrhythmia, the use of FFR in clinical decision-making and alternative of medical therapy alone in regards to coronary angiography, and possible percutaneous coronary intervention. I have explained that it may not be possible to perform ad hoc PCI if we reach contrast limits and/or find diffuse disease that may be best treated with CAB The patient voiced understanding and wishes to proceed. The patient has a good right radial pulse, normal ulnar pulse and a normal Tushar's sign.

## 2022-11-23 NOTE — PROCEDURES
[facility]    Procedure: *Cath with PCI    Date/Time: 11/23/2022 2:39 PM  Performed by: Venkata Hdez MD  Authorized by: Venkata Hdez MD       UNIVERSAL PROTOCOL   Site Marked: Yes  Prior Images Obtained and Reviewed:  Yes  Required items: Required blood products, implants, devices and special equipment available    Patient identity confirmed:  Verbally with patient  Patient was reevaluated immediately before administering moderate or deep sedation or anesthesia  Confirmation Checklist:  Patient's identity using two indicators  Time out: Immediately prior to the procedure a time out was called    Universal Protocol: the Joint Commission Universal Protocol was followed    Preparation: Patient was prepped and draped in usual sterile fashion       ANESTHESIA    Local Anesthetic: Lidocaine 1% without epinephrine      SEDATION  Patient Sedated: Yes    Sedation:  Fentanyl and midazolam  Vital signs: Vital signs monitored during sedation      PROCEDURE    Patient Tolerance:  Patient tolerated the procedure well with no immediate complications  Length of time physician/provider present for 1:1 monitoring during sedation: 45

## 2022-11-23 NOTE — PROVIDER NOTIFICATION
MD Notification    Notified Person: MD    Notified Person Name: Dr. Randall     Notification Date/Time: 11/23 3611    Notification Interaction: text page    Purpose of Notification: Pt back from angiogram. Do you still want the increase in fluids like your note says, currently not ordered? He got 300mL before the procedure and now my order from cards is 75 mL/hr for 4 hours.    Orders Received: 110mL/hr of NS to run for 12 hours post cath. Monitor closely for resp distress    Comments:

## 2022-11-23 NOTE — PROGRESS NOTES
[facility]    Medicine Progress Note - Hospitalist Service    Date of Admission:  11/19/2022    Assessment & Plan        Jeremi Damon is a 52 year old male with history of multivessel coronary artery disease with prior NSTEMI managed medically, hypertension, hypertrophic cardiomyopathy, mild persistent asthma who presents with elevated BP associated with headache, chest pain and was admitted on 11/19/2022.      Hypertensive emergency -resolved  Pulmonary edema-resolved  Acute hypoxic respiratory failure due to new onset biventricular and diastolic heart failure in exacerbation- resolved  New mild Pulmonary HTN  Coronary artery disease, multivessel, with prior NSTEMI medically managed  NSTEMI  Hypertrophic cardiomyopathy  Presented with elevated SBP >230 at home, associated with headache, intermittent left sided chest pain occurring at rest  -Hypoxic to 88% in ED, CXR concerning for pulmonary edema  -Head CT negative  -Troponin 242->281-258; NtpBNP 1852  -Last echocardiogram with EF 55-60%, severe LVH, no note of outflow tract obstruction   - He was also started on nicardipine gtt , Heparin gtt and asa and was given 40 mg iv lasix daily and was transitioned to oral Lasix on 11/20 which was later held because of increasing creatinine  - ECHO on 11/20 showed ef of 30-35%, severe apical wall hypokinesis , RV systolic function is decreased , moderated MR , RVSP is elevated and has mild pul htn , grade 2 diastolic dysfunction  -Has been seen by cardiology team and cardiac cath was recommended but he did had increasing creatinine from baseline and it was suggested to the patient that he should get that Done as outpatient in 2 weeks.  Of note the cardiology team did had concern about his compliance with dual antiplatelet therapy in case he gets stent.  Patient is adamant on having coronary angiogram while he is in the hospital and understand the risk and benefit and is aware that his renal  function can get worsen and it can lead him to go on dialysis.  Patient again verbalized understanding.  He also told me multiple times that he will be compliant with dual antiplatelet therapy  -Patient is scheduled for cardiac cath and continue the patient with aspirin, statin, beta-blocker    Chronic kidney disease, stage 3  Baseline creatinine 1.4-1.6 with GFR in 50s.   -Creatinine 1.63 on admission and is  Today is 1.44 and his GFR is 40   -Renal ultrasound during this admission has shown left renal mass and urology has been consulted  -Patient will be given IV fluids pre and postcontrast closure and we will monitor his renal function tomorrow       Leucocytosis - resolved  -He had elevated WBC count on admission but work-up infectious is negative including blood cultures and UA and his WBC count is normal today      Major depression  - we will continue with PTA well butyrin and other prn home meds   -Of note he was seen by mental health and psychiatry team and does not have any suicidal intentions     ADHD  - Hold stimulants while BP elevated     Panic disorder   -  We will continue with  PTA lorazepam prn     GERD  - we will continue with PTA     Opioid dependence   -continue with PTA suboxone    Left renal mass  -There is a mass in the medial inferior aspect of the left kidney, prostatomegaly and a mass has blood flow on the ultrasound  -Patient was evaluated by urology team and he will need to have further work-up with CT and given his angiogram and contrast load. it can be deferred for 1 to 2 months and will need to follow as outpatient with urology team          Diet: NPO for Medical/Clinical Reasons Except for: Meds  NPO for Medical/Clinical Reasons Except for: Meds    DVT Prophylaxis: heparin drip   Yee Catheter: Not present  Central Lines: None  Cardiac Monitoring: ACTIVE order. Indication: Acute decompensated heart failure (48 hours)  Code Status: Full Code      Disposition Plan      Expected  "Discharge Date: 11/24/2022                The patient's care was discussed with the Bedside Nurse and Patient.    Angelo Lorenzo MD  Hospitalist Service  [facility]  Securely message with the Vocera Web Console (learn more here)  Text page via [employer] Paging/Directory         Clinically Significant Risk Factors              # Hypoalbuminemia: Lowest albumin = 3.2 g/dL (Ref range: 3.5-5.2) at 11/19/2022  4:23 PM, will monitor as appropriate           # Overweight: Estimated body mass index is 29.21 kg/m  as calculated from the following:    Height as of this encounter: 1.93 m (6' 4\").    Weight as of this encounter: 108.9 kg (240 lb)., PRESENT ON ADMISSION          Time spent in care of patient is 45 minutes and more than 50% of the time was spent in coordination of ongoing management of NSTEMI, chronic kidney disease stage III, hypertension and I discussed his case in detail multiple times with cardiology, nephrology and I reviewed all his labs, images and medications    ______________________________________________________________________    Interval History     I did see the patient today and that he denies any fever, chills, nausea, vomiting, chest pain or shortness of breath.  He was working on his laptop.  He is ready for angiogram today.  He had questions which I answered to his satisfaction .he was calm and cooperative and was smiling    Data reviewed today: I reviewed all medications, new labs and imaging results over the last 24 hours. I personally reviewed     Physical Exam   Vital Signs: Temp: 98.1  F (36.7  C) Temp src: Oral BP: (!) 144/97 Pulse: 66   Resp: 12 SpO2: 94 % O2 Device: None (Room air)    Weight: 240 lbs 0 oz        General: Calm and cooperative and AO x2  HEENT: Head is atraumatic,   Neck: Neck is supple   Respiratory: ctla no crackles   Cardiovascular: Regular rate , S1 and S2 normal with no murmer or rubs or gallops  Abdomen:   soft , non tender , non distended and " bowel sound present   Skin: No skin rashes   Neurologic: No facial droop  Musculoskeletal: Normal Range of motion over upper and lower extremities bilaterally   Psychiatric: Calm    Data   Recent Labs   Lab 11/23/22  0617 11/22/22  0608 11/21/22  1045 11/21/22  0724 11/20/22  0529 11/19/22  1623   WBC 9.8 11.4*  --  12.1*   < > 15.2*   HGB 17.6 16.7  --  18.0*   < > 18.0*   MCV 87 87  --  88   < > 85    201  --  198   < > 242    136 136  --    < > 139   POTASSIUM 5.0 4.4 4.5  --    < > 4.7   CHLORIDE 101 102 101  --    < > 104   CO2 35* 31 30  --    < > 30   BUN 22 23 22  --    < > 18   CR 1.94* 1.74* 1.75*  --    < > 1.63*   ANIONGAP <1* 3 5  --    < > 5   MIKO 9.3 8.4* 8.7  --    < > 9.4   GLC 89 92 102*  --    < > 100*   ALBUMIN  --   --   --   --   --  3.2*   PROTTOTAL  --   --   --   --   --  6.7*   BILITOTAL  --   --   --   --   --  0.6   ALKPHOS  --   --   --   --   --  89   ALT  --   --   --   --   --  142*   AST  --   --   --   --   --  93*    < > = values in this interval not displayed.     No results found for this or any previous visit (from the past 24 hour(s)).  Medications     - MEDICATION INSTRUCTIONS -       sodium chloride 150 mL/hr at 11/23/22 1213       albuterol  2 puff Inhalation Q6H     amitriptyline  150 mg Oral At Bedtime     artificial tears  1 drop Both Eyes BID     aspirin  81 mg Oral Daily     buprenorphine HCl-naloxone HCl  1 Film Sublingual 4x Daily     buPROPion  300 mg Oral QAM     carvedilol  25 mg Oral BID w/meals     clopidogrel  75 mg Oral Daily     finasteride  2.5 mg Oral Daily     fluticasone  1 spray Both Nostrils Daily     fluticasone-vilanterol  1 puff Inhalation Daily     [Held by provider] furosemide  40 mg Oral Daily     hydrALAZINE  25 mg Oral Q8H [facility]     isosorbide mononitrate  60 mg Oral Daily     [Held by provider] losartan  100 mg Oral Daily     pantoprazole  40 mg Oral Daily     rosuvastatin  20 mg Oral Daily     sodium chloride (PF)  3 mL Intracatheter  Q8H

## 2022-11-24 ENCOUNTER — APPOINTMENT (OUTPATIENT)
Dept: PHYSICAL THERAPY | Facility: CLINIC | Age: 52
DRG: 246 | End: 2022-11-24
Attending: STUDENT IN AN ORGANIZED HEALTH CARE EDUCATION/TRAINING PROGRAM
Payer: COMMERCIAL

## 2022-11-24 VITALS
SYSTOLIC BLOOD PRESSURE: 177 MMHG | WEIGHT: 239.1 LBS | DIASTOLIC BLOOD PRESSURE: 91 MMHG | OXYGEN SATURATION: 100 % | HEART RATE: 79 BPM | RESPIRATION RATE: 20 BRPM | BODY MASS INDEX: 29.11 KG/M2 | TEMPERATURE: 98.2 F | HEIGHT: 76 IN

## 2022-11-24 LAB
ANION GAP SERPL CALCULATED.3IONS-SCNC: 3 MMOL/L (ref 3–14)
BUN SERPL-MCNC: 22 MG/DL (ref 7–30)
CALCIUM SERPL-MCNC: 8.2 MG/DL (ref 8.5–10.1)
CHLORIDE BLD-SCNC: 103 MMOL/L (ref 94–109)
CO2 SERPL-SCNC: 31 MMOL/L (ref 20–32)
CREAT SERPL-MCNC: 1.71 MG/DL (ref 0.66–1.25)
ERYTHROCYTE [DISTWIDTH] IN BLOOD BY AUTOMATED COUNT: 15.7 % (ref 10–15)
GFR SERPL CREATININE-BSD FRML MDRD: 47 ML/MIN/1.73M2
GLUCOSE BLD-MCNC: 86 MG/DL (ref 70–99)
HCT VFR BLD AUTO: 52.1 % (ref 40–53)
HGB BLD-MCNC: 16.3 G/DL (ref 13.3–17.7)
MCH RBC QN AUTO: 27.6 PG (ref 26.5–33)
MCHC RBC AUTO-ENTMCNC: 31.3 G/DL (ref 31.5–36.5)
MCV RBC AUTO: 88 FL (ref 78–100)
PLATELET # BLD AUTO: 173 10E3/UL (ref 150–450)
POTASSIUM BLD-SCNC: 5 MMOL/L (ref 3.4–5.3)
RBC # BLD AUTO: 5.9 10E6/UL (ref 4.4–5.9)
SODIUM SERPL-SCNC: 137 MMOL/L (ref 133–144)
WBC # BLD AUTO: 10.6 10E3/UL (ref 4–11)

## 2022-11-24 PROCEDURE — 93010 ELECTROCARDIOGRAM REPORT: CPT | Performed by: INTERNAL MEDICINE

## 2022-11-24 PROCEDURE — 250N000013 HC RX MED GY IP 250 OP 250 PS 637: Performed by: HOSPITALIST

## 2022-11-24 PROCEDURE — 250N000013 HC RX MED GY IP 250 OP 250 PS 637: Performed by: PHYSICIAN ASSISTANT

## 2022-11-24 PROCEDURE — 250N000011 HC RX IP 250 OP 636: Performed by: HOSPITALIST

## 2022-11-24 PROCEDURE — 99232 SBSQ HOSP IP/OBS MODERATE 35: CPT | Performed by: INTERNAL MEDICINE

## 2022-11-24 PROCEDURE — 85027 COMPLETE CBC AUTOMATED: CPT | Performed by: HOSPITALIST

## 2022-11-24 PROCEDURE — 99239 HOSP IP/OBS DSCHRG MGMT >30: CPT | Performed by: HOSPITALIST

## 2022-11-24 PROCEDURE — 82310 ASSAY OF CALCIUM: CPT | Performed by: HOSPITALIST

## 2022-11-24 PROCEDURE — 36415 COLL VENOUS BLD VENIPUNCTURE: CPT | Performed by: HOSPITALIST

## 2022-11-24 PROCEDURE — 97161 PT EVAL LOW COMPLEX 20 MIN: CPT | Mod: GP

## 2022-11-24 PROCEDURE — 97530 THERAPEUTIC ACTIVITIES: CPT | Mod: GP

## 2022-11-24 PROCEDURE — 93005 ELECTROCARDIOGRAM TRACING: CPT

## 2022-11-24 RX ORDER — CLOPIDOGREL BISULFATE 75 MG/1
75 TABLET ORAL DAILY
Qty: 90 TABLET | Refills: 3 | Status: SHIPPED | OUTPATIENT
Start: 2022-11-24 | End: 2023-09-08

## 2022-11-24 RX ORDER — NITROGLYCERIN 0.4 MG/1
0.4 TABLET SUBLINGUAL EVERY 5 MIN PRN
Qty: 25 TABLET | Refills: 0 | Status: SHIPPED | OUTPATIENT
Start: 2022-11-24 | End: 2022-12-30

## 2022-11-24 RX ORDER — CARVEDILOL 25 MG/1
25 TABLET ORAL 2 TIMES DAILY WITH MEALS
Qty: 60 TABLET | Refills: 0 | Status: SHIPPED | OUTPATIENT
Start: 2022-11-24 | End: 2022-12-12

## 2022-11-24 RX ORDER — ISOSORBIDE MONONITRATE 60 MG/1
60 TABLET, EXTENDED RELEASE ORAL DAILY
Qty: 30 TABLET | Refills: 1 | Status: SHIPPED | OUTPATIENT
Start: 2022-11-25 | End: 2022-12-12

## 2022-11-24 RX ORDER — HYDRALAZINE HYDROCHLORIDE 50 MG/1
100 TABLET, FILM COATED ORAL EVERY 8 HOURS SCHEDULED
Status: DISCONTINUED | OUTPATIENT
Start: 2022-11-24 | End: 2022-11-24 | Stop reason: HOSPADM

## 2022-11-24 RX ORDER — HYDRALAZINE HYDROCHLORIDE 100 MG/1
100 TABLET, FILM COATED ORAL EVERY 8 HOURS
Qty: 90 TABLET | Refills: 1 | Status: SHIPPED | OUTPATIENT
Start: 2022-11-24 | End: 2022-12-08

## 2022-11-24 RX ORDER — HYDRALAZINE HYDROCHLORIDE 50 MG/1
50 TABLET, FILM COATED ORAL ONCE
Status: COMPLETED | OUTPATIENT
Start: 2022-11-24 | End: 2022-11-24

## 2022-11-24 RX ADMIN — CLOPIDOGREL BISULFATE 75 MG: 75 TABLET ORAL at 08:08

## 2022-11-24 RX ADMIN — CARVEDILOL 25 MG: 25 TABLET, FILM COATED ORAL at 08:08

## 2022-11-24 RX ADMIN — FLUTICASONE FUROATE AND VILANTEROL TRIFENATATE 1 PUFF: 200; 25 POWDER RESPIRATORY (INHALATION) at 08:10

## 2022-11-24 RX ADMIN — ALBUTEROL SULFATE 2 PUFF: 90 AEROSOL, METERED RESPIRATORY (INHALATION) at 08:12

## 2022-11-24 RX ADMIN — FLUTICASONE PROPIONATE 1 SPRAY: 50 SPRAY, METERED NASAL at 08:11

## 2022-11-24 RX ADMIN — FINASTERIDE 2.5 MG: 5 TABLET, FILM COATED ORAL at 08:08

## 2022-11-24 RX ADMIN — ASPIRIN 81 MG: 81 TABLET, COATED ORAL at 08:08

## 2022-11-24 RX ADMIN — HYDRALAZINE HYDROCHLORIDE 50 MG: 50 TABLET, FILM COATED ORAL at 05:50

## 2022-11-24 RX ADMIN — PANTOPRAZOLE SODIUM 40 MG: 40 TABLET, DELAYED RELEASE ORAL at 08:08

## 2022-11-24 RX ADMIN — DEXTRAN 70, GLYCERIN, HYPROMELLOSE 1 DROP: 1; 2; 3 SOLUTION/ DROPS OPHTHALMIC at 08:11

## 2022-11-24 RX ADMIN — LABETALOL HYDROCHLORIDE 10 MG: 5 INJECTION INTRAVENOUS at 04:32

## 2022-11-24 RX ADMIN — HYDRALAZINE HYDROCHLORIDE 50 MG: 50 TABLET, FILM COATED ORAL at 11:34

## 2022-11-24 RX ADMIN — BUPROPION HYDROCHLORIDE 300 MG: 300 TABLET, EXTENDED RELEASE ORAL at 08:08

## 2022-11-24 RX ADMIN — ROSUVASTATIN CALCIUM 20 MG: 20 TABLET, FILM COATED ORAL at 08:08

## 2022-11-24 RX ADMIN — ISOSORBIDE MONONITRATE 60 MG: 60 TABLET, EXTENDED RELEASE ORAL at 08:08

## 2022-11-24 ASSESSMENT — ACTIVITIES OF DAILY LIVING (ADL)
ADLS_ACUITY_SCORE: 20

## 2022-11-24 NOTE — CONSULTS
NUTRITION EDUCATION    REASON FOR ASSESSMENT:  Nutrition education on American Heart Association (AHA) Heart Healthy Diet.    NUTRITION HISTORY:  Information obtained from patient  Diet recall:  - breakfast: almond milk with rice krispies cereal, sometimes adds banana   - lunch and dinner: chicken and rice seasoned with cinnamon, sometimes adds spinach (reports eating this meal up to 6x/day)  - beverages: water  - does not consume take out food   - does not add salt to food   - limited variety in fruits, vegetables and whole grains     CURRENT DIET ORDER:  Low saturated fat, low sodium     NUTRITION DIAGNOSIS:  Food- and nutrition-related knowledge deficit R/t no prior exposure to formal education as evidenced by patient report      INTERVENTIONS:  Nutrition Prescription:    Recommended AHA Heart Healthy Diet    Implementation:     Nutrition Education (Content):  a) reviewed Heart Healthy Diet guidelines  b) provided heart healthy diet handout    Nutrition Education (Application):  a) Discussed current eating habits and recommended alternative food choices    Anticipate good compliance    Diet Education - refer to Education flowsheet    Goals:    Patient verbalizes understanding of diet     All of the above goals met during education session    Follow Up/Monitoring:    Provided RD contact information for future questions      Amelia Lerma RD, LD  Clinical Dietitian

## 2022-11-24 NOTE — PROGRESS NOTES
11/24/22 0828   Appointment Info   Signing Clinician's Name / Credentials (PT) Myrna Deal, PT, DPT   Living Environment   People in Home alone   Current Living Arrangements house   Home Accessibility stairs within home   Number of Stairs, Within Home, Primary greater than 10 stairs   Stair Railings, Within Home, Primary railings safe and in good condition   Transportation Anticipated car, drives self;family or friend will provide   Living Environment Comments Pt lives in multi-level home, has flight of stairs to negotiate   Self-Care   Usual Activity Tolerance good   Current Activity Tolerance moderate   Regular Exercise Yes   Activity/Exercise Type strength training;walking   Exercise Amount/Frequency 30 mins;3-5 times/wk   Equipment Currently Used at Home none   Fall history within last six months no   Activity/Exercise/Self-Care Comment Pt is independent without assistive device, works out 3-4x/week both weight lifting and treadmill   General Information   Onset of Illness/Injury or Date of Surgery 11/19/22   Referring Physician Tania Trevizo MD   Patient/Family Therapy Goals Statement (PT) To rest adn to go home   Pertinent History of Current Problem (include personal factors and/or comorbidities that impact the POC) Pt is 52 year old male adm on 11/19/22 with hypertensive emergency associated with headache and chest pain. Pt found to have NSTEMI. Was recommended pt wait two weeks to undergo angio due to kidney function but pt insisting on having procedure done this hospital stay. Pt underwent cardiac cath with FERNANDA to LAD on 11/23/22. PMH includes CKD stage 3, multivessel CAD which has been managed medically to this point, HTN, and hypertrophic cardiomyopathy.   Existing Precautions/Restrictions cardiac   Cognition   Affect/Mental Status (Cognition) WFL   Orientation Status (Cognition) oriented x 4   Follows Commands (Cognition) WFL   Pain Assessment   Patient Currently in Pain No    Integumentary/Edema   Integumentary/Edema no deficits were identifed   Posture    Posture Not impaired   Range of Motion (ROM)   ROM Comment B LE ROM WFL   Strength (Manual Muscle Testing)   Strength Comments B LE strength WFL   Bed Mobility   Bed Mobility no deficits identified   Transfers   Transfers no deficits identified   Gait/Stairs (Locomotion)   [place] Level (Gait) independent   Balance   Balance no deficits were identified   Clinical Impression   Criteria for Skilled Therapeutic Intervention Yes, treatment indicated   PT Diagnosis (PT) Impaired activity tolerance   Influenced by the following impairments Decreased endurance   Functional limitations due to impairments Decreased ability to participate in daily tasks   Clinical Presentation (PT Evaluation Complexity) Stable/Uncomplicated   Clinical Presentation Rationale Current presentation, [facility]   Clinical Decision Making (Complexity) low complexity   Planned Therapy Interventions (PT) progressive activity/exercise;risk factor education;home program guidelines   Risk & Benefits of therapy have been explained evaluation/treatment results reviewed;care plan/treatment goals reviewed;risks/benefits reviewed;current/potential barriers reviewed;participants voiced agreement with care plan;participants included;patient   PT Total Evaluation Time   PT Eval, Low Complexity Minutes (20007) 10   Physical Therapy Goals   PT Frequency One time eval and treatment only   PT Goals Cardiac Phase 1   PT: Understanding of cardiac education to maximize quality of life, condition management, and health outcomes Patient;Verbalize;Demonstrate   PT: Perform aerobic activity with stable cardiovascular response continuous;15 minutes;treadmill   PT: Functional/aerobic ambulation tolerance with stable cardiovascular response in order to return to home and community environment Independent;Greater than 300 feet   PT: Navigation of stairs simulating home set up with stable  cardiovascular response in order to return to home and community environment Independent;Greater than 10 stairs   Interventions   Interventions Quick Adds Therapeutic Activity   Therapeutic Activity   Therapeutic Activities: dynamic activities to improve functional performance Minutes (06636) 15   Symptoms Noted During/After Treatment Fatigue   Treatment Detail/Skilled Intervention Pt declined getting OOB/participating in any mobility at this time due to fatigue, constantly being woken up during hospital stay. Pt observed independently ambulating in halls previously. Pt denies any symptoms when up and ambulating, RN also reports pt with good activity tolerance. Time spent in education and handouts provided to patient. Focused on OMNI effort scale, HEP recommendations, signs/symptoms of intolerance and OP cardiac rehab. Pt with OP CR referral but no appointment set yet, education and encouragement to provided to participate in OP CR and to call and set up an appointment as soon as possible. Pt states understanding.   PT Discharge Planning   PT Plan Pt discharging to home, does not want to participate in inpatient cardiac rehab, education and encouragement provided, will sign off and pt can follow up as outpatient.   PT Discharge Recommendation (DC Rec) home with outpatient cardiac rehab   PT Rationale for DC Rec Pt would benefit from outpatient cardiac rehab for monitored progression of activity tolerance and for education to promote optimal heart health.   PT Brief overview of current status Independent   Total Session Time   Timed Code Treatment Minutes 15   Total Session Time (sum of timed and untimed services) 25

## 2022-11-24 NOTE — PLAN OF CARE
Cardiac Rehab Discharge Summary    Reason for therapy discharge:    Discharged to home with outpatient therapy.    Progress towards therapy goal(s). See goals on Care Plan in Epic electronic health record for goal details.  Goals partially met.  Barriers to achieving goals:   discharge from facility.    Therapy recommendation(s):    Continued therapy is recommended.  Rationale/Recommendations:  outpatient cardiac rehab for monitored progression of activity and education to promote optimal heart health.

## 2022-11-24 NOTE — PLAN OF CARE
Jeremi is discharging to home today after hypertensive crisis/ NSTEMI. He had stents placed in the cath lab yesterday. Right radial site WDL. Reinforced need for DOAC and follow up. He denies chest pain, nausea, dyspnea. Up independently with good activity tolerance. Discharging with his belongings including: laptop, cellphone, clothing, bags.

## 2022-11-24 NOTE — DISCHARGE SUMMARY
[facility]  Hospitalist Discharge Summary      Date of Admission:  11/19/2022  Date of Discharge:  11/24/2022  1:29 PM  Discharging Provider: Angelo Lorenzo MD  Discharge Service: Hospitalist Service    Discharge Diagnoses     Hypertensive emergency -resolved  Pulmonary edema-resolved  Acute hypoxic respiratory failure due to new onset biventricular and diastolic heart failure in exacerbation- resolved  New mild Pulmonary HTN  Ischemic cardiomyopathy  Coronary artery disease, multivessel s/p PCI to LAD  NSTEMI  Hypertrophic cardiomyopathy   Net Onset left renal mass  Chronic kidney disease stage III      Follow-ups Needed After Discharge   Follow-up Appointments     Follow-up and recommended labs and tests       Follow up with primary care provider, Luis Armando Segovia, within 7 days for   hospital follow- up.  The following labs/tests are recommended: CBC AND   BASIC.    FOLLOW WITH UROLOGY IN 2-3 WEEKS . They will call you and if you dont get   called call their office  Follow with nephrology in 2-3 weeks   Follow with cardiology in 1-2 weeks         {Additional follow-up instructions/to-do's for PCP       Unresulted Labs Ordered in the Past 30 Days of this Admission     Date and Time Order Name Status Description    11/20/2022  8:45 AM Blood Culture Hand, Right Preliminary     11/20/2022  8:45 AM Blood Culture Hand, Right Preliminary       These results will be followed up by     Discharge Disposition   Discharged to home  Condition at discharge: Stable        Hospital Course     This is a 52-year-old male with medical history which includes multivessel coronary artery disease with prior history of NSTEMI, hypertension, hypertrophic cardiomyopathy likely hypertension induced, chronic kidney disease stage III, depression, panic disorder, GERD, opioid dependence on Suboxone, ADHD, mild persistent asthma who presented to the ED on 11/19 with chief complaint of elevated blood pressure, headache,  chest pain.    On presentation to the ED patient was found to have blood pressure of systolic more than 230 at home and patient was saturating 88% on room air in the ED and chest x-ray was consistent with fluid overload.  He had CT scan of the head done which was negative and the patient had elevated BNP and troponins.  Cardiology was consulted and patient was started on nicardipine drip, IV Lasix and her home losartan was started.  Patient was also started on heparin drip.    He underwent echo which showedef of 30-35%, severe apical wall hypokinesis , RV systolic function is decreased , moderated MR , RVSP is elevated and has mild pul htn , grade 2 diastolic dysfunction.He was  seen by cardiology team and cardiac cath was recommended but he did had increasing creatinine from baseline and it was suggested to the patient that he should get that Done as outpatient in 2 weeks.     His renal function was being monitored and nephrology was consulted and patient underwent renal ultrasound which showed left renal mass with its own blood flow.  Patient creatinine was being monitored and cardiology team was little worried about his renal function and compliance and wanted to do angiogram as an outpatient but patient persisted and understood the risks including him being going on hemodialysis.    Patient underwent cardiac cath on 11/23 and had PCI to mid LAD with drug-eluting stent and he was continued on aspirin, statin, beta-blocker and Plavix.  He was stressed the importance of dual antiplatelet therapy.  Patient will need to follow as outpatient with cardiology/core clinic.  Also given his elevated creatinine- diuretics and ACE/ARB were not started and will be addressed as an outpatient.    As far as his respiratory status was concerned patient was initially requiring oxygen but with IV and oral diuretics that he was back on room air.    As far as his blood pressure was concerned he was transitioned from nicardipine to  losartan and Lasix and given increasing creatinine the losartan was held and patient was switched to Coreg 25 mg twice daily and on day of discharge his hydralazine was increased 100 mg three times daily and further dose titration will be done by cardiology as an outpatient.    Patient was also evaluated by nephrology and his creatinine on day of discharge was 1.7 and he will need to follow as outpatient with nephrology team    He was evaluated by urology team and patient will need to have CT scan with renal protocol and given plan for angiogram it was decided that he will need to follow as outpatient with urology and he was given information.  Patient was instructed that his inability to not follow-up with urology can lead to increased mortality and morbidity as this can be cancer and he understood and verbalized back.    He also had elevated white count on admission and blood cultures were negative, UA was negative and he remained afebrile while he was in the hospital and his WBC count on day of discharge was back to normal and this was stress-induced    On day of discharge patient was at baseline and was in agreement with going home and his medications were sent to [facility] pharmacy.  Patient was instructed that in case he has difficulty getting medications he will let us know right away.      Consultations This Hospital Stay   PHARMACY IP CONSULT  PHARMACY IP CONSULT  CARDIOLOGY IP CONSULT  NEPHROLOGY IP CONSULT  UROLOGY IP CONSULT  PHARMACY LIAISON FOR MEDICATION COVERAGE CONSULT  CORE CLINIC EVALUATION IP CONSULT  PSYCHIATRY IP CONSULT  UROLOGY IP CONSULT  NUTRITION SERVICES ADULT IP CONSULT  CARDIAC REHAB IP CONSULT  PHARMACY IP CONSULT  SMOKING CESSATION PROGRAM IP CONSULT    Code Status   Full Code    Time Spent on this Encounter   Angelo MANRIQUE MD, personally saw the patient today and spent greater than 30 minutes discharging this patient.       Angelo Lorenzo MD  [facility]  CORONARY CARE UNIT  [address]  [place] [address]  Phone: [phone]  ______________________________________________________________________    Physical Exam   Vital Signs: Temp: 98.2  F (36.8  C) Temp src: Oral BP: (!) 177/91 Pulse: 79   Resp: 20 SpO2: 100 % O2 Device: None (Room air)    Weight: 239 lbs 1.53 oz        General: Patient appears comfortable and in no acute distress.  HEENT: Head is atraumatic, normocephalic.  Pupils are equal, round and reactive to light.  No scleral icterus. Oral mucosa is moist   Neck: Neck is supple   Respiratory: Lungs are clear to auscultation bilaterally with no wheeze or crackles   Cardiovascular: Regular rate , S1 and S2 normal with no murmer or rubs or gallops  Abdomen:   soft , non tender , non distended and bowel sound present   Skin: No skin rashes   Neurologic:  No facial droop  Musculoskeletal: Normal Range of motion over upper and lower extremities bilaterally   Psychiatric: cooperative        Primary Care Physician   Luis Armando Segovia    Discharge Orders      Basic metabolic panel     N terminal pro BNP outpatient     Follow-Up with Cardiology JAM      CARDIAC REHAB REFERRAL      Follow-Up with Cardiology JAM      Patient care order    Follow-up with [facility] (Dr. Howard) in 1-2 months with CT scan of the kidneys prior. Our office will call to schedule this. If you do not hear from our scheduling team by next Mon 11/28/22, please call [phone].     Medication Instructions - Anticoagulants    Do NOT stop your aspirin or platelet inhibitor unless directed by your Cardiologist.  These medications help to prevent platelets in your blood from sticking together and forming a clot.  Examples of these medications are:  Ticagrelor (Brilinta), Clopidigrel (Plavix), Prasugrel (Effient)     When to call - Contact the Heart Clinic    You may experience symptoms that require follow-up before your scheduled appointment. Contact the Heart Clinic  if you develop: Fever over 100.4o Fahrenheit, that lasts more than one day; Redness, heat, or pus at the puncture site; Change in color or temperature in your hand or arm.     When to call - Reasons to Call 911    If your wrist puncture site starts bleeding after discharge, sit down and apply firm pressure with your thumb against the puncture site and fingers against the back of the wrist for 10 minutes. If the bleeding stops, continue to rest, keeping your wrist still for 2 hours. Notify your doctor as soon as possible.  IF BLEEDING DOES NOT STOP OR THERE IS A LARGER AMOUNT OF BLEEDING OR SPURTING CALL 9-1-1 immediately.DO NOT drive yourself to the hospital.     Precautions - Lifting    DO NOT lift more than 5 pounds with affected arm for 48 hours     Precautions - Household Activities    Avoid excessive bending or movement of your wrist for 72 hours.  Do not subject hand/arm to any forceful movements for 24 hours, such as supporting weight when rising from a chair or bed.     Remove the band-aid on the puncture site after 24 hours and leave open to air. If minor oozing, you may apply a band-aid and remove after 12 hours.     Precautions - Active Sports Activities    DO NOT engage in vigorous exercise using your affected arm for 3 days after discharge.  This includes golf, tennis or swimming.     Precautions - Operating yard equipment or vehicles    Do not operate a chainsaw, lawnmower, motorcycle, or all-terrain vehicle for 48 hours after the procedure.     Precautions - Elective Dental Work    NO elective dental work for 6 weeks after receiving a stent.     Comfort and Pain Management - Bruising after Surgery    Expect mild tingling of hand and tenderness at the wrist puncture site for up to 3 days. You may take Tylenol or a pain medicine recommended by your doctor.     Activity - Cardiac Rehab    You are encouraged to enroll in an Outpatient Cardiac Rehab program after discharge from the hospital.  Our Cardiac  Rehab staff may visit briefly with you while you're in the hospital.  If they miss you, someone will contact you after you are home.     Return to Driving    Driving is NOT permitted for 24 hours after surgery     Return to work    You may return to work after 72 hours if you are feeling well and your job does not involve heavy lifting.     Shower / Bathing    You may shower on the day after your procedure.  DO NOT soak of wrist with the puncture site in water for 3 days to prevent infection. DO NOT take a tub bath or wash dishes for 3 days after the procedure     Dressing Removal    Remove the band-aid on the puncture site after 24 hours and leave open to air. If minor oozing, you may apply a band-aid and remove after 12 hours     Reason for your hospital stay    NSTEMI   CAD S/P PCI     Follow-up and recommended labs and tests     Follow up with primary care provider, Luis Armando Segovia, within 7 days for hospital follow- up.  The following labs/tests are recommended: CBC AND BASIC.    FOLLOW WITH UROLOGY IN 2-3 WEEKS . They will call you and if you dont get called call their office  Follow with nephrology in 2-3 weeks   Follow with cardiology in 1-2 weeks     Activity    Your activity upon discharge: activity as tolerated     Diet    Follow this diet upon discharge: Orders Placed This Encounter      Low Saturated Fat Na <2400 mg       Significant Results and Procedures   Most Recent 3 CBC's:Recent Labs   Lab Test 11/24/22  0552 11/23/22  0617 11/22/22  0608   WBC 10.6 9.8 11.4*   HGB 16.3 17.6 16.7   MCV 88 87 87    193 201     Most Recent 3 BMP's:Recent Labs   Lab Test 11/24/22  0552 11/23/22  0617 11/22/22  0608    136 136   POTASSIUM 5.0 5.0 4.4   CHLORIDE 103 101 102   CO2 31 35* 31   BUN 22 22 23   CR 1.71* 1.94* 1.74*   ANIONGAP 3 <1* 3   MIKO 8.2* 9.3 8.4*   GLC 86 89 92     Most Recent 2 LFT's:Recent Labs   Lab Test 11/19/22  1623 11/04/22  1348   AST 93* 54*   * 79*   ALKPHOS 89 72    BILITOTAL 0.6 0.5   ,   Results for orders placed or performed during the hospital encounter of 11/19/22   CT Head w/o Contrast    Narrative    EXAM: CT HEAD W/O CONTRAST  LOCATION: [facility]  DATE/TIME: 11/19/2022 9:44 PM    INDICATION: headaches, hypertension, eval bleed  COMPARISON: CT head 04/14/2017  TECHNIQUE: Routine CT Head without IV contrast. Multiplanar reformats. Dose reduction techniques were used.    FINDINGS:  INTRACRANIAL CONTENTS: No intracranial hemorrhage, extraaxial collection, or mass effect.  No CT evidence of acute infarct. Mild presumed chronic small vessel ischemic changes. Mild generalized volume loss. No hydrocephalus.     VISUALIZED ORBITS/SINUSES/MASTOIDS: No intraorbital abnormality. Complete opacification left frontal sinus. No middle ear or mastoid effusion.    BONES/SOFT TISSUES: No acute abnormality.      Impression    IMPRESSION:  1.  No CT evidence for acute intracranial process.  2.  Chronic left frontal sinus disease.   XR Chest 2 Views    Narrative    EXAM: XR CHEST 2 VIEWS  LOCATION: [facility]  DATE/TIME: 11/19/2022 9:07 PM    INDICATION: hypoxia  COMPARISON: 11/29/2020      Impression    IMPRESSION: Moderate coarsened bilateral perihilar opacities have developed since 11/29/2020, suspicious for edema or atypical infectious process. Chest otherwise negative.   US Renal Complete Non-Vascular    Narrative    EXAM: US RENAL COMPLETE NON-VASCULAR  LOCATION: [facility]  DATE/TIME: 11/21/2022 4:55 PM    INDICATION: Harvey  COMPARISON: None.  TECHNIQUE: Routine Bilateral Renal and Bladder Ultrasound.    FINDINGS:    RIGHT KIDNEY: 13.0 x 7.0 x 5.6 cm. Normal without hydronephrosis or masses.     LEFT KIDNEY: 12.4 x 6.1 x 7.9 cm. Normal without hydronephrosis or masses. Cyst identified in the lateral interpolar region measuring 2.2 x 2.1 x 1.8 cm in size. This appears to have layering calcifications within  it. In the medial superior region, a   mass is identified which has blood flow. This measures 2.2 x 2.2 x 2.5 cm. A dedicated CT renal protocol is recommended.    BLADDER: Normal. Prostate gland contains consultations and measures 4.2 x 5.0 x 3.9 cm.      Impression    IMPRESSION:  1.  Mass identified in the medial inferior aspect of the left kidney. CT scan with a renal protocol is recommended.  2.  Prostatomegaly.   Echocardiogram Complete     Value    LVEF  30-35%    [facility]    [id number]  [id number]  [id number]^JOAQUIN^LUIS^PRAMOD     [facility]  Echocardiography Laboratory  [address] [place] [address]     Name: FRANCISCO KELLER  MRN: [id number]  : 1970  Study Date: 2022 10:28 AM  Age: 52 yrs  Gender: Male  Patient Location: [facility]  Reason For Study: Chest Pain  Ordering Physician: LUIS NUÑEZ  Referring Physician: LUIS NUÑEZ  Performed By: Kendall Gallegos     BSA: 2.4 m2  Height: 76 in  Weight: 245 lb  HR: 59  ______________________________________________________________________________  Procedure  Complete Echo Adult. Optison (NDC #[id number]) given intravenously.  ______________________________________________________________________________  Interpretation Summary     The visual ejection fraction is 30-35%.  Left ventricular systolic function is moderately reduced.  There is severe apical wall hypokinesis.  The right ventricular systolic function is mild to moderately reduced.  There is mild to moderate (1-2+) mitral regurgitation.  Right ventricular systolic pressure is elevated, consistent with mild  pulmonary hypertension.  Mild aortic root dilatation.  The ascending aorta is Mildly dilated.  Grade III or advanced diastolic dysfunction.  ______________________________________________________________________________  Left Ventricle  The left ventricle is moderately dilated. There is moderate to severe  concentric left ventricular  hypertrophy. Diastolic Doppler findings (E/E'  ratio and/or other parameters) suggest left ventricular filling pressures are  increased. The visual ejection fraction is 30-35%. Grade III or advanced  diastolic dysfunction. Left ventricular systolic function is moderately  reduced. There is severe apical wall hypokinesis. A false chord is noted  (normal variant).     Right Ventricle  The right ventricle is normal size. The right ventricular systolic function is  mild to moderately reduced. The right ventricle was not well seeen.     Atria  The left atrium is mildly dilated. Right atrial size is normal. There is no  color Doppler evidence of an atrial shunt.     Mitral Valve  There is mild to moderate (1-2+) mitral regurgitation.     Tricuspid Valve  There is mild (1+) tricuspid regurgitation. The right ventricular systolic  pressure is approximated at 32.5 mmHg plus the right atrial pressure. IVC  diameter and respiratory changes fall into an intermediate range suggesting an  RA pressure of 8 mmHg. Right ventricular systolic pressure is elevated,  consistent with mild pulmonary hypertension.     Aortic Valve  There is trivial trileaflet aortic sclerosis. No aortic regurgitation is  present. No hemodynamically significant valvular aortic stenosis.     Pulmonic Valve  There is mild (1+) pulmonic valvular regurgitation. Normal pulmonic valve  velocity.     Vessels  Mild aortic root dilatation. The ascending aorta is Mildly dilated. Dilation  of the inferior vena cava is present with normal respiratory variation in  diameter. IVC diameter and respiratory changes fall into an intermediate range  suggesting an RA pressure of 8 mmHg.     Pericardium  There is no pericardial effusion.     Rhythm  The rhythm was sinus bradycardia.  ______________________________________________________________________________  MMode/2D Measurements & Calculations     IVSd: 1.5 cm  LVIDd: 6.4 cm  LVIDs: 4.6 cm  LVPWd: 1.8 cm  FS: 27.5 %  LV  mass(C)d: 554.7 grams  LV mass(C)dI: 229.7 grams/m2  Ao root diam: 3.9 cm  asc Aorta Diam: 3.6 cm  LVOT diam: 2.3 cm  LVOT area: 4.2 cm2  LA Volume (BP): 89.6 ml  LA Volume Index (BP): 37.0 ml/m2  RWT: 0.57     Doppler Measurements & Calculations  MV E max danis: 96.9 cm/sec  MV A max danis: 59.5 cm/sec  MV E/A: 1.6  MV max P.0 mmHg  MV mean P.0 mmHg  MV V2 VTI: 36.6 cm  MVA(VTI): 2.3 cm2  MV P1/2t max danis: 124.0 cm/sec  MV P1/2t: 72.2 msec  MVA(P1/2t): 3.0 cm2  MV dec slope: 503.0 cm/sec2  MV dec time: 0.16 sec  Ao V2 max: 143.5 cm/sec  Ao max P.0 mmHg  EDDI(V,D): 3.3 cm2  LV V1 max P.2 mmHg  LV V1 max: 114.0 cm/sec  LV V1 VTI: 20.3 cm  SV(LVOT): 84.3 ml  SI(LVOT): 34.9 ml/m2  PA V2 max: 112.0 cm/sec  PA max P.0 mmHg  PA acc time: 0.12 sec  TR max danis: 285.0 cm/sec  TR max P.5 mmHg  AV Danis Ratio (DI): 0.79  E/E' av.8  Lateral E/e': 13.9  Medial E/e': 15.6     ______________________________________________________________________________  Report approved by: Anna Lee 2022 02:40 PM         Cardiac Catheterization    Narrative      Ost LAD to Prox LAD lesion is 30% stenosed.    Mid LAD-1 lesion is 60% stenosed.    Dist LAD lesion is 90% stenosed.    Mid LAD-2 lesion is 60% stenosed.    1st Mrg-1 lesion is 60% stenosed.    1st Mrg-2 lesion is 50% stenosed.    Lat 1st Mrg lesion is 40% stenosed.    Prox RCA lesion is 40% stenosed.    Dist RCA lesion is 25% stenosed.    RPDA lesion is 50% stenosed.    2nd RPL lesion is 60% stenosed.    2nd Diag lesion is 40% stenosed.     One vessel obstructive coronary artery disease   PCI of mid to distal LAD with FERNANDA x 3 (Synergy 3.00x38 mm, Synergy 3.00x32   mm and Synergy 3.00x8 mm)   Hemostasis of RRA with TR band        *Note: Due to a large number of results and/or encounters for the requested time period, some results have not been displayed. A complete set of results can be found in Results Review.       Discharge Medications    Current Discharge Medication List      START taking these medications    Details   aspirin (ASA) 81 MG chewable tablet Take 1 tablet (81 mg) by mouth daily Starting tomorrow.  Qty: 30 tablet, Refills: 3    Associated Diagnoses: Coronary artery disease involving native coronary artery of native heart with angina pectoris (H)      carvedilol (COREG) 25 MG tablet Take 1 tablet (25 mg) by mouth 2 times daily (with meals)  Qty: 60 tablet, Refills: 0    Associated Diagnoses: Hypertension goal BP (blood pressure) < 140/90      hydrALAZINE (APRESOLINE) 100 MG tablet Take 1 tablet (100 mg) by mouth every 8 hours for 30 days  Qty: 90 tablet, Refills: 1    Associated Diagnoses: Hypertension goal BP (blood pressure) < 140/90         CONTINUE these medications which have CHANGED    Details   clopidogrel (PLAVIX) 75 MG tablet Take 1 tablet (75 mg) by mouth daily  Qty: 90 tablet, Refills: 3    Associated Diagnoses: Coronary artery disease involving native heart without angina pectoris, unspecified vessel or lesion type      isosorbide mononitrate (IMDUR) 60 MG 24 hr tablet Take 1 tablet (60 mg) by mouth daily  Qty: 30 tablet, Refills: 1    Associated Diagnoses: NSTEMI (non-ST elevated myocardial infarction) (H); Coronary artery disease involving native heart without angina pectoris, unspecified vessel or lesion type      nitroGLYcerin (NITROSTAT) 0.4 MG sublingual tablet Place 1 tablet (0.4 mg) under the tongue every 5 minutes as needed for chest pain For chest pain place 1 tablet under the tongue every 5 minutes for 3 doses. If symptoms persist 5 minutes after 1st dose call 911.  Qty: 25 tablet, Refills: 0    Associated Diagnoses: Other chest pain      rosuvastatin (CRESTOR) 20 MG tablet Take 1 tablet (20 mg) by mouth daily  Qty: 90 tablet, Refills: 3    Associated Diagnoses: Coronary artery disease involving native coronary artery of native heart with angina pectoris (H)         CONTINUE these medications which have NOT  CHANGED    Details   albuterol (PROAIR HFA/PROVENTIL HFA/VENTOLIN HFA) 108 (90 Base) MCG/ACT inhaler Inhale 2 puffs into the lungs every 6 hours  Qty: 18 g, Refills: 5    Comments: Pharmacy may dispense brand covered by insurance (Proair, or proventil or ventolin or generic albuterol inhaler)  Associated Diagnoses: Mild persistent asthma without complication      amitriptyline (ELAVIL) 50 MG tablet Take 3 tablets (150 mg) by mouth At Bedtime  Qty: 270 tablet, Refills: 3    Associated Diagnoses: Insomnia, unspecified type      amphetamine-dextroamphetamine (ADDERALL) 20 MG tablet Take 1 tablet (20 mg) by mouth 2 times daily  Qty: 90 tablet, Refills: 0    Associated Diagnoses: Attention deficit hyperactivity disorder (ADHD), combined type      buprenorphine HCl-naloxone HCl (SUBOXONE) 8-2 MG per film 1/2 film QID - Brand name only  Qty: 60 Film, Refills: 1    Comments: NADEAN: [id number]  Associated Diagnoses: Uncomplicated opioid dependence (H)      buPROPion (WELLBUTRIN XL) 300 MG 24 hr tablet Take 1 tablet (300 mg) by mouth every morning  Qty: 90 tablet, Refills: 1    Associated Diagnoses: Generalized anxiety disorder; Major depressive disorder, recurrent episode, moderate (H)      busPIRone (BUSPAR) 5 MG tablet Take 1 tablet (5 mg) by mouth 2 times daily as needed (panic attack)  Qty: 30 tablet, Refills: 1    Associated Diagnoses: Panic disorder without agoraphobia      clonazePAM (KLONOPIN) 1 MG tablet TAKE 1 TABLET (1 MG) BY MOUTH 2 TIMES DAILY AS NEEDED (FLYING PHOBIA)  Qty: 4 tablet, Refills: 0    Comments: Not to exceed 5 additional fills before 01/22/2023  Associated Diagnoses: Flying phobia      cyclobenzaprine (FLEXERIL) 10 MG tablet Take 1 tablet (10 mg) by mouth 3 times daily as needed for other (hiccups)  Qty: 90 tablet, Refills: 3    Associated Diagnoses: Neck pain      finasteride (PROSCAR) 5 MG tablet 1/2 tab daily  Qty: 90 tablet, Refills: 1    Associated Diagnoses: Alopecia      imiquimod (ALDARA) 5  "% external cream Apply topically At Bedtime -wash off after 8 hours and my use for up to 16 weeks.  Qty: 48 packet, Refills: 5    Associated Diagnoses: Flat wart      LORazepam (ATIVAN) 1 MG tablet Take 1 tablet (1 mg) by mouth 2 times daily as needed for anxiety  Qty: 60 tablet, Refills: 1    Associated Diagnoses: Generalized anxiety disorder; Panic disorder without agoraphobia      pantoprazole (PROTONIX) 40 MG EC tablet Take 1 tablet (40 mg) by mouth daily  Qty: 90 tablet, Refills: 3    Associated Diagnoses: Gastroesophageal reflux disease without esophagitis      RESTASIS 0.05 % ophthalmic emulsion INSTILL 1 DROP INTO BOTH EYES TWICE A DAY  Qty: 60 mL, Refills: 4    Associated Diagnoses: Dry eyes, bilateral      testosterone cypionate (DEPOTESTOSTERONE) 200 MG/ML injection Inject 1 mL (200 mg) into the muscle once a week  Qty: 4 mL, Refills: 5    Associated Diagnoses: Hypogonadism male      vitamin C (ASCORBIC ACID) 1000 MG TABS Take 1,000 mg by mouth daily  Qty: 90, Refills: 0      zolpidem (AMBIEN) 10 MG tablet TAKE 1 TABLET (10 MG) BY MOUTH NIGHTLY AS NEEDED FOR SLEEP  Qty: 30 tablet, Refills: 2    Comments: Not to exceed 3 additional fills before 12/20/2022  Associated Diagnoses: Insomnia, unspecified type      B-D LUER-HIWOT SYRINGE 21G X 1-1/2\" 3 ML MISC 1 DEVICE ONCE A WEEK   AND ALSO NEEDS 22 G NEEDLES 1.5 INCH      fluticasone-salmeterol (ADVAIR DISKUS) 500-50 MCG/DOSE inhaler 1 inhalation 2 times per day  Qty: 1 Inhaler, Refills: 2    Associated Diagnoses: Mild persistent asthma without complication      loperamide (IMODIUM A-D) 2 MG tablet Take 2 tabs (4 mg) after first loose stool, and then take one tab (2 mg) after each diarrheal stool.  Max of 8 tabs (16 mg) per day.  Qty: 30 tablet, Refills: 0    Associated Diagnoses: Loose stools      naloxone (NARCAN) 4 MG/0.1ML nasal spray Spray 1 spray (4 mg) into one nostril alternating nostrils as needed for opioid reversal every 2-3 minutes until assistance " "arrives  Qty: 0.2 mL, Refills: 11    Associated Diagnoses: Opioid type dependence, continuous (H)      Syringe/Needle, Disp, (SYRINGE LUER LOCK) 20G X 1-1/2\" 3 ML MISC 1 Device once a week - and also needs 22 G needles 1.5 inch #30 with 1 refill  Qty: 30 each, Refills: 1    Associated Diagnoses: Hypogonadism in male         STOP taking these medications       aspirin (ASA) 81 MG EC tablet Comments:   Reason for Stopping:         betamethasone dipropionate (DIPROSONE) 0.05 % external cream Comments:   Reason for Stopping:         fluconazole (DIFLUCAN) 150 MG tablet Comments:   Reason for Stopping:         losartan (COZAAR) 100 MG tablet Comments:   Reason for Stopping:         metoprolol tartrate (LOPRESSOR) 100 MG tablet Comments:   Reason for Stopping:             Allergies   Allergies   Allergen Reactions     Acetaminophen Other (See Comments)     Patient states he does not have any reaction to this     Morphine Other (See Comments)     Patient states he had a headache one time but has used since with no adverse reactions     Sulfa Drugs      Theophylline Hives     "

## 2022-11-24 NOTE — PROGRESS NOTES
"EP- Cardiology Progress Note           Assessment and Plan:     52-year-old M with history of asthma, HTN, HL, and I-CMP, who P/W non-STEMI.  PCI to mid distal LAD (11/23).      Events: No events overnight.  He is CP free.  Radial pulses palpable (2+)    Previous studies:  -Echo: EF of 30-35%.  Apical wall hypokinesis.  Mild to moderate MR.      Plan:  1.  NSTEMI/I-CMP.  He underwent PCI to LAD yesterday and is doing well.  Echo showed new drop in EF.  Recommendations:  -Continue aspirin/Plavix, Coreg, Crestor and indur.  -Increase hydralazine to 100 mg 3 times daily.  -Follow-up in cardiology clinic in 2 weeks.  -Repeat echo in 3 months.    2.  HTN.  BP is mildly evaded.  Plan as detailed above.    We will sign off.  Please call questions.    Telly Contreras MD      Physical Exam:  Vitals: BP (!) 177/91 (BP Location: Left arm)   Pulse 79   Temp 98.2  F (36.8  C) (Oral)   Resp 20   Ht 1.93 m (6' 4\")   Wt 108.5 kg (239 lb 1.5 oz)   SpO2 100%   BMI 29.10 kg/m        Intake/Output Summary (Last 24 hours) at 11/24/2022 1041  Last data filed at 11/23/2022 1646  Gross per 24 hour   Intake 240 ml   Output 450 ml   Net -210 ml     Vitals:    11/20/22 0115 11/21/22 0700 11/22/22 0501 11/23/22 0621   Weight: 111.2 kg (245 lb 2.4 oz) 110 kg (242 lb 9.6 oz) 109.2 kg (240 lb 12.8 oz) 108.9 kg (240 lb)    11/24/22 0620   Weight: 108.5 kg (239 lb 1.5 oz)       Constitutional:  AAO x3.  Pt is in NAD.  HEAD: Normocephalic.  SKIN: Skin normal color, texture and turgor with no lesions or eruptions.  Eyes: PERRL, EOMI.  ENT:  Supple, normal JVP. No lymphadenopathy or thyroid enlargement.  Chest:  CTAB.  Cardiac:  RRR, normal  S1 and S2.  No murmurs rubs or gallop.    Abdomen:  Normal BS.  Soft, non-tender and non-distended.  No rebound or guarding.    Extremities:  Pedious pulses palpable B/L.  No LE edema noticed.   Neurological: Strength and sensation grossly symmetric and intact throughout.                            Review of " Systems:   As per subjective, otherwise 5 systems reviewed and negative.         Medications:          albuterol  2 puff Inhalation Q6H     amitriptyline  150 mg Oral At Bedtime     artificial tears  1 drop Both Eyes BID     aspirin  81 mg Oral Daily     buprenorphine HCl-naloxone HCl  1 Film Sublingual 4x Daily     buPROPion  300 mg Oral QAM     carvedilol  25 mg Oral BID w/meals     clopidogrel  75 mg Oral Daily     finasteride  2.5 mg Oral Daily     fluticasone  1 spray Both Nostrils Daily     fluticasone-vilanterol  1 puff Inhalation Daily     [Held by provider] furosemide  40 mg Oral Daily     hydrALAZINE  50 mg Oral Q8H SOHEILA     isosorbide mononitrate  60 mg Oral Daily     [Held by provider] losartan  100 mg Oral Daily     pantoprazole  40 mg Oral Daily     rosuvastatin  20 mg Oral Daily     PRN Meds: acetaminophen **OR** acetaminophen, bisacodyl, busPIRone, - MEDICATION INSTRUCTIONS -, - MEDICATION INSTRUCTIONS -, fentaNYL, HOLD MEDICATION, hydrALAZINE, labetalol, lidocaine 4%, lidocaine (buffered or not buffered), LORazepam, melatonin, metoprolol, midazolam, nitroGLYcerin, ondansetron **OR** ondansetron, Percutaneous Coronary Intervention orders placed (this is information for BPA alerting), polyethylene glycol, prochlorperazine **OR** prochlorperazine **OR** prochlorperazine, zolpidem             Data:     Recent Labs   Lab 11/24/22  0552 11/23/22  0617 11/22/22  0608 11/20/22  0529 11/19/22  1623   WBC 10.6 9.8 11.4*   < > 15.2*   HGB 16.3 17.6 16.7   < > 18.0*   MCV 88 87 87   < > 85    193 201   < > 242    136 136   < > 139   POTASSIUM 5.0 5.0 4.4   < > 4.7   CHLORIDE 103 101 102   < > 104   CO2 31 35* 31   < > 30   BUN 22 22 23   < > 18   CR 1.71* 1.94* 1.74*   < > 1.63*   ANIONGAP 3 <1* 3   < > 5   MIKO 8.2* 9.3 8.4*   < > 9.4   GLC 86 89 92   < > 100*   ALBUMIN  --   --   --   --  3.2*   PROTTOTAL  --   --   --   --  6.7*   BILITOTAL  --   --   --   --  0.6   ALKPHOS  --   --   --   --  89    ALT  --   --   --   --  142*   AST  --   --   --   --  93*    < > = values in this interval not displayed.

## 2022-11-24 NOTE — PROGRESS NOTES
Renal Medicine Progress Note            Assessment/Plan:     52 y.o man with CKD IIIA presumed due to HTN and cardiovascular disease.      # CKD IIIA: Baseline Scr ~ 1.5 mg/dl. Scr improved from 1.94 to 1.7 mg/dl.     # Hypertensive urgency/emergency:                -noncompliance and was not taking his BP medications as prescribed               -BP is better               -Coreg 25 mg bid               -Imdur 30 mg daily                -hydralazine increased to 100 mg tid today by Dr. Contreras               -losartan on hold     # NSTEMI: From hypertensive emergency vs ischemia?      # Severe cLVH (old) but EF of 30-35% is new. Hypertensive heart disease vs infiltrative process?               -SPEP/UPEP and immmofixation in 2021 was wo monoclonal protein     # Left renal mass,2.5 cm: urology evaluated the patient.     # Risk of RCIN ~ 14% and risks of dialysis <1%     Plan:  # See our PA in 1 week after discharge ([facility], [address] [place] [address], [address], phone # [phone]).   # Check renal panel next week with PCP.  # Avoid NSAIDs.  # Follow-up with urology to complete work-up for renal mass.  # Close follow-up with cardiology.  # Can re-introduce ACEi/ARB or Enstresto outpatient.    I discussed the case with Dr. Contreras, Dr. Cast and RN in person.         Interval History:     Patient denies SOB and chest pain.  He wants to go home, and he is adamant about it.   Dr. Contreras increased hydralazine to 100 mg tid          Medications and Allergies:       albuterol  2 puff Inhalation Q6H     amitriptyline  150 mg Oral At Bedtime     artificial tears  1 drop Both Eyes BID     aspirin  81 mg Oral Daily     buprenorphine HCl-naloxone HCl  1 Film Sublingual 4x Daily     buPROPion  300 mg Oral QAM     carvedilol  25 mg Oral BID w/meals     clopidogrel  75 mg Oral Daily     finasteride  2.5 mg Oral Daily     fluticasone  1 spray Both Nostrils Daily     fluticasone-vilanterol  1 puff Inhalation Daily     [Held  "by provider] furosemide  40 mg Oral Daily     hydrALAZINE  100 mg Oral Q8H SOHEILA     isosorbide mononitrate  60 mg Oral Daily     [Held by provider] losartan  100 mg Oral Daily     pantoprazole  40 mg Oral Daily     rosuvastatin  20 mg Oral Daily        Allergies   Allergen Reactions     Acetaminophen Other (See Comments)     Patient states he does not have any reaction to this     Morphine Other (See Comments)     Patient states he had a headache one time but has used since with no adverse reactions     Sulfa Drugs      Theophylline Hives            Physical Exam:   Vitals were reviewed   , Blood pressure (!) 177/91, pulse 79, temperature 98.2  F (36.8  C), temperature source Oral, resp. rate 20, height 1.93 m (6' 4\"), weight 108.5 kg (239 lb 1.5 oz), SpO2 100 %.    Wt Readings from Last 3 Encounters:   11/24/22 108.5 kg (239 lb 1.5 oz)   11/04/22 110.2 kg (243 lb)   06/23/22 111 kg (244 lb 12.8 oz)       Intake/Output Summary (Last 24 hours) at 11/24/2022 1052  Last data filed at 11/23/2022 1646  Gross per 24 hour   Intake 240 ml   Output 450 ml   Net -210 ml       GENERAL APPEARANCE: NAD  HEENT: EOMI. PERR.   RESP: lungs cta b c good efforts, no crackles, rhonchi or wheezes  CV: RRR, nl S1/S2, + murmur  ABDOMEN: obese, soft.   EXTREMITIES/SKIN: no rashes/lesions on observed skin; no edema  NEURO: Awake, alert and conversing normally  PSYCH: Very irritable, difficult and rude.         Data:     CBC RESULTS:     Recent Labs   Lab 11/24/22  0552 11/23/22  0617 11/22/22  0608 11/21/22  0724 11/20/22  0529 11/19/22  1623   WBC 10.6 9.8 11.4* 12.1* 15.8* 15.2*   RBC 5.90 6.52* 6.07* 6.72* 6.66* 6.45*   HGB 16.3 17.6 16.7 18.0* 18.1* 18.0*   HCT 52.1 56.6* 53.0 59.0* 56.0* 55.0*    193 201 198 199 242       Basic Metabolic Panel:  Recent Labs   Lab 11/24/22  0552 11/23/22  0617 11/22/22  0608 11/21/22  1045 11/20/22  0529 11/19/22  1623    136 136 136 134 139   POTASSIUM 5.0 5.0 4.4 4.5 4.5 4.7   CHLORIDE 103 " 101 102 101 100 104   CO2 31 35* 31 30 31 30   BUN 22 22 23 22 18 18   CR 1.71* 1.94* 1.74* 1.75* 1.43* 1.63*   GLC 86 89 92 102* 74 100*   MIKO 8.2* 9.3 8.4* 8.7 9.4 9.4       INRNo lab results found in last 7 days.   Attestation:   I have reviewed today's relevant vital signs, notes, medications, labs and imaging.    David Randall MD  [facility] - Nephrology  Office phone :[phone]  Pager: [phone]

## 2022-11-24 NOTE — PLAN OF CARE
[id number]  Alert and oriented times four  Independent  Right radial site WDL, CMS intact  Tele; SR first degree  Room air, denies pain   NS infusing at 110 ml/hr till 0500  Plan: continue to monitor, possible discharge tomorrow

## 2022-11-24 NOTE — PROGRESS NOTES
A&O x4. VSS on room air. SBP elevated this morning, x1 dose IV labetolol given. PO hydralazine dose increased.Tele SR 1 degree AVB and BBB. Minor headache this morning, PRN tylenol given. Denies CP/SOB. Up independently. R radial site WDL. NS infusing at 110 mL/hr till 0500. Plan to recheck renal function tomorrow.

## 2022-11-24 NOTE — PROGRESS NOTES
Calm and cooperative. Tele Sinus, 1st degree AVB/BBB. Pressures 160s and 150s post PRN labetalol. Scheduled hydralazine given. R Radial site WDL and CMS intact. Plan for possible discharge today, work with cardiac rehab, evaluate renal this AM, and continue to discussion of importance of compliancy at home.

## 2022-11-25 ENCOUNTER — LAB (OUTPATIENT)
Dept: LAB | Facility: CLINIC | Age: 52
End: 2022-11-25
Payer: COMMERCIAL

## 2022-11-25 DIAGNOSIS — R79.89 ELEVATED BRAIN NATRIURETIC PEPTIDE (BNP) LEVEL: ICD-10-CM

## 2022-11-25 DIAGNOSIS — N18.30 CKD (CHRONIC KIDNEY DISEASE) STAGE 3, GFR 30-59 ML/MIN (H): ICD-10-CM

## 2022-11-25 DIAGNOSIS — I42.2 HYPERTROPHIC CARDIOMYOPATHY (H): ICD-10-CM

## 2022-11-25 LAB
ANION GAP SERPL CALCULATED.3IONS-SCNC: 2 MMOL/L (ref 3–14)
BACTERIA BLD CULT: NO GROWTH
BACTERIA BLD CULT: NO GROWTH
BUN SERPL-MCNC: 16 MG/DL (ref 7–30)
CALCIUM SERPL-MCNC: 9.2 MG/DL (ref 8.5–10.1)
CHLORIDE BLD-SCNC: 101 MMOL/L (ref 94–109)
CO2 SERPL-SCNC: 34 MMOL/L (ref 20–32)
CREAT SERPL-MCNC: 1.71 MG/DL (ref 0.66–1.25)
GFR SERPL CREATININE-BSD FRML MDRD: 47 ML/MIN/1.73M2
GLUCOSE BLD-MCNC: 98 MG/DL (ref 70–99)
NT-PROBNP SERPL-MCNC: 1686 PG/ML (ref 0–900)
POTASSIUM BLD-SCNC: 4.6 MMOL/L (ref 3.4–5.3)
SODIUM SERPL-SCNC: 137 MMOL/L (ref 133–144)

## 2022-11-25 PROCEDURE — 36415 COLL VENOUS BLD VENIPUNCTURE: CPT | Performed by: INTERNAL MEDICINE

## 2022-11-25 PROCEDURE — 80048 BASIC METABOLIC PNL TOTAL CA: CPT | Performed by: INTERNAL MEDICINE

## 2022-11-25 PROCEDURE — 83880 ASSAY OF NATRIURETIC PEPTIDE: CPT | Performed by: INTERNAL MEDICINE

## 2022-11-26 ENCOUNTER — PATIENT OUTREACH (OUTPATIENT)
Dept: CARE COORDINATION | Facility: CLINIC | Age: 52
End: 2022-11-26

## 2022-11-26 NOTE — PROGRESS NOTES
Connected Care Resource Center Contact  [facility]/Voicemail     Clinical Data: Transitional Care Management Outreach     Outreach attempted x 2.  Unable to leave message on patient's voicemail with [facility]'s 24/7 scheduling and nurse triage phone number [phone] ([phone]) for questions/concerns or schedule an appointment with an [facility] provider.     Plan:  [facility] will do no further outreaches at this time.       Rekha Gillis  Community Health Worker  [facility] Care Resource [place], [facility]      *Connected Care Resource Team does NOT follow patient ongoing. Referrals are identified based on internal discharge reports and the outreach is to ensure patient has an understanding of their discharge instructions.

## 2022-11-28 ENCOUNTER — TELEPHONE (OUTPATIENT)
Dept: CARDIOLOGY | Facility: CLINIC | Age: 52
End: 2022-11-28

## 2022-11-28 ENCOUNTER — DOCUMENTATION ONLY (OUTPATIENT)
Dept: CARDIOLOGY | Facility: CLINIC | Age: 52
End: 2022-11-28

## 2022-11-28 DIAGNOSIS — I50.23 ACUTE ON CHRONIC HFREF (HEART FAILURE WITH REDUCED EJECTION FRACTION) (H): Primary | ICD-10-CM

## 2022-11-28 NOTE — TELEPHONE ENCOUNTER
Pt is scheduled for a [facility] appt on 11/28/22     Pt with a history of cardiomyopathy..  Medication list reviewed and pt is not currently on Entresto, Jardiance, Farxiga and Ivokana.    Please initiate coverage check for the above medications.'    Tamia Rice CMA ([facility])  11/28/22 8:21am

## 2022-11-28 NOTE — PROGRESS NOTES
[facility]    Shelli Guo EVELIN would like Mr. Damon to have labs repeated today prior to follow-up visit. Scheduling team--please call Mr. Damon to arrange. If he doesn't answer, please arrange lab appt right before or right after Shelli's visit. Thanks!    Stephany Redmond RN BSN   9:44 AM 11/28/22  CORE nurse line [facility] 8a-4p: [phone]          Stephany Redomnd RN BSN   10:17 AM 11/28/22  [facility] nurse line [facility] 8a-4p: [phone]

## 2022-11-30 ENCOUNTER — MYC MEDICAL ADVICE (OUTPATIENT)
Dept: FAMILY MEDICINE | Facility: CLINIC | Age: 52
End: 2022-11-30

## 2022-11-30 LAB
ATRIAL RATE - MUSE: 61 BPM
DIASTOLIC BLOOD PRESSURE - MUSE: NORMAL MMHG
INTERPRETATION ECG - MUSE: NORMAL
P AXIS - MUSE: 17 DEGREES
PR INTERVAL - MUSE: 230 MS
QRS DURATION - MUSE: 218 MS
QT - MUSE: 474 MS
QTC - MUSE: 477 MS
R AXIS - MUSE: 154 DEGREES
SYSTOLIC BLOOD PRESSURE - MUSE: NORMAL MMHG
T AXIS - MUSE: -2 DEGREES
VENTRICULAR RATE- MUSE: 61 BPM

## 2022-12-01 LAB
ATRIAL RATE - MUSE: 78 BPM
DIASTOLIC BLOOD PRESSURE - MUSE: NORMAL MMHG
INTERPRETATION ECG - MUSE: NORMAL
P AXIS - MUSE: 55 DEGREES
PR INTERVAL - MUSE: 236 MS
QRS DURATION - MUSE: 220 MS
QT - MUSE: 478 MS
QTC - MUSE: 544 MS
R AXIS - MUSE: 204 DEGREES
SYSTOLIC BLOOD PRESSURE - MUSE: NORMAL MMHG
T AXIS - MUSE: 43 DEGREES
VENTRICULAR RATE- MUSE: 78 BPM

## 2022-12-05 NOTE — TELEPHONE ENCOUNTER
Patient admitted 11/19-11/24    Patient outreach encounter on 11/26- unable to reach patient.     Attempt # 1    Called # [phone]     Left a non detailed VM to call back at [phone] and ask for any available Triage Nurse.    BEATRICE CONSTANTINO RN on 12/5/2022 at 2:38 PM   [facility]

## 2022-12-07 ENCOUNTER — TELEPHONE (OUTPATIENT)
Dept: CARDIOLOGY | Facility: CLINIC | Age: 52
End: 2022-12-07

## 2022-12-07 DIAGNOSIS — I50.23 ACUTE ON CHRONIC HFREF (HEART FAILURE WITH REDUCED EJECTION FRACTION) (H): ICD-10-CM

## 2022-12-07 DIAGNOSIS — I25.10 CAD (CORONARY ARTERY DISEASE): Primary | ICD-10-CM

## 2022-12-07 DIAGNOSIS — I42.9 SECONDARY CARDIOMYOPATHY (H): ICD-10-CM

## 2022-12-07 NOTE — TELEPHONE ENCOUNTER
[facility] Call Center    Phone Message    May a detailed message be left on voicemail: no     Reason for Call: Other: Jeremi called to schedule a post-hospital F/U for a heart attack, and per his request scheduled with a new cardiologist tomorrow 12/8/22 at 10:45 am. Also, Jeremi did report a current ongoing symptom of shortness of breath with exertion.      Action Taken: Other: [facility]    Travel Screening: Not Applicable

## 2022-12-07 NOTE — TELEPHONE ENCOUNTER
Pt recently discharged from hospital for MI.  Please advise where patient can be seen on your scheduled  Flakito VACA RN, BSN

## 2022-12-08 ENCOUNTER — OFFICE VISIT (OUTPATIENT)
Dept: CARDIOLOGY | Facility: CLINIC | Age: 52
End: 2022-12-08
Payer: COMMERCIAL

## 2022-12-08 VITALS
SYSTOLIC BLOOD PRESSURE: 151 MMHG | HEART RATE: 94 BPM | DIASTOLIC BLOOD PRESSURE: 95 MMHG | WEIGHT: 240 LBS | BODY MASS INDEX: 32.51 KG/M2 | HEIGHT: 72 IN | OXYGEN SATURATION: 96 %

## 2022-12-08 DIAGNOSIS — N18.32 STAGE 3B CHRONIC KIDNEY DISEASE (H): ICD-10-CM

## 2022-12-08 DIAGNOSIS — I43 CARDIOMYOPATHY DUE TO HYPERTENSION, WITH HEART FAILURE (H): ICD-10-CM

## 2022-12-08 DIAGNOSIS — I25.118 CORONARY ARTERY DISEASE OF NATIVE ARTERY OF NATIVE HEART WITH STABLE ANGINA PECTORIS (H): ICD-10-CM

## 2022-12-08 DIAGNOSIS — I11.0 CARDIOMYOPATHY DUE TO HYPERTENSION, WITH HEART FAILURE (H): ICD-10-CM

## 2022-12-08 DIAGNOSIS — I10 UNCONTROLLED HYPERTENSION: Primary | ICD-10-CM

## 2022-12-08 DIAGNOSIS — I50.21 ACUTE HFREF (HEART FAILURE WITH REDUCED EJECTION FRACTION) (H): ICD-10-CM

## 2022-12-08 PROCEDURE — 99215 OFFICE O/P EST HI 40 MIN: CPT | Performed by: INTERNAL MEDICINE

## 2022-12-08 RX ORDER — LOSARTAN POTASSIUM 25 MG/1
25 TABLET ORAL 2 TIMES DAILY
Qty: 60 TABLET | Refills: 4 | Status: SHIPPED | OUTPATIENT
Start: 2022-12-08 | End: 2022-12-12

## 2022-12-08 RX ORDER — AMLODIPINE BESYLATE 5 MG/1
5 TABLET ORAL EVERY MORNING
Qty: 30 TABLET | Refills: 4 | Status: SHIPPED | OUTPATIENT
Start: 2022-12-08 | End: 2022-12-12

## 2022-12-08 RX ORDER — TORSEMIDE 10 MG/1
10 TABLET ORAL EVERY MORNING
Qty: 30 TABLET | Refills: 4 | Status: SHIPPED | OUTPATIENT
Start: 2022-12-08 | End: 2023-09-08

## 2022-12-08 NOTE — PROGRESS NOTES
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  He follows with my partner, Dr. Keita.  I am seeing him for post hospitalization followup due to unavailability of appointments with a J[facility] or Dr. Keita.    Jeremi is known to have longstanding resistant hypertension, stage IIIB chronic kidney disease, hypertensive heart disease, mixed dyslipidemia with very low HDL, untreated severe sleep apnea due to CPAP intolerance, BMI 32 kg/m2, ADHD (on Adderall), prostatic hypertrophy, bipolar I disorder with anxiety and panic attacks, GERD, chronic pain (on opioids).    The patient was hospitalized at [facility] from 11/19 to 11/24/2022 with hypertensive emergency with blood pressures of 230-250 systolic, pulmonary edema with markedly elevated NT-proBNP, NSTEMI.  He required nicardipine drip and IV furosemide for treatment.      His coronary angiography showed triple-vessel coronary artery disease with moderate disease in the RCA and circumflex and severe mid LAD stenosis that was stented with 3 stents.  He was put on DAPT with aspirin and clopidogrel, which he has been taking.  Because his creatinine was elevated at 1.7, his established ARB was held and diuretics were held.  He was seen by Cardiology who advised Imdur and hydralazine.    Over the last 3 weeks, he has been having frequent urination, despite not being on a diuretic, his blood pressure is still elevated in the 150s/90s.  He has profound fatigue with the new medication combination and requests a change.  He has GERD, but no chest pain, no lower extremity edema.  His weight is 240 pounds.  BP is 150/95 (rechecked).  He is tachycardic at 94 despite taking full dose carvedilol 25 mg b.i.d.    He states that he drinks significant amounts of caffeine throughout the day to  counteract the effects of carvedilol on myocardial contractility.  He also works out at least 5 times a week in the gym including doing both aerobic and weight training.  He denies alcohol intake.  No tobacco use.    DIAGNOSTIC DATA:  Labs:  Total cholesterol 90, HDL 28, LDL 94, triglycerides 215, creatinine 1.7, potassium 4.6, estimated GFR 47, polycythemia with a hemoglobin of 16.3 in the context of untreated severe sleep apnea.    ECG shows sinus rhythm with first-degree AV block ( milliseconds), right bundle branch block.    Recent echocardiogram shows left ventricular systolic cardiomyopathy with reduced ejection fraction with global hypokinesis, LVEF 30%-35%, mildly decreased right ventricular systolic function, mild to moderate mitral valve regurgitation, secondary pulmonary hypertension, dilated inferior vena cava.    I personally reviewed and independently interpreted labs, ECG, chest x-ray image (showed pulmonary edema), echocardiogram images, coronary angiogram images as documented above.  I note patient also had a head CT, which was negative for bleed or stroke.    PHYSICAL EXAMINATION:    GENERAL:  He is micheal faced, but alert and oriented, comfortable at rest.  VITAL SIGNS:  /95, pulse 94, weight 240 pounds, BMI 32, sats are 96% on room air.  CARDIOVASCULAR:  Normal first heart sound, split second heart sound, no audible murmur.  No S3 or S4.  Normal jugular venous pressure.  RESPIRATORY:  Normal breath sounds.  No valve disease.  ABDOMEN:  Soft, nontender without hepatosplenomegaly.  EXTREMITIES:  No edema.    DIAGNOSES AND ASSESSMENT:    1.  Uncontrolled hypertension.  He is on multiple drugs and his blood pressure is still elevated.  Denies NSAID use.  Drinks large amounts of caffeine because he is convinced that caffeine will increase myocardial contractility and counteract the effects of beta blocker.  He has fatigue and wants to change his medications back to what worked for him in  the past and I think this is reasonable.  2.  Acute heart failure with reduced ejection fraction secondary to hypertension and coronary artery disease.  We will optimize medications including addition of diuretic and ARB.  3.  CAD, status post LAD stenting in 2022.  Continue aspirin DAPT with aspirin and clopidogrel.  4.  Stage IIIB chronic kidney disease.  Reconcile medications and closely monitor renal function.  5.  Excess caffeine intake.  I counseled him, but he is very set in his ways and is unlikely to cut back.  6.  Severe untreated sleep apnea due to CPAP intolerance.    PLAN:    1.  Medication changes made today:    -- Stop hydralazine 100 mg 2 times daily.  The patient is fatigued from this.  -- Start amlodipine 5 mg daily in the morning.  He has tolerated this well in the past.    -- Start losartan 25 mg 2 times daily.  -- Start torsemide 10 mg daily.    2.  Orders placed today:  -- Basic metabolic panel, NT-proBNP and establish care with [facility] IN 2 weeks.  -- Follow up with Dr. Keita in 6 months.  -- Note, there was a question of hypertrophic cardiomyopathy in the past.  He has hypertensive cardiomyopathy from years of uncontrolled hypertension.  He is extremely claustrophobic and a cardiac MRI would not be a good idea in this patient and he is unlikely to tolerate a cardiac MRI.    3.  Extensive counseling, including review of all tests directly with the patient.  He has a background as a chiropractor and has fixed ideas about his medications and caffeine intake.    4.  Detailed visit summary typed and printed for the patient.    Total time today:  60 minutes.  High complexity medical decision making.    Genevieve Morales MD        D: 2022   T: 2022   MT: HEMA    Name:     FRANCISCO KELLER  MRN:      [id number]        Account:      [id number]   :      1970           Service Date: 2022       Document: [id number]

## 2022-12-08 NOTE — PROGRESS NOTES
Clinic visit note dictated. Dictation reference number - [id number]        Today's clinic visit entailed:  The following tests were independently interpreted by me as noted in my documentation: ECg, labs, coronary angiogram images, echocardiogram images, chest x-ray images, CT brain.  Ordering of each unique test  Prescription drug management  60 minutes spent on the date of the encounter doing chart review, history and exam, documentation and further activities per the note.  The level of medical decision making during this visit was of high complexity.        Encounter Diagnoses   Name Primary?     Uncontrolled hypertension Yes     Acute HFrEF (heart failure with reduced ejection fraction) (H)      Stage 3b chronic kidney disease (H)      Cardiomyopathy due to hypertension, with heart failure (H)      Coronary artery disease of native artery of native heart with stable angina pectoris (H)          Orders Placed This Encounter   Procedures     Basic metabolic panel     N terminal pro BNP outpatient     Follow-Up with Cardiology [facility]- [facility]     Follow-Up with Cardiology         Vitals: BP (!) 151/95   Pulse 94   Ht 1.829 m (6')   Wt 108.9 kg (240 lb)   SpO2 96%   BMI 32.55 kg/m    Wt Readings from Last 5 Encounters:   12/08/22 108.9 kg (240 lb)   11/24/22 108.5 kg (239 lb 1.5 oz)   11/04/22 110.2 kg (243 lb)   06/23/22 111 kg (244 lb 12.8 oz)   10/19/21 116.1 kg (256 lb)           CURRENT MEDICATIONS:  Current Outpatient Medications   Medication Sig Dispense Refill     albuterol (PROAIR HFA/PROVENTIL HFA/VENTOLIN HFA) 108 (90 Base) MCG/ACT inhaler Inhale 2 puffs into the lungs every 6 hours 18 g 5     amitriptyline (ELAVIL) 50 MG tablet Take 3 tablets (150 mg) by mouth At Bedtime 270 tablet 3     amLODIPine (NORVASC) 5 MG tablet Take 1 tablet (5 mg) by mouth every morning 30 tablet 4     amphetamine-dextroamphetamine (ADDERALL) 20 MG tablet Take 1 tablet (20 mg) by mouth 2 times daily 90 tablet 0     aspirin  "(ASA) 81 MG chewable tablet Take 1 tablet (81 mg) by mouth daily Starting tomorrow. 30 tablet 3     B-D LUER-HIWOT SYRINGE 21G X 1-1/2\" 3 ML MISC 1 DEVICE ONCE A WEEK   AND ALSO NEEDS 22 G NEEDLES 1.5 INCH       buprenorphine HCl-naloxone HCl (SUBOXONE) 8-2 MG per film 1/2 film QID - Brand name only 60 Film 1     buPROPion (WELLBUTRIN XL) 300 MG 24 hr tablet Take 1 tablet (300 mg) by mouth every morning 90 tablet 1     busPIRone (BUSPAR) 5 MG tablet Take 1 tablet (5 mg) by mouth 2 times daily as needed (panic attack) 30 tablet 1     carvedilol (COREG) 25 MG tablet Take 1 tablet (25 mg) by mouth 2 times daily (with meals) 60 tablet 0     clonazePAM (KLONOPIN) 1 MG tablet TAKE 1 TABLET (1 MG) BY MOUTH 2 TIMES DAILY AS NEEDED (FLYING PHOBIA) 4 tablet 0     clopidogrel (PLAVIX) 75 MG tablet Take 1 tablet (75 mg) by mouth daily 90 tablet 3     cyclobenzaprine (FLEXERIL) 10 MG tablet Take 1 tablet (10 mg) by mouth 3 times daily as needed for other (hiccups) 90 tablet 3     finasteride (PROSCAR) 5 MG tablet 1/2 tab daily 90 tablet 1     fluticasone-salmeterol (ADVAIR DISKUS) 500-50 MCG/DOSE inhaler 1 inhalation 2 times per day 1 Inhaler 2     imiquimod (ALDARA) 5 % external cream Apply topically At Bedtime -wash off after 8 hours and my use for up to 16 weeks. 48 packet 5     isosorbide mononitrate (IMDUR) 60 MG 24 hr tablet Take 1 tablet (60 mg) by mouth daily 30 tablet 1     LORazepam (ATIVAN) 1 MG tablet Take 1 tablet (1 mg) by mouth 2 times daily as needed for anxiety 60 tablet 1     losartan (COZAAR) 25 MG tablet Take 1 tablet (25 mg) by mouth 2 times daily 60 tablet 4     naloxone (NARCAN) 4 MG/0.1ML nasal spray Spray 1 spray (4 mg) into one nostril alternating nostrils as needed for opioid reversal every 2-3 minutes until assistance arrives 0.2 mL 11     nitroGLYcerin (NITROSTAT) 0.4 MG sublingual tablet Place 1 tablet (0.4 mg) under the tongue every 5 minutes as needed for chest pain For chest pain place 1 tablet " "under the tongue every 5 minutes for 3 doses. If symptoms persist 5 minutes after 1st dose call 911. 25 tablet 0     pantoprazole (PROTONIX) 40 MG EC tablet Take 1 tablet (40 mg) by mouth daily 90 tablet 3     RESTASIS 0.05 % ophthalmic emulsion INSTILL 1 DROP INTO BOTH EYES TWICE A DAY 60 mL 4     rosuvastatin (CRESTOR) 20 MG tablet Take 1 tablet (20 mg) by mouth daily 90 tablet 3     Syringe/Needle, Disp, (SYRINGE LUER LOCK) 20G X 1-1/2\" 3 ML MISC 1 Device once a week - and also needs 22 G needles 1.5 inch #30 with 1 refill 30 each 1     testosterone cypionate (DEPOTESTOSTERONE) 200 MG/ML injection Inject 1 mL (200 mg) into the muscle once a week 4 mL 5     torsemide (DEMADEX) 10 MG tablet Take 1 tablet (10 mg) by mouth every morning 30 tablet 4     vitamin C (ASCORBIC ACID) 1000 MG TABS Take 1,000 mg by mouth daily 90 0     zolpidem (AMBIEN) 10 MG tablet TAKE 1 TABLET (10 MG) BY MOUTH NIGHTLY AS NEEDED FOR SLEEP 30 tablet 2     loperamide (IMODIUM A-D) 2 MG tablet Take 2 tabs (4 mg) after first loose stool, and then take one tab (2 mg) after each diarrheal stool.  Max of 8 tabs (16 mg) per day. (Patient not taking: Reported on 12/8/2022) 30 tablet 0         ALLERGIES:  Allergies   Allergen Reactions     Acetaminophen Other (See Comments)     Patient states he does not have any reaction to this     Dairy Digestive      Morphine Other (See Comments)     Patient states he had a headache one time but has used since with no adverse reactions     Sulfa Drugs      Theophylline Hives       PAST MEDICAL HISTORY:    Past Medical History:   Diagnosis Date     ADHD (attention deficit hyperactivity disorder)      Allergic rhinitis, cause unspecified     Allergic rhinitis     Benign hypertension      CAD (coronary artery disease)     cardiac cath 11/23/2022: FERNANDA x3 to LAD     Chronic back pain      Hiccough      Hypersomnia with sleep apnea, unspecified      Major depressive disorder, single episode, moderate (H) 03/2008     " Mild persistent asthma      Other primary cardiomyopathies     Cardiomyopathy -- unknown etiology       PAST SURGICAL HISTORY:    Past Surgical History:   Procedure Laterality Date     APPENDECTOMY  2007     BACK SURGERY  2014    L5-S1 fusion     CV CORONARY ANGIOGRAM N/A 2020    Procedure: Heart Catheterization with Possible Intervention;  Surgeon: Theo Donahue MD;  Location:  [facility]     CV CORONARY ANGIOGRAM N/A 2022    Procedure: Coronary Angiogram;  Surgeon: Venkata Hdez MD;  Location:  [facility]     CV PCI N/A 2022    Procedure: Percutaneous Coronary Intervention;  Surgeon: Venkata Hdez MD;  Location:  [facility]     HC REPAIR OF NASAL SEPTUM       LAPAROSCOPIC CHOLECYSTECTOMY      Cholecystectomy, Laparoscopic     SURGICAL HISTORY OF -       torn pectoral muscle     SURGICAL HISTORY OF -       Bilateral radiofrequency volume reduction of the inferior turbinates.     SURGICAL HISTORY OF -       rhinoplasty- septoplasty       FAMILY HISTORY:    Family History   Problem Relation Age of Onset     Coronary Artery Disease Mother          55; MI x 2     Cancer Father         hodgkins     Hypertension Father      Coronary Artery Disease Father      Cardiovascular Sister      Hypertension Brother      Cardiovascular Maternal Grandmother      No Known Problems Daughter      Prostate Cancer No family hx of      Cancer - colorectal No family hx of      Cardiomyopathy No family hx of      Valvular heart disease No family hx of        SOCIAL HISTORY:    Social History     Socioeconomic History     Marital status:      Spouse name: None     Number of children: 1     Years of education: None     Highest education level: None   Occupational History     Occupation: Retired chiropracter.     Employer: [employer]    Tobacco Use     Smoking status: Never     Smokeless tobacco: Never   Vaping Use     Vaping Use:  Never used   Substance and Sexual Activity     Alcohol use: No     Comment: rarely      Drug use: No     Sexual activity: Yes     Partners: Female     Birth control/protection: Condom   Other Topics Concern     Parent/sibling w/ CABG, MI or angioplasty before 65F 55M? No

## 2022-12-08 NOTE — LETTER
12/8/2022    Luis Armando Segovia MD  [address] [address]    RE: Jeremi Damon       Dear Colleague,     I had the pleasure of seeing Jeremi Damon in the [facility].    Clinic visit note dictated. Dictation reference number - [id number]        Today's clinic visit entailed:  The following tests were independently interpreted by me as noted in my documentation: ECg, labs, coronary angiogram images, echocardiogram images, chest x-ray images, CT brain.  Ordering of each unique test  Prescription drug management  60 minutes spent on the date of the encounter doing chart review, history and exam, documentation and further activities per the note.  The level of medical decision making during this visit was of high complexity.        Encounter Diagnoses   Name Primary?     Uncontrolled hypertension Yes     Acute HFrEF (heart failure with reduced ejection fraction) (H)      Stage 3b chronic kidney disease (H)      Cardiomyopathy due to hypertension, with heart failure (H)      Coronary artery disease of native artery of native heart with stable angina pectoris (H)          Orders Placed This Encounter   Procedures     Basic metabolic panel     N terminal pro BNP outpatient     Follow-Up with Cardiology [facility]- [facility]     Follow-Up with Cardiology         Vitals: BP (!) 151/95   Pulse 94   Ht 1.829 m (6')   Wt 108.9 kg (240 lb)   SpO2 96%   BMI 32.55 kg/m    Wt Readings from Last 5 Encounters:   12/08/22 108.9 kg (240 lb)   11/24/22 108.5 kg (239 lb 1.5 oz)   11/04/22 110.2 kg (243 lb)   06/23/22 111 kg (244 lb 12.8 oz)   10/19/21 116.1 kg (256 lb)           CURRENT MEDICATIONS:  Current Outpatient Medications   Medication Sig Dispense Refill     albuterol (PROAIR HFA/PROVENTIL HFA/VENTOLIN HFA) 108 (90 Base) MCG/ACT inhaler Inhale 2 puffs into the lungs every 6 hours 18 g 5     amitriptyline (ELAVIL) 50 MG tablet Take 3 tablets (150 mg) by mouth At Bedtime 270 tablet 3     amLODIPine  "(NORVASC) 5 MG tablet Take 1 tablet (5 mg) by mouth every morning 30 tablet 4     amphetamine-dextroamphetamine (ADDERALL) 20 MG tablet Take 1 tablet (20 mg) by mouth 2 times daily 90 tablet 0     aspirin (ASA) 81 MG chewable tablet Take 1 tablet (81 mg) by mouth daily Starting tomorrow. 30 tablet 3     B-D LUER-HIWOT SYRINGE 21G X 1-1/2\" 3 ML MISC 1 DEVICE ONCE A WEEK   AND ALSO NEEDS 22 G NEEDLES 1.5 INCH       buprenorphine HCl-naloxone HCl (SUBOXONE) 8-2 MG per film 1/2 film QID - Brand name only 60 Film 1     buPROPion (WELLBUTRIN XL) 300 MG 24 hr tablet Take 1 tablet (300 mg) by mouth every morning 90 tablet 1     busPIRone (BUSPAR) 5 MG tablet Take 1 tablet (5 mg) by mouth 2 times daily as needed (panic attack) 30 tablet 1     carvedilol (COREG) 25 MG tablet Take 1 tablet (25 mg) by mouth 2 times daily (with meals) 60 tablet 0     clonazePAM (KLONOPIN) 1 MG tablet TAKE 1 TABLET (1 MG) BY MOUTH 2 TIMES DAILY AS NEEDED (FLYING PHOBIA) 4 tablet 0     clopidogrel (PLAVIX) 75 MG tablet Take 1 tablet (75 mg) by mouth daily 90 tablet 3     cyclobenzaprine (FLEXERIL) 10 MG tablet Take 1 tablet (10 mg) by mouth 3 times daily as needed for other (hiccups) 90 tablet 3     finasteride (PROSCAR) 5 MG tablet 1/2 tab daily 90 tablet 1     fluticasone-salmeterol (ADVAIR DISKUS) 500-50 MCG/DOSE inhaler 1 inhalation 2 times per day 1 Inhaler 2     imiquimod (ALDARA) 5 % external cream Apply topically At Bedtime -wash off after 8 hours and my use for up to 16 weeks. 48 packet 5     isosorbide mononitrate (IMDUR) 60 MG 24 hr tablet Take 1 tablet (60 mg) by mouth daily 30 tablet 1     LORazepam (ATIVAN) 1 MG tablet Take 1 tablet (1 mg) by mouth 2 times daily as needed for anxiety 60 tablet 1     losartan (COZAAR) 25 MG tablet Take 1 tablet (25 mg) by mouth 2 times daily 60 tablet 4     naloxone (NARCAN) 4 MG/0.1ML nasal spray Spray 1 spray (4 mg) into one nostril alternating nostrils as needed for opioid reversal every 2-3 minutes " "until assistance arrives 0.2 mL 11     nitroGLYcerin (NITROSTAT) 0.4 MG sublingual tablet Place 1 tablet (0.4 mg) under the tongue every 5 minutes as needed for chest pain For chest pain place 1 tablet under the tongue every 5 minutes for 3 doses. If symptoms persist 5 minutes after 1st dose call 911. 25 tablet 0     pantoprazole (PROTONIX) 40 MG EC tablet Take 1 tablet (40 mg) by mouth daily 90 tablet 3     RESTASIS 0.05 % ophthalmic emulsion INSTILL 1 DROP INTO BOTH EYES TWICE A DAY 60 mL 4     rosuvastatin (CRESTOR) 20 MG tablet Take 1 tablet (20 mg) by mouth daily 90 tablet 3     Syringe/Needle, Disp, (SYRINGE LUER LOCK) 20G X 1-1/2\" 3 ML MISC 1 Device once a week - and also needs 22 G needles 1.5 inch #30 with 1 refill 30 each 1     testosterone cypionate (DEPOTESTOSTERONE) 200 MG/ML injection Inject 1 mL (200 mg) into the muscle once a week 4 mL 5     torsemide (DEMADEX) 10 MG tablet Take 1 tablet (10 mg) by mouth every morning 30 tablet 4     vitamin C (ASCORBIC ACID) 1000 MG TABS Take 1,000 mg by mouth daily 90 0     zolpidem (AMBIEN) 10 MG tablet TAKE 1 TABLET (10 MG) BY MOUTH NIGHTLY AS NEEDED FOR SLEEP 30 tablet 2     loperamide (IMODIUM A-D) 2 MG tablet Take 2 tabs (4 mg) after first loose stool, and then take one tab (2 mg) after each diarrheal stool.  Max of 8 tabs (16 mg) per day. (Patient not taking: Reported on 12/8/2022) 30 tablet 0         ALLERGIES:  Allergies   Allergen Reactions     Acetaminophen Other (See Comments)     Patient states he does not have any reaction to this     Dairy Digestive      Morphine Other (See Comments)     Patient states he had a headache one time but has used since with no adverse reactions     Sulfa Drugs      Theophylline Hives       PAST MEDICAL HISTORY:    Past Medical History:   Diagnosis Date     ADHD (attention deficit hyperactivity disorder)      Allergic rhinitis, cause unspecified     Allergic rhinitis     Benign hypertension      CAD (coronary artery " disease)     cardiac cath 2022: FERNANDA x3 to LAD     Chronic back pain      Hiccough      Hypersomnia with sleep apnea, unspecified      Major depressive disorder, single episode, moderate (H) 2008     Mild persistent asthma      Other primary cardiomyopathies     Cardiomyopathy -- unknown etiology       PAST SURGICAL HISTORY:    Past Surgical History:   Procedure Laterality Date     APPENDECTOMY       BACK SURGERY      L5-S1 fusion     CV CORONARY ANGIOGRAM N/A 2020    Procedure: Heart Catheterization with Possible Intervention;  Surgeon: Theo Donahue MD;  Location:  [facility]ARD[facility] CATH LAB     CV CORONARY ANGIOGRAM N/A 2022    Procedure: Coronary Angiogram;  Surgeon: Venkata Hdez MD;  Location: [facility] CARDIAC CATH LAB     CV PCI N/A 2022    Procedure: Percutaneous Coronary Intervention;  Surgeon: Venkata Hdez MD;  Location: [facility] CARDIAC CATH LAB     HC REPAIR OF NASAL SEPTUM       LAPAROSCOPIC CHOLECYSTECTOMY      Cholecystectomy, Laparoscopic     SURGICAL HISTORY OF -       torn pectoral muscle     SURGICAL HISTORY OF -       Bilateral radiofrequency volume reduction of the inferior turbinates.     SURGICAL HISTORY OF -       rhinoplasty- septoplasty       FAMILY HISTORY:    Family History   Problem Relation Age of Onset     Coronary Artery Disease Mother          55; MI x 2     Cancer Father         hodgkins     Hypertension Father      Coronary Artery Disease Father      Cardiovascular Sister      Hypertension Brother      Cardiovascular Maternal Grandmother      No Known Problems Daughter      Prostate Cancer No family hx of      Cancer - colorectal No family hx of      Cardiomyopathy No family hx of      Valvular heart disease No family hx of        SOCIAL HISTORY:    Social History     Socioeconomic History     Marital status:      Spouse name: None     Number of children: 1     Years of education: None     Highest  education level: None   Occupational History     Occupation: Retired chiropracter.     Employer: [employer]    Tobacco Use     Smoking status: Never     Smokeless tobacco: Never   Vaping Use     Vaping Use: Never used   Substance and Sexual Activity     Alcohol use: No     Comment: rarely      Drug use: No     Sexual activity: Yes     Partners: Female     Birth control/protection: Condom   Other Topics Concern     Parent/sibling w/ CABG, MI or angioplasty before 65F 55M? 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  He follows with my partner, Dr. Keita.  I am seeing him for post hospitalization followup due to unavailability of appointments with a J[facility] or Dr. Keita.    Jeremi is known to have longstanding resistant hypertension, stage IIIB chronic kidney disease, hypertensive heart disease, mixed dyslipidemia with very low HDL, untreated severe sleep apnea due to CPAP intolerance, BMI 32 kg/m2, ADHD (on Adderall), prostatic hypertrophy, bipolar I disorder with anxiety and panic attacks, GERD, chronic pain (on opioids).    The patient was hospitalized at [facility] from 11/19 to 11/24/2022 with hypertensive emergency with blood pressures of 230-250, pulmonary edema with markedly elevated NT-proBNP, NSTEMI.  He required nicardipine drip and IV furosemide for treatment.      His coronary angiography showed triple-vessel coronary artery disease with moderate disease in the RCA and circumflex and severe mid LAD stenosis that was stented with 3 stents.  He was put on DAPT with aspirin and clopidogrel, which he has been taking.  Because his creatinine was elevated at 1.7, his established ARB was held and diuretics were held.  He was seen by Cardiology who advised Imdur and hydralazine.    Over the  last 3 weeks, he has been having frequent urination, despite not being on a diuretic, his blood pressure is still elevated in the 150s/90s.  He has profound fatigue with the new medication combination and requests a change.  He has GERD, but no chest pain, no lower extremity edema.  His weight is 240 pounds.  BP is 150/95 (rechecked).  He is tachycardic at 94 despite taking full dose carvedilol 25 mg b.i.d.    He states that he drinks significant amounts of caffeine throughout the day to counteract the effects of carvedilol on myocardial contractility.  He also works out at least 5 times a week in the gym including doing both aerobic and weight training.  He denies alcohol intake.  No tobacco use.    DIAGNOSTIC DATA:  Labs:  Total cholesterol 90, HDL 28, LDL 94, triglycerides 215, creatinine 1.7, potassium 4.6, estimated GFR 47, polycythemia with a hemoglobin of 16.3 in the context of untreated severe sleep apnea.    ECG shows sinus rhythm with first-degree AV block ( milliseconds), right bundle branch block.    Recent echocardiogram shows left ventricular systolic cardiomyopathy with reduced ejection fraction with global hypokinesis, LVEF 30%-35%, mildly decreased right ventricular systolic function, mild to moderate mitral valve regurgitation, secondary pulmonary hypertension, dilated inferior vena cava.    I personally reviewed and independently interpreted labs, ECG, chest x-ray image (showed pulmonary edema), echocardiogram images, coronary angiogram images as documented above.  I note patient also had a head CT, which was negative for bleed or stroke.    PHYSICAL EXAMINATION:    GENERAL:  He is micheal faced, but alert and oriented, comfortable at rest.  VITAL SIGNS:  /95, pulse 94, weight 240 pounds, BMI 32, sats are 96% on room air.  CARDIOVASCULAR:  Normal first heart sound, split second heart sound, no audible murmur.  No S3 or S4.  Normal jugular venous pressure.  RESPIRATORY:  Normal breath  sounds.  No valve disease.  ABDOMEN:  Soft, nontender without hepatosplenomegaly.  EXTREMITIES:  No edema.    DIAGNOSES AND ASSESSMENT:    1.  Uncontrolled hypertension.  He is on multiple drugs and his blood pressure is still elevated.  Denies NSAID use.  Drinks very high amounts of caffeine.  Caffeine use will increase myocardial contractility and counteract the effects of beta blocker.  He has fatigue and wants to change his medications back to what worked for him in the past and I think this is reasonable.  2.  Acute heart failure with reduced ejection fraction secondary to hypertension and coronary artery disease.  We will optimize medications including addition of diuretic and ARB.  3.  CAD, status post LAD stenting in 11/2022.  Continue aspirin DAPT with aspirin and clopidogrel.  4.  Stage IIIB chronic kidney disease.  Reconcile medications and closely monitor renal function.  5.  Excess caffeine intake.  I counseled him, but he is very set in his ways and is unlikely to cut back.  6.  Severe untreated sleep apnea due to CPAP intolerance.    PLAN:    1.  Medication changes made today:    -- Stop hydralazine 100 mg 2 times daily.  The patient is fatigued from this.  -- Start amlodipine 5 mg daily in the morning.  He has tolerated this well in the past.    -- Start losartan 25 mg 2 times daily.  -- Start torsemide 10 mg daily.  2.  Orders placed today:  -- Basic metabolic panel, NT-proBNP and establish care with [facility] IN 2 weeks.  -- Follow up with Dr. Keita in 6 months.  -- Note, there was a question of hypertrophic cardiomyopathy in the past.  He has hypertensive cardiomyopathy from years of uncontrolled hypertension.  He is extremely claustrophobic and a cardiac MRI would not be a good idea in this patient and he is unlikely to tolerate a cardiac MRI.  3.  Extensive counseling, including review of all tests directly with the patient.  He has a background as a chiropractor and has fixed ideas about  his medications and caffeine intake.  4.  Detailed visit summary printed for the patient.    Total time today:  60 minutes.  High complexity medical decision making.    Genevieve Morales MD        D: 2022   T: 2022   MT: HEMA    Name:     FRANCISCO KELLER  MRN:      [id number]        Account:      [id number]   :      1970           Service Date: 2022       Document: [id number]      Thank you for allowing me to participate in the care of your patient.      Sincerely,     Genevieve Morales MD     [facility]  cc:   No referring provider defined for this encounter.

## 2022-12-08 NOTE — PATIENT INSTRUCTIONS
1.  Medication changes made today:  -- Stop hydralazine 100 mg 2 times daily.  -- Start amlodipine 5 mg daily.  Take 1 tablet in the morning.  -- Start losartan 25 mg.  Take 1 tablet 2 times daily.  -- Start torsemide 10 mg.  This is a diuretic.  Take 1 tablet in the morning.    2.  Tests ordered today:  -- Blood tests and follow-up with the cardiology [facility] in the [facility] in 2 weeks.  -- Follow-up with Dr. Keita in 6 months.    If you have any questions or concerns, please contact my nurses at [phone].

## 2022-12-12 ENCOUNTER — OFFICE VISIT (OUTPATIENT)
Dept: FAMILY MEDICINE | Facility: CLINIC | Age: 52
End: 2022-12-12
Payer: COMMERCIAL

## 2022-12-12 VITALS
HEART RATE: 66 BPM | RESPIRATION RATE: 16 BRPM | HEIGHT: 72 IN | SYSTOLIC BLOOD PRESSURE: 142 MMHG | OXYGEN SATURATION: 97 % | BODY MASS INDEX: 32.1 KG/M2 | WEIGHT: 237 LBS | TEMPERATURE: 98.6 F | DIASTOLIC BLOOD PRESSURE: 86 MMHG

## 2022-12-12 DIAGNOSIS — I42.2 HYPERTROPHIC CARDIOMYOPATHY (H): ICD-10-CM

## 2022-12-12 DIAGNOSIS — I10 HYPERTENSION GOAL BP (BLOOD PRESSURE) < 140/90: ICD-10-CM

## 2022-12-12 DIAGNOSIS — N28.89 LEFT RENAL MASS: ICD-10-CM

## 2022-12-12 DIAGNOSIS — I25.10 CORONARY ARTERY DISEASE INVOLVING NATIVE CORONARY ARTERY OF NATIVE HEART WITHOUT ANGINA PECTORIS: Primary | ICD-10-CM

## 2022-12-12 DIAGNOSIS — I10 UNCONTROLLED HYPERTENSION: ICD-10-CM

## 2022-12-12 DIAGNOSIS — G47.00 INSOMNIA, UNSPECIFIED TYPE: ICD-10-CM

## 2022-12-12 DIAGNOSIS — N40.0 BENIGN PROSTATIC HYPERPLASIA WITHOUT LOWER URINARY TRACT SYMPTOMS: ICD-10-CM

## 2022-12-12 DIAGNOSIS — I25.10 CORONARY ARTERY DISEASE INVOLVING NATIVE HEART WITHOUT ANGINA PECTORIS, UNSPECIFIED VESSEL OR LESION TYPE: ICD-10-CM

## 2022-12-12 DIAGNOSIS — I21.4 NSTEMI (NON-ST ELEVATED MYOCARDIAL INFARCTION) (H): ICD-10-CM

## 2022-12-12 PROBLEM — I16.1 HYPERTENSIVE EMERGENCY: Status: RESOLVED | Noted: 2022-11-19 | Resolved: 2022-12-12

## 2022-12-12 PROCEDURE — G0009 ADMIN PNEUMOCOCCAL VACCINE: HCPCS | Performed by: FAMILY MEDICINE

## 2022-12-12 PROCEDURE — 90682 RIV4 VACC RECOMBINANT DNA IM: CPT | Performed by: FAMILY MEDICINE

## 2022-12-12 PROCEDURE — 99214 OFFICE O/P EST MOD 30 MIN: CPT | Mod: 25 | Performed by: FAMILY MEDICINE

## 2022-12-12 PROCEDURE — 90677 PCV20 VACCINE IM: CPT | Performed by: FAMILY MEDICINE

## 2022-12-12 PROCEDURE — G0008 ADMIN INFLUENZA VIRUS VAC: HCPCS | Performed by: FAMILY MEDICINE

## 2022-12-12 RX ORDER — ZOLPIDEM TARTRATE 10 MG/1
10 TABLET ORAL
Qty: 30 TABLET | Refills: 2 | Status: SHIPPED | OUTPATIENT
Start: 2022-12-29 | End: 2023-04-03

## 2022-12-12 RX ORDER — CARVEDILOL 25 MG/1
25 TABLET ORAL 2 TIMES DAILY WITH MEALS
Qty: 180 TABLET | Refills: 3 | Status: SHIPPED | OUTPATIENT
Start: 2022-12-12 | End: 2023-09-08

## 2022-12-12 RX ORDER — LOSARTAN POTASSIUM 25 MG/1
25 TABLET ORAL 2 TIMES DAILY
Qty: 90 TABLET | Refills: 3 | Status: SHIPPED | OUTPATIENT
Start: 2022-12-12 | End: 2022-12-23 | Stop reason: ALTCHOICE

## 2022-12-12 RX ORDER — ISOSORBIDE MONONITRATE 60 MG/1
60 TABLET, EXTENDED RELEASE ORAL DAILY
Qty: 90 TABLET | Refills: 3 | Status: SHIPPED | OUTPATIENT
Start: 2022-12-12 | End: 2023-09-08

## 2022-12-12 RX ORDER — AMLODIPINE BESYLATE 5 MG/1
5 TABLET ORAL EVERY MORNING
Qty: 90 TABLET | Refills: 3 | Status: SHIPPED | OUTPATIENT
Start: 2022-12-12 | End: 2023-09-08

## 2022-12-12 ASSESSMENT — ASTHMA QUESTIONNAIRES
QUESTION_1 LAST FOUR WEEKS HOW MUCH OF THE TIME DID YOUR ASTHMA KEEP YOU FROM GETTING AS MUCH DONE AT WORK, SCHOOL OR AT HOME: NONE OF THE TIME
QUESTION_2 LAST FOUR WEEKS HOW OFTEN HAVE YOU HAD SHORTNESS OF BREATH: ONCE OR TWICE A WEEK
ACT_TOTALSCORE: 21
ACT_TOTALSCORE: 21
QUESTION_3 LAST FOUR WEEKS HOW OFTEN DID YOUR ASTHMA SYMPTOMS (WHEEZING, COUGHING, SHORTNESS OF BREATH, CHEST TIGHTNESS OR PAIN) WAKE YOU UP AT NIGHT OR EARLIER THAN USUAL IN THE MORNING: NOT AT ALL
QUESTION_4 LAST FOUR WEEKS HOW OFTEN HAVE YOU USED YOUR RESCUE INHALER OR NEBULIZER MEDICATION (SUCH AS ALBUTEROL): TWO OR THREE TIMES PER WEEK
QUESTION_5 LAST FOUR WEEKS HOW WOULD YOU RATE YOUR ASTHMA CONTROL: WELL CONTROLLED

## 2022-12-12 ASSESSMENT — ANXIETY QUESTIONNAIRES
2. NOT BEING ABLE TO STOP OR CONTROL WORRYING: NEARLY EVERY DAY
5. BEING SO RESTLESS THAT IT IS HARD TO SIT STILL: SEVERAL DAYS
GAD7 TOTAL SCORE: 16
6. BECOMING EASILY ANNOYED OR IRRITABLE: MORE THAN HALF THE DAYS
IF YOU CHECKED OFF ANY PROBLEMS ON THIS QUESTIONNAIRE, HOW DIFFICULT HAVE THESE PROBLEMS MADE IT FOR YOU TO DO YOUR WORK, TAKE CARE OF THINGS AT HOME, OR GET ALONG WITH OTHER PEOPLE: VERY DIFFICULT
8. IF YOU CHECKED OFF ANY PROBLEMS, HOW DIFFICULT HAVE THESE MADE IT FOR YOU TO DO YOUR WORK, TAKE CARE OF THINGS AT HOME, OR GET ALONG WITH OTHER PEOPLE?: VERY DIFFICULT
GAD7 TOTAL SCORE: 16
7. FEELING AFRAID AS IF SOMETHING AWFUL MIGHT HAPPEN: SEVERAL DAYS
3. WORRYING TOO MUCH ABOUT DIFFERENT THINGS: NEARLY EVERY DAY
1. FEELING NERVOUS, ANXIOUS, OR ON EDGE: NEARLY EVERY DAY
4. TROUBLE RELAXING: NEARLY EVERY DAY
7. FEELING AFRAID AS IF SOMETHING AWFUL MIGHT HAPPEN: SEVERAL DAYS
GAD7 TOTAL SCORE: 16

## 2022-12-12 ASSESSMENT — PATIENT HEALTH QUESTIONNAIRE - PHQ9
SUM OF ALL RESPONSES TO PHQ QUESTIONS 1-9: 13
SUM OF ALL RESPONSES TO PHQ QUESTIONS 1-9: 13
10. IF YOU CHECKED OFF ANY PROBLEMS, HOW DIFFICULT HAVE THESE PROBLEMS MADE IT FOR YOU TO DO YOUR WORK, TAKE CARE OF THINGS AT HOME, OR GET ALONG WITH OTHER PEOPLE: VERY DIFFICULT

## 2022-12-12 NOTE — PROGRESS NOTES
Assessment & Plan     Benign prostatic hyperplasia without lower urinary tract symptoms  Stable - continue medication(s).  (Finasteride)    Left renal mass  Noted on imaging and follow-up study ordered:  - CT Renal Mass wo & w Contrast    Hypertrophic cardiomyopathy (H)  No signs of heart failure at this time.  Continue work with cardiology.    Hypertension goal BP (blood pressure) < 140/90  Uncontrolled hypertension  Improving and will continue medication.  - amLODIPine (NORVASC) 5 MG tablet  Dispense: 90 tablet; Refill: 3  - losartan (COZAAR) 25 MG tablet  Dispense: 90 tablet; Refill: 3  - carvedilol (COREG) 25 MG tablet  Dispense: 180 tablet; Refill: 3    Coronary artery disease involving native coronary artery of native heart without angina pectoris  NSTEMI (non-ST elevated myocardial infarction) (H)  Coronary artery disease involving native heart without angina pectoris, unspecified vessel or lesion type  No recent chest pain, continue medication and improvement in blood pressure control  - isosorbide mononitrate (IMDUR) 60 MG 24 hr tablet  Dispense: 90 tablet; Refill: 3    Insomnia, unspecified type  Controlled - continue medication(s).  - zolpidem (AMBIEN) 10 MG tablet  Dispense: 30 tablet; Refill: 2      BMI:   Estimated body mass index is 32.14 kg/m  as calculated from the following:    Height as of this encounter: 1.829 m (6').    Weight as of this encounter: 107.5 kg (237 lb).   Weight management plan: Discussed healthy diet and exercise guidelines      Return in about 6 months (around 6/23/2023) for wellness exam with fasting labs with Nate Segovia MD.      Nate Segovia MD      [address] [address]  [email or web address]   Office: [phone]       Justin Blood is a 52 year old, presenting for the following health issues:  Hospital F/U      HPI       Hospital Follow-up Visit:    Hospital/Nursing Home/IP Rehab Facility: [facility]  Date of Admission: 11/19/22  Date of Discharge: 11/24/22  Reason(s) for Admission: heart attack    Was your hospitalization related to COVID-19? No   Problems taking medications regularly:  None  Medication changes since discharge: None  Problems adhering to non-medication therapy:  None    Summary of hospitalization:  [facility] discharge summary reviewed  Diagnostic Tests/Treatments reviewed.  Follow up needed: cardiology, urology  Other Healthcare Providers Involved in Patient s Care:         None  Update since discharge: improved. He would like  Plan of care communicated with patient        Constitutional, HEENT, cardiovascular, pulmonary, gi and gu systems are negative, except as otherwise noted.      Objective    BP (!) 142/86 (BP Location: Left arm, Patient Position: Chair, Cuff Size: Adult Large)   Pulse 66   Temp 98.6  F (37  C) (Temporal)   Resp 16   Ht 1.829 m (6')   Wt 107.5 kg (237 lb)   SpO2 97%   BMI 32.14 kg/m    Body mass index is 32.14 kg/m .  Physical Exam   GENERAL: healthy, alert and no distress  NECK: no adenopathy, no asymmetry, masses, or scars and thyroid normal to palpation  RESP: lungs clear to auscultation - no rales, rhonchi or wheezes  CV: regular rate and rhythm, normal S1 S2, no S3 or S4, no murmur, click or rub, no peripheral edema and peripheral pulses strong  ABDOMEN: soft, nontender, no hepatosplenomegaly, no masses and bowel sounds normal  MS: no gross musculoskeletal defects noted, no edema  SKIN: no suspicious lesions or rashes  NEURO: Normal strength and tone, mentation intact and speech normal  BACK: no CVA tenderness, no paralumbar tenderness

## 2022-12-12 NOTE — NURSING NOTE
Prior to immunization administration, verified patients identity using patient s name and date of birth. Please see Immunization Activity for additional information.     Screening Questionnaire for Adult Immunization    Are you sick today?   No   Do you have allergies to medications, food, a vaccine component or latex?   No   Have you ever had a serious reaction after receiving a vaccination?   No   Do you have a long-term health problem with heart, lung, kidney, or metabolic disease (e.g., diabetes), asthma, a blood disorder, no spleen, complement component deficiency, a cochlear implant, or a spinal fluid leak?  Are you on long-term aspirin therapy?   No   Do you have cancer, leukemia, HIV/AIDS, or any other immune system problem?   No   Do you have a parent, brother, or sister with an immune system problem?   No   In the past 3 months, have you taken medications that affect  your immune system, such as prednisone, other steroids, or anticancer drugs; drugs for the treatment of rheumatoid arthritis, Crohn s disease, or psoriasis; or have you had radiation treatments?   No   Have you had a seizure, or a brain or other nervous system problem?   No   During the past year, have you received a transfusion of blood or blood    products, or been given immune (gamma) globulin or antiviral drug?   No   For women: Are you pregnant or is there a chance you could become       pregnant during the next month?   No   Have you received any vaccinations in the past 4 weeks?   No     Immunization questionnaire answers were all negative.        Per orders of Dr. Segovia, injection of flu and pevnar 20 given by Divina Benitez CMA. Patient instructed to remain in clinic for 15 minutes afterwards, and to report any adverse reaction to me immediately.       Screening performed by Divina Benitez CMA on 12/12/2022 at 12:03 PM.

## 2022-12-15 DIAGNOSIS — F41.0 PANIC DISORDER WITHOUT AGORAPHOBIA: ICD-10-CM

## 2022-12-15 DIAGNOSIS — F41.1 GENERALIZED ANXIETY DISORDER: ICD-10-CM

## 2022-12-16 RX ORDER — BUSPIRONE HYDROCHLORIDE 5 MG/1
5 TABLET ORAL 2 TIMES DAILY PRN
Qty: 30 TABLET | Refills: 3 | Status: SHIPPED | OUTPATIENT
Start: 2022-12-16 | End: 2023-02-22

## 2022-12-16 RX ORDER — LORAZEPAM 1 MG/1
TABLET ORAL
Qty: 60 TABLET | Refills: 1 | Status: SHIPPED | OUTPATIENT
Start: 2022-12-16 | End: 2023-02-12

## 2022-12-16 NOTE — TELEPHONE ENCOUNTER
Routing refill request to provider for review/approval because:  Drug not on the [facility] refill protocol     Georgie MOYER RN

## 2022-12-23 ENCOUNTER — LAB (OUTPATIENT)
Dept: LAB | Facility: CLINIC | Age: 52
End: 2022-12-23
Payer: COMMERCIAL

## 2022-12-23 ENCOUNTER — OFFICE VISIT (OUTPATIENT)
Dept: CARDIOLOGY | Facility: CLINIC | Age: 52
End: 2022-12-23
Payer: COMMERCIAL

## 2022-12-23 VITALS
HEART RATE: 79 BPM | BODY MASS INDEX: 32.1 KG/M2 | WEIGHT: 237 LBS | HEIGHT: 72 IN | OXYGEN SATURATION: 93 % | DIASTOLIC BLOOD PRESSURE: 86 MMHG | SYSTOLIC BLOOD PRESSURE: 130 MMHG

## 2022-12-23 DIAGNOSIS — N18.32 STAGE 3B CHRONIC KIDNEY DISEASE (H): ICD-10-CM

## 2022-12-23 DIAGNOSIS — I10 UNCONTROLLED HYPERTENSION: ICD-10-CM

## 2022-12-23 DIAGNOSIS — I50.23 ACUTE ON CHRONIC HFREF (HEART FAILURE WITH REDUCED EJECTION FRACTION) (H): ICD-10-CM

## 2022-12-23 DIAGNOSIS — I25.118 CORONARY ARTERY DISEASE OF NATIVE ARTERY OF NATIVE HEART WITH STABLE ANGINA PECTORIS (H): ICD-10-CM

## 2022-12-23 DIAGNOSIS — I50.21 ACUTE HFREF (HEART FAILURE WITH REDUCED EJECTION FRACTION) (H): ICD-10-CM

## 2022-12-23 DIAGNOSIS — I11.0 CARDIOMYOPATHY DUE TO HYPERTENSION, WITH HEART FAILURE (H): ICD-10-CM

## 2022-12-23 DIAGNOSIS — I43 CARDIOMYOPATHY DUE TO HYPERTENSION, WITH HEART FAILURE (H): ICD-10-CM

## 2022-12-23 LAB
ANION GAP SERPL CALCULATED.3IONS-SCNC: 3 MMOL/L (ref 3–14)
BUN SERPL-MCNC: 18 MG/DL (ref 7–30)
CALCIUM SERPL-MCNC: 8.5 MG/DL (ref 8.5–10.1)
CHLORIDE BLD-SCNC: 101 MMOL/L (ref 94–109)
CO2 SERPL-SCNC: 34 MMOL/L (ref 20–32)
CREAT SERPL-MCNC: 1.73 MG/DL (ref 0.66–1.25)
GFR SERPL CREATININE-BSD FRML MDRD: 46 ML/MIN/1.73M2
GLUCOSE BLD-MCNC: 93 MG/DL (ref 70–99)
NT-PROBNP SERPL-MCNC: 202 PG/ML (ref 0–900)
POTASSIUM BLD-SCNC: 4.2 MMOL/L (ref 3.4–5.3)
SODIUM SERPL-SCNC: 138 MMOL/L (ref 133–144)
TSH SERPL DL<=0.005 MIU/L-ACNC: 3.29 MU/L (ref 0.4–4)

## 2022-12-23 PROCEDURE — 84443 ASSAY THYROID STIM HORMONE: CPT | Performed by: NURSE PRACTITIONER

## 2022-12-23 PROCEDURE — 99215 OFFICE O/P EST HI 40 MIN: CPT | Performed by: NURSE PRACTITIONER

## 2022-12-23 PROCEDURE — 36415 COLL VENOUS BLD VENIPUNCTURE: CPT | Performed by: INTERNAL MEDICINE

## 2022-12-23 PROCEDURE — 83880 ASSAY OF NATRIURETIC PEPTIDE: CPT | Performed by: INTERNAL MEDICINE

## 2022-12-23 PROCEDURE — 80048 BASIC METABOLIC PNL TOTAL CA: CPT | Performed by: INTERNAL MEDICINE

## 2022-12-23 RX ORDER — HYDRALAZINE HYDROCHLORIDE 100 MG/1
100 TABLET, FILM COATED ORAL 3 TIMES DAILY
Qty: 90 TABLET | Refills: 6 | Status: SHIPPED | OUTPATIENT
Start: 2022-12-23 | End: 2023-01-20

## 2022-12-23 NOTE — PATIENT INSTRUCTIONS
Call my nurse with any questions or concerns:  [phone]  *If you have concerns after hours, please call [phone], option 2 to speak with on call Cardiologist.    1. Medication changes from today:    Stop losartan  Start Hydralazine 50 mg three times daily. IF blood pressure >140/>85 then increase to 100 mg three times daily  Start a walking program       Latest Reference Range & Units 12/23/22 12:37   Sodium 133 - 144 mmol/L 138   Potassium 3.4 - 5.3 mmol/L 4.2   Chloride 94 - 109 mmol/L 101   Carbon Dioxide 20 - 32 mmol/L 34 (H)   Urea Nitrogen 7 - 30 mg/dL 18   Creatinine 0.66 - 1.25 mg/dL 1.73 (H)   GFR Estimate >60 mL/min/1.73m2 46 (L)   Calcium 8.5 - 10.1 mg/dL 8.5   Anion Gap 3 - 14 mmol/L 3   N-Terminal Pro Bnp 0 - 900 pg/mL 202       2. Lab Results: Cath report noted below       Conclusion       Ost LAD to Prox LAD lesion is 30% stenosed.  Mid LAD-1 lesion is 60% stenosed.  Dist LAD lesion is 90% stenosed.  Mid LAD-2 lesion is 60% stenosed.  1st Mrg-1 lesion is 60% stenosed.  1st Mrg-2 lesion is 50% stenosed.  Lat 1st Mrg lesion is 40% stenosed.  Prox RCA lesion is 40% stenosed.  Dist RCA lesion is 25% stenosed.  RPDA lesion is 50% stenosed.  2nd RPL lesion is 60% stenosed.  2nd Diag lesion is 40% stenosed.     One vessel obstructive coronary artery disease   PCI of mid to distal LAD with FERNANDA x 3 (Synergy 3.00x38 mm, Synergy 3.00x32 mm and Synergy 3.00x8 mm)   Hemostasis of RRA with TR band          Plan     Follow bedrest per protocol   Continued medical management and lifestyle modifications for cardiovascular risk factor optimizations.   Arterial sheath removed from radial artery with TR band placement.   Return to the primary inpatient team for further evaluation and managmenet       Continuation of dual antiplatelet therapy for 12 months   Post antiplatelet therapy of    Aspirin; give 81 mg qd .     Plavix; and 75 mg qd daily.     Continue high dose statin therapy  Aggressive risk factor  modification for CAD     Coronary Findings    Diagnostic  Dominance: Right  Left Anterior Descending   Ost LAD to Prox LAD lesion is 30% stenosed.   Mid LAD-1 lesion is 60% stenosed.   Mid LAD-2 lesion is 60% stenosed.   Dist LAD lesion is 90% stenosed.      Second Diagonal Branch   2nd Diag lesion is 40% stenosed.      Left Circumflex      First Obtuse Marginal Branch   The vessel is large.   1st Mrg-1 lesion is 60% stenosed.   1st Mrg-2 lesion is 50% stenosed.      Lateral First Obtuse Marginal Branch   Lat 1st Mrg lesion is 40% stenosed.      Right Coronary Artery   The vessel is large.   Prox RCA lesion is 40% stenosed.   Dist RCA lesion is 25% stenosed.      Right Posterior Descending Artery   RPDA lesion is 50% stenosed.      Second Right Posterolateral Branch   2nd RPL lesion is 60% stenosed.      Third Right Posterolateral Branch         Intervention     Mid LAD-2 lesion   Stent (Also treats lesions: Dist LAD)   Lesion length: 60 mm. The pre-interventional distal flow is normal (ARLINE 3). A stent was successfully placed. The post-interventional distal flow is normal (ARLINE 3). Left main was engaged with an EBU 3.50 GC. Heparin was given to maintain ACT > 250 s throughout the procedure. Angiogram showed obstructive lesion 90% in dLAD and moderate to severe stenosis in the mid LAD 60-70%. Decison was taken to treat the mid to distal LAD. A 0.014'' Runthrough wire was advanced to the dLAD. 90% stenosis was predilated with a Takeru 2.00x15 mm balloon. Then distal LAD stenosis was pre dilated with an Emerge 2.50x15 mm balloon. A Synergy 3.00x38 mm FERNANDA was deployed in the dLAD. Just distally to stent deployment vessel appeared to be unhealthy. So we decided to deploy a Synergy 3.00x8 mm FERNANDA distally overlapping proximally to priorly placed stent. Then we deployed a Synergy 3.00x32 mm FERNANDA to mid LAD overlapping distally with priorly placed stent. We post dilated mid LAD FERNANDA with an NC Emerge 3.50x12 mm balloon. Post  intervention angiogram showed no dissection or other complication. There was ARLINE III flow.   There is a 0% residual stenosis post intervention.      Dist LAD lesion   Stent (Also treats lesions: Mid LAD-2)   See details in Mid LAD-2 lesion.   There is a 0% residual stenosis post intervention.

## 2022-12-23 NOTE — PROGRESS NOTES
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      His history includes asthma, hypertension, hyperlipidemia, bipolar disorder with anxiety and panic attacks, severe claustrophobia, ADHD on Adderall will, BPH, GERD, chronic pain on opioids, CKD stage 3 ischemic/versus hypertensive cardiomyopathy, who presented with a non-STEMI last month.  He was hospitalized 11 19-11/ 24/2022.  Upon admission he was noted to have hypertensive crisis with systolic blood pressures 230-250 SBP and pulmonary edema with markedly elevated proBNP.  He required IV furosemide and nicardipine drip.    Coronary angiogram was completed 11/23/2022 and received 3 drug-eluting stents to the mid to distal LAD.    His coronary anatomy as noted as follows:    Ost LAD to Prox LAD lesion is 30% stenosed.    Mid LAD-1 lesion is 60% stenosed.    Dist LAD lesion is 90% stenosed.    Mid LAD-2 lesion is 60% stenosed.    1st Mrg-1 lesion is 60% stenosed.    1st Mrg-2 lesion is 50% stenosed.    Lat 1st Mrg lesion is 40% stenosed.    Prox RCA lesion is 40% stenosed.    Dist RCA lesion is 25% stenosed.    RPDA lesion is 50% stenosed.    2nd RPL lesion is 60% stenosed.    2nd Diag lesion is 40% stenosed.    Echo: EF of 30-35%.  Apical wall hypokinesis.  Mild to moderate MR    ECG shows sinus rhythm with first-degree AV block ( milliseconds), right bundle branch block.    During his hospital stay his losartan was decreased from 100 mg to 25 mg twice daily due to his elevated creatinine.  He was on hydralazine which was discontinued at his follow-up with Dr. Morales because the patient stated he did not tolerate it.  She then initiated amlodipine at 5 mg daily.    Blood pressure today is slightly elevated, but stable.  He is very concerned about his coronary artery disease and wondering if he should have more stents placed in the future.  We reviewed his echo, his angiogram and his lab work throughout the visit.  I did give him a copy of his  angiogram that he could review at home.    Has complaints of fatigue.  Denies chest pain, orthopnea, PND, syncope, or peripheral edema.    Component      Latest Ref Rng & Units 12/23/2022   Sodium      133 - 144 mmol/L 138   Potassium      3.4 - 5.3 mmol/L 4.2   Chloride      94 - 109 mmol/L 101   Carbon Dioxide      20 - 32 mmol/L 34 (H)   Anion Gap      3 - 14 mmol/L 3   Urea Nitrogen      7 - 30 mg/dL 18   Creatinine      0.66 - 1.25 mg/dL 1.73 (H)   Calcium      8.5 - 10.1 mg/dL 8.5   Glucose      70 - 99 mg/dL 93   GFR Estimate      >60 mL/min/1.73m2 46 (L)   TSH      0.40 - 4.00 mU/L 3.29   N-Terminal Pro Bnp      0 - 900 pg/mL 202         Plan:   1. severe hypertension  We spent quite a bit of time reviewing his diagnostics as well as his medications.  Although his EF is slightly depressed being on amlodipine seems reasonably due to his severe hypertension.  He is willing to stop losartan and go back on hydralazine.  He thought he read in his discharge summary that hydralazine was supposed to be for 1 month only and then stop.    We will start at 50 mg 3 times daily and if his systolic blood pressure is still greater than 140 then we will increase it to 100 mg 3 times daily.  Carvedilol will continue at 25 mg twice daily, and isosorbide at 60 mg daily.  His torsemide was prescribed at 10 mg daily which she states he often does not take since he has severe leg cramps with it.    He does follow a low-sodium diet.    2. severe multivessel coronary artery disease with recent NSTEMI  3 stents to the mid to proximal LAD  DAPT  It does not appear the patient is participating in cardiac rehab.  I will reach out to see if he is interested.    3.  Cardiomyopathy EF 35%  Core enrollment was completed today.  Informational packet was given to the patient.  I reviewed the need to be on low-sodium diet and to weigh daily.  He has been health-conscious for many years so these are habits that he has been doing.  proBNP is  now within normal limits.  I reviewed GDMT and possibility of starting Entresto.  We will continue to monitor creatinine.  We can consider this at her next visit    Repeat echo will be scheduled in 2 months.  If his ejection fraction remains diminished then I would recommend consideration for ICD implantation for primary prevention.  I will try to move up his appointment with Dr. Keita so she can review those results with the patient.  Patient was seen during hospital stay by Dr. Contreras (general cards rounding).    4.  ERICA untreated    5.  Stage IIIb chronic kidney disease.  Losartan will be stopped we will reassess his creatinine next month.  Can consider nephrology consult.    6.  ED  Patient is on long-acting nitrates and was instructed not to use Phosphodiesterase-5 Enzyme Inhibitors     60 minutes was completed today over 50% was related to reviewing medications, core education and reviewing his hospitalization and diagnostic studies.  He is extremely claustrophobic and there has been discussion about cardiac MRI.  The patient was wondering if this could be done under general anesthesia.  I will leave that up to Dr. Sanchez's discretion.  Perhaps this could be completed at [facility].    Thank you for including me in his care.    Yaquelin Kaur, NP, APRN CNP          Today's clinic visit entailed:  Review of the result(s) of each unique test - Labs and post angiogram  Ordering of each unique test  Prescription drug management  No LOS data to display   Time spent doing chart review, history and exam, documentation and further activities per the note  Provider  Link to [facility] Help Grid     The level of medical decision making during this visit was of moderate complexity.      Orders Placed This Encounter   Procedures     Basic metabolic panel     Follow-Up with Cardiology Core- JAM     Echocardiogram Complete       Orders Placed This Encounter   Medications     hydrALAZINE (APRESOLINE) 100 MG tablet     Sig: Take 1 tablet  "(100 mg) by mouth 3 times daily     Dispense:  90 tablet     Refill:  6       Medications Discontinued During This Encounter   Medication Reason     losartan (COZAAR) 25 MG tablet Alternate therapy         Encounter Diagnoses   Name Primary?     Cardiomyopathy due to hypertension, with heart failure (H)      Uncontrolled hypertension      Acute HFrEF (heart failure with reduced ejection fraction) (H)      Stage 3b chronic kidney disease (H)      Coronary artery disease of native artery of native heart with stable angina pectoris (H)        CURRENT MEDICATIONS:  Current Outpatient Medications   Medication Sig Dispense Refill     albuterol (PROAIR HFA/PROVENTIL HFA/VENTOLIN HFA) 108 (90 Base) MCG/ACT inhaler Inhale 2 puffs into the lungs every 6 hours 18 g 5     amitriptyline (ELAVIL) 50 MG tablet Take 3 tablets (150 mg) by mouth At Bedtime 270 tablet 3     amLODIPine (NORVASC) 5 MG tablet Take 1 tablet (5 mg) by mouth every morning 90 tablet 3     amphetamine-dextroamphetamine (ADDERALL) 20 MG tablet Take 1 tablet (20 mg) by mouth 2 times daily 90 tablet 0     aspirin (ASA) 81 MG chewable tablet Take 1 tablet (81 mg) by mouth daily Starting tomorrow. 30 tablet 3     B-D LUER-HIWOT SYRINGE 21G X 1-1/2\" 3 ML MISC 1 DEVICE ONCE A WEEK   AND ALSO NEEDS 22 G NEEDLES 1.5 INCH       buprenorphine HCl-naloxone HCl (SUBOXONE) 8-2 MG per film 1/2 film QID - Brand name only 60 Film 1     buPROPion (WELLBUTRIN XL) 300 MG 24 hr tablet Take 1 tablet (300 mg) by mouth every morning 90 tablet 1     busPIRone (BUSPAR) 5 MG tablet TAKE 1 TABLET (5 MG) BY MOUTH 2 TIMES DAILY AS NEEDED (PANIC ATTACK) 30 tablet 3     carvedilol (COREG) 25 MG tablet Take 1 tablet (25 mg) by mouth 2 times daily (with meals) 180 tablet 3     clonazePAM (KLONOPIN) 1 MG tablet TAKE 1 TABLET (1 MG) BY MOUTH 2 TIMES DAILY AS NEEDED (FLYING PHOBIA) 4 tablet 0     clopidogrel (PLAVIX) 75 MG tablet Take 1 tablet (75 mg) by mouth daily 90 tablet 3     cyclobenzaprine " "(FLEXERIL) 10 MG tablet Take 1 tablet (10 mg) by mouth 3 times daily as needed for other (hiccups) 90 tablet 3     finasteride (PROSCAR) 5 MG tablet 1/2 tab daily (Patient taking differently: 5 mg daily) 90 tablet 1     fluticasone-salmeterol (ADVAIR DISKUS) 500-50 MCG/DOSE inhaler 1 inhalation 2 times per day 1 Inhaler 2     hydrALAZINE (APRESOLINE) 100 MG tablet Take 1 tablet (100 mg) by mouth 3 times daily 90 tablet 6     imiquimod (ALDARA) 5 % external cream Apply topically At Bedtime -wash off after 8 hours and my use for up to 16 weeks. 48 packet 5     isosorbide mononitrate (IMDUR) 60 MG 24 hr tablet Take 1 tablet (60 mg) by mouth daily 90 tablet 3     loperamide (IMODIUM A-D) 2 MG tablet Take 2 tabs (4 mg) after first loose stool, and then take one tab (2 mg) after each diarrheal stool.  Max of 8 tabs (16 mg) per day. 30 tablet 0     LORazepam (ATIVAN) 1 MG tablet TAKE 1 TABLET BY MOUTH 2 TIMES DAILY AS NEEDED FOR ANXIETY. 60 tablet 1     naloxone (NARCAN) 4 MG/0.1ML nasal spray Spray 1 spray (4 mg) into one nostril alternating nostrils as needed for opioid reversal every 2-3 minutes until assistance arrives 0.2 mL 11     nitroGLYcerin (NITROSTAT) 0.4 MG sublingual tablet Place 1 tablet (0.4 mg) under the tongue every 5 minutes as needed for chest pain For chest pain place 1 tablet under the tongue every 5 minutes for 3 doses. If symptoms persist 5 minutes after 1st dose call 911. 25 tablet 0     pantoprazole (PROTONIX) 40 MG EC tablet Take 1 tablet (40 mg) by mouth daily 90 tablet 3     RESTASIS 0.05 % ophthalmic emulsion INSTILL 1 DROP INTO BOTH EYES TWICE A DAY 60 mL 4     rosuvastatin (CRESTOR) 20 MG tablet Take 1 tablet (20 mg) by mouth daily 90 tablet 3     Syringe/Needle, Disp, (SYRINGE LUER LOCK) 20G X 1-1/2\" 3 ML MISC 1 Device once a week - and also needs 22 G needles 1.5 inch #30 with 1 refill 30 each 1     testosterone cypionate (DEPOTESTOSTERONE) 200 MG/ML injection Inject 1 mL (200 mg) into the " muscle once a week 4 mL 5     torsemide (DEMADEX) 10 MG tablet Take 1 tablet (10 mg) by mouth every morning 30 tablet 4     vitamin C (ASCORBIC ACID) 1000 MG TABS Take 1,000 mg by mouth daily 90 0     [START ON 12/29/2022] zolpidem (AMBIEN) 10 MG tablet Take 1 tablet (10 mg) by mouth nightly as needed for sleep 30 tablet 2       ALLERGIES     Allergies   Allergen Reactions     Acetaminophen Other (See Comments)     Patient states he does not have any reaction to this     Dairy Digestive      Morphine Other (See Comments)     Patient states he had a headache one time but has used since with no adverse reactions     Sulfa Drugs      Theophylline Hives       PAST MEDICAL HISTORY:  Past Medical History:   Diagnosis Date     ADHD (attention deficit hyperactivity disorder)      Allergic rhinitis, cause unspecified     Allergic rhinitis     Benign hypertension      CAD (coronary artery disease)     cardiac cath 11/23/2022: FERNANDA x3 to LAD     Chronic back pain      Hiccough      Hypersomnia with sleep apnea, unspecified      Hypertensive emergency 11/19/2022     Major depressive disorder, single episode, moderate (H) 03/2008     Mild persistent asthma      NSTEMI (non-ST elevated myocardial infarction) (H) 11/29/2020     Other primary cardiomyopathies     Cardiomyopathy -- unknown etiology       PAST SURGICAL HISTORY:  Past Surgical History:   Procedure Laterality Date     APPENDECTOMY  2007     BACK SURGERY  2014    L5-S1 fusion     CV CORONARY ANGIOGRAM N/A 11/30/2020    Procedure: Heart Catheterization with Possible Intervention;  Surgeon: Theo Donahue MD;  Location: [facility] CARDIAC CATH LAB     CV CORONARY ANGIOGRAM N/A 11/23/2022    Procedure: Coronary Angiogram;  Surgeon: Venkata Hdez MD;  Location: [facility] CARDIAC CATH LAB     CV PCI N/A 11/23/2022    Procedure: Percutaneous Coronary Intervention;  Surgeon: Venkata Hdez MD;  Location: [facility] CARDIAC CATH LAB     HC REPAIR OF NASAL  SEPTUM       LAPAROSCOPIC CHOLECYSTECTOMY      Cholecystectomy, Laparoscopic     SURGICAL HISTORY OF -       torn pectoral muscle     SURGICAL HISTORY OF -       Bilateral radiofrequency volume reduction of the inferior turbinates.     SURGICAL HISTORY OF -       rhinoplasty- septoplasty       FAMILY HISTORY:  Family History   Problem Relation Age of Onset     Coronary Artery Disease Mother          55; MI x 2     Cancer Father         hodgkins     Hypertension Father      Coronary Artery Disease Father      Cardiovascular Sister      Hypertension Brother      Cardiovascular Maternal Grandmother      No Known Problems Daughter      Prostate Cancer No family hx of      Cancer - colorectal No family hx of      Cardiomyopathy No family hx of      Valvular heart disease No family hx of        SOCIAL HISTORY:  Social History     Socioeconomic History     Marital status:      Spouse name: None     Number of children: 1     Years of education: None     Highest education level: None   Occupational History     Occupation: Retired chiropracter.     Employer: [employer]    Tobacco Use     Smoking status: Never     Smokeless tobacco: Never   Vaping Use     Vaping Use: Never used   Substance and Sexual Activity     Alcohol use: No     Comment: rarely      Drug use: No     Sexual activity: Yes     Partners: Female     Birth control/protection: Condom   Other Topics Concern     Parent/sibling w/ CABG, MI or angioplasty before 65F 55M? No       Review of Systems:  Skin:          Eyes:         ENT:         Respiratory:  Positive for   GAMING stairs when carrying items   Cardiovascular:  Negative;palpitations;chest pain;lightheadedness;dizziness;syncope or near-syncope;cyanosis;edema Positive for feels lethargic  Gastroenterology:        Genitourinary:         Musculoskeletal:         Neurologic:         Psychiatric:         Heme/Lymph/Imm:         Endocrine:  Negative        Physical Exam:  Vitals: /86    Pulse 79   Ht 1.829 m (6')   Wt 107.5 kg (237 lb)   SpO2 93%   BMI 32.14 kg/m      Constitutional:  cooperative, alert and oriented, well developed, well nourished, in no acute distress        Skin:  warm and dry to the touch;no apparent skin lesions or masses noted          Head:  normocephalic, no masses or lesions        Eyes:  pupils equal and round;conjunctivae and lids unremarkable        Lymph:      ENT:  no pallor or cyanosis, dentition good        Neck:           Respiratory:  normal breath sounds, clear to auscultation, normal A-P diameter, normal symmetry, normal respiratory excursion, no use of accessory muscles         Cardiac: regular rhythm;normal S1 and S2;no murmurs, gallops or rubs detected                not assessed this visit                                        GI:  BS normoactive        Extremities and Muscular Skeletal:  no deformities, clubbing, cyanosis, erythema observed;no edema              Neurological:  no gross motor deficits        Psych:  Alert and Oriented x 3        CC  Mothilal Kelly Morales MD  [address] [place] [id number]

## 2022-12-23 NOTE — LETTER
12/23/2022    Luis Armando Segovia MD  [address] [facility] [address]    RE: Jeremi Damon       Dear Colleague,     I had the pleasure of seeing Jeremi Damon in the [facility].  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      His history includes asthma, hypertension, hyperlipidemia, bipolar disorder with anxiety and panic attacks, severe claustrophobia, ADHD on Adderall will, BPH, GERD, chronic pain on opioids, CKD stage 3 ischemic/versus hypertensive cardiomyopathy, who presented with a non-STEMI last month.  He was hospitalized 11 19-11/ 24/2022.  Upon admission he was noted to have hypertensive crisis with systolic blood pressures 230-250 SBP and pulmonary edema with markedly elevated proBNP.  He required IV furosemide and nicardipine drip.    Coronary angiogram was completed 11/23/2022 and received 3 drug-eluting stents to the mid to distal LAD.    His coronary anatomy as noted as follows:    Ost LAD to Prox LAD lesion is 30% stenosed.    Mid LAD-1 lesion is 60% stenosed.    Dist LAD lesion is 90% stenosed.    Mid LAD-2 lesion is 60% stenosed.    1st Mrg-1 lesion is 60% stenosed.    1st Mrg-2 lesion is 50% stenosed.    Lat 1st Mrg lesion is 40% stenosed.    Prox RCA lesion is 40% stenosed.    Dist RCA lesion is 25% stenosed.    RPDA lesion is 50% stenosed.    2nd RPL lesion is 60% stenosed.    2nd Diag lesion is 40% stenosed.    Echo: EF of 30-35%.  Apical wall hypokinesis.  Mild to moderate MR    ECG shows sinus rhythm with first-degree AV block ( milliseconds), right bundle branch block.    During his hospital stay his losartan was decreased from 100 mg to 25 mg twice daily due to his elevated creatinine.  He was on hydralazine which was discontinued at his follow-up with Dr. Morales because the patient stated he did not tolerate it.  She then initiated amlodipine at 5 mg daily.    Blood pressure today is slightly elevated, but stable.  He is very  concerned about his coronary artery disease and wondering if he should have more stents placed in the future.  We reviewed his echo, his angiogram and his lab work throughout the visit.  I did give him a copy of his angiogram that he could review at home.    Has complaints of fatigue.  Denies chest pain, orthopnea, PND, syncope, or peripheral edema.    Component      Latest Ref Rng & Units 12/23/2022   Sodium      133 - 144 mmol/L 138   Potassium      3.4 - 5.3 mmol/L 4.2   Chloride      94 - 109 mmol/L 101   Carbon Dioxide      20 - 32 mmol/L 34 (H)   Anion Gap      3 - 14 mmol/L 3   Urea Nitrogen      7 - 30 mg/dL 18   Creatinine      0.66 - 1.25 mg/dL 1.73 (H)   Calcium      8.5 - 10.1 mg/dL 8.5   Glucose      70 - 99 mg/dL 93   GFR Estimate      >60 mL/min/1.73m2 46 (L)   TSH      0.40 - 4.00 mU/L 3.29   N-Terminal Pro Bnp      0 - 900 pg/mL 202         Plan:   1. severe hypertension  We spent quite a bit of time reviewing his diagnostics as well as his medications.  Although his EF is slightly depressed being on amlodipine seems reasonably due to his severe hypertension.  He is willing to stop losartan and go back on hydralazine.  He thought he read in his discharge summary that hydralazine was supposed to be for 1 month only and then stop.    We will start at 50 mg 3 times daily and if his systolic blood pressure is still greater than 140 then we will increase it to 100 mg 3 times daily.  Carvedilol will continue at 25 mg twice daily, and isosorbide at 60 mg daily.  His torsemide was prescribed at 10 mg daily which she states he often does not take since he has severe leg cramps with it.    He does follow a low-sodium diet.    2. severe multivessel coronary artery disease with recent NSTEMI  3 stents to the mid to proximal LAD  DAPT  It does not appear the patient is participating in cardiac rehab.  I will reach out to see if he is interested.    3.  Cardiomyopathy EF 35%  Core enrollment was completed today.   Informational packet was given to the patient.  I reviewed the need to be on low-sodium diet and to weigh daily.  He has been health-conscious for many years so these are habits that he has been doing.  proBNP is now within normal limits.  I reviewed GDMT and possibility of starting Entresto.  We will continue to monitor creatinine.  We can consider this at her next visit    Repeat echo will be scheduled in 2 months.  If his ejection fraction remains diminished then I would recommend consideration for ICD implantation for primary prevention.  I will try to move up his appointment with Dr. Keita so she can review those results with the patient.  Patient was seen during hospital stay by Dr. Contreras (general cards rounding).    4.  ERICA untreated    5.  Stage IIIb chronic kidney disease.  Losartan will be stopped we will reassess his creatinine next month.  Can consider nephrology consult.    6.  ED  Patient is on long-acting nitrates and was instructed not to use Phosphodiesterase-5 Enzyme Inhibitors     60 minutes was completed today over 50% was related to reviewing medications, core education and reviewing his hospitalization and diagnostic studies.  He is extremely claustrophobic and there has been discussion about cardiac MRI.  The patient was wondering if this could be done under general anesthesia.  I will leave that up to Dr. Sanchez's discretion.  Perhaps this could be completed at [facility].    Thank you for including me in his care.    Yaquelin Kaur, NP, APRN CNP          Today's clinic visit entailed:  Review of the result(s) of each unique test - Labs and post angiogram  Ordering of each unique test  Prescription drug management  No LOS data to display   Time spent doing chart review, history and exam, documentation and further activities per the note  Provider  Link to [facility] Help Grid     The level of medical decision making during this visit was of moderate complexity.      Orders Placed This Encounter  "  Procedures     Basic metabolic panel     Follow-Up with Cardiology Core- JAM     Echocardiogram Complete       Orders Placed This Encounter   Medications     hydrALAZINE (APRESOLINE) 100 MG tablet     Sig: Take 1 tablet (100 mg) by mouth 3 times daily     Dispense:  90 tablet     Refill:  6       Medications Discontinued During This Encounter   Medication Reason     losartan (COZAAR) 25 MG tablet Alternate therapy         Encounter Diagnoses   Name Primary?     Cardiomyopathy due to hypertension, with heart failure (H)      Uncontrolled hypertension      Acute HFrEF (heart failure with reduced ejection fraction) (H)      Stage 3b chronic kidney disease (H)      Coronary artery disease of native artery of native heart with stable angina pectoris (H)        CURRENT MEDICATIONS:  Current Outpatient Medications   Medication Sig Dispense Refill     albuterol (PROAIR HFA/PROVENTIL HFA/VENTOLIN HFA) 108 (90 Base) MCG/ACT inhaler Inhale 2 puffs into the lungs every 6 hours 18 g 5     amitriptyline (ELAVIL) 50 MG tablet Take 3 tablets (150 mg) by mouth At Bedtime 270 tablet 3     amLODIPine (NORVASC) 5 MG tablet Take 1 tablet (5 mg) by mouth every morning 90 tablet 3     amphetamine-dextroamphetamine (ADDERALL) 20 MG tablet Take 1 tablet (20 mg) by mouth 2 times daily 90 tablet 0     aspirin (ASA) 81 MG chewable tablet Take 1 tablet (81 mg) by mouth daily Starting tomorrow. 30 tablet 3     B-D LUER-HIWOT SYRINGE 21G X 1-1/2\" 3 ML MISC 1 DEVICE ONCE A WEEK   AND ALSO NEEDS 22 G NEEDLES 1.5 INCH       buprenorphine HCl-naloxone HCl (SUBOXONE) 8-2 MG per film 1/2 film QID - Brand name only 60 Film 1     buPROPion (WELLBUTRIN XL) 300 MG 24 hr tablet Take 1 tablet (300 mg) by mouth every morning 90 tablet 1     busPIRone (BUSPAR) 5 MG tablet TAKE 1 TABLET (5 MG) BY MOUTH 2 TIMES DAILY AS NEEDED (PANIC ATTACK) 30 tablet 3     carvedilol (COREG) 25 MG tablet Take 1 tablet (25 mg) by mouth 2 times daily (with meals) 180 tablet 3     " clonazePAM (KLONOPIN) 1 MG tablet TAKE 1 TABLET (1 MG) BY MOUTH 2 TIMES DAILY AS NEEDED (FLYING PHOBIA) 4 tablet 0     clopidogrel (PLAVIX) 75 MG tablet Take 1 tablet (75 mg) by mouth daily 90 tablet 3     cyclobenzaprine (FLEXERIL) 10 MG tablet Take 1 tablet (10 mg) by mouth 3 times daily as needed for other (hiccups) 90 tablet 3     finasteride (PROSCAR) 5 MG tablet 1/2 tab daily (Patient taking differently: 5 mg daily) 90 tablet 1     fluticasone-salmeterol (ADVAIR DISKUS) 500-50 MCG/DOSE inhaler 1 inhalation 2 times per day 1 Inhaler 2     hydrALAZINE (APRESOLINE) 100 MG tablet Take 1 tablet (100 mg) by mouth 3 times daily 90 tablet 6     imiquimod (ALDARA) 5 % external cream Apply topically At Bedtime -wash off after 8 hours and my use for up to 16 weeks. 48 packet 5     isosorbide mononitrate (IMDUR) 60 MG 24 hr tablet Take 1 tablet (60 mg) by mouth daily 90 tablet 3     loperamide (IMODIUM A-D) 2 MG tablet Take 2 tabs (4 mg) after first loose stool, and then take one tab (2 mg) after each diarrheal stool.  Max of 8 tabs (16 mg) per day. 30 tablet 0     LORazepam (ATIVAN) 1 MG tablet TAKE 1 TABLET BY MOUTH 2 TIMES DAILY AS NEEDED FOR ANXIETY. 60 tablet 1     naloxone (NARCAN) 4 MG/0.1ML nasal spray Spray 1 spray (4 mg) into one nostril alternating nostrils as needed for opioid reversal every 2-3 minutes until assistance arrives 0.2 mL 11     nitroGLYcerin (NITROSTAT) 0.4 MG sublingual tablet Place 1 tablet (0.4 mg) under the tongue every 5 minutes as needed for chest pain For chest pain place 1 tablet under the tongue every 5 minutes for 3 doses. If symptoms persist 5 minutes after 1st dose call 911. 25 tablet 0     pantoprazole (PROTONIX) 40 MG EC tablet Take 1 tablet (40 mg) by mouth daily 90 tablet 3     RESTASIS 0.05 % ophthalmic emulsion INSTILL 1 DROP INTO BOTH EYES TWICE A DAY 60 mL 4     rosuvastatin (CRESTOR) 20 MG tablet Take 1 tablet (20 mg) by mouth daily 90 tablet 3     Syringe/Needle, Disp,  "(SYRINGE LUER LOCK) 20G X 1-1/2\" 3 ML MISC 1 Device once a week - and also needs 22 G needles 1.5 inch #30 with 1 refill 30 each 1     testosterone cypionate (DEPOTESTOSTERONE) 200 MG/ML injection Inject 1 mL (200 mg) into the muscle once a week 4 mL 5     torsemide (DEMADEX) 10 MG tablet Take 1 tablet (10 mg) by mouth every morning 30 tablet 4     vitamin C (ASCORBIC ACID) 1000 MG TABS Take 1,000 mg by mouth daily 90 0     [START ON 12/29/2022] zolpidem (AMBIEN) 10 MG tablet Take 1 tablet (10 mg) by mouth nightly as needed for sleep 30 tablet 2       ALLERGIES     Allergies   Allergen Reactions     Acetaminophen Other (See Comments)     Patient states he does not have any reaction to this     Dairy Digestive      Morphine Other (See Comments)     Patient states he had a headache one time but has used since with no adverse reactions     Sulfa Drugs      Theophylline Hives       PAST MEDICAL HISTORY:  Past Medical History:   Diagnosis Date     ADHD (attention deficit hyperactivity disorder)      Allergic rhinitis, cause unspecified     Allergic rhinitis     Benign hypertension      CAD (coronary artery disease)     cardiac cath 11/23/2022: FERNANDA x3 to LAD     Chronic back pain      Hiccough      Hypersomnia with sleep apnea, unspecified      Hypertensive emergency 11/19/2022     Major depressive disorder, single episode, moderate (H) 03/2008     Mild persistent asthma      NSTEMI (non-ST elevated myocardial infarction) (H) 11/29/2020     Other primary cardiomyopathies     Cardiomyopathy -- unknown etiology       PAST SURGICAL HISTORY:  Past Surgical History:   Procedure Laterality Date     APPENDECTOMY  2007     BACK SURGERY  2014    L5-S1 fusion     CV CORONARY ANGIOGRAM N/A 11/30/2020    Procedure: Heart Catheterization with Possible Intervention;  Surgeon: Theo Donahue MD;  Location: [facility] CARDIAC CATH LAB     CV CORONARY ANGIOGRAM N/A 11/23/2022    Procedure: Coronary Angiogram;  Surgeon: Ketty" Venkata Talley MD;  Location: [facility] CARDIAC CATH LAB     CV PCI N/A 2022    Procedure: Percutaneous Coronary Intervention;  Surgeon: Venkata Hdez MD;  Location: [facility] CARDIAC CATH LAB     HC REPAIR OF NASAL SEPTUM       LAPAROSCOPIC CHOLECYSTECTOMY      Cholecystectomy, Laparoscopic     SURGICAL HISTORY OF -       torn pectoral muscle     SURGICAL HISTORY OF -       Bilateral radiofrequency volume reduction of the inferior turbinates.     SURGICAL HISTORY OF -       rhinoplasty- septoplasty       FAMILY HISTORY:  Family History   Problem Relation Age of Onset     Coronary Artery Disease Mother          55; MI x 2     Cancer Father         hodgkins     Hypertension Father      Coronary Artery Disease Father      Cardiovascular Sister      Hypertension Brother      Cardiovascular Maternal Grandmother      No Known Problems Daughter      Prostate Cancer No family hx of      Cancer - colorectal No family hx of      Cardiomyopathy No family hx of      Valvular heart disease No family hx of        SOCIAL HISTORY:  Social History     Socioeconomic History     Marital status:      Spouse name: None     Number of children: 1     Years of education: None     Highest education level: None   Occupational History     Occupation: Retired chiropracter.     Employer: [employer]    Tobacco Use     Smoking status: Never     Smokeless tobacco: Never   Vaping Use     Vaping Use: Never used   Substance and Sexual Activity     Alcohol use: No     Comment: rarely      Drug use: No     Sexual activity: Yes     Partners: Female     Birth control/protection: Condom   Other Topics Concern     Parent/sibling w/ CABG, MI or angioplasty before 65F 55M? No       Review of Systems:  Skin:          Eyes:         ENT:         Respiratory:  Positive for   GAMING stairs when carrying items   Cardiovascular:  Negative;palpitations;chest pain;lightheadedness;dizziness;syncope or near-syncope;cyanosis;edema  Positive for feels lethargic  Gastroenterology:        Genitourinary:         Musculoskeletal:         Neurologic:         Psychiatric:         Heme/Lymph/Imm:         Endocrine:  Negative        Physical Exam:  Vitals: /86   Pulse 79   Ht 1.829 m (6')   Wt 107.5 kg (237 lb)   SpO2 93%   BMI 32.14 kg/m      Constitutional:  cooperative, alert and oriented, well developed, well nourished, in no acute distress        Skin:  warm and dry to the touch;no apparent skin lesions or masses noted          Head:  normocephalic, no masses or lesions        Eyes:  pupils equal and round;conjunctivae and lids unremarkable        Lymph:      ENT:  no pallor or cyanosis, dentition good        Neck:           Respiratory:  normal breath sounds, clear to auscultation, normal A-P diameter, normal symmetry, normal respiratory excursion, no use of accessory muscles         Cardiac: regular rhythm;normal S1 and S2;no murmurs, gallops or rubs detected                not assessed this visit                                        GI:  BS normoactive        Extremities and Muscular Skeletal:  no deformities, clubbing, cyanosis, erythema observed;no edema              Neurological:  no gross motor deficits        Psych:  Alert and Oriented x 3        CC  [facility] Kelly Morales MD  [address] [place] [id number]      Thank you for allowing me to participate in the care of your patient.      Sincerely,     Yaquelin Kaur NP, APRN [facility]

## 2022-12-27 ENCOUNTER — MYC MEDICAL ADVICE (OUTPATIENT)
Dept: FAMILY MEDICINE | Facility: CLINIC | Age: 52
End: 2022-12-27

## 2022-12-29 DIAGNOSIS — F90.2 ATTENTION DEFICIT HYPERACTIVITY DISORDER (ADHD), COMBINED TYPE: ICD-10-CM

## 2022-12-29 DIAGNOSIS — R07.89 OTHER CHEST PAIN: ICD-10-CM

## 2022-12-29 DIAGNOSIS — F40.243 FLYING PHOBIA: ICD-10-CM

## 2022-12-29 NOTE — TELEPHONE ENCOUNTER
Patient was in hospital 11/ for  MI.     Patient had hospital follow up on 12/12. Medications restarted on lower dosage.     Patient saw cardiology on 12/23. Losartan discontinued. Hydralazine ordered.     Mychart sent to patient.     Rola Sullivan RN  [facility]

## 2022-12-29 NOTE — TELEPHONE ENCOUNTER
Routing refill request to provider for review/approval because:  Drug not on the [facility] refill protoco  Nitroglycerin needs review    BEATRICE CONSTANTINO RN on 12/29/2022 at 5:54 PM   [facility]

## 2022-12-30 RX ORDER — CLONAZEPAM 1 MG/1
1 TABLET ORAL 2 TIMES DAILY PRN
Qty: 4 TABLET | Refills: 0 | Status: SHIPPED | OUTPATIENT
Start: 2022-12-30 | End: 2023-01-25

## 2022-12-30 RX ORDER — DEXTROAMPHETAMINE SACCHARATE, AMPHETAMINE ASPARTATE, DEXTROAMPHETAMINE SULFATE AND AMPHETAMINE SULFATE 5; 5; 5; 5 MG/1; MG/1; MG/1; MG/1
20 TABLET ORAL 2 TIMES DAILY
Qty: 90 TABLET | Refills: 0 | Status: SHIPPED | OUTPATIENT
Start: 2022-12-30 | End: 2023-06-08

## 2022-12-30 RX ORDER — NITROGLYCERIN 0.4 MG/1
0.4 TABLET SUBLINGUAL EVERY 5 MIN PRN
Qty: 25 TABLET | Refills: 0 | Status: SHIPPED | OUTPATIENT
Start: 2022-12-30 | End: 2023-01-25

## 2023-01-08 DIAGNOSIS — H04.123 DRY EYES, BILATERAL: ICD-10-CM

## 2023-01-09 NOTE — TELEPHONE ENCOUNTER
Routing refill request to provider for review/approval because:  Drug not on the [facility] refill protocol     Rola Sullivan RN  [facility]

## 2023-01-11 RX ORDER — CYCLOSPORINE 0.5 MG/ML
EMULSION OPHTHALMIC
Qty: 60 ML | Refills: 4 | Status: SHIPPED | OUTPATIENT
Start: 2023-01-11 | End: 2023-06-21

## 2023-01-12 ENCOUNTER — OFFICE VISIT (OUTPATIENT)
Dept: PALLIATIVE MEDICINE | Facility: CLINIC | Age: 53
End: 2023-01-12
Payer: COMMERCIAL

## 2023-01-12 ENCOUNTER — LAB (OUTPATIENT)
Dept: LAB | Facility: CLINIC | Age: 53
End: 2023-01-12
Payer: COMMERCIAL

## 2023-01-12 VITALS — HEART RATE: 70 BPM | DIASTOLIC BLOOD PRESSURE: 87 MMHG | SYSTOLIC BLOOD PRESSURE: 135 MMHG | OXYGEN SATURATION: 95 %

## 2023-01-12 DIAGNOSIS — F11.20 UNCOMPLICATED OPIOID DEPENDENCE (H): Primary | ICD-10-CM

## 2023-01-12 DIAGNOSIS — Z79.899 ENCOUNTER FOR LONG-TERM (CURRENT) USE OF HIGH-RISK MEDICATION: ICD-10-CM

## 2023-01-12 DIAGNOSIS — G89.4 CHRONIC PAIN SYNDROME: ICD-10-CM

## 2023-01-12 DIAGNOSIS — G43.709 CHRONIC MIGRAINE WITHOUT AURA, NOT INTRACTABLE, WITHOUT STATUS MIGRAINOSUS: ICD-10-CM

## 2023-01-12 DIAGNOSIS — F11.20 UNCOMPLICATED OPIOID DEPENDENCE (H): ICD-10-CM

## 2023-01-12 DIAGNOSIS — F41.1 GAD (GENERALIZED ANXIETY DISORDER): ICD-10-CM

## 2023-01-12 DIAGNOSIS — E29.1 HYPOGONADISM MALE: ICD-10-CM

## 2023-01-12 LAB — CREAT UR-MCNC: 88 MG/DL

## 2023-01-12 PROCEDURE — 80307 DRUG TEST PRSMV CHEM ANLYZR: CPT | Mod: 90

## 2023-01-12 PROCEDURE — 99215 OFFICE O/P EST HI 40 MIN: CPT | Mod: 25 | Performed by: ANESTHESIOLOGY

## 2023-01-12 PROCEDURE — 80307 DRUG TEST PRSMV CHEM ANLYZR: CPT

## 2023-01-12 PROCEDURE — 64615 CHEMODENERV MUSC MIGRAINE: CPT | Performed by: ANESTHESIOLOGY

## 2023-01-12 PROCEDURE — 99000 SPECIMEN HANDLING OFFICE-LAB: CPT

## 2023-01-12 RX ORDER — BUPRENORPHINE AND NALOXONE 8; 2 MG/1; MG/1
FILM, SOLUBLE BUCCAL; SUBLINGUAL
Qty: 60 FILM | Refills: 2 | Status: SHIPPED | OUTPATIENT
Start: 2023-01-12 | End: 2023-04-19

## 2023-01-12 ASSESSMENT — PAIN SCALES - GENERAL: PAINLEVEL: EXTREME PAIN (8)
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
## 2023-01-12 NOTE — PATIENT INSTRUCTIONS
[facility]  Botox Injection Discharge Instructions    Do not rub or put extended pressure on the injection sites. You may gently touch the sites to remove any excess blood.  Monitor the injection sites for signs and symptoms of infection such as redness, swelling, warmth, fever, chills, or drainage to areas.  You may have soreness at the injection sites for up to 24 hours.  If you are able to use anti-inflammatory medications or Tylenol for pain control, you can take these as directed.  It may take up to 1 month to notice benefit from the 1st treatment  If you do notice relief, botox can be done every 3 months.  It may require insurance authorization everytime.    For questions about insurance coverage, please call the main clinic number and ask to speak with someone about botox coverage.     Pain Clinic phone number during work hours (Monday through Friday 8 am-4:30 pm) at [phone] or the Provider Line after hours at [phone]:

## 2023-01-13 LAB — ETHYL GLUCURONIDE UR QL SCN: NEGATIVE NG/ML

## 2023-01-13 RX ORDER — TESTOSTERONE CYPIONATE 200 MG/ML
200 INJECTION, SOLUTION INTRAMUSCULAR WEEKLY
Qty: 4 ML | Refills: 4 | Status: SHIPPED | OUTPATIENT
Start: 2023-01-13 | End: 2023-06-21

## 2023-01-16 LAB
AMPHET UR CFM-MCNC: 1640 NG/ML
AMPHET/CREAT UR: 1864 NG/MG {CREAT}
BUPRENORPHINE UR CFM-MCNC: 10 NG/ML
BUPRENORPHINE/CREAT UR: 11 NG/MG {CREAT}
LORAZEPAM UR QL CFM: PRESENT
NORBUPRENORPHINE UR CFM-MCNC: 34 NG/ML
NORBUPRENORPHINE/CREAT UR: 39 NG/MG {CREAT}

## 2023-01-20 ENCOUNTER — OFFICE VISIT (OUTPATIENT)
Dept: CARDIOLOGY | Facility: CLINIC | Age: 53
End: 2023-01-20
Attending: NURSE PRACTITIONER
Payer: COMMERCIAL

## 2023-01-20 ENCOUNTER — LAB (OUTPATIENT)
Dept: LAB | Facility: CLINIC | Age: 53
End: 2023-01-20
Payer: COMMERCIAL

## 2023-01-20 VITALS
BODY MASS INDEX: 33.2 KG/M2 | SYSTOLIC BLOOD PRESSURE: 111 MMHG | HEART RATE: 80 BPM | HEIGHT: 72 IN | DIASTOLIC BLOOD PRESSURE: 63 MMHG | OXYGEN SATURATION: 97 % | WEIGHT: 245.1 LBS

## 2023-01-20 DIAGNOSIS — E78.5 HYPERLIPIDEMIA LDL GOAL <70: ICD-10-CM

## 2023-01-20 DIAGNOSIS — I10 UNCONTROLLED HYPERTENSION: ICD-10-CM

## 2023-01-20 DIAGNOSIS — I50.21 ACUTE HFREF (HEART FAILURE WITH REDUCED EJECTION FRACTION) (H): ICD-10-CM

## 2023-01-20 DIAGNOSIS — I25.10 CORONARY ARTERY DISEASE INVOLVING NATIVE CORONARY ARTERY OF NATIVE HEART WITHOUT ANGINA PECTORIS: Primary | ICD-10-CM

## 2023-01-20 LAB
ANION GAP SERPL CALCULATED.3IONS-SCNC: 11 MMOL/L (ref 7–15)
BUN SERPL-MCNC: 11.9 MG/DL (ref 6–20)
CALCIUM SERPL-MCNC: 8.9 MG/DL (ref 8.6–10)
CHLORIDE SERPL-SCNC: 97 MMOL/L (ref 98–107)
CREAT SERPL-MCNC: 1.63 MG/DL (ref 0.67–1.17)
DEPRECATED HCO3 PLAS-SCNC: 29 MMOL/L (ref 22–29)
GFR SERPL CREATININE-BSD FRML MDRD: 50 ML/MIN/1.73M2
GLUCOSE SERPL-MCNC: 78 MG/DL (ref 70–99)
POTASSIUM SERPL-SCNC: 3.9 MMOL/L (ref 3.4–5.3)
SODIUM SERPL-SCNC: 137 MMOL/L (ref 136–145)

## 2023-01-20 PROCEDURE — 36415 COLL VENOUS BLD VENIPUNCTURE: CPT | Performed by: NURSE PRACTITIONER

## 2023-01-20 PROCEDURE — 80048 BASIC METABOLIC PNL TOTAL CA: CPT | Performed by: NURSE PRACTITIONER

## 2023-01-20 PROCEDURE — 99213 OFFICE O/P EST LOW 20 MIN: CPT | Performed by: NURSE PRACTITIONER

## 2023-01-20 RX ORDER — HYDRALAZINE HYDROCHLORIDE 100 MG/1
50 TABLET, FILM COATED ORAL 2 TIMES DAILY
Qty: 90 TABLET | Refills: 6 | COMMUNITY
Start: 2023-01-20 | End: 2023-04-03

## 2023-01-20 NOTE — PATIENT INSTRUCTIONS
Call my nurse with any questions or concerns:  [phone]  *If you have concerns after hours, please call [phone], option 2 to speak with on call Cardiologist.    1. Medication changes from today:    Decrease hydralazine 50 mg twice daily        2. Lab Results:      Component      Latest Ref Rng & Units 1/20/2023   Sodium      136 - 145 mmol/L 137   Potassium      3.4 - 5.3 mmol/L 3.9   Chloride      98 - 107 mmol/L 97 (L)   Carbon Dioxide (CO2)      22 - 29 mmol/L 29   Anion Gap      7 - 15 mmol/L 11   Urea Nitrogen      6.0 - 20.0 mg/dL 11.9   Creatinine      0.67 - 1.17 mg/dL 1.63 (H)   Calcium      8.6 - 10.0 mg/dL 8.9   Glucose      70 - 99 mg/dL 78   GFR Estimate      >60 mL/min/1.73m2 50 (L)

## 2023-01-20 NOTE — LETTER
1/20/2023    Luis Armando Segovia MD  [address] [facility] [id number]    RE: Jeremi Damon       Dear Colleague,     I had the pleasure of seeing Jeremi Damon in the [facility].  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       His history includes asthma, hypertension, hyperlipidemia, bipolar disorder with anxiety and panic attacks, severe claustrophobia, ADHD on Adderall will, BPH, GERD, chronic pain on opioids, CKD stage 3 ischemic/versus hypertensive cardiomyopathy, who presented with a non-STEMI last month.  He was hospitalized 11/19-11/24/2022.  Upon admission he was noted to have hypertensive crisis with systolic blood pressures 230-250 SBP and pulmonary edema with markedly elevated proBNP.  He required IV furosemide and nicardipine drip.     Coronary angiogram was completed 11/23/2022 and received 3 drug-eluting stents to the mid to distal LAD.    His coronary anatomy as noted as follows:    Ost LAD to Prox LAD lesion is 30% stenosed.    Mid LAD-1 lesion is 60% stenosed.    Dist LAD lesion is 90% stenosed.    Mid LAD-2 lesion is 60% stenosed.    1st Mrg-1 lesion is 60% stenosed.    1st Mrg-2 lesion is 50% stenosed.    Lat 1st Mrg lesion is 40% stenosed.    Prox RCA lesion is 40% stenosed.    Dist RCA lesion is 25% stenosed.    RPDA lesion is 50% stenosed.    2nd RPL lesion is 60% stenosed.    2nd Diag lesion is 40% stenosed.     Echo: EF of 30-35%.  Apical wall hypokinesis.  Mild to moderate MR     ECG shows sinus rhythm with first-degree AV block ( milliseconds), right bundle branch block.     During his hospital stay his losartan was decreased from 100 mg to 25 mg twice daily due to his elevated creatinine.  He was on hydralazine which was discontinued at his follow-up with Dr. Morales because the patient stated he did not tolerate it.  She then initiated amlodipine at 5 mg daily.    At my visit on December 23 I stopped his losartan and started  hydralazine.  I gave him parameters to increase his hydralazine up to 100 mg 3 times daily if his systolic blood pressure was greater than 140/greater than 85.  He reports that he has been trying hard to take his medications as prescribed.  At today's visit he is flushed and normotensive, but feeling very lightheaded.  Blood pressure today is 111/63.  Pulse rate is stable at 80 bpm.  He states that he has had increased lightheadedness and dizziness over the past several days.  His creatinine did improve after stopping his losartan as noted below.    DIAGNOSTIC DATA:   Latest Reference Range & Units 01/20/23 13:36   Sodium 136 - 145 mmol/L 137   Potassium 3.4 - 5.3 mmol/L 3.9   Chloride 98 - 107 mmol/L 97 (L)   Carbon Dioxide (CO2) 22 - 29 mmol/L 29   Urea Nitrogen 6.0 - 20.0 mg/dL 11.9   Creatinine 0.67 - 1.17 mg/dL 1.63 (H)   GFR Estimate >60 mL/min/1.73m2 50 (L)   Calcium 8.6 - 10.0 mg/dL 8.9   Anion Gap 7 - 15 mmol/L 11   Glucose 70 - 99 mg/dL 78     Echocardiogram shows left ventricular systolic cardiomyopathy with reduced ejection fraction with global hypokinesis, LVEF 30%-35%, mildly decreased right ventricular systolic function, mild to moderate mitral valve regurgitation, secondary pulmonary hypertension, dilated inferior vena cava.    He denies chest pain, orthopnea, PND, syncope, or peripheral edema.  He suffers from chronic pain and is followed by pain clinic.    Plan:   1. Hypertension-labile  Patient appears to have good response on his current regimen.  I did opt to decrease the hydralazine from 100 mg twice daily to 50 mg twice daily.  He admits to not remembering to take the middle day dose.  He will continue with carvedilol and isosorbide.  He stopped his torsemide after his hospital admission due to severe leg cramps.  He does follow a low-sodium diet.    2. multivessel coronary artery disease with recent NSTEMI  3 stents to the mid to proximal LAD.  On DAPT.  No complaints of chest pain.  He is  not participating in outpatient cardiac rehab.  LDL goal less than 70.  Last LDL a year ago was 19, triglycerides 215.  He is on rosuvastatin 20 mg daily.  Order will be placed to repeat lipids prior to his visit with Dr. Keita.    3.  Cardiomyopathy EF 35%  We did CORE enrollment at her last visit in December.  I reviewed low-sodium diet with him at that time need to watch diet closely.  He is very concerned about his kidney function.  We did discuss starting Entresto at our last visit, however, he does not want to be on anything that will potentially worsen his renal function.  We could continue to optimize his nitrate and hydralazine therapy.  He is normotensive now.    Follow-up echo is scheduled.  If his ejection fraction remains diminished then he would meet criteria for ICD implantation for primary prevention.    4.  ERICA untreated  5.  Stage IIIb chronic kidney disease.  Improved off losartan.  Consider nephrology consult.    6.  History of ED.  Patient is on long-acting nitrates and instructed not to use phosphodiesterase-5 Enzyme inhibitors    7.  Chronic pain    Thank you for including us in his care.  I will see him back per Dr. Keita's discretion.    Yaquelin Kaur, NP, APRN CNP    Today's clinic visit entailed:  Review of the result(s) of each unique test - labs  Prescription drug management  No LOS data to display   Time spent doing chart review, history and exam, documentation and further activities per the note  Provider  Link to [facility] Help Grid     The level of medical decision making during this visit was of moderate complexity.      Orders Placed This Encounter   Procedures     Lipid Profile     ALT       Orders Placed This Encounter   Medications     hydrALAZINE (APRESOLINE) 100 MG tablet     Sig: Take 0.5 tablets (50 mg) by mouth 2 times daily     Dispense:  90 tablet     Refill:  6       Medications Discontinued During This Encounter   Medication Reason     hydrALAZINE (APRESOLINE) 100 MG tablet  "         Encounter Diagnoses   Name Primary?     Acute HFrEF (heart failure with reduced ejection fraction) (H)      Uncontrolled hypertension      Coronary artery disease involving native coronary artery of native heart without angina pectoris Yes     Hyperlipidemia LDL goal <70        CURRENT MEDICATIONS:  Current Outpatient Medications   Medication Sig Dispense Refill     albuterol (PROAIR HFA/PROVENTIL HFA/VENTOLIN HFA) 108 (90 Base) MCG/ACT inhaler Inhale 2 puffs into the lungs every 6 hours 18 g 5     amitriptyline (ELAVIL) 50 MG tablet Take 3 tablets (150 mg) by mouth At Bedtime 270 tablet 3     amLODIPine (NORVASC) 5 MG tablet Take 1 tablet (5 mg) by mouth every morning 90 tablet 3     amphetamine-dextroamphetamine (ADDERALL) 20 MG tablet Take 1 tablet (20 mg) by mouth 2 times daily 90 tablet 0     aspirin (ASA) 81 MG chewable tablet Take 1 tablet (81 mg) by mouth daily Starting tomorrow. 30 tablet 3     B-D LUER-HIWOT SYRINGE 21G X 1-1/2\" 3 ML MISC 1 DEVICE ONCE A WEEK   AND ALSO NEEDS 22 G NEEDLES 1.5 INCH       buprenorphine HCl-naloxone HCl (SUBOXONE) 8-2 MG per film 1/2 film QID - Brand name only 60 Film 2     buPROPion (WELLBUTRIN XL) 300 MG 24 hr tablet Take 1 tablet (300 mg) by mouth every morning 90 tablet 1     busPIRone (BUSPAR) 5 MG tablet TAKE 1 TABLET (5 MG) BY MOUTH 2 TIMES DAILY AS NEEDED (PANIC ATTACK) 30 tablet 3     carvedilol (COREG) 25 MG tablet Take 1 tablet (25 mg) by mouth 2 times daily (with meals) 180 tablet 3     clonazePAM (KLONOPIN) 1 MG tablet TAKE 1 TABLET (1 MG) BY MOUTH 2 TIMES DAILY AS NEEDED (FLYING PHOBIA) 4 tablet 0     clopidogrel (PLAVIX) 75 MG tablet Take 1 tablet (75 mg) by mouth daily 90 tablet 3     cyclobenzaprine (FLEXERIL) 10 MG tablet Take 1 tablet (10 mg) by mouth 3 times daily as needed for other (hiccups) 90 tablet 3     finasteride (PROSCAR) 5 MG tablet 1/2 tab daily (Patient taking differently: 5 mg daily) 90 tablet 1     hydrALAZINE (APRESOLINE) 100 MG " "tablet Take 0.5 tablets (50 mg) by mouth 2 times daily 90 tablet 6     imiquimod (ALDARA) 5 % external cream Apply topically At Bedtime -wash off after 8 hours and my use for up to 16 weeks. 48 packet 5     isosorbide mononitrate (IMDUR) 60 MG 24 hr tablet Take 1 tablet (60 mg) by mouth daily 90 tablet 3     loperamide (IMODIUM A-D) 2 MG tablet Take 2 tabs (4 mg) after first loose stool, and then take one tab (2 mg) after each diarrheal stool.  Max of 8 tabs (16 mg) per day. 30 tablet 0     LORazepam (ATIVAN) 1 MG tablet TAKE 1 TABLET BY MOUTH 2 TIMES DAILY AS NEEDED FOR ANXIETY. 60 tablet 1     nitroGLYcerin (NITROSTAT) 0.4 MG sublingual tablet Place 1 tablet (0.4 mg) under the tongue every 5 minutes as needed for chest pain For chest pain place 1 tablet under the tongue every 5 minutes for 3 doses. If symptoms persist 5 minutes after 1st dose call 911. 25 tablet 0     pantoprazole (PROTONIX) 40 MG EC tablet Take 1 tablet (40 mg) by mouth daily 90 tablet 3     RESTASIS 0.05 % ophthalmic emulsion INSTILL 1 DROP INTO BOTH EYES TWICE A DAY 60 mL 4     rosuvastatin (CRESTOR) 20 MG tablet Take 1 tablet (20 mg) by mouth daily 90 tablet 3     testosterone cypionate (DEPOTESTOSTERONE) 200 MG/ML injection INJECT 1 ML (200 MG) INTO THE MUSCLE ONCE A WEEK 4 mL 4     zolpidem (AMBIEN) 10 MG tablet Take 1 tablet (10 mg) by mouth nightly as needed for sleep 30 tablet 2     fluticasone-salmeterol (ADVAIR DISKUS) 500-50 MCG/DOSE inhaler 1 inhalation 2 times per day (Patient not taking: Reported on 1/20/2023) 1 Inhaler 2     naloxone (NARCAN) 4 MG/0.1ML nasal spray Spray 1 spray (4 mg) into one nostril alternating nostrils as needed for opioid reversal every 2-3 minutes until assistance arrives (Patient not taking: Reported on 1/20/2023) 0.2 mL 11     Syringe/Needle, Disp, (SYRINGE LUER LOCK) 20G X 1-1/2\" 3 ML MISC 1 Device once a week - and also needs 22 G needles 1.5 inch #30 with 1 refill 30 each 1     torsemide (DEMADEX) 10 MG " tablet Take 1 tablet (10 mg) by mouth every morning (Patient not taking: Reported on 1/20/2023) 30 tablet 4     vitamin C (ASCORBIC ACID) 1000 MG TABS Take 1,000 mg by mouth daily (Patient not taking: Reported on 1/20/2023) 90 0       ALLERGIES     Allergies   Allergen Reactions     Acetaminophen Other (See Comments)     Patient states he does not have any reaction to this     Dairy Digestive      Morphine Other (See Comments)     Patient states he had a headache one time but has used since with no adverse reactions     Sulfa Drugs      Theophylline Hives       PAST MEDICAL HISTORY:  Past Medical History:   Diagnosis Date     ADHD (attention deficit hyperactivity disorder)      Allergic rhinitis, cause unspecified     Allergic rhinitis     Benign hypertension      CAD (coronary artery disease)     cardiac cath 11/23/2022: FERNANDA x3 to LAD     Chronic back pain      Hiccough      Hypersomnia with sleep apnea, unspecified      Hypertensive emergency 11/19/2022     Major depressive disorder, single episode, moderate (H) 03/2008     Mild persistent asthma      NSTEMI (non-ST elevated myocardial infarction) (H) 11/29/2020     Other primary cardiomyopathies     Cardiomyopathy -- unknown etiology       PAST SURGICAL HISTORY:  Past Surgical History:   Procedure Laterality Date     APPENDECTOMY  2007     BACK SURGERY  2014    L5-S1 fusion     CV CORONARY ANGIOGRAM N/A 11/30/2020    Procedure: Heart Catheterization with Possible Intervention;  Surgeon: Theo Donahue MD;  Location:  HEART CARDIAC CATH LAB     CV CORONARY ANGIOGRAM N/A 11/23/2022    Procedure: Coronary Angiogram;  Surgeon: Venkata Hdez MD;  Location: [facility] CARDIAC CATH LAB     CV PCI N/A 11/23/2022    Procedure: Percutaneous Coronary Intervention;  Surgeon: Venkata Hdez MD;  Location:  HEART CARDIAC CATH LAB     HC REPAIR OF NASAL SEPTUM       LAPAROSCOPIC CHOLECYSTECTOMY  1/05    Cholecystectomy, Laparoscopic     SURGICAL HISTORY  OF -       torn pectoral muscle     SURGICAL HISTORY OF -       Bilateral radiofrequency volume reduction of the inferior turbinates.     SURGICAL HISTORY OF -       rhinoplasty- septoplasty       FAMILY HISTORY:  Family History   Problem Relation Age of Onset     Coronary Artery Disease Mother          55; MI x 2     Cancer Father         hodgkins     Hypertension Father      Coronary Artery Disease Father      Cardiovascular Sister      Hypertension Brother      Cardiovascular Maternal Grandmother      No Known Problems Daughter      Prostate Cancer No family hx of      Cancer - colorectal No family hx of      Cardiomyopathy No family hx of      Valvular heart disease No family hx of        SOCIAL HISTORY:  Social History     Socioeconomic History     Marital status:      Spouse name: None     Number of children: 1     Years of education: None     Highest education level: None   Occupational History     Occupation: Retired chiropracter.     Employer: [employer]    Tobacco Use     Smoking status: Never     Smokeless tobacco: Never   Vaping Use     Vaping Use: Never used   Substance and Sexual Activity     Alcohol use: No     Comment: rarely      Drug use: No     Sexual activity: Yes     Partners: Female     Birth control/protection: Condom   Other Topics Concern     Parent/sibling w/ CABG, MI or angioplasty before 65F 55M? No       Review of Systems:  Skin:  not assessed       Eyes:  Negative      ENT:  not assessed      Respiratory:  Positive for sleep apnea;CPAP GAMING stairs when carrying items   Cardiovascular:  Negative;palpitations;chest pain;edema Positive for;dizziness Sometimes with standing up too fast  Gastroenterology: Positive for heartburn    Genitourinary:  Negative      Musculoskeletal:  Positive for back pain;neck pain    Neurologic:  not assessed      Psychiatric:  Positive for anxiety;depression    Heme/Lymph/Imm:  Positive for allergies    Endocrine:  Negative        Physical  Exam:  Vitals: /63 (BP Location: Right arm, Cuff Size: Adult Large)   Pulse 80   Ht 1.829 m (6')   Wt 111.2 kg (245 lb 1.6 oz)   SpO2 97%   BMI 33.24 kg/m      Constitutional:  cooperative, alert and oriented, well developed, well nourished, in no acute distress        Skin:  warm and dry to the touch;no apparent skin lesions or masses noted          Head:  normocephalic, no masses or lesions        Eyes:  pupils equal and round;conjunctivae and lids unremarkable        Lymph:      ENT:  no pallor or cyanosis, dentition good        Neck:  JVP normal        Respiratory:  normal breath sounds, clear to auscultation, normal A-P diameter, normal symmetry, normal respiratory excursion, no use of accessory muscles         Cardiac: regular rhythm;normal S1 and S2;no murmurs, gallops or rubs detected                not assessed this visit                                        GI:  BS normoactive        Extremities and Muscular Skeletal:  no deformities, clubbing, cyanosis, erythema observed;no edema              Neurological:  no gross motor deficits        Psych:  Alert and Oriented x 3              Thank you for allowing me to participate in the care of your patient.    Sincerely,     Yaquelin Kaur NP, APRN CNP     [facility]

## 2023-01-20 NOTE — PROGRESS NOTES
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       His history includes asthma, hypertension, hyperlipidemia, bipolar disorder with anxiety and panic attacks, severe claustrophobia, ADHD on Adderall will, BPH, GERD, chronic pain on opioids, CKD stage 3 ischemic/versus hypertensive cardiomyopathy, who presented with a non-STEMI last month.  He was hospitalized 11/19-11/24/2022.  Upon admission he was noted to have hypertensive crisis with systolic blood pressures 230-250 SBP and pulmonary edema with markedly elevated proBNP.  He required IV furosemide and nicardipine drip.     Coronary angiogram was completed 11/23/2022 and received 3 drug-eluting stents to the mid to distal LAD.    His coronary anatomy as noted as follows:    Ost LAD to Prox LAD lesion is 30% stenosed.    Mid LAD-1 lesion is 60% stenosed.    Dist LAD lesion is 90% stenosed.    Mid LAD-2 lesion is 60% stenosed.    1st Mrg-1 lesion is 60% stenosed.    1st Mrg-2 lesion is 50% stenosed.    Lat 1st Mrg lesion is 40% stenosed.    Prox RCA lesion is 40% stenosed.    Dist RCA lesion is 25% stenosed.    RPDA lesion is 50% stenosed.    2nd RPL lesion is 60% stenosed.    2nd Diag lesion is 40% stenosed.     Echo: EF of 30-35%.  Apical wall hypokinesis.  Mild to moderate MR     ECG shows sinus rhythm with first-degree AV block ( milliseconds), right bundle branch block.     During his hospital stay his losartan was decreased from 100 mg to 25 mg twice daily due to his elevated creatinine.  He was on hydralazine which was discontinued at his follow-up with Dr. Morales because the patient stated he did not tolerate it.  She then initiated amlodipine at 5 mg daily.    At my visit on December 23 I stopped his losartan and started hydralazine.  I gave him parameters to increase his hydralazine up to 100 mg 3 times daily if his systolic blood pressure was greater than 140/greater than 85.  He reports that he has been trying hard to take his  medications as prescribed.  At today's visit he is flushed and normotensive, but feeling very lightheaded.  Blood pressure today is 111/63.  Pulse rate is stable at 80 bpm.  He states that he has had increased lightheadedness and dizziness over the past several days.  His creatinine did improve after stopping his losartan as noted below.    DIAGNOSTIC DATA:   Latest Reference Range & Units 01/20/23 13:36   Sodium 136 - 145 mmol/L 137   Potassium 3.4 - 5.3 mmol/L 3.9   Chloride 98 - 107 mmol/L 97 (L)   Carbon Dioxide (CO2) 22 - 29 mmol/L 29   Urea Nitrogen 6.0 - 20.0 mg/dL 11.9   Creatinine 0.67 - 1.17 mg/dL 1.63 (H)   GFR Estimate >60 mL/min/1.73m2 50 (L)   Calcium 8.6 - 10.0 mg/dL 8.9   Anion Gap 7 - 15 mmol/L 11   Glucose 70 - 99 mg/dL 78     Echocardiogram shows left ventricular systolic cardiomyopathy with reduced ejection fraction with global hypokinesis, LVEF 30%-35%, mildly decreased right ventricular systolic function, mild to moderate mitral valve regurgitation, secondary pulmonary hypertension, dilated inferior vena cava.    He denies chest pain, orthopnea, PND, syncope, or peripheral edema.  He suffers from chronic pain and is followed by pain clinic.    Plan:   1. Hypertension-labile  Patient appears to have good response on his current regimen.  I did opt to decrease the hydralazine from 100 mg twice daily to 50 mg twice daily.  He admits to not remembering to take the middle day dose.  He will continue with carvedilol and isosorbide.  He stopped his torsemide after his hospital admission due to severe leg cramps.  He does follow a low-sodium diet.    2. multivessel coronary artery disease with recent NSTEMI  3 stents to the mid to proximal LAD.  On DAPT.  No complaints of chest pain.  He is not participating in outpatient cardiac rehab.  LDL goal less than 70.  Last LDL a year ago was 19, triglycerides 215.  He is on rosuvastatin 20 mg daily.  Order will be placed to repeat lipids prior to his visit  with Dr. Keita.    3.  Cardiomyopathy EF 35%  We did CORE enrollment at her last visit in December.  I reviewed low-sodium diet with him at that time need to watch diet closely.  He is very concerned about his kidney function.  We did discuss starting Entresto at our last visit, however, he does not want to be on anything that will potentially worsen his renal function.  We could continue to optimize his nitrate and hydralazine therapy.  He is normotensive now.    Follow-up echo is scheduled.  If his ejection fraction remains diminished then he would meet criteria for ICD implantation for primary prevention.    4.  ERICA untreated  5.  Stage IIIb chronic kidney disease.  Improved off losartan.  Consider nephrology consult.    6.  History of ED.  Patient is on long-acting nitrates and instructed not to use phosphodiesterase-5 Enzyme inhibitors    7.  Chronic pain    Thank you for including us in his care.  I will see him back per Dr. Keita's discretion.    Yaquelin Kaur, NP, APRN CNP    Today's clinic visit entailed:  Review of the result(s) of each unique test - labs  Prescription drug management  No LOS data to display   Time spent doing chart review, history and exam, documentation and further activities per the note  Provider  Link to MDM Help Grid     The level of medical decision making during this visit was of moderate complexity.      Orders Placed This Encounter   Procedures     Lipid Profile     ALT       Orders Placed This Encounter   Medications     hydrALAZINE (APRESOLINE) 100 MG tablet     Sig: Take 0.5 tablets (50 mg) by mouth 2 times daily     Dispense:  90 tablet     Refill:  6       Medications Discontinued During This Encounter   Medication Reason     hydrALAZINE (APRESOLINE) 100 MG tablet          Encounter Diagnoses   Name Primary?     Acute HFrEF (heart failure with reduced ejection fraction) (H)      Uncontrolled hypertension      Coronary artery disease involving native coronary artery of  "native heart without angina pectoris Yes     Hyperlipidemia LDL goal <70        CURRENT MEDICATIONS:  Current Outpatient Medications   Medication Sig Dispense Refill     albuterol (PROAIR HFA/PROVENTIL HFA/VENTOLIN HFA) 108 (90 Base) MCG/ACT inhaler Inhale 2 puffs into the lungs every 6 hours 18 g 5     amitriptyline (ELAVIL) 50 MG tablet Take 3 tablets (150 mg) by mouth At Bedtime 270 tablet 3     amLODIPine (NORVASC) 5 MG tablet Take 1 tablet (5 mg) by mouth every morning 90 tablet 3     amphetamine-dextroamphetamine (ADDERALL) 20 MG tablet Take 1 tablet (20 mg) by mouth 2 times daily 90 tablet 0     aspirin (ASA) 81 MG chewable tablet Take 1 tablet (81 mg) by mouth daily Starting tomorrow. 30 tablet 3     B-D LUER-HIWOT SYRINGE 21G X 1-1/2\" 3 ML MISC 1 DEVICE ONCE A WEEK   AND ALSO NEEDS 22 G NEEDLES 1.5 INCH       buprenorphine HCl-naloxone HCl (SUBOXONE) 8-2 MG per film 1/2 film QID - Brand name only 60 Film 2     buPROPion (WELLBUTRIN XL) 300 MG 24 hr tablet Take 1 tablet (300 mg) by mouth every morning 90 tablet 1     busPIRone (BUSPAR) 5 MG tablet TAKE 1 TABLET (5 MG) BY MOUTH 2 TIMES DAILY AS NEEDED (PANIC ATTACK) 30 tablet 3     carvedilol (COREG) 25 MG tablet Take 1 tablet (25 mg) by mouth 2 times daily (with meals) 180 tablet 3     clonazePAM (KLONOPIN) 1 MG tablet TAKE 1 TABLET (1 MG) BY MOUTH 2 TIMES DAILY AS NEEDED (FLYING PHOBIA) 4 tablet 0     clopidogrel (PLAVIX) 75 MG tablet Take 1 tablet (75 mg) by mouth daily 90 tablet 3     cyclobenzaprine (FLEXERIL) 10 MG tablet Take 1 tablet (10 mg) by mouth 3 times daily as needed for other (hiccups) 90 tablet 3     finasteride (PROSCAR) 5 MG tablet 1/2 tab daily (Patient taking differently: 5 mg daily) 90 tablet 1     hydrALAZINE (APRESOLINE) 100 MG tablet Take 0.5 tablets (50 mg) by mouth 2 times daily 90 tablet 6     imiquimod (ALDARA) 5 % external cream Apply topically At Bedtime -wash off after 8 hours and my use for up to 16 weeks. 48 packet 5     " "isosorbide mononitrate (IMDUR) 60 MG 24 hr tablet Take 1 tablet (60 mg) by mouth daily 90 tablet 3     loperamide (IMODIUM A-D) 2 MG tablet Take 2 tabs (4 mg) after first loose stool, and then take one tab (2 mg) after each diarrheal stool.  Max of 8 tabs (16 mg) per day. 30 tablet 0     LORazepam (ATIVAN) 1 MG tablet TAKE 1 TABLET BY MOUTH 2 TIMES DAILY AS NEEDED FOR ANXIETY. 60 tablet 1     nitroGLYcerin (NITROSTAT) 0.4 MG sublingual tablet Place 1 tablet (0.4 mg) under the tongue every 5 minutes as needed for chest pain For chest pain place 1 tablet under the tongue every 5 minutes for 3 doses. If symptoms persist 5 minutes after 1st dose call 911. 25 tablet 0     pantoprazole (PROTONIX) 40 MG EC tablet Take 1 tablet (40 mg) by mouth daily 90 tablet 3     RESTASIS 0.05 % ophthalmic emulsion INSTILL 1 DROP INTO BOTH EYES TWICE A DAY 60 mL 4     rosuvastatin (CRESTOR) 20 MG tablet Take 1 tablet (20 mg) by mouth daily 90 tablet 3     testosterone cypionate (DEPOTESTOSTERONE) 200 MG/ML injection INJECT 1 ML (200 MG) INTO THE MUSCLE ONCE A WEEK 4 mL 4     zolpidem (AMBIEN) 10 MG tablet Take 1 tablet (10 mg) by mouth nightly as needed for sleep 30 tablet 2     fluticasone-salmeterol (ADVAIR DISKUS) 500-50 MCG/DOSE inhaler 1 inhalation 2 times per day (Patient not taking: Reported on 1/20/2023) 1 Inhaler 2     naloxone (NARCAN) 4 MG/0.1ML nasal spray Spray 1 spray (4 mg) into one nostril alternating nostrils as needed for opioid reversal every 2-3 minutes until assistance arrives (Patient not taking: Reported on 1/20/2023) 0.2 mL 11     Syringe/Needle, Disp, (SYRINGE LUER LOCK) 20G X 1-1/2\" 3 ML MISC 1 Device once a week - and also needs 22 G needles 1.5 inch #30 with 1 refill 30 each 1     torsemide (DEMADEX) 10 MG tablet Take 1 tablet (10 mg) by mouth every morning (Patient not taking: Reported on 1/20/2023) 30 tablet 4     vitamin C (ASCORBIC ACID) 1000 MG TABS Take 1,000 mg by mouth daily (Patient not taking: " Reported on 1/20/2023) 90 0       ALLERGIES     Allergies   Allergen Reactions     Acetaminophen Other (See Comments)     Patient states he does not have any reaction to this     Dairy Digestive      Morphine Other (See Comments)     Patient states he had a headache one time but has used since with no adverse reactions     Sulfa Drugs      Theophylline Hives       PAST MEDICAL HISTORY:  Past Medical History:   Diagnosis Date     ADHD (attention deficit hyperactivity disorder)      Allergic rhinitis, cause unspecified     Allergic rhinitis     Benign hypertension      CAD (coronary artery disease)     cardiac cath 11/23/2022: FERNANDA x3 to LAD     Chronic back pain      Hiccough      Hypersomnia with sleep apnea, unspecified      Hypertensive emergency 11/19/2022     Major depressive disorder, single episode, moderate (H) 03/2008     Mild persistent asthma      NSTEMI (non-ST elevated myocardial infarction) (H) 11/29/2020     Other primary cardiomyopathies     Cardiomyopathy -- unknown etiology       PAST SURGICAL HISTORY:  Past Surgical History:   Procedure Laterality Date     APPENDECTOMY  2007     BACK SURGERY  2014    L5-S1 fusion     CV CORONARY ANGIOGRAM N/A 11/30/2020    Procedure: Heart Catheterization with Possible Intervention;  Surgeon: Theo Donahue MD;  Location: [facility] CARDIAC CATH LAB     CV CORONARY ANGIOGRAM N/A 11/23/2022    Procedure: Coronary Angiogram;  Surgeon: Venkata Hdez MD;  Location: [facility] CARDIAC CATH LAB     CV PCI N/A 11/23/2022    Procedure: Percutaneous Coronary Intervention;  Surgeon: Venkata Hdez MD;  Location: [facility] CARDIAC CATH LAB     HC REPAIR OF NASAL SEPTUM       LAPAROSCOPIC CHOLECYSTECTOMY  1/05    Cholecystectomy, Laparoscopic     SURGICAL HISTORY OF -       torn pectoral muscle     SURGICAL HISTORY OF -   2009    Bilateral radiofrequency volume reduction of the inferior turbinates.     SURGICAL HISTORY OF -   2012    rhinoplasty- septoplasty        FAMILY HISTORY:  Family History   Problem Relation Age of Onset     Coronary Artery Disease Mother          55; MI x 2     Cancer Father         hodgkins     Hypertension Father      Coronary Artery Disease Father      Cardiovascular Sister      Hypertension Brother      Cardiovascular Maternal Grandmother      No Known Problems Daughter      Prostate Cancer No family hx of      Cancer - colorectal No family hx of      Cardiomyopathy No family hx of      Valvular heart disease No family hx of        SOCIAL HISTORY:  Social History     Socioeconomic History     Marital status:      Spouse name: None     Number of children: 1     Years of education: None     Highest education level: None   Occupational History     Occupation: Retired chiropracter.     Employer: [employer]    Tobacco Use     Smoking status: Never     Smokeless tobacco: Never   Vaping Use     Vaping Use: Never used   Substance and Sexual Activity     Alcohol use: No     Comment: rarely      Drug use: No     Sexual activity: Yes     Partners: Female     Birth control/protection: Condom   Other Topics Concern     Parent/sibling w/ CABG, MI or angioplasty before 65F 55M? No       Review of Systems:  Skin:  not assessed       Eyes:  Negative      ENT:  not assessed      Respiratory:  Positive for sleep apnea;CPAP GAMING stairs when carrying items   Cardiovascular:  Negative;palpitations;chest pain;edema Positive for;dizziness Sometimes with standing up too fast  Gastroenterology: Positive for heartburn    Genitourinary:  Negative      Musculoskeletal:  Positive for back pain;neck pain    Neurologic:  not assessed      Psychiatric:  Positive for anxiety;depression    Heme/Lymph/Imm:  Positive for allergies    Endocrine:  Negative        Physical Exam:  Vitals: /63 (BP Location: Right arm, Cuff Size: Adult Large)   Pulse 80   Ht 1.829 m (6')   Wt 111.2 kg (245 lb 1.6 oz)   SpO2 97%   BMI 33.24 kg/m      Constitutional:  cooperative,  alert and oriented, well developed, well nourished, in no acute distress        Skin:  warm and dry to the touch;no apparent skin lesions or masses noted          Head:  normocephalic, no masses or lesions        Eyes:  pupils equal and round;conjunctivae and lids unremarkable        Lymph:      ENT:  no pallor or cyanosis, dentition good        Neck:  JVP normal        Respiratory:  normal breath sounds, clear to auscultation, normal A-P diameter, normal symmetry, normal respiratory excursion, no use of accessory muscles         Cardiac: regular rhythm;normal S1 and S2;no murmurs, gallops or rubs detected                not assessed this visit                                        GI:  BS normoactive        Extremities and Muscular Skeletal:  no deformities, clubbing, cyanosis, erythema observed;no edema              Neurological:  no gross motor deficits        Psych:  Alert and Oriented x 3        CC  RAINA Escobar CNP  [address]  [place] [address]

## 2023-01-25 DIAGNOSIS — R07.89 OTHER CHEST PAIN: ICD-10-CM

## 2023-01-25 DIAGNOSIS — F40.243 FLYING PHOBIA: ICD-10-CM

## 2023-01-25 RX ORDER — CLONAZEPAM 1 MG/1
1 TABLET ORAL 2 TIMES DAILY PRN
Qty: 4 TABLET | Refills: 0 | Status: SHIPPED | OUTPATIENT
Start: 2023-01-25 | End: 2023-03-08

## 2023-01-25 RX ORDER — NITROGLYCERIN 0.4 MG/1
0.4 TABLET SUBLINGUAL EVERY 5 MIN PRN
Qty: 25 TABLET | Refills: 0 | Status: SHIPPED | OUTPATIENT
Start: 2023-01-25 | End: 2023-04-30

## 2023-01-30 DIAGNOSIS — M54.2 NECK PAIN: ICD-10-CM

## 2023-01-30 RX ORDER — CYCLOBENZAPRINE HCL 10 MG
10 TABLET ORAL 3 TIMES DAILY PRN
Qty: 90 TABLET | Refills: 3 | Status: SHIPPED | OUTPATIENT
Start: 2023-01-30 | End: 2023-09-08

## 2023-01-30 NOTE — TELEPHONE ENCOUNTER
Received fax from pharmacy requesting refill(s) for     cyclobenzaprine (FLEXERIL) 10 MG tablet     Last refilled on 12/29/22    Pt last seen on 1/12/23  Next appt scheduled for 4/19/23    E-prescribe to:    [facility]/PHARMACY #[id number] - [place], [place] - [address]     Will facilitate refill.

## 2023-02-12 DIAGNOSIS — F41.1 GENERALIZED ANXIETY DISORDER: ICD-10-CM

## 2023-02-12 DIAGNOSIS — F41.0 PANIC DISORDER WITHOUT AGORAPHOBIA: ICD-10-CM

## 2023-02-12 RX ORDER — LORAZEPAM 1 MG/1
TABLET ORAL
Qty: 60 TABLET | Refills: 1 | Status: SHIPPED | OUTPATIENT
Start: 2023-02-14 | End: 2023-04-19

## 2023-02-13 NOTE — TELEPHONE ENCOUNTER
Med sent    Last visit in this dept:    12/12/2022     Last visit -this provider:  12/12/2022     Next visit in this dept:   Future Appointments 2/12/2023 - 8/11/2023      Date Visit Type Length Department Provider     2/21/2023 11:00 AM ECHO COMPLETE 60 min [facility] CARDIAC SERVICES [id number]    Location Instructions:     [facility] is located at [address], [place] [address]. You can park your vehicle at the parking ramp west of [address] across the street from [facility]. You will cross the skyway to access our clinic on 3rd floor. Please call the clinic if you have questions regarding directions at [phone].              3/17/2023  8:00 AM LAB 15 min [facility]    Location Instructions:     Our clinic is located at [address], [place] [address]. For your convenience,  parking is available, or you may park your vehicle at the parking ramp west of [facility] across the street from [facility]. You will cross the skyway to access our clinic on the 2nd floor.               3/17/2023  8:45 AM RETURN CARDIOLOGY 30 min [facility] Gilda Keita MD    Location Instructions:     Our clinic is located at [address], [place] [address]. You can park your vehicle at the parking ramp west of [facility] [address] [facility] across the street from [facility]. You will cross the skyway to access our clinic on the 2nd floor.              4/19/2023  2:00 PM RETURN PATIENT 60 min [facility] Macy Subramanian MD              5/19/2023  9:15 AM RETURN CARDIOLOGY 30 min [facility] Gilda Keita MD    Location Instructions:     Our clinic is located at [address], [place] [address]. You can park your vehicle at the parking ramp west of [facility] across the street  from [facility]. You will cross the skyway to access our clinic on the 2nd floor.              5/31/2023  3:15 PM VIDEO VISIT RETURN 15 min [facility] Chung Henderson MD    Location Instructions:     Clinic is located at [address] [place] [address], [address], [place] [address]                   Health Maintenance   Topic Date Due     ZOSTER IMMUNIZATION (1 of 2) Never done     ASTHMA ACTION PLAN  06/28/2020     COLORECTAL CANCER SCREENING  10/15/2021     COVID-19 Vaccine (3 - Booster for Moderna series) 11/19/2021     ASTHMA CONTROL TEST  06/12/2023     PHQ-9  06/12/2023     MEDICARE ANNUAL WELLNESS VISIT  06/23/2023     LIPID  06/23/2023     PSA  06/23/2023     ANNUAL REVIEW OF HM ORDERS  06/23/2023     CMP  11/19/2023     MICROALBUMIN  11/21/2023     CBC  11/24/2023     HEMOGLOBIN  11/24/2023     NANCY ASSESSMENT  12/12/2023     URINE DRUG SCREEN  01/12/2024     BMP  01/20/2024     DTAP/TDAP/TD IMMUNIZATION (3 - Td or Tdap) 05/20/2027     ADVANCE CARE PLANNING  06/24/2027     HEPATITIS C SCREENING  Completed     HIV SCREENING  Completed     INFLUENZA VACCINE  Completed     Pneumococcal Vaccine: Pediatrics (0 to 5 Years) and At-Risk Patients (6 to 64 Years)  Completed     URINALYSIS  Completed     IPV IMMUNIZATION  Aged Out     MENINGITIS IMMUNIZATION  Aged Out     HEPATITIS B IMMUNIZATION  Discontinued

## 2023-02-21 DIAGNOSIS — F41.0 PANIC DISORDER WITHOUT AGORAPHOBIA: ICD-10-CM

## 2023-02-22 RX ORDER — BUSPIRONE HYDROCHLORIDE 5 MG/1
5 TABLET ORAL 2 TIMES DAILY PRN
Qty: 30 TABLET | Refills: 2 | Status: SHIPPED | OUTPATIENT
Start: 2023-02-22 | End: 2023-04-18

## 2023-02-22 NOTE — TELEPHONE ENCOUNTER
Prescription approved per [facility] Refill Protocol.  Carrie Mccarty RN

## 2023-03-07 DIAGNOSIS — F40.243 FLYING PHOBIA: ICD-10-CM

## 2023-03-08 RX ORDER — CLONAZEPAM 1 MG/1
1 TABLET ORAL 2 TIMES DAILY PRN
Qty: 4 TABLET | Refills: 0 | Status: SHIPPED | OUTPATIENT
Start: 2023-03-08 | End: 2023-04-30

## 2023-03-20 DIAGNOSIS — F41.1 GENERALIZED ANXIETY DISORDER: ICD-10-CM

## 2023-03-20 DIAGNOSIS — F33.1 MAJOR DEPRESSIVE DISORDER, RECURRENT EPISODE, MODERATE (H): ICD-10-CM

## 2023-03-23 RX ORDER — BUPROPION HYDROCHLORIDE 300 MG/1
TABLET ORAL
Qty: 90 TABLET | Refills: 1 | Status: SHIPPED | OUTPATIENT
Start: 2023-03-23 | End: 2023-09-08

## 2023-03-23 NOTE — TELEPHONE ENCOUNTER
Routing refill request to provider for review/approval because:  SSRIs Protocol Failed 03/20/2023 04:02 PM   Protocol Details  PHQ-9 score less than 5 in past 6 months     PHQ 6/23/2022 11/4/2022 12/12/2022   PHQ-9 Total Score 10 10 13   Q9: Thoughts of better off dead/self-harm past 2 weeks Several days Not at all Not at all   F/U: Thoughts of suicide or self-harm No - -   F/U: Safety concerns No - -   Some encounter information is confidential and restricted. Go to Review Flowsheets activity to see all data.

## 2023-03-24 ENCOUNTER — MYC MEDICAL ADVICE (OUTPATIENT)
Dept: FAMILY MEDICINE | Facility: CLINIC | Age: 53
End: 2023-03-24
Payer: COMMERCIAL

## 2023-03-24 DIAGNOSIS — F41.1 GENERALIZED ANXIETY DISORDER: ICD-10-CM

## 2023-03-24 DIAGNOSIS — F41.0 PANIC DISORDER WITHOUT AGORAPHOBIA: ICD-10-CM

## 2023-03-24 DIAGNOSIS — N52.9 ERECTILE DYSFUNCTION, UNSPECIFIED ERECTILE DYSFUNCTION TYPE: Primary | ICD-10-CM

## 2023-03-24 DIAGNOSIS — F90.9 ATTENTION DEFICIT HYPERACTIVITY DISORDER (ADHD), UNSPECIFIED ADHD TYPE: ICD-10-CM

## 2023-03-27 NOTE — TELEPHONE ENCOUNTER
Please see my chart message below     Please review and advise     Thank you     Kari Mcleod RN, BSN  [place] [facility]

## 2023-03-31 DIAGNOSIS — I10 UNCONTROLLED HYPERTENSION: ICD-10-CM

## 2023-03-31 DIAGNOSIS — F90.9 ATTENTION DEFICIT HYPERACTIVITY DISORDER (ADHD), UNSPECIFIED ADHD TYPE: ICD-10-CM

## 2023-03-31 DIAGNOSIS — G47.00 INSOMNIA, UNSPECIFIED TYPE: ICD-10-CM

## 2023-04-03 RX ORDER — DEXTROAMPHETAMINE SACCHARATE, AMPHETAMINE ASPARTATE MONOHYDRATE, DEXTROAMPHETAMINE SULFATE AND AMPHETAMINE SULFATE 5; 5; 5; 5 MG/1; MG/1; MG/1; MG/1
40 CAPSULE, EXTENDED RELEASE ORAL DAILY
Qty: 60 CAPSULE | Refills: 0 | Status: SHIPPED | OUTPATIENT
Start: 2023-04-03 | End: 2023-05-31

## 2023-04-03 RX ORDER — ZOLPIDEM TARTRATE 10 MG/1
10 TABLET ORAL
Qty: 30 TABLET | Refills: 2 | Status: SHIPPED | OUTPATIENT
Start: 2023-04-03 | End: 2023-06-30

## 2023-04-03 RX ORDER — DEXTROAMPHETAMINE SACCHARATE, AMPHETAMINE ASPARTATE MONOHYDRATE, DEXTROAMPHETAMINE SULFATE AND AMPHETAMINE SULFATE 5; 5; 5; 5 MG/1; MG/1; MG/1; MG/1
40 CAPSULE, EXTENDED RELEASE ORAL DAILY
Qty: 60 CAPSULE | Refills: 0 | Status: SHIPPED | OUTPATIENT
Start: 2023-04-03 | End: 2023-04-03

## 2023-04-03 RX ORDER — DEXTROAMPHETAMINE SACCHARATE, AMPHETAMINE ASPARTATE MONOHYDRATE, DEXTROAMPHETAMINE SULFATE AND AMPHETAMINE SULFATE 5; 5; 5; 5 MG/1; MG/1; MG/1; MG/1
40 CAPSULE, EXTENDED RELEASE ORAL DAILY
Qty: 60 CAPSULE | Refills: 0 | Status: SHIPPED | OUTPATIENT
Start: 2023-06-04 | End: 2023-07-04

## 2023-04-03 RX ORDER — HYDRALAZINE HYDROCHLORIDE 100 MG/1
50 TABLET, FILM COATED ORAL 2 TIMES DAILY
Qty: 90 TABLET | Refills: 6 | Status: SHIPPED | OUTPATIENT
Start: 2023-04-03 | End: 2023-04-11

## 2023-04-03 RX ORDER — DEXTROAMPHETAMINE SACCHARATE, AMPHETAMINE ASPARTATE MONOHYDRATE, DEXTROAMPHETAMINE SULFATE AND AMPHETAMINE SULFATE 5; 5; 5; 5 MG/1; MG/1; MG/1; MG/1
40 CAPSULE, EXTENDED RELEASE ORAL DAILY
Qty: 60 CAPSULE | Refills: 0 | Status: SHIPPED | OUTPATIENT
Start: 2023-05-04 | End: 2023-06-03

## 2023-04-03 NOTE — TELEPHONE ENCOUNTER
Dr Segovia this patient wants his adderall to be taken back from [facility] pharmacy and sent to [facility] on file please.       [facility]/PHARMACY #[id number] - [place], [place] - [address]    Please resend the three month script when possible.    Shannon Sr   Flex

## 2023-04-10 DIAGNOSIS — I10 UNCONTROLLED HYPERTENSION: ICD-10-CM

## 2023-04-11 RX ORDER — HYDRALAZINE HYDROCHLORIDE 100 MG/1
TABLET, FILM COATED ORAL
Qty: 90 TABLET | Refills: 0 | Status: SHIPPED | OUTPATIENT
Start: 2023-04-11 | End: 2023-09-08

## 2023-04-15 DIAGNOSIS — F41.0 PANIC DISORDER WITHOUT AGORAPHOBIA: ICD-10-CM

## 2023-04-18 RX ORDER — BUSPIRONE HYDROCHLORIDE 5 MG/1
5 TABLET ORAL 2 TIMES DAILY PRN
Qty: 30 TABLET | Refills: 1 | Status: SHIPPED | OUTPATIENT
Start: 2023-04-18 | End: 2023-05-30
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
## 2023-04-18 NOTE — TELEPHONE ENCOUNTER
Refill approved per [facility] refill protocol.    Cinda RIOS RN   [facility] Triage

## 2023-04-19 ENCOUNTER — OFFICE VISIT (OUTPATIENT)
Dept: PALLIATIVE MEDICINE | Facility: CLINIC | Age: 53
End: 2023-04-19
Payer: COMMERCIAL

## 2023-04-19 VITALS — HEART RATE: 70 BPM | OXYGEN SATURATION: 94 % | SYSTOLIC BLOOD PRESSURE: 142 MMHG | DIASTOLIC BLOOD PRESSURE: 90 MMHG

## 2023-04-19 DIAGNOSIS — G89.4 CHRONIC PAIN SYNDROME: ICD-10-CM

## 2023-04-19 DIAGNOSIS — G43.709 CHRONIC MIGRAINE WITHOUT AURA, NOT INTRACTABLE, WITHOUT STATUS MIGRAINOSUS: ICD-10-CM

## 2023-04-19 DIAGNOSIS — F11.20 UNCOMPLICATED OPIOID DEPENDENCE (H): Primary | ICD-10-CM

## 2023-04-19 DIAGNOSIS — F41.1 GAD (GENERALIZED ANXIETY DISORDER): ICD-10-CM

## 2023-04-19 DIAGNOSIS — Z79.899 ENCOUNTER FOR LONG-TERM (CURRENT) USE OF HIGH-RISK MEDICATION: ICD-10-CM

## 2023-04-19 PROCEDURE — 99214 OFFICE O/P EST MOD 30 MIN: CPT | Mod: 25 | Performed by: ANESTHESIOLOGY

## 2023-04-19 PROCEDURE — 64615 CHEMODENERV MUSC MIGRAINE: CPT | Performed by: ANESTHESIOLOGY

## 2023-04-19 RX ORDER — BUPRENORPHINE AND NALOXONE 8; 2 MG/1; MG/1
FILM, SOLUBLE BUCCAL; SUBLINGUAL
Qty: 60 FILM | Refills: 2 | Status: SHIPPED | OUTPATIENT
Start: 2023-04-19 | End: 2023-07-26

## 2023-04-19 RX ORDER — LORAZEPAM 1 MG/1
1 TABLET ORAL 2 TIMES DAILY PRN
Qty: 60 TABLET | Refills: 1 | Status: SHIPPED | OUTPATIENT
Start: 2023-04-19 | End: 2023-06-30

## 2023-04-19 ASSESSMENT — PAIN SCALES - GENERAL: PAINLEVEL: EXTREME PAIN (8)

## 2023-04-19 NOTE — PATIENT INSTRUCTIONS
[facility]  Botox Injection Discharge Instructions    Do not rub or put extended pressure on the injection sites. You may gently touch the sites to remove any excess blood.  Monitor the injection sites for signs and symptoms of infection such as redness, swelling, warmth, fever, chills, or drainage to areas.  You may have soreness at the injection sites for up to 24 hours.  If you are able to use anti-inflammatory medications or Tylenol for pain control, you can take these as directed.  It may take up to 1 month to notice benefit from the 1st treatment  If you do notice relief, botox can be done every 3 months.  It may require insurance authorization everytime.    For questions about insurance coverage, please call the main clinic number and ask to speak with someone about botox coverage.     Pain Clinic phone number during work hours (Monday through Friday 8 am-4:30 pm) at [phone] or the Provider Line after hours at [phone]:

## 2023-04-19 NOTE — TELEPHONE ENCOUNTER
Last office visit: 12/12/2022     Future Office Visit:        [facility] -- Patient Level:     [Media Unavailable] Controlled Substance Agreement - Non - Opioid - Scan on 6/25/2022  5:47 AM: NON-OPIOID CONTROLLED SUBSTANCE AGREEMENT             Routing refill request to provider for review/approval because:  Drug not on the [facility] refill protocol     Kari Mcleod RN, BSN  [facility]

## 2023-04-20 DIAGNOSIS — F41.1 GENERALIZED ANXIETY DISORDER: ICD-10-CM

## 2023-04-20 DIAGNOSIS — F41.0 PANIC DISORDER WITHOUT AGORAPHOBIA: ICD-10-CM

## 2023-04-20 RX ORDER — LORAZEPAM 1 MG/1
TABLET ORAL
Qty: 60 TABLET | Refills: 1 | OUTPATIENT
Start: 2023-04-20

## 2023-04-21 ENCOUNTER — LAB (OUTPATIENT)
Dept: LAB | Facility: CLINIC | Age: 53
End: 2023-04-21
Payer: COMMERCIAL

## 2023-04-21 ENCOUNTER — HOSPITAL ENCOUNTER (OUTPATIENT)
Dept: CARDIOLOGY | Facility: CLINIC | Age: 53
Discharge: HOME | End: 2023-04-21
Attending: NURSE PRACTITIONER | Admitting: NURSE PRACTITIONER
Payer: COMMERCIAL

## 2023-04-21 DIAGNOSIS — I50.21 ACUTE HFREF (HEART FAILURE WITH REDUCED EJECTION FRACTION) (H): ICD-10-CM

## 2023-04-21 DIAGNOSIS — E78.5 HYPERLIPIDEMIA LDL GOAL <70: ICD-10-CM

## 2023-04-21 DIAGNOSIS — I43 CARDIOMYOPATHY DUE TO HYPERTENSION, WITH HEART FAILURE (H): ICD-10-CM

## 2023-04-21 DIAGNOSIS — I11.0 CARDIOMYOPATHY DUE TO HYPERTENSION, WITH HEART FAILURE (H): ICD-10-CM

## 2023-04-21 DIAGNOSIS — I25.10 CAD (CORONARY ARTERY DISEASE): Primary | ICD-10-CM

## 2023-04-21 LAB
ALT SERPL W P-5'-P-CCNC: 51 U/L (ref 10–50)
CHOLEST SERPL-MCNC: 82 MG/DL
HDLC SERPL-MCNC: 33 MG/DL
LDLC SERPL CALC-MCNC: 22 MG/DL
LVEF ECHO: NORMAL
NONHDLC SERPL-MCNC: 49 MG/DL
TRIGL SERPL-MCNC: 137 MG/DL

## 2023-04-21 PROCEDURE — 36415 COLL VENOUS BLD VENIPUNCTURE: CPT | Performed by: NURSE PRACTITIONER

## 2023-04-21 PROCEDURE — 93306 TTE W/DOPPLER COMPLETE: CPT | Mod: 26 | Performed by: INTERNAL MEDICINE

## 2023-04-21 PROCEDURE — 84460 ALANINE AMINO (ALT) (SGPT): CPT | Performed by: NURSE PRACTITIONER

## 2023-04-21 PROCEDURE — 255N000002 HC RX 255 OP 636: Performed by: NURSE PRACTITIONER

## 2023-04-21 PROCEDURE — 999N000208 ECHOCARDIOGRAM COMPLETE

## 2023-04-21 PROCEDURE — 80061 LIPID PANEL: CPT | Performed by: NURSE PRACTITIONER

## 2023-04-21 RX ADMIN — HUMAN ALBUMIN MICROSPHERES AND PERFLUTREN 9 ML: 10; .22 INJECTION, SOLUTION INTRAVENOUS at 10:41

## 2023-04-30 DIAGNOSIS — R07.89 OTHER CHEST PAIN: ICD-10-CM

## 2023-04-30 DIAGNOSIS — B07.8 FLAT WART: ICD-10-CM

## 2023-04-30 DIAGNOSIS — J20.9 ACUTE BRONCHITIS WITH SYMPTOMS > 10 DAYS: ICD-10-CM

## 2023-04-30 DIAGNOSIS — F40.243 FLYING PHOBIA: ICD-10-CM

## 2023-04-30 RX ORDER — AZITHROMYCIN 250 MG/1
TABLET, FILM COATED ORAL
Qty: 6 TABLET | Refills: 0 | Status: SHIPPED | OUTPATIENT
Start: 2023-04-30 | End: 2023-05-19

## 2023-04-30 RX ORDER — CLONAZEPAM 1 MG/1
1 TABLET ORAL 2 TIMES DAILY PRN
Qty: 4 TABLET | Refills: 0 | Status: SHIPPED | OUTPATIENT
Start: 2023-04-30 | End: 2023-06-20

## 2023-04-30 RX ORDER — NITROGLYCERIN 0.4 MG/1
TABLET SUBLINGUAL
Qty: 25 TABLET | Refills: 0 | Status: SHIPPED | OUTPATIENT
Start: 2023-04-30 | End: 2023-05-19

## 2023-04-30 RX ORDER — IMIQUIMOD 12.5 MG/.25G
CREAM TOPICAL
Qty: 48 PACKET | Refills: 5 | Status: SHIPPED | OUTPATIENT
Start: 2023-04-30 | End: 2024-05-21

## 2023-05-19 ENCOUNTER — TELEPHONE (OUTPATIENT)
Dept: CARDIOLOGY | Facility: CLINIC | Age: 53
End: 2023-05-19

## 2023-05-19 ENCOUNTER — OFFICE VISIT (OUTPATIENT)
Dept: CARDIOLOGY | Facility: CLINIC | Age: 53
End: 2023-05-19
Attending: INTERNAL MEDICINE
Payer: COMMERCIAL

## 2023-05-19 VITALS
HEART RATE: 75 BPM | WEIGHT: 246.3 LBS | HEIGHT: 72 IN | OXYGEN SATURATION: 95 % | DIASTOLIC BLOOD PRESSURE: 58 MMHG | BODY MASS INDEX: 33.36 KG/M2 | SYSTOLIC BLOOD PRESSURE: 112 MMHG

## 2023-05-19 DIAGNOSIS — I50.21 ACUTE HFREF (HEART FAILURE WITH REDUCED EJECTION FRACTION) (H): ICD-10-CM

## 2023-05-19 DIAGNOSIS — N52.9 ERECTILE DYSFUNCTION, UNSPECIFIED ERECTILE DYSFUNCTION TYPE: Primary | ICD-10-CM

## 2023-05-19 DIAGNOSIS — I10 UNCONTROLLED HYPERTENSION: ICD-10-CM

## 2023-05-19 PROCEDURE — 99215 OFFICE O/P EST HI 40 MIN: CPT | Performed by: INTERNAL MEDICINE

## 2023-05-19 RX ORDER — SILDENAFIL 25 MG/1
25 TABLET, FILM COATED ORAL DAILY PRN
Qty: 30 TABLET | Refills: 0 | Status: SHIPPED | OUTPATIENT
Start: 2023-05-19 | End: 2023-09-08

## 2023-05-19 NOTE — TELEPHONE ENCOUNTER
[facility] Call Center    Phone Message    May a detailed message be left on voicemail: yes     Reason for Call: Other: Alysa from [facility] would like a call back asap as a pt was prescribed a medication today but it might interfere with another medication by pcp, please reach out to alysa to discuss     Action Taken: Message routed to:  [facility] ([facility] Cardio    Travel Screening: Not Applicable

## 2023-05-19 NOTE — LETTER
5/19/2023    Luis Armando Segovia MD  [address] [facility] [address]    RE: Jeremi BARTOLOME Damon       Dear Colleague,     I had the pleasure of seeing Jeremi MANCUSO Nelson in the [facility].      HPI:     This is a 52 year old HTN, CKD here for follow up. He was seen originally in consultation during his hospitalization by cardiology and has a complex clinical presentation.     Gurpreet presented with 2 day history of chest pain, radiating to his arm, back and jaw. He presented to the hospital and had a troponin of 0.069 which uptrended to a peak of 13 during the course of the hospitalization. He was started on heparin and nitroglycerin infusion and underwent angiogram which showed multivessel coronary artery disease involving moderate mid LAD and severe mid-distal LAD lesion, AV groove distal circumflex severe stenosis, moderate large obtuse marginal lesion, and right PL moderate stenosis. All of the severe disease is in the distal segments. ACS/non-STEMI culprit lesion was likely AV groove circumflex lesion as there was slow filling and emptying of the segment. ARILNE-3 flow was in the distal LAD. Decision was made that disease not amenable to PCI given distal nature. EKG showed RBBB. Echocardiogram without WMA but showed severe hypertrophic cardiomyopathy and findings concerning for infiltrative cardiomyopathy or HCM. There was no LVOT obstruction. After patient returned from angiogram he requested discharge. He was placed on DAPT and arranged for outpatient cardiology with me.      He is a very active patient in fitness and has concerns about his underlying diagnosis, ability to perform his baseline activity level. He is down about not being able to return to his original fitness levels. We were attempting to obtain an MRI however he had PEDRO and thus he was referred to nephrology. He increased hydration and his kidney function improved. He was also wondering if heavy exercise in the past contributed  to his episodic PEDRO. His baseline Cr runs around 1.5 which it is at today.     Patient appears to have good response on his current regimen.  His EF improved to 45%. He is asking about decreasing coreg dose as he thinks he is not burning fat as a result, due to lower heart rates. He continues to carb load in the evening, eating a bowl of cereal before bed.    Asking about ED medication. He is on long acting nitrate.  BP wel controlled now.    ASSESSMENT/PLAN:     CAD: multivessel coronary artery disease with recent NSTEMI  3 stents to the mid to proximal LAD.  On DAPT.  No complaints of chest pain.  He is not participating in outpatient cardiac rehab.  LDL goal less than 70.  Last LDL a year ago was 19, triglycerides 215.  He is on rosuvastatin 20 mg daily     Cardiomyopathy EF 35% now improved to 45%.   Declined entresto  On Coreg   On imdur and hydral for afterload reduction  Not needing ICD     ED: will start Viagra low dose, instructed patient not to take imdur that day, monitor blood pressure         Hypertrophic cardiomyopathy without LVOT obstruction: Differential includes hypertensive heart disease versus infiltrative CMY or HCM without obstruction. Did not have family history of SCD. He is on adequate beta blockade.    -no need for diuretic at this time  -aggressive BP control       CKD: stable now      RBBB on EKG: no concerning findings of advanced heart block by symptoms. Chronotropic competency is present based on patient's own physiologic monitoring during exercise. Event montior  If needed\    Follow up 6 months    Gilda Keita mD MSC  [facility]      PAST MEDICAL HISTORY  Past Medical History:   Diagnosis Date    ADHD (attention deficit hyperactivity disorder)     Allergic rhinitis, cause unspecified     Allergic rhinitis    Benign hypertension     CAD (coronary artery disease)     cardiac cath 11/23/2022: FERNANDA x3 to LAD    Chronic back pain     Hiccough     Hypersomnia with sleep apnea,  "unspecified     Hypertensive emergency 11/19/2022    Major depressive disorder, single episode, moderate (H) 03/2008    Mild persistent asthma     NSTEMI (non-ST elevated myocardial infarction) (H) 11/29/2020    Other primary cardiomyopathies     Cardiomyopathy -- unknown etiology       CURRENT MEDICATIONS  Current Outpatient Medications   Medication Sig Dispense Refill    albuterol (PROAIR HFA/PROVENTIL HFA/VENTOLIN HFA) 108 (90 Base) MCG/ACT inhaler Inhale 2 puffs into the lungs every 6 hours 18 g 5    amitriptyline (ELAVIL) 50 MG tablet Take 3 tablets (150 mg) by mouth At Bedtime 270 tablet 3    amLODIPine (NORVASC) 5 MG tablet Take 1 tablet (5 mg) by mouth every morning 90 tablet 3    amphetamine-dextroamphetamine (ADDERALL XR) 20 MG 24 hr capsule Take 2 capsules (40 mg) by mouth daily for 30 days 60 capsule 0    [START ON 6/4/2023] amphetamine-dextroamphetamine (ADDERALL XR) 20 MG 24 hr capsule Take 2 capsules (40 mg) by mouth daily for 30 days 60 capsule 0    amphetamine-dextroamphetamine (ADDERALL XR) 20 MG 24 hr capsule Take 2 capsules (40 mg) by mouth daily 60 capsule 0    amphetamine-dextroamphetamine (ADDERALL) 20 MG tablet Take 1 tablet (20 mg) by mouth 2 times daily 90 tablet 0    aspirin (ASA) 81 MG chewable tablet Take 1 tablet (81 mg) by mouth daily Starting tomorrow. 30 tablet 3    B-D LUER-HIWOT SYRINGE 21G X 1-1/2\" 3 ML MISC 1 DEVICE ONCE A WEEK   AND ALSO NEEDS 22 G NEEDLES 1.5 INCH      buprenorphine HCl-naloxone HCl (SUBOXONE) 8-2 MG per film 1/2 film QID - Brand name only 60 Film 2    buPROPion (WELLBUTRIN XL) 300 MG 24 hr tablet TAKE 1 TABLET BY MOUTH EVERY MORNING 90 tablet 1    busPIRone (BUSPAR) 5 MG tablet TAKE 1 TABLET (5 MG) BY MOUTH 2 TIMES DAILY AS NEEDED (PANIC ATTACK) 30 tablet 1    carvedilol (COREG) 25 MG tablet Take 1 tablet (25 mg) by mouth 2 times daily (with meals) 180 tablet 3    clonazePAM (KLONOPIN) 1 MG tablet TAKE 1 TABLET (1 MG) BY MOUTH 2 TIMES DAILY AS NEEDED (FLYING " "PHOBIA) 4 tablet 0    clopidogrel (PLAVIX) 75 MG tablet Take 1 tablet (75 mg) by mouth daily 90 tablet 3    cyclobenzaprine (FLEXERIL) 10 MG tablet Take 1 tablet (10 mg) by mouth 3 times daily as needed for other (hiccups) 90 tablet 3    finasteride (PROSCAR) 5 MG tablet 1/2 tab daily (Patient taking differently: 5 mg daily) 90 tablet 1    fluticasone-salmeterol (ADVAIR DISKUS) 500-50 MCG/DOSE inhaler 1 inhalation 2 times per day 1 Inhaler 2    hydrALAZINE (APRESOLINE) 100 MG tablet TAKE ONE TABLET BY MOUTH EVERY 8 HOURS 90 tablet 0    imiquimod (ALDARA) 5 % external cream APPLY TOPICALLY AT BEDTIME -WASH OFF AFTER 8 HOURS AND MAY USE FOR UP TO 16 WEEKS. 48 packet 5    isosorbide mononitrate (IMDUR) 60 MG 24 hr tablet Take 1 tablet (60 mg) by mouth daily 90 tablet 3    loperamide (IMODIUM A-D) 2 MG tablet Take 2 tabs (4 mg) after first loose stool, and then take one tab (2 mg) after each diarrheal stool.  Max of 8 tabs (16 mg) per day. 30 tablet 0    LORazepam (ATIVAN) 1 MG tablet Take 1 tablet (1 mg) by mouth 2 times daily as needed for anxiety 60 tablet 1    naloxone (NARCAN) 4 MG/0.1ML nasal spray Spray 1 spray (4 mg) into one nostril alternating nostrils as needed for opioid reversal every 2-3 minutes until assistance arrives 0.2 mL 11    nitroGLYcerin (NITROSTAT) 0.4 MG sublingual tablet PLACE 1 TABLET UNDER THE TONGUE EVERY 5 MINUTES AS NEEDED FOR CHEST PAIN FOR CHEST PAIN PLACE 1 TABLET UNDER THE TONGUE EVERY 5 MINUTES FOR 3 DOSES. IF SYMPTOMS PERSIST 5 MINUTES AFTER 1ST DOSE CALL 911. 25 tablet 0    pantoprazole (PROTONIX) 40 MG EC tablet Take 1 tablet (40 mg) by mouth daily 90 tablet 3    RESTASIS 0.05 % ophthalmic emulsion INSTILL 1 DROP INTO BOTH EYES TWICE A DAY 60 mL 4    rosuvastatin (CRESTOR) 20 MG tablet Take 1 tablet (20 mg) by mouth daily 90 tablet 3    Syringe/Needle, Disp, (SYRINGE LUER LOCK) 20G X 1-1/2\" 3 ML MISC 1 Device once a week - and also needs 22 G needles 1.5 inch #30 with 1 refill 30 " each 1    testosterone cypionate (DEPOTESTOSTERONE) 200 MG/ML injection INJECT 1 ML (200 MG) INTO THE MUSCLE ONCE A WEEK 4 mL 4    torsemide (DEMADEX) 10 MG tablet Take 1 tablet (10 mg) by mouth every morning 30 tablet 4    vitamin C (ASCORBIC ACID) 1000 MG TABS Take 1,000 mg by mouth daily 90 0    zolpidem (AMBIEN) 10 MG tablet TAKE 1 TABLET (10 MG) BY MOUTH NIGHTLY AS NEEDED FOR SLEEP 30 tablet 2       PAST SURGICAL HISTORY:  Past Surgical History:   Procedure Laterality Date    APPENDECTOMY      BACK SURGERY      L5-S1 fusion    CV CORONARY ANGIOGRAM N/A 2020    Procedure: Heart Catheterization with Possible Intervention;  Surgeon: Theo Donahue MD;  Location:  HEART CARDIAC CATH LAB    CV CORONARY ANGIOGRAM N/A 2022    Procedure: Coronary Angiogram;  Surgeon: Venkata Hdez MD;  Location: [facility] CARD[facility]    CV PCI N/A 2022    Procedure: Percutaneous Coronary Intervention;  Surgeon: Venkata Hdez MD;  Location:  HEART CARDIAC CATH LAB    HC REPAIR OF NASAL SEPTUM      LAPAROSCOPIC CHOLECYSTECTOMY      Cholecystectomy, Laparoscopic    SURGICAL HISTORY OF -       torn pectoral muscle    SURGICAL HISTORY OF -       Bilateral radiofrequency volume reduction of the inferior turbinates.    SURGICAL HISTORY OF -       rhinoplasty- septoplasty       ALLERGIES     Allergies   Allergen Reactions    Dairy Digestive     Morphine Other (See Comments)     Patient states he had a headache one time but has used since with no adverse reactions    Sulfa Antibiotics     Theophylline Hives       FAMILY HISTORY  Family History   Problem Relation Age of Onset    Coronary Artery Disease Mother          55; MI x 2    Cancer Father         hodgkins    Hypertension Father     Coronary Artery Disease Father     Cardiovascular Sister     Hypertension Brother     Cardiovascular Maternal Grandmother     No Known Problems Daughter     Prostate Cancer No family hx  of     Cancer - colorectal No family hx of     Cardiomyopathy No family hx of     Valvular heart disease No family hx of        SOCIAL HISTORY  Social History     Socioeconomic History    Marital status:      Spouse name: Not on file    Number of children: 1    Years of education: Not on file    Highest education level: Not on file   Occupational History    Occupation: Retired chiropracter.     Employer: [employer]    Tobacco Use    Smoking status: Never    Smokeless tobacco: Never   Vaping Use    Vaping status: Never Used   Substance and Sexual Activity    Alcohol use: Yes     Comment: rarely     Drug use: No    Sexual activity: Yes     Partners: Female     Birth control/protection: Condom   Other Topics Concern    Parent/sibling w/ CABG, MI or angioplasty before 65F 55M? No   Social History Narrative    Not on file     Social Determinants of Health     Financial Resource Strain: Not on file   Food Insecurity: Not on file   Transportation Needs: Not on file   Physical Activity: Not on file   Stress: Not on file   Social Connections: Not on file   Intimate Partner Violence: Not on file   Housing Stability: Not on file       ROS:   Constitutional: No fever, chills, or sweats. No weight gain/loss   ENT: No visual disturbance, ear ache, epistaxis, sore throat  Allergies/Immunologic: Negative  Respiratory: No cough, hemoptysia  Cardiovascular: As per HPI  GI: No nausea, vomiting, hematemesis, melena, or hematochezia  : No urinary frequency, dysuria, or hematuria  Integument: Negative  Psychiatric: Negative  Neuro: Negative  Endocrinology: Negative   Musculoskeletal: Negative  Vascular: No walking impairment, claudication, ischemic rest pain or nonhealing wounds    EXAM:  /58   Pulse 75   Ht 1.829 m (6')   Wt 111.7 kg (246 lb 4.8 oz)   SpO2 95%   BMI 33.40 kg/m    In general, the patient is a pleasant male in no apparent distress.    HEENT: NC/AT.  PERRLA.  EOMI.  Sclerae white, not injected.  Nares  clear.  Pharynx without erythema or exudate.  Dentition intact.    Neck: No adenopathy.  No thyromegaly. Carotids +2/2 bilaterally without bruits.  No jugular venous distension.   Heart: RRR. Normal S1, S2 splits physiologically. No murmur, rub, click, or gallop.  Lungs: CTA.  No ronchi, wheezes, rales.  No dullness to percussion.   Abdomen: Soft, nontender, nondistended  Extremities: No clubbing, cyanosis, or edema.  No wounds. No varicose veins signs of chronic venous insufficiency.   Vascular: No bruits are noted.    Labs:  LIPID RESULTS:  Lab Results   Component Value Date    CHOL 82 04/21/2023    CHOL 175 02/08/2021    HDL 33 (L) 04/21/2023    HDL 41 02/08/2021    LDL 22 04/21/2023    LDL  02/08/2021     Cannot estimate LDL when triglyceride exceeds 400 mg/dL    LDL 68 02/08/2021    TRIG 137 04/21/2023    TRIG 537 (H) 02/08/2021    CHOLHDLRATIO 4.3 08/13/2013    NHDL 49 04/21/2023    NHDL 134 (H) 02/08/2021       LIVER ENZYME RESULTS:  Lab Results   Component Value Date    AST 93 (H) 11/19/2022    AST 67 (H) 05/05/2021    ALT 51 (H) 04/21/2023    ALT 99 (H) 05/05/2021       CBC RESULTS:  Lab Results   Component Value Date    WBC 10.6 11/24/2022    WBC 8.9 05/05/2021    RBC 5.90 11/24/2022    RBC 5.87 05/05/2021    HGB 16.3 11/24/2022    HGB 17.0 05/05/2021    HCT 52.1 11/24/2022    HCT 53.3 (H) 05/05/2021    MCV 88 11/24/2022    MCV 91 05/05/2021    MCH 27.6 11/24/2022    MCH 29.0 05/05/2021    MCHC 31.3 (L) 11/24/2022    MCHC 31.9 05/05/2021    RDW 15.7 (H) 11/24/2022    RDW 13.8 05/05/2021     11/24/2022     05/05/2021       BMP RESULTS:  Lab Results   Component Value Date     01/20/2023     05/05/2021    POTASSIUM 3.9 01/20/2023    POTASSIUM 4.2 12/23/2022    POTASSIUM 4.8 05/05/2021    CHLORIDE 97 (L) 01/20/2023    CHLORIDE 101 12/23/2022    CHLORIDE 103 05/05/2021    CO2 29 01/20/2023    CO2 34 (H) 12/23/2022    CO2 35 (H) 05/05/2021    ANIONGAP 11 01/20/2023    ANIONGAP 3  12/23/2022    ANIONGAP <1 (L) 05/05/2021    GLC 78 01/20/2023    GLC 93 12/23/2022    GLC 74 05/05/2021    BUN 11.9 01/20/2023    BUN 18 12/23/2022    BUN 15 05/05/2021    CR 1.63 (H) 01/20/2023    CR 2.05 (H) 05/05/2021    GFRESTIMATED 50 (L) 01/20/2023    GFRESTIMATED 36 (L) 05/05/2021    GFRESTBLACK 42 (L) 05/05/2021    MIKO 8.9 01/20/2023    MIKO 9.1 05/05/2021        A1C RESULTS:  Lab Results   Component Value Date    A1C 5.2 11/30/2020           Thank you for allowing me to participate in the care of your patient.      Sincerely,     Gilda Keita MD     [facility]  cc:   Genevieve Morales MD  [address] [place] [id number]

## 2023-05-19 NOTE — PROGRESS NOTES
HPI:     This is a 52 year old HTN, CKD here for follow up. He was seen originally in consultation during his hospitalization by cardiology and has a complex clinical presentation.     Gurpreet presented with 2 day history of chest pain, radiating to his arm, back and jaw. He presented to the hospital and had a troponin of 0.069 which uptrended to a peak of 13 during the course of the hospitalization. He was started on heparin and nitroglycerin infusion and underwent angiogram which showed multivessel coronary artery disease involving moderate mid LAD and severe mid-distal LAD lesion, AV groove distal circumflex severe stenosis, moderate large obtuse marginal lesion, and right PL moderate stenosis. All of the severe disease is in the distal segments. ACS/non-STEMI culprit lesion was likely AV groove circumflex lesion as there was slow filling and emptying of the segment. ARLINE-3 flow was in the distal LAD. Decision was made that disease not amenable to PCI given distal nature. EKG showed RBBB. Echocardiogram without WMA but showed severe hypertrophic cardiomyopathy and findings concerning for infiltrative cardiomyopathy or HCM. There was no LVOT obstruction. After patient returned from angiogram he requested discharge. He was placed on DAPT and arranged for outpatient cardiology with me.      He is a very active patient in fitness and has concerns about his underlying diagnosis, ability to perform his baseline activity level. He is down about not being able to return to his original fitness levels. We were attempting to obtain an MRI however he had PEDRO and thus he was referred to nephrology. He increased hydration and his kidney function improved. He was also wondering if heavy exercise in the past contributed to his episodic PEDRO. His baseline Cr runs around 1.5 which it is at today.     Patient appears to have good response on his current regimen.  His EF improved to 45%. He is asking about decreasing coreg dose as  he thinks he is not burning fat as a result, due to lower heart rates. He continues to carb load in the evening, eating a bowl of cereal before bed.    Asking about ED medication. He is on long acting nitrate.  BP wel controlled now.    ASSESSMENT/PLAN:     CAD: multivessel coronary artery disease with recent NSTEMI  3 stents to the mid to proximal LAD.  On DAPT.  No complaints of chest pain.  He is not participating in outpatient cardiac rehab.  LDL goal less than 70.  Last LDL a year ago was 19, triglycerides 215.  He is on rosuvastatin 20 mg daily     Cardiomyopathy EF 35% now improved to 45%.   Declined entresto  On Coreg   On imdur and hydral for afterload reduction  Not needing ICD     ED: will start Viagra low dose, instructed patient not to take imdur that day, monitor blood pressure         Hypertrophic cardiomyopathy without LVOT obstruction: Differential includes hypertensive heart disease versus infiltrative CMY or HCM without obstruction. Did not have family history of SCD. He is on adequate beta blockade.    -no need for diuretic at this time  -aggressive BP control       CKD: stable now      RBBB on EKG: no concerning findings of advanced heart block by symptoms. Chronotropic competency is present based on patient's own physiologic monitoring during exercise. Event montior  If needed\    Follow up 6 months    Gilda Keita mD MSC  [facility]      PAST MEDICAL HISTORY  Past Medical History:   Diagnosis Date     ADHD (attention deficit hyperactivity disorder)      Allergic rhinitis, cause unspecified     Allergic rhinitis     Benign hypertension      CAD (coronary artery disease)     cardiac cath 11/23/2022: FERNANDA x3 to LAD     Chronic back pain      Hiccough      Hypersomnia with sleep apnea, unspecified      Hypertensive emergency 11/19/2022     Major depressive disorder, single episode, moderate (H) 03/2008     Mild persistent asthma      NSTEMI (non-ST elevated myocardial infarction) (H)  "11/29/2020     Other primary cardiomyopathies     Cardiomyopathy -- unknown etiology       CURRENT MEDICATIONS  Current Outpatient Medications   Medication Sig Dispense Refill     albuterol (PROAIR HFA/PROVENTIL HFA/VENTOLIN HFA) 108 (90 Base) MCG/ACT inhaler Inhale 2 puffs into the lungs every 6 hours 18 g 5     amitriptyline (ELAVIL) 50 MG tablet Take 3 tablets (150 mg) by mouth At Bedtime 270 tablet 3     amLODIPine (NORVASC) 5 MG tablet Take 1 tablet (5 mg) by mouth every morning 90 tablet 3     amphetamine-dextroamphetamine (ADDERALL XR) 20 MG 24 hr capsule Take 2 capsules (40 mg) by mouth daily for 30 days 60 capsule 0     [START ON 6/4/2023] amphetamine-dextroamphetamine (ADDERALL XR) 20 MG 24 hr capsule Take 2 capsules (40 mg) by mouth daily for 30 days 60 capsule 0     amphetamine-dextroamphetamine (ADDERALL XR) 20 MG 24 hr capsule Take 2 capsules (40 mg) by mouth daily 60 capsule 0     amphetamine-dextroamphetamine (ADDERALL) 20 MG tablet Take 1 tablet (20 mg) by mouth 2 times daily 90 tablet 0     aspirin (ASA) 81 MG chewable tablet Take 1 tablet (81 mg) by mouth daily Starting tomorrow. 30 tablet 3     B-D LUER-HIWOT SYRINGE 21G X 1-1/2\" 3 ML MISC 1 DEVICE ONCE A WEEK   AND ALSO NEEDS 22 G NEEDLES 1.5 INCH       buprenorphine HCl-naloxone HCl (SUBOXONE) 8-2 MG per film 1/2 film QID - Brand name only 60 Film 2     buPROPion (WELLBUTRIN XL) 300 MG 24 hr tablet TAKE 1 TABLET BY MOUTH EVERY MORNING 90 tablet 1     busPIRone (BUSPAR) 5 MG tablet TAKE 1 TABLET (5 MG) BY MOUTH 2 TIMES DAILY AS NEEDED (PANIC ATTACK) 30 tablet 1     carvedilol (COREG) 25 MG tablet Take 1 tablet (25 mg) by mouth 2 times daily (with meals) 180 tablet 3     clonazePAM (KLONOPIN) 1 MG tablet TAKE 1 TABLET (1 MG) BY MOUTH 2 TIMES DAILY AS NEEDED (FLYING PHOBIA) 4 tablet 0     clopidogrel (PLAVIX) 75 MG tablet Take 1 tablet (75 mg) by mouth daily 90 tablet 3     cyclobenzaprine (FLEXERIL) 10 MG tablet Take 1 tablet (10 mg) by mouth 3 " "times daily as needed for other (hiccups) 90 tablet 3     finasteride (PROSCAR) 5 MG tablet 1/2 tab daily (Patient taking differently: 5 mg daily) 90 tablet 1     fluticasone-salmeterol (ADVAIR DISKUS) 500-50 MCG/DOSE inhaler 1 inhalation 2 times per day 1 Inhaler 2     hydrALAZINE (APRESOLINE) 100 MG tablet TAKE ONE TABLET BY MOUTH EVERY 8 HOURS 90 tablet 0     imiquimod (ALDARA) 5 % external cream APPLY TOPICALLY AT BEDTIME -WASH OFF AFTER 8 HOURS AND MAY USE FOR UP TO 16 WEEKS. 48 packet 5     isosorbide mononitrate (IMDUR) 60 MG 24 hr tablet Take 1 tablet (60 mg) by mouth daily 90 tablet 3     loperamide (IMODIUM A-D) 2 MG tablet Take 2 tabs (4 mg) after first loose stool, and then take one tab (2 mg) after each diarrheal stool.  Max of 8 tabs (16 mg) per day. 30 tablet 0     LORazepam (ATIVAN) 1 MG tablet Take 1 tablet (1 mg) by mouth 2 times daily as needed for anxiety 60 tablet 1     naloxone (NARCAN) 4 MG/0.1ML nasal spray Spray 1 spray (4 mg) into one nostril alternating nostrils as needed for opioid reversal every 2-3 minutes until assistance arrives 0.2 mL 11     nitroGLYcerin (NITROSTAT) 0.4 MG sublingual tablet PLACE 1 TABLET UNDER THE TONGUE EVERY 5 MINUTES AS NEEDED FOR CHEST PAIN FOR CHEST PAIN PLACE 1 TABLET UNDER THE TONGUE EVERY 5 MINUTES FOR 3 DOSES. IF SYMPTOMS PERSIST 5 MINUTES AFTER 1ST DOSE CALL 911. 25 tablet 0     pantoprazole (PROTONIX) 40 MG EC tablet Take 1 tablet (40 mg) by mouth daily 90 tablet 3     RESTASIS 0.05 % ophthalmic emulsion INSTILL 1 DROP INTO BOTH EYES TWICE A DAY 60 mL 4     rosuvastatin (CRESTOR) 20 MG tablet Take 1 tablet (20 mg) by mouth daily 90 tablet 3     Syringe/Needle, Disp, (SYRINGE LUER LOCK) 20G X 1-1/2\" 3 ML MISC 1 Device once a week - and also needs 22 G needles 1.5 inch #30 with 1 refill 30 each 1     testosterone cypionate (DEPOTESTOSTERONE) 200 MG/ML injection INJECT 1 ML (200 MG) INTO THE MUSCLE ONCE A WEEK 4 mL 4     torsemide (DEMADEX) 10 MG tablet " Take 1 tablet (10 mg) by mouth every morning 30 tablet 4     vitamin C (ASCORBIC ACID) 1000 MG TABS Take 1,000 mg by mouth daily 90 0     zolpidem (AMBIEN) 10 MG tablet TAKE 1 TABLET (10 MG) BY MOUTH NIGHTLY AS NEEDED FOR SLEEP 30 tablet 2       PAST SURGICAL HISTORY:  Past Surgical History:   Procedure Laterality Date     APPENDECTOMY       BACK SURGERY      L5-S1 fusion     CV CORONARY ANGIOGRAM N/A 2020    Procedure: Heart Catheterization with Possible Intervention;  Surgeon: Theo Donahue MD;  Location:  HE[facility] CARD[facility] CATH LAB     CV CORONARY ANGIOGRAM N/A 2022    Procedure: Coronary Angiogram;  Surgeon: Venkata Hdez MD;  Location: [facility] CARDIAC CATH LAB     CV PCI N/A 2022    Procedure: Percutaneous Coronary Intervention;  Surgeon: Venkata Hdez MD;  Location:  HE[facility] CARDI[facility] CATH LAB     HC REPAIR OF NASAL SEPTUM       LAPAROSCOPIC CHOLECYSTECTOMY      Cholecystectomy, Laparoscopic     SURGICAL HISTORY OF -       torn pectoral muscle     SURGICAL HISTORY OF -       Bilateral radiofrequency volume reduction of the inferior turbinates.     SURGICAL HISTORY OF -       rhinoplasty- septoplasty       ALLERGIES     Allergies   Allergen Reactions     Dairy Digestive      Morphine Other (See Comments)     Patient states he had a headache one time but has used since with no adverse reactions     Sulfa Antibiotics      Theophylline Hives       FAMILY HISTORY  Family History   Problem Relation Age of Onset     Coronary Artery Disease Mother          55; MI x 2     Cancer Father         hodgkins     Hypertension Father      Coronary Artery Disease Father      Cardiovascular Sister      Hypertension Brother      Cardiovascular Maternal Grandmother      No Known Problems Daughter      Prostate Cancer No family hx of      Cancer - colorectal No family hx of      Cardiomyopathy No family hx of      Valvular heart disease No family hx of        SOCIAL  HISTORY  Social History     Socioeconomic History     Marital status:      Spouse name: Not on file     Number of children: 1     Years of education: Not on file     Highest education level: Not on file   Occupational History     Occupation: Retired chiropracter.     Employer: [employer]    Tobacco Use     Smoking status: Never     Smokeless tobacco: Never   Vaping Use     Vaping status: Never Used   Substance and Sexual Activity     Alcohol use: Yes     Comment: rarely      Drug use: No     Sexual activity: Yes     Partners: Female     Birth control/protection: Condom   Other Topics Concern     Parent/sibling w/ CABG, MI or angioplasty before 65F 55M? No   Social History Narrative     Not on file     Social Determinants of Health     Financial Resource Strain: Not on file   Food Insecurity: Not on file   Transportation Needs: Not on file   Physical Activity: Not on file   Stress: Not on file   Social Connections: Not on file   Intimate Partner Violence: Not on file   Housing Stability: Not on file       ROS:   Constitutional: No fever, chills, or sweats. No weight gain/loss   ENT: No visual disturbance, ear ache, epistaxis, sore throat  Allergies/Immunologic: Negative  Respiratory: No cough, hemoptysia  Cardiovascular: As per HPI  GI: No nausea, vomiting, hematemesis, melena, or hematochezia  : No urinary frequency, dysuria, or hematuria  Integument: Negative  Psychiatric: Negative  Neuro: Negative  Endocrinology: Negative   Musculoskeletal: Negative  Vascular: No walking impairment, claudication, ischemic rest pain or nonhealing wounds    EXAM:  /58   Pulse 75   Ht 1.829 m (6')   Wt 111.7 kg (246 lb 4.8 oz)   SpO2 95%   BMI 33.40 kg/m    In general, the patient is a pleasant male in no apparent distress.    HEENT: NC/AT.  PERRLA.  EOMI.  Sclerae white, not injected.  Nares clear.  Pharynx without erythema or exudate.  Dentition intact.    Neck: No adenopathy.  No thyromegaly. Carotids +2/2  bilaterally without bruits.  No jugular venous distension.   Heart: RRR. Normal S1, S2 splits physiologically. No murmur, rub, click, or gallop.  Lungs: CTA.  No ronchi, wheezes, rales.  No dullness to percussion.   Abdomen: Soft, nontender, nondistended  Extremities: No clubbing, cyanosis, or edema.  No wounds. No varicose veins signs of chronic venous insufficiency.   Vascular: No bruits are noted.    Labs:  LIPID RESULTS:  Lab Results   Component Value Date    CHOL 82 04/21/2023    CHOL 175 02/08/2021    HDL 33 (L) 04/21/2023    HDL 41 02/08/2021    LDL 22 04/21/2023    LDL  02/08/2021     Cannot estimate LDL when triglyceride exceeds 400 mg/dL    LDL 68 02/08/2021    TRIG 137 04/21/2023    TRIG 537 (H) 02/08/2021    CHOLHDLRATIO 4.3 08/13/2013    NHDL 49 04/21/2023    NHDL 134 (H) 02/08/2021       LIVER ENZYME RESULTS:  Lab Results   Component Value Date    AST 93 (H) 11/19/2022    AST 67 (H) 05/05/2021    ALT 51 (H) 04/21/2023    ALT 99 (H) 05/05/2021       CBC RESULTS:  Lab Results   Component Value Date    WBC 10.6 11/24/2022    WBC 8.9 05/05/2021    RBC 5.90 11/24/2022    RBC 5.87 05/05/2021    HGB 16.3 11/24/2022    HGB 17.0 05/05/2021    HCT 52.1 11/24/2022    HCT 53.3 (H) 05/05/2021    MCV 88 11/24/2022    MCV 91 05/05/2021    MCH 27.6 11/24/2022    MCH 29.0 05/05/2021    MCHC 31.3 (L) 11/24/2022    MCHC 31.9 05/05/2021    RDW 15.7 (H) 11/24/2022    RDW 13.8 05/05/2021     11/24/2022     05/05/2021       BMP RESULTS:  Lab Results   Component Value Date     01/20/2023     05/05/2021    POTASSIUM 3.9 01/20/2023    POTASSIUM 4.2 12/23/2022    POTASSIUM 4.8 05/05/2021    CHLORIDE 97 (L) 01/20/2023    CHLORIDE 101 12/23/2022    CHLORIDE 103 05/05/2021    CO2 29 01/20/2023    CO2 34 (H) 12/23/2022    CO2 35 (H) 05/05/2021    ANIONGAP 11 01/20/2023    ANIONGAP 3 12/23/2022    ANIONGAP <1 (L) 05/05/2021    GLC 78 01/20/2023    GLC 93 12/23/2022    GLC 74 05/05/2021    BUN 11.9 01/20/2023     BUN 18 12/23/2022    BUN 15 05/05/2021    CR 1.63 (H) 01/20/2023    CR 2.05 (H) 05/05/2021    GFRESTIMATED 50 (L) 01/20/2023    GFRESTIMATED 36 (L) 05/05/2021    GFRESTBLACK 42 (L) 05/05/2021    MIKO 8.9 01/20/2023    MIKO 9.1 05/05/2021        A1C RESULTS:  Lab Results   Component Value Date    A1C 5.2 11/30/2020

## 2023-05-19 NOTE — PATIENT INSTRUCTIONS
If you are going to use Viagra then hold IMDUR for the day   Echocardiogram in 6 months   Follow up 6-12 months    Lumen device for metabolism

## 2023-05-19 NOTE — TELEPHONE ENCOUNTER
RN attempted to call pharmacy x2, but was on hold for 15 min. RN called x3 and left detailed VM  With Dr. Keita's recommendations below regarding the IMDUR.       Per Dr. Keita's 5/19/23 note                1.   If you are going to use Viagra then hold IMDUR for the day

## 2023-05-24 ENCOUNTER — PATIENT OUTREACH (OUTPATIENT)
Dept: CARE COORDINATION | Facility: CLINIC | Age: 53
End: 2023-05-24
Payer: COMMERCIAL

## 2023-05-27 DIAGNOSIS — F41.0 PANIC DISORDER WITHOUT AGORAPHOBIA: ICD-10-CM

## 2023-05-29 ENCOUNTER — MYC REFILL (OUTPATIENT)
Dept: FAMILY MEDICINE | Facility: CLINIC | Age: 53
End: 2023-05-29
Payer: COMMERCIAL

## 2023-05-29 DIAGNOSIS — F90.9 ATTENTION DEFICIT HYPERACTIVITY DISORDER (ADHD), UNSPECIFIED ADHD TYPE: ICD-10-CM

## 2023-05-30 RX ORDER — BUSPIRONE HYDROCHLORIDE 5 MG/1
5 TABLET ORAL 2 TIMES DAILY PRN
Qty: 30 TABLET | Refills: 1 | Status: SHIPPED | OUTPATIENT
Start: 2023-05-30 | End: 2023-06-30

## 2023-05-30 RX ORDER — DEXTROAMPHETAMINE SACCHARATE, AMPHETAMINE ASPARTATE MONOHYDRATE, DEXTROAMPHETAMINE SULFATE AND AMPHETAMINE SULFATE 5; 5; 5; 5 MG/1; MG/1; MG/1; MG/1
40 CAPSULE, EXTENDED RELEASE ORAL DAILY
Qty: 60 CAPSULE | Refills: 0 | OUTPATIENT
Start: 2023-05-30

## 2023-05-30 NOTE — TELEPHONE ENCOUNTER
Prescription approved per [facility] Refill Protocol.    Rola Sullivan RN  [facility]

## 2023-05-31 RX ORDER — DEXTROAMPHETAMINE SACCHARATE, AMPHETAMINE ASPARTATE MONOHYDRATE, DEXTROAMPHETAMINE SULFATE AND AMPHETAMINE SULFATE 5; 5; 5; 5 MG/1; MG/1; MG/1; MG/1
40 CAPSULE, EXTENDED RELEASE ORAL DAILY
Qty: 60 CAPSULE | Refills: 0 | Status: SHIPPED | OUTPATIENT
Start: 2023-05-31 | End: 2023-06-07

## 2023-05-31 NOTE — TELEPHONE ENCOUNTER
Please see messages. Sent to [facility] and they do not have generic, brand name not covered. Please send new RX to [facility] #[id number] - [place], [place] - [address]. Thank you.    Eden Hope RN [facility]

## 2023-06-05 ENCOUNTER — TELEPHONE (OUTPATIENT)
Dept: FAMILY MEDICINE | Facility: CLINIC | Age: 53
End: 2023-06-05
Payer: COMMERCIAL

## 2023-06-05 DIAGNOSIS — F90.9 ATTENTION DEFICIT HYPERACTIVITY DISORDER (ADHD), UNSPECIFIED ADHD TYPE: ICD-10-CM

## 2023-06-05 NOTE — TELEPHONE ENCOUNTER
Patient is calling to report that the [facility] in [place] does not have generic version of his Adderall 20mg. Please cancel current script and send generic version to [facility] pharmacy in [place]. Patient reports that this pharmacy has available Adderall 10mg. Patient has been out of his medication for a week, and is having a hard time. Please fill as able.    Patient would prefer a phone call, okay to leave a detailed VM.

## 2023-06-07 ENCOUNTER — MYC REFILL (OUTPATIENT)
Dept: FAMILY MEDICINE | Facility: CLINIC | Age: 53
End: 2023-06-07
Payer: COMMERCIAL

## 2023-06-07 DIAGNOSIS — F90.9 ATTENTION DEFICIT HYPERACTIVITY DISORDER (ADHD), UNSPECIFIED ADHD TYPE: ICD-10-CM

## 2023-06-07 DIAGNOSIS — F90.2 ATTENTION DEFICIT HYPERACTIVITY DISORDER (ADHD), COMBINED TYPE: ICD-10-CM

## 2023-06-07 RX ORDER — DEXTROAMPHETAMINE SACCHARATE, AMPHETAMINE ASPARTATE MONOHYDRATE, DEXTROAMPHETAMINE SULFATE AND AMPHETAMINE SULFATE 5; 5; 5; 5 MG/1; MG/1; MG/1; MG/1
40 CAPSULE, EXTENDED RELEASE ORAL DAILY
Qty: 60 CAPSULE | Refills: 0 | Status: CANCELLED | OUTPATIENT
Start: 2023-06-07

## 2023-06-07 RX ORDER — DEXTROAMPHETAMINE SACCHARATE, AMPHETAMINE ASPARTATE MONOHYDRATE, DEXTROAMPHETAMINE SULFATE AND AMPHETAMINE SULFATE 2.5; 2.5; 2.5; 2.5 MG/1; MG/1; MG/1; MG/1
40 CAPSULE, EXTENDED RELEASE ORAL DAILY
Qty: 120 CAPSULE | Refills: 0 | Status: SHIPPED | OUTPATIENT
Start: 2023-06-07 | End: 2023-07-07

## 2023-06-08 ENCOUNTER — PATIENT OUTREACH (OUTPATIENT)
Dept: CARE COORDINATION | Facility: CLINIC | Age: 53
End: 2023-06-08
Payer: COMMERCIAL

## 2023-06-08 RX ORDER — DEXTROAMPHETAMINE SACCHARATE, AMPHETAMINE ASPARTATE MONOHYDRATE, DEXTROAMPHETAMINE SULFATE AND AMPHETAMINE SULFATE 5; 5; 5; 5 MG/1; MG/1; MG/1; MG/1
40 CAPSULE, EXTENDED RELEASE ORAL DAILY
Qty: 60 CAPSULE | Refills: 0 | Status: CANCELLED | OUTPATIENT
Start: 2023-06-08

## 2023-06-08 NOTE — TELEPHONE ENCOUNTER
Please see Pt message. Need tabs not capsules.    Eden Hope RN [facility][note 3]

## 2023-06-10 RX ORDER — DEXTROAMPHETAMINE SACCHARATE, AMPHETAMINE ASPARTATE, DEXTROAMPHETAMINE SULFATE AND AMPHETAMINE SULFATE 5; 5; 5; 5 MG/1; MG/1; MG/1; MG/1
20 TABLET ORAL 2 TIMES DAILY
Qty: 60 TABLET | Refills: 0 | Status: SHIPPED | OUTPATIENT
Start: 2023-08-09 | End: 2023-08-30

## 2023-06-10 RX ORDER — DEXTROAMPHETAMINE SACCHARATE, AMPHETAMINE ASPARTATE, DEXTROAMPHETAMINE SULFATE AND AMPHETAMINE SULFATE 5; 5; 5; 5 MG/1; MG/1; MG/1; MG/1
20 TABLET ORAL 2 TIMES DAILY
Qty: 60 TABLET | Refills: 0 | Status: SHIPPED | OUTPATIENT
Start: 2023-06-10 | End: 2023-07-10

## 2023-06-10 RX ORDER — DEXTROAMPHETAMINE SACCHARATE, AMPHETAMINE ASPARTATE, DEXTROAMPHETAMINE SULFATE AND AMPHETAMINE SULFATE 5; 5; 5; 5 MG/1; MG/1; MG/1; MG/1
20 TABLET ORAL 2 TIMES DAILY
Qty: 60 TABLET | Refills: 0 | Status: SHIPPED | OUTPATIENT
Start: 2023-07-10 | End: 2023-10-31

## 2023-06-17 DIAGNOSIS — H04.123 DRY EYES, BILATERAL: ICD-10-CM

## 2023-06-20 ENCOUNTER — MYC REFILL (OUTPATIENT)
Dept: FAMILY MEDICINE | Facility: CLINIC | Age: 53
End: 2023-06-20
Payer: COMMERCIAL

## 2023-06-20 DIAGNOSIS — J45.30 MILD PERSISTENT ASTHMA WITHOUT COMPLICATION: ICD-10-CM

## 2023-06-20 DIAGNOSIS — F40.243 FLYING PHOBIA: ICD-10-CM

## 2023-06-20 NOTE — TELEPHONE ENCOUNTER
Last office visit: 12/12/2022     Future Office Visit:        CSA -- Patient Level:     [Media Unavailable] Controlled Substance Agreement - Non - Opioid - Scan on 6/25/2022  5:47 AM: NON-OPIOID CONTROLLED SUBSTANCE AGREEMENT             Routing refill request to provider for review/approval because:  Drug not on the [facility] refill protocol     Kari Mcleod RN, BSN  [facility] [place]

## 2023-06-21 DIAGNOSIS — J45.30 MILD PERSISTENT ASTHMA WITHOUT COMPLICATION: ICD-10-CM

## 2023-06-21 RX ORDER — CLONAZEPAM 1 MG/1
1 TABLET ORAL 2 TIMES DAILY PRN
Qty: 4 TABLET | Refills: 0 | Status: SHIPPED | OUTPATIENT
Start: 2023-06-21 | End: 2023-10-20

## 2023-06-21 RX ORDER — ALBUTEROL SULFATE 90 UG/1
2 AEROSOL, METERED RESPIRATORY (INHALATION) EVERY 6 HOURS
Refills: 2 | OUTPATIENT
Start: 2023-06-21

## 2023-06-21 RX ORDER — CYCLOSPORINE 0.5 MG/ML
EMULSION OPHTHALMIC
Qty: 60 ML | Refills: 4 | Status: SHIPPED | OUTPATIENT
Start: 2023-06-21 | End: 2023-11-12

## 2023-06-21 RX ORDER — ALBUTEROL SULFATE 90 UG/1
2 AEROSOL, METERED RESPIRATORY (INHALATION) EVERY 6 HOURS
Qty: 18 G | Refills: 5 | Status: SHIPPED | OUTPATIENT
Start: 2023-06-21 | End: 2023-09-08

## 2023-06-21 NOTE — TELEPHONE ENCOUNTER
Overdue for wellness/med check, medication sent for now.  Please call and schedule wellness visit in the next month or 2.Okay to use same-day, next day, virtual release or virtual slots (do not use provider approval slot).

## 2023-06-22 NOTE — TELEPHONE ENCOUNTER
Left message to call back. When patient calls back please help schedule a wellness/med check in the next month or 2 with Dr. Segovia. Okay to use SD, ND, VV or virtual release (no JOHNATHON slots).

## 2023-06-28 DIAGNOSIS — G47.00 INSOMNIA, UNSPECIFIED TYPE: ICD-10-CM

## 2023-06-28 DIAGNOSIS — F41.1 GENERALIZED ANXIETY DISORDER: ICD-10-CM

## 2023-06-28 DIAGNOSIS — F41.0 PANIC DISORDER WITHOUT AGORAPHOBIA: ICD-10-CM

## 2023-06-30 DIAGNOSIS — F40.243 FLYING PHOBIA: ICD-10-CM

## 2023-06-30 DIAGNOSIS — F41.0 PANIC DISORDER WITHOUT AGORAPHOBIA: ICD-10-CM

## 2023-06-30 DIAGNOSIS — F41.1 GENERALIZED ANXIETY DISORDER: ICD-10-CM

## 2023-06-30 RX ORDER — LORAZEPAM 1 MG/1
1 TABLET ORAL 2 TIMES DAILY PRN
Qty: 60 TABLET | Refills: 2 | Status: SHIPPED | OUTPATIENT
Start: 2023-06-30 | End: 2023-09-08

## 2023-06-30 RX ORDER — BUSPIRONE HYDROCHLORIDE 5 MG/1
5 TABLET ORAL 2 TIMES DAILY PRN
Qty: 180 TABLET | Refills: 0 | Status: SHIPPED | OUTPATIENT
Start: 2023-06-30 | End: 2023-09-08

## 2023-06-30 RX ORDER — CLONAZEPAM 1 MG/1
1 TABLET ORAL 2 TIMES DAILY PRN
Qty: 4 TABLET | Refills: 0 | OUTPATIENT
Start: 2023-06-30

## 2023-06-30 RX ORDER — ZOLPIDEM TARTRATE 10 MG/1
10 TABLET ORAL
Qty: 30 TABLET | Refills: 2 | Status: SHIPPED | OUTPATIENT
Start: 2023-06-30 | End: 2023-09-08

## 2023-06-30 NOTE — TELEPHONE ENCOUNTER
Refill not needed. Already sent in.     BEATRICE CONSTANTINO RN on 6/30/2023 at 2:37 PM   [facility]

## 2023-06-30 NOTE — TELEPHONE ENCOUNTER
"Patient calls.     Anxious for refills.   \"What's taking so long?\"    Advised it takes 3 business days to process refills. Will get this to Dr. Segovia to refill.     Buspirone is BID dosing - should be #60 tabs/month.     Ambien is controlled.     Routing to provider to review and advise.     Rola Sullivan RN  [facility]    "

## 2023-06-30 NOTE — TELEPHONE ENCOUNTER
Called and spoke with patient.     Informed him refills sent in.     BEATRICE CONSTANTINO RN on 6/30/2023 at 3:55 PM   [facility]

## 2023-07-14 ENCOUNTER — MYC REFILL (OUTPATIENT)
Dept: FAMILY MEDICINE | Facility: CLINIC | Age: 53
End: 2023-07-14
Payer: COMMERCIAL

## 2023-07-14 DIAGNOSIS — F90.9 ATTENTION DEFICIT HYPERACTIVITY DISORDER (ADHD), UNSPECIFIED ADHD TYPE: ICD-10-CM

## 2023-07-14 DIAGNOSIS — E29.1 HYPOGONADISM MALE: ICD-10-CM

## 2023-07-17 RX ORDER — DEXTROAMPHETAMINE SACCHARATE, AMPHETAMINE ASPARTATE MONOHYDRATE, DEXTROAMPHETAMINE SULFATE AND AMPHETAMINE SULFATE 5; 5; 5; 5 MG/1; MG/1; MG/1; MG/1
40 CAPSULE, EXTENDED RELEASE ORAL DAILY
Qty: 60 CAPSULE | Refills: 0 | OUTPATIENT
Start: 2023-07-17

## 2023-07-17 NOTE — TELEPHONE ENCOUNTER
Routing refill request to provider for review/approval because:  Testosterone does not pass the [facility] refill protocol     Refused Adderall has refill on file August and September.     Cinda RIOS RN   [facility]

## 2023-07-18 RX ORDER — TESTOSTERONE CYPIONATE 200 MG/ML
200 INJECTION, SOLUTION INTRAMUSCULAR WEEKLY
Qty: 4 ML | Refills: 1 | Status: SHIPPED | OUTPATIENT
Start: 2023-07-18 | End: 2023-09-08

## 2023-07-20 ENCOUNTER — MYC REFILL (OUTPATIENT)
Dept: FAMILY MEDICINE | Facility: CLINIC | Age: 53
End: 2023-07-20
Payer: COMMERCIAL

## 2023-07-20 DIAGNOSIS — F90.9 ATTENTION DEFICIT HYPERACTIVITY DISORDER (ADHD), UNSPECIFIED ADHD TYPE: ICD-10-CM

## 2023-07-20 RX ORDER — DEXTROAMPHETAMINE SACCHARATE, AMPHETAMINE ASPARTATE, DEXTROAMPHETAMINE SULFATE AND AMPHETAMINE SULFATE 5; 5; 5; 5 MG/1; MG/1; MG/1; MG/1
20 TABLET ORAL 2 TIMES DAILY
Qty: 60 TABLET | Refills: 0 | Status: CANCELLED | OUTPATIENT
Start: 2023-07-20

## 2023-07-24 DIAGNOSIS — K21.9 GASTROESOPHAGEAL REFLUX DISEASE WITHOUT ESOPHAGITIS: ICD-10-CM

## 2023-07-25 RX ORDER — PANTOPRAZOLE SODIUM 40 MG/1
TABLET, DELAYED RELEASE ORAL
Qty: 90 TABLET | Refills: 3 | Status: SHIPPED | OUTPATIENT
Start: 2023-07-25 | End: 2023-09-08

## 2023-07-26 ENCOUNTER — OFFICE VISIT (OUTPATIENT)
Dept: PALLIATIVE MEDICINE | Facility: CLINIC | Age: 53
End: 2023-07-26
Payer: COMMERCIAL

## 2023-07-26 VITALS — DIASTOLIC BLOOD PRESSURE: 72 MMHG | HEART RATE: 74 BPM | OXYGEN SATURATION: 94 % | SYSTOLIC BLOOD PRESSURE: 123 MMHG

## 2023-07-26 DIAGNOSIS — G89.4 CHRONIC PAIN SYNDROME: ICD-10-CM

## 2023-07-26 DIAGNOSIS — F41.1 GAD (GENERALIZED ANXIETY DISORDER): ICD-10-CM

## 2023-07-26 DIAGNOSIS — Z79.899 ENCOUNTER FOR LONG-TERM (CURRENT) USE OF HIGH-RISK MEDICATION: ICD-10-CM

## 2023-07-26 DIAGNOSIS — G43.709 CHRONIC MIGRAINE WITHOUT AURA, NOT INTRACTABLE, WITHOUT STATUS MIGRAINOSUS: ICD-10-CM

## 2023-07-26 DIAGNOSIS — F11.20 UNCOMPLICATED OPIOID DEPENDENCE (H): Primary | ICD-10-CM

## 2023-07-26 PROCEDURE — 64615 CHEMODENERV MUSC MIGRAINE: CPT | Performed by: ANESTHESIOLOGY

## 2023-07-26 PROCEDURE — 99215 OFFICE O/P EST HI 40 MIN: CPT | Mod: 25 | Performed by: ANESTHESIOLOGY

## 2023-07-26 RX ORDER — BUPRENORPHINE AND NALOXONE 8; 2 MG/1; MG/1
FILM, SOLUBLE BUCCAL; SUBLINGUAL
Qty: 60 FILM | Refills: 2 | Status: SHIPPED | OUTPATIENT
Start: 2023-07-26 | End: 2023-10-18

## 2023-07-26 ASSESSMENT — PATIENT HEALTH QUESTIONNAIRE - PHQ9: SUM OF ALL RESPONSES TO PHQ QUESTIONS 1-9: 6

## 2023-07-26 ASSESSMENT — PAIN SCALES - GENERAL: PAINLEVEL: SEVERE PAIN (6)
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
## 2023-08-18 ENCOUNTER — TELEPHONE (OUTPATIENT)
Dept: FAMILY MEDICINE | Facility: CLINIC | Age: 53
End: 2023-08-18
Payer: COMMERCIAL

## 2023-08-18 NOTE — TELEPHONE ENCOUNTER
Forms/Letter Request    Type of form/letter:  [facility] ICD-10 Diagnosis Verification Form (Authorization to Release Information attached & fax was sent here from [facility])    [facility] would like a copy sent to them through fax or blue bag after form is completed    Have you been seen for this request: N/A    Do we have the form/letter: Yes: Provider's Bin    Who is the form from? [facility] (if other please explain)  Fax: [phone]    Where did/will the form come from? form was faxed in

## 2023-08-28 ENCOUNTER — TELEPHONE (OUTPATIENT)
Dept: PALLIATIVE MEDICINE | Facility: CLINIC | Age: 53
End: 2023-08-28
Payer: COMMERCIAL

## 2023-08-28 NOTE — TELEPHONE ENCOUNTER
Call from kellee ALMEIDA with acute pain issues    Pt will restart Suboxone and follow-up with our clinic in the am    Carmina MANRIQUE RN Care Coordinator  [facility]

## 2023-08-30 ENCOUNTER — MYC REFILL (OUTPATIENT)
Dept: FAMILY MEDICINE | Facility: CLINIC | Age: 53
End: 2023-08-30
Payer: COMMERCIAL

## 2023-08-30 DIAGNOSIS — F90.9 ATTENTION DEFICIT HYPERACTIVITY DISORDER (ADHD), UNSPECIFIED ADHD TYPE: ICD-10-CM

## 2023-09-01 RX ORDER — DEXTROAMPHETAMINE SACCHARATE, AMPHETAMINE ASPARTATE, DEXTROAMPHETAMINE SULFATE AND AMPHETAMINE SULFATE 5; 5; 5; 5 MG/1; MG/1; MG/1; MG/1
20 TABLET ORAL 2 TIMES DAILY
Qty: 60 TABLET | Refills: 0 | Status: SHIPPED | OUTPATIENT
Start: 2023-09-01 | End: 2023-10-31

## 2023-09-01 NOTE — TELEPHONE ENCOUNTER
Health Call Center    Phone Message    May a detailed message be left on voicemail: yes     Reason for Call: Other: Patient is calling regarding his pain and what he should do next. He is out of medication and not sure what he can take in the first place. Please call back when available. He is also supposed to get an MRI but gets claustrophobic and would need medication or and MRI. Please call back right away. Patient stated he can send in pictures.  If any medication is prescribed please send to [facility].      Action Taken: Other: Pain    Travel Screening: Not Applicable

## 2023-09-01 NOTE — TELEPHONE ENCOUNTER
I do not even have records from [facility] about the injury.      Opioids are not usually used for pain for more than 3 days - even for fractures.  If he is still having pain severe enough to require opioids I would recommend that he be seen in UC or return to [facility] for follow up.      He should continue to take his Suboxone 4mg four times a day.  This in addition to conservative therapies including ice, heat, ibuprofen, tylenol and rest should be sufficient to manage his pain.     Macy Subramanian MD

## 2023-09-02 ENCOUNTER — TELEPHONE (OUTPATIENT)
Dept: PALLIATIVE MEDICINE | Facility: CLINIC | Age: 53
End: 2023-09-02
Payer: COMMERCIAL

## 2023-09-03 NOTE — TELEPHONE ENCOUNTER
The patient called the on call service Saturday night at 8pm.      He tore a muscle last week and wants more pain medication.      Recommend that he follow up with [facility], with whom he was seen for this injury, if he is still having enough pain to need opioid pain medication.      Explained again that we are a chronic pain clinic and do not manage acute pain or injuries.     Macy Subramanian MD

## 2023-09-08 ENCOUNTER — OFFICE VISIT (OUTPATIENT)
Dept: FAMILY MEDICINE | Facility: CLINIC | Age: 53
End: 2023-09-08
Payer: COMMERCIAL

## 2023-09-08 ENCOUNTER — LAB (OUTPATIENT)
Dept: FAMILY MEDICINE | Facility: CLINIC | Age: 53
End: 2023-09-08

## 2023-09-08 VITALS
BODY MASS INDEX: 33.09 KG/M2 | SYSTOLIC BLOOD PRESSURE: 130 MMHG | OXYGEN SATURATION: 95 % | HEART RATE: 81 BPM | WEIGHT: 244 LBS | DIASTOLIC BLOOD PRESSURE: 89 MMHG | TEMPERATURE: 97.8 F

## 2023-09-08 DIAGNOSIS — Z00.01 ENCOUNTER FOR GENERAL ADULT MEDICAL EXAMINATION WITH ABNORMAL FINDINGS: ICD-10-CM

## 2023-09-08 DIAGNOSIS — I25.10 CORONARY ARTERY DISEASE INVOLVING NATIVE HEART WITHOUT ANGINA PECTORIS, UNSPECIFIED VESSEL OR LESION TYPE: ICD-10-CM

## 2023-09-08 DIAGNOSIS — E29.1 HYPOGONADISM MALE: ICD-10-CM

## 2023-09-08 DIAGNOSIS — I43 CARDIOMYOPATHY DUE TO HYPERTENSION, WITH HEART FAILURE (H): ICD-10-CM

## 2023-09-08 DIAGNOSIS — N18.30 STAGE 3 CHRONIC KIDNEY DISEASE, UNSPECIFIED WHETHER STAGE 3A OR 3B CKD (H): ICD-10-CM

## 2023-09-08 DIAGNOSIS — I21.4 NSTEMI (NON-ST ELEVATED MYOCARDIAL INFARCTION) (H): ICD-10-CM

## 2023-09-08 DIAGNOSIS — F41.1 GENERALIZED ANXIETY DISORDER: ICD-10-CM

## 2023-09-08 DIAGNOSIS — M54.2 NECK PAIN: ICD-10-CM

## 2023-09-08 DIAGNOSIS — G47.00 INSOMNIA, UNSPECIFIED TYPE: ICD-10-CM

## 2023-09-08 DIAGNOSIS — I25.119 CORONARY ARTERY DISEASE INVOLVING NATIVE CORONARY ARTERY OF NATIVE HEART WITH ANGINA PECTORIS (H): ICD-10-CM

## 2023-09-08 DIAGNOSIS — F33.1 MAJOR DEPRESSIVE DISORDER, RECURRENT EPISODE, MODERATE (H): ICD-10-CM

## 2023-09-08 DIAGNOSIS — I11.0 CARDIOMYOPATHY DUE TO HYPERTENSION, WITH HEART FAILURE (H): ICD-10-CM

## 2023-09-08 DIAGNOSIS — K21.9 GASTROESOPHAGEAL REFLUX DISEASE WITHOUT ESOPHAGITIS: ICD-10-CM

## 2023-09-08 DIAGNOSIS — Z12.5 SCREENING FOR PROSTATE CANCER: ICD-10-CM

## 2023-09-08 DIAGNOSIS — Z12.11 SCREEN FOR COLON CANCER: ICD-10-CM

## 2023-09-08 DIAGNOSIS — Z00.00 ROUTINE GENERAL MEDICAL EXAMINATION AT A HEALTH CARE FACILITY: Primary | ICD-10-CM

## 2023-09-08 DIAGNOSIS — I10 HYPERTENSION GOAL BP (BLOOD PRESSURE) < 140/90: ICD-10-CM

## 2023-09-08 DIAGNOSIS — J45.30 MILD PERSISTENT ASTHMA WITHOUT COMPLICATION: ICD-10-CM

## 2023-09-08 DIAGNOSIS — N52.9 ERECTILE DYSFUNCTION, UNSPECIFIED ERECTILE DYSFUNCTION TYPE: ICD-10-CM

## 2023-09-08 DIAGNOSIS — F41.0 PANIC DISORDER WITHOUT AGORAPHOBIA: ICD-10-CM

## 2023-09-08 DIAGNOSIS — L65.9 ALOPECIA: ICD-10-CM

## 2023-09-08 LAB
ALBUMIN SERPL BCG-MCNC: 4 G/DL (ref 3.5–5.2)
ALP SERPL-CCNC: 80 U/L (ref 40–129)
ALT SERPL W P-5'-P-CCNC: 73 U/L (ref 0–70)
ANION GAP SERPL CALCULATED.3IONS-SCNC: 10 MMOL/L (ref 7–15)
AST SERPL W P-5'-P-CCNC: 69 U/L (ref 0–45)
BILIRUB SERPL-MCNC: 0.8 MG/DL
BUN SERPL-MCNC: 15.4 MG/DL (ref 6–20)
CALCIUM SERPL-MCNC: 9.1 MG/DL (ref 8.6–10)
CHLORIDE SERPL-SCNC: 103 MMOL/L (ref 98–107)
CREAT SERPL-MCNC: 1.88 MG/DL (ref 0.67–1.17)
CREAT UR-MCNC: 198 MG/DL
DEPRECATED HCO3 PLAS-SCNC: 29 MMOL/L (ref 22–29)
EGFRCR SERPLBLD CKD-EPI 2021: 42 ML/MIN/1.73M2
GLUCOSE SERPL-MCNC: 91 MG/DL (ref 70–99)
HGB BLD-MCNC: 15.6 G/DL (ref 13.3–17.7)
MICROALBUMIN UR-MCNC: 783 MG/L
MICROALBUMIN/CREAT UR: 395.45 MG/G CR (ref 0–17)
POTASSIUM SERPL-SCNC: 4.7 MMOL/L (ref 3.4–5.3)
PROT SERPL-MCNC: 6.4 G/DL (ref 6.4–8.3)
PSA SERPL DL<=0.01 NG/ML-MCNC: 0.93 NG/ML (ref 0–3.5)
SODIUM SERPL-SCNC: 142 MMOL/L (ref 136–145)

## 2023-09-08 PROCEDURE — 82043 UR ALBUMIN QUANTITATIVE: CPT | Performed by: FAMILY MEDICINE

## 2023-09-08 PROCEDURE — G0438 PPPS, INITIAL VISIT: HCPCS | Performed by: FAMILY MEDICINE

## 2023-09-08 PROCEDURE — 82570 ASSAY OF URINE CREATININE: CPT | Performed by: FAMILY MEDICINE

## 2023-09-08 PROCEDURE — 36415 COLL VENOUS BLD VENIPUNCTURE: CPT | Performed by: FAMILY MEDICINE

## 2023-09-08 PROCEDURE — 99214 OFFICE O/P EST MOD 30 MIN: CPT | Mod: 25 | Performed by: FAMILY MEDICINE

## 2023-09-08 PROCEDURE — 85018 HEMOGLOBIN: CPT | Performed by: FAMILY MEDICINE

## 2023-09-08 PROCEDURE — 80053 COMPREHEN METABOLIC PANEL: CPT | Performed by: FAMILY MEDICINE

## 2023-09-08 PROCEDURE — G0103 PSA SCREENING: HCPCS | Performed by: FAMILY MEDICINE

## 2023-09-08 RX ORDER — SYRINGE WITH NEEDLE, 1 ML 25GX5/8"
SYRINGE, EMPTY DISPOSABLE MISCELLANEOUS
Qty: 50 EACH | Refills: 3 | Status: SHIPPED | OUTPATIENT
Start: 2023-09-08 | End: 2023-09-10

## 2023-09-08 RX ORDER — ZOLPIDEM TARTRATE 10 MG/1
10 TABLET ORAL
Qty: 30 TABLET | Refills: 5 | Status: SHIPPED | OUTPATIENT
Start: 2023-09-08 | End: 2024-03-18

## 2023-09-08 RX ORDER — BUPROPION HYDROCHLORIDE 300 MG/1
300 TABLET ORAL EVERY MORNING
Qty: 90 TABLET | Refills: 3 | Status: SHIPPED | OUTPATIENT
Start: 2023-09-08 | End: 2024-09-05

## 2023-09-08 RX ORDER — TORSEMIDE 10 MG/1
10 TABLET ORAL EVERY MORNING
Qty: 30 TABLET | Refills: 4 | Status: SHIPPED | OUTPATIENT
Start: 2023-09-08

## 2023-09-08 RX ORDER — BUSPIRONE HYDROCHLORIDE 5 MG/1
5 TABLET ORAL 2 TIMES DAILY PRN
Qty: 180 TABLET | Refills: 3 | Status: SHIPPED | OUTPATIENT
Start: 2023-09-08 | End: 2024-08-03

## 2023-09-08 RX ORDER — SILDENAFIL 25 MG/1
25 TABLET, FILM COATED ORAL DAILY PRN
Qty: 20 TABLET | Refills: 11 | Status: SHIPPED | OUTPATIENT
Start: 2023-09-08 | End: 2024-03-14

## 2023-09-08 RX ORDER — ISOSORBIDE MONONITRATE 60 MG/1
60 TABLET, EXTENDED RELEASE ORAL DAILY
Qty: 90 TABLET | Refills: 3 | Status: SHIPPED | OUTPATIENT
Start: 2023-09-08

## 2023-09-08 RX ORDER — CARVEDILOL 25 MG/1
25 TABLET ORAL 2 TIMES DAILY WITH MEALS
Qty: 180 TABLET | Refills: 3 | Status: SHIPPED | OUTPATIENT
Start: 2023-09-08 | End: 2024-09-05

## 2023-09-08 RX ORDER — CLOPIDOGREL BISULFATE 75 MG/1
75 TABLET ORAL DAILY
Qty: 90 TABLET | Refills: 3 | Status: SHIPPED | OUTPATIENT
Start: 2023-09-08 | End: 2024-09-05

## 2023-09-08 RX ORDER — PANTOPRAZOLE SODIUM 40 MG/1
40 TABLET, DELAYED RELEASE ORAL DAILY
Qty: 90 TABLET | Refills: 3 | Status: SHIPPED | OUTPATIENT
Start: 2023-09-08 | End: 2024-08-01

## 2023-09-08 RX ORDER — HYDRALAZINE HYDROCHLORIDE 100 MG/1
50 TABLET, FILM COATED ORAL 2 TIMES DAILY
Qty: 180 TABLET | Refills: 3 | Status: SHIPPED | OUTPATIENT
Start: 2023-09-08

## 2023-09-08 RX ORDER — TESTOSTERONE CYPIONATE 200 MG/ML
200 INJECTION, SOLUTION INTRAMUSCULAR WEEKLY
Qty: 4 ML | Refills: 1 | Status: SHIPPED | OUTPATIENT
Start: 2023-09-08 | End: 2023-11-15

## 2023-09-08 RX ORDER — CYCLOBENZAPRINE HCL 10 MG
10 TABLET ORAL 3 TIMES DAILY PRN
Qty: 90 TABLET | Refills: 5 | Status: SHIPPED | OUTPATIENT
Start: 2023-09-08 | End: 2024-08-19

## 2023-09-08 RX ORDER — FINASTERIDE 5 MG/1
5 TABLET, FILM COATED ORAL DAILY
Qty: 90 TABLET | Refills: 3 | Status: SHIPPED | OUTPATIENT
Start: 2023-09-08 | End: 2024-09-30

## 2023-09-08 RX ORDER — NITROGLYCERIN 0.4 MG/1
TABLET SUBLINGUAL EVERY 5 MIN PRN
COMMUNITY
Start: 2023-05-01

## 2023-09-08 RX ORDER — AMITRIPTYLINE HYDROCHLORIDE 50 MG/1
150 TABLET ORAL AT BEDTIME
Qty: 270 TABLET | Refills: 1 | Status: SHIPPED | OUTPATIENT
Start: 2023-09-08 | End: 2024-03-05

## 2023-09-08 RX ORDER — LORAZEPAM 1 MG/1
1 TABLET ORAL 2 TIMES DAILY PRN
Qty: 60 TABLET | Refills: 5 | Status: SHIPPED | OUTPATIENT
Start: 2023-09-08 | End: 2023-10-25

## 2023-09-08 RX ORDER — ALBUTEROL SULFATE 90 UG/1
2 AEROSOL, METERED RESPIRATORY (INHALATION) EVERY 6 HOURS
Qty: 18 G | Refills: 5 | Status: SHIPPED | OUTPATIENT
Start: 2023-09-08 | End: 2024-05-21

## 2023-09-08 RX ORDER — ROSUVASTATIN CALCIUM 20 MG/1
20 TABLET, COATED ORAL DAILY
Qty: 90 TABLET | Refills: 3 | Status: SHIPPED | OUTPATIENT
Start: 2023-09-08 | End: 2024-09-19

## 2023-09-08 ASSESSMENT — ENCOUNTER SYMPTOMS
NERVOUS/ANXIOUS: 0
HEADACHES: 0
HEARTBURN: 0
NAUSEA: 0

## 2023-09-08 ASSESSMENT — ACTIVITIES OF DAILY LIVING (ADL): CURRENT_FUNCTION: NO ASSISTANCE NEEDED

## 2023-09-08 NOTE — PATIENT INSTRUCTIONS
Preventive Health Recommendations  Male Ages 50 - 64    Yearly exam:             See your health care provider every year in order to  o   Review health changes.   o   Discuss preventive care.    o   Review your medicines if your doctor has prescribed any.   Have a cholesterol test every 5 years, or more frequently if you are at risk for high cholesterol/heart disease.   Have a diabetes test (fasting glucose) every three years. If you are at risk for diabetes, you should have this test more often.   Have a colonoscopy at age 50, or have a yearly FIT test (stool test). These exams will check for colon cancer.    Talk with your health care provider about whether or not a prostate cancer screening test (PSA) is right for you.  You should be tested each year for STDs (sexually transmitted diseases), if you re at risk.     Shots: Get a flu shot each year. Get a tetanus shot every 10 years.     Nutrition:  Eat at least 5 servings of fruits and vegetables daily.   Eat whole-grain bread, whole-wheat pasta and brown rice instead of white grains and rice.   Get adequate Calcium and Vitamin D.     Lifestyle  Exercise for at least 150 minutes a week (30 minutes a day, 5 days a week). This will help you control your weight and prevent disease.   Limit alcohol to one drink per day.   No smoking.   Wear sunscreen to prevent skin cancer.   See your dentist every six months for an exam and cleaning.   See your eye doctor every 1 to 2 years.    Patient Education   Personalized Prevention Plan  You are due for the preventive services outlined below.  Your care team is available to assist you in scheduling these services.  If you have already completed any of these items, please share that information with your care team to update in your medical record.  Health Maintenance Due   Topic Date Due     Heart Failure Action Plan  Never done     Hepatitis A Vaccine (1 of 2 - Risk 2-dose series) Never done     Zoster (Shingles) Vaccine (1 of  2) Never done     Asthma Action Plan - yearly  06/28/2020     Colorectal Cancer Screening  10/15/2021     COVID-19 Vaccine (3 - Moderna series) 11/19/2021     Kidney Microalbumin Urine Test  06/23/2023     Prostate Test  06/23/2023     Flu Vaccine (1) 09/01/2023     Learning About Dietary Guidelines  What are the Dietary Guidelines for Americans?     Dietary Guidelines for Americans provide tips for eating well and staying healthy. This helps reduce the risk for long-term (chronic) diseases.  These guidelines recommend that you:  Eat and drink the right amount for you. The U.S. government's food guide is called MyPlate. It can help you make your own well-balanced eating plan.  Try to balance your eating with your activity. This helps you stay at a healthy weight.  Drink alcohol in moderation, if at all.  Limit foods high in salt, saturated fat, trans fat, and added sugar.  These guidelines are from the U.S. Department of Agriculture and the U.S. Department of Health and Human Services. They are updated every 5 years.  What is MyPlate?  MyPlate is the U.S. government's food guide. It can help you make your own well-balanced eating plan. A balanced eating plan means that you eat enough, but not too much, and that your food gives you the nutrients you need to stay healthy.  MyPlate focuses on eating plenty of whole grains, fruits, and vegetables, and on limiting fat and sugar. It is available online at www.ChooseMyPlate.gov.  How can you get started?  If you're trying to eat healthier, you can slowly change your eating habits over time. You don't have to make big changes all at once. Start by adding one or two healthy foods to your meals each day.  Grains  Choose whole-grain breads and cereals and whole-wheat pasta and whole-grain crackers.  Vegetables  Eat a variety of vegetables every day. They have lots of nutrients and are part of a heart-healthy diet.  Fruits  Eat a variety of fruits every day. Fruits contain lots  "of nutrients. Choose fresh fruit instead of fruit juice.  Protein foods  Choose fish and lean poultry more often. Eat red meat and fried meats less often. Dried beans, tofu, and nuts are also good sources of protein.  Dairy  Choose low-fat or fat-free products from this food group. If you have problems digesting milk, try eating cheese or yogurt instead.  Fats and oils  Limit fats and oils if you're trying to cut calories. Choose healthy fats when you cook. These include canola oil and olive oil.  Where can you learn more?  Go to https://www.[employer].net/patiented  Enter D676 in the search box to learn more about \"Learning About Dietary Guidelines.\"  Current as of: March 1, 2023               Content Version: 13.7    7871-0488 [employer].   Care instructions adapted under license by your healthcare professional. If you have questions about a medical condition or this instruction, always ask your healthcare professional. [employer] disclaims any warranty or liability for your use of this information.         Exercise: The Rewards of Fitness    Do you need to be convinced that exercise is a good idea? Exercise and fitness offer you all kinds of rewards. Think about your goals. Can exercise help you achieve some of them?     A Stronger and Happier You  Even before you see the difference, you will feel the difference. Here are three ways you will benefit from more activity:  Physical fitness. You ll have more stamina. You ll also enjoy recreation more. And you ll keep your strength and independence as you age.  Mental fitness. You ll manage stress better, be less tense, think more clearly, and maybe even sleep better.  Long-term health. Your risk of some diseases may go down. This includes heart disease, osteoporosis (thinning bones), some cancers, high blood pressure, and diabetes.    Check Your Health First  If you answer  yes  to any of the questions below, you should talk to your doctor " before beginning a fitness program:  Has a doctor ever said you have heart trouble?  Do you ever have chest pains?  Do you often feel faint or have dizzy spells?  Has a doctor ever said your blood pressure is too high?  Has a doctor ever said that you have a bone or joint problem that could be made worse by exercise?  Do you take any prescription medications for problems such as diabetes or asthma?      Will I Lose Weight?  Many of us would like to lose or keep off a few pounds. Being more active each day and building muscle can help. Here s how:  Being active burns calories. You burn nearly twice as many calories just walking slowly as you do sitting.  Muscle burns more calories than fat. So the more muscle you build up from activity, the more calories you burn.  If you add more muscle, you ll use more calories even when you re inactive.  Being active helps you retain more muscle as you age. More muscle means it ll be easier to control your weight.        4558-7481 [employer]. [address] [place] [address], [place] [address]. All rights reserved. This information is not intended as a substitute for professional medical care. Always follow your healthcare professional's instructions.

## 2023-09-08 NOTE — PROGRESS NOTES
"SUBJECTIVE:   CC: Jeremi is an 53 year old who presents for preventative health visit.     Healthy Habits:     In general, how would you rate your overall health?  Good    Frequency of exercise:  2-3 days/week    Duration of exercise:  Greater than 60 minutes    Do you usually eat at least 4 servings of fruit and vegetables a day, include whole grains    & fiber and avoid regularly eating high fat or \"junk\" foods?  No    Taking medications regularly:  Yes    Medication side effects:  Other    Ability to successfully perform activities of daily living:  No assistance needed    Home Safety:  No safety concerns identified    Hearing Impairment:  No hearing concerns    In the past 6 months, have you been bothered by leaking of urine?  No    In general, how would you rate your overall mental or emotional health?  Good    Additional concerns today:  Yes    -Slipped going into a pool and caught his fall and landed on the right chest wall - Right arm pain - Concerns - F/U from [facility] 08/28/203, ongoing pain.  Right lateral chest wall tender bruised too    Hyperlipidemia Follow-Up    Are you regularly taking any medication or supplement to lower your cholesterol?   Yes- rosuvastatin (CRESTOR) 20 MG tablet  Are you having muscle aches or other side effects that you think could be caused by your cholesterol lowering medication?  No    Hypertension Follow-up    Do you check your blood pressure regularly outside of the clinic? Yes   Are you following a low salt diet? Yes  Are your blood pressures ever more than 140 on the top number (systolic) OR more   than 90 on the bottom number (diastolic), for example 140/90? Yes    BP Readings from Last 2 Encounters:   09/08/23 130/89   07/26/23 123/72     Hemoglobin A1C (%)   Date Value   11/30/2020 5.2     LDL Cholesterol Calculated (mg/dL)   Date Value   04/21/2023 22   06/23/2022 19   02/08/2021     Cannot estimate LDL when triglyceride exceeds 400 mg/dL   11/30/2020 50     LDL " Cholesterol Direct (mg/dL)   Date Value   02/08/2021 68     Vascular Disease Follow-up    How often do you take nitroglycerin? A few times monthly  Do you take an aspirin every day? Yes    Depression and Anxiety Follow-Up  How are you doing with your depression since your last visit? Worsened - relationship ended and GF left  and now he has all of the expenses.   How are you doing with your anxiety since your last visit?  Worsened   Are you having other symptoms that might be associated with depression or anxiety? No  Have you had a significant life event? No   Do you have any concerns with your use of alcohol or other drugs? No  On wellbutrin -  had been on fluoxetine in the past .     Social History     Tobacco Use     Smoking status: Never     Smokeless tobacco: Never   Vaping Use     Vaping Use: Never used   Substance Use Topics     Alcohol use: Yes     Comment: rarely      Drug use: No         12/12/2022    10:51 AM 7/26/2023     2:12 PM 8/2/2023    11:15 PM   PHQ   PHQ-9 Total Score 13 6 7   Q9: Thoughts of better off dead/self-harm past 2 weeks Not at all Not at all Not at all         6/23/2022     1:29 PM 11/4/2022    12:36 PM 12/12/2022    10:52 AM   NANCY-7 SCORE   Total Score 12 (moderate anxiety) 18 (severe anxiety) 16 (severe anxiety)   Total Score 12 18 16         8/2/2023    11:15 PM   Last PHQ-9   1.  Little interest or pleasure in doing things 1   2.  Feeling down, depressed, or hopeless 2   3.  Trouble falling or staying asleep, or sleeping too much 1   4.  Feeling tired or having little energy 1   5.  Poor appetite or overeating 0   6.  Feeling bad about yourself 1   7.  Trouble concentrating 1   8.  Moving slowly or restless 0   Q9: Thoughts of better off dead/self-harm past 2 weeks 0   PHQ-9 Total Score 7         12/12/2022    10:52 AM   NANCY-7    1. Feeling nervous, anxious, or on edge 3   2. Not being able to stop or control worrying 3   3. Worrying too much about different things 3   4. Trouble  relaxing 3   5. Being so restless that it is hard to sit still 1   6. Becoming easily annoyed or irritable 2   7. Feeling afraid, as if something awful might happen 1   NANCY-7 Total Score 16   If you checked any problems, how difficult have they made it for you to do your work, take care of things at home, or get along with other people? Very difficult     Suicide Assessment Five-step Evaluation and Treatment (SAFE-T)      Chronic Kidney Disease Follow-up  Do you take any over the counter pain medicine?: No    Social History     Tobacco Use     Smoking status: Never     Smokeless tobacco: Never   Substance Use Topics     Alcohol use: Yes     Comment: rarely              9/8/2023    12:52 PM   Alcohol Use   Prescreen: >3 drinks/day or >7 drinks/week? Not Applicable     Last PSA:   PSA   Date Value Ref Range Status   02/08/2021 0.34 0 - 4 ug/L Final     Comment:     Assay Method:  Chemiluminescence using Siemens Vista analyzer     Prostate Specific Antigen Screen   Date Value Ref Range Status   06/23/2022 1.76 0.00 - 4.00 ug/L Final       Reviewed orders with patient. Reviewed health maintenance and updated orders accordingly - Yes    Reviewed and updated as needed this visit by clinical staff   Tobacco  Allergies  Meds  Problems  Med Hx  Surg Hx  Fam Hx        Reviewed and updated as needed this visit by Provider   Tobacco  Allergies  Meds  Problems  Med Hx  Surg Hx  Fam Hx             Review of Systems   HENT:  Negative for ear pain.    Eyes:  Positive for visual disturbance.   Gastrointestinal:  Negative for heartburn and nausea.   Genitourinary:  Positive for impotence. Negative for penile discharge.   Neurological:  Negative for headaches.   Psychiatric/Behavioral:  The patient is not nervous/anxious.        OBJECTIVE:   /89 (BP Location: Left arm, Patient Position: Sitting, Cuff Size: Adult Large)   Pulse 81   Temp 97.8  F (36.6  C) (Tympanic)   Wt 110.7 kg (244 lb)   SpO2 95%   BMI 33.09  kg/m    EXAM:  GENERAL: healthy, alert and no distress  EYES: Eyes grossly normal to inspection, PERRL and conjunctivae and sclerae normal  HENT: ear canals and TM's normal, nose and mouth without ulcers or lesions  NECK: no adenopathy, no asymmetry, masses, or scars and thyroid normal to palpation  RESP: lungs clear to auscultation - no rales, rhonchi or wheezes  BREAST: normal without masses, tenderness or nipple discharge and no palpable axillary masses or adenopathy  CV: regular rate and rhythm, normal S1 S2, no S3 or S4, no murmur, click or rub, no peripheral edema and peripheral pulses strong  ABDOMEN: soft, nontender, no hepatosplenomegaly, no masses and bowel sounds normal   (male): normal male genitalia without lesions or urethral discharge, no hernia  MS: no gross musculoskeletal defects noted, no edema  SKIN: no suspicious lesions or rashes  NEURO: Normal strength and tone, mentation intact and speech normal  PSYCH: mentation appears normal, affect normal/bright  LYMPH: no cervical, supraclavicular, axillary, or inguinal adenopathy  RECTAL: declined exam  Diabetic foot exam: normal DP and PT pulses, no trophic changes or ulcerative lesions, and normal sensory exam      ASSESSMENT/PLAN:   Routine general medical examination at a health care facility    Stage 3 chronic kidney disease, unspecified whether stage 3a or 3b CKD (H)  Blood pressure is borderline elevated, continue medications  - Albumin Random Urine Quantitative with Creat Ratio  - Hemoglobin  - Comprehensive metabolic panel (BMP + Alb, Alk Phos, ALT, AST, Total. Bili, TP)  - Albumin Random Urine Quantitative with Creat Ratio  - Hemoglobin  - Comprehensive metabolic panel (BMP + Alb, Alk Phos, ALT, AST, Total. Bili, TP)    Gastroesophageal reflux disease without esophagitis  Ongoing symptoms and medication helps and will continue  - pantoprazole (PROTONIX) 40 MG EC tablet  Dispense: 90 tablet; Refill: 3    Hypogonadism male  Ongoing symptoms  "and desires to continue  - testosterone cypionate (DEPOTESTOSTERONE) 200 MG/ML injection  Dispense: 4 mL; Refill: 1  - B-D LUER-HIWOT SYRINGE 21G X 1-1/2\" 3 ML MISC  Dispense: 50 each; Refill: 3    Insomnia, unspecified type  Medication helps some.  We will continue  - amitriptyline (ELAVIL) 50 MG tablet  Dispense: 270 tablet; Refill: 1  - zolpidem (AMBIEN) 10 MG tablet  Dispense: 30 tablet; Refill: 5    Panic disorder without agoraphobia  Generalized anxiety disorder  Ongoing anxieties.  Declined therapy referral, continue medications- busPIRone (BUSPAR) 5 MG tablet  Dispense: 180 tablet; Refill: 3  - LORazepam (ATIVAN) 1 MG tablet  Dispense: 60 tablet; Refill: 5    Mild persistent asthma without complication  As needed use, doing okay today.  - albuterol (PROAIR HFA/PROVENTIL HFA/VENTOLIN HFA) 108 (90 Base) MCG/ACT inhaler  Dispense: 18 g; Refill: 5    Erectile dysfunction, unspecified erectile dysfunction type  Stated that cardiology is okay with him taking sildenafil and can refill this.  - sildenafil (VIAGRA) 25 MG tablet  Dispense: 20 tablet; Refill: 11    Uncontrolled hypertension  Medications better controlled and will continue  - hydrALAZINE (APRESOLINE) 100 MG tablet  Dispense: 180 tablet; Refill: 3    Major Depressive Disorder, Recurrent Episode, Mode  Increased stress with some financial strain due to partner moving out.  - buPROPion (WELLBUTRIN XL) 300 MG 24 hr tablet  Dispense: 90 tablet; Refill: 3    Neck pain  Ongoing symptoms off and on and medications helps  - cyclobenzaprine (FLEXERIL) 10 MG tablet  Dispense: 90 tablet; Refill: 5    Hypertension goal BP (blood pressure) < 140/90  Fair control, continue medications.  - carvedilol (COREG) 25 MG tablet  Dispense: 180 tablet; Refill: 3    Coronary artery disease involving native heart without angina pectoris, unspecified vessel or lesion type  Prior history of NSTEMI (non-ST elevated myocardial infarction) (H)  No recent chest pains, continue " medications and follow-up with cardiology.  - isosorbide mononitrate (IMDUR) 60 MG 24 hr tablet  Dispense: 90 tablet; Refill: 3  - clopidogrel (PLAVIX) 75 MG tablet  Dispense: 90 tablet; Refill: 3    Cardiomyopathy due to hypertension, with heart failure (H)  No swelling in ankles, fluid controlled.  - torsemide (DEMADEX) 10 MG tablet  Dispense: 30 tablet; Refill: 4    Coronary artery disease involving native coronary artery of native heart with angina pectoris (H)  See above  - rosuvastatin (CRESTOR) 20 MG tablet  Dispense: 90 tablet; Refill: 3    Alopecia  Desires to continue and takes quarter tablet daily.  - finasteride (PROSCAR) 5 MG tablet  Dispense: 90 tablet; Refill: 3    Screen for colon cancer  Due for screening:  - COLOGUARD([employer])    Screening for prostate cancer  - PROSTATE SPEC ANTIGEN SCREEN  - PROSTATE SPEC ANTIGEN SCREEN      COUNSELING:  Reviewed preventive health counseling, as reflected in patient instructions      BMI:   Estimated body mass index is 33.09 kg/m  as calculated from the following:    Height as of 5/19/23: 1.829 m (6').    Weight as of this encounter: 110.7 kg (244 lb).   Weight management plan: Discussed healthy diet and exercise guidelines           reports that he has never smoked. He has never used smokeless tobacco.      No follow-ups on file.    Follow-up Visit   Expected date:  Sep 08, 2024 (Approximate)      Follow Up Appointment Details:     Follow-up with whom?: Me    Follow-Up for what?: Adult Preventive    Any Additional Chronic Condition Management?:  Vascular Disease  Hypertension  Hyperlipidemia  Depression  Anxiety  CKD       How?: In Person    Is this an as-needed follow-up?: No             Follow-up Visit   Expected date:  Sep 08, 2024 (Approximate)      Follow Up Appointment Details:     Follow-up with whom?: PCP    Follow-Up for what?: Medicare Wellness    Any Additional Chronic Condition Management?:  CKD  Vascular Disease  Hyperlipidemia  Hypertension        Welcome or Annual?: Annual Wellness    How?: In Person                       Nate Segovia MD     [address] [address]  [email or web address]     Office: [phone]     The patient was counseled and encouraged to consider modifying their diet and eating habits. He was provided with information on recommended healthy diet options.

## 2023-09-10 RX ORDER — SYRINGE WITH NEEDLE, 1 ML 25GX5/8"
SYRINGE, EMPTY DISPOSABLE MISCELLANEOUS
Qty: 50 EACH | Refills: 3 | Status: SHIPPED | OUTPATIENT
Start: 2023-09-10

## 2023-09-10 RX ORDER — NEEDLES, DISPOSABLE 25GX5/8"
1 NEEDLE, DISPOSABLE MISCELLANEOUS WEEKLY
Qty: 12 EACH | Refills: 3 | Status: SHIPPED | OUTPATIENT
Start: 2023-09-10 | End: 2024-05-14

## 2023-09-11 NOTE — RESULT ENCOUNTER NOTE
Dear Jeremi,    Here is a summary of your recent test results:  -Hemoglobin is normal.  There is no evidence of anemia.  -PSA (prostate specific antigen) test is normal.  This indicates a low likelihood of prostate cancer.  ADVISE: rechecking this in 1 year.  -Liver and gallbladder tests (ALT,AST, Alk phos,bilirubin) are minimally elevated.  -Kidney function (GFR) is decreased and stable ADVISE: Staying well-hydrated, avoiding ibuprofen and/or naproxen as these are toxic to the kidneys.  -Sodium is normal.  -Potassium is normal.  -Calcium is normal.  -Glucose (diabetic screening test) is normal.  -Microalbumin (urine protein) level is elevated. This is suggestive of early damage to your kidneys from high blood pressure.  ADVISE: avoiding anti-inflamatory agents such as ibuprofen (Advil, Motrin) or naproxen (Aleve) as much as possible, and keeping your blood pressure in a normal range.    For additional lab test information, [email or web address] is a very good reference.          Thank you very much for trusting me and [facility].     Have a peaceful day.    Healthy regards,  Nate Segovia MD

## 2023-09-26 NOTE — TELEPHONE ENCOUNTER
Pt with no follow-up to clinic    Closing    Carmina MANRIQUE RN Care Coordinator  [facility]

## 2023-10-01 ENCOUNTER — NURSE TRIAGE (OUTPATIENT)
Dept: NURSING | Facility: CLINIC | Age: 53
End: 2023-10-01
Payer: COMMERCIAL

## 2023-10-01 ENCOUNTER — TELEPHONE (OUTPATIENT)
Dept: FAMILY MEDICINE | Facility: CLINIC | Age: 53
End: 2023-10-01
Payer: COMMERCIAL

## 2023-10-01 DIAGNOSIS — L03.90 CELLULITIS, UNSPECIFIED CELLULITIS SITE: Primary | ICD-10-CM

## 2023-10-01 NOTE — TELEPHONE ENCOUNTER
Patient calling back again. See message below from previous FNA nurse Binta.     Scheduling states pt is having trouble with his phone connection and is unsure if he missed a call from the doctor. Scheduling attempted to connect patient with this RN but call dropped. I called patient back. He tells me that he just spoke with the doctor and was advised to go to UC/ED. Pt upset with this. Pt thanked me for calling him back. No further assistance from FNA needed at this time.     Cassidy Mckeon RN  [place] Nurse Advisor  10/1/2023 11:13 AM

## 2023-10-01 NOTE — TELEPHONE ENCOUNTER
"Friday is when symptoms started.  Patient has inflammation on right thigh underneath the skin.   When patient puts hand on his thigh is raised warm to touch.  Redness is present.  Redness spreading.   Leg is painful to touch.  Denies fever.   Pain is currently a \"7 when sitting 10 when walking.\"   Patient is requesting on call MD be paged.  Patient states \"I am not going to the hospital\".  FNA paged on call Kobe Borja at 9:36 am to phone back FNA.  MD advised that patient needs to be assessed in either urgent care or ED or be seen in clinic.    Patient is requesting another page be sent to MD to call patient back direct.  FNA paged again at 10:56 and relayed message to Kobe Borja.        Reason for Disposition   [1] SEVERE pain (e.g., excruciating, unable to do any normal activities) AND [2] not improved after 2 hours of pain medicine    Additional Information   Negative: Looks like a broken bone or dislocated joint (e.g., crooked or deformed)   Negative: Sounds like a life-threatening emergency to the triager   Negative: Chest pain   Negative: Difficulty breathing   Negative: Entire foot is cool or blue in comparison to other side   Negative: Unable to walk   Negative: [1] Red area or streak AND [2] fever   Negative: [1] Swollen joint AND [2] fever   Negative: [1] Cast on leg or ankle AND [2] now increased pain   Negative: Patient sounds very sick or weak to the triager    Protocols used: Leg Pain-A-AH    "

## 2023-10-01 NOTE — TELEPHONE ENCOUNTER
Received call from FNA advisors x 2, patient would like to talk directly to me    He calls noting he has cellulitis, has outlined it, with out movement pain is a 4/10, with any movement/touch pain goes to 8/10    FNA triage protocol state ER evaluation with in 4 hrs    I reviewed with patient, based on his 8/10 patient, and uncertain diagnosis, he needs further evaluation in ER (It's a Sunday), I'm not comfortable treating over the phone, at this time he hung up on me

## 2023-10-02 RX ORDER — CEPHALEXIN 500 MG/1
500 CAPSULE ORAL 3 TIMES DAILY
Qty: 21 CAPSULE | Refills: 0 | Status: SHIPPED | OUTPATIENT
Start: 2023-10-02 | End: 2023-10-09

## 2023-10-02 NOTE — TELEPHONE ENCOUNTER
Patient is calling to report that he has cellulitis on his right anterior mid thigh, and is much pain. Patient reports he is need of antibiotics. Writer advised patient an appointment is needed so he can be assessed by a provider, and a rx can be prescribed. Patient declined making an appointment, and just wants a rx sent. Writer once again informed patient the importance of scheduling appointment for the leg pain concerns so he can be assessed appropriately - patient firmly declining and requesting a rx to be sent. Patient reports he has experienced this before and knows what it is, and just needs treatment. Pharmacy desired attached. Please advise.     Patient would like a callback to notify rx is sent

## 2023-10-03 NOTE — TELEPHONE ENCOUNTER
I will send in an antibiotic course and if not better and 3 to 7 days or if worsening symptoms such as spreading infection then immediately needs to be seen .

## 2023-10-17 NOTE — TELEPHONE ENCOUNTER
BP Readings from Last 3 Encounters:   04/06/21 (!) 150/90   03/03/21 (!) 148/78   02/12/21 (!) 159/93     Routing refill request to provider for review/approval because:  BP          Asmita Marinelli RN  [facility]     PCP: Buddy Orourke DO    Referring Provider:     Subjective:   Godwin Haddad is a 58 y.o. male with hx of intermediate grade CAD ([facility] 2017), HLD, HTN, and LVDD,  presents for  follow-up status post left heart catheterization and unsuccessful balloon angioplasty of the proximal RCA (unable to pass the stent across the lesion in )by Dr. Moreau 2023 and post complicated PCI by Dr. Chinchilla 2023 at [facility].  The patient presents for 3 month follow up. He states that he is doing well and denies complaints.     2023 The patient states he is doing well his shortness of breath which is his anginal equivalent has completely resolved.    4/10/2023 who presents for routine follow up. He reports at 2 month duration of chest pressure and ARGUETA with minimal exertion such as walking in his home. The discomfort is relieved with rest. He has sublingual ntg but states that it is old. He has not used sublingual ntg.         Fhx:  Family History   Problem Relation Age of Onset    Lung cancer Mother     Heart attack Father     Heart disease Father     Hypertension Father     Diabetes Father      Shx:   Social History     Socioeconomic History    Marital status:    Tobacco Use    Smoking status: Former     Current packs/day: 0.00     Average packs/day: 4.0 packs/day for 20.0 years (80.0 ttl pk-yrs)     Types: Cigarettes     Start date:      Quit date:      Years since quittin.8    Smokeless tobacco: Never   Substance and Sexual Activity    Alcohol use: Yes    Drug use: Never       EKG 4/10/2023 sinus bradycardia; HR 59 bpm;   10/13/2022 RSR with HR 66 bpm    ECHO Results for orders placed during the hospital encounter of 21    Echo Saline Bubble? No    Interpretation Summary  · The left ventricle is normal in size with mild concentric hypertrophy and normal systolic function.  · The estimated ejection fraction is 60%.  · Normal right ventricular size with normal right ventricular systolic  function.  · Mild tricuspid regurgitation.  · Normal left ventricular diastolic function.    Results for orders placed during the hospital encounter of 04/24/23    Cardiac catheterization    Conclusion    The Ramus lesion was 70% stenosed.    The Prox RCA to Mid RCA lesion was 95% stenosed with 95% stenosis post-intervention.    The ejection fraction was calculated to be 55%.    The pre-procedure left ventricular end diastolic pressure was 38.    The estimated blood loss was none.    Unsuccessful angioplasty of 95% in stent stenosis of the proximal RCA.    Lesion in proximal RCA of N stents stenosis would not expand with NC balloon to greater than 20 atmospheres.   [facility] 6/12/2017- Dr. Moreau  LAD - 30% eccentric plaque in the pLAD  RI 40-50% napkin ring stenosis 10 mm above the takeoff of the bifurcation in the midsection  LCx- no significant CAD  RCA 50-60% long segment from 10 mm below the conus to above the 2nd genu      5/26/2023- Dr. Chinchilla at [facility]  Successful rotational atherectomy assisted, shockwave lithotripsy assisted, IVUS guided PCI of prox to mid RCA.     Lab Results   Component Value Date     04/21/2023    K 4.5 04/21/2023     (H) 04/21/2023    CO2 29 04/21/2023    BUN 13 04/21/2023    CREATININE 0.94 04/21/2023    CALCIUM 9.4 04/21/2023    ANIONGAP 7 04/21/2023    ESTGFRAFRICA 76 01/20/2021    EGFRNONAA 74 06/06/2022       Lab Results   Component Value Date    CHOL 102 04/21/2023    CHOL 117 04/04/2022     Lab Results   Component Value Date    HDL 36 (L) 04/21/2023    HDL 38 (L) 04/04/2022     Lab Results   Component Value Date    LDLCALC 49 04/21/2023    LDLCALC 36 04/04/2022     Lab Results   Component Value Date    TRIG 84 04/21/2023    TRIG 215 (H) 04/04/2022     Lab Results   Component Value Date    CHOLHDL 2.8 04/21/2023    CHOLHDL 3.1 04/04/2022       Lab Results   Component Value Date    WBC 9.69 04/21/2023    HGB 14.6 04/21/2023    HCT 44.1 04/21/2023    MCV 85.8 04/21/2023    PLT  183 04/21/2023           Current Outpatient Medications:     amLODIPine (NORVASC) 10 MG tablet, Take 1 tablet (10 mg total) by mouth once daily., Disp: 90 tablet, Rfl: 3    aspirin (ECOTRIN) 81 MG EC tablet, Take 1 tablet (81 mg total) by mouth once daily., Disp: 90 tablet, Rfl: 3    atorvastatin (LIPITOR) 20 MG tablet, Take 1 tablet (20 mg total) by mouth once daily., Disp: 90 tablet, Rfl: 3    b complex vitamins tablet, Take 1 tablet by mouth once daily., Disp: , Rfl:     carvediloL (COREG) 6.25 MG tablet, Take 1 tablet (6.25 mg total) by mouth 2 (two) times daily with meals., Disp: 60 tablet, Rfl: 5    citalopram (CELEXA) 10 MG tablet, Take 1 tablet (10 mg total) by mouth once daily., Disp: 90 tablet, Rfl: 3    clopidogreL (PLAVIX) 75 mg tablet, Take 1 tablet (75 mg total) by mouth once daily., Disp: 90 tablet, Rfl: 3    coenzyme Q10 (CO Q-10) 300 mg Cap, Take 300 mg by mouth once daily., Disp: , Rfl:     HYDROmorphone (DILAUDID) 2 MG tablet, Take 1 tablet (2 mg total) by mouth every 4 (four) hours as needed for Pain., Disp: 18 tablet, Rfl: 0    metFORMIN (GLUCOPHAGE) 500 MG tablet, Take 1 tablet (500 mg total) by mouth 2 (two) times daily with meals., Disp: 180 tablet, Rfl: 3    nitroGLYCERIN (NITROSTAT) 0.4 MG SL tablet, Place 1 tablet (0.4 mg total) under the tongue every 5 (five) minutes as needed for Chest pain., Disp: 25 tablet, Rfl: 3    olmesartan-hydrochlorothiazide (BENICAR HCT) 20-12.5 mg per tablet, Take 1 tablet by mouth once daily., Disp: 90 tablet, Rfl: 3    potassium citrate (UROCIT-K) 10 mEq (1,080 mg) TbSR, Take 10 mEq by mouth 2 (two) times daily with meals. 3 tablets twice a day, Disp: , Rfl:     testosterone cypionate (DEPOTESTOTERONE CYPIONATE) 200 mg/mL injection, Inject 1 mL (200 mg total) into the muscle every 14 (fourteen) days., Disp: 2 mL, Rfl: 5    acetaminophen (TYLENOL) 650 MG TbSR, Take 1 tablet by mouth every 6 to 8 hours as needed., Disp: , Rfl:     cholecalciferol, vitamin D3,  "125 mcg (5,000 unit) Tab, Take 5,000 Units by mouth once daily., Disp: , Rfl:     OZEMPIC 1 mg/dose (4 mg/3 mL), INJECT 1 MG SUBCUTANEOUSLY INTO THE SKIN EVERY 7 DAYS (Patient not taking: Reported on 10/16/2023), Disp: 3 mL, Rfl: 3    promethazine (PHENERGAN) 25 MG tablet, Take 1 tablet (25 mg total) by mouth every 6 (six) hours as needed for Nausea. (Patient not taking: Reported on 7/13/2023), Disp: 15 tablet, Rfl: 0    semaglutide (OZEMPIC) 1 mg/dose (4 mg/3 mL), Inject 1 mg into the skin every 7 days. (Patient not taking: Reported on 10/16/2023), Disp: 3 pen, Rfl: 3    traMADoL 100 mg Tab, 1 tablet at bedtime Orally Once a day, Disp: , Rfl:   Meds reviewed and reconciled.    Review of Systems   Respiratory:  Negative for shortness of breath.    Cardiovascular:  Negative for chest pain, palpitations, orthopnea, claudication, leg swelling and PND.   Neurological:  Negative for dizziness, loss of consciousness and weakness.           Objective:   BP (!) 126/54 (BP Location: Left arm, Patient Position: Sitting)   Pulse 71   Ht 5' 6" (1.676 m)   Wt 129.2 kg (284 lb 12.8 oz)   SpO2 95%   BMI 45.97 kg/m²     Physical Exam  Vitals and nursing note reviewed.   Constitutional:       Appearance: Normal appearance. He is obese.   HENT:      Head: Normocephalic and atraumatic.   Neck:      Vascular: No carotid bruit.   Cardiovascular:      Rate and Rhythm: Normal rate and regular rhythm.      Pulses: Normal pulses.      Heart sounds: Normal heart sounds.   Pulmonary:      Effort: Pulmonary effort is normal.      Breath sounds: Normal breath sounds.   Abdominal:      Palpations: Abdomen is soft.   Musculoskeletal:      Cervical back: Neck supple.      Right lower leg: No edema.      Left lower leg: No edema.   Skin:     General: Skin is warm and dry.      Capillary Refill: Capillary refill takes less than 2 seconds.   Neurological:      Mental Status: He is alert.           Assessment:     1. Coronary artery disease " involving native coronary artery of native heart without angina pectoris        2. Hyperlipidemia, unspecified hyperlipidemia type        3. Primary hypertension              Plan:   Coronary artery disease  [facility] 6/12/2017- Dr. Moreau  LAD - 30% eccentric plaque in the pLAD  RI 40-50% napkin ring stenosis 10 mm above the takeoff of the bifurcation in the midsection  LCx- no significant CAD  RCA 50-60% long segment from 10 mm below the conus to above the 2nd genu    5/26/2023 successful rotational atherectomy assisted, shockwave lithotripsy assisted, IVUS guided PCI of the prox to mid RCA- Dr. Chinchilla at [facility]      Asymptomatic  Continue ASA/Plavix/Lipitor    Hyperlipidemia  Lipid panel from 4/21/2023 reviewed.    mg/dl  Trig 84 mg/dl  HDL 26 mg/dl  LDL 49 mg/dl  Lipitor 20 mg po daily      Hypertension  126/54 mmHg    RTC: 6 months

## 2023-10-18 DIAGNOSIS — F11.20 UNCOMPLICATED OPIOID DEPENDENCE (H): ICD-10-CM

## 2023-10-18 DIAGNOSIS — Z12.11 SCREEN FOR COLON CANCER: Primary | ICD-10-CM

## 2023-10-18 LAB — NONINV COLON CA DNA+OCC BLD SCRN STL QL: POSITIVE

## 2023-10-18 NOTE — RESULT ENCOUNTER NOTE
Note to Staff: please call the patient to explain results and order pended as he may want to have it at [facility] instead of [place] because I think he lives closer to there.    -Cologuard test (colon cancer screening test) was positive.  ADVISE: scheduling a colonoscopy to check for possible colon cancer and an order has been sent to gastroenterology/endoscopy scheduling department - they will call you.

## 2023-10-18 NOTE — TELEPHONE ENCOUNTER
Received refill request for:    buprenorphine HCl-naloxone HCl (SUBOXONE) 8-2 MG per film      Last dispensed from pharmacy on 7/3/2023.    Patient's last office/virtual visit by prescribing provider on 7/26/2023.  Next office/virtual appointment scheduled for 11/1/2023.    Last urine drug screen date 1/12/2023.  Current opioid agreement on file? NO    E-prescribe to:     [id number] - [place], [place] - [address]    Will route to [facility] for review and preparation of prescription(s).

## 2023-10-18 NOTE — TELEPHONE ENCOUNTER
Routing to provider to review medication prepped per below      buprenorphine HCl-naloxone HCl (SUBOXONE) 8-2 MG per film #60, Refill:2  Si/2 film QID - Brand name only   Last picked up 23 with start on 23 (Per , prep is incorrect)  Due: OK to fill 23    Per last OV note 23: Continue suboxone at current dose  - Continue buprenorphine HCl-naloxone HCl (SUBOXONE) 8-2 MG per film; 1/2 film QID       [id number] - [place], [place] - [address]  [address] [address]  Phone: [phone] Fax: [phone]    Carmina MANRIQUE RN Care Coordinator  [facility]

## 2023-10-19 RX ORDER — BUPRENORPHINE AND NALOXONE 8; 2 MG/1; MG/1
FILM, SOLUBLE BUCCAL; SUBLINGUAL
Qty: 60 FILM | Refills: 2 | Status: SHIPPED | OUTPATIENT
Start: 2023-10-19 | End: 2023-11-02

## 2023-10-19 NOTE — TELEPHONE ENCOUNTER
Script Eprescribed to pharmacy  [place] Prescription Monitoring Program checked      Signed Prescriptions:                        Disp   Refills    buprenorphine HCl-naloxone HCl (SUBOXONE) *60 Film2        Si/2 film QID - Brand name only. OK to fill 23  Authorizing Provider: ABENA MEDELLIN MD

## 2023-10-20 DIAGNOSIS — F40.243 FLYING PHOBIA: ICD-10-CM

## 2023-10-20 RX ORDER — CLONAZEPAM 1 MG/1
1 TABLET ORAL 2 TIMES DAILY PRN
Qty: 4 TABLET | Refills: 0 | Status: SHIPPED | OUTPATIENT
Start: 2023-10-20 | End: 2024-03-12

## 2023-10-24 ENCOUNTER — TELEPHONE (OUTPATIENT)
Dept: FAMILY MEDICINE | Facility: CLINIC | Age: 53
End: 2023-10-24
Payer: COMMERCIAL

## 2023-10-24 DIAGNOSIS — F41.0 PANIC DISORDER WITHOUT AGORAPHOBIA: ICD-10-CM

## 2023-10-24 DIAGNOSIS — F41.1 GENERALIZED ANXIETY DISORDER: ICD-10-CM

## 2023-10-24 NOTE — TELEPHONE ENCOUNTER
Patient is calling to report that the [facility] pharmacy in [place] is out of stock of lorazepam 1 mg, this medication is on back order until January. Patient reports that the  pharmacy in [place] has lorazepam 1mg currently in stock. Please send rx to pharmacy desired attached.

## 2023-10-25 RX ORDER — LORAZEPAM 1 MG/1
1 TABLET ORAL 2 TIMES DAILY PRN
Qty: 60 TABLET | Refills: 5 | Status: SHIPPED | OUTPATIENT
Start: 2023-10-25 | End: 2024-04-18

## 2023-10-31 DIAGNOSIS — F90.9 ATTENTION DEFICIT HYPERACTIVITY DISORDER (ADHD), UNSPECIFIED ADHD TYPE: ICD-10-CM

## 2023-10-31 RX ORDER — DEXTROAMPHETAMINE SACCHARATE, AMPHETAMINE ASPARTATE, DEXTROAMPHETAMINE SULFATE AND AMPHETAMINE SULFATE 5; 5; 5; 5 MG/1; MG/1; MG/1; MG/1
20 TABLET ORAL 2 TIMES DAILY
Qty: 60 TABLET | Refills: 0 | Status: SHIPPED | OUTPATIENT
Start: 2023-10-31 | End: 2024-01-26

## 2023-10-31 RX ORDER — DEXTROAMPHETAMINE SACCHARATE, AMPHETAMINE ASPARTATE, DEXTROAMPHETAMINE SULFATE AND AMPHETAMINE SULFATE 5; 5; 5; 5 MG/1; MG/1; MG/1; MG/1
20 TABLET ORAL 2 TIMES DAILY
Qty: 60 TABLET | Refills: 0 | Status: SHIPPED | OUTPATIENT
Start: 2023-11-30 | End: 2024-01-26

## 2023-10-31 RX ORDER — DEXTROAMPHETAMINE SACCHARATE, AMPHETAMINE ASPARTATE, DEXTROAMPHETAMINE SULFATE AND AMPHETAMINE SULFATE 5; 5; 5; 5 MG/1; MG/1; MG/1; MG/1
20 TABLET ORAL 2 TIMES DAILY
Qty: 60 TABLET | Refills: 0 | Status: SHIPPED | OUTPATIENT
Start: 2023-12-30 | End: 2024-01-26

## 2023-10-31 NOTE — TELEPHONE ENCOUNTER
Medication sent    Last visit in this dept:    9/8/2023     Last visit -this provider:  10/24/2023     Next visit in this dept:   Future Appointments 10/31/2023 - 4/28/2024        Date Visit Type Length Department Provider     11/1/2023  2:30 PM RETURN PAIN 60 min [facility] Macy Subramanian MD              11/8/2023  9:45 AM RETURN CARDIOLOGY 30 min [facility] Gilda Keita MD    Location Instructions:     Our clinic is located at [address], [place] [address]. You can park your vehicle at the parking ramp west of [address] across the street from [employer]. You will cross the skyway to access our clinic on the 2nd floor.              11/20/2023 10:15 AM VIDEO VISIT RETURN 15 min [facility] Chung Henderson MD    Location Instructions:     Clinic is located at [address], [place] [address]                     Health Maintenance   Topic Date Due    HF ACTION PLAN  Never done    HEPATITIS A IMMUNIZATION (1 of 2 - Risk 2-dose series) Never done    ZOSTER IMMUNIZATION (1 of 2) Never done    ASTHMA ACTION PLAN  06/28/2020    INFLUENZA VACCINE (1) 09/01/2023    COVID-19 Vaccine (3 - 2023-24 season) 09/01/2023    COLORECTAL CANCER SCREENING  10/13/2023    CBC  11/24/2023    NANCY ASSESSMENT  12/12/2023    ASTHMA CONTROL TEST  02/03/2024    PHQ-9  02/03/2024    LIPID  04/21/2024    MEDICARE ANNUAL WELLNESS VISIT  09/08/2024    ALT  09/08/2024    BMP  09/08/2024    CMP  09/08/2024    MICROALBUMIN  09/08/2024    PSA  09/08/2024    ANNUAL REVIEW OF HM ORDERS  09/08/2024    HEMOGLOBIN  09/08/2024    DTAP/TDAP/TD IMMUNIZATION (3 - Td or Tdap) 05/20/2027    ADVANCE CARE PLANNING  06/24/2027    TSH W/FREE T4 REFLEX  Completed    HEPATITIS C SCREENING  Completed    HIV SCREENING  Completed    Pneumococcal Vaccine: Pediatrics (0 to 5 Years) and At-Risk Patients (6 to 64 Years)  Completed    URINALYSIS  Completed    IPV  IMMUNIZATION  Aged Out    HPV IMMUNIZATION  Aged Out    MENINGITIS IMMUNIZATION  Aged Out    RSV MONOCLONAL ANTIBODY  Aged Out    HEPATITIS B IMMUNIZATION  Discontinued        CSA -- Patient Level:     [Media Unavailable] Controlled Substance Agreement - Non - Opioid - Scan on 6/25/2022  5:47 AM: NON-OPIOID CONTROLLED SUBSTANCE AGREEMENT

## 2023-10-31 NOTE — TELEPHONE ENCOUNTER
Patient calls for refill on Adderall.     Routing to provider to review and advise.     Rola Sullivan RN  [facility]

## 2023-11-01 ENCOUNTER — TELEPHONE (OUTPATIENT)
Dept: PALLIATIVE MEDICINE | Facility: CLINIC | Age: 53
End: 2023-11-01

## 2023-11-01 NOTE — TELEPHONE ENCOUNTER
OK per Moris for 30min appt    Carmina MANRIQUE RN Care Coordinator  [facility]
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
## 2023-11-01 NOTE — TELEPHONE ENCOUNTER
Will call pt as he was scheduled in 30 min spot for 60 min appt - will need r/s    Carmina MANRIQUE RN Care Coordinator  [facility]

## 2023-11-02 ENCOUNTER — OFFICE VISIT (OUTPATIENT)
Dept: PALLIATIVE MEDICINE | Facility: CLINIC | Age: 53
End: 2023-11-02
Payer: COMMERCIAL

## 2023-11-02 ENCOUNTER — LAB (OUTPATIENT)
Dept: LAB | Facility: CLINIC | Age: 53
End: 2023-11-02
Payer: COMMERCIAL

## 2023-11-02 VITALS — OXYGEN SATURATION: 96 % | DIASTOLIC BLOOD PRESSURE: 85 MMHG | HEART RATE: 88 BPM | SYSTOLIC BLOOD PRESSURE: 152 MMHG

## 2023-11-02 DIAGNOSIS — G89.4 CHRONIC PAIN SYNDROME: ICD-10-CM

## 2023-11-02 DIAGNOSIS — Z79.899 ENCOUNTER FOR LONG-TERM (CURRENT) USE OF HIGH-RISK MEDICATION: ICD-10-CM

## 2023-11-02 DIAGNOSIS — G43.709 CHRONIC MIGRAINE WITHOUT AURA, NOT INTRACTABLE, WITHOUT STATUS MIGRAINOSUS: ICD-10-CM

## 2023-11-02 DIAGNOSIS — F41.1 GAD (GENERALIZED ANXIETY DISORDER): ICD-10-CM

## 2023-11-02 DIAGNOSIS — F11.20 UNCOMPLICATED OPIOID DEPENDENCE (H): Primary | ICD-10-CM

## 2023-11-02 LAB — CREAT UR-MCNC: 80 MG/DL

## 2023-11-02 PROCEDURE — 64615 CHEMODENERV MUSC MIGRAINE: CPT | Performed by: ANESTHESIOLOGY

## 2023-11-02 PROCEDURE — G0480 DRUG TEST DEF 1-7 CLASSES: HCPCS | Performed by: INTERNAL MEDICINE

## 2023-11-02 PROCEDURE — 99417 PROLNG OP E/M EACH 15 MIN: CPT | Mod: 25 | Performed by: ANESTHESIOLOGY

## 2023-11-02 PROCEDURE — 99000 SPECIMEN HANDLING OFFICE-LAB: CPT

## 2023-11-02 PROCEDURE — 99215 OFFICE O/P EST HI 40 MIN: CPT | Mod: 25 | Performed by: ANESTHESIOLOGY

## 2023-11-02 PROCEDURE — 80307 DRUG TEST PRSMV CHEM ANLYZR: CPT | Mod: 90

## 2023-11-02 RX ORDER — BUPRENORPHINE AND NALOXONE 8; 2 MG/1; MG/1
FILM, SOLUBLE BUCCAL; SUBLINGUAL
Qty: 60 FILM | Refills: 2 | Status: ON HOLD | OUTPATIENT
Start: 2023-11-02 | End: 2024-01-10

## 2023-11-02 ASSESSMENT — PAIN SCALES - GENERAL: PAINLEVEL: EXTREME PAIN (8)

## 2023-11-02 ASSESSMENT — PAIN SCALES - PAIN ENJOYMENT GENERAL ACTIVITY SCALE (PEG)
INTERFERED_GENERAL_ACTIVITY: 8
PEG_TOTALSCORE: 7.33
INTERFERED_ENJOYMENT_LIFE: 8
AVG_PAIN_PASTWEEK: 6

## 2023-11-02 NOTE — LETTER
Opioid / Opioid Plus Controlled Substance Agreement    This is an agreement between you and your provider about the safe and appropriate use of controlled substance/opioids prescribed by your care team. Controlled substances are medicines that can cause physical and mental dependence (abuse).    There are strict laws about having and using these medicines. We here at [facility] are committing to working with you in your efforts to get better. To support you in this work, we ll help you schedule regular office appointments for medicine refills. If we must cancel or change your appointment for any reason, we ll make sure you have enough medicine to last until your next appointment.     As a Provider, I will:  Listen carefully to your concerns and treat you with respect.   Recommend a treatment plan that I believe is in your best interest. This plan may involve therapies other than opioid pain medication.   Talk with you often about the possible benefits, and the risk of harm of any medicine that we prescribe for you.   Provide a plan on how to taper (discontinue or go off) using this medicine if the decision is made to stop its use.    As a Patient, I understand that opioid(s):   Are a controlled substance prescribed by my care team to help me function or work and manage my condition(s).   Are strong medicines and can cause serious side effects such as:  Drowsiness, which can seriously affect my driving ability  A lower breathing rate, enough to cause death  Harm to my thinking ability   Depression   Abuse of and addiction to this medicine  Need to be taken exactly as prescribed. Combining opioids with certain medicines or chemicals (such as illegal drugs, sedatives, sleeping pills, and benzodiazepines) can be dangerous or even fatal. If I stop opioids suddenly, I may have severe withdrawal symptoms.  Do not work for all types of pain nor for all patients. If they re not helpful, I may be asked to stop  them.        The risks, benefits and side effects of these medicine(s) were explained to me. I agree that:  I will take part in other treatments as advised by my care team. This may be psychiatry or counseling, physical therapy, behavioral therapy, group treatment or a referral to a specialist.     I will keep all my appointments. I understand that this is part of the monitoring of opioids. My care team may require an office visit for EVERY opioid/controlled substance refill. If I miss appointments or don t follow instructions, my care team may stop my medicine.    I will take my medicines as prescribed. I will not change the dose or schedule unless my care team tells me to. There will be no refills if I run out early.     I may be asked to come to the clinic and complete a urine drug test or complete a pill count at any time. If I don t give a urine sample or participate in a pill count, the care team may stop my medicine.    I will only receive prescriptions from this clinic for chronic pain. If I am treated by another provider for acute pain issues, I will tell them that I am taking opioid pain medication for chronic pain and that I have a treatment agreement with this provider. I will inform my [facility] care team within one business day if I am given a prescription for any pain medication by another healthcare provider. My [facility] care team can contact other providers and pharmacists about my use of any medicines.    It is up to me to make sure that I don t run out of my medicines on weekends or holidays. If my care team is willing to refill my opioid prescription without a visit, I must request refills only during office hours. Refills may take up to 3 business days to process. I will use one pharmacy to fill all my opioid and other controlled substance prescriptions. I will notify the clinic about any changes to my insurance or medication availability.    I am responsible for my  prescriptions. If the medicine/prescription is lost, stolen or destroyed, it will not be replaced. I also agree not to share controlled substance medicines with anyone.    I am aware I should not use any illegal or recreational drugs. I agree not to drink alcohol unless my care team says I can.       If I enroll in the [place] Medical Cannabis program, I will tell my care team prior to my next refill.     I will tell my care team right away if I become pregnant, have a new medical problem treated outside of my regular clinic, or have a change in my medications.    I understand that this medicine can affect my thinking, judgment and reaction time. Alcohol and drugs affect the brain and body, which can affect the safety of my driving. Being under the influence of alcohol or drugs can affect my decision-making, behaviors, personal safety, and the safety of others. Driving while impaired (DWI) can occur if a person is driving, operating, or in physical control of a car, motorcycle, boat, snowmobile, ATV, motorbike, off-road vehicle, or any other motor vehicle ([place] Statute 169A.20). I understand the risk if I choose to drive or operate any vehicle or machinery.    I understand that if I do not follow any of the conditions above, my prescriptions or treatment may be stopped or changed.          Opioids  What You Need to Know    What are opioids?   Opioids are pain medicines that must be prescribed by a doctor. They are also known as narcotics.     Examples are:   morphine (MS Contin, Anastasiia)  oxycodone (Oxycontin)  oxycodone and acetaminophen (Percocet)  hydrocodone and acetaminophen (Vicodin, Norco)   fentanyl patch (Duragesic)   hydromorphone (Dilaudid)   methadone  codeine (Tylenol #3)     What do opioids do well?   Opioids are best for severe short-term pain such as after a surgery or injury. They may work well for cancer pain. They may help some people with long-lasting (chronic) pain.     What do opioids NOT do  well?   Opioids never get rid of pain entirely, and they don t work well for most patients with chronic pain. Opioids don t reduce swelling, one of the causes of pain.                                    Other ways to manage chronic pain and improve function include:     Treat the health problem that may be causing pain  Anti-inflammation medicines, which reduce swelling and tenderness, such as ibuprofen (Advil, Motrin) or naproxen (Aleve)  Acetaminophen (Tylenol)  Antidepressants and anti-seizure medicines, especially for nerve pain  Topical treatments such as patches or creams  Injections or nerve blocks  Chiropractic or osteopathic treatment  Acupuncture, massage, deep breathing, meditation, visual imagery, aromatherapy  Use heat or ice at the pain site  Physical therapy   Exercise  Stop smoking  Take part in therapy       Risks and side effects     Talk to your doctor before you start or decide to keep taking opioids. Possible side effects include:    Lowering your breathing rate enough to cause death  Overdose, including death, especially if taking higher than prescribed doses  Worse depression symptoms; less pleasure in things you usually enjoy  Feeling tired or sluggish  Slower thoughts or cloudy thinking  Being more sensitive to pain over time; pain is harder to control  Trouble sleeping or restless sleep  Changes in hormone levels (for example, less testosterone)  Changes in sex drive or ability to have sex  Constipation  Unsafe driving  Itching and sweating  Dizziness  Nausea, throwing up and dry mouth    What else should I know about opioids?    Opioids may lead to dependence, tolerance, or addiction.    Dependence means that if you stop or reduce the medicine too quickly, you will have withdrawal symptoms. These include loose poop (diarrhea), jitters, flu-like symptoms, nervousness and tremors. Dependence is not the same as addiction.                     Tolerance means needing higher doses over time to  get the same effect. This may increase the chance of serious side effects.    Addiction is when people improperly use a substance that harms their body, their mind or their relations with others. Use of opiates can cause a relapse of addiction if you have a history of drug or alcohol abuse.    People who have used opioids for a long time may have a lower quality of life, worse depression, higher levels of pain and more visits to doctors.    You can overdose on opioids. Take these steps to lower your risk of overdose:    Recognize the signs:  Signs of overdose include decrease or loss of consciousness (blackout), slowed breathing, trouble waking up and blue lips. If someone is worried about overdose, they should call 911.    Talk to your doctor about Narcan (naloxone).   If you are at risk for overdose, you may be given a prescription for Narcan. This medicine very quickly reverses the effects of opioids.   If you overdose, a friend or family member can give you Narcan while waiting for the ambulance. They need to know the signs of overdose and how to give Narcan.     Don't use alcohol or street drugs.   Taking them with opioids can cause death.    Do not take any of these medicines unless your doctor says it s OK. Taking these with opioids can cause death:  Benzodiazepines, such as lorazepam (Ativan), alprazolam (Xanax) or diazepam (Valium)  Muscle relaxers, such as cyclobenzaprine (Flexeril)  Sleeping pills like zolpidem (Ambien)   Other opioids      How to keep you and other people safe while taking opioids:    Never share your opioids with others.  Opioid medicines are regulated by the Drug Enforcement Agency (BEC[place]). Selling or sharing medications is a criminal act.    2. Be sure to store opioids in a secure place, locked up if possible. Young children can easily swallow them and overdose.    3. When you are traveling with your medicines, keep them in the original bottles. If you use a pill box, be sure you also  carry a copy of your medicine list from your clinic or pharmacy.    4. Safe disposal of opioids    Most pharmacies have places to get rid of medicine, called disposal kiosks. Medicine disposal options are also available in every [facility]. Search your county and  medication disposal  to find more options. You can find more details at:  https://www.pca.[facility].[place]./living-green/managing-unwanted-medications     I agree that my provider, clinic care team, and pharmacy may work with any city, state or federal law enforcement agency that investigates the misuse, sale, or other diversion of my controlled medicine. I will allow my provider to discuss my care with, or share a copy of, this agreement with any other treating provider, pharmacy or emergency room where I receive care.    I have read this agreement and have asked questions about anything I did not understand.    _______________________________________________________  Patient Signature - Jeremi Damon _____________________                   Date     _______________________________________________________  Provider Signature - Macy Subramanian MD   _____________________                   Date     _______________________________________________________  Witness Signature (required if provider not present while patient signing)   _____________________                   Date

## 2023-11-02 NOTE — NURSING NOTE
Obtained urine specimen.  Specimen sent to the lab.    Reviewed controlled substance agreement with patient and the patient stated understanding.  Patient signed agreement and a  copy was given to the patient.       Ofelia Laurent MA  [facility] [place]

## 2023-11-02 NOTE — PATIENT INSTRUCTIONS
[facility]  Botox Injection Discharge Instructions    Do not rub or put extended pressure on the injection sites. You may gently touch the sites to remove any excess blood.  Monitor the injection sites for signs and symptoms of infection such as redness, swelling, warmth, fever, chills, or drainage to areas.  You may have soreness at the injection sites for up to 24 hours.  If you are able to use anti-inflammatory medications or Tylenol for pain control, you can take these as directed.  It may take up to 1 month to notice benefit from the 1st treatment  If you do notice relief, botox can be done every 3 months.  It may require insurance authorization everytime.    For questions about insurance coverage, please call the main clinic number and ask to speak with someone about botox coverage.     Pain Clinic phone number during work hours (Monday through Friday 8 am-4:30 pm) at [phone] or the Provider Line after hours at [phone]:

## 2023-11-03 LAB — ETHYL GLUCURONIDE UR QL SCN: NEGATIVE NG/ML

## 2023-11-03 NOTE — TELEPHONE ENCOUNTER
I spoke to the patient. He has been taking 1/2 strip QID. He found one extra strip, last dose was last night.     Routing to provider.       
Please clarify with patient how he has been taking Suboxone.  If taking 1/2 film qid with last rx 8/2/19 he should have been out 8/17/19.   Please review recent psychiatry note before call.    
Pt states the reason he missed his last appt was due to his aunt's death. Pt made appt for 9/11.   
Refill for: Suboxone     Last Appointment: 19    Next Appointment: 19    No Shows/Cancellations since last appointment:  Late cancelled 19 due to   2019 6:14 AM By:  By:  By: JEWELS GANDHI NANCY G LEE, LAURA Cancel Reason: Patient (just started new medication and is feeling dizzy, cannot drive )         Last Refill in Epic (date and amount/how many days):    Disp Refills Start End COLLETTE   buprenorphine HCl-naloxone HCl (SUBOXONE) 2-0.5 MG per film 30 Film 0 2019  No   Si/2 film qid   Sent to pharmacy as: buprenorphine HCl-naloxone HCl (SUBOXONE) 2-0.5 MG per film   Class: E-Prescribe       Most Recent UDS results: 19 POS for benzos, TCAs and Buprenorphine     reviewed and summarized below:   Fill Date Drug     Qty Days Prescriber   08/15/2019 Gabapentin 300 Mg Capsule   120.00 30 Bond Nak   2019 Lorazepam 1 Mg Tablet   60.00 30 Ro Leh   2019 Zolpidem Tartrate 10 Mg Tablet  30.00 30 Ro Leh   2019 Bupreno-Nalox 2-0.5 Mg Sl Film  30.00 15 Ei Bur     
Rx completed by Kristi.     
Family

## 2023-11-06 ENCOUNTER — TELEPHONE (OUTPATIENT)
Dept: GASTROENTEROLOGY | Facility: CLINIC | Age: 53
End: 2023-11-06
Payer: COMMERCIAL

## 2023-11-06 ENCOUNTER — HOSPITAL ENCOUNTER (OUTPATIENT)
Facility: CLINIC | Age: 53
End: 2023-11-06
Attending: INTERNAL MEDICINE | Admitting: INTERNAL MEDICINE
Payer: COMMERCIAL

## 2023-11-06 NOTE — TELEPHONE ENCOUNTER
Pre Assessment RN Review    Focused Assessments    Pain Medication Review    Per scheduling questionnaire, patient reports taking prescription pain medication three or more times per week.    Per chart review, patient does have an active prescription for an opioid medication. Prescribed Medication(s): suboxone      Scheduling Status & Recommendations    Sedation: MAC/Deep Sedation - Per exclusion criteria.  Prep: Golytely Extended Prep - Per RN assessment.

## 2023-11-06 NOTE — TELEPHONE ENCOUNTER
Endoscopy Scheduling Screen    Have you had a positive Covid test in the last 14 days?  No    Are you active on MyChart?   Yes    What insurance is in the chart?  Other:  [facility]    Ordering/Referring Provider: Luca   (If ordering provider performs procedure, schedule with ordering provider unless otherwise instructed. )    BMI: Estimated body mass index is 33.09 kg/m  as calculated from the following:    Height as of 5/19/23: 1.829 m (6').    Weight as of 9/8/23: 110.7 kg (244 lb).     Sedation Ordered  moderate sedation.   If patient BMI > 50 do not schedule in [facility].    If patient BMI > 45 do not schedule at [facility].    Are you taking methadone or Suboxone?  Yes -Suboxone  Patient must be scheduled with MAC sedation.    Please send In Basket alert to Ije with MRN.-sent 11/6/23    Are you taking any prescription medications for pain 3 or more times per week?   No    Do you have a history of malignant hyperthermia or adverse reaction to anesthesia?  No    (Females) Are you currently pregnant?   No     Have you been diagnosed or told you have pulmonary hypertension?   No    Do you have an LVAD?  No    Have you been told you have moderate to severe sleep apnea?  Yes (RN Review required for scheduling unless scheduling in Hospital.)could not use cpap but is supposed to    Have you been told you have COPD, asthma, or any other lung disease?  Yes     What breathing problems do you have?  Asthma     Do you use home oxygen?  No    Have your breathing problems required an ED visit or hospitalization in the last year?  No    Do you have any heart conditions?  Yes -heart attack Nov 2022-stents placed at this time    In the past 6 months, have you had any hospitalizations for heart related issues including cardiomyopathy, heart attack, or stent placement?  No    Do you have any implantable devices in your body (pacemaker, ICD)?  No    Do you take nitroglycerine?  No    Have you ever had an organ transplant?   No    Have you ever had  "or are you awaiting a heart or lung transplant?   No    Have you had a stroke or transient ischemic attack (TIA aka \"mini stroke\" in the last 6 months?   No    Have you been diagnosed with or been told you have cirrhosis of the liver?   No    Are you currently on dialysis?   No    Do you need assistance transferring?   No    BMI: Estimated body mass index is 33.09 kg/m  as calculated from the following:    Height as of 5/19/23: 1.829 m (6').    Weight as of 9/8/23: 110.7 kg (244 lb).     Is patients BMI > 40 and scheduling location [facility]?  No    Do you take an injectable medication for weight loss or diabetes (excluding insulin)?  No    Do you take the medication Naltrexone?  No    Do you take blood thinners?  Yes -plavix     Are you taking Effient/Prasugrel?  No, you must contact your prescribing provider for direction on holding or bridging with a different medication.       Prep   Are you currently on dialysis or do you have chronic kidney disease?  Yes (Golytely Prep)    Do you have a diagnosis of diabetes?  No    Do you have a diagnosis of cystic fibrosis (CF)?  No    On a regular basis do you go 3 -5 days between bowel movements?  No    BMI > 40?  No    Preferred Pharmacy:    [facility] [address] [address]  [address] [address]  Phone: [phone] Fax: [phone]    Final Scheduling Details   Colonoscopy prep sent?  Golytely Extended Prep-for suboxone per RN    Procedure scheduled  Colonoscopy    Surgeon:  Sean     Date of procedure:  1/31/24     Pre-OP / PAC:   Yes - Patient informed of pre-op requirement.    Location  [facility] - Patient preference.    Sedation   MAC/Deep Sedation - Per exclusion criteria.      Patient Reminders:   You will receive a call from a Nurse to review instructions and health history.  This assessment must be completed prior to your procedure.  Failure to complete the Nurse assessment may result in the procedure being " cancelled.      On the day of your procedure, please designate an adult(s) who can drive you home stay with you for the next 24 hours. The medicines used in the exam will make you sleepy. You will not be able to drive.      You cannot take public transportation, ride share services, or non-medical taxi service without a responsible caregiver.  Medical transport services are allowed with the requirement that a responsible caregiver will receive you at your destination.  We require that drivers and caregivers are confirmed prior to your procedure.

## 2023-11-08 LAB
AMPHET UR CFM-MCNC: 1360 NG/ML
AMPHET/CREAT UR: 1700 NG/MG {CREAT}
BUPRENORPHINE UR CFM-MCNC: 5 NG/ML
BUPRENORPHINE/CREAT UR: 6 NG/MG {CREAT}
LORAZEPAM UR QL CFM: PRESENT
NORBUPRENORPHINE UR CFM-MCNC: 27 NG/ML
NORBUPRENORPHINE/CREAT UR: 34 NG/MG {CREAT}

## 2023-11-09 ENCOUNTER — HOSPITAL ENCOUNTER (OUTPATIENT)
Dept: CT IMAGING | Facility: CLINIC | Age: 53
Discharge: HOME | End: 2023-11-09
Attending: UROLOGY | Admitting: UROLOGY
Payer: COMMERCIAL

## 2023-11-09 DIAGNOSIS — N28.89 RENAL MASS: ICD-10-CM

## 2023-11-09 LAB
CREAT BLD-MCNC: 2.3 MG/DL (ref 0.7–1.3)
EGFRCR SERPLBLD CKD-EPI 2021: 33 ML/MIN/1.73M2

## 2023-11-09 PROCEDURE — 82565 ASSAY OF CREATININE: CPT

## 2023-11-09 PROCEDURE — 74178 CT ABD&PLV WO CNTR FLWD CNTR: CPT

## 2023-11-09 PROCEDURE — 250N000009 HC RX 250: Performed by: UROLOGY

## 2023-11-09 PROCEDURE — 250N000011 HC RX IP 250 OP 636: Performed by: UROLOGY

## 2023-11-09 RX ORDER — IOPAMIDOL 755 MG/ML
120 INJECTION, SOLUTION INTRAVASCULAR ONCE
Status: COMPLETED | OUTPATIENT
Start: 2023-11-09 | End: 2023-11-09

## 2023-11-09 RX ADMIN — IOPAMIDOL 120 ML: 755 INJECTION, SOLUTION INTRAVENOUS at 15:45

## 2023-11-09 RX ADMIN — SODIUM CHLORIDE 80 ML: 9 INJECTION, SOLUTION INTRAVENOUS at 15:45

## 2023-11-12 DIAGNOSIS — H04.123 DRY EYES, BILATERAL: ICD-10-CM

## 2023-11-12 RX ORDER — CYCLOSPORINE 0.5 MG/ML
EMULSION OPHTHALMIC
Qty: 60 ML | Refills: 4 | Status: SHIPPED | OUTPATIENT
Start: 2023-11-12 | End: 2024-08-03

## 2023-11-14 DIAGNOSIS — E29.1 HYPOGONADISM MALE: ICD-10-CM

## 2023-11-15 RX ORDER — TESTOSTERONE CYPIONATE 200 MG/ML
200 INJECTION, SOLUTION INTRAMUSCULAR WEEKLY
Qty: 4 ML | Refills: 1 | Status: SHIPPED | OUTPATIENT
Start: 2023-11-15 | End: 2024-01-14

## 2023-11-20 ENCOUNTER — VIRTUAL VISIT (OUTPATIENT)
Dept: UROLOGY | Facility: CLINIC | Age: 53
End: 2023-11-20
Payer: COMMERCIAL

## 2023-11-20 VITALS — BODY MASS INDEX: 33.59 KG/M2 | WEIGHT: 248 LBS | HEIGHT: 72 IN

## 2023-11-20 DIAGNOSIS — N40.1 BPH WITH OBSTRUCTION/LOWER URINARY TRACT SYMPTOMS: ICD-10-CM

## 2023-11-20 DIAGNOSIS — N13.8 BPH WITH OBSTRUCTION/LOWER URINARY TRACT SYMPTOMS: ICD-10-CM

## 2023-11-20 DIAGNOSIS — N28.89 LEFT RENAL MASS: Primary | ICD-10-CM

## 2023-11-20 PROCEDURE — 99214 OFFICE O/P EST MOD 30 MIN: CPT | Mod: VID | Performed by: UROLOGY

## 2023-11-20 RX ORDER — TAMSULOSIN HYDROCHLORIDE 0.4 MG/1
0.4 CAPSULE ORAL DAILY
Qty: 90 CAPSULE | Refills: 3 | Status: SHIPPED | OUTPATIENT
Start: 2023-11-20 | End: 2024-05-14

## 2023-11-20 ASSESSMENT — PAIN SCALES - GENERAL: PAINLEVEL: NO PAIN (0)

## 2023-11-20 NOTE — PROGRESS NOTES
Urology Consult History and Physical  [facility]  Name: Jeremi Damon    MRN: [id number]   YOB: 1970       We were asked to see Jeremi Damon at the request of Dr. Segovia for evaluation and treatment of LEFT renal mass.        Chief Complaint:   LEFT renal mass    History is obtained from the patient            History of Present Illness:   Jeremi Damon is a 53 year old male who is being seen for evaluation of LEFT renal mass    Initially seen as hospital consult on 11/22/2022:  This patient is a 52 year old male admitted with hypertensive urgency/emergency, NSTEMI and not taking BP meds. Renal US done 11/21/22 due to continued renal dysfunction. This noted a mass in the medial inferior aspect of the left kidney (2.2 x 2.2 x 2.5 cm) and prostatomegaly. Nonsmoker. No family hx of urologic cancers.      Micro hematuria noted as far back as 2007 (referred to Urology in 2002 and 2011, but not evaluated). Most recent UA 11/20, does not have a micro exam.  Cr 1.74 today (1.75 yesterday).      Has slower stream. Nocturia. No gross hematuria.     TODAY 11/20/2023:  He had been recommended to have a repeat CT scan after 6 months, however just had this completed no  He is on finasteride 1mg for hair loss  He does note some mild LUTS  He reports that his pressure control has been under much tighter control    He works as a chiropractor           Past Medical History:     Past Medical History:   Diagnosis Date    ADHD (attention deficit hyperactivity disorder)     Allergic rhinitis, cause unspecified     Allergic rhinitis    Benign hypertension     CAD (coronary artery disease)     cardiac cath 11/23/2022: FERNANDA x3 to LAD    Chronic back pain     Hiccough     Hypersomnia with sleep apnea, unspecified     Hypertensive emergency 11/19/2022    Major depressive disorder, single episode, moderate (H) 03/2008    Mild persistent asthma     NSTEMI (non-ST elevated myocardial infarction) (H) 11/29/2020    Other primary  cardiomyopathies     Cardiomyopathy -- unknown etiology            Past Surgical History:     Past Surgical History:   Procedure Laterality Date    APPENDECTOMY  2007    BACK SURGERY      L5-S1 fusion    CV CORONARY ANGIOGRAM N/A 2020    Procedure: Heart Catheterization with Possible Intervention;  Surgeon: Theo Donahue MD;  Location:  [facility]    CV CORONARY ANGIOGRAM N/A 2022    Procedure: Coronary Angiogram;  Surgeon: Venkata Hdez MD;  Location:  [facility]    CV PCI N/A 2022    Procedure: Percutaneous Coronary Intervention;  Surgeon: Venkata Hdez MD;  Location:  [facility]    HC REPAIR OF NASAL SEPTUM      LAPAROSCOPIC CHOLECYSTECTOMY      Cholecystectomy, Laparoscopic    SURGICAL HISTORY OF -       torn pectoral muscle    SURGICAL HISTORY OF -       Bilateral radiofrequency volume reduction of the inferior turbinates.    SURGICAL HISTORY OF -       rhinoplasty- septoplasty            Social History:     Social History     Tobacco Use    Smoking status: Never    Smokeless tobacco: Never   Substance Use Topics    Alcohol use: Yes     Comment: rarely        History   Smoking Status    Never   Smokeless Tobacco    Never            Family History:     Family History   Problem Relation Age of Onset    Coronary Artery Disease Mother          55; MI x 2    Cancer Father         hodgkins    Hypertension Father     Coronary Artery Disease Father     Cardiovascular Sister     Hypertension Brother     Cardiovascular Maternal Grandmother     No Known Problems Daughter     Prostate Cancer No family hx of     Cancer - colorectal No family hx of     Cardiomyopathy No family hx of     Valvular heart disease No family hx of               Allergies:     Allergies   Allergen Reactions    Theophylline Hives    Dairy Digestive     Morphine Other (See Comments)     Patient states he had a headache one time but has used since  "with no adverse reactions    Sulfa Antibiotics             Medications:     Current Outpatient Medications   Medication Sig    albuterol (PROAIR HFA/PROVENTIL HFA/VENTOLIN HFA) 108 (90 Base) MCG/ACT inhaler Inhale 2 puffs into the lungs every 6 hours    amitriptyline (ELAVIL) 50 MG tablet Take 3 tablets (150 mg) by mouth At Bedtime    [START ON 12/30/2023] amphetamine-dextroamphetamine (ADDERALL) 20 MG tablet Take 1 tablet (20 mg) by mouth 2 times daily    amphetamine-dextroamphetamine (ADDERALL) 20 MG tablet Take 1 tablet (20 mg) by mouth 2 times daily for 30 days    [START ON 11/30/2023] amphetamine-dextroamphetamine (ADDERALL) 20 MG tablet Take 1 tablet (20 mg) by mouth 2 times daily for 30 days    aspirin (ASA) 81 MG chewable tablet Take 1 tablet (81 mg) by mouth daily Starting tomorrow.    B-D LUER-HIWOT SYRINGE 21G X 1-1/2\" 3 ML MISC 1 DEVICE ONCE A WEEK AND ALSO NEEDS 22 G NEEDLES 1.5 INCH    buprenorphine HCl-naloxone HCl (SUBOXONE) 8-2 MG per film 1/2 film QID - Brand name only.    buPROPion (WELLBUTRIN XL) 300 MG 24 hr tablet Take 1 tablet (300 mg) by mouth every morning    busPIRone (BUSPAR) 5 MG tablet Take 1 tablet (5 mg) by mouth 2 times daily as needed (panic attack)    carvedilol (COREG) 25 MG tablet Take 1 tablet (25 mg) by mouth 2 times daily (with meals)    clonazePAM (KLONOPIN) 1 MG tablet TAKE 1 TABLET (1 MG) BY MOUTH 2 TIMES DAILY AS NEEDED (FLYING PHOBIA)    clopidogrel (PLAVIX) 75 MG tablet Take 1 tablet (75 mg) by mouth daily    cyclobenzaprine (FLEXERIL) 10 MG tablet Take 1 tablet (10 mg) by mouth 3 times daily as needed for other (hiccups)    finasteride (PROSCAR) 5 MG tablet Take 1 tablet (5 mg) by mouth daily    fluticasone-salmeterol (ADVAIR DISKUS) 500-50 MCG/DOSE inhaler 1 inhalation 2 times per day    hydrALAZINE (APRESOLINE) 100 MG tablet Take 0.5 tablets (50 mg) by mouth 2 times daily    imiquimod (ALDARA) 5 % external cream APPLY TOPICALLY AT BEDTIME -WASH OFF AFTER 8 HOURS AND MAY " "USE FOR UP TO 16 WEEKS.    isosorbide mononitrate (IMDUR) 60 MG 24 hr tablet Take 1 tablet (60 mg) by mouth daily    loperamide (IMODIUM A-D) 2 MG tablet Take 2 tabs (4 mg) after first loose stool, and then take one tab (2 mg) after each diarrheal stool.  Max of 8 tabs (16 mg) per day.    LORazepam (ATIVAN) 1 MG tablet Take 1 tablet (1 mg) by mouth 2 times daily as needed for anxiety    needle, disp, (BD DISP NEEDLES) 21G X 1-1/2\" MISC 1 Needle once a week    nitroGLYcerin (NITROSTAT) 0.4 MG sublingual tablet Place under the tongue every 5 minutes as needed    pantoprazole (PROTONIX) 40 MG EC tablet Take 1 tablet (40 mg) by mouth daily    RESTASIS 0.05 % ophthalmic emulsion INSTILL 1 DROP INTO BOTH EYES TWICE A DAY    rosuvastatin (CRESTOR) 20 MG tablet Take 1 tablet (20 mg) by mouth daily    sildenafil (VIAGRA) 25 MG tablet Take 1 tablet (25 mg) by mouth daily as needed (erectile dysfunction)    Syringe/Needle, Disp, (SYRINGE LUER LOCK) 20G X 1-1/2\" 3 ML MISC 1 Device once a week - and also needs 22 G needles 1.5 inch #30 with 1 refill    testosterone cypionate (DEPOTESTOSTERONE) 200 MG/ML injection INJECT 1 ML (200 MG) INTO THE MUSCLE ONCE A WEEK    torsemide (DEMADEX) 10 MG tablet Take 1 tablet (10 mg) by mouth every morning    vitamin C (ASCORBIC ACID) 1000 MG TABS Take 1,000 mg by mouth daily    zolpidem (AMBIEN) 10 MG tablet Take 1 tablet (10 mg) by mouth nightly as needed for sleep     No current facility-administered medications for this visit.             Review of Systems:     Negative except for as reviewed above          Physical Exam:     PHYSICAL EXAM  Patient is a 53 year old  male   Vitals: Height 1.829 m (6'), weight 112.5 kg (248 lb).  Body mass index is 33.63 kg/m .  General Appearance Adult:   Alert, no acute distress, oriented  Neuro: Alert, oriented, speech and mentation normal  Psych: affect and mood normal             Data:   All laboratory data reviewed:    UA RESULTS:  Recent Labs   Lab " Test 11/20/22  1609   COLOR Yellow   APPEARANCE Clear   URINEGLC Negative   URINEBILI Negative   URINEKETONE Trace*   SG 1.015   UBLD Small*   URINEPH 5.5   PROTEIN 50*   NITRITE Negative   LEUKEST Negative      PSA   Date Value Ref Range Status   02/08/2021 0.34 0 - 4 ug/L Final     Comment:     Assay Method:  Chemiluminescence using Siemens Vista analyzer     Prostate Specific Antigen Screen   Date Value Ref Range Status   09/08/2023 0.93 0.00 - 3.50 ng/mL Final   06/23/2022 1.76 0.00 - 4.00 ug/L Final      Lab Results   Component Value Date    CR 2.3 11/09/2023    CR 1.88 09/08/2023    CR 2.05 05/05/2021      IMAGING:  All pertinent imaging reviewed:    All imaging studies reviewed by me.  I personally reviewed these imaging films.  A formal report from radiology will follow.    U/S RENAL 11/21/2022:  FINDINGS:     RIGHT KIDNEY: 13.0 x 7.0 x 5.6 cm. Normal without hydronephrosis or masses.      LEFT KIDNEY: 12.4 x 6.1 x 7.9 cm. Normal without hydronephrosis or masses. Cyst identified in the lateral interpolar region measuring 2.2 x 2.1 x 1.8 cm in size. This appears to have layering calcifications within it. In the medial superior region, a   mass is identified which has blood flow. This measures 2.2 x 2.2 x 2.5 cm. A dedicated CT renal protocol is recommended.     BLADDER: Normal. Prostate gland contains consultations and measures 4.2 x 5.0 x 3.9 cm.                                                  IMPRESSION:  1.  Mass identified in the medial inferior aspect of the left kidney. CT scan with a renal protocol is recommended.  2.  Prostatomegaly.    CT RENAL MASS 11/9/2023:  FINDINGS: 2.3 cm enhancing mass in the mid left kidney concerning for  renal cell carcinoma. Left renal vein and IVC are patent. A few  nonenlarged retroperitoneal lymph nodes are noted, no adenopathy by  size criteria. Benign cyst in the posterior left kidney.     The liver, pancreas, spleen, and adrenal glands demonstrate no  worrisome  focal lesion. No dilated bowel.                                                           IMPRESSION: 2.3 cm mass in the mid left kidney concerning for renal  cell carcinoma.           Impression and Plan:   Impression:   53-year-old man with significant medical history including hypertension CAD status post cardiac stenting x3 in November 2022 with an incidental 3 cm left renal mass.  Also with bothersome LUTS.      Plan:   DISCUSSION SMALL RENAL MASS    I reviewed his CT scan and reviewed these images personally.  I measured the mass at 3 cm and discussed the measuring differences between ultrasound and CT.    The diagnosis of renal mass was discussed in great detail.  We discussed the fact that enhancement within a renal mass suggests malignancy, but that a benign renal tumor cannot be excluded.  Overall the risks of malignancy appears to be about 80-90%.  We discussed the problems with renal biopsy, the limited indications, and the need to treat based primarily on radiographic findings. The patient appears to understand that there is about a 10-20% chance that this mass may be benign.      Regarding treatment, we discussed radical vs. partial nephrectomy.  With respect to partial nephrectomy we discussed a potential advantage with preservation of long-term renal function long term and the equivalent long-term cancer control rates with appropriately selected patients.  We discussed the small (~4%) risk of local recurrence and recurrence in the contralateral kidney and the need for long-term radiographic surveillance postoperatively.  We discussed the higher complication rate with open radical vs partial nephrectomy related to a higher risk of prolonged urine leak, urinoma, hemorrhage requiring transfusion, infection, and possible need for reoperation.  We also discussed the potential need for radical nephrectomy based on intraoperative findings.  For radical and partial nephrectomy, we discussed the risks  associated with any major surgery, including cardiovascular, pulmonary, anesthetic, and thromboembolic problems as well as wound healing problems.  We discussed risk of cancer recurrence and the potential need for additional therapy.  We also discussed risk of renal insufficiency and dialysis short and long-term with radical nephrectomy.    We also discussed open vs. robotic-assisted laparoscopic surgery and advantages and disadvantages each way. For laparoscopic surgery, we discussed possibility that conversion to open surgery might be required dependent on intraoperative findings and anatomy.      We discussed the role of observation and the low risk of metastases associated with lesions less than 3 cm in size and the potential for slow/stable growth in many cases.  However, the unpredictable natural history of these lesions was mentioned as a drawback with this approach and the lack of effective therapy in the event of systemic progression.  In general, this approach is most favored for those with limited life expectancy and/or those with indeterminate (< 3 cm) renal masses.    We discussed the options for treatment of a localized kidney mass including active surveillance, thermal ablation, and surgical extirpation.  Surgical extirpation has the best track record and close to a 99% chance of cure for T1a lesions compared to 90% cure with thermal ablation and is generally preferred.  Furthermore, thermal ablation is not optimal for larger masses or centrally located masses.  Active surveillance is also a reasonable option when life expectancy is less than 5-7 years.    The anticipated post-operative course was explained, including an anticipated 1 night hospital stay.    Patient has decided he would like to proceed with Robotic Partial Nephrectomy - [id number]    The risks, benefits, alternatives, and personnel involved in the procudure were discussed.  All questions were answered.  A written informed consent will be  finalized on the morning of the procedure.    Plan:   - Robotic partial after the holidays which is very reasonable      BPH with LUTS  - We discussed the pathophysiology of the bladder and prostate normal changes associated with development of BPH and LUTS  - We discussed first-line treatment with tamsulosin 0.4 mg daily and prescription sent to the pharmacy.  We discussed common side effects of this medication-we will continue to monitor this going forward     Thank you for the kind consultation.    Time spent: 25 minutes spent on the date of the encounter doing chart review, history and exam, documentation and further activities as noted above.    Chung Howard MD   Urology  [facility]  [facility] Phone: [phone]  [facility] Phone: [phone]         Virtual Visit Details    Type of service:  Video Visit   Video Start Time:  10:15 AM  Video End Time: 10:30 AM anemia.     Originating Location (pt. Location): Home    Distant Location (provider location):  On-site  Platform used for Video Visit: Jyothi

## 2023-11-20 NOTE — LETTER
11/20/2023       RE: Jeremi Damon  [address]  [place] [address]     Dear Colleague,    Thank you for referring your patient, Jeremi Damon, to the [facility] UROLOGY CLINIC [place] at [facility]. Please see a copy of my visit note below.    Urology Consult History and Physical  SOUTHDA  Name: Jeremi Damon    MRN: [id number]   YOB: 1970       We were asked to see Jeremi Damon at the request of Dr. Segovia for evaluation and treatment of LEFT renal mass.        Chief Complaint:   LEFT renal mass    History is obtained from the patient            History of Present Illness:   Jeremi Damon is a 53 year old male who is being seen for evaluation of LEFT renal mass    Initially seen as hospital consult on 11/22/2022:  This patient is a 52 year old male admitted with hypertensive urgency/emergency, NSTEMI and not taking BP meds. Renal US done 11/21/22 due to continued renal dysfunction. This noted a mass in the medial inferior aspect of the left kidney (2.2 x 2.2 x 2.5 cm) and prostatomegaly. Nonsmoker. No family hx of urologic cancers.      Micro hematuria noted as far back as 2007 (referred to Urology in 2002 and 2011, but not evaluated). Most recent UA 11/20, does not have a micro exam.  Cr 1.74 today (1.75 yesterday).      Has slower stream. Nocturia. No gross hematuria.     TODAY 11/20/2023:  He had been recommended to have a repeat CT scan after 6 months, however just had this completed no  He is on finasteride 1mg for hair loss  He does note some mild LUTS  He reports that his pressure control has been under much tighter control    He works as a chiropractor           Past Medical History:     Past Medical History:   Diagnosis Date    ADHD (attention deficit hyperactivity disorder)     Allergic rhinitis, cause unspecified     Allergic rhinitis    Benign hypertension     CAD (coronary artery disease)     cardiac cath 11/23/2022:  FERNANDA x3 to LAD    Chronic back pain     Hiccough     Hypersomnia with sleep apnea, unspecified     Hypertensive emergency 2022    Major depressive disorder, single episode, moderate (H) 2008    Mild persistent asthma     NSTEMI (non-ST elevated myocardial infarction) (H) 2020    Other primary cardiomyopathies     Cardiomyopathy -- unknown etiology            Past Surgical History:     Past Surgical History:   Procedure Laterality Date    APPENDECTOMY      BACK SURGERY      L5-S1 fusion    CV CORONARY ANGIOGRAM N/A 2020    Procedure: Heart Catheterization with Possible Intervention;  Surgeon: Theo Donahue MD;  Location:  HEART CARDIAC CATH LAB    CV CORONARY ANGIOGRAM N/A 2022    Procedure: Coronary Angiogram;  Surgeon: Venkata Hdez MD;  Location: [facility] CARDIAC CATH LAB    CV PCI N/A 2022    Procedure: Percutaneous Coronary Intervention;  Surgeon: Venkata Hdez MD;  Location:  HEART CARDIAC CATH LAB    HC REPAIR OF NASAL SEPTUM      LAPAROSCOPIC CHOLECYSTECTOMY      Cholecystectomy, Laparoscopic    SURGICAL HISTORY OF -       torn pectoral muscle    SURGICAL HISTORY OF -       Bilateral radiofrequency volume reduction of the inferior turbinates.    SURGICAL HISTORY OF -       rhinoplasty- septoplasty            Social History:     Social History     Tobacco Use    Smoking status: Never    Smokeless tobacco: Never   Substance Use Topics    Alcohol use: Yes     Comment: rarely        History   Smoking Status    Never   Smokeless Tobacco    Never            Family History:     Family History   Problem Relation Age of Onset    Coronary Artery Disease Mother          55; MI x 2    Cancer Father         hodgkins    Hypertension Father     Coronary Artery Disease Father     Cardiovascular Sister     Hypertension Brother     Cardiovascular Maternal Grandmother     No Known Problems Daughter     Prostate Cancer No family hx of     Cancer  "- colorectal No family hx of     Cardiomyopathy No family hx of     Valvular heart disease No family hx of               Allergies:     Allergies   Allergen Reactions    Theophylline Hives    Dairy Digestive     Morphine Other (See Comments)     Patient states he had a headache one time but has used since with no adverse reactions    Sulfa Antibiotics             Medications:     Current Outpatient Medications   Medication Sig    albuterol (PROAIR HFA/PROVENTIL HFA/VENTOLIN HFA) 108 (90 Base) MCG/ACT inhaler Inhale 2 puffs into the lungs every 6 hours    amitriptyline (ELAVIL) 50 MG tablet Take 3 tablets (150 mg) by mouth At Bedtime    [START ON 12/30/2023] amphetamine-dextroamphetamine (ADDERALL) 20 MG tablet Take 1 tablet (20 mg) by mouth 2 times daily    amphetamine-dextroamphetamine (ADDERALL) 20 MG tablet Take 1 tablet (20 mg) by mouth 2 times daily for 30 days    [START ON 11/30/2023] amphetamine-dextroamphetamine (ADDERALL) 20 MG tablet Take 1 tablet (20 mg) by mouth 2 times daily for 30 days    aspirin (ASA) 81 MG chewable tablet Take 1 tablet (81 mg) by mouth daily Starting tomorrow.    B-D LUER-HIWOT SYRINGE 21G X 1-1/2\" 3 ML MISC 1 DEVICE ONCE A WEEK AND ALSO NEEDS 22 G NEEDLES 1.5 INCH    buprenorphine HCl-naloxone HCl (SUBOXONE) 8-2 MG per film 1/2 film QID - Brand name only.    buPROPion (WELLBUTRIN XL) 300 MG 24 hr tablet Take 1 tablet (300 mg) by mouth every morning    busPIRone (BUSPAR) 5 MG tablet Take 1 tablet (5 mg) by mouth 2 times daily as needed (panic attack)    carvedilol (COREG) 25 MG tablet Take 1 tablet (25 mg) by mouth 2 times daily (with meals)    clonazePAM (KLONOPIN) 1 MG tablet TAKE 1 TABLET (1 MG) BY MOUTH 2 TIMES DAILY AS NEEDED (FLYING PHOBIA)    clopidogrel (PLAVIX) 75 MG tablet Take 1 tablet (75 mg) by mouth daily    cyclobenzaprine (FLEXERIL) 10 MG tablet Take 1 tablet (10 mg) by mouth 3 times daily as needed for other (hiccups)    finasteride (PROSCAR) 5 MG tablet Take 1 tablet " "(5 mg) by mouth daily    fluticasone-salmeterol (ADVAIR DISKUS) 500-50 MCG/DOSE inhaler 1 inhalation 2 times per day    hydrALAZINE (APRESOLINE) 100 MG tablet Take 0.5 tablets (50 mg) by mouth 2 times daily    imiquimod (ALDARA) 5 % external cream APPLY TOPICALLY AT BEDTIME -WASH OFF AFTER 8 HOURS AND MAY USE FOR UP TO 16 WEEKS.    isosorbide mononitrate (IMDUR) 60 MG 24 hr tablet Take 1 tablet (60 mg) by mouth daily    loperamide (IMODIUM A-D) 2 MG tablet Take 2 tabs (4 mg) after first loose stool, and then take one tab (2 mg) after each diarrheal stool.  Max of 8 tabs (16 mg) per day.    LORazepam (ATIVAN) 1 MG tablet Take 1 tablet (1 mg) by mouth 2 times daily as needed for anxiety    needle, disp, (BD DISP NEEDLES) 21G X 1-1/2\" MISC 1 Needle once a week    nitroGLYcerin (NITROSTAT) 0.4 MG sublingual tablet Place under the tongue every 5 minutes as needed    pantoprazole (PROTONIX) 40 MG EC tablet Take 1 tablet (40 mg) by mouth daily    RESTASIS 0.05 % ophthalmic emulsion INSTILL 1 DROP INTO BOTH EYES TWICE A DAY    rosuvastatin (CRESTOR) 20 MG tablet Take 1 tablet (20 mg) by mouth daily    sildenafil (VIAGRA) 25 MG tablet Take 1 tablet (25 mg) by mouth daily as needed (erectile dysfunction)    Syringe/Needle, Disp, (SYRINGE LUER LOCK) 20G X 1-1/2\" 3 ML MISC 1 Device once a week - and also needs 22 G needles 1.5 inch #30 with 1 refill    testosterone cypionate (DEPOTESTOSTERONE) 200 MG/ML injection INJECT 1 ML (200 MG) INTO THE MUSCLE ONCE A WEEK    torsemide (DEMADEX) 10 MG tablet Take 1 tablet (10 mg) by mouth every morning    vitamin C (ASCORBIC ACID) 1000 MG TABS Take 1,000 mg by mouth daily    zolpidem (AMBIEN) 10 MG tablet Take 1 tablet (10 mg) by mouth nightly as needed for sleep     No current facility-administered medications for this visit.             Review of Systems:     Negative except for as reviewed above          Physical Exam:     PHYSICAL EXAM  Patient is a 53 year old  male   Vitals: Height " 1.829 m (6'), weight 112.5 kg (248 lb).  Body mass index is 33.63 kg/m .  General Appearance Adult:   Alert, no acute distress, oriented  Neuro: Alert, oriented, speech and mentation normal  Psych: affect and mood normal             Data:   All laboratory data reviewed:    UA RESULTS:  Recent Labs   Lab Test 11/20/22  1609   COLOR Yellow   APPEARANCE Clear   URINEGLC Negative   URINEBILI Negative   URINEKETONE Trace*   SG 1.015   UBLD Small*   URINEPH 5.5   PROTEIN 50*   NITRITE Negative   LEUKEST Negative      PSA   Date Value Ref Range Status   02/08/2021 0.34 0 - 4 ug/L Final     Comment:     Assay Method:  Chemiluminescence using Siemens Vista analyzer     Prostate Specific Antigen Screen   Date Value Ref Range Status   09/08/2023 0.93 0.00 - 3.50 ng/mL Final   06/23/2022 1.76 0.00 - 4.00 ug/L Final      Lab Results   Component Value Date    CR 2.3 11/09/2023    CR 1.88 09/08/2023    CR 2.05 05/05/2021      IMAGING:  All pertinent imaging reviewed:    All imaging studies reviewed by me.  I personally reviewed these imaging films.  A formal report from radiology will follow.    U/S RENAL 11/21/2022:  FINDINGS:     RIGHT KIDNEY: 13.0 x 7.0 x 5.6 cm. Normal without hydronephrosis or masses.      LEFT KIDNEY: 12.4 x 6.1 x 7.9 cm. Normal without hydronephrosis or masses. Cyst identified in the lateral interpolar region measuring 2.2 x 2.1 x 1.8 cm in size. This appears to have layering calcifications within it. In the medial superior region, a   mass is identified which has blood flow. This measures 2.2 x 2.2 x 2.5 cm. A dedicated CT renal protocol is recommended.     BLADDER: Normal. Prostate gland contains consultations and measures 4.2 x 5.0 x 3.9 cm.                                                  IMPRESSION:  1.  Mass identified in the medial inferior aspect of the left kidney. CT scan with a renal protocol is recommended.  2.  Prostatomegaly.    CT RENAL MASS 11/9/2023:  FINDINGS: 2.3 cm enhancing mass in the  mid left kidney concerning for  renal cell carcinoma. Left renal vein and IVC are patent. A few  nonenlarged retroperitoneal lymph nodes are noted, no adenopathy by  size criteria. Benign cyst in the posterior left kidney.     The liver, pancreas, spleen, and adrenal glands demonstrate no  worrisome focal lesion. No dilated bowel.                                                           IMPRESSION: 2.3 cm mass in the mid left kidney concerning for renal  cell carcinoma.           Impression and Plan:   Impression:   53-year-old man with significant medical history including hypertension CAD status post cardiac stenting x3 in November 2022 with an incidental 3 cm left renal mass.  Also with bothersome LUTS.      Plan:   DISCUSSION SMALL RENAL MASS    I reviewed his CT scan and reviewed these images personally.  I measured the mass at 3 cm and discussed the measuring differences between ultrasound and CT.    The diagnosis of renal mass was discussed in great detail.  We discussed the fact that enhancement within a renal mass suggests malignancy, but that a benign renal tumor cannot be excluded.  Overall the risks of malignancy appears to be about 80-90%.  We discussed the problems with renal biopsy, the limited indications, and the need to treat based primarily on radiographic findings. The patient appears to understand that there is about a 10-20% chance that this mass may be benign.      Regarding treatment, we discussed radical vs. partial nephrectomy.  With respect to partial nephrectomy we discussed a potential advantage with preservation of long-term renal function long term and the equivalent long-term cancer control rates with appropriately selected patients.  We discussed the small (~4%) risk of local recurrence and recurrence in the contralateral kidney and the need for long-term radiographic surveillance postoperatively.  We discussed the higher complication rate with open radical vs partial nephrectomy  related to a higher risk of prolonged urine leak, urinoma, hemorrhage requiring transfusion, infection, and possible need for reoperation.  We also discussed the potential need for radical nephrectomy based on intraoperative findings.  For radical and partial nephrectomy, we discussed the risks associated with any major surgery, including cardiovascular, pulmonary, anesthetic, and thromboembolic problems as well as wound healing problems.  We discussed risk of cancer recurrence and the potential need for additional therapy.  We also discussed risk of renal insufficiency and dialysis short and long-term with radical nephrectomy.    We also discussed open vs. robotic-assisted laparoscopic surgery and advantages and disadvantages each way. For laparoscopic surgery, we discussed possibility that conversion to open surgery might be required dependent on intraoperative findings and anatomy.      We discussed the role of observation and the low risk of metastases associated with lesions less than 3 cm in size and the potential for slow/stable growth in many cases.  However, the unpredictable natural history of these lesions was mentioned as a drawback with this approach and the lack of effective therapy in the event of systemic progression.  In general, this approach is most favored for those with limited life expectancy and/or those with indeterminate (< 3 cm) renal masses.    We discussed the options for treatment of a localized kidney mass including active surveillance, thermal ablation, and surgical extirpation.  Surgical extirpation has the best track record and close to a 99% chance of cure for T1a lesions compared to 90% cure with thermal ablation and is generally preferred.  Furthermore, thermal ablation is not optimal for larger masses or centrally located masses.  Active surveillance is also a reasonable option when life expectancy is less than 5-7 years.    The anticipated post-operative course was explained,  including an anticipated 1 night hospital stay.    Patient has decided he would like to proceed with Robotic Partial Nephrectomy - 53205    The risks, benefits, alternatives, and personnel involved in the procudure were discussed.  All questions were answered.  A written informed consent will be finalized on the morning of the procedure.    Plan:   - Robotic partial after the holidays which is very reasonable      BPH with LUTS  - We discussed the pathophysiology of the bladder and prostate normal changes associated with development of BPH and LUTS  - We discussed first-line treatment with tamsulosin 0.4 mg daily and prescription sent to the pharmacy.  We discussed common side effects of this medication-we will continue to monitor this going forward     Thank you for the kind consultation.    Time spent: 25 minutes spent on the date of the encounter doing chart review, history and exam, documentation and further activities as noted above.    Chung Howard MD   Urology  [facility]  [facility] Phone: [phone]  [facility] Phone: [phone]         Virtual Visit Details    Type of service:  Video Visit   Video Start Time:  10:15 AM  Video End Time: 10:30 AM anemia.     Originating Location (pt. Location): Home    Distant Location (provider location):  On-site  Platform used for Video Visit: [employer]

## 2023-11-20 NOTE — NURSING NOTE
Is the patient currently in the state of [place]? YES    Visit mode:VIDEO    If the visit is dropped, the patient can be reconnected by: VIDEO VISIT: Send to e-mail at: [email or web address]    Will anyone else be joining the visit? NO  (If patient encounters technical issues they should call [phone])    How would you like to obtain your AVS? MyChart    Are changes needed to the allergy or medication list? No    Reason for visit: RECHECK    Silvia PHILLIPSF

## 2023-12-06 ENCOUNTER — MYC MEDICAL ADVICE (OUTPATIENT)
Dept: PALLIATIVE MEDICINE | Facility: CLINIC | Age: 53
End: 2023-12-06
Payer: COMMERCIAL

## 2023-12-07 NOTE — TELEPHONE ENCOUNTER
Will leave encounter open for patient response/chart review by nursing.     ----------------Mychart Below from pt----------------    Hi, I hope you had a great Thanksgiving. I have just gone through MyChart to catch up on things. I saw the form and am wondering if I am to send it back signed, or will we take care of it at the next appointment. Thanks     Respectfully,  Jeremi    --------------Mychart below response to pt----------------    Christiano Blood,     Do you mean the controlled substance agreement? If so, you signed it at your appointment on 11/02/23 so no need to sign again and return. Mychart just allows you to see your documents. If you are meaning a different form, please let me know. Thanks    Trixie ASTORGA, RN Care Coordinator  [facility]  Pain Management

## 2024-01-01 NOTE — TELEPHONE ENCOUNTER
Prescription(s) signed and in the [facility].  Please process and notify the patient, if needed.   Walk in

## 2024-01-03 ENCOUNTER — CARE COORDINATION (OUTPATIENT)
Dept: UROLOGY | Facility: CLINIC | Age: 54
End: 2024-01-03
Payer: COMMERCIAL

## 2024-01-03 NOTE — PROGRESS NOTES
1/3/24: Left message to discuss upcoming surgery    Upcoming surgical procedure with Dr. Howard on 1/8/24 at 0730, check in at 0530.   Surgery at : [facility]  Patient is having a LEFT ROBOTIC ASSISTED LAPAROSOCPIC PARTIAL NEPHRECTOMY WITH INTRA-OPERATIVE RENAL ULTRASOUND, POSSIBLE OPEN    Patient has a responsible adult to drive home and stay with them for 24 hours: Staying 1 night in hospital  Pre-op physical completed: YES. Date-1/4/24.  Bowel Prep: Not needed  Urine culture completed: Not needed  Post-operative appointment needed: YES. Date-2/28/24 at 1600 with Dr. Howard.  All questions answered.    Patient verbalized understanding. No further questions.      Ruth Elizabeth, RN, BSN  Care Coordinator  [facility]

## 2024-01-04 ENCOUNTER — OFFICE VISIT (OUTPATIENT)
Dept: PEDIATRICS | Facility: CLINIC | Age: 54
End: 2024-01-04
Payer: COMMERCIAL

## 2024-01-04 VITALS
BODY MASS INDEX: 34.35 KG/M2 | DIASTOLIC BLOOD PRESSURE: 73 MMHG | WEIGHT: 253.3 LBS | HEART RATE: 66 BPM | OXYGEN SATURATION: 97 % | RESPIRATION RATE: 14 BRPM | TEMPERATURE: 97.4 F | SYSTOLIC BLOOD PRESSURE: 112 MMHG

## 2024-01-04 DIAGNOSIS — F90.9 ATTENTION DEFICIT HYPERACTIVITY DISORDER (ADHD), UNSPECIFIED ADHD TYPE: ICD-10-CM

## 2024-01-04 DIAGNOSIS — I11.0 CARDIOMYOPATHY DUE TO HYPERTENSION, WITH HEART FAILURE (H): ICD-10-CM

## 2024-01-04 DIAGNOSIS — I10 HYPERTENSION GOAL BP (BLOOD PRESSURE) < 140/90: ICD-10-CM

## 2024-01-04 DIAGNOSIS — J45.30 MILD PERSISTENT ASTHMA WITHOUT COMPLICATION: ICD-10-CM

## 2024-01-04 DIAGNOSIS — F33.1 MAJOR DEPRESSIVE DISORDER, RECURRENT EPISODE, MODERATE (H): ICD-10-CM

## 2024-01-04 DIAGNOSIS — F11.20 UNCOMPLICATED OPIOID DEPENDENCE (H): ICD-10-CM

## 2024-01-04 DIAGNOSIS — I21.4 NSTEMI (NON-ST ELEVATED MYOCARDIAL INFARCTION) (H): ICD-10-CM

## 2024-01-04 DIAGNOSIS — G89.4 CHRONIC PAIN SYNDROME: ICD-10-CM

## 2024-01-04 DIAGNOSIS — I43 CARDIOMYOPATHY DUE TO HYPERTENSION, WITH HEART FAILURE (H): ICD-10-CM

## 2024-01-04 DIAGNOSIS — F31.81 BIPOLAR 2 DISORDER (H): ICD-10-CM

## 2024-01-04 DIAGNOSIS — I42.2 HYPERTROPHIC CARDIOMYOPATHY (H): ICD-10-CM

## 2024-01-04 DIAGNOSIS — K21.9 GASTROESOPHAGEAL REFLUX DISEASE WITHOUT ESOPHAGITIS: ICD-10-CM

## 2024-01-04 DIAGNOSIS — N28.89 LEFT RENAL MASS: ICD-10-CM

## 2024-01-04 DIAGNOSIS — I25.10 CORONARY ARTERY DISEASE INVOLVING NATIVE CORONARY ARTERY OF NATIVE HEART WITHOUT ANGINA PECTORIS: ICD-10-CM

## 2024-01-04 DIAGNOSIS — F41.1 GENERALIZED ANXIETY DISORDER: ICD-10-CM

## 2024-01-04 DIAGNOSIS — G47.00 INSOMNIA, UNSPECIFIED TYPE: ICD-10-CM

## 2024-01-04 DIAGNOSIS — I25.119 CORONARY ARTERY DISEASE INVOLVING NATIVE CORONARY ARTERY OF NATIVE HEART WITH ANGINA PECTORIS (H): ICD-10-CM

## 2024-01-04 DIAGNOSIS — Z01.818 PREOP GENERAL PHYSICAL EXAM: Primary | ICD-10-CM

## 2024-01-04 DIAGNOSIS — E29.1 HYPOGONADISM IN MALE: ICD-10-CM

## 2024-01-04 DIAGNOSIS — N18.32 STAGE 3B CHRONIC KIDNEY DISEASE (H): ICD-10-CM

## 2024-01-04 LAB
ANION GAP SERPL CALCULATED.3IONS-SCNC: 9 MMOL/L (ref 3–14)
BUN SERPL-MCNC: 17 MG/DL (ref 7–30)
CALCIUM SERPL-MCNC: 9.6 MG/DL (ref 8.5–10.1)
CHLORIDE BLD-SCNC: 103 MMOL/L (ref 94–109)
CO2 SERPL-SCNC: 31 MMOL/L (ref 20–32)
CREAT SERPL-MCNC: 1.9 MG/DL (ref 0.66–1.25)
EGFRCR SERPLBLD CKD-EPI 2021: 41 ML/MIN/1.73M2
ERYTHROCYTE [DISTWIDTH] IN BLOOD BY AUTOMATED COUNT: 15.6 % (ref 10–15)
GLUCOSE BLD-MCNC: 75 MG/DL (ref 70–99)
HCT VFR BLD AUTO: 44.5 % (ref 40–53)
HGB BLD-MCNC: 13.8 G/DL (ref 13.3–17.7)
MCH RBC QN AUTO: 26.7 PG (ref 26.5–33)
MCHC RBC AUTO-ENTMCNC: 31 G/DL (ref 31.5–36.5)
MCV RBC AUTO: 86 FL (ref 78–100)
PLATELET # BLD AUTO: 222 10E3/UL (ref 150–450)
POTASSIUM BLD-SCNC: 4.7 MMOL/L (ref 3.4–5.3)
RBC # BLD AUTO: 5.16 10E6/UL (ref 4.4–5.9)
SODIUM SERPL-SCNC: 143 MMOL/L (ref 135–145)
WBC # BLD AUTO: 8.7 10E3/UL (ref 4–11)

## 2024-01-04 PROCEDURE — 36415 COLL VENOUS BLD VENIPUNCTURE: CPT | Performed by: NURSE PRACTITIONER

## 2024-01-04 PROCEDURE — 85027 COMPLETE CBC AUTOMATED: CPT | Performed by: NURSE PRACTITIONER

## 2024-01-04 PROCEDURE — 93000 ELECTROCARDIOGRAM COMPLETE: CPT | Performed by: NURSE PRACTITIONER

## 2024-01-04 PROCEDURE — 80048 BASIC METABOLIC PNL TOTAL CA: CPT | Performed by: NURSE PRACTITIONER

## 2024-01-04 PROCEDURE — 99214 OFFICE O/P EST MOD 30 MIN: CPT | Mod: 25 | Performed by: NURSE PRACTITIONER

## 2024-01-04 RX ORDER — AMLODIPINE BESYLATE 5 MG/1
1 TABLET ORAL DAILY
COMMUNITY
End: 2024-03-14

## 2024-01-04 ASSESSMENT — ANXIETY QUESTIONNAIRES
GAD7 TOTAL SCORE: 15
GAD7 TOTAL SCORE: 15
3. WORRYING TOO MUCH ABOUT DIFFERENT THINGS: MORE THAN HALF THE DAYS
2. NOT BEING ABLE TO STOP OR CONTROL WORRYING: NEARLY EVERY DAY
7. FEELING AFRAID AS IF SOMETHING AWFUL MIGHT HAPPEN: MORE THAN HALF THE DAYS
IF YOU CHECKED OFF ANY PROBLEMS ON THIS QUESTIONNAIRE, HOW DIFFICULT HAVE THESE PROBLEMS MADE IT FOR YOU TO DO YOUR WORK, TAKE CARE OF THINGS AT HOME, OR GET ALONG WITH OTHER PEOPLE: SOMEWHAT DIFFICULT
7. FEELING AFRAID AS IF SOMETHING AWFUL MIGHT HAPPEN: MORE THAN HALF THE DAYS
4. TROUBLE RELAXING: NEARLY EVERY DAY
6. BECOMING EASILY ANNOYED OR IRRITABLE: SEVERAL DAYS
1. FEELING NERVOUS, ANXIOUS, OR ON EDGE: MORE THAN HALF THE DAYS
GAD7 TOTAL SCORE: 15
5. BEING SO RESTLESS THAT IT IS HARD TO SIT STILL: MORE THAN HALF THE DAYS
8. IF YOU CHECKED OFF ANY PROBLEMS, HOW DIFFICULT HAVE THESE MADE IT FOR YOU TO DO YOUR WORK, TAKE CARE OF THINGS AT HOME, OR GET ALONG WITH OTHER PEOPLE?: SOMEWHAT DIFFICULT

## 2024-01-04 ASSESSMENT — PAIN SCALES - GENERAL: PAINLEVEL: NO PAIN (0)

## 2024-01-04 ASSESSMENT — PATIENT HEALTH QUESTIONNAIRE - PHQ9
10. IF YOU CHECKED OFF ANY PROBLEMS, HOW DIFFICULT HAVE THESE PROBLEMS MADE IT FOR YOU TO DO YOUR WORK, TAKE CARE OF THINGS AT HOME, OR GET ALONG WITH OTHER PEOPLE: SOMEWHAT DIFFICULT
SUM OF ALL RESPONSES TO PHQ QUESTIONS 1-9: 12
SUM OF ALL RESPONSES TO PHQ QUESTIONS 1-9: 12

## 2024-01-04 NOTE — PATIENT INSTRUCTIONS
Preparing for Your Surgery  Getting started  A nurse will call you to review your health history and instructions. They will give you an arrival time based on your scheduled surgery time. Please be ready to share:  Your doctor's clinic name and phone number  Your medical, surgical, and anesthesia history  A list of allergies and sensitivities  A list of medicines, including herbal treatments and over-the-counter drugs  Whether the patient has a legal guardian (ask how to send us the papers in advance)  Please tell us if you're pregnant--or if there's any chance you might be pregnant. Some surgeries may injure a fetus (unborn baby), so they require a pregnancy test. Surgeries that are safe for a fetus don't always need a test, and you can choose whether to have one.   If you have a child who's having surgery, please ask for a copy of Preparing for Your Child's Surgery.    Preparing for surgery  Within 10 to 30 days of surgery: Have a pre-op exam (sometimes called an H&P, or History and Physical). This can be done at a clinic or pre-operative center.  If you're having a , you may not need this exam. Talk to your care team.  At your pre-op exam, talk to your care team about all medicines you take. If you need to stop any medicines before surgery, ask when to start taking them again.  We do this for your safety. Many medicines can make you bleed too much during surgery. Some change how well surgery (anesthesia) drugs work.  Call your insurance company to let them know you're having surgery. (If you don't have insurance, call [phone].)  Call your clinic if there's any change in your health. This includes signs of a cold or flu (sore throat, runny nose, cough, rash, fever). It also includes a scrape or scratch near the surgery site.  If you have questions on the day of surgery, call your hospital or surgery center.  Eating and drinking guidelines  For your safety: Unless your surgeon tells you otherwise,  follow the guidelines below.  Eat and drink as usual until 8 hours before you arrive for surgery. After that, no food or milk.  Drink clear liquids until 2 hours before you arrive. These are liquids you can see through, like water, Gatorade, and Propel Water. They also include plain black coffee and tea (no cream or milk), candy, and breath mints. You can spit out gum when you arrive.  If you drink alcohol: Stop drinking it the night before surgery.  If your care team tells you to take medicine on the morning of surgery, it's okay to take it with a sip of water.  Preventing infection  Shower or bathe the night before and morning of your surgery. Follow the instructions your clinic gave you. (If no instructions, use regular soap.)  Don't shave or clip hair near your surgery site. We'll remove the hair if needed.  Don't smoke or vape the morning of surgery. You may chew nicotine gum up to 2 hours before surgery. A nicotine patch is okay.  Note: Some surgeries require you to completely quit smoking and nicotine. Check with your surgeon.  Your care team will make every effort to keep you safe from infection. We will:  Clean our hands often with soap and water (or an alcohol-based hand rub).  Clean the skin at your surgery site with a special soap that kills germs.  Give you a special gown to keep you warm. (Cold raises the risk of infection.)  Wear special hair covers, masks, gowns and gloves during surgery.  Give antibiotic medicine, if prescribed. Not all surgeries need antibiotics.  What to bring on the day of surgery  Photo ID and insurance card  Copy of your health care directive, if you have one  Glasses and hearing aids (bring cases)  You can't wear contacts during surgery  Inhaler and eye drops, if you use them (tell us about these when you arrive)  CPAP machine or breathing device, if you use them  A few personal items, if spending the night  If you have . . .  A pacemaker, ICD (cardiac defibrillator) or other  implant: Bring the ID card.  An implanted stimulator: Bring the remote control.  A legal guardian: Bring a copy of the certified (court-stamped) guardianship papers.  Please remove any jewelry, including body piercings. Leave jewelry and other valuables at home.  If you're going home the day of surgery  You must have a responsible adult drive you home. They should stay with you overnight as well.  If you don't have someone to stay with you, and you aren't safe to go home alone, we may keep you overnight. Insurance often won't pay for this.  After surgery  If it's hard to control your pain or you need more pain medicine, please call your surgeon's office.  Questions?   If you have any questions for your care team, list them here: _________________________________________________________________________________________________________________________________________________________________________ ____________________________________ ____________________________________ ____________________________________  For informational purposes only. Not to replace the advice of your health care provider. Copyright   2003, 2019 [facility]. All rights reserved. Clinically reviewed by Mariah Rice MD. SMART[employer] 313309 - REV 12/22.    How to Take Your Medication Before Surgery

## 2024-01-04 NOTE — Clinical Note
Dr. Keita,  I saw this patient for a pre-operative exam.  EKG was performed and showed changes from previous EKGs. Please see below:  EKG: Sinus Rhythm -First degree A-V block  Simón = 292 -Right bundle branch block with left axis -bifascicular block. -Inferior infarct -age undetermined -Old anterior infarct. ABNORMAL  Patient is asymptomatic. Let me know if you'd like him to follow-up.  Thank you, Caio, DNP
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
## 2024-01-04 NOTE — COMMUNITY RESOURCES LIST (ENGLISH)
01/04/2024   [facility]  N/A  For questions about this resource list or additional care needs, please contact your primary care clinic or care manager.  Phone: [phone]   Email: N/A   Address: [address] [place] [id number]   Hours: N/A        Financial Stability       Rent and mortgage payment assistance  1  [facility] - Rent and mortgage payment assistance Distance: 3.32 miles      In-Person, Phone/Virtual   [address] [place] [address]  Language: English  Hours: Mon - Thu 9:00 AM - 3:00 PM  Fees: Free   Phone: (101) 511-3894 Email: Bahai@[facility] Website: http://www.[facility].org/care-ministries/     2  [place] [employer] - Stable HOME Program Distance: 4.28 miles      In-Person, Phone/Virtual   [address] [place] [address]  Language: English  Hours: Mon - Fri 7:30 AM - 5:00 PM   Phone: (499) 928-8708 Email: info@[facility].[employer] Website: http://www.[facility].org/government/departments-divisions/housing/rental-assistance          Food and Nutrition       Food pantry  3  [place] [employer] ([facility]) Distance: 1.03 miles      In-Person   [address] [place] [address]  Language: English  Hours: Mon - Sun Open 24 Hours  Fees: Free   Phone: (792) 888-1090 Email: mrd.363days@[employer] Website: http://www.[facility].[employer]     4  [facility] Food Shelf Distance: 2.86 miles      In-Person, Pickup   [address] [place] [address]  Language: English  Hours: Mon 6:00 PM - 8:00 PM  Fees: Free   Phone: (539) 776-5590 Email: dllc.office@[employer].org Website: http://www.[employer].org/     SNAP application assistance  5  [facility] Distance: 4.34 miles      In-Person, Phone/Virtual   [address] [place] [address]  Language: English, Romanian  Hours: Mon - Fri 9:00 AM - 4:30 PM   Fees: Free   Phone: (954) 277-6076 Ext.113 Email: info@[employer].org Website: https://[employer].org     6  [employer] [facility] Distance: 5.16 miles      In-Person   [address] [place] [address]  Language: English, [place] citizen  Hours: Mon - Fri 8:30 AM - 5:00 PM  Fees: Free   Phone: (956) 590-3576 Email: info@[employer].org Website: http://www.clues.org/     Soup kitchen or free meals  7  [facility] [employer] Distance: 1.68 miles      [facility]   [address], [place] [address]  Language: English  Hours: Sat 5:00 PM - 7:00 PM , Sun 5:30 PM - 6:30 PM  Fees: Free   Phone: (484) 897-3820 Email: office@[employer] Website: http://[employer]/     8  [facility] [place] [employer] Distance: 2.64 miles      In-Person   [address], [place] [address]  Language: English  Hours: Mon - Fri 3:00 PM - 9:00 PM , Sat 12:00 PM - 6:00 PM  Fees: Free   Phone: (747) 352-4179 Email: meghan@[employer] Website: https://www.[employer]/parks__destinations/recreation_centers/[place]recreation[place]/          Important Numbers & Websites       Emergency Services   [address]  [facility]   311  Poison Control   (576) 944-8940  Suicide Prevention Lifeline   (658) 409-2930 (TALK)  Child Abuse Hotline   (743) 608-4461 (4-A-Child)  Sexual Assault Hotline   (821) 123-7059 (HOPE)  National Runaway Safeline   (455) 754-5468 (RUNAWAY)  All-Options Talkline   (780) 515-3042  Substance Abuse Referral   (175) 109-5627 (HELP)

## 2024-01-08 ENCOUNTER — HOSPITAL ENCOUNTER (OUTPATIENT)
Facility: CLINIC | Age: 54
Discharge: HOME | End: 2024-01-10
Attending: UROLOGY | Admitting: UROLOGY
Payer: COMMERCIAL

## 2024-01-08 ENCOUNTER — ANESTHESIA (OUTPATIENT)
Dept: SURGERY | Facility: CLINIC | Age: 54
End: 2024-01-08
Payer: COMMERCIAL

## 2024-01-08 ENCOUNTER — ANESTHESIA EVENT (OUTPATIENT)
Dept: SURGERY | Facility: CLINIC | Age: 54
End: 2024-01-08
Payer: COMMERCIAL

## 2024-01-08 DIAGNOSIS — G89.4 CHRONIC PAIN SYNDROME: ICD-10-CM

## 2024-01-08 DIAGNOSIS — F11.20 UNCOMPLICATED OPIOID DEPENDENCE (H): ICD-10-CM

## 2024-01-08 DIAGNOSIS — N28.89 LEFT RENAL MASS: Primary | ICD-10-CM

## 2024-01-08 LAB
ABO/RH(D): NORMAL
ANTIBODY SCREEN: NEGATIVE
FASTING STATUS PATIENT QL REPORTED: YES
GLUCOSE SERPL-MCNC: 83 MG/DL (ref 70–99)
POTASSIUM SERPL-SCNC: 4.3 MMOL/L (ref 3.4–5.3)
SPECIMEN EXPIRATION DATE: NORMAL

## 2024-01-08 PROCEDURE — 250N000009 HC RX 250: Performed by: UROLOGY

## 2024-01-08 PROCEDURE — 86900 BLOOD TYPING SEROLOGIC ABO: CPT | Performed by: ANESTHESIOLOGY

## 2024-01-08 PROCEDURE — 250N000011 HC RX IP 250 OP 636: Performed by: STUDENT IN AN ORGANIZED HEALTH CARE EDUCATION/TRAINING PROGRAM

## 2024-01-08 PROCEDURE — 258N000003 HC RX IP 258 OP 636: Performed by: NURSE ANESTHETIST, CERTIFIED REGISTERED

## 2024-01-08 PROCEDURE — 250N000009 HC RX 250: Performed by: NURSE ANESTHETIST, CERTIFIED REGISTERED

## 2024-01-08 PROCEDURE — 250N000011 HC RX IP 250 OP 636: Performed by: UROLOGY

## 2024-01-08 PROCEDURE — 710N000009 HC RECOVERY PHASE 1, LEVEL 1, PER MIN: Performed by: UROLOGY

## 2024-01-08 PROCEDURE — 272N000001 HC OR GENERAL SUPPLY STERILE: Performed by: UROLOGY

## 2024-01-08 PROCEDURE — 250N000013 HC RX MED GY IP 250 OP 250 PS 637: Performed by: STUDENT IN AN ORGANIZED HEALTH CARE EDUCATION/TRAINING PROGRAM

## 2024-01-08 PROCEDURE — 88342 IMHCHEM/IMCYTCHM 1ST ANTB: CPT | Mod: TC | Performed by: UROLOGY

## 2024-01-08 PROCEDURE — 84132 ASSAY OF SERUM POTASSIUM: CPT | Performed by: ANESTHESIOLOGY

## 2024-01-08 PROCEDURE — 250N000013 HC RX MED GY IP 250 OP 250 PS 637: Performed by: PHYSICIAN ASSISTANT

## 2024-01-08 PROCEDURE — 370N000017 HC ANESTHESIA TECHNICAL FEE, PER MIN: Performed by: UROLOGY

## 2024-01-08 PROCEDURE — 99215 OFFICE O/P EST HI 40 MIN: CPT | Performed by: PHYSICIAN ASSISTANT

## 2024-01-08 PROCEDURE — 250N000011 HC RX IP 250 OP 636: Performed by: ANESTHESIOLOGY

## 2024-01-08 PROCEDURE — 258N000001 HC RX 258: Performed by: UROLOGY

## 2024-01-08 PROCEDURE — 360N000080 HC SURGERY LEVEL 7, PER MIN: Performed by: UROLOGY

## 2024-01-08 PROCEDURE — 999N000141 HC STATISTIC PRE-PROCEDURE NURSING ASSESSMENT: Performed by: UROLOGY

## 2024-01-08 PROCEDURE — 50543 LAPARO PARTIAL NEPHRECTOMY: CPT | Mod: LT | Performed by: UROLOGY

## 2024-01-08 PROCEDURE — 99417 PROLNG OP E/M EACH 15 MIN: CPT | Performed by: PHYSICIAN ASSISTANT

## 2024-01-08 PROCEDURE — 258N000003 HC RX IP 258 OP 636: Performed by: ANESTHESIOLOGY

## 2024-01-08 PROCEDURE — 250N000011 HC RX IP 250 OP 636: Performed by: NURSE ANESTHETIST, CERTIFIED REGISTERED

## 2024-01-08 PROCEDURE — 250N000025 HC SEVOFLURANE, PER MIN: Performed by: UROLOGY

## 2024-01-08 PROCEDURE — 36415 COLL VENOUS BLD VENIPUNCTURE: CPT | Performed by: ANESTHESIOLOGY

## 2024-01-08 PROCEDURE — 258N000003 HC RX IP 258 OP 636: Performed by: STUDENT IN AN ORGANIZED HEALTH CARE EDUCATION/TRAINING PROGRAM

## 2024-01-08 PROCEDURE — 82947 ASSAY GLUCOSE BLOOD QUANT: CPT | Performed by: ANESTHESIOLOGY

## 2024-01-08 PROCEDURE — 76770 US EXAM ABDO BACK WALL COMP: CPT | Mod: 26 | Performed by: UROLOGY

## 2024-01-08 RX ORDER — SODIUM CHLORIDE 9 MG/ML
INJECTION, SOLUTION INTRAVENOUS CONTINUOUS
Status: DISCONTINUED | OUTPATIENT
Start: 2024-01-08 | End: 2024-01-10

## 2024-01-08 RX ORDER — NALOXONE HYDROCHLORIDE 0.4 MG/ML
0.2 INJECTION, SOLUTION INTRAMUSCULAR; INTRAVENOUS; SUBCUTANEOUS
Status: DISCONTINUED | OUTPATIENT
Start: 2024-01-08 | End: 2024-01-10 | Stop reason: HOSPADM

## 2024-01-08 RX ORDER — GLYCOPYRROLATE 0.2 MG/ML
INJECTION, SOLUTION INTRAMUSCULAR; INTRAVENOUS PRN
Status: DISCONTINUED | OUTPATIENT
Start: 2024-01-08 | End: 2024-01-08

## 2024-01-08 RX ORDER — ONDANSETRON 4 MG/1
4 TABLET, ORALLY DISINTEGRATING ORAL EVERY 6 HOURS PRN
Status: DISCONTINUED | OUTPATIENT
Start: 2024-01-08 | End: 2024-01-10 | Stop reason: HOSPADM

## 2024-01-08 RX ORDER — BUSPIRONE HYDROCHLORIDE 5 MG/1
5 TABLET ORAL 2 TIMES DAILY PRN
Status: DISCONTINUED | OUTPATIENT
Start: 2024-01-08 | End: 2024-01-10 | Stop reason: HOSPADM

## 2024-01-08 RX ORDER — SODIUM CHLORIDE, SODIUM LACTATE, POTASSIUM CHLORIDE, CALCIUM CHLORIDE 600; 310; 30; 20 MG/100ML; MG/100ML; MG/100ML; MG/100ML
INJECTION, SOLUTION INTRAVENOUS CONTINUOUS PRN
Status: DISCONTINUED | OUTPATIENT
Start: 2024-01-08 | End: 2024-01-08

## 2024-01-08 RX ORDER — SODIUM CHLORIDE, SODIUM LACTATE, POTASSIUM CHLORIDE, CALCIUM CHLORIDE 600; 310; 30; 20 MG/100ML; MG/100ML; MG/100ML; MG/100ML
INJECTION, SOLUTION INTRAVENOUS CONTINUOUS
Status: DISCONTINUED | OUTPATIENT
Start: 2024-01-08 | End: 2024-01-08 | Stop reason: HOSPADM

## 2024-01-08 RX ORDER — SIMETHICONE 40MG/0.6ML
40 SUSPENSION, DROPS(FINAL DOSAGE FORM)(ML) ORAL EVERY 6 HOURS PRN
Status: DISCONTINUED | OUTPATIENT
Start: 2024-01-08 | End: 2024-01-08

## 2024-01-08 RX ORDER — AMITRIPTYLINE HYDROCHLORIDE 75 MG/1
150 TABLET ORAL AT BEDTIME
Status: DISCONTINUED | OUTPATIENT
Start: 2024-01-08 | End: 2024-01-10 | Stop reason: HOSPADM

## 2024-01-08 RX ORDER — PROPOFOL 10 MG/ML
INJECTION, EMULSION INTRAVENOUS PRN
Status: DISCONTINUED | OUTPATIENT
Start: 2024-01-08 | End: 2024-01-08

## 2024-01-08 RX ORDER — TAMSULOSIN HYDROCHLORIDE 0.4 MG/1
0.4 CAPSULE ORAL DAILY
Status: DISCONTINUED | OUTPATIENT
Start: 2024-01-08 | End: 2024-01-10 | Stop reason: HOSPADM

## 2024-01-08 RX ORDER — EPHEDRINE SULFATE 50 MG/ML
INJECTION, SOLUTION INTRAMUSCULAR; INTRAVENOUS; SUBCUTANEOUS PRN
Status: DISCONTINUED | OUTPATIENT
Start: 2024-01-08 | End: 2024-01-08

## 2024-01-08 RX ORDER — ASPIRIN 81 MG
100 TABLET, DELAYED RELEASE (ENTERIC COATED) ORAL DAILY
Qty: 60 TABLET | Refills: 1 | Status: SHIPPED | OUTPATIENT
Start: 2024-01-08 | End: 2024-03-14

## 2024-01-08 RX ORDER — BUPROPION HYDROCHLORIDE 150 MG/1
300 TABLET ORAL EVERY MORNING
Status: DISCONTINUED | OUTPATIENT
Start: 2024-01-09 | End: 2024-01-10 | Stop reason: HOSPADM

## 2024-01-08 RX ORDER — FENTANYL CITRATE 50 UG/ML
25 INJECTION, SOLUTION INTRAMUSCULAR; INTRAVENOUS EVERY 5 MIN PRN
Status: DISCONTINUED | OUTPATIENT
Start: 2024-01-08 | End: 2024-01-08 | Stop reason: HOSPADM

## 2024-01-08 RX ORDER — CEFAZOLIN SODIUM/WATER 2 G/20 ML
2 SYRINGE (ML) INTRAVENOUS SEE ADMIN INSTRUCTIONS
Status: DISCONTINUED | OUTPATIENT
Start: 2024-01-08 | End: 2024-01-08 | Stop reason: HOSPADM

## 2024-01-08 RX ORDER — PROCHLORPERAZINE MALEATE 10 MG
10 TABLET ORAL EVERY 6 HOURS PRN
Status: DISCONTINUED | OUTPATIENT
Start: 2024-01-08 | End: 2024-01-10 | Stop reason: HOSPADM

## 2024-01-08 RX ORDER — METHOCARBAMOL 750 MG/1
750 TABLET, FILM COATED ORAL EVERY 6 HOURS PRN
Status: DISCONTINUED | OUTPATIENT
Start: 2024-01-08 | End: 2024-01-09

## 2024-01-08 RX ORDER — OXYCODONE HYDROCHLORIDE 5 MG/1
5 TABLET ORAL EVERY 4 HOURS PRN
Status: DISCONTINUED | OUTPATIENT
Start: 2024-01-08 | End: 2024-01-09

## 2024-01-08 RX ORDER — POLYETHYLENE GLYCOL 3350 17 G/17G
17 POWDER, FOR SOLUTION ORAL DAILY
Status: DISCONTINUED | OUTPATIENT
Start: 2024-01-09 | End: 2024-01-10 | Stop reason: HOSPADM

## 2024-01-08 RX ORDER — ACETAMINOPHEN 325 MG/1
975 TABLET ORAL EVERY 8 HOURS
Status: DISCONTINUED | OUTPATIENT
Start: 2024-01-08 | End: 2024-01-10 | Stop reason: HOSPADM

## 2024-01-08 RX ORDER — OXYCODONE HYDROCHLORIDE 5 MG/1
5 TABLET ORAL EVERY 6 HOURS PRN
Qty: 9 TABLET | Refills: 0 | Status: SHIPPED | OUTPATIENT
Start: 2024-01-08 | End: 2024-01-11

## 2024-01-08 RX ORDER — NALOXONE HYDROCHLORIDE 0.4 MG/ML
0.4 INJECTION, SOLUTION INTRAMUSCULAR; INTRAVENOUS; SUBCUTANEOUS
Status: DISCONTINUED | OUTPATIENT
Start: 2024-01-08 | End: 2024-01-10 | Stop reason: HOSPADM

## 2024-01-08 RX ORDER — LIDOCAINE HYDROCHLORIDE 20 MG/ML
INJECTION, SOLUTION INFILTRATION; PERINEURAL PRN
Status: DISCONTINUED | OUTPATIENT
Start: 2024-01-08 | End: 2024-01-08

## 2024-01-08 RX ORDER — OXYCODONE HYDROCHLORIDE 5 MG/1
10 TABLET ORAL EVERY 4 HOURS PRN
Status: DISCONTINUED | OUTPATIENT
Start: 2024-01-08 | End: 2024-01-09

## 2024-01-08 RX ORDER — PROPOFOL 10 MG/ML
INJECTION, EMULSION INTRAVENOUS CONTINUOUS PRN
Status: DISCONTINUED | OUTPATIENT
Start: 2024-01-08 | End: 2024-01-08

## 2024-01-08 RX ORDER — ACETAMINOPHEN 325 MG/1
650 TABLET ORAL EVERY 4 HOURS PRN
Status: DISCONTINUED | OUTPATIENT
Start: 2024-01-11 | End: 2024-01-10 | Stop reason: HOSPADM

## 2024-01-08 RX ORDER — ONDANSETRON 4 MG/1
4 TABLET, ORALLY DISINTEGRATING ORAL EVERY 30 MIN PRN
Status: DISCONTINUED | OUTPATIENT
Start: 2024-01-08 | End: 2024-01-08 | Stop reason: HOSPADM

## 2024-01-08 RX ORDER — DEXTROAMPHETAMINE SACCHARATE, AMPHETAMINE ASPARTATE, DEXTROAMPHETAMINE SULFATE AND AMPHETAMINE SULFATE 5; 5; 5; 5 MG/1; MG/1; MG/1; MG/1
20 TABLET ORAL 2 TIMES DAILY
Status: DISCONTINUED | OUTPATIENT
Start: 2024-01-09 | End: 2024-01-10 | Stop reason: HOSPADM

## 2024-01-08 RX ORDER — DEXAMETHASONE SODIUM PHOSPHATE 4 MG/ML
INJECTION, SOLUTION INTRA-ARTICULAR; INTRALESIONAL; INTRAMUSCULAR; INTRAVENOUS; SOFT TISSUE PRN
Status: DISCONTINUED | OUTPATIENT
Start: 2024-01-08 | End: 2024-01-08

## 2024-01-08 RX ORDER — SIMETHICONE 80 MG
80 TABLET,CHEWABLE ORAL EVERY 6 HOURS PRN
Status: DISCONTINUED | OUTPATIENT
Start: 2024-01-08 | End: 2024-01-10 | Stop reason: HOSPADM

## 2024-01-08 RX ORDER — HYDROMORPHONE HCL IN WATER/PF 6 MG/30 ML
0.2 PATIENT CONTROLLED ANALGESIA SYRINGE INTRAVENOUS EVERY 5 MIN PRN
Status: DISCONTINUED | OUTPATIENT
Start: 2024-01-08 | End: 2024-01-08 | Stop reason: HOSPADM

## 2024-01-08 RX ORDER — HYDROMORPHONE HCL IN WATER/PF 6 MG/30 ML
0.4 PATIENT CONTROLLED ANALGESIA SYRINGE INTRAVENOUS
Status: DISCONTINUED | OUTPATIENT
Start: 2024-01-08 | End: 2024-01-10 | Stop reason: HOSPADM

## 2024-01-08 RX ORDER — BISACODYL 10 MG
10 SUPPOSITORY, RECTAL RECTAL DAILY PRN
Status: DISCONTINUED | OUTPATIENT
Start: 2024-01-08 | End: 2024-01-10 | Stop reason: HOSPADM

## 2024-01-08 RX ORDER — CARVEDILOL 25 MG/1
25 TABLET ORAL 2 TIMES DAILY WITH MEALS
Status: DISCONTINUED | OUTPATIENT
Start: 2024-01-08 | End: 2024-01-10 | Stop reason: HOSPADM

## 2024-01-08 RX ORDER — PANTOPRAZOLE SODIUM 40 MG/1
40 TABLET, DELAYED RELEASE ORAL DAILY
Status: DISCONTINUED | OUTPATIENT
Start: 2024-01-09 | End: 2024-01-10 | Stop reason: HOSPADM

## 2024-01-08 RX ORDER — FENTANYL CITRATE 50 UG/ML
50 INJECTION, SOLUTION INTRAMUSCULAR; INTRAVENOUS EVERY 5 MIN PRN
Status: DISCONTINUED | OUTPATIENT
Start: 2024-01-08 | End: 2024-01-08 | Stop reason: HOSPADM

## 2024-01-08 RX ORDER — ISOSORBIDE MONONITRATE 30 MG/1
60 TABLET, EXTENDED RELEASE ORAL DAILY
Status: DISCONTINUED | OUTPATIENT
Start: 2024-01-09 | End: 2024-01-10 | Stop reason: HOSPADM

## 2024-01-08 RX ORDER — BUPIVACAINE HYDROCHLORIDE 2.5 MG/ML
INJECTION, SOLUTION EPIDURAL; INFILTRATION; INTRACAUDAL PRN
Status: DISCONTINUED | OUTPATIENT
Start: 2024-01-08 | End: 2024-01-08 | Stop reason: HOSPADM

## 2024-01-08 RX ORDER — ALBUTEROL SULFATE 90 UG/1
2 AEROSOL, METERED RESPIRATORY (INHALATION) EVERY 6 HOURS PRN
Status: DISCONTINUED | OUTPATIENT
Start: 2024-01-08 | End: 2024-01-10 | Stop reason: HOSPADM

## 2024-01-08 RX ORDER — HYDROMORPHONE HCL IN WATER/PF 6 MG/30 ML
0.4 PATIENT CONTROLLED ANALGESIA SYRINGE INTRAVENOUS EVERY 5 MIN PRN
Status: DISCONTINUED | OUTPATIENT
Start: 2024-01-08 | End: 2024-01-08 | Stop reason: HOSPADM

## 2024-01-08 RX ORDER — HYDRALAZINE HYDROCHLORIDE 50 MG/1
50 TABLET, FILM COATED ORAL 2 TIMES DAILY
Status: DISCONTINUED | OUTPATIENT
Start: 2024-01-08 | End: 2024-01-10 | Stop reason: HOSPADM

## 2024-01-08 RX ORDER — MAGNESIUM HYDROXIDE 1200 MG/15ML
LIQUID ORAL PRN
Status: DISCONTINUED | OUTPATIENT
Start: 2024-01-08 | End: 2024-01-08 | Stop reason: HOSPADM

## 2024-01-08 RX ORDER — AMLODIPINE BESYLATE 5 MG/1
5 TABLET ORAL DAILY
Status: DISCONTINUED | OUTPATIENT
Start: 2024-01-09 | End: 2024-01-10 | Stop reason: HOSPADM

## 2024-01-08 RX ORDER — CEFAZOLIN SODIUM/WATER 2 G/20 ML
2 SYRINGE (ML) INTRAVENOUS
Status: COMPLETED | OUTPATIENT
Start: 2024-01-08 | End: 2024-01-08

## 2024-01-08 RX ORDER — FENTANYL CITRATE 50 UG/ML
INJECTION, SOLUTION INTRAMUSCULAR; INTRAVENOUS PRN
Status: DISCONTINUED | OUTPATIENT
Start: 2024-01-08 | End: 2024-01-08

## 2024-01-08 RX ORDER — ROSUVASTATIN CALCIUM 20 MG/1
20 TABLET, COATED ORAL DAILY
Status: DISCONTINUED | OUTPATIENT
Start: 2024-01-09 | End: 2024-01-10 | Stop reason: HOSPADM

## 2024-01-08 RX ORDER — FINASTERIDE 5 MG/1
5 TABLET, FILM COATED ORAL DAILY
Status: DISCONTINUED | OUTPATIENT
Start: 2024-01-09 | End: 2024-01-10 | Stop reason: HOSPADM

## 2024-01-08 RX ORDER — HYDROMORPHONE HCL IN WATER/PF 6 MG/30 ML
0.2 PATIENT CONTROLLED ANALGESIA SYRINGE INTRAVENOUS
Status: DISCONTINUED | OUTPATIENT
Start: 2024-01-08 | End: 2024-01-10 | Stop reason: HOSPADM

## 2024-01-08 RX ORDER — ONDANSETRON 2 MG/ML
4 INJECTION INTRAMUSCULAR; INTRAVENOUS EVERY 30 MIN PRN
Status: DISCONTINUED | OUTPATIENT
Start: 2024-01-08 | End: 2024-01-08 | Stop reason: HOSPADM

## 2024-01-08 RX ORDER — VASOPRESSIN IN 0.9 % NACL 2 UNIT/2ML
SYRINGE (ML) INTRAVENOUS PRN
Status: DISCONTINUED | OUTPATIENT
Start: 2024-01-08 | End: 2024-01-08

## 2024-01-08 RX ORDER — AMOXICILLIN 250 MG
1 CAPSULE ORAL 2 TIMES DAILY
Status: DISCONTINUED | OUTPATIENT
Start: 2024-01-08 | End: 2024-01-10 | Stop reason: HOSPADM

## 2024-01-08 RX ORDER — LIDOCAINE 40 MG/G
CREAM TOPICAL
Status: DISCONTINUED | OUTPATIENT
Start: 2024-01-08 | End: 2024-01-10 | Stop reason: HOSPADM

## 2024-01-08 RX ORDER — LORAZEPAM 1 MG/1
1 TABLET ORAL 2 TIMES DAILY PRN
Status: DISCONTINUED | OUTPATIENT
Start: 2024-01-08 | End: 2024-01-10 | Stop reason: HOSPADM

## 2024-01-08 RX ORDER — ONDANSETRON 2 MG/ML
4 INJECTION INTRAMUSCULAR; INTRAVENOUS EVERY 6 HOURS PRN
Status: DISCONTINUED | OUTPATIENT
Start: 2024-01-08 | End: 2024-01-10 | Stop reason: HOSPADM

## 2024-01-08 RX ORDER — ONDANSETRON 2 MG/ML
INJECTION INTRAMUSCULAR; INTRAVENOUS PRN
Status: DISCONTINUED | OUTPATIENT
Start: 2024-01-08 | End: 2024-01-08

## 2024-01-08 RX ADMIN — ACETAMINOPHEN 975 MG: 325 TABLET, FILM COATED ORAL at 14:05

## 2024-01-08 RX ADMIN — OXYCODONE HYDROCHLORIDE 10 MG: 5 TABLET ORAL at 14:09

## 2024-01-08 RX ADMIN — CARVEDILOL 25 MG: 25 TABLET, FILM COATED ORAL at 16:57

## 2024-01-08 RX ADMIN — FENTANYL CITRATE 50 MCG: 50 INJECTION INTRAMUSCULAR; INTRAVENOUS at 08:53

## 2024-01-08 RX ADMIN — Medication 2 G: at 07:56

## 2024-01-08 RX ADMIN — PHENYLEPHRINE HYDROCHLORIDE 100 MCG: 10 INJECTION INTRAVENOUS at 08:15

## 2024-01-08 RX ADMIN — Medication 5 MG: at 08:45

## 2024-01-08 RX ADMIN — TAMSULOSIN HYDROCHLORIDE 0.4 MG: 0.4 CAPSULE ORAL at 15:39

## 2024-01-08 RX ADMIN — PHENYLEPHRINE HYDROCHLORIDE 200 MCG: 10 INJECTION INTRAVENOUS at 10:09

## 2024-01-08 RX ADMIN — HYDROMORPHONE HYDROCHLORIDE 0.4 MG: 0.2 INJECTION, SOLUTION INTRAMUSCULAR; INTRAVENOUS; SUBCUTANEOUS at 12:43

## 2024-01-08 RX ADMIN — Medication 0.5 UNITS: at 08:09

## 2024-01-08 RX ADMIN — SODIUM CHLORIDE: 9 INJECTION, SOLUTION INTRAVENOUS at 13:04

## 2024-01-08 RX ADMIN — FENTANYL CITRATE 50 MCG: 50 INJECTION, SOLUTION INTRAMUSCULAR; INTRAVENOUS at 11:13

## 2024-01-08 RX ADMIN — HYDROMORPHONE HYDROCHLORIDE 0.5 MG: 1 INJECTION, SOLUTION INTRAMUSCULAR; INTRAVENOUS; SUBCUTANEOUS at 09:27

## 2024-01-08 RX ADMIN — Medication 0.5 UNITS: at 09:52

## 2024-01-08 RX ADMIN — ONDANSETRON 4 MG: 2 INJECTION INTRAMUSCULAR; INTRAVENOUS at 08:26

## 2024-01-08 RX ADMIN — OXYCODONE HYDROCHLORIDE 10 MG: 5 TABLET ORAL at 23:52

## 2024-01-08 RX ADMIN — ROCURONIUM BROMIDE 10 MG: 50 INJECTION, SOLUTION INTRAVENOUS at 09:45

## 2024-01-08 RX ADMIN — FENTANYL CITRATE 50 MCG: 50 INJECTION, SOLUTION INTRAMUSCULAR; INTRAVENOUS at 11:34

## 2024-01-08 RX ADMIN — SUGAMMADEX 400 MG: 100 INJECTION, SOLUTION INTRAVENOUS at 10:29

## 2024-01-08 RX ADMIN — PROPOFOL 200 MG: 10 INJECTION, EMULSION INTRAVENOUS at 07:51

## 2024-01-08 RX ADMIN — PHENYLEPHRINE HYDROCHLORIDE 200 MCG: 10 INJECTION INTRAVENOUS at 10:12

## 2024-01-08 RX ADMIN — PROPOFOL 30 MCG/KG/MIN: 10 INJECTION, EMULSION INTRAVENOUS at 08:00

## 2024-01-08 RX ADMIN — Medication 5 MG: at 09:41

## 2024-01-08 RX ADMIN — HYDRALAZINE HYDROCHLORIDE 50 MG: 50 TABLET, FILM COATED ORAL at 20:22

## 2024-01-08 RX ADMIN — METHOCARBAMOL 750 MG: 750 TABLET ORAL at 14:10

## 2024-01-08 RX ADMIN — Medication 0.5 UNITS: at 10:13

## 2024-01-08 RX ADMIN — SODIUM CHLORIDE, POTASSIUM CHLORIDE, SODIUM LACTATE AND CALCIUM CHLORIDE: 600; 310; 30; 20 INJECTION, SOLUTION INTRAVENOUS at 06:30

## 2024-01-08 RX ADMIN — OXYCODONE HYDROCHLORIDE 10 MG: 5 TABLET ORAL at 19:29

## 2024-01-08 RX ADMIN — ROCURONIUM BROMIDE 60 MG: 50 INJECTION, SOLUTION INTRAVENOUS at 07:51

## 2024-01-08 RX ADMIN — PROPOFOL 50 MG: 10 INJECTION, EMULSION INTRAVENOUS at 07:55

## 2024-01-08 RX ADMIN — SIMETHICONE 80 MG: 80 TABLET, CHEWABLE ORAL at 14:10

## 2024-01-08 RX ADMIN — HYDROMORPHONE HYDROCHLORIDE 0.4 MG: 0.2 INJECTION, SOLUTION INTRAMUSCULAR; INTRAVENOUS; SUBCUTANEOUS at 15:46

## 2024-01-08 RX ADMIN — HYDROMORPHONE HYDROCHLORIDE 0.4 MG: 0.2 INJECTION, SOLUTION INTRAMUSCULAR; INTRAVENOUS; SUBCUTANEOUS at 17:48

## 2024-01-08 RX ADMIN — Medication 0.5 UNITS: at 08:47

## 2024-01-08 RX ADMIN — PHENYLEPHRINE HYDROCHLORIDE 200 MCG: 10 INJECTION INTRAVENOUS at 08:17

## 2024-01-08 RX ADMIN — GLYCOPYRROLATE 0.2 MG: 0.2 INJECTION, SOLUTION INTRAMUSCULAR; INTRAVENOUS at 08:10

## 2024-01-08 RX ADMIN — MIDAZOLAM 1 MG: 1 INJECTION INTRAMUSCULAR; INTRAVENOUS at 07:45

## 2024-01-08 RX ADMIN — Medication 5 MG: at 09:51

## 2024-01-08 RX ADMIN — Medication 0.5 UNITS: at 08:18

## 2024-01-08 RX ADMIN — LIDOCAINE HYDROCHLORIDE 60 MG: 20 INJECTION, SOLUTION INFILTRATION; PERINEURAL at 07:51

## 2024-01-08 RX ADMIN — PHENYLEPHRINE HYDROCHLORIDE 0.5 MCG/KG/MIN: 10 INJECTION INTRAVENOUS at 08:04

## 2024-01-08 RX ADMIN — FENTANYL CITRATE 50 MCG: 50 INJECTION INTRAMUSCULAR; INTRAVENOUS at 07:45

## 2024-01-08 RX ADMIN — ROCURONIUM BROMIDE 20 MG: 50 INJECTION, SOLUTION INTRAVENOUS at 09:05

## 2024-01-08 RX ADMIN — ACETAMINOPHEN 975 MG: 325 TABLET, FILM COATED ORAL at 21:35

## 2024-01-08 RX ADMIN — SIMETHICONE 80 MG: 80 TABLET, CHEWABLE ORAL at 20:22

## 2024-01-08 RX ADMIN — LORAZEPAM 1 MG: 1 TABLET ORAL at 16:58

## 2024-01-08 RX ADMIN — AMITRIPTYLINE HYDROCHLORIDE 150 MG: 75 TABLET, FILM COATED ORAL at 21:35

## 2024-01-08 RX ADMIN — FENTANYL CITRATE 50 MCG: 50 INJECTION, SOLUTION INTRAMUSCULAR; INTRAVENOUS at 11:25

## 2024-01-08 RX ADMIN — Medication 5 MG: at 08:17

## 2024-01-08 RX ADMIN — SENNOSIDES AND DOCUSATE SODIUM 1 TABLET: 50; 8.6 TABLET ORAL at 14:05

## 2024-01-08 RX ADMIN — SENNOSIDES AND DOCUSATE SODIUM 1 TABLET: 50; 8.6 TABLET ORAL at 20:22

## 2024-01-08 RX ADMIN — HYDROMORPHONE HYDROCHLORIDE 0.4 MG: 0.2 INJECTION, SOLUTION INTRAMUSCULAR; INTRAVENOUS; SUBCUTANEOUS at 11:47

## 2024-01-08 RX ADMIN — DEXAMETHASONE SODIUM PHOSPHATE 4 MG: 4 INJECTION, SOLUTION INTRA-ARTICULAR; INTRALESIONAL; INTRAMUSCULAR; INTRAVENOUS; SOFT TISSUE at 08:26

## 2024-01-08 RX ADMIN — SODIUM CHLORIDE, POTASSIUM CHLORIDE, SODIUM LACTATE AND CALCIUM CHLORIDE: 600; 310; 30; 20 INJECTION, SOLUTION INTRAVENOUS at 11:35

## 2024-01-08 RX ADMIN — SODIUM CHLORIDE: 9 INJECTION, SOLUTION INTRAVENOUS at 23:57

## 2024-01-08 RX ADMIN — FENTANYL CITRATE 50 MCG: 50 INJECTION, SOLUTION INTRAMUSCULAR; INTRAVENOUS at 11:20

## 2024-01-08 RX ADMIN — HYDROMORPHONE HYDROCHLORIDE 0.4 MG: 0.2 INJECTION, SOLUTION INTRAMUSCULAR; INTRAVENOUS; SUBCUTANEOUS at 21:33

## 2024-01-08 RX ADMIN — PHENYLEPHRINE HYDROCHLORIDE 100 MCG: 10 INJECTION INTRAVENOUS at 10:01

## 2024-01-08 RX ADMIN — Medication 5 MG: at 09:46

## 2024-01-08 RX ADMIN — SODIUM CHLORIDE, POTASSIUM CHLORIDE, SODIUM LACTATE AND CALCIUM CHLORIDE: 600; 310; 30; 20 INJECTION, SOLUTION INTRAVENOUS at 08:46

## 2024-01-08 RX ADMIN — METHOCARBAMOL 750 MG: 750 TABLET ORAL at 20:22

## 2024-01-08 RX ADMIN — ROCURONIUM BROMIDE 20 MG: 50 INJECTION, SOLUTION INTRAVENOUS at 08:24

## 2024-01-08 ASSESSMENT — ACTIVITIES OF DAILY LIVING (ADL)
ADLS_ACUITY_SCORE: 20
ADLS_ACUITY_SCORE: 22
ADLS_ACUITY_SCORE: 20
ADLS_ACUITY_SCORE: 22

## 2024-01-08 NOTE — ANESTHESIA PREPROCEDURE EVALUATION
Anesthesia Pre-Procedure Evaluation    Patient: Jeremi Damon   MRN: [id number] : 1970        Procedure : Procedure(s):  LEFT ROBOTIC ASSISTED LAPAROSOCPIC PARTIAL NEPHRECTOMY WITH INTRA-OPERATIVE RENAL ULTRASOUND, POSSIBLE OPEN          Past Medical History:   Diagnosis Date    ADHD (attention deficit hyperactivity disorder)     Allergic rhinitis, cause unspecified     Allergic rhinitis    Benign hypertension     CAD (coronary artery disease)     cardiac cath 2022: FERNANDA x3 to LAD    Chronic back pain     Hiccough     Hypersomnia with sleep apnea, unspecified     Hypertensive emergency 2022    Major depressive disorder, single episode, moderate (H) 2008    Mild persistent asthma     NSTEMI (non-ST elevated myocardial infarction) (H) 2020    Other primary cardiomyopathies     Cardiomyopathy -- unknown etiology      Past Surgical History:   Procedure Laterality Date    APPENDECTOMY      BACK SURGERY      L5-S1 fusion    CV CORONARY ANGIOGRAM N/A 2020    Procedure: Heart Catheterization with Possible Intervention;  Surgeon: Theo Donahue MD;  Location:  HEART CARDIAC CATH LAB    CV CORONARY ANGIOGRAM N/A 2022    Procedure: Coronary Angiogram;  Surgeon: Venkata Hdez MD;  [facility]    CV PCI N/A 2022    Procedure: Percutaneous Coronary Intervention;  Surgeon: Venkata Hdez MD;  [facility]    HC REPAIR OF NASAL SEPTUM      LAPAROSCOPIC CHOLECYSTECTOMY      Cholecystectomy, Laparoscopic    SURGICAL HISTORY OF -       torn pectoral muscle    SURGICAL HISTORY OF -       Bilateral radiofrequency volume reduction of the inferior turbinates.    SURGICAL HISTORY OF -       rhinoplasty- septoplasty      Allergies   Allergen Reactions    Theophylline Hives    Dairy Digestive     Sulfa Antibiotics       Social History     Tobacco Use    Smoking status: Never    Smokeless tobacco: Never    Substance Use Topics    Alcohol use: Yes     Comment: rarely       Wt Readings from Last 1 Encounters:   01/08/24 113.9 kg (251 lb)        Anesthesia Evaluation            ROS/MED HX  ENT/Pulmonary:     (+)           allergic rhinitis,           asthma                  Neurologic:       Cardiovascular: Comment: Interpretation Summary     1. Mildly decreased left ventricular systolic function The visual ejection  fraction is 40-45%.  2. There is severe concentric left ventricular hypertrophy.  3. No regional wall motion abnormalities noted.  4. The right ventricle is normal in structure, function and size.  5. No evidence for significant valvular pathology.  6. The ascending aorta is mildly dilated, 4.0 cm.  7. In comparison with the prior study, there has been some interval  improvement in LV function.  ________________________________________      (+)  hypertension- -  CAD -  - stent- 3                                     METS/Exercise Tolerance:     Hematologic:       Musculoskeletal:       GI/Hepatic:     (+) GERD,                   Renal/Genitourinary:     (+) renal disease,       BPH,      Endo:       Psychiatric/Substance Use:     (+) psychiatric history depression (gerd)       Infectious Disease:       Malignancy:       Other:            Physical Exam    Airway        Mallampati: III   TM distance: > 3 FB   Neck ROM: full     Respiratory Devices and Support         Dental     Comment: broken        Cardiovascular   cardiovascular exam normal          Pulmonary   pulmonary exam normal                OUTSIDE LABS:  CBC:   Lab Results   Component Value Date    WBC 8.7 01/04/2024    WBC 10.6 11/24/2022    HGB 13.8 01/04/2024    HGB 15.6 09/08/2023    HCT 44.5 01/04/2024    HCT 52.1 11/24/2022     01/04/2024     11/24/2022     BMP:   Lab Results   Component Value Date     01/04/2024     09/08/2023    POTASSIUM 4.3 01/08/2024    POTASSIUM 4.7 01/04/2024    CHLORIDE 103 01/04/2024     CHLORIDE 103 09/08/2023    CO2 31 01/04/2024    CO2 29 09/08/2023    BUN 17 01/04/2024    BUN 15.4 09/08/2023    CR 1.90 (H) 01/04/2024    CR 2.3 (H) 11/09/2023    GLC 83 01/08/2024    GLC 75 01/04/2024     COAGS:   Lab Results   Component Value Date    PTT 94 (H) 11/29/2020    INR 0.98 08/27/2009     POC:   Lab Results   Component Value Date    BGM 89 11/30/2020     HEPATIC:   Lab Results   Component Value Date    ALBUMIN 4.0 09/08/2023    PROTTOTAL 6.4 09/08/2023    ALT 73 (H) 09/08/2023    AST 69 (H) 09/08/2023    ALKPHOS 80 09/08/2023    BILITOTAL 0.8 09/08/2023     OTHER:   Lab Results   Component Value Date    A1C 5.2 11/30/2020    MIKO 9.6 01/04/2024    PHOS 2.5 06/14/2005    LIPASE 29 02/17/2008    AMYLASE 35 02/18/2008    TSH 3.29 12/23/2022    T4 0.80 01/13/2017    CRP <2.9 02/13/2015     (H) 02/07/2010       Anesthesia Plan    ASA Status:  3       Anesthesia Type: General.     - Airway: ETT         Techniques and Equipment:     - Airway: Video-Laryngoscope     - Lines/Monitors: 2nd IV     Consents    Anesthesia Plan(s) and associated risks, benefits, and realistic alternatives discussed. Questions answered and patient/representative(s) expressed understanding.     - Discussed:     - Discussed with:  Patient            Postoperative Care       PONV prophylaxis: Ondansetron (or other 5HT-3), Dexamethasone or Solumedrol, Background Propofol Infusion     Comments:               Valentina Jeffery I have reviewed the pertinent notes and labs in the chart from the past 30 days and (re)examined the patient.  Any updates or changes from those notes are reflected in this note.             # Drug Induced Platelet Defect: home medication list includes an antiplatelet medication  # Obesity: Estimated body mass index is 34.04 kg/m  as calculated from the following:    Height as of this encounter: 1.829 m (6').    Weight as of this encounter: 113.9 kg (251 lb).

## 2024-01-08 NOTE — PROVIDER NOTIFICATION
..MD Notification    Notified Person: MD Howard    Notified Person Name: Chung Alvaradonciwllam      Notification Date/Time: 1/8/24 1300    Notification Interaction:Amcom     Purpose of Notification: Report patients gas pain and request PRN Simethicone     Orders Received: Awaiting     Comments:

## 2024-01-08 NOTE — ANESTHESIA POSTPROCEDURE EVALUATION
Patient: Jeremi Damon    Procedure: Procedure(s):  LEFT ROBOTIC ASSISTED LAPAROSOCPIC PARTIAL NEPHRECTOMY WITH INTRA-OPERATIVE RENAL ULTRASOUND, POSSIBLE OPEN       Anesthesia Type:  General    Note:  Disposition: Admission   Postop Pain Control: Uneventful            Sign Out: Well controlled pain   PONV: No   Neuro/Psych: Uneventful            Sign Out: Acceptable/Baseline neuro status   Airway/Respiratory: Uneventful            Sign Out: Acceptable/Baseline resp. status   CV/Hemodynamics: Uneventful            Sign Out: Acceptable CV status   Other NRE:    DID A NON-ROUTINE EVENT OCCUR? No           Last vitals:  Vitals Value Taken Time   /80 01/08/24 1210   Temp 36.8  C (98.2  F) 01/08/24 1150   Pulse 71 01/08/24 1212   Resp 17 01/08/24 1212   SpO2 94 % 01/08/24 1212   Vitals shown include unfiled device data.    Electronically Signed By: Valentina Jeffery  January 8, 2024  12:34 PM

## 2024-01-08 NOTE — OR NURSING
OK to transport to rm 2228-1 per Dr Jeffery- report given to Jesus RN- transported to rm 228-1 by Nurse Aidmary Alanis. Family called- no response

## 2024-01-08 NOTE — OP NOTE
OPERATIVE REPORT  DATE OF SURGERY: 01/08/24  LOCATION OF SURGERY: [facility] OR  PREOPERATIVE DIAGNOSIS:  (N28.89) Left renal mass  (primary encounter diagnosis)  POSTOPERATIVE DIAGNOSIS: (N28.89) Left renal mass  (primary encounter diagnosis)    START TIME: 8:27 AM  END TIME: 10:26 AM     PROCEDURE PERFORMED:   ROBOTIC ASSISTED LAPAROSCOPIC PARTIAL NEPHRECTOMY - LEFT  ROBOTIC ASSISTED LAPAROSCOPIC INTRA-OPERATIVE RENAL ULTRASOUND     STAFF SURGEON: Chung Howard MD  RESIDENT SURGEON: Ede Estes MD  ANESTHESIA: General.   ESTIMATED BLOOD LOSS: 30 mL.   DRAINS AND TUBES: 16fr coude catheter  COMPLICATIONS: None.   DISPOSITION: PACU.   SPECIMENS OBTAINED:   ID Type Source Tests Collected by Time Destination   1 : LEFT RENAL NEOPLASAM Tissue Kidney, Left SURGICAL PATHOLOGY EXAM Chung Howard MD 1/8/2024  9:57 AM      SIGNIFICANT FINDINGS: LEFT renal neoplasm removed with grossly negative margins.     HISTORY OF PRESENT ILLNESS: Jeremi Damon is a 53-year-old man with significant medical history including hypertension CAD status post cardiac stenting x3 in November 2022 with an incidental 3 cm left renal mass.  He was counseled on treatment options and elected to proceed with the above surgery.    OPERATION PERFORMED:   Informed consent was obtained and the patient was brought to the operating room where general anesthesia was induced. The patient was given appropriate preoperative antibiotics and positioned supine. We then performed a timeout, verifying the correct patient's site and procedure to be performed.    The patient was then placed in the lateral flank position with LEFT side up in preparation for a LEFT-sided partial nephrectomy. We prepped and draped in the usual sterile fashion, and then after being prepped and draped in the usual sterile fashion, we gained access to the abdomen for CO2 insufflation with a Veress needle of the lateral border of the rectus muscle in line with the 11th rib. After  access was obtained, the 8-mm port was inserted and the camera was inserted. No adhesions were noted. An 8-mm port was placed one hand's breath cranial, 1-2 fingers below the costal margin. Two 8-mm ports were placed in the LEFT lower quadrant, medial and anterior to the anterior iliac spine.  We then placed a 12-mm assistant port. The robot was then docked, and the descending colon was dropped off the lateral wall. The plane was found between the mesentery and Gerota's fascia. The ureter was identified and the gonadal vein was identified.  We were able to follow this up to the renal vein. Dissection was carried to the hilum and both the renal artery and vein were exposed. Care was taken to ensure the artery was dissected adequately for clamp placement. The kidney was then defatted and a bulge was noted at the anterior lateral upper/mid pole.  The renal ultrasound was used to delineate the mass and the renal capsule was scored, marking the outline of the tumors. A single bulldog clamp was placed on the artery. The partial nephrectomy was then performed, removing the tumor with grossly negative margin.  Hemostasis was maintained in the nephrectomy bed was running 2-0 SH vicryl suture. The renorrhaphy was closed with a 0 CT-1 Vicryl suture through the capsule. Capsular sutures were secured with Weck clips. The final end was doubly clipped. The bulldog clamp was then removed and hemostasis was checked, which was excellent. Ischemia time was 20 minutes. Pneumoperitoneum was lowered and there was no bleeding from the renorrhaphy site.  The specimen was placed in an endocatch bag. The robot was undocked. The 12-mm assistant port was decided to be our extraction site.  The assistant port was then extended. Extraction incision was closed with 0-PDS. The remainder of the skin incisions were irrigated out and closed subcuticularly using 4-0 Vicryl suture and covered with skin glue. Dressings were applied.     The patient was  awoken from anesthesia and taken to PACU in stable condition.      Chung Howard MD   Urology  [facility]  Clinic Phone [phone]

## 2024-01-08 NOTE — CONSULTS
[facility]  Consult Note - Hospitalist Service  Date of Admission:  1/8/2024  Consult Requested by: Dr. Estes  Reason for Consult: Cardiac hx, chronic pain with suboxone     Assessment & Plan   Jeremi Damon is a 53 year old male with below PMHx admitted on 1/8/2024. He underwent elective L robotic assisted laparoscopic partial nephrectomy with Dr. Howard. Hospitalist consulted for medical co-management.     L renal mass S/P robotic assisted lap partial nephrectomy (1/8/24): Surgery per Dr. Howard. EBL 30 ccs.   -- Defer routine post-operative cares, IVF, DVT prophylaxis and pain control to primary service   -- Treated with periop Ancef   -- Analgesic regimen with tylenol 975 mg q8 hrs, oxycodone 5-10 mg q4 hrs PRN, IV dilaudid 0.2-0.4 mg q2 hrs PRN, robaxin 750 mg q6 hrs PRN   -- VTE prophylaxis deferred to primary team     -- Encourage pulmonary toilet; incentive spirometer at bedside   -- Bowel regimen in place while on narcotics   -- PT and OT in the AM   -- Hgb and BMP in AM   -- Continue Finasteride 5 mg po every day, Flomax 0.4 mg po every day      CAD, multivessel   Hx of NSTEMI s/p 3 stents to mid to distal LAD (11/19/22)  Hypertrophic cardiomyopathy (EF improved to 40-45%): Follows with Dr. Keita, last saw 5/19/2023.  *Echo 4/2023 with EF 40-45%, severe concentric LVH, no valvular disease, ascending aorta mildly dilated at 4.0 cm.   *Cardiac cath 11/19/2022  - Continue Norvasc 5 mg po every day, coreg 25 mg BID, Imdur 60 mg po every day, Crestor 20 mg po every day, Hydralazine 50 mg po BID   - Torsemide 10 mg po every day on hold POD0, resume as appropriate, monitor volume status closely. Note pt has not been taking as it doesn't work.   - Defer resumption of ASA 81 mg po every day. Pt note taking Plavix 75 mg po every day anymore (>1 year since stent placement).     Chronic pain syndrome: Maintained on Suboxone which has been held by the primary team. Discussed with patient who  typically holds his suboxone in the acute post-op state. Consider ordering buprenorphine only should patient have pain issues vs consult pain team.      Major Depressive Disorder, Recurrent Episode, Mode  Generalized anxiety disorder  Bipolar 2 disorder   ADHD  Insomnia:   - Continue Elavil 150 mg at bedtime, Wellbutrin  mg po every day, Buspar 5 mg BID PRN for panic attack, Ativan 1 mg BID PRN   - Hold Adderall      CKD IIIb: Recent baseline creat 1.6-1.9.   - Monitor post-op       Possible ERICA: Hx of using CPAP.  No device currently     Mild persistent asthma, not in acute exacerbation:   - Encourage pulmonary toilet post-op   - Advair held as pt does not consistently use, PRN albuterol     GERD: Continue Protonix 40 mg po every day     Hypogonadisim: Hold testosterone     The patient's care was discussed with the Attending Physician, Dr. Del Valle, Bedside Nurse, and Patient.    Clinically Significant Risk Factors Present on Admission                # Drug Induced Platelet Defect: home medication list includes an antiplatelet medication   # Hypertension: Noted on problem list      # Obesity: Estimated body mass index is 34.04 kg/m  as calculated from the following:    Height as of this encounter: 1.829 m (6').    Weight as of this encounter: 113.9 kg (251 lb).       # Asthma: noted on problem list        Alexa Kearns PA-C  Hospitalist Service  Securely message with [employer] (more info)  Text page via [facility] Paging/Directory   ______________________________________________________________________    Chief Complaint   Renal mass     History is obtained from the patient    History of Present Illness   Jeremi Damon is a 53 year old male with below PMHx admitted on 1/8/2024. He underwent elective L robotic assisted laparoscopic partial nephrectomy with Dr. Howard. Hospitalist consulted for medical co-management.     Seen post-op on general surgery floor. Issues with incisional site pain. No  nausea. Denies chest pain. Reports some lower extremity edema which he attributes to taking Norvasc which he reports intermittently taking recently. He is not taking Torsemide as he does not feel that it helps him.  Overdue for follow-up with Cardiology.     Past Medical History    Past Medical History:   Diagnosis Date    ADHD (attention deficit hyperactivity disorder)     Allergic rhinitis, cause unspecified     Allergic rhinitis    Benign hypertension     CAD (coronary artery disease)     cardiac cath 11/23/2022: FERNANDA x3 to LAD    Chronic back pain     Hiccough     Hypersomnia with sleep apnea, unspecified     Hypertensive emergency 11/19/2022    Major depressive disorder, single episode, moderate (H) 03/2008    Mild persistent asthma     NSTEMI (non-ST elevated myocardial infarction) (H) 11/29/2020    Other primary cardiomyopathies     Cardiomyopathy -- unknown etiology       Past Surgical History   Past Surgical History:   Procedure Laterality Date    APPENDECTOMY  2007    BACK SURGERY  2014    L5-S1 fusion    CV CORONARY ANGIOGRAM N/A 11/30/2020    Procedure: Heart Catheterization with Possible Intervention;  Surgeon: Theo Donahue MD;  Location: [facility] CARDIAC CATH LAB    CV CORONARY ANGIOGRAM N/A 11/23/2022    Procedure: Coronary Angiogram;  Surgeon: Venkata Hdez MD;  Location: [facility] CARDIAC CATH LAB    CV PCI N/A 11/23/2022    Procedure: Percutaneous Coronary Intervention;  Surgeon: Venkata Hdez MD;  Location: [facility] CARDIAC CATH LAB    HC REPAIR OF NASAL SEPTUM      LAPAROSCOPIC CHOLECYSTECTOMY  1/05    Cholecystectomy, Laparoscopic    SURGICAL HISTORY OF -       torn pectoral muscle    SURGICAL HISTORY OF -   2009    Bilateral radiofrequency volume reduction of the inferior turbinates.    SURGICAL HISTORY OF -   2012    rhinoplasty- septoplasty       Medications   Medications Prior to Admission   Medication Sig Dispense Refill Last Dose    albuterol (PROAIR HFA/PROVENTIL  HFA/VENTOLIN HFA) 108 (90 Base) MCG/ACT inhaler Inhale 2 puffs into the lungs every 6 hours 18 g 5 1/5/2024 at prn    amitriptyline (ELAVIL) 50 MG tablet Take 3 tablets (150 mg) by mouth At Bedtime 270 tablet 1 1/7/2024 at pm    amLODIPine (NORVASC) 5 MG tablet Take 1 tablet by mouth daily   1/8/2024 at am    amphetamine-dextroamphetamine (ADDERALL) 20 MG tablet Take 1 tablet (20 mg) by mouth 2 times daily 60 tablet 0 1/4/2024 at prn    aspirin (ASA) 81 MG chewable tablet Take 1 tablet (81 mg) by mouth daily Starting tomorrow. 30 tablet 3 12/27/2023 at am    buprenorphine HCl-naloxone HCl (SUBOXONE) 8-2 MG per film 1/2 film QID - Brand name only. 60 Film 2 1/8/2024 at am    buPROPion (WELLBUTRIN XL) 300 MG 24 hr tablet Take 1 tablet (300 mg) by mouth every morning 90 tablet 3 1/8/2024 at am    busPIRone (BUSPAR) 5 MG tablet Take 1 tablet (5 mg) by mouth 2 times daily as needed (panic attack) 180 tablet 3 1/4/2024 at prn    carvedilol (COREG) 25 MG tablet Take 1 tablet (25 mg) by mouth 2 times daily (with meals) 180 tablet 3 1/8/2024 at am    clonazePAM (KLONOPIN) 1 MG tablet TAKE 1 TABLET (1 MG) BY MOUTH 2 TIMES DAILY AS NEEDED (FLYING PHOBIA) 4 tablet 0 More than a month at prn    clopidogrel (PLAVIX) 75 MG tablet Take 1 tablet (75 mg) by mouth daily 90 tablet 3 1/1/2024 at am    cyclobenzaprine (FLEXERIL) 10 MG tablet Take 1 tablet (10 mg) by mouth 3 times daily as needed for other (hiccups) 90 tablet 5 Past Month at prn    finasteride (PROSCAR) 5 MG tablet Take 1 tablet (5 mg) by mouth daily 90 tablet 3 1/6/2024 at am    fluticasone-salmeterol (ADVAIR DISKUS) 500-50 MCG/DOSE inhaler 1 inhalation 2 times per day 1 Inhaler 2 Past Week at Unknown    hydrALAZINE (APRESOLINE) 100 MG tablet Take 0.5 tablets (50 mg) by mouth 2 times daily 180 tablet 3 1/8/2024 at am    imiquimod (ALDARA) 5 % external cream APPLY TOPICALLY AT BEDTIME -WASH OFF AFTER 8 HOURS AND MAY USE FOR UP TO 16 WEEKS. 48 packet 5 Unknown at Unknown  "   isosorbide mononitrate (IMDUR) 60 MG 24 hr tablet Take 1 tablet (60 mg) by mouth daily 90 tablet 3 1/8/2024 at am    loperamide (IMODIUM A-D) 2 MG tablet Take 2 tabs (4 mg) after first loose stool, and then take one tab (2 mg) after each diarrheal stool.  Max of 8 tabs (16 mg) per day. 30 tablet 0 Unknown at prn    LORazepam (ATIVAN) 1 MG tablet Take 1 tablet (1 mg) by mouth 2 times daily as needed for anxiety 60 tablet 5 1/5/2024 at pm    nitroGLYcerin (NITROSTAT) 0.4 MG sublingual tablet Place under the tongue every 5 minutes as needed   Unknown at prn    pantoprazole (PROTONIX) 40 MG EC tablet Take 1 tablet (40 mg) by mouth daily 90 tablet 3 1/8/2024 at am    RESTASIS 0.05 % ophthalmic emulsion INSTILL 1 DROP INTO BOTH EYES TWICE A DAY 60 mL 4 Unknown at Unknown    rosuvastatin (CRESTOR) 20 MG tablet Take 1 tablet (20 mg) by mouth daily 90 tablet 3 1/8/2024 at am    sildenafil (VIAGRA) 25 MG tablet Take 1 tablet (25 mg) by mouth daily as needed (erectile dysfunction) 20 tablet 11 More than a month at prn    tamsulosin (FLOMAX) 0.4 MG capsule Take 1 capsule (0.4 mg) by mouth daily 90 capsule 3 Unknown at Unknown    testosterone cypionate (DEPOTESTOSTERONE) 200 MG/ML injection INJECT 1 ML (200 MG) INTO THE MUSCLE ONCE A WEEK 4 mL 1 1/1/2024 at am    torsemide (DEMADEX) 10 MG tablet Take 1 tablet (10 mg) by mouth every morning 30 tablet 4 1/1/2024 at prn    vitamin C (ASCORBIC ACID) 1000 MG TABS Take 1,000 mg by mouth daily 90 0 1/7/2024 at am    zolpidem (AMBIEN) 10 MG tablet Take 1 tablet (10 mg) by mouth nightly as needed for sleep 30 tablet 5 1/7/2024 at pm    B-D LUER-HIWOT SYRINGE 21G X 1-1/2\" 3 ML MISC 1 DEVICE ONCE A WEEK AND ALSO NEEDS 22 G NEEDLES 1.5 INCH 50 each 3     needle, disp, (BD DISP NEEDLES) 21G X 1-1/2\" MISC 1 Needle once a week 12 each 3     Syringe/Needle, Disp, (SYRINGE LUER LOCK) 20G X 1-1/2\" 3 ML MISC 1 Device once a week - and also needs 22 G needles 1.5 inch #30 with 1 refill 30 each 1  "          Review of Systems    The 10 point Review of Systems is negative other than noted in the HPI.    Social History   I have reviewed this patient's social history and updated it with pertinent information if needed.  Social History     Tobacco Use    Smoking status: Never    Smokeless tobacco: Never   Vaping Use    Vaping Use: Never used   Substance Use Topics    Alcohol use: Yes     Comment: rarely     Drug use: No     Family History   I have reviewed this patient's family history and updated it with pertinent information if needed.  Family History   Problem Relation Age of Onset    Coronary Artery Disease Mother          55; MI x 2    Cancer Father         hodgkins    Hypertension Father     Coronary Artery Disease Father     Cardiovascular Sister     Hypertension Brother     Cardiovascular Maternal Grandmother     No Known Problems Daughter     Prostate Cancer No family hx of     Cancer - colorectal No family hx of     Cardiomyopathy No family hx of     Valvular heart disease No family hx of      Allergies   Allergies   Allergen Reactions    Theophylline Hives    Dairy Digestive     Sulfa Antibiotics         Physical Exam   Vital Signs: Temp: 98.4  F (36.9  C) Temp src: Axillary BP: 130/72 Pulse: 63   Resp: 15 SpO2: 95 % O2 Device: Nasal cannula Oxygen Delivery: 3 LPM  Weight: 251 lbs 0 oz    CONSTITUTIONAL: Pt laying in bed, dressed in hospital garb. Appears comfortable. Cooperative with interview.   HEENT: Normocephalic, atraumatic.   CARDIOVASCULAR: RRR, no murmurs, rubs, or extra heart sounds appreciated. Pulses +2/4 and regular in upper and lower extremities, bilaterally.   RESPIRATORY: No increased work of breathing.  CTA, bilat; no wheezes, rales, or rhonchi appreciated.  GASTROINTESTINAL:  Abdomen soft, non-distended. BS auscultated in all four quadrants. Tender at lap incision sites.   MUSCULOSKELETAL: No gross deformities noted. Normal muscle tone.   HEMATOLOGIC/LYMPHATIC/IMMUNOLOGIC: 2+  DUONG  NEUROLOGIC: Alert and oriented to person, place, and time.  strength intact. No focal neuro deficits.   SKIN: Warm, dry, intact. 5 lap incision sites.   : Yee with straw colored urine.     Medical Decision Making       75 MINUTES SPENT BY ME on the date of service doing chart review, history, exam, documentation & further activities per the note.      Data     I have personally reviewed the following data over the past 24 hrs:    N/A  \   N/A   / N/A     N/A N/A N/A /  83   4.3 N/A N/A \       Imaging results reviewed over the past 24 hrs:   No results found for this or any previous visit (from the past 24 hour(s)).

## 2024-01-08 NOTE — ANESTHESIA PROCEDURE NOTES
Airway       Patient location during procedure: OR       Procedure Start/Stop Times: 1/8/2024 7:54 AM  Staff -        Performed By: CRNA  Consent for Airway        Urgency: emergent  Indications and Patient Condition       Indications for airway management: rudy-procedural       Induction type:intravenous       Mask difficulty assessment: 2 - vent by mask + OA or adjuvant +/- NMBA    Final Airway Details       Final airway type: endotracheal airway       Successful airway: ETT - single and Oral  Endotracheal Airway Details        ETT size (mm): 8.0       Cuffed: yes       Successful intubation technique: video laryngoscopy       VL Blade Size: Jacinto 4       Grade View of Cords: 1       Adjucts: stylet       Position: Left       Measured from: lips       Secured at (cm): 24       Bite block used: Soft    Post intubation assessment        Placement verified by: capnometry, equal breath sounds and chest rise        Number of attempts at approach: 1       Secured with: tape       Ease of procedure: easy       Dentition: Intact and Unchanged    Medication(s) Administered   Medication Administration Time: 1/8/2024 7:54 AM

## 2024-01-08 NOTE — PROGRESS NOTES
PTA medications completed by Medication Scribe day of surgery     Medication history sources: Patient, Surescripts, and H&P  In the past week, patient estimated taking medication this percent of the time: Greater than 90%      Significant changes made to the medication list:  None      Additional medication history information:   None    Medication reconciliation completed by provider prior to medication history? No    Time spent in this activity: 40 minutes    The information provided in this note is only as accurate as the sources available at the time of update(s)      Prior to Admission medications    Medication Sig Last Dose Taking? Auth Provider Long Term End Date   albuterol (PROAIR HFA/PROVENTIL HFA/VENTOLIN HFA) 108 (90 Base) MCG/ACT inhaler Inhale 2 puffs into the lungs every 6 hours 1/5/2024 at prn Yes Luis Armando Segovia MD Yes    amitriptyline (ELAVIL) 50 MG tablet Take 3 tablets (150 mg) by mouth At Bedtime 1/7/2024 at pm Yes Luis Armando Segovia MD Yes    amLODIPine (NORVASC) 5 MG tablet Take 1 tablet by mouth daily 1/8/2024 at am Yes Reported, Patient No    amphetamine-dextroamphetamine (ADDERALL) 20 MG tablet Take 1 tablet (20 mg) by mouth 2 times daily 1/4/2024 at prn Yes Luis Armando Segovia MD     aspirin (ASA) 81 MG chewable tablet Take 1 tablet (81 mg) by mouth daily Starting tomorrow. 12/27/2023 at am Yes Venkata Hdez MD     buprenorphine HCl-naloxone HCl (SUBOXONE) 8-2 MG per film 1/2 film QID - Brand name only. 1/8/2024 at am Yes Macy Subramanian MD     buPROPion (WELLBUTRIN XL) 300 MG 24 hr tablet Take 1 tablet (300 mg) by mouth every morning 1/8/2024 at am Yes Luis Armando Segovia MD Yes    busPIRone (BUSPAR) 5 MG tablet Take 1 tablet (5 mg) by mouth 2 times daily as needed (panic attack) 1/4/2024 at prn Yes Luis Armando Segovia MD Yes    carvedilol (COREG) 25 MG tablet Take 1 tablet (25 mg) by mouth 2 times daily (with meals) 1/8/2024 at am Yes Luis Armando Segovia MD Yes    clonazePAM (KLONOPIN)  1 MG tablet TAKE 1 TABLET (1 MG) BY MOUTH 2 TIMES DAILY AS NEEDED (FLYING PHOBIA) More than a month at prn Yes Luis Armando Segovia MD Yes    clopidogrel (PLAVIX) 75 MG tablet Take 1 tablet (75 mg) by mouth daily 1/1/2024 at am Yes Luis Armando Segovia MD Yes    cyclobenzaprine (FLEXERIL) 10 MG tablet Take 1 tablet (10 mg) by mouth 3 times daily as needed for other (hiccups) Past Month at prn Yes Luis Armando Segovia MD     finasteride (PROSCAR) 5 MG tablet Take 1 tablet (5 mg) by mouth daily 1/6/2024 at am Yes Luis Armando Segovia MD     fluticasone-salmeterol (ADVAIR DISKUS) 500-50 MCG/DOSE inhaler 1 inhalation 2 times per day Past Week at Unknown Yes Luis Armando Segovia MD Yes    hydrALAZINE (APRESOLINE) 100 MG tablet Take 0.5 tablets (50 mg) by mouth 2 times daily 1/8/2024 at am Yes Luis Armando Segovia MD Yes    imiquimod (ALDARA) 5 % external cream APPLY TOPICALLY AT BEDTIME -WASH OFF AFTER 8 HOURS AND MAY USE FOR UP TO 16 WEEKS. Unknown at Unknown Yes Luis Armando Segovia MD     isosorbide mononitrate (IMDUR) 60 MG 24 hr tablet Take 1 tablet (60 mg) by mouth daily 1/8/2024 at am Yes Luis Armando Segovia MD Yes    loperamide (IMODIUM A-D) 2 MG tablet Take 2 tabs (4 mg) after first loose stool, and then take one tab (2 mg) after each diarrheal stool.  Max of 8 tabs (16 mg) per day. Unknown at prn Yes Luis Armando Segovia MD     LORazepam (ATIVAN) 1 MG tablet Take 1 tablet (1 mg) by mouth 2 times daily as needed for anxiety 1/5/2024 at pm Yes Luis Armando Segovia MD No    nitroGLYcerin (NITROSTAT) 0.4 MG sublingual tablet Place under the tongue every 5 minutes as needed Unknown at prn Yes Reported, Patient No    pantoprazole (PROTONIX) 40 MG EC tablet Take 1 tablet (40 mg) by mouth daily 1/8/2024 at am Yes Luis Armando Segovia MD     RESTASIS 0.05 % ophthalmic emulsion INSTILL 1 DROP INTO BOTH EYES TWICE A DAY Unknown at Unknown Yes Luis Armando Segovia MD     rosuvastatin (CRESTOR) 20 MG tablet Take 1 tablet (20 mg) by mouth daily 1/8/2024 at am  "Yes Luis Armando Segovia MD Yes    sildenafil (VIAGRA) 25 MG tablet Take 1 tablet (25 mg) by mouth daily as needed (erectile dysfunction) More than a month at prn Yes Luis Armando Segovia MD Yes    tamsulosin (FLOMAX) 0.4 MG capsule Take 1 capsule (0.4 mg) by mouth daily Unknown at Unknown Yes Chung Howard MD     testosterone cypionate (DEPOTESTOSTERONE) 200 MG/ML injection INJECT 1 ML (200 MG) INTO THE MUSCLE ONCE A WEEK 1/1/2024 at am Yes Luis Armando Segovia MD Yes    torsemide (DEMADEX) 10 MG tablet Take 1 tablet (10 mg) by mouth every morning 1/1/2024 at prn Yes Luis Armando Segovia MD Yes    vitamin C (ASCORBIC ACID) 1000 MG TABS Take 1,000 mg by mouth daily 1/7/2024 at am Yes BARTOLOME Clarke MD     zolpidem (AMBIEN) 10 MG tablet Take 1 tablet (10 mg) by mouth nightly as needed for sleep 1/7/2024 at pm Yes Luis Armando Segovia MD     B-D LUER-HIWOT SYRINGE 21G X 1-1/2\" 3 ML MISC 1 DEVICE ONCE A WEEK AND ALSO NEEDS 22 G NEEDLES 1.5 INCH   Luis Armando Segovia MD     needle, disp, (BD DISP NEEDLES) 21G X 1-1/2\" MISC 1 Needle once a week   Luis Armando Segovia MD     Syringe/Needle, Disp, (SYRINGE LUER LOCK) 20G X 1-1/2\" 3 ML MISC 1 Device once a week - and also needs 22 G needles 1.5 inch #30 with 1 refill   Luis Armando Segovia MD         Medication history completed by: Luis Villalobos CPhT     "

## 2024-01-08 NOTE — ANESTHESIA CARE TRANSFER NOTE
Patient: Jeremi Damon    Procedure: Procedure(s):  LEFT ROBOTIC ASSISTED LAPAROSOCPIC PARTIAL NEPHRECTOMY WITH INTRA-OPERATIVE RENAL ULTRASOUND, POSSIBLE OPEN       Diagnosis: Left renal mass [N28.89]  Diagnosis Additional Information: No value filed.    Anesthesia Type:   General     Note:    Oropharynx: oropharynx clear of all foreign objects  Level of Consciousness: awake and drowsy  Oxygen Supplementation: face mask  Level of Supplemental Oxygen (L/min / FiO2): 6  Independent Airway: airway patency satisfactory and stable  Dentition: dentition unchanged  Vital Signs Stable: post-procedure vital signs reviewed and stable  Report to RN Given: handoff report given  Patient transferred to: PACU  Comments: To PACU:: Arouses easily, good airway, 02 face mask, VSS  Report to RN  Handoff Report: Identifed the Patient, Identified the Reponsible Provider, Reviewed the pertinent medical history, Discussed the surgical course, Reviewed Intra-OP anesthesia mangement and issues during anesthesia, Set expectations for post-procedure period and Allowed opportunity for questions and acknowledgement of understanding      Vitals:  Vitals Value Taken Time   /87 01/08/24 1050   Temp     Pulse 75 01/08/24 1053   Resp 13 01/08/24 1053   SpO2 97 % 01/08/24 1053   Vitals shown include unfiled device data.    Electronically Signed By: RAINA Plasencia CRNA  January 8, 2024  10:54 AM

## 2024-01-09 LAB
ANION GAP SERPL CALCULATED.3IONS-SCNC: 4 MMOL/L (ref 7–15)
BUN SERPL-MCNC: 22.8 MG/DL (ref 6–20)
CALCIUM SERPL-MCNC: 8.4 MG/DL (ref 8.6–10)
CHLORIDE SERPL-SCNC: 101 MMOL/L (ref 98–107)
CREAT SERPL-MCNC: 3.22 MG/DL (ref 0.67–1.17)
DEPRECATED HCO3 PLAS-SCNC: 33 MMOL/L (ref 22–29)
EGFRCR SERPLBLD CKD-EPI 2021: 22 ML/MIN/1.73M2
ERYTHROCYTE [DISTWIDTH] IN BLOOD BY AUTOMATED COUNT: 15.8 % (ref 10–15)
GLUCOSE SERPL-MCNC: 114 MG/DL (ref 70–99)
HCT VFR BLD AUTO: 37 % (ref 40–53)
HGB BLD-MCNC: 11.5 G/DL (ref 13.3–17.7)
MCH RBC QN AUTO: 26.9 PG (ref 26.5–33)
MCHC RBC AUTO-ENTMCNC: 31.1 G/DL (ref 31.5–36.5)
MCV RBC AUTO: 87 FL (ref 78–100)
PLATELET # BLD AUTO: 244 10E3/UL (ref 150–450)
POTASSIUM SERPL-SCNC: 5.5 MMOL/L (ref 3.4–5.3)
RBC # BLD AUTO: 4.27 10E6/UL (ref 4.4–5.9)
SODIUM SERPL-SCNC: 138 MMOL/L (ref 135–145)
TROPONIN T SERPL HS-MCNC: 66 NG/L
TROPONIN T SERPL HS-MCNC: 68 NG/L
WBC # BLD AUTO: 15.7 10E3/UL (ref 4–11)

## 2024-01-09 PROCEDURE — 36415 COLL VENOUS BLD VENIPUNCTURE: CPT | Performed by: STUDENT IN AN ORGANIZED HEALTH CARE EDUCATION/TRAINING PROGRAM

## 2024-01-09 PROCEDURE — 84484 ASSAY OF TROPONIN QUANT: CPT | Performed by: STUDENT IN AN ORGANIZED HEALTH CARE EDUCATION/TRAINING PROGRAM

## 2024-01-09 PROCEDURE — 250N000013 HC RX MED GY IP 250 OP 250 PS 637: Performed by: STUDENT IN AN ORGANIZED HEALTH CARE EDUCATION/TRAINING PROGRAM

## 2024-01-09 PROCEDURE — 250N000013 HC RX MED GY IP 250 OP 250 PS 637: Performed by: UROLOGY

## 2024-01-09 PROCEDURE — 250N000013 HC RX MED GY IP 250 OP 250 PS 637: Performed by: PHYSICIAN ASSISTANT

## 2024-01-09 PROCEDURE — 85027 COMPLETE CBC AUTOMATED: CPT | Performed by: STUDENT IN AN ORGANIZED HEALTH CARE EDUCATION/TRAINING PROGRAM

## 2024-01-09 PROCEDURE — 93010 ELECTROCARDIOGRAM REPORT: CPT | Performed by: INTERNAL MEDICINE

## 2024-01-09 PROCEDURE — 93005 ELECTROCARDIOGRAM TRACING: CPT

## 2024-01-09 PROCEDURE — 258N000003 HC RX IP 258 OP 636: Performed by: STUDENT IN AN ORGANIZED HEALTH CARE EDUCATION/TRAINING PROGRAM

## 2024-01-09 PROCEDURE — 250N000011 HC RX IP 250 OP 636: Performed by: STUDENT IN AN ORGANIZED HEALTH CARE EDUCATION/TRAINING PROGRAM

## 2024-01-09 PROCEDURE — 250N000013 HC RX MED GY IP 250 OP 250 PS 637: Performed by: NURSE PRACTITIONER

## 2024-01-09 PROCEDURE — 99214 OFFICE O/P EST MOD 30 MIN: CPT | Performed by: INTERNAL MEDICINE

## 2024-01-09 PROCEDURE — 82374 ASSAY BLOOD CARBON DIOXIDE: CPT | Performed by: STUDENT IN AN ORGANIZED HEALTH CARE EDUCATION/TRAINING PROGRAM

## 2024-01-09 PROCEDURE — 99215 OFFICE O/P EST HI 40 MIN: CPT | Mod: VID | Performed by: NURSE PRACTITIONER

## 2024-01-09 RX ORDER — OXYCODONE HYDROCHLORIDE 5 MG/1
10 TABLET ORAL
Status: DISCONTINUED | OUTPATIENT
Start: 2024-01-09 | End: 2024-01-10 | Stop reason: HOSPADM

## 2024-01-09 RX ORDER — MENTHOL AND METHYL SALICYLATE 7.6; 29 G/100G; G/100G
OINTMENT TOPICAL EVERY 6 HOURS PRN
Status: DISCONTINUED | OUTPATIENT
Start: 2024-01-09 | End: 2024-01-10 | Stop reason: HOSPADM

## 2024-01-09 RX ORDER — BUPRENORPHINE AND NALOXONE 4; 1 MG/1; MG/1
1 FILM, SOLUBLE BUCCAL; SUBLINGUAL 2 TIMES DAILY
Status: DISCONTINUED | OUTPATIENT
Start: 2024-01-09 | End: 2024-01-10 | Stop reason: HOSPADM

## 2024-01-09 RX ORDER — MUSCLE RUB CREAM 100; 150 MG/G; MG/G
CREAM TOPICAL EVERY 6 HOURS PRN
Status: DISCONTINUED | OUTPATIENT
Start: 2024-01-09 | End: 2024-01-09

## 2024-01-09 RX ORDER — OXYCODONE HYDROCHLORIDE 5 MG/1
5 TABLET ORAL
Status: DISCONTINUED | OUTPATIENT
Start: 2024-01-09 | End: 2024-01-10 | Stop reason: HOSPADM

## 2024-01-09 RX ORDER — CYCLOBENZAPRINE HCL 10 MG
10 TABLET ORAL 3 TIMES DAILY PRN
Status: DISCONTINUED | OUTPATIENT
Start: 2024-01-09 | End: 2024-01-10 | Stop reason: HOSPADM

## 2024-01-09 RX ADMIN — BUPROPION HYDROCHLORIDE 300 MG: 150 TABLET, FILM COATED, EXTENDED RELEASE ORAL at 08:26

## 2024-01-09 RX ADMIN — METHOCARBAMOL 750 MG: 750 TABLET ORAL at 02:25

## 2024-01-09 RX ADMIN — PANTOPRAZOLE SODIUM 40 MG: 40 TABLET, DELAYED RELEASE ORAL at 08:27

## 2024-01-09 RX ADMIN — METHOCARBAMOL 750 MG: 750 TABLET ORAL at 08:27

## 2024-01-09 RX ADMIN — AMLODIPINE BESYLATE 5 MG: 5 TABLET ORAL at 08:27

## 2024-01-09 RX ADMIN — CYCLOBENZAPRINE 10 MG: 10 TABLET, FILM COATED ORAL at 19:30

## 2024-01-09 RX ADMIN — HYDRALAZINE HYDROCHLORIDE 50 MG: 50 TABLET, FILM COATED ORAL at 08:27

## 2024-01-09 RX ADMIN — MENTHOL AND METHYL SALICYLATE: 7.6; 29 OINTMENT TOPICAL at 19:35

## 2024-01-09 RX ADMIN — ACETAMINOPHEN 975 MG: 325 TABLET, FILM COATED ORAL at 13:52

## 2024-01-09 RX ADMIN — HYDRALAZINE HYDROCHLORIDE 50 MG: 50 TABLET, FILM COATED ORAL at 20:29

## 2024-01-09 RX ADMIN — SIMETHICONE 80 MG: 80 TABLET, CHEWABLE ORAL at 19:30

## 2024-01-09 RX ADMIN — ACETAMINOPHEN 975 MG: 325 TABLET, FILM COATED ORAL at 20:29

## 2024-01-09 RX ADMIN — SODIUM CHLORIDE: 9 INJECTION, SOLUTION INTRAVENOUS at 19:26

## 2024-01-09 RX ADMIN — SIMETHICONE 80 MG: 80 TABLET, CHEWABLE ORAL at 08:27

## 2024-01-09 RX ADMIN — TAMSULOSIN HYDROCHLORIDE 0.4 MG: 0.4 CAPSULE ORAL at 08:27

## 2024-01-09 RX ADMIN — SENNOSIDES AND DOCUSATE SODIUM 1 TABLET: 50; 8.6 TABLET ORAL at 20:29

## 2024-01-09 RX ADMIN — OXYCODONE HYDROCHLORIDE 10 MG: 5 TABLET ORAL at 06:25

## 2024-01-09 RX ADMIN — SIMETHICONE 80 MG: 80 TABLET, CHEWABLE ORAL at 02:25

## 2024-01-09 RX ADMIN — CARVEDILOL 25 MG: 25 TABLET, FILM COATED ORAL at 08:26

## 2024-01-09 RX ADMIN — FINASTERIDE 5 MG: 5 TABLET, FILM COATED ORAL at 08:26

## 2024-01-09 RX ADMIN — METHOCARBAMOL 750 MG: 750 TABLET ORAL at 14:34

## 2024-01-09 RX ADMIN — AMITRIPTYLINE HYDROCHLORIDE 150 MG: 75 TABLET, FILM COATED ORAL at 20:29

## 2024-01-09 RX ADMIN — ACETAMINOPHEN 975 MG: 325 TABLET, FILM COATED ORAL at 06:25

## 2024-01-09 RX ADMIN — ROSUVASTATIN CALCIUM 20 MG: 20 TABLET, FILM COATED ORAL at 08:26

## 2024-01-09 RX ADMIN — HYDROMORPHONE HYDROCHLORIDE 0.4 MG: 0.2 INJECTION, SOLUTION INTRAMUSCULAR; INTRAVENOUS; SUBCUTANEOUS at 20:35

## 2024-01-09 RX ADMIN — SENNOSIDES AND DOCUSATE SODIUM 1 TABLET: 50; 8.6 TABLET ORAL at 08:26

## 2024-01-09 RX ADMIN — ISOSORBIDE MONONITRATE 60 MG: 30 TABLET, EXTENDED RELEASE ORAL at 08:26

## 2024-01-09 RX ADMIN — CARVEDILOL 25 MG: 25 TABLET, FILM COATED ORAL at 18:17

## 2024-01-09 RX ADMIN — OXYCODONE HYDROCHLORIDE 10 MG: 5 TABLET ORAL at 19:30

## 2024-01-09 RX ADMIN — POLYETHYLENE GLYCOL 3350 17 G: 17 POWDER, FOR SOLUTION ORAL at 08:27

## 2024-01-09 RX ADMIN — OXYCODONE HYDROCHLORIDE 10 MG: 5 TABLET ORAL at 10:56

## 2024-01-09 ASSESSMENT — ACTIVITIES OF DAILY LIVING (ADL)
ADLS_ACUITY_SCORE: 22

## 2024-01-09 NOTE — PLAN OF CARE
Date & Time: 01/08/24 7014-0971  Surgery/POD#: L partial nephrectomy   Behavior & Aggression: Green  Fall Risk: Yes  Orientation:A+Ox4  ABNL VS/O2:VSS  on 3LPM  ABNL Labs: None  Pain Management:PRN oxycodone, dilaudid, Robaxin, Ativan   Bowel/Bladder: Continent   Drains: Yee in place  Diet:Regular   Activity Level: Due to Dangle  Tests/Procedures: Surgery this AM   Anticipated  DC Date: Pending improvement   Significant Information: NS @ 100mL/hr

## 2024-01-09 NOTE — PROVIDER NOTIFICATION
..MD Notification    Notified Person: MD Howard    Notified Person Name: Chung Howard     Notification Date/Time: 01/09/24 0920; 2nd call @ 1017    Notification Interaction: Clinic Phone     Purpose of Notification: Notify of low urine output overnight and verify that they want reina removed this AM    Orders Received: Talked with Maura who would page on-call    Comments: Awaiting call back from MD

## 2024-01-09 NOTE — TELEPHONE ENCOUNTER
Labs on 2017 have  and a letter was sent on 2017. Patient has not followed up. Routing to team to address.    [facility]   GOAL: Pt will ambulate 50' independently with or without AD as needed in 1-2 weeks

## 2024-01-09 NOTE — PLAN OF CARE
Surgery/POD#: POD #1 s/p L lap partial nephrectomy  Orientation: A&Ox4  ABNL VS/O2: VSS on RA  Pain Management: Scheduled Tylenol, PRN Oxy, Robaxin, simethicone & IV Dilaudid x1. Heat/ice packs.   Bowel/Bladder: Yee w/ low UOP overnight. BS hypoactive, not passing gas yet but burping. Abd rounded/distended.  Drains: PIV infusing NS at 100 ml/hr  Diet: Regular  Activity Level: SBA. Ambulated in bryant & up in chair overnight.  Anticipated  DC Date: possibly home later today  Significant Information: Abd lap sites w/ liquid bandage CDI.

## 2024-01-09 NOTE — DISCHARGE INSTRUCTIONS
POSTOPERATIVE INSTRUCTIONS    Diagnosis-------------------------------   LEFT renal mass    Procedure-------------------------------  Procedure(s) (LRB):  LEFT ROBOTIC ASSISTED LAPAROSOCPIC PARTIAL NEPHRECTOMY WITH INTRA-OPERATIVE RENAL ULTRASOUND, POSSIBLE OPEN (Left)      Findings--------------------------------  LEFT renal mass removed    Home-going instructions-----------------         Activity Limitation:     - No driving or operating heavy machinery while on narcotic pain medication.   - No strenuous exercise for 6 weeks.   - No lifting, pushing, pulling more than 10 pounds for 6 weeks. Take care when pushing with your arms to stand up.   - Do not strain your belly area. When you bend, sit up or twice, you could strain the area around your incision.   - Do not strain with bowel movements.   - Do not drive until you can press the brake pedal quickly and fully without pain.   - Do not operate a motor vehicle while taking narcotic pain medications    FOLLOW THESE INSTRUCTIONS AS INDICATED BELOW:  - Observe operative area for signs of excessive bleeding.  - You may shower.  - Increase fluid intake to promote clear urine.  - Resume usual diet as tolerated      What to expect while recovering----------- WOUND CARE:  - You may shower and get incisions wet starting 48 hrs after surgery.  - Do not scrub incisions or submerge wounds (aka, bath, pool, hot tub, etc.) for 2 weeks or until wounds have healed   - Remove wound dressing 48 hours after surgery if already not removed.   - If purple dermabond glue was used, avoid applying any lotions or ointments.   - Leave incision open to air. Cover with gauze only if needed for comfort or to protect clothing from drainage.    Discharge Medications/instructions:   1) PAIN: Oxycodone is a narcotic medication that has been prescribed for pain. Narcotics will cause sleepiness and constipation, therefore it is best to stop or reduce them as soon as you can and switch to using  "acetaminophen (Tylenol) and/or ibuprofen (Advil/Motrin), taken as directed on the packaging. Keep in mind that certain narcotics can contain acetaminophen, also called \"APAP\" on prescription bottles. Do not take more than 4,000mg of Tylenol (acetaminophen/ APAP) from all sources in any 24 hour period since this can cause liver damage. Never drive, operate machinery or drink alcoholic beverages while you are taking narcotic pain medications. While you are using the oxycodone, reduce your home suboxone dose (take twice daily instead of 4 times daily) - then when you are done using the oxycodone, resume your regular home dose.    2) CONSTIPATION: Pericolace (senna/docusate sodium) can be taken twice daily for prevention of constipation since surgery, pain medications and bladder spasm medications can all make you constipated. Please reduce or stop pericolace if you develop loose stools. Other over the counter solutions such as prune juice, miralax, fiber products, senna, and dulcolax can also be used. If you are taking the pericolace but still have not had a bowel movement in 3 days, start over-the-counter Milk of Magnesia taken twice daily until you have a nice bowel movement. Call the office with any concerns.       Questions/concerns------------------------  [facility]: [phone]    Future appointments  You will be contacted to move up your post op check visit.      Chung Howard MD      "

## 2024-01-09 NOTE — PROGRESS NOTES
Urology  Progress Note    - Afeb, VSS  - [place] urge to have a bowel movement yesterday, but has not yet  - Passing gas  - Ambulating  - Tolerating solids and liquids  - Left sided shoulder pain    Exam  BP (!) 141/77   Pulse 66   Temp 97.6  F (36.4  C) (Oral)   Resp 19   Ht 1.829 m (6')   Wt 113.9 kg (251 lb)   SpO2 92%   BMI 34.04 kg/m    No acute distress  Unlabored breathing  Abdomen soft, appropriately tender, mildly distended. Incisions c/d/I with skin glue  Reina with yellow urine in tubing    /150    Labs  AM labs pending    Assessment/Plan  53 year old male s/p left partial nephrectomy on 1/9/24, labs pending and working on ambulation and pain control    Neuro: Marti APAP, prn oxy, dilaudid, robaxin for pain control, holding PTA suboxone and adderall, continue PTA elavil, wellbutrin  CV: HDS, PTA amlodipine, carvedilol, hydralazine, imdur, statin; EKG today  Pulm: incentive spirometry while awake  FEN/GI: ADAT, continue PPI, bowel regimen  Endo: CHARLEEN  : Discontinue reina this AM, trend Cr, continue finasteride, tamsulosin  Heme/ID: Periop abx complete, monitor for fevers, leukocytosis. Follow up AM labs  Activity: Up ad ana paula  PPx: SCDs  Dispo: Pending pain control, possibly home today vs tomorrow    Seen and examined, to be discussed with staff    Ede Estes MD  Urology Resident, PGY-5    Addendum:  Labs this morning demonstrate elevated troponin, PEDRO and leukocytosis. Leukocytosis expected in post-operative period. Will get pain consult for assistance in medication management, trend Cr for PEDRO, and appreciate hospitalist assistance with troponin trend, EKG changes

## 2024-01-09 NOTE — PROGRESS NOTES
[facility]    Medicine Progress Note - Hospitalist Service       Date of Admission:  1/8/2024  Assessment & Plan   Jeremi Damon is a 53 year old male with PMHx as below admitted on 1/8/2024. He underwent elective L robotic assisted laparoscopic partial nephrectomy with Dr. Howard. Hospitalist consulted for medical co-management.      L renal mass S/P robotic assisted lap partial nephrectomy (1/8/24)  Chronic pain syndrome   Surgery per Dr. Howard. EBL 30 ccs.   - Routine postoperative cares including diet, DVT prophylaxis, activity per urology service  - Pain consult entered per urology, see recommendations     CAD, multivessel   Hx of NSTEMI s/p 3 stents to mid to distal LAD (11/19/22)  Hypertrophic cardiomyopathy (EF improved to 40-45%): *Follows with Dr. Keita, last saw 5/19/2023.  *Echo 4/2023 with EF 40-45%, severe concentric LVH, no valvular disease, ascending aorta mildly dilated at 4.0 cm.   *Cardiac cath 11/19/2022  - Continue prior to admission amlodipine, carvedilol, Imdur, received statin, hydralazine  - Continue to hold torsemide while on IVF and creatinine elevated  - Resume aspirin when okay post-operative from urology standpoint     Left shoulder pain  Mild troponin elevation  - Reports intermittent left shoulder pain, reproducible on palpation of shoulder and with active range of motion of left shoulder, consistent with musculoskeletal etiology  - Troponin and EKG obtained per urology: EKG with no significant changes, troponin 66->68  - Symptoms not consistent with ACS, mild troponin elevation not unexpected postoperatively in the setting of his hypertrophic cardiomyopathy  - Cardiac medication management as above  - Ordered muscle rub cream (patient reports this helps at home)      Major Depressive Disorder, Recurrent Episode  Generalized anxiety disorder  Bipolar 2 disorder   ADHD  Insomnia  - Continue prior to admission regimen  - Hold Adderall      Acute kidney injury  on CKD stage IIIb  Recent baseline creat 1.6-1.9.   - Creatinine increased to 3.22 from 1.90 1/4 post-op  - Unclear what (if any) amount of creatinine increase expected after his partial nephrectomy, defer to urology for this  - Agree with continued fluids  - Holding torsemide as above  - BMP in AM       Possible ERICA  Hx of using CPAP.  No device currently      Mild persistent asthma, not in acute exacerbation:   - Encourage pulmonary toilet post-op   - Advair held as pt does not consistently use, PRN albuterol      GERD  - Continue Protonix 40 mg po every day      Hypogonadism  - Hold testosterone    Clinically Significant Risk Factors Present on Admission        # Hyperkalemia: Highest K = 5.5 mmol/L in last 2 days, will monitor as appropriate         # Drug Induced Platelet Defect: home medication list includes an antiplatelet medication  # Acute Kidney Injury, unspecified: based on a >150% or 0.3 mg/dL increase in last creatinine compared to past 90 day average, will monitor renal function  # Hypertension: Noted on problem list      # Obesity: Estimated body mass index is 34.04 kg/m  as calculated from the following:    Height as of this encounter: 1.829 m (6').    Weight as of this encounter: 113.9 kg (251 lb).       # Asthma: noted on problem list           Diet: Advance Diet as Tolerated: Regular Diet Adult    DVT Prophylaxis: Defer to primary service  Yee Catheter: Not present  Code Status: Full Code      Disposition Plan      Expected Discharge Date: 01/09/2024             Entered: Francisco Del Valle MD 01/09/2024, 3:15 PM       The patient's care was discussed with the patient and bedside RN    Francisco Del Valle MD  Hospitalist Service  [facility]    ______________________________________________________________________    Interval History   Reports intermittent left shoulder pain around his AC joint, worse with palpation and movement of his left arm.  He attributes this to  "positioning and sleeping on it in bed.  He reports at home, \"hot\" muscle creams helped the most.  Reports persistent incisional pain, worse with coughing.    Data reviewed today: I reviewed all medications, new labs and imaging results over the last 24 hours. I personally reviewed the EKG tracing showing sinus rhythm with first-degree AV block, bifasicular block .    Physical Exam   Vital Signs: Temp: 97.6  F (36.4  C) Temp src: Oral BP: (!) 141/77 Pulse: 66   Resp: 19 SpO2: 92 % O2 Device: None (Room air) Oxygen Delivery: 3 LPM  Weight: 251 lbs 0 oz    Constitutional: Well-appearing, NAD  Respiratory: Clear to auscultation bilaterally, good air movement bilaterally  Cardiovascular: RRR   Musculoskeletal: Tenderness to palpation over left AC joint, superior aspect of left shoulder    Data   Recent Labs   Lab 01/09/24  1036 01/08/24  0610 01/04/24  1542   WBC 15.7*  --  8.7   HGB 11.5*  --  13.8   MCV 87  --  86     --  222     --  143   POTASSIUM 5.5* 4.3 4.7   CHLORIDE 101  --  103   CO2 33*  --  31   BUN 22.8*  --  17   CR 3.22*  --  1.90*   ANIONGAP 4*  --  9   MIKO 8.4*  --  9.6   * 83 75       No results found for this or any previous visit (from the past 24 hour(s)).  Medications    sodium chloride 100 mL/hr at 01/08/24 2357      acetaminophen  975 mg Oral Q8H    amitriptyline  150 mg Oral At Bedtime    amLODIPine  5 mg Oral Daily    [Held by provider] amphetamine-dextroamphetamine  20 mg Oral BID    buPROPion  300 mg Oral QAM    carvedilol  25 mg Oral BID w/meals    finasteride  5 mg Oral Daily    hydrALAZINE  50 mg Oral BID    isosorbide mononitrate  60 mg Oral Daily    pantoprazole  40 mg Oral Daily    polyethylene glycol  17 g Oral Daily    rosuvastatin  20 mg Oral Daily    senna-docusate  1 tablet Oral BID    sodium chloride (PF)  3 mL Intracatheter Q8H    tamsulosin  0.4 mg Oral Daily       "

## 2024-01-10 VITALS
RESPIRATION RATE: 22 BRPM | HEART RATE: 75 BPM | BODY MASS INDEX: 34 KG/M2 | WEIGHT: 251 LBS | HEIGHT: 72 IN | SYSTOLIC BLOOD PRESSURE: 126 MMHG | TEMPERATURE: 98.7 F | OXYGEN SATURATION: 92 % | DIASTOLIC BLOOD PRESSURE: 83 MMHG

## 2024-01-10 PROBLEM — N17.9 ACUTE KIDNEY FAILURE, UNSPECIFIED (H): Status: ACTIVE | Noted: 2024-01-10

## 2024-01-10 LAB
ANION GAP SERPL CALCULATED.3IONS-SCNC: 5 MMOL/L (ref 7–15)
BUN SERPL-MCNC: 23.2 MG/DL (ref 6–20)
CALCIUM SERPL-MCNC: 7.6 MG/DL (ref 8.6–10)
CHLORIDE SERPL-SCNC: 102 MMOL/L (ref 98–107)
CREAT SERPL-MCNC: 2.91 MG/DL (ref 0.67–1.17)
DEPRECATED HCO3 PLAS-SCNC: 28 MMOL/L (ref 22–29)
EGFRCR SERPLBLD CKD-EPI 2021: 25 ML/MIN/1.73M2
ERYTHROCYTE [DISTWIDTH] IN BLOOD BY AUTOMATED COUNT: 15.7 % (ref 10–15)
GLUCOSE SERPL-MCNC: 102 MG/DL (ref 70–99)
HCT VFR BLD AUTO: 33.4 % (ref 40–53)
HGB BLD-MCNC: 10.3 G/DL (ref 13.3–17.7)
MCH RBC QN AUTO: 26.5 PG (ref 26.5–33)
MCHC RBC AUTO-ENTMCNC: 30.8 G/DL (ref 31.5–36.5)
MCV RBC AUTO: 86 FL (ref 78–100)
PLATELET # BLD AUTO: 197 10E3/UL (ref 150–450)
POTASSIUM SERPL-SCNC: 4.2 MMOL/L (ref 3.4–5.3)
RBC # BLD AUTO: 3.89 10E6/UL (ref 4.4–5.9)
SODIUM SERPL-SCNC: 135 MMOL/L (ref 135–145)
WBC # BLD AUTO: 12.1 10E3/UL (ref 4–11)

## 2024-01-10 PROCEDURE — 36415 COLL VENOUS BLD VENIPUNCTURE: CPT | Performed by: STUDENT IN AN ORGANIZED HEALTH CARE EDUCATION/TRAINING PROGRAM

## 2024-01-10 PROCEDURE — 250N000013 HC RX MED GY IP 250 OP 250 PS 637: Performed by: PHYSICIAN ASSISTANT

## 2024-01-10 PROCEDURE — 250N000011 HC RX IP 250 OP 636: Performed by: STUDENT IN AN ORGANIZED HEALTH CARE EDUCATION/TRAINING PROGRAM

## 2024-01-10 PROCEDURE — 250N000013 HC RX MED GY IP 250 OP 250 PS 637: Performed by: NURSE PRACTITIONER

## 2024-01-10 PROCEDURE — 99207 PR NO BILLABLE SERVICE THIS VISIT: CPT | Performed by: PHYSICIAN ASSISTANT

## 2024-01-10 PROCEDURE — 258N000003 HC RX IP 258 OP 636: Performed by: STUDENT IN AN ORGANIZED HEALTH CARE EDUCATION/TRAINING PROGRAM

## 2024-01-10 PROCEDURE — 85027 COMPLETE CBC AUTOMATED: CPT | Performed by: STUDENT IN AN ORGANIZED HEALTH CARE EDUCATION/TRAINING PROGRAM

## 2024-01-10 PROCEDURE — 80048 BASIC METABOLIC PNL TOTAL CA: CPT | Performed by: STUDENT IN AN ORGANIZED HEALTH CARE EDUCATION/TRAINING PROGRAM

## 2024-01-10 PROCEDURE — 250N000013 HC RX MED GY IP 250 OP 250 PS 637: Performed by: STUDENT IN AN ORGANIZED HEALTH CARE EDUCATION/TRAINING PROGRAM

## 2024-01-10 RX ORDER — BUPRENORPHINE AND NALOXONE 8; 2 MG/1; MG/1
FILM, SOLUBLE BUCCAL; SUBLINGUAL
COMMUNITY
Start: 2024-01-10 | End: 2024-03-14

## 2024-01-10 RX ADMIN — SODIUM CHLORIDE: 9 INJECTION, SOLUTION INTRAVENOUS at 05:31

## 2024-01-10 RX ADMIN — CARVEDILOL 25 MG: 25 TABLET, FILM COATED ORAL at 10:24

## 2024-01-10 RX ADMIN — BUPRENORPHINE AND NALOXONE 1 FILM: 4; 1 FILM BUCCAL; SUBLINGUAL at 10:25

## 2024-01-10 RX ADMIN — HYDRALAZINE HYDROCHLORIDE 50 MG: 50 TABLET, FILM COATED ORAL at 10:24

## 2024-01-10 RX ADMIN — TAMSULOSIN HYDROCHLORIDE 0.4 MG: 0.4 CAPSULE ORAL at 10:22

## 2024-01-10 RX ADMIN — OXYCODONE HYDROCHLORIDE 10 MG: 5 TABLET ORAL at 01:57

## 2024-01-10 RX ADMIN — OXYCODONE HYDROCHLORIDE 10 MG: 5 TABLET ORAL at 05:29

## 2024-01-10 RX ADMIN — FINASTERIDE 5 MG: 5 TABLET, FILM COATED ORAL at 10:24

## 2024-01-10 RX ADMIN — ACETAMINOPHEN 975 MG: 325 TABLET, FILM COATED ORAL at 04:02

## 2024-01-10 RX ADMIN — SENNOSIDES AND DOCUSATE SODIUM 1 TABLET: 50; 8.6 TABLET ORAL at 10:22

## 2024-01-10 RX ADMIN — CYCLOBENZAPRINE 10 MG: 10 TABLET, FILM COATED ORAL at 04:02

## 2024-01-10 RX ADMIN — OXYCODONE HYDROCHLORIDE 10 MG: 5 TABLET ORAL at 10:53

## 2024-01-10 RX ADMIN — POLYETHYLENE GLYCOL 3350 17 G: 17 POWDER, FOR SOLUTION ORAL at 10:25

## 2024-01-10 RX ADMIN — ROSUVASTATIN CALCIUM 20 MG: 20 TABLET, FILM COATED ORAL at 10:24

## 2024-01-10 RX ADMIN — BUPROPION HYDROCHLORIDE 300 MG: 150 TABLET, FILM COATED, EXTENDED RELEASE ORAL at 10:22

## 2024-01-10 RX ADMIN — SIMETHICONE 80 MG: 80 TABLET, CHEWABLE ORAL at 01:57

## 2024-01-10 RX ADMIN — ISOSORBIDE MONONITRATE 60 MG: 30 TABLET, EXTENDED RELEASE ORAL at 10:25

## 2024-01-10 RX ADMIN — HYDROMORPHONE HYDROCHLORIDE 0.4 MG: 0.2 INJECTION, SOLUTION INTRAMUSCULAR; INTRAVENOUS; SUBCUTANEOUS at 04:02

## 2024-01-10 RX ADMIN — AMLODIPINE BESYLATE 5 MG: 5 TABLET ORAL at 10:24

## 2024-01-10 RX ADMIN — PANTOPRAZOLE SODIUM 40 MG: 40 TABLET, DELAYED RELEASE ORAL at 10:24

## 2024-01-10 ASSESSMENT — ACTIVITIES OF DAILY LIVING (ADL)
ADLS_ACUITY_SCORE: 22

## 2024-01-10 NOTE — PROGRESS NOTES
Urology  Progress Note    - Primarily left shoulder pain yesterday - EKG obtained and troponin trended - EKG with no significant change and troponin level not unexpected given cardiac history and post-operative state  - Pain consult yesterday, resumed on 1/2 dose home suboxone regimen  - Afeb, VSS  - Tolerating diet  - Urinating well after catheter removal    Exam  /83   Pulse 76   Temp 99.2  F (37.3  C) (Oral)   Resp 21   Ht 1.829 m (6')   Wt 113.9 kg (251 lb)   SpO2 90%   BMI 34.04 kg/m    No acute distress  Unlabored breathing  Abdomen soft, appropriately tender, mildly distended. Incisions c/d/I with skin glue, no erythema or drainage    /1050+    Labs  WBC 12.1 (15.7)  Hgb 10.3 (11.5)  Cr 2.9 (3.2)    Assessment/Plan  53 year old male s/p left partial nephrectomy on 1/9/24, post-op course with PEDRO now with downtrending Cr, left shoulder pain with EKG with no significant changes and minimally elevated troponin, likely secondary to prior cardiac history and post-operative state, doing well and likely home later today     Neuro: Marti APAP, prn oxy, dilaudid, flexeril for pain control, resume 1/2 dose home suboxone, holding PTA adderall, continue PTA elavil, wellbutrin; appreciate pain service recommendations  CV: HDS, PTA amlodipine, carvedilol, hydralazine, imdur, statin; EKG and troponin yesterday not overly concerning for cardiac event given perioperative state and cardiac history and no significant EKG changes  Pulm: incentive spirometry while awake  FEN/GI: Regular diet, stop IVF, continue bowel regimen  Endo: CHARLEEN  : Voiding spontaneously, Cr downtrending, continue finasteride, tamsulosin  Heme/ID: Periop abx complete, monitor for fevers, leukocytosis  Activity: Up ad ana paula  PPx: SCDs  Dispo: Home later today     Seen and examined, to be discussed with staff     Ede Estes MD  Urology Resident, PGY-5

## 2024-01-10 NOTE — PLAN OF CARE
Date & Time: 01/09/24 2161-8460  Surgery/POD#: L partial nephrectomy   Behavior & Aggression: Green  Fall Risk: Yes  Orientation:A+Ox4  ABNL VS/O2:VSS   ABNL Labs: K 5.5, Co 33, Urea N 22.8, Creatinine 3.22, Ca 8.4, Anion gap 4, Troponin 68, WBC 15.7  Pain Management:PRN oxycodone, Robaxin, warm packs, abdominal binder  Bowel/Bladder: Continent   Drains: None  Diet:Regular   Activity Level: SBA  Tests/Procedures: ECG  Anticipated  DC Date: Pending improvement   Significant Information: NS @ 100mL; Catheter removed this AM however Unable to void until 1740

## 2024-01-10 NOTE — PLAN OF CARE
Surgery/POD#: POD #2 s/p L lap partial nephrectomy  Orientation: A&Ox4  ABNL VS/O2: VSS on RA, hypertensive at times  Pain Management: Scheduled Tylenol, PRN Oxy, Flexeril, Simethicone & IV Dilaudid x2. Heat/ice packs.   Bowel/Bladder: Voiding adequately in urinal. No BM but reports passing gas.   Drains: PIV infusing NS at 100 ml/hr  Diet: Regular  Activity Level: SBA. Up in chair overnight.  Anticipated  DC Date: home 1/10  Significant Information: Abd lap sites w/ liquid bandage CDI. Abd binder on. C/o bilateral shoulder pain, worse w/ movement.

## 2024-01-10 NOTE — PROGRESS NOTES
[facility]    Medicine Progress Note - Hospitalist Service    Brief Hospitalist note    Patient discharged prior to me seeing him. I called him and asked that he hold off on torsemide for 3-4 days and have BMP repeated with PCP within 1 week to check renal function. He is receptive to these instructions.     No charge.    KIRBY Osman, PADulceC  Hospitalist JAM  [facility]  Securely message with the Vocera Web Console (learn more here)  Text page via [employer] Paging/Directory

## 2024-01-10 NOTE — DISCHARGE SUMMARY
POD 2 from a L partial nephrectomy. A&Ox4. CMS intact. Bowel sounds normoactive, denies flatus, tolerating regular diet. VSS. Abdominal incisions secured with surgical glue. Up with IND. C/o bilateral shoulder pain and abdominal  pain, decreased with PRN oxycodone and scheduled suboxone. MD ordered discharge. RN reviewed discharge instructions and medications. RN reminded the patient of his weight restrictions, as he had a duffle bag of belongings, and suggested a wheelchair to meet his daughter as he stated that she was picking him up. When asked what door she could meet him at he stated that she was in the skyway ramp. RN recommended meeting at door 2 as it was close to the ramp. RN suggested that the call the daughter to coordinate  but he refused and stated that he just needed to leave. Staff moved the patient down to door 2 to meet his daughter however the patient told staff that he needed to go to the pharmacy to  a medication. He picked up his duffle bag and walked away. Staff offered to stay with him until his ride arrived but he refused.

## 2024-01-11 ENCOUNTER — CARE COORDINATION (OUTPATIENT)
Dept: UROLOGY | Facility: CLINIC | Age: 54
End: 2024-01-11
Payer: COMMERCIAL

## 2024-01-11 LAB
ATRIAL RATE - MUSE: 64 BPM
DIASTOLIC BLOOD PRESSURE - MUSE: NORMAL MMHG
INTERPRETATION ECG - MUSE: NORMAL
P AXIS - MUSE: 16 DEGREES
PATH REPORT.COMMENTS IMP SPEC: NORMAL
PATH REPORT.FINAL DX SPEC: NORMAL
PATH REPORT.GROSS SPEC: NORMAL
PATH REPORT.MICROSCOPIC SPEC OTHER STN: NORMAL
PATH REPORT.RELEVANT HX SPEC: NORMAL
PHOTO IMAGE: NORMAL
PR INTERVAL - MUSE: 282 MS
QRS DURATION - MUSE: 192 MS
QT - MUSE: 434 MS
QTC - MUSE: 447 MS
R AXIS - MUSE: 163 DEGREES
SYSTOLIC BLOOD PRESSURE - MUSE: NORMAL MMHG
T AXIS - MUSE: -12 DEGREES
VENTRICULAR RATE- MUSE: 64 BPM

## 2024-01-11 PROCEDURE — 88307 TISSUE EXAM BY PATHOLOGIST: CPT | Mod: 26 | Performed by: PATHOLOGY

## 2024-01-11 PROCEDURE — 88341 IMHCHEM/IMCYTCHM EA ADD ANTB: CPT | Mod: 26 | Performed by: PATHOLOGY

## 2024-01-11 PROCEDURE — 88342 IMHCHEM/IMCYTCHM 1ST ANTB: CPT | Mod: 26 | Performed by: PATHOLOGY

## 2024-01-12 ENCOUNTER — NURSE TRIAGE (OUTPATIENT)
Dept: NURSING | Facility: CLINIC | Age: 54
End: 2024-01-12
Payer: COMMERCIAL

## 2024-01-12 NOTE — TELEPHONE ENCOUNTER
Nurse Triage SBAR    Is this a 2nd Level Triage? YES, LICENSED PRACTITIONER REVIEW IS REQUIRED    Situation:   Patient after procedure had a baseball sized bruise, purple in color, now it is down to his mid thigh.  Pt is concerned for internal bleeding.    Background:  ROBOTIC ASSISTED LAPAROSCOPIC PARTIAL NEPHRECTOMY - LEFT,  ROBOTIC ASSISTED LAPAROSCOPIC INTRA-OPERATIVE RENAL ULTRASOUND   Chung Howard MD   on  01-    Assessment:   Patient is concerned that he is having internal bleeding since his bruising is down to his mid thigh area.  Purple in color  Patient is up and around in his home  Patient denies fall or injury  Patient denies fever or chills  Patient is taking Oxycodone for pain control  Patients pain just sitting is 3/10 steady sharp pain  When getting up out of the chair, it is a 4/10  Pain when getting up out of bed is a 9/10 and can bring tears to his eyes.  He denies chest discomfort, but does say that slight SOB due to his sutures are hurting.  He has had a BM since his procedure  He states that his fluids intake is good.  Patient denies blood in the BM or urine.    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      Protocol Recommended Disposition:   Routing to Urology for a call back    Recommendations:   we will route to Urology to call you back to discuss your concern about internal bleeding.      Routed to provider          Reason for Disposition   Caller has URGENT question and triager unable to answer question    Additional Information   Negative: Bright red, wide-spread, sunburn-like rash   Negative: SEVERE headache and after spinal (epidural) anesthesia   Negative: Vomiting and persists > 4 hours   Negative: Vomiting and abdomen looks much more swollen than usual   Negative: Drinking very little and dehydration suspected (e.g., no urine > 12 hours, very dry mouth, very lightheaded)   Negative: Patient sounds very sick or weak to the  "triager   Negative: Sounds like a serious complication to the triager   Negative: Fever > 100.4 F (38.0 C)   Negative: Fever present > 3 days (72 hours)   Negative: Headache and after spinal (epidural) anesthesia and not severe    Answer Assessment - Initial Assessment Questions  1. SYMPTOM: \"What's the main symptom you're concerned about?\" (e.g., pain, fever, vomiting)        2. ONSET: \"When did   start?\"        3. SURGERY: \"What surgery did you have?\"        4. DATE of SURGERY: \"When was the surgery?\"         5. ANESTHESIA: \" What type of anesthesia did you have?\" (e.g., general, spinal, epidural, local)        6. PAIN: \"Is there any pain?\" If Yes, ask: \"How bad is it?\"  (Scale 1-10; or mild, moderate, severe)      Pain   3/10  to left flank to 5th lateral rib, non radiating  7. FEVER: \"Do you have a fever?\" If Yes, ask: \"What is your temperature, how was it measured, and when did it start?\"      no  8. VOMITING: \"Is there any vomiting?\" If Yes, ask: \"How many times?\"      no  9. BLEEDING: \"Is there any bleeding?\" If Yes, ask: \"How much?\" and \"Where?\"      no  10. OTHER SYMPTOMS: \"Do you have any other symptoms?\" (e.g., drainage from wound, painful urination, constipation)        No drainage, no painful urination, no constipation, minimal bowel movement since procedure    Protocols used: Post-Op Symptoms and [id number]    "

## 2024-01-12 NOTE — TELEPHONE ENCOUNTER
Please see post op MyChart correspondence with patient, will also address these findings.    Ruth Elizabeth RN, BSN  Care Coordinator  [facility]

## 2024-01-13 DIAGNOSIS — E29.1 HYPOGONADISM MALE: ICD-10-CM

## 2024-01-13 NOTE — LETTER
January 23, 2024      Jeremi Damon  [address]  [place] [address]        We received a refill request from your pharmacy for your testosterone cypionate (DEPOTESTOSTERONE) 200 MG/ML injection medication.  At this time the nurses were able to give you a chuy refill, but you are due to be seen for a lab appointment in the morning before the next refill.  This appointment can be scheduled by calling [phone] or can be scheduled via [employer] as well.    Thank you for choosing [facility]            Sincerely,                Nate Segovia M.D.

## 2024-01-14 RX ORDER — TESTOSTERONE CYPIONATE 200 MG/ML
200 INJECTION, SOLUTION INTRAMUSCULAR WEEKLY
Qty: 4 ML | Refills: 1 | Status: SHIPPED | OUTPATIENT
Start: 2024-01-14 | End: 2024-04-21

## 2024-01-18 ENCOUNTER — TELEPHONE (OUTPATIENT)
Dept: GASTROENTEROLOGY | Facility: CLINIC | Age: 54
End: 2024-01-18
Payer: COMMERCIAL

## 2024-01-18 NOTE — TELEPHONE ENCOUNTER
Caller: Jeremi  Reason for Reschedule/Cancellation (please be detailed, any staff messages or encounters to note?): Patient should be pushed out 2-3 months per [employer] message. I spoke with patient and he would like a call back in a few weeks due to just having a procedure done. Will send back to [employer].       Prior to reschedule please review:  Ordering Provider:     Luis Armando Segovia MD in [facility]     Sedation per order: MAC  Does patient have any ASC Exclusions, please identify?: Y ERICA      Notes on Cancelled Procedure:  Procedure: Lower Endoscopy [Colonoscopy]   Date: 01/31/2024  Location: [facility]; [facility] [place] [address] [place] [address]  Surgeon: Sean      Rescheduled: No

## 2024-01-18 NOTE — TELEPHONE ENCOUNTER
Per Dr. Estrada, patient should reschedule procedure 2-3 months out to recover from renal mass surgery. Notified scheduling of change.     Kelly Castro RN  Endoscopy Procedure Pre Assessment RN  [phone] option 4

## 2024-01-18 NOTE — TELEPHONE ENCOUNTER
Sent FYI message to  nursing [place] and performing provider re: patient colonoscopy. Patient had surgery to remove renal mass on 1/8/24. May need approval from surgeon to proceed with colonoscopy.    ---------------------------------------------------------------------------------------------------------------------      Attempted to contact patient in order to complete pre assessment questions.     No answer. Left message to return call to [phone] option 4      Procedure details:    Patient scheduled for Colonoscopy  on 1/31/24.     Arrival time: 1200. Procedure time 1300    Pre op exam needed? Yes.  Patient needs to complete a pre-operative exam prior to procedure.  Patient had pre-op on 1/4/24 for procedure on 1/8/24.     Facility location: [facility]; [address] [place] [address]    Sedation type: MAC    Indication for procedure: Screening      Chart review:     Electronic implanted devices? No    Recent diagnosis of diverticulitis within the last 6 weeks? No    Diabetic? No      Medication review:    Anticoagulants? Yes Clopidogrel (Plavix): Recommended HOLD 5 days before procedure.  Consult with your managing provider.    NSAIDS? No NSAID medications per patient's medication list.  RN will verify with pre-assessment call.    Other medication HOLDING recommendations:  N/A      Prep for procedure:     Bowel prep recommendation: Extended Golytely   Due to: chronic pain medication noted in chart.  and CKD noted.     Prep instructions sent via [employer]. Bowel prep script sent to    [facility] [place], [place] - [address]      Kelly Castro RN  Endoscopy Procedure Pre Assessment RN

## 2024-01-24 ENCOUNTER — OFFICE VISIT (OUTPATIENT)
Dept: UROLOGY | Facility: CLINIC | Age: 54
End: 2024-01-24
Payer: COMMERCIAL

## 2024-01-24 VITALS
HEART RATE: 84 BPM | HEIGHT: 72 IN | WEIGHT: 251 LBS | DIASTOLIC BLOOD PRESSURE: 89 MMHG | BODY MASS INDEX: 34 KG/M2 | OXYGEN SATURATION: 99 % | SYSTOLIC BLOOD PRESSURE: 159 MMHG

## 2024-01-24 DIAGNOSIS — Z90.5 H/O PARTIAL NEPHRECTOMY: ICD-10-CM

## 2024-01-24 DIAGNOSIS — N28.89 LEFT RENAL MASS: Primary | ICD-10-CM

## 2024-01-24 PROCEDURE — 99024 POSTOP FOLLOW-UP VISIT: CPT | Performed by: UROLOGY

## 2024-01-24 RX ORDER — OXYCODONE HYDROCHLORIDE 5 MG/1
5 TABLET ORAL EVERY 6 HOURS PRN
Qty: 9 TABLET | Refills: 0 | Status: SHIPPED | OUTPATIENT
Start: 2024-01-24 | End: 2024-01-27

## 2024-01-24 ASSESSMENT — PAIN SCALES - GENERAL: PAINLEVEL: SEVERE PAIN (7)

## 2024-01-24 NOTE — LETTER
1/24/2024       RE: Jeremi Damon  [address]  [facility] [address]     Dear Colleague,    Thank you for referring your patient, Jeremi Damon, to the [facility] at [facility]. Please see a copy of my visit note below.    [place]   CHIEF COMPLAINT   It was my pleasure to see Jeremi Damon who is a 53 year old male for follow-up of left renal oncocytoma.      HPI  Jeremi Damon is a 53 year old male who is being seen for evaluation of LEFT renal mass     Initially seen as hospital consult on 11/22/2022:  This patient is a 52 year old male admitted with hypertensive urgency/emergency, NSTEMI and not taking BP meds. Renal US done 11/21/22 due to continued renal dysfunction. This noted a mass in the medial inferior aspect of the left kidney (2.2 x 2.2 x 2.5 cm) and prostatomegaly. Nonsmoker. No family hx of urologic cancers.      Micro hematuria noted as far back as 2007 (referred to Urology in 2002 and 2011, but not evaluated). Most recent UA 11/20, does not have a micro exam.  Cr 1.74 today (1.75 yesterday).      Has slower stream. Nocturia. No gross hematuria.      11/20/2023:  He had been recommended to have a repeat CT scan after 6 months, however just had this completed no  He is on finasteride 1mg for hair loss  He does note some mild LUTS  He reports that his pressure control has been under much tighter control     He works as a chiropractor    TODAY 1/24/2024:  Status post a robotic partial nephrectomy on 1/8/2024  Pathology consistent with an oncocytoma  Having some diffuse abdominal pain bilaterally  Having some issues with constipation and on a stool softener regimen    PHYSICAL EXAM  Patient is a 53 year old  male   Vitals: Blood pressure (!) 159/89, pulse 84, height 1.829 m (6'), weight 113.9 kg (251 lb), SpO2 99%.  Body mass index is 34.04 kg/m .  General Appearance Adult:   Alert, no acute distress, oriented  HENT:  throat/mouth:normal, good dentition  Lungs: no respiratory distress, or pursed lip breathing  Heart: No obvious jugular venous distension present  Abdomen: soft, nontender to palpation  Incisions: clean, dry and intact with peeling skin glue in place, no infection or hernia noted  Musculoskeltal: extremities normal, no peripheral edema  Skin: no suspicious lesions or rashes  Neuro: Alert, oriented, speech and mentation normal  Psych: affect and mood normal  Gait: Normal      Creatinine   Date Value Ref Range Status   01/10/2024 2.91 (H) 0.67 - 1.17 mg/dL Final   05/05/2021 2.05 (H) 0.66 - 1.25 mg/dL Final        PATHOLOGY:  1/8/2024:  Final Diagnosis   Left kidney, partial nephrectomy:  -Oncocytoma, please see description         ASSESSMENT and PLAN  53-year-old man status post a left partial nephrectomy with evidence of an oncocytoma    Left renal oncocytoma  - Reviewed his pathology from surgery with evidence of a benign renal oncocytoma.  We discussed the implications of this and this is very excellent news  - he is having some postsurgical abdominal pain and prescription for oxycodone sent to the pharmacy.  We discussed that this would be my only prescription for oxycodone for him  - No further follow-up or imaging needed    CKD stage III  - Reviewed his serum creatinine and he is due for an updated lab and order for this placed  - We discussed the impact of chronic ibuprofen use  - I recommend he follow-up closely with his PCP    He may follow-up with me on a as needed basis    Time spent: 10 minutes spent on the date of the encounter doing chart review, history and exam, documentation and further activities as noted above.    Chung Howard MD   Urology  [facility]  [facility] Phone: [phone]  [facility] Phone: [phone]

## 2024-01-24 NOTE — NURSING NOTE
Chief Complaint   Patient presents with    Surgical Followup    Abd pain   No blood in the urine   Cordell Hightower, CMA

## 2024-01-24 NOTE — PROGRESS NOTES
[facility]   CHIEF COMPLAINT   It was my pleasure to see Jeremi Damon who is a 53 year old male for follow-up of left renal oncocytoma.      HPI  Jeremi Damon is a 53 year old male who is being seen for evaluation of LEFT renal mass     Initially seen as hospital consult on 11/22/2022:  This patient is a 52 year old male admitted with hypertensive urgency/emergency, NSTEMI and not taking BP meds. Renal US done 11/21/22 due to continued renal dysfunction. This noted a mass in the medial inferior aspect of the left kidney (2.2 x 2.2 x 2.5 cm) and prostatomegaly. Nonsmoker. No family hx of urologic cancers.      Micro hematuria noted as far back as 2007 (referred to Urology in 2002 and 2011, but not evaluated). Most recent UA 11/20, does not have a micro exam.  Cr 1.74 today (1.75 yesterday).      Has slower stream. Nocturia. No gross hematuria.      11/20/2023:  He had been recommended to have a repeat CT scan after 6 months, however just had this completed no  He is on finasteride 1mg for hair loss  He does note some mild LUTS  He reports that his pressure control has been under much tighter control     He works as a chiropractor    TODAY 1/24/2024:  Status post a robotic partial nephrectomy on 1/8/2024  Pathology consistent with an oncocytoma  Having some diffuse abdominal pain bilaterally  Having some issues with constipation and on a stool softener regimen    PHYSICAL EXAM  Patient is a 53 year old  male   Vitals: Blood pressure (!) 159/89, pulse 84, height 1.829 m (6'), weight 113.9 kg (251 lb), SpO2 99%.  Body mass index is 34.04 kg/m .  General Appearance Adult:   Alert, no acute distress, oriented  HENT: throat/mouth:normal, good dentition  Lungs: no respiratory distress, or pursed lip breathing  Heart: No obvious jugular venous distension present  Abdomen: soft, nontender to palpation  Incisions: clean, dry and intact with peeling skin glue in place, no infection or hernia noted  Musculoskeltal:  extremities normal, no peripheral edema  Skin: no suspicious lesions or rashes  Neuro: Alert, oriented, speech and mentation normal  Psych: affect and mood normal  Gait: Normal      Creatinine   Date Value Ref Range Status   01/10/2024 2.91 (H) 0.67 - 1.17 mg/dL Final   05/05/2021 2.05 (H) 0.66 - 1.25 mg/dL Final        PATHOLOGY:  1/8/2024:  Final Diagnosis   Left kidney, partial nephrectomy:  -Oncocytoma, please see description         ASSESSMENT and PLAN  53-year-old man status post a left partial nephrectomy with evidence of an oncocytoma    Left renal oncocytoma  - Reviewed his pathology from surgery with evidence of a benign renal oncocytoma.  We discussed the implications of this and this is very excellent news  - he is having some postsurgical abdominal pain and prescription for oxycodone sent to the pharmacy.  We discussed that this would be my only prescription for oxycodone for him  - No further follow-up or imaging needed    CKD stage III  - Reviewed his serum creatinine and he is due for an updated lab and order for this placed  - We discussed the impact of chronic ibuprofen use  - I recommend he follow-up closely with his PCP    He may follow-up with me on a as needed basis    Time spent: 10 minutes spent on the date of the encounter doing chart review, history and exam, documentation and further activities as noted above.    Chung Howard MD   Urology  [facility]  [facility] Phone: [phone]  [facility] Phone: [phone]

## 2024-01-26 ENCOUNTER — MYC REFILL (OUTPATIENT)
Dept: FAMILY MEDICINE | Facility: CLINIC | Age: 54
End: 2024-01-26
Payer: COMMERCIAL

## 2024-01-26 DIAGNOSIS — F90.9 ATTENTION DEFICIT HYPERACTIVITY DISORDER (ADHD), UNSPECIFIED ADHD TYPE: ICD-10-CM

## 2024-01-29 RX ORDER — DEXTROAMPHETAMINE SACCHARATE, AMPHETAMINE ASPARTATE, DEXTROAMPHETAMINE SULFATE AND AMPHETAMINE SULFATE 5; 5; 5; 5 MG/1; MG/1; MG/1; MG/1
20 TABLET ORAL 2 TIMES DAILY
Qty: 60 TABLET | Refills: 0 | Status: SHIPPED | OUTPATIENT
Start: 2024-03-02 | End: 2024-05-14

## 2024-01-29 RX ORDER — DEXTROAMPHETAMINE SACCHARATE, AMPHETAMINE ASPARTATE, DEXTROAMPHETAMINE SULFATE AND AMPHETAMINE SULFATE 5; 5; 5; 5 MG/1; MG/1; MG/1; MG/1
20 TABLET ORAL 2 TIMES DAILY
Qty: 60 TABLET | Refills: 0 | Status: SHIPPED | OUTPATIENT
Start: 2024-04-01 | End: 2024-05-04

## 2024-01-29 RX ORDER — DEXTROAMPHETAMINE SACCHARATE, AMPHETAMINE ASPARTATE, DEXTROAMPHETAMINE SULFATE AND AMPHETAMINE SULFATE 5; 5; 5; 5 MG/1; MG/1; MG/1; MG/1
20 TABLET ORAL 2 TIMES DAILY
Qty: 60 TABLET | Refills: 0 | Status: SHIPPED | OUTPATIENT
Start: 2024-02-01 | End: 2024-03-02

## 2024-02-08 ENCOUNTER — TELEPHONE (OUTPATIENT)
Dept: UROLOGY | Facility: CLINIC | Age: 54
End: 2024-02-08

## 2024-02-08 NOTE — TELEPHONE ENCOUNTER
Patient calling into nurse triage stating that he's still continues to have severe abdominal pain since after his left partial nephrectomy on 1/8/24 with Dr. Howard.  Patient states that he has a hard time taking a full breath due to the amount of abdominal pain he's in.  He still has abdominal swelling and has had significant weight loss (unable to tell me the exact amount).  Patient states this has been ongoing since right after his surgery.  Recommended patient be further evaluated in the ED but patient refuses.  Appointment made with Dr. Howard on 2/14/24 for further evaluation.  Patient will go to ED if symptoms get worse.  Dr. Howard updated.    Ruth Elizabeth, RN, BSN  Care Coordinator  [facility]

## 2024-02-10 ENCOUNTER — MYC REFILL (OUTPATIENT)
Dept: PALLIATIVE MEDICINE | Facility: CLINIC | Age: 54
End: 2024-02-10
Payer: COMMERCIAL

## 2024-02-10 DIAGNOSIS — F11.20 UNCOMPLICATED OPIOID DEPENDENCE (H): ICD-10-CM

## 2024-02-10 DIAGNOSIS — G89.4 CHRONIC PAIN SYNDROME: ICD-10-CM

## 2024-02-12 RX ORDER — BUPRENORPHINE AND NALOXONE 8; 2 MG/1; MG/1
FILM, SOLUBLE BUCCAL; SUBLINGUAL
Qty: 60 FILM | Refills: 0 | Status: SHIPPED | OUTPATIENT
Start: 2024-02-12 | End: 2024-03-06

## 2024-02-12 NOTE — TELEPHONE ENCOUNTER
Routing to provider to review medication prepped per below      Suboxone 8-2 , #60, Refill:no  Si/2 film QID - Brand name only. Ok to dispense 24 and start 02/15/24  Last picked up 01/15/24 with start on 24  Due 24    Per last OV note 23:  Continue suboxone at current dose  - Continue buprenorphine HCl-naloxone HCl (SUBOXONE) 8-2 MG per film;  film   FOLLOW UP:  EVERY 3 MONTHS - 2023 @ 1030am  This is now 24      Trixie ASTORGA, RN Care Coordinator  [facility]  Pain [facility]

## 2024-02-12 NOTE — TELEPHONE ENCOUNTER
Received call from patient requesting refill(s) of     BUPRENORPHINE HCL-NALOXONE HCL 8-2 MG SL FILM  Last dispensed from pharmacy on: Stewart. 15, 2024     Patient's last office/virtual visit by prescribing provider on: Oct. 2, 2019   Next office/virtual appointment scheduled for: Mar. 14, 2024     UDT: Nov. 2, 2023   CSA: Nov. 2, 2023     E-prescribe to    [facility] [place] - [address] [facility]      Will route to nursing pool for review and preparation of prescription(s).     Tammy Michele, Clinic Facilitator  [facility]

## 2024-02-12 NOTE — TELEPHONE ENCOUNTER
Refills have been requested for the following medications:         buprenorphine HCl-naloxone HCl (SUBOXONE) 8-2 MG per film [Matilde Berg]      Patient Comment: I ask this in case I cannot find an appointment before it runs out. I am actively rescheduling my missed appointment earlier this week. Thank you     Preferred pharmacy: [facility] [place], [place] - [address] [facility]

## 2024-02-12 NOTE — TELEPHONE ENCOUNTER
Script Eprescribed to pharmacy  [place] Prescription Monitoring Program checked      Signed Prescriptions:                        Disp   Refills    buprenorphine HCl-naloxone HCl (SUBOXONE) *60 Film0        Si/2 film QID - Brand name only. Ok to dispense           24 and start 02/15/24  Authorizing Provider: ABENA MEDELLIN MD

## 2024-02-20 ENCOUNTER — TELEPHONE (OUTPATIENT)
Dept: UROLOGY | Facility: CLINIC | Age: 54
End: 2024-02-20
Payer: COMMERCIAL

## 2024-02-20 NOTE — TELEPHONE ENCOUNTER
[facility] Call Center    Phone Message    May a detailed message be left on voicemail: no     Reason for Call: Other: Patient called the clinic and was insistent that he needed a kidney function test along with other things. Patient stated that he needed to schedule these things with a nurse. Writer reached out to clinic and they were not sure either. Patient sounded very confused. Please call patient back to discuss.      Action Taken: Other: [facility]    Travel Screening: Not Applicable

## 2024-02-28 ENCOUNTER — TELEPHONE (OUTPATIENT)
Dept: UROLOGY | Facility: CLINIC | Age: 54
End: 2024-02-28

## 2024-02-28 NOTE — TELEPHONE ENCOUNTER
Spoke with patient, he states his RLQ pain has decreased significantly after pushing a lot of fluids since we last spoke.  Recommended he follow up with his PCP if the pain starts to increase again.  Patient states understanding.    Ruth Elizabeth RN, BSN  Care Coordinator  [facility]            Chung Howard MD Sanh, Nina, CINDY Miranda,  No, I do not think any right-sided lower quadrant pain and possible inguinal hernia would be related to his left partial nephrectomy.  I agree with your plan to refer him to his PCP for further evaluation.    Thanks,  Chung Howard M.D.          Previous Messages       ----- Message -----  From: Ruth Elizabeth RN  Sent: 2/22/2024   1:55 PM CST  To: Chung Howard MD  Subject: Pain in RLQ                                      Hi Dr. Howard    Pt is calling stating for the past week he's been having pain in his RLQ down to his groin when he pushes to void.  Feels it may be an inguinal hernia.  Do you feel there could be anything from a urologic standpoint here?  Otherwise I'm going to refer him to his PCP for further evaluation.    Thanks,  Ruth

## 2024-03-05 DIAGNOSIS — G47.00 INSOMNIA, UNSPECIFIED TYPE: ICD-10-CM

## 2024-03-05 RX ORDER — AMITRIPTYLINE HYDROCHLORIDE 50 MG/1
150 TABLET ORAL AT BEDTIME
Qty: 270 TABLET | Refills: 1 | Status: SHIPPED | OUTPATIENT
Start: 2024-03-05

## 2024-03-06 DIAGNOSIS — F11.20 UNCOMPLICATED OPIOID DEPENDENCE (H): ICD-10-CM

## 2024-03-06 DIAGNOSIS — G89.4 CHRONIC PAIN SYNDROME: ICD-10-CM

## 2024-03-06 NOTE — TELEPHONE ENCOUNTER
Routing to provider to review medication prepped per below    buprenorphine HCl-naloxone HCl (SUBOXONE) 8-2 MG per film #60, Refill:0  Si/2 film QID - Brand name only.   Last picked up 24 with start on 2/15/24  Due: OK to fill 3/14/24 and start 3/16/24    Per last OV note 23: - He continues to take his suboxone 4mg QID       [facility] [place] [facility] [address] [facility]  [address]  [place] [address]  Phone: [phone] Fax: [phone]    Carmina MANRIQUE RN Care Coordinator  [facility]

## 2024-03-06 NOTE — TELEPHONE ENCOUNTER
Pt called to request:     Disp Refills Start End COLLETTE   buprenorphine HCl-naloxone HCl (SUBOXONE) 8-2 MG per film            Routing to prep    Carmina MANRIQUE RN Care Coordinator  [facility]

## 2024-03-06 NOTE — TELEPHONE ENCOUNTER
Received call from patient requesting refill(s) of buprenorphine HCl-naloxone HCl (SUBOXONE) 8-2 MG per film    Last dispensed from pharmacy on 2/12/2024.    Patient's last office/virtual visit by prescribing provider on 11/2/2023.  Next office/virtual appointment scheduled for 3/14/2024.    Last urine drug screen date 11/2/2023.  Current opioid agreement on file (completed within the last year) Yes Date of opioid agreement: 11/2/2023.    E-prescribe to:     [facility] [place] [place], [place] - [address] [facility]    Will route to nursing [place] for review and preparation of prescription(s).

## 2024-03-07 RX ORDER — BUPRENORPHINE AND NALOXONE 8; 2 MG/1; MG/1
FILM, SOLUBLE BUCCAL; SUBLINGUAL
Qty: 60 FILM | Refills: 0 | Status: SHIPPED | OUTPATIENT
Start: 2024-03-07 | End: 2024-03-14

## 2024-03-07 NOTE — TELEPHONE ENCOUNTER
Script Eprescribed to pharmacy  [place] Prescription Monitoring Program checked      Signed Prescriptions:                        Disp   Refills    buprenorphine HCl-naloxone HCl (SUBOXONE) *60 Film0        Si/2 film QID - Brand name only. OK to fill 3/14/24           and start 3/16/24  Authorizing Provider: ABENA MEDELLIN MD

## 2024-03-12 DIAGNOSIS — F40.243 FLYING PHOBIA: ICD-10-CM

## 2024-03-12 RX ORDER — CLONAZEPAM 1 MG/1
1 TABLET ORAL 2 TIMES DAILY PRN
Qty: 4 TABLET | Refills: 0 | Status: SHIPPED | OUTPATIENT
Start: 2024-03-12 | End: 2024-04-13

## 2024-03-14 ENCOUNTER — OFFICE VISIT (OUTPATIENT)
Dept: PALLIATIVE MEDICINE | Facility: CLINIC | Age: 54
End: 2024-03-14
Payer: COMMERCIAL

## 2024-03-14 VITALS
BODY MASS INDEX: 32.7 KG/M2 | DIASTOLIC BLOOD PRESSURE: 84 MMHG | WEIGHT: 241.1 LBS | HEART RATE: 69 BPM | SYSTOLIC BLOOD PRESSURE: 146 MMHG | OXYGEN SATURATION: 95 %

## 2024-03-14 DIAGNOSIS — G43.709 CHRONIC MIGRAINE WITHOUT AURA, NOT INTRACTABLE, WITHOUT STATUS MIGRAINOSUS: ICD-10-CM

## 2024-03-14 DIAGNOSIS — Z79.899 ENCOUNTER FOR LONG-TERM (CURRENT) USE OF HIGH-RISK MEDICATION: ICD-10-CM

## 2024-03-14 DIAGNOSIS — G89.4 CHRONIC PAIN SYNDROME: ICD-10-CM

## 2024-03-14 DIAGNOSIS — F41.1 GENERALIZED ANXIETY DISORDER: ICD-10-CM

## 2024-03-14 DIAGNOSIS — F11.20 UNCOMPLICATED OPIOID DEPENDENCE (H): Primary | ICD-10-CM

## 2024-03-14 PROCEDURE — 99215 OFFICE O/P EST HI 40 MIN: CPT | Mod: 25 | Performed by: ANESTHESIOLOGY

## 2024-03-14 PROCEDURE — 64615 CHEMODENERV MUSC MIGRAINE: CPT | Performed by: ANESTHESIOLOGY

## 2024-03-14 RX ORDER — BUPRENORPHINE AND NALOXONE 8; 2 MG/1; MG/1
FILM, SOLUBLE BUCCAL; SUBLINGUAL
Qty: 60 FILM | Refills: 2 | Status: SHIPPED | OUTPATIENT
Start: 2024-03-14 | End: 2024-07-23

## 2024-03-14 ASSESSMENT — PAIN SCALES - PAIN ENJOYMENT GENERAL ACTIVITY SCALE (PEG)
AVG_PAIN_PASTWEEK: 8
INTERFERED_ENJOYMENT_LIFE: 5
INTERFERED_GENERAL_ACTIVITY: 5
PEG_TOTALSCORE: 6
INTERFERED_ENJOYMENT_LIFE: 5
PEG_TOTALSCORE: 6
INTERFERED_GENERAL_ACTIVITY: 5
AVG_PAIN_PASTWEEK: 8

## 2024-03-14 ASSESSMENT — PAIN SCALES - GENERAL: PAINLEVEL: SEVERE PAIN (7)
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
## 2024-03-14 NOTE — PATIENT INSTRUCTIONS
[facility]  Botox Injection Discharge Instructions    Do not rub or put extended pressure on the injection sites. You may gently touch the sites to remove any excess blood.  Monitor the injection sites for signs and symptoms of infection such as redness, swelling, warmth, fever, chills, or drainage to areas.  You may have soreness at the injection sites for up to 24 hours.  If you are able to use anti-inflammatory medications or Tylenol for pain control, you can take these as directed.  It may take up to 1 month to notice benefit from the 1st treatment  If you do notice relief, botox can be done every 3 months.  It may require insurance authorization everytime.    For questions about insurance coverage, please call the main clinic number and ask to speak with someone about botox coverage.     Pain Clinic phone number during work hours (Monday through Friday 8 am-4:30 pm) at [phone] or the Provider Line after hours at [phone]:

## 2024-03-17 DIAGNOSIS — G47.00 INSOMNIA, UNSPECIFIED TYPE: ICD-10-CM

## 2024-03-18 RX ORDER — ZOLPIDEM TARTRATE 10 MG/1
10 TABLET ORAL
Qty: 30 TABLET | Refills: 2 | Status: SHIPPED | OUTPATIENT
Start: 2024-03-18 | End: 2024-05-14

## 2024-03-21 NOTE — TELEPHONE ENCOUNTER
March 21, 2024       [facility]  [address] [facility] [address]    Raman    Ariel had an upper gastrointestinal endoscopy performed at [facility] on March 13th, 2024.    The biopsy results: pathology from the endoscopy biopsies just showed some mild nonspecific inflammation in the stomach and small intestine but no bacteria causing it. The esophagus biopsies were normal. Follow up as planned     Please keep in mind that many colon polyps are precancerous and should be removed to prevent a cancerous growth in the future. Please contact us or your primary care physician when it is time for your repeat colonoscopy, or sooner, if you are experiencing gastrointestinal problems.    If you have any questions or concerns, please contact the clinic at [phone] for an appointment.    Thank you,                                                                 Dr. Chin     Refilled per RN Protocol.       Asmita Marinelli RN  [facility]

## 2024-04-12 DIAGNOSIS — F40.243 FLYING PHOBIA: ICD-10-CM

## 2024-04-13 RX ORDER — CLONAZEPAM 1 MG/1
1 TABLET ORAL 2 TIMES DAILY PRN
Qty: 4 TABLET | Refills: 0 | Status: SHIPPED | OUTPATIENT
Start: 2024-04-13 | End: 2024-05-14

## 2024-04-17 DIAGNOSIS — F41.1 GENERALIZED ANXIETY DISORDER: ICD-10-CM

## 2024-04-17 DIAGNOSIS — F41.0 PANIC DISORDER WITHOUT AGORAPHOBIA: ICD-10-CM

## 2024-04-18 RX ORDER — LORAZEPAM 1 MG/1
1 TABLET ORAL 2 TIMES DAILY PRN
Qty: 60 TABLET | Refills: 5 | Status: SHIPPED | OUTPATIENT
Start: 2024-04-18

## 2024-04-18 NOTE — TELEPHONE ENCOUNTER
sent    Last visit in this dept:    9/8/2023     Last visit -this provider:  10/24/2023     Next visit in this dept:   Future Appointments 4/18/2024 - 10/15/2024        Date Visit Type Length Department Provider     6/3/2024 10:15 AM RETURN PATIENT 15 min [facility] Chung Henderson MD    Location Instructions:     Clinic is located at [address] [place] [address], [address], [place] [address]              7/23/2024  1:45 PM RETURN PAIN 60 min [facility] MANAG[facility] Macy Subramanian MD                     Health Maintenance   Topic Date Due    HF ACTION PLAN  Never done    ZOSTER IMMUNIZATION (1 of 2) Never done    HEPATITIS A IMMUNIZATION (1 of 2 - Risk 2-dose series) Never done    ASTHMA ACTION PLAN  06/28/2020    COVID-19 Vaccine (3 - Moderna risk series) 10/22/2021    INFLUENZA VACCINE (1) 09/01/2023    COLORECTAL CANCER SCREENING  10/13/2023    ASTHMA CONTROL TEST  02/03/2024    MICROALBUMIN  03/08/2024    LIPID  04/21/2024    PHQ-9  07/04/2024    MEDICARE ANNUAL WELLNESS VISIT  09/08/2024    ALT  09/08/2024    CMP  09/08/2024    PSA  09/08/2024    ANNUAL REVIEW OF HM ORDERS  09/08/2024    NANCY ASSESSMENT  01/04/2025    BMP  01/10/2025    CBC  01/10/2025    HEMOGLOBIN  01/10/2025    GLUCOSE  01/10/2027    DTAP/TDAP/TD IMMUNIZATION (3 - Td or Tdap) 05/20/2027    ADVANCE CARE PLANNING  06/24/2027    PARATHYROID  Completed    PHOSPHORUS  Completed    TSH W/FREE T4 REFLEX  Completed    HEPATITIS C SCREENING  Completed    HIV SCREENING  Completed    Pneumococcal Vaccine: Pediatrics (0 to 5 Years) and At-Risk Patients (6 to 64 Years)  Completed    URINALYSIS  Completed    ALK PHOS  Completed    IPV IMMUNIZATION  Aged Out    HPV IMMUNIZATION  Aged Out    MENINGITIS IMMUNIZATION  Aged Out    RSV MONOCLONAL ANTIBODY  Aged Out    HEPATITIS B IMMUNIZATION  Discontinued        CSA -- Patient Level:     [Media Unavailable] Controlled Substance Agreement - Opioid - Scan on 11/2/2023 11:06 AM   [Media Unavailable] Controlled  Substance Agreement - Non - Opioid - Scan on 6/25/2022  5:47 AM: NON-OPIOID CONTROLLED SUBSTANCE AGREEMENT

## 2024-04-20 DIAGNOSIS — E29.1 HYPOGONADISM MALE: ICD-10-CM

## 2024-04-21 RX ORDER — TESTOSTERONE CYPIONATE 200 MG/ML
200 INJECTION, SOLUTION INTRAMUSCULAR WEEKLY
Qty: 4 ML | Refills: 1 | Status: SHIPPED | OUTPATIENT
Start: 2024-04-21 | End: 2024-05-14

## 2024-05-01 ENCOUNTER — HOSPITAL ENCOUNTER (EMERGENCY)
Facility: CLINIC | Age: 54
Discharge: LEFT BEFORE DISCHARGE | End: 2024-05-02
Admitting: EMERGENCY MEDICINE
Payer: COMMERCIAL

## 2024-05-01 VITALS
HEART RATE: 84 BPM | SYSTOLIC BLOOD PRESSURE: 187 MMHG | DIASTOLIC BLOOD PRESSURE: 101 MMHG | RESPIRATION RATE: 16 BRPM | TEMPERATURE: 97 F | OXYGEN SATURATION: 97 %

## 2024-05-01 PROCEDURE — 99281 EMR DPT VST MAYX REQ PHY/QHP: CPT

## 2024-05-01 ASSESSMENT — ACTIVITIES OF DAILY LIVING (ADL)
ADLS_ACUITY_SCORE: 38
ADLS_ACUITY_SCORE: 38

## 2024-05-01 ASSESSMENT — COLUMBIA-SUICIDE SEVERITY RATING SCALE - C-SSRS
6. HAVE YOU EVER DONE ANYTHING, STARTED TO DO ANYTHING, OR PREPARED TO DO ANYTHING TO END YOUR LIFE?: NO
1. IN THE PAST MONTH, HAVE YOU WISHED YOU WERE DEAD OR WISHED YOU COULD GO TO SLEEP AND NOT WAKE UP?: NO
2. HAVE YOU ACTUALLY HAD ANY THOUGHTS OF KILLING YOURSELF IN THE PAST MONTH?: NO

## 2024-05-02 ASSESSMENT — ACTIVITIES OF DAILY LIVING (ADL): ADLS_ACUITY_SCORE: 38

## 2024-05-02 NOTE — ED TRIAGE NOTES
Pt cut middle and ring fingers on L hand with knife approximately 1800 tonight. Bleeding controlled, but won't stop. Pain 3/10.

## 2024-05-13 ASSESSMENT — ASTHMA QUESTIONNAIRES
ACT_TOTALSCORE: 20
QUESTION_4 LAST FOUR WEEKS HOW OFTEN HAVE YOU USED YOUR RESCUE INHALER OR NEBULIZER MEDICATION (SUCH AS ALBUTEROL): TWO OR THREE TIMES PER WEEK
ACT_TOTALSCORE: 20
QUESTION_3 LAST FOUR WEEKS HOW OFTEN DID YOUR ASTHMA SYMPTOMS (WHEEZING, COUGHING, SHORTNESS OF BREATH, CHEST TIGHTNESS OR PAIN) WAKE YOU UP AT NIGHT OR EARLIER THAN USUAL IN THE MORNING: ONCE OR TWICE
QUESTION_5 LAST FOUR WEEKS HOW WOULD YOU RATE YOUR ASTHMA CONTROL: WELL CONTROLLED
QUESTION_2 LAST FOUR WEEKS HOW OFTEN HAVE YOU HAD SHORTNESS OF BREATH: ONCE OR TWICE A WEEK
QUESTION_1 LAST FOUR WEEKS HOW MUCH OF THE TIME DID YOUR ASTHMA KEEP YOU FROM GETTING AS MUCH DONE AT WORK, SCHOOL OR AT HOME: NONE OF THE TIME

## 2024-05-13 ASSESSMENT — PATIENT HEALTH QUESTIONNAIRE - PHQ9
SUM OF ALL RESPONSES TO PHQ QUESTIONS 1-9: 13
SUM OF ALL RESPONSES TO PHQ QUESTIONS 1-9: 13
10. IF YOU CHECKED OFF ANY PROBLEMS, HOW DIFFICULT HAVE THESE PROBLEMS MADE IT FOR YOU TO DO YOUR WORK, TAKE CARE OF THINGS AT HOME, OR GET ALONG WITH OTHER PEOPLE: EXTREMELY DIFFICULT

## 2024-05-13 ASSESSMENT — ANXIETY QUESTIONNAIRES
4. TROUBLE RELAXING: MORE THAN HALF THE DAYS
3. WORRYING TOO MUCH ABOUT DIFFERENT THINGS: NEARLY EVERY DAY
5. BEING SO RESTLESS THAT IT IS HARD TO SIT STILL: SEVERAL DAYS
IF YOU CHECKED OFF ANY PROBLEMS ON THIS QUESTIONNAIRE, HOW DIFFICULT HAVE THESE PROBLEMS MADE IT FOR YOU TO DO YOUR WORK, TAKE CARE OF THINGS AT HOME, OR GET ALONG WITH OTHER PEOPLE: EXTREMELY DIFFICULT
8. IF YOU CHECKED OFF ANY PROBLEMS, HOW DIFFICULT HAVE THESE MADE IT FOR YOU TO DO YOUR WORK, TAKE CARE OF THINGS AT HOME, OR GET ALONG WITH OTHER PEOPLE?: EXTREMELY DIFFICULT
GAD7 TOTAL SCORE: 14
GAD7 TOTAL SCORE: 14
7. FEELING AFRAID AS IF SOMETHING AWFUL MIGHT HAPPEN: MORE THAN HALF THE DAYS
GAD7 TOTAL SCORE: 14
2. NOT BEING ABLE TO STOP OR CONTROL WORRYING: NEARLY EVERY DAY
7. FEELING AFRAID AS IF SOMETHING AWFUL MIGHT HAPPEN: MORE THAN HALF THE DAYS
1. FEELING NERVOUS, ANXIOUS, OR ON EDGE: NEARLY EVERY DAY
6. BECOMING EASILY ANNOYED OR IRRITABLE: NOT AT ALL

## 2024-05-14 ENCOUNTER — VIRTUAL VISIT (OUTPATIENT)
Dept: FAMILY MEDICINE | Facility: CLINIC | Age: 54
End: 2024-05-14
Payer: COMMERCIAL

## 2024-05-14 DIAGNOSIS — E29.1 HYPOGONADISM MALE: ICD-10-CM

## 2024-05-14 DIAGNOSIS — N18.32 STAGE 3B CHRONIC KIDNEY DISEASE (H): ICD-10-CM

## 2024-05-14 DIAGNOSIS — G47.00 INSOMNIA, UNSPECIFIED TYPE: Primary | ICD-10-CM

## 2024-05-14 DIAGNOSIS — M54.2 NECK PAIN, BILATERAL: ICD-10-CM

## 2024-05-14 DIAGNOSIS — F40.243 FLYING PHOBIA: ICD-10-CM

## 2024-05-14 DIAGNOSIS — Z12.11 SCREEN FOR COLON CANCER: ICD-10-CM

## 2024-05-14 DIAGNOSIS — F90.9 ATTENTION DEFICIT HYPERACTIVITY DISORDER (ADHD), UNSPECIFIED ADHD TYPE: ICD-10-CM

## 2024-05-14 PROCEDURE — 99442 PR PHYSICIAN TELEPHONE EVALUATION 11-20 MIN: CPT | Mod: 93 | Performed by: FAMILY MEDICINE

## 2024-05-14 RX ORDER — DEXTROAMPHETAMINE SACCHARATE, AMPHETAMINE ASPARTATE, DEXTROAMPHETAMINE SULFATE AND AMPHETAMINE SULFATE 5; 5; 5; 5 MG/1; MG/1; MG/1; MG/1
20 TABLET ORAL 2 TIMES DAILY
Qty: 60 TABLET | Refills: 0 | Status: SHIPPED | OUTPATIENT
Start: 2024-06-05 | End: 2024-08-01

## 2024-05-14 RX ORDER — TESTOSTERONE CYPIONATE 200 MG/ML
200 INJECTION, SOLUTION INTRAMUSCULAR WEEKLY
Qty: 4 ML | Refills: 1 | Status: SHIPPED | OUTPATIENT
Start: 2024-05-14 | End: 2024-07-30

## 2024-05-14 RX ORDER — ZOLPIDEM TARTRATE 10 MG/1
10 TABLET ORAL
Qty: 30 TABLET | Refills: 2 | Status: SHIPPED | OUTPATIENT
Start: 2024-05-14 | End: 2024-08-15

## 2024-05-14 RX ORDER — DEXTROAMPHETAMINE SACCHARATE, AMPHETAMINE ASPARTATE, DEXTROAMPHETAMINE SULFATE AND AMPHETAMINE SULFATE 5; 5; 5; 5 MG/1; MG/1; MG/1; MG/1
20 TABLET ORAL 2 TIMES DAILY
Qty: 60 TABLET | Refills: 0 | Status: SHIPPED | OUTPATIENT
Start: 2024-08-04 | End: 2024-08-01

## 2024-05-14 RX ORDER — CLONAZEPAM 1 MG/1
1 TABLET ORAL 2 TIMES DAILY PRN
COMMUNITY
Start: 2024-05-14

## 2024-05-14 RX ORDER — DEXTROAMPHETAMINE SACCHARATE, AMPHETAMINE ASPARTATE, DEXTROAMPHETAMINE SULFATE AND AMPHETAMINE SULFATE 5; 5; 5; 5 MG/1; MG/1; MG/1; MG/1
20 TABLET ORAL 2 TIMES DAILY
Qty: 60 TABLET | Refills: 0 | Status: SHIPPED | OUTPATIENT
Start: 2024-07-05 | End: 2024-08-01

## 2024-05-14 RX ORDER — ACETAMINOPHEN 500 MG
500-1000 TABLET ORAL EVERY 6 HOURS PRN
Qty: 200 TABLET | Refills: 5 | COMMUNITY
Start: 2024-05-14

## 2024-05-14 NOTE — PROGRESS NOTES
Jeremi is a 53 year old who is being evaluated via a billable telephone visit.    What phone number would you like to be contacted at? [phone]   How would you like to obtain your AVS? Robertharmina  Originating Location (pt. Location): Home    Distant Location (provider location):  On-site    Assessment & Plan   Insomnia, unspecified type  Stable - continue medication(s).  - zolpidem (AMBIEN) 10 MG tablet  Dispense: 30 tablet; Refill: 2    Hypogonadism male  Medication has been helpful for his energy and will continue:  - testosterone cypionate (DEPOTESTOSTERONE) 200 MG/ML injection  Dispense: 4 mL; Refill: 1    Attention deficit hyperactivity disorder (ADHD), unspecified ADHD type  Increase stressors in her dental continue medication as above and will continue  - amphetamine-dextroamphetamine (ADDERALL) 20 MG tablet  Dispense: 60 tablet; Refill: 0  - amphetamine-dextroamphetamine (ADDERALL) 20 MG tablet  Dispense: 60 tablet; Refill: 0  - amphetamine-dextroamphetamine (ADDERALL) 20 MG tablet  Dispense: 60 tablet; Refill: 0    Screen for colon cancer  Advise the Cologuard and still due for  - Colonoscopy Screening  Referral    Flying phobia  Has used previously and will put med list for refill if needed when traveling-   clonazePAM (KLONOPIN) 1 MG tablet    Neck pain, bilateral  Ongoing pain, sees pain clinic and could have Tylenol, strongly encouraged he avoid NSAIDs with his kidney function  - acetaminophen (TYLENOL) 500 MG tablet  Dispense: 200 tablet; Refill: 5      CKD stage IIIb   Hydrations important, avoid NSAIDs.  Recheck labs which are due    No follow-ups on file.   Follow-up Visit   Expected date: May 14, 2024      Follow Up Appointment Details:     Follow-up with whom?: Other Primary Care Services    Follow-Up for what?: Lab Visit    How?: In Person                         Nate Segovia MD      [address] [address]  [email or web address]    Office: [phone]         Justin Blood is a 53 year old, presenting for the following health issues:  Recheck Medication and Refill Request    History of Present Illness       Reason for visit:  I believe it is for a medicine review as well as status of existing conditions.    He eats 2-3 servings of fruits and vegetables daily.He consumes 3 sweetened beverage(s) daily.He exercises with enough effort to increase his heart rate 10 to 19 minutes per day.  He exercises with enough effort to increase his heart rate 3 or less days per week.   He is taking medications regularly.     Depression and Anxiety   How are you doing with your depression since your last visit? No change  How are you doing with your anxiety since your last visit?  No change  Are you having other symptoms that might be associated with depression or anxiety? No  Have you had a significant life event? No - recent diagnosis of renal mass - some financial stressors.  Do you have any concerns with your use of alcohol or other drugs? No    Social History     Tobacco Use    Smoking status: Never    Smokeless tobacco: Never   Vaping Use    Vaping status: Never Used   Substance Use Topics    Alcohol use: Yes     Comment: rarely     Drug use: No         8/2/2023    11:15 PM 1/4/2024     2:48 PM 5/13/2024    10:04 PM   PHQ   PHQ-9 Total Score 7 12 13   Q9: Thoughts of better off dead/self-harm past 2 weeks Not at all Not at all Not at all         12/12/2022    10:52 AM 1/4/2024     2:51 PM 5/13/2024    10:05 PM   NANCY-7 SCORE   Total Score 16 (severe anxiety) 15 (severe anxiety) 14 (moderate anxiety)   Total Score 16 15 14         5/13/2024    10:04 PM   Last PHQ-9   1.  Little interest or pleasure in doing things 2   2.  Feeling down, depressed, or hopeless 3   3.  Trouble falling or staying asleep, or sleeping too much 2   4.  Feeling tired or having little energy 2   5.  Poor appetite or overeating 0   6.  Feeling bad about yourself 1   7.   Trouble concentrating 3   8.  Moving slowly or restless 0   Q9: Thoughts of better off dead/self-harm past 2 weeks 0   PHQ-9 Total Score 13         5/13/2024    10:05 PM   NANCY-7    1. Feeling nervous, anxious, or on edge 3   2. Not being able to stop or control worrying 3   3. Worrying too much about different things 3   4. Trouble relaxing 2   5. Being so restless that it is hard to sit still 1   6. Becoming easily annoyed or irritable 0   7. Feeling afraid, as if something awful might happen 2   NANCY-7 Total Score 14   If you checked any problems, how difficult have they made it for you to do your work, take care of things at home, or get along with other people? Extremely difficult   Suicide Assessment Five-step Evaluation and Treatment (SAFE-T)      Medication Followup for ADHD: amphetamine-dextroamphetamine (ADDERALL) 20 MG tablet   Taking Medication as prescribed: yes  Side Effects:  None  Medication Helping Symptoms:  yes    Medication Followup for Hypogonadism male:  testosterone cypionate (DEPOTESTOSTERONE) 200 MG/ML injection   Taking Medication as prescribed: yes  Side Effects:  None  Medication Helping Symptoms:  yes    Medication Followup for Insomnia: zolpidem (AMBIEN) 10 MG tablet   Taking Medication as prescribed: yes  Side Effects:  None  Medication Helping Symptoms:  yes        Objective       Vitals:  No vitals were obtained today due to virtual visit.    Physical Exam   General: Alert and no distress //Respiratory: No audible wheeze, cough, or shortness of breath // Psychiatric:  Appropriate affect, tone, and pace of words          Phone call duration: 14 minutes  Signed Electronically by: Luis Armando Segovia MD

## 2024-05-15 ENCOUNTER — TELEPHONE (OUTPATIENT)
Dept: CARDIOLOGY | Facility: CLINIC | Age: 54
End: 2024-05-15
Payer: COMMERCIAL

## 2024-05-15 NOTE — TELEPHONE ENCOUNTER
[place] Community Measures Blood Pressure guideline reviewed.  Patients recent blood pressure is outside of guideline parameters.  Called pt to review, no answer.  Left voicemail message asking patient to check their blood pressure using a home blood pressure cuff or by going to a [facility].  Patient instructed to then call [phone] (Jennifer) and leave a message with their name, date of birth, and blood pressure reading that was completed within the last 24 hours and where it was completed.  Will await call back for further review.           Ludmila Cardenas LPN

## 2024-05-20 DIAGNOSIS — J45.30 MILD PERSISTENT ASTHMA WITHOUT COMPLICATION: ICD-10-CM

## 2024-05-20 DIAGNOSIS — B07.8 FLAT WART: ICD-10-CM

## 2024-05-21 RX ORDER — ALBUTEROL SULFATE 90 UG/1
2 AEROSOL, METERED RESPIRATORY (INHALATION) EVERY 6 HOURS
Qty: 18 G | Refills: 5 | Status: SHIPPED | OUTPATIENT
Start: 2024-05-21

## 2024-05-21 RX ORDER — IMIQUIMOD 12.5 MG/.25G
CREAM TOPICAL
Qty: 24 PACKET | Refills: 11 | Status: SHIPPED | OUTPATIENT
Start: 2024-05-21

## 2024-05-28 NOTE — TELEPHONE ENCOUNTER
05/28/24 0357   RT Protocol   History Pulmonary Disease 2   Respiratory pattern 2   Breath sounds 2   Cough 0   Indications for Bronchodilator Therapy On home bronchodilators   Bronchodilator Assessment Score 6        Prescription approved per [facility] Refill Protocol.    Georgie MOYER RN

## 2024-06-03 DIAGNOSIS — F90.9 ATTENTION DEFICIT HYPERACTIVITY DISORDER (ADHD), UNSPECIFIED ADHD TYPE: ICD-10-CM

## 2024-06-03 RX ORDER — DEXTROAMPHETAMINE SACCHARATE, AMPHETAMINE ASPARTATE, DEXTROAMPHETAMINE SULFATE AND AMPHETAMINE SULFATE 5; 5; 5; 5 MG/1; MG/1; MG/1; MG/1
20 TABLET ORAL 2 TIMES DAILY
Qty: 60 TABLET | Refills: 0 | OUTPATIENT
Start: 2024-06-03

## 2024-06-03 NOTE — TELEPHONE ENCOUNTER
Duplicate prescription as they already have prescriptions at the pharmacy waiting.    Last visit in this dept:    5/14/2024     Last visit -this provider:  5/14/2024     Next visit in this dept:   Future Appointments 6/3/2024 - 11/30/2024        Date Visit Type Length Department Provider     7/23/2024  1:45 PM RETURN PAIN 60 min [facility] Macy Subramanian MD                     Health Maintenance   Topic Date Due    HF ACTION PLAN  Never done    ZOSTER IMMUNIZATION (1 of 2) Never done    HEPATITIS A IMMUNIZATION (1 of 2 - Risk 2-dose series) Never done    ASTHMA ACTION PLAN  06/28/2020    COVID-19 Vaccine (3 - Moderna risk series) 10/22/2021    COLORECTAL CANCER SCREENING  10/13/2023    MICROALBUMIN  03/08/2024    LIPID  04/21/2024    DEPRESSION 6 MO INDEX REPEAT PHQ-9  05/28/2024    INFLUENZA VACCINE (Season Ended) 09/01/2024    MEDICARE ANNUAL WELLNESS VISIT  09/08/2024    ALT  09/08/2024    CMP  09/08/2024    PSA  09/08/2024    ANNUAL REVIEW OF HM ORDERS  09/08/2024    ASTHMA CONTROL TEST  11/14/2024    PHQ-9  11/14/2024    BMP  01/10/2025    CBC  01/10/2025    HEMOGLOBIN  01/10/2025    NANCY ASSESSMENT  05/14/2025    GLUCOSE  01/10/2027    DTAP/TDAP/TD IMMUNIZATION (3 - Td or Tdap) 05/20/2027    ADVANCE CARE PLANNING  01/04/2029    PARATHYROID  Completed    PHOSPHORUS  Completed    TSH W/FREE T4 REFLEX  Completed    HEPATITIS C SCREENING  Completed    HIV SCREENING  Completed    Pneumococcal Vaccine: Pediatrics (0 to 5 Years) and At-Risk Patients (6 to 64 Years)  Completed    URINALYSIS  Completed    ALK PHOS  Completed    IPV IMMUNIZATION  Aged Out    HPV IMMUNIZATION  Aged Out    MENINGITIS IMMUNIZATION  Aged Out    RSV MONOCLONAL ANTIBODY  Aged Out    HEPATITIS B IMMUNIZATION  Discontinued        CSA -- Patient Level:     [Media Unavailable] Controlled Substance Agreement - Opioid - Scan on 11/2/2023 11:06 AM   [Media Unavailable] Controlled Substance Agreement - Non - Opioid - Scan on 6/25/2022  5:47  AM: NON-OPIOID CONTROLLED SUBSTANCE AGREEMENT

## 2024-06-13 DIAGNOSIS — G47.00 INSOMNIA, UNSPECIFIED TYPE: ICD-10-CM

## 2024-06-16 RX ORDER — ZOLPIDEM TARTRATE 10 MG/1
10 TABLET ORAL
Qty: 30 TABLET | Refills: 2 | OUTPATIENT
Start: 2024-06-16

## 2024-06-25 ENCOUNTER — VIRTUAL VISIT (OUTPATIENT)
Dept: FAMILY MEDICINE | Facility: CLINIC | Age: 54
End: 2024-06-25
Payer: COMMERCIAL

## 2024-06-25 DIAGNOSIS — D48.5 NEOPLASM OF UNCERTAIN BEHAVIOR OF SKIN: ICD-10-CM

## 2024-06-25 DIAGNOSIS — R10.13 ABDOMINAL PAIN, EPIGASTRIC: Primary | ICD-10-CM

## 2024-06-25 DIAGNOSIS — R12 HEARTBURN: ICD-10-CM

## 2024-06-25 PROCEDURE — 99213 OFFICE O/P EST LOW 20 MIN: CPT | Mod: 95 | Performed by: FAMILY MEDICINE

## 2024-06-25 RX ORDER — SUCRALFATE 1 G/1
1 TABLET ORAL 4 TIMES DAILY PRN
Qty: 60 TABLET | Refills: 2 | Status: SHIPPED | OUTPATIENT
Start: 2024-06-25

## 2024-06-25 ASSESSMENT — ANXIETY QUESTIONNAIRES
3. WORRYING TOO MUCH ABOUT DIFFERENT THINGS: MORE THAN HALF THE DAYS
1. FEELING NERVOUS, ANXIOUS, OR ON EDGE: MORE THAN HALF THE DAYS
GAD7 TOTAL SCORE: 13
2. NOT BEING ABLE TO STOP OR CONTROL WORRYING: MORE THAN HALF THE DAYS
IF YOU CHECKED OFF ANY PROBLEMS ON THIS QUESTIONNAIRE, HOW DIFFICULT HAVE THESE PROBLEMS MADE IT FOR YOU TO DO YOUR WORK, TAKE CARE OF THINGS AT HOME, OR GET ALONG WITH OTHER PEOPLE: VERY DIFFICULT
8. IF YOU CHECKED OFF ANY PROBLEMS, HOW DIFFICULT HAVE THESE MADE IT FOR YOU TO DO YOUR WORK, TAKE CARE OF THINGS AT HOME, OR GET ALONG WITH OTHER PEOPLE?: VERY DIFFICULT
4. TROUBLE RELAXING: MORE THAN HALF THE DAYS
5. BEING SO RESTLESS THAT IT IS HARD TO SIT STILL: MORE THAN HALF THE DAYS
6. BECOMING EASILY ANNOYED OR IRRITABLE: SEVERAL DAYS
GAD7 TOTAL SCORE: 13
7. FEELING AFRAID AS IF SOMETHING AWFUL MIGHT HAPPEN: MORE THAN HALF THE DAYS
7. FEELING AFRAID AS IF SOMETHING AWFUL MIGHT HAPPEN: MORE THAN HALF THE DAYS
GAD7 TOTAL SCORE: 13

## 2024-06-25 ASSESSMENT — PATIENT HEALTH QUESTIONNAIRE - PHQ9
SUM OF ALL RESPONSES TO PHQ QUESTIONS 1-9: 15
SUM OF ALL RESPONSES TO PHQ QUESTIONS 1-9: 15
10. IF YOU CHECKED OFF ANY PROBLEMS, HOW DIFFICULT HAVE THESE PROBLEMS MADE IT FOR YOU TO DO YOUR WORK, TAKE CARE OF THINGS AT HOME, OR GET ALONG WITH OTHER PEOPLE: VERY DIFFICULT

## 2024-06-25 NOTE — PROGRESS NOTES
Jeremi is a 54 year old who is being evaluated via a billable video visit.    How would you like to obtain your AVS? MyChart  If the video visit is tiana Logen blood loggia I will ask what else        Pped, the invitation should be resent by: Text to cell phone: [phone]  Will anyone else be joining your video visit? No      Assessment & Plan   Abdominal pain, epigastric  Heartburn  History of gastric ulcers and esophagitis, current symptoms suggest possible recurrence- Plan: Increase dose of pantoprazole for a week or two, reduce intake of caffeine, order an upper endoscopy, use Sucralfate as needed  - Adult GI  Referral - Procedure Only  - sucralfate (CARAFATE) 1 GM tablet  Dispense: 60 tablet; Refill: 2    Neoplasm of uncertain behavior of skin  -Office Visit To assess           Return in about 3 weeks (around 7/16/2024) for symptoms failing to improve or sooner if worsening.          Nate Segovia MD      [address] [address]  [email or web address]   Office: [phone]       Subjective   Jeremi is a 54 year old, presenting for the following health issues:  Abdominal Pain and Bloated    History of Present Illness       Reason for visit:  Stomach pain, crampping of ab muscles ,reflux, sore throat, almost, nonstop hiccups, indigestion  Symptom onset:  More than a month  Symptoms include:  Sore throat, bloated stomach, hiccups  Symptom intensity:  Severe  Symptom progression:  Staying the same  Had these symptoms before:  Yes  Has tried/received treatment for these symptoms:  Yes  Previous treatment was successful:  No  What makes it worse:  Coffee, Red bull  What makes it better:  [place] milk, anta-acids, pepto-bismolHe consumes 3 sweetened beverage(s) daily.He exercises with enough effort to increase his heart rate 10 to 19 minutes per day.  He exercises with enough effort to increase his heart rate 3 or less days per week. He is missing 1 dose(s) of  medications per week.  He is not taking prescribed medications regularly due to remembering to take.     -  stomach pains, cramping in the abdominal muscles, acid reflux, hiccups, and indigestion. He reports that these symptoms are exacerbated by the consumption of caffeine and antacids provide some relief. He denies alcohol consumption and reports no black or tarry stools, which could be indicative of an ulcer.  - Mr. Damon has a history of gastric ulcers and esophagitis, diagnosed in 2010 via an upper endoscopy. He is unable to recall if he was taking any non-steroidal anti-inflammatory drugs at the time of this diagnosis.  - He also reports experiencing stress and burping, which he believes may be contributing to his symptoms. He has been experiencing difficulty breathing for a few seconds off and on and possible diaphragm spasms, which he describes as feeling like he is suffocating and unable to breathe. These episodes are brief and do not appear to be associated with chest pain.  - He is currently on pantoprazole (Protonix) for his gastric issues.            Objective         Vitals:  No vitals were obtained today due to virtual visit.    Physical Exam   healthy, alert, and no distress  PSYCH: Alert and oriented times 3; coherent speech, normal   rate and volume, able to articulate logical thoughts, able   to abstract reason, no tangential thoughts, no hallucinations   or delusions  Her affect is normal  RESP: No cough, no audible wheezing, able to talk in full sentences  Remainder of exam unable to be completed due to telephone visits        Telephone visit - 12 minutes

## 2024-06-25 NOTE — TELEPHONE ENCOUNTER
OPAT Nurse Coordinator Weekly Update Note    Current OPAT plan:  Zosyn 13.5 gm iv daily through 6/26/24      Diagnosis:  R leg cellulitis       Assessment:  Spoke with pt.  He states the drainage is very little.  Pt saw Dr Giles last week.  Pt is under the impression Dr Agustin and Dr Giles have been communicating  Pt is \"very nervous to stop the atb, I am very afraid the wound will come back and the drainage with come back\".  Pt asked if he can extend again.  I will discuss with Dr Agustin and notify patient  Pt also stated that Dr Giles \"is happy with how my leg looks\"      Follow up appts:  Pt sees Dr Giles on 6/27   Pt term date is 6/26        Verbal order received by Dr Agustin to extend ib atb through 7/3/24  Patient notified and Amerimed given order to extend through 7/3, spoke with pharmacistTalia.         Prescription faxed to [facility].      Emerald Zuniga

## 2024-06-27 ENCOUNTER — TELEPHONE (OUTPATIENT)
Dept: GASTROENTEROLOGY | Facility: CLINIC | Age: 54
End: 2024-06-27
Payer: COMMERCIAL

## 2024-06-27 NOTE — TELEPHONE ENCOUNTER
"Endoscopy Scheduling Screen    Have you had a positive Covid test in the last 14 days?  No    What is your communication preference for Instructions and/or Bowel Prep?   MyChart    What insurance is in the chart?  Other:  [facility]    Ordering/Referring Provider: Luis Armando Segovia MD   (If ordering provider performs procedure, schedule with ordering provider unless otherwise instructed. )    BMI: Estimated body mass index is 32.7 kg/m  as calculated from the following:    Height as of 1/24/24: 1.829 m (6').    Weight as of 3/14/24: 109.4 kg (241 lb 1.6 oz).     Sedation Ordered  MAC/deep sedation.   BMI<= 45 45 < BMI <= 48 48 < BMI < = 50  BMI > 50   No Restrictions No [facility]  No [facility]  [facility] with exceptions Hospital Only OR Only       Do you have a history of malignant hyperthermia?  No    (Females) Are you currently pregnant?   No     Have you been diagnosed or told you have pulmonary hypertension?   No    Do you have an LVAD?  No    Have you been told you have moderate to severe sleep apnea?  No    Have you been told you have COPD, asthma, or any other lung disease?  Yes     What breathing problems do you have?  Asthma     Do you use home oxygen?  No    Have your breathing problems required an ED visit or hospitalization in the last year?  No    Do you have any heart conditions?  Yes     In the past year, have you had any hospitalizations for heart related issues including cardiomyopathy, heart attack, or stent placement?  No    Do you have any implantable devices in your body (pacemaker, ICD)?  No    Do you take nitroglycerine?  No    Have you ever had or are you waiting for an organ transplant?  No. Continue scheduling, no site restrictions.    Have you had a stroke or transient ischemic attack (TIA aka \"mini stroke\" in the last 6 months?   No    Have you been diagnosed with or been told you have cirrhosis of the liver?   No    Are you currently on dialysis?   No    Do you need assistance " transferring?   No    BMI: Estimated body mass index is 32.7 kg/m  as calculated from the following:    Height as of 1/24/24: 1.829 m (6').    Weight as of 3/14/24: 109.4 kg (241 lb 1.6 oz).     Is patients BMI > 40 and scheduling location [facility]?  No    Do you take an injectable medication for weight loss or diabetes (excluding insulin)?  No    Do you take the medication Naltrexone?  No    Do you take blood thinners?  Yes     Are you taking Effient/Prasugrel?  No, you must contact your prescribing provider for direction on holding or bridging with a different medication.       Prep   Are you currently on dialysis or do you have chronic kidney disease?  Yes ([facility] Prep)    Do you have a diagnosis of diabetes?  No    Do you have a diagnosis of cystic fibrosis (CF)?  No    On a regular basis do you go 3 -5 days between bowel movements?  No    BMI > 40?  No    Preferred Pharmacy:    [facility] [place] - [address] [address]  [address] [address]  Phone: [phone] Fax: [phone]    [id number] - [place] - [address] [facility] [address]  [address] [facility] [address]  Phone: [phone] Fax: [phone]      Final Scheduling Details     Procedure scheduled  Colonoscopy    Surgeon:  Aditi     Date of procedure:  11/18/2024     Pre-OP / PAC:   Yes - Patient informed of pre-op requirement.    Location  [facility] - Patient preference.    Sedation   MAC/Deep Sedation - Per order._pt's colonoscopy ref was for mod, but EGD ref for MAC      Patient Reminders:   You will receive a call from a Nurse to review instructions and health history.  This assessment must be completed prior to your procedure.  Failure to complete the Nurse assessment may result in the procedure being cancelled.      On the day of your procedure, please designate an adult(s) who can drive you home stay with you for the next 24 hours. The medicines used in the exam will make you sleepy. You will not be able  to drive.      You cannot take public transportation, ride share services, or non-medical taxi service without a responsible caregiver.  Medical transport services are allowed with the requirement that a responsible caregiver will receive you at your destination.  We require that drivers and caregivers are confirmed prior to your procedure.

## 2024-07-02 ENCOUNTER — MYC MEDICAL ADVICE (OUTPATIENT)
Dept: FAMILY MEDICINE | Facility: CLINIC | Age: 54
End: 2024-07-02
Payer: COMMERCIAL

## 2024-07-02 DIAGNOSIS — F90.9 ATTENTION DEFICIT HYPERACTIVITY DISORDER (ADHD), UNSPECIFIED ADHD TYPE: ICD-10-CM

## 2024-07-02 RX ORDER — DEXTROAMPHETAMINE SACCHARATE, AMPHETAMINE ASPARTATE, DEXTROAMPHETAMINE SULFATE AND AMPHETAMINE SULFATE 5; 5; 5; 5 MG/1; MG/1; MG/1; MG/1
20 TABLET ORAL 2 TIMES DAILY
Qty: 60 TABLET | Refills: 0 | OUTPATIENT
Start: 2024-07-02

## 2024-07-10 ENCOUNTER — MYC MEDICAL ADVICE (OUTPATIENT)
Dept: FAMILY MEDICINE | Facility: CLINIC | Age: 54
End: 2024-07-10
Payer: COMMERCIAL

## 2024-07-11 NOTE — TELEPHONE ENCOUNTER
Please call patient and set up a visit, okay to use next day, next week, same-day or virtual release slots (but not virtual visit slot).

## 2024-07-12 DIAGNOSIS — F41.0 PANIC DISORDER WITHOUT AGORAPHOBIA: ICD-10-CM

## 2024-07-12 RX ORDER — BUSPIRONE HYDROCHLORIDE 5 MG/1
5 TABLET ORAL 2 TIMES DAILY PRN
Qty: 180 TABLET | Refills: 3 | OUTPATIENT
Start: 2024-07-12

## 2024-07-15 NOTE — TELEPHONE ENCOUNTER
Closing encounter. See notes.   Patient has appointment.     Future Appointments 7/15/2024 - 1/11/2025        Date Visit Type Length Department Provider     7/16/2024 11:00 AM OFFICE VISIT 20 min [facility] Luis Armando Segovia MD    Location Instructions:     [facility] is located at [address] [place] [address], along [address] Free parking is available; access the lot by turning north from [address] onto [address], then west onto [address].              7/23/2024  2:00 PM RETURN PAIN 45 min [facility] Macy Subramanian MD

## 2024-07-16 ENCOUNTER — OFFICE VISIT (OUTPATIENT)
Dept: FAMILY MEDICINE | Facility: CLINIC | Age: 54
End: 2024-07-16
Payer: COMMERCIAL

## 2024-07-16 ENCOUNTER — ANCILLARY PROCEDURE (OUTPATIENT)
Dept: GENERAL RADIOLOGY | Facility: CLINIC | Age: 54
End: 2024-07-16
Attending: FAMILY MEDICINE
Payer: COMMERCIAL

## 2024-07-16 VITALS
HEART RATE: 78 BPM | WEIGHT: 242 LBS | HEIGHT: 72 IN | TEMPERATURE: 97.9 F | OXYGEN SATURATION: 98 % | DIASTOLIC BLOOD PRESSURE: 66 MMHG | RESPIRATION RATE: 16 BRPM | BODY MASS INDEX: 32.78 KG/M2 | SYSTOLIC BLOOD PRESSURE: 122 MMHG

## 2024-07-16 DIAGNOSIS — D48.5 NEOPLASM OF UNCERTAIN BEHAVIOR OF SKIN: ICD-10-CM

## 2024-07-16 DIAGNOSIS — R06.6 HICCUPS: ICD-10-CM

## 2024-07-16 DIAGNOSIS — R14.0 ABDOMINAL BLOATING: ICD-10-CM

## 2024-07-16 DIAGNOSIS — N28.89 LEFT RENAL MASS: ICD-10-CM

## 2024-07-16 DIAGNOSIS — F40.243 FLYING PHOBIA: ICD-10-CM

## 2024-07-16 DIAGNOSIS — I11.0 CARDIOMYOPATHY DUE TO HYPERTENSION, WITH HEART FAILURE (H): ICD-10-CM

## 2024-07-16 DIAGNOSIS — I43 CARDIOMYOPATHY DUE TO HYPERTENSION, WITH HEART FAILURE (H): ICD-10-CM

## 2024-07-16 DIAGNOSIS — I25.119 CORONARY ARTERY DISEASE INVOLVING NATIVE CORONARY ARTERY OF NATIVE HEART WITH ANGINA PECTORIS (H): ICD-10-CM

## 2024-07-16 DIAGNOSIS — N18.32 STAGE 3B CHRONIC KIDNEY DISEASE (H): ICD-10-CM

## 2024-07-16 DIAGNOSIS — Z90.5 H/O PARTIAL NEPHRECTOMY: ICD-10-CM

## 2024-07-16 DIAGNOSIS — E29.1 HYPOGONADISM MALE: ICD-10-CM

## 2024-07-16 DIAGNOSIS — M62.08 RECTUS DIASTASIS: ICD-10-CM

## 2024-07-16 DIAGNOSIS — R14.0 ABDOMINAL BLOATING: Primary | ICD-10-CM

## 2024-07-16 LAB
ANION GAP SERPL CALCULATED.3IONS-SCNC: 8 MMOL/L (ref 7–15)
BUN SERPL-MCNC: 22.7 MG/DL (ref 6–20)
CALCIUM SERPL-MCNC: 9 MG/DL (ref 8.8–10.4)
CHLORIDE SERPL-SCNC: 102 MMOL/L (ref 98–107)
CREAT SERPL-MCNC: 2.38 MG/DL (ref 0.67–1.17)
CREAT UR-MCNC: 250 MG/DL
EGFRCR SERPLBLD CKD-EPI 2021: 31 ML/MIN/1.73M2
GLUCOSE SERPL-MCNC: 77 MG/DL (ref 70–99)
HCO3 SERPL-SCNC: 27 MMOL/L (ref 22–29)
MICROALBUMIN UR-MCNC: 617 MG/L
MICROALBUMIN/CREAT UR: 246.8 MG/G CR (ref 0–17)
POTASSIUM SERPL-SCNC: 4.8 MMOL/L (ref 3.4–5.3)
SODIUM SERPL-SCNC: 137 MMOL/L (ref 135–145)

## 2024-07-16 PROCEDURE — 84403 ASSAY OF TOTAL TESTOSTERONE: CPT | Performed by: FAMILY MEDICINE

## 2024-07-16 PROCEDURE — 80048 BASIC METABOLIC PNL TOTAL CA: CPT | Performed by: FAMILY MEDICINE

## 2024-07-16 PROCEDURE — 36415 COLL VENOUS BLD VENIPUNCTURE: CPT | Performed by: FAMILY MEDICINE

## 2024-07-16 PROCEDURE — 88305 TISSUE EXAM BY PATHOLOGIST: CPT | Performed by: DERMATOLOGY

## 2024-07-16 PROCEDURE — 82570 ASSAY OF URINE CREATININE: CPT | Performed by: FAMILY MEDICINE

## 2024-07-16 PROCEDURE — 84270 ASSAY OF SEX HORMONE GLOBUL: CPT | Performed by: FAMILY MEDICINE

## 2024-07-16 PROCEDURE — 82043 UR ALBUMIN QUANTITATIVE: CPT | Performed by: FAMILY MEDICINE

## 2024-07-16 PROCEDURE — 11302 SHAVE SKIN LESION 1.1-2.0 CM: CPT | Mod: LT | Performed by: FAMILY MEDICINE

## 2024-07-16 PROCEDURE — 74019 RADEX ABDOMEN 2 VIEWS: CPT | Mod: TC | Performed by: RADIOLOGY

## 2024-07-16 PROCEDURE — 99214 OFFICE O/P EST MOD 30 MIN: CPT | Mod: 25 | Performed by: FAMILY MEDICINE

## 2024-07-16 RX ORDER — CLONAZEPAM 1 MG/1
1 TABLET ORAL 2 TIMES DAILY PRN
Qty: 4 TABLET | Refills: 0 | Status: SHIPPED | OUTPATIENT
Start: 2024-07-16

## 2024-07-16 NOTE — PROGRESS NOTES
Assessment & Plan   Abdominal bloating  Hiccups  Rectus diastasis  Has issues with upper abdominal bloating and tightness as well as a slight bulge below previous rectus diastases repair that he is concerned about extension.  Abdominal x-ray did not show any significant findings or hiatal hernia etc.  Will get CT scan of the abdomen & abdominal wall  - CT Abdomen Pelvis w/o & w Contrast  - XR Abdomen 2 Views      Stage 3b chronic kidney disease (H)  Continue hydration and avoidance of NSAIDs (on Plavix)  - Albumin Random Urine Quantitative with Creat Ratio  - Albumin Random Urine Quantitative with Creat Ratio    Coronary artery disease involving native coronary artery of native heart with angina pectoris (H24)  No new symptoms,    Cardiomyopathy due to hypertension, with heart failure (H)  No signs of heart failure at this time-stable continue carvedilol and other cardiac meds.    Left renal mass  H/O partial nephrectomy  Prior history and will add lab that is due and sent to specialist.  - Basic metabolic panel    Hypogonadism male  On hormone replacement and due for lab recheck  - Testosterone Bioavailable    Flying phobia  [place] one of his parents passed and needs to deal with the estate.  - clonazePAM (KLONOPIN) 1 MG tablet  Dispense: 4 tablet; Refill: 0    Neoplasm of uncertain behavior of skin  Significant lesion on left thigh that might be irritated seborrheic keratosis versus basal cell versus other and shave/tangential biopsy done and pathology sent.  - trunk, arms, legs    After informed consent was obtained, using Hibiclens for cleansing and 1% Lidocaine with epinephrine for anesthetic, with sterile technique, shave excision was performed. Antibiotic dressing is applied, and wound care instructions provided.  Be alert for any signs of cutaneous infection. The procedure was well tolerated without complications. Follow up: The specimen is labelled and sent to pathology  for evaluation., The patient may return prn..     No follow-ups on file.          Nate Segovia MD      [address] [address]  [place]   Office: [phone]         Justin Blood is a 54 year old, presenting for the following health issues:  Abdominal Pain      History of Present Illness       Reason for visit:  Possible hernia severe indigestion  Symptom onset:  More than a month  Symptoms include:  Hiccups bloat inabilty to take in full breath  Symptom intensity:  Severe  Symptom progression:  Worsening  Had these symptoms before:  Yes  Has tried/received treatment for these symptoms:  Yes  Previous treatment was successful:  No  What makes it worse:  Taking so much medication im always groggy  What makes it better:  Holding breath to help stretch abdominal muscle that get very tight    He eats 2-3 servings of fruits and vegetables daily.He consumes 2 sweetened beverage(s) daily.He exercises with enough effort to increase his heart rate 10 to 19 minutes per day.  He exercises with enough effort to increase his heart rate 4 days per week.   He is taking medications regularly.     MyChart Encounter 07/10/2024    I certainly believe I have some issues with my ulcers as far as heartburn and hiccups are concerned.  However, I have also been concerned about a herniated linea alba. I have had surgery in the past for one in the linea alba and one for a herniated umbilical region.   Just above the existing scar, the region of the linea alba is very hypertonic. On the other hand, below that scar, the integrity of the linea alba protrudes noticeably. If I press on the protrusion, I can get some relief from the cramping, and thus a reduction of the severity of the hiccups. I have an endoscopy scheduled at the end of the year, but this is getting so problematic that the muscle relaxers and stretching have no effect and I need a diagnosis and treatment ASAP.       Has been having to take muscle relaxer's around the clock and that's the only thing that stops him from having hiccups.     Interested in getting testosterone testing done and some basic blood work as well.           Objective    /66   Pulse 78   Temp 97.9  F (36.6  C) (Tympanic)   Resp 16   Ht 1.829 m (6')   Wt 109.8 kg (242 lb)   SpO2 98%   BMI 32.82 kg/m    Body mass index is 32.82 kg/m .  Physical Exam   GENERAL: alert and no distress  NECK: no adenopathy, no asymmetry, masses, or scars  RESP: lungs clear to auscultation - no rales, rhonchi or wheezes  CV: regular rate and rhythm, normal S1 S2, no S3 or S4, no murmur, click or rub, no peripheral edema  ABDOMEN: soft, slight protrusion of the mid epigastric area with doing a sit up movement, no definite hernia felt through the linea alba but more likely a rectus diastases otherwise nontender, no hepatosplenomegaly, no masses and bowel sounds normal  MS: no gross musculoskeletal defects noted, no edema  SKIN: 1.2 x 1.3 cm pink papule with central eschar on the left thigh otherwise no suspicious lesions or rashes              Signed Electronically by: Luis Armando Segovia MD

## 2024-07-17 LAB — SHBG SERPL-SCNC: 10 NMOL/L (ref 11–80)

## 2024-07-18 LAB
PATH REPORT.COMMENTS IMP SPEC: ABNORMAL
PATH REPORT.COMMENTS IMP SPEC: ABNORMAL
PATH REPORT.COMMENTS IMP SPEC: YES
PATH REPORT.FINAL DX SPEC: ABNORMAL
PATH REPORT.GROSS SPEC: ABNORMAL
PATH REPORT.MICROSCOPIC SPEC OTHER STN: ABNORMAL
PATH REPORT.RELEVANT HX SPEC: ABNORMAL

## 2024-07-21 LAB
TESTOST FREE SERPL-MCNC: 47.7 NG/DL
TESTOST SERPL-MCNC: 1297 NG/DL (ref 240–950)

## 2024-07-22 ENCOUNTER — TELEPHONE (OUTPATIENT)
Dept: PALLIATIVE MEDICINE | Facility: CLINIC | Age: 54
End: 2024-07-22
Payer: COMMERCIAL

## 2024-07-22 DIAGNOSIS — F11.20 UNCOMPLICATED OPIOID DEPENDENCE (H): ICD-10-CM

## 2024-07-22 DIAGNOSIS — G89.4 CHRONIC PAIN SYNDROME: ICD-10-CM

## 2024-07-22 RX ORDER — BUPRENORPHINE AND NALOXONE 8; 2 MG/1; MG/1
FILM, SOLUBLE BUCCAL; SUBLINGUAL
Qty: 60 FILM | Refills: 2 | Status: CANCELLED | OUTPATIENT
Start: 2024-07-22

## 2024-07-22 NOTE — TELEPHONE ENCOUNTER
Received request for a refill(s) of buprenorphine HCl-naloxone HCl (SUBOXONE) 8-2 MG per film      Last dispensed from pharmacy on 06/17/24    Patient's last office/virtual visit by prescribing provider on 03/14/24  Next office/virtual appointment scheduled for 07/23/24    Last urine drug screen date 11/02/23  Current opioid agreement on file (completed within the last year) Yes Date of opioid agreement: 11/02/23    E-prescribe to pharmacy-[facility] Guerra  [address] [facility]      Will route to nursing [place] for review and preparation of prescription(s).

## 2024-07-23 ENCOUNTER — OFFICE VISIT (OUTPATIENT)
Dept: PALLIATIVE MEDICINE | Facility: CLINIC | Age: 54
End: 2024-07-23
Payer: COMMERCIAL

## 2024-07-23 VITALS — HEART RATE: 76 BPM | OXYGEN SATURATION: 95 % | SYSTOLIC BLOOD PRESSURE: 143 MMHG | DIASTOLIC BLOOD PRESSURE: 72 MMHG

## 2024-07-23 DIAGNOSIS — F11.20 UNCOMPLICATED OPIOID DEPENDENCE (H): Primary | ICD-10-CM

## 2024-07-23 DIAGNOSIS — F41.1 GAD (GENERALIZED ANXIETY DISORDER): ICD-10-CM

## 2024-07-23 DIAGNOSIS — G43.709 CHRONIC MIGRAINE WITHOUT AURA, NOT INTRACTABLE, WITHOUT STATUS MIGRAINOSUS: ICD-10-CM

## 2024-07-23 DIAGNOSIS — Z79.899 ENCOUNTER FOR LONG-TERM (CURRENT) USE OF HIGH-RISK MEDICATION: ICD-10-CM

## 2024-07-23 DIAGNOSIS — G89.4 CHRONIC PAIN SYNDROME: ICD-10-CM

## 2024-07-23 PROCEDURE — 64615 CHEMODENERV MUSC MIGRAINE: CPT | Performed by: ANESTHESIOLOGY

## 2024-07-23 PROCEDURE — 99214 OFFICE O/P EST MOD 30 MIN: CPT | Mod: 25 | Performed by: ANESTHESIOLOGY

## 2024-07-23 RX ORDER — BUPRENORPHINE AND NALOXONE 8; 2 MG/1; MG/1
FILM, SOLUBLE BUCCAL; SUBLINGUAL
Qty: 60 FILM | Refills: 2 | Status: SHIPPED | OUTPATIENT
Start: 2024-07-23

## 2024-07-23 ASSESSMENT — PAIN SCALES - GENERAL: PAINLEVEL: MODERATE PAIN (4)

## 2024-07-23 ASSESSMENT — PAIN SCALES - PAIN ENJOYMENT GENERAL ACTIVITY SCALE (PEG)
INTERFERED_ENJOYMENT_LIFE: 3
INTERFERED_ENJOYMENT_LIFE: 3
INTERFERED_GENERAL_ACTIVITY: 3
AVG_PAIN_PASTWEEK: 4
PEG_TOTALSCORE: 3.33
PEG_TOTALSCORE: 3.33
INTERFERED_GENERAL_ACTIVITY: 3
AVG_PAIN_PASTWEEK: 4

## 2024-07-23 NOTE — PATIENT INSTRUCTIONS
[facility]  Botox Injection Discharge Instructions    Do not rub or put extended pressure on the injection sites. You may gently touch the sites to remove any excess blood.  Monitor the injection sites for signs and symptoms of infection such as redness, swelling, warmth, fever, chills, or drainage to areas.  You may have soreness at the injection sites for up to 24 hours.  If you are able to use anti-inflammatory medications or Tylenol for pain control, you can take these as directed.  It may take up to 1 month to notice benefit from the 1st treatment  If you do notice relief, botox can be done every 3 months.  It may require insurance authorization everytime.    For questions about insurance coverage, please call the main clinic number and ask to speak with someone about botox coverage.     Pain Clinic phone number during work hours (Monday through Friday 8 am-4:30 pm) at [phone] or the Provider Line after hours at [phone]:

## 2024-07-23 NOTE — TELEPHONE ENCOUNTER
Pt has appt 7/23, will refill at this time    Carmina MANRIQUE RN Care Coordinator  [facility]
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
## 2024-07-25 ENCOUNTER — HOSPITAL ENCOUNTER (OUTPATIENT)
Dept: CT IMAGING | Facility: CLINIC | Age: 54
Discharge: HOME | End: 2024-07-25
Attending: FAMILY MEDICINE | Admitting: FAMILY MEDICINE
Payer: COMMERCIAL

## 2024-07-25 DIAGNOSIS — R06.6 HICCUPS: ICD-10-CM

## 2024-07-25 DIAGNOSIS — R14.0 ABDOMINAL BLOATING: ICD-10-CM

## 2024-07-25 LAB
CREAT BLD-MCNC: 2.5 MG/DL (ref 0.7–1.3)
EGFRCR SERPLBLD CKD-EPI 2021: 29 ML/MIN/1.73M2

## 2024-07-25 PROCEDURE — 82565 ASSAY OF CREATININE: CPT

## 2024-07-25 PROCEDURE — 250N000011 HC RX IP 250 OP 636: Performed by: FAMILY MEDICINE

## 2024-07-25 PROCEDURE — 74177 CT ABD & PELVIS W/CONTRAST: CPT

## 2024-07-25 RX ORDER — IOPAMIDOL 755 MG/ML
90 INJECTION, SOLUTION INTRAVASCULAR ONCE
Status: COMPLETED | OUTPATIENT
Start: 2024-07-25 | End: 2024-07-25

## 2024-07-25 RX ADMIN — IOPAMIDOL 75 ML: 755 INJECTION, SOLUTION INTRAVENOUS at 11:14

## 2024-07-25 NOTE — RESULT ENCOUNTER NOTE
Dear Jeremi,    Here is a summary of your recent test results:    IMPRESSION:   1.  Interval partial left nephrectomy. No visible recurrent mass.  2.  No findings to explain the patient's symptoms.        For additional lab test information, www.testing.com is a very good reference.    In addition, here is a list of due or overdue Health Maintenance reminders:  Heart Failure Action Plan Never done  Hepatitis A Vaccine(1 of 2 - Risk 2-dose series) Never done  Zoster (Shingles) Vaccine(1 of 2) Never done  Asthma Action Plan - yearly due on 06/28/2020  COVID-19 Vaccine(3 - 2023-24 season) due on 09/01/2023  Colorectal Cancer Screening due on 10/13/2023  Cholesterol Lab due on 04/21/2024    Please call us at [phone] (or use [employer]) to address the above recommendations if needed.           Thank you very much for trusting me and [facility].     Have a peaceful day.    Healthy regards,  Nate Segovia MD

## 2024-07-30 DIAGNOSIS — E29.1 HYPOGONADISM MALE: ICD-10-CM

## 2024-07-30 RX ORDER — TESTOSTERONE CYPIONATE 200 MG/ML
200 INJECTION, SOLUTION INTRAMUSCULAR WEEKLY
Qty: 4 ML | Refills: 1 | Status: SHIPPED | OUTPATIENT
Start: 2024-07-30 | End: 2024-09-26

## 2024-08-01 ENCOUNTER — MYC REFILL (OUTPATIENT)
Dept: FAMILY MEDICINE | Facility: CLINIC | Age: 54
End: 2024-08-01
Payer: COMMERCIAL

## 2024-08-01 DIAGNOSIS — K21.9 GASTROESOPHAGEAL REFLUX DISEASE WITHOUT ESOPHAGITIS: ICD-10-CM

## 2024-08-01 DIAGNOSIS — F90.9 ATTENTION DEFICIT HYPERACTIVITY DISORDER (ADHD), UNSPECIFIED ADHD TYPE: ICD-10-CM

## 2024-08-01 RX ORDER — DEXTROAMPHETAMINE SACCHARATE, AMPHETAMINE ASPARTATE, DEXTROAMPHETAMINE SULFATE AND AMPHETAMINE SULFATE 5; 5; 5; 5 MG/1; MG/1; MG/1; MG/1
20 TABLET ORAL 2 TIMES DAILY
Qty: 60 TABLET | Refills: 0 | Status: SHIPPED | OUTPATIENT
Start: 2024-10-03

## 2024-08-01 RX ORDER — DEXTROAMPHETAMINE SACCHARATE, AMPHETAMINE ASPARTATE, DEXTROAMPHETAMINE SULFATE AND AMPHETAMINE SULFATE 5; 5; 5; 5 MG/1; MG/1; MG/1; MG/1
20 TABLET ORAL 2 TIMES DAILY
Qty: 60 TABLET | Refills: 0 | Status: SHIPPED | OUTPATIENT
Start: 2024-08-04 | End: 2024-09-03

## 2024-08-01 RX ORDER — DEXTROAMPHETAMINE SACCHARATE, AMPHETAMINE ASPARTATE, DEXTROAMPHETAMINE SULFATE AND AMPHETAMINE SULFATE 5; 5; 5; 5 MG/1; MG/1; MG/1; MG/1
20 TABLET ORAL 2 TIMES DAILY
Qty: 60 TABLET | Refills: 0 | Status: SHIPPED | OUTPATIENT
Start: 2024-09-03 | End: 2024-10-03

## 2024-08-01 RX ORDER — PANTOPRAZOLE SODIUM 40 MG/1
40 TABLET, DELAYED RELEASE ORAL DAILY
Qty: 90 TABLET | Refills: 2 | Status: SHIPPED | OUTPATIENT
Start: 2024-08-01

## 2024-08-02 DIAGNOSIS — F41.0 PANIC DISORDER WITHOUT AGORAPHOBIA: ICD-10-CM

## 2024-08-02 DIAGNOSIS — H04.123 DRY EYES, BILATERAL: ICD-10-CM

## 2024-08-03 RX ORDER — CYCLOSPORINE 0.5 MG/ML
EMULSION OPHTHALMIC
Qty: 60 ML | Refills: 4 | Status: SHIPPED | OUTPATIENT
Start: 2024-08-03

## 2024-08-03 RX ORDER — BUSPIRONE HYDROCHLORIDE 5 MG/1
5 TABLET ORAL 2 TIMES DAILY PRN
Qty: 180 TABLET | Refills: 3 | Status: SHIPPED | OUTPATIENT
Start: 2024-08-03

## 2024-08-09 ENCOUNTER — PATIENT OUTREACH (OUTPATIENT)
Dept: CARE COORDINATION | Facility: CLINIC | Age: 54
End: 2024-08-09
Payer: COMMERCIAL

## 2024-08-15 DIAGNOSIS — G47.00 INSOMNIA, UNSPECIFIED TYPE: ICD-10-CM

## 2024-08-15 RX ORDER — ZOLPIDEM TARTRATE 10 MG/1
10 TABLET ORAL
Qty: 30 TABLET | Refills: 2 | Status: SHIPPED | OUTPATIENT
Start: 2024-08-15

## 2024-08-19 DIAGNOSIS — M54.2 NECK PAIN: ICD-10-CM

## 2024-08-19 RX ORDER — CYCLOBENZAPRINE HCL 10 MG
10 TABLET ORAL 3 TIMES DAILY PRN
Qty: 90 TABLET | Refills: 5 | Status: SHIPPED | OUTPATIENT
Start: 2024-08-19

## 2024-08-23 ENCOUNTER — PATIENT OUTREACH (OUTPATIENT)
Dept: CARE COORDINATION | Facility: CLINIC | Age: 54
End: 2024-08-23
Payer: COMMERCIAL

## 2024-09-05 DIAGNOSIS — F33.1 MAJOR DEPRESSIVE DISORDER, RECURRENT EPISODE, MODERATE (H): ICD-10-CM

## 2024-09-05 DIAGNOSIS — F41.1 GENERALIZED ANXIETY DISORDER: ICD-10-CM

## 2024-09-05 DIAGNOSIS — I25.10 CORONARY ARTERY DISEASE INVOLVING NATIVE HEART WITHOUT ANGINA PECTORIS, UNSPECIFIED VESSEL OR LESION TYPE: ICD-10-CM

## 2024-09-05 DIAGNOSIS — I10 HYPERTENSION GOAL BP (BLOOD PRESSURE) < 140/90: ICD-10-CM

## 2024-09-05 RX ORDER — CARVEDILOL 25 MG/1
25 TABLET ORAL 2 TIMES DAILY WITH MEALS
Qty: 180 TABLET | Refills: 0 | Status: SHIPPED | OUTPATIENT
Start: 2024-09-05

## 2024-09-05 RX ORDER — CLOPIDOGREL BISULFATE 75 MG/1
75 TABLET ORAL DAILY
Qty: 90 TABLET | Refills: 0 | Status: SHIPPED | OUTPATIENT
Start: 2024-09-05

## 2024-09-05 RX ORDER — BUPROPION HYDROCHLORIDE 300 MG/1
300 TABLET ORAL EVERY MORNING
Qty: 90 TABLET | Refills: 0 | Status: SHIPPED | OUTPATIENT
Start: 2024-09-05

## 2024-09-05 NOTE — LETTER
September 23, 2024      Jeremi Damon  [address]  [place] [address]        Dear Jeremi,       We received a refill request from your pharmacy for your bupropion, carvedilol, and clopidogril medications.  At this time the nurses were able to give you a chuy refill, but you are due to be seen for a fasting wellness visit before the next refill.  This appointment can be scheduled by calling [phone] or can be scheduled via [employer] as well.    Thank you for choosing [facility],        Sincerely,        Luis Armando Segovia MD

## 2024-09-08 NOTE — TELEPHONE ENCOUNTER
No care due was identified.  [facility] Embedded Care Due Messages. Reference number: [id number].   9/08/2024 3:51:09 PM CDT   rx sent, patient aware closing encounter.     Cinda RIOS RN   [facility] Triage

## 2024-09-18 ENCOUNTER — MYC MEDICAL ADVICE (OUTPATIENT)
Dept: CARDIOLOGY | Facility: CLINIC | Age: 54
End: 2024-09-18
Payer: COMMERCIAL

## 2024-09-19 DIAGNOSIS — I25.119 CORONARY ARTERY DISEASE INVOLVING NATIVE CORONARY ARTERY OF NATIVE HEART WITH ANGINA PECTORIS (H): ICD-10-CM

## 2024-09-19 RX ORDER — ROSUVASTATIN CALCIUM 20 MG/1
20 TABLET, COATED ORAL DAILY
Qty: 100 TABLET | Refills: 1 | Status: SHIPPED | OUTPATIENT
Start: 2024-09-19

## 2024-09-19 NOTE — TELEPHONE ENCOUNTER
Patient has [facility] coverage and is eligible to get certain prescriptions as a 100-day supply.      Prescriptions updated to 100-day supply: rosuvastatin     Felipe NjD, [facility]  Retail Pharmacy Specialist  [phone]

## 2024-09-19 NOTE — TELEPHONE ENCOUNTER
Left message to call back. When pt calls back please advise of Dr. Segovia's message below and help schedule fasting physical appointment. Lisa Li MA

## 2024-09-23 NOTE — CONFIDENTIAL NOTE
Patient returned call and left voicemail message with update blood pressure reading.      Last Blood Pressure: 143/72  Last Heart Rate: 76  Date: 7/23/24  Location: Other Specialty    Today's Blood Pressure: 116/74  Today's Heart Rate: 73  Location: Home BP    Patient reported blood pressure updated in Epic. Blood pressure falls within [place] Community Measures guidelines.  Patient will follow up as previously advised.  GRETEL Forrest

## 2024-09-26 DIAGNOSIS — E29.1 HYPOGONADISM MALE: ICD-10-CM

## 2024-09-26 RX ORDER — TESTOSTERONE CYPIONATE 200 MG/ML
200 INJECTION, SOLUTION INTRAMUSCULAR WEEKLY
Qty: 4 ML | Refills: 1 | Status: SHIPPED | OUTPATIENT
Start: 2024-09-26

## 2024-09-28 DIAGNOSIS — L65.9 ALOPECIA: ICD-10-CM

## 2024-09-30 RX ORDER — FINASTERIDE 5 MG/1
1 TABLET, FILM COATED ORAL DAILY
Qty: 90 TABLET | Refills: 3 | Status: SHIPPED | OUTPATIENT
Start: 2024-09-30

## 2024-10-02 ENCOUNTER — MYC MEDICAL ADVICE (OUTPATIENT)
Dept: FAMILY MEDICINE | Facility: CLINIC | Age: 54
End: 2024-10-02
Payer: COMMERCIAL

## 2024-10-02 NOTE — TELEPHONE ENCOUNTER
Placed call to patient to help schedule appointments. Preop and physical cannot be combined.     Pt scheduled preop with [place] provider - closer to home 11/11/24  Scheduled physical with PCP for 11/25/24

## 2024-10-10 DIAGNOSIS — I21.4 NSTEMI (NON-ST ELEVATED MYOCARDIAL INFARCTION) (H): ICD-10-CM

## 2024-10-10 DIAGNOSIS — I25.10 CORONARY ARTERY DISEASE INVOLVING NATIVE HEART WITHOUT ANGINA PECTORIS, UNSPECIFIED VESSEL OR LESION TYPE: ICD-10-CM

## 2024-10-10 RX ORDER — ISOSORBIDE MONONITRATE 60 MG/1
60 TABLET, EXTENDED RELEASE ORAL DAILY
Qty: 90 TABLET | Refills: 3 | Status: SHIPPED | OUTPATIENT
Start: 2024-10-10

## 2024-10-18 DIAGNOSIS — G47.00 INSOMNIA, UNSPECIFIED TYPE: ICD-10-CM

## 2024-10-18 RX ORDER — AMITRIPTYLINE HYDROCHLORIDE 50 MG/1
150 TABLET ORAL AT BEDTIME
Qty: 270 TABLET | Refills: 1 | Status: SHIPPED | OUTPATIENT
Start: 2024-10-18

## 2024-10-23 DIAGNOSIS — F41.1 GENERALIZED ANXIETY DISORDER: ICD-10-CM

## 2024-10-23 DIAGNOSIS — F41.0 PANIC DISORDER WITHOUT AGORAPHOBIA: ICD-10-CM

## 2024-10-23 RX ORDER — LORAZEPAM 1 MG/1
1 TABLET ORAL 2 TIMES DAILY PRN
Qty: 60 TABLET | Refills: 5 | Status: SHIPPED | OUTPATIENT
Start: 2024-10-25

## 2024-10-26 DIAGNOSIS — I10 HYPERTENSION GOAL BP (BLOOD PRESSURE) < 140/90: ICD-10-CM

## 2024-10-28 RX ORDER — HYDRALAZINE HYDROCHLORIDE 100 MG/1
50 TABLET, FILM COATED ORAL 2 TIMES DAILY
Qty: 180 TABLET | Refills: 1 | Status: SHIPPED | OUTPATIENT
Start: 2024-10-28

## 2024-10-30 ENCOUNTER — MYC MEDICAL ADVICE (OUTPATIENT)
Dept: PALLIATIVE MEDICINE | Facility: CLINIC | Age: 54
End: 2024-10-30
Payer: COMMERCIAL

## 2024-10-30 DIAGNOSIS — G89.4 CHRONIC PAIN SYNDROME: ICD-10-CM

## 2024-10-30 DIAGNOSIS — F11.20 UNCOMPLICATED OPIOID DEPENDENCE (H): ICD-10-CM

## 2024-10-31 ENCOUNTER — TELEPHONE (OUTPATIENT)
Dept: PALLIATIVE MEDICINE | Facility: CLINIC | Age: 54
End: 2024-10-31
Payer: COMMERCIAL

## 2024-10-31 RX ORDER — BUPRENORPHINE AND NALOXONE 8; 2 MG/1; MG/1
FILM, SOLUBLE BUCCAL; SUBLINGUAL
Qty: 60 FILM | Refills: 0 | Status: CANCELLED | OUTPATIENT
Start: 2024-10-31

## 2024-10-31 NOTE — TELEPHONE ENCOUNTER
"Called pt. Discussed need for nurse appt for annual protocols and also scheduled for Botox in injection time slot. Advised pt that is is not the norm to schedule within procedural time. Have also scheduled follow up for 60min in Jan for next Botox and clinic follow up appt.   Pt states that is out of suboxone and needs refill. Has been using leftover \"yellow films\" this past week. Advised would route refill for review. 30 day prepped.       Per  last sold Suboxone 8-2, 09/23/24, #60    Trixie ASTORGA, RN Care Coordinator  [facility]  Pain Management              "

## 2024-10-31 NOTE — TELEPHONE ENCOUNTER
[facility] Call Center    Phone Message    May a detailed message be left on voicemail: yes     Reason for Call: Medication Refill Request    Has the patient contacted the pharmacy for the refill? Yes   Name of medication being requested: buprenorphine HCl-naloxone HCl (SUBOXONE) 8-2 MG per film   Provider who prescribed the medication: Moris  Pharmacy:   [facility] [place] - [address]-     Date medication is needed: ASAP    Action Taken: Message routed to:  Other: [facility]    Travel Screening: Not Applicable     Date of Service:

## 2024-10-31 NOTE — TELEPHONE ENCOUNTER
Routed to MA pool to gather required information for opioid refill.    ----------------Mychart Below from pt----------------  Hi. I had to watch my granddaughter with no notice. I figured out why when I too started feeling like crap. We all went out to eat and I assume it was some sort of food poisoning. I'm past due for Suboxone. Can you call that in? I am setting up an appointment to come see you for Botox. My neck has gotten pretty bad and it has led to one horrible migraine. I will see you soon. Thanks  Jeremi      --------------Mychart below response to pt----------------    Bonny Blood,     We can begin processing a refill for you. However, you are due for your annual contract agreement with us, this is a requirement for our clinic to prescribe. You will need to come in some time within  the next week to complete this. We have availability for you to come in to complete this either tomorrow between 8:30am-11 or next week Monday, Tuesday, Thursday from 8:30-11 or 1:30-3:30. Please let me know what works for you and I will out you into the nursing schedule to complete.   In regards to your follow up with Dr Subramanian. You can call [phone] to reschedule. Your appointment will need to be 60 minutes in order to complete the follow up and Botox.  Of note, Dr Subramanian is booking into January for her appointments. I would also recommend inquiring about being put on a cancellation list with the scheduling office.     Thanks    Trixie ASTORGA, RN Care Coordinator  [facility]  Pain Management    When responding to this message, please allow 24-48 business hours for a reply.  If you need sooner assistance please call: [facility] Pain Management at [phone] between the hours of 8:00AM and 4:30PM Monday through Friday.    Note that [employer] is not intended for emergencies, detailed provider communication, or in lieu of an office visit. Medication is not typically adjusted over MyChart communication.

## 2024-11-01 ENCOUNTER — LAB (OUTPATIENT)
Dept: LAB | Facility: CLINIC | Age: 54
End: 2024-11-01
Payer: COMMERCIAL

## 2024-11-01 ENCOUNTER — ALLIED HEALTH/NURSE VISIT (OUTPATIENT)
Dept: PALLIATIVE MEDICINE | Facility: CLINIC | Age: 54
End: 2024-11-01
Payer: COMMERCIAL

## 2024-11-01 DIAGNOSIS — F11.20 UNCOMPLICATED OPIOID DEPENDENCE (H): ICD-10-CM

## 2024-11-01 DIAGNOSIS — G89.4 CHRONIC PAIN SYNDROME: ICD-10-CM

## 2024-11-01 DIAGNOSIS — Z79.899 ENCOUNTER FOR LONG-TERM (CURRENT) USE OF HIGH-RISK MEDICATION: Primary | ICD-10-CM

## 2024-11-01 DIAGNOSIS — F11.20 UNCOMPLICATED OPIOID DEPENDENCE (H): Primary | ICD-10-CM

## 2024-11-01 PROCEDURE — G0481 DRUG TEST DEF 8-14 CLASSES: HCPCS | Performed by: INTERNAL MEDICINE

## 2024-11-01 PROCEDURE — 80307 DRUG TEST PRSMV CHEM ANLYZR: CPT

## 2024-11-01 PROCEDURE — 99207 PR NO CHARGE NURSE ONLY: CPT

## 2024-11-01 RX ORDER — BUPRENORPHINE AND NALOXONE 8; 2 MG/1; MG/1
FILM, SOLUBLE BUCCAL; SUBLINGUAL
Qty: 60 FILM | Refills: 0 | Status: SHIPPED | OUTPATIENT
Start: 2024-11-01

## 2024-11-01 NOTE — TELEPHONE ENCOUNTER
Script Eprescribed to pharmacy  [place] Prescription Monitoring [place] checked      Signed Prescriptions:                        Disp   Refills    buprenorphine HCl-naloxone HCl (SUBOXONE) *60 Film0        Si/2 film QID - Brand name only.  Authorizing Provider: ABENA MEDELLIN MD

## 2024-11-01 NOTE — TELEPHONE ENCOUNTER
UDS completed. Routing to provider to for refill in separate encounter. Unable to prep due to Epic error       Trixie ASTORGA, RN Care Coordinator  [facility]  Pain [facility]

## 2024-11-01 NOTE — TELEPHONE ENCOUNTER
See 10/31 Mychart encounter. Prepping Suboxone refill.     Trixie ASTORGA, RN Care Coordinator  [facility]  Pain [facility]

## 2024-11-01 NOTE — TELEPHONE ENCOUNTER
Patient no showed appointment today after clear discussion yesterday that UDS/annual protocols are required and that we would also work him in to be seen for Botox.     Discussed no show/attendance policy that is now at 2 no show appointments. 3 no show appointments may results in dismissal from clinic.   Advised that as discussed with provider that UDS needed for prescription. Offered to allow to come in prior to 3pm today to complete. Ok to seek assistance for recovery clinic until able to come in for annual requirements. Prescription to be provided once complete.       [facility]     The Recovery Clinic provides bridging services to people with Opiate Use Disorders (OUD) seeking care. This is a front door to Medication Assisted Treatments (MAT), Substance Abuse Disorder-related psychotherapy, Peer Recovery and Nursing services and referrals to Substance Use Disorder (JORY) Assessments.      There are three ways for people to access the Recovery Clinic:      People being seen for Opiate Use Disorder (OUD) withdrawal in the [facility] can be referred by that care team  People receiving care in a [facility] system can be referred by their provider   People who use the walk-in hours                                    Important Information:     People ages 16 years and older are served     Methadone services are not provided     Open 5 days weekly     Monday through Friday 9 am-4pm   Walk-Ins Available Monday through Friday 9am-3pm          Location:    [facility], [address] [place] [address], First Floor, [address], [place] [address]   (next to outpatient lab)     Phone: [phone]

## 2024-11-02 LAB
CANNABINOIDS UR QL SCN: NORMAL
CREAT UR-MCNC: 146 MG/DL

## 2024-11-05 LAB
AMPHET UR CFM-MCNC: 726 NG/ML
AMPHET/CREAT UR: 497 NG/MG {CREAT}
BUPRENORPHINE UR CFM-MCNC: 7 NG/ML
BUPRENORPHINE/CREAT UR: 5 NG/MG {CREAT}
LORAZEPAM UR QL CFM: PRESENT
NORBUPRENORPHINE UR CFM-MCNC: 74 NG/ML
NORBUPRENORPHINE/CREAT UR: 51 NG/MG {CREAT}

## 2024-11-10 DIAGNOSIS — R12 HEARTBURN: ICD-10-CM

## 2024-11-10 DIAGNOSIS — R10.13 ABDOMINAL PAIN, EPIGASTRIC: ICD-10-CM

## 2024-11-11 ENCOUNTER — OFFICE VISIT (OUTPATIENT)
Dept: FAMILY MEDICINE | Facility: CLINIC | Age: 54
End: 2024-11-11
Payer: COMMERCIAL

## 2024-11-11 VITALS
OXYGEN SATURATION: 96 % | SYSTOLIC BLOOD PRESSURE: 117 MMHG | WEIGHT: 262.7 LBS | TEMPERATURE: 98.1 F | HEIGHT: 72 IN | HEART RATE: 68 BPM | BODY MASS INDEX: 35.58 KG/M2 | DIASTOLIC BLOOD PRESSURE: 69 MMHG | RESPIRATION RATE: 16 BRPM

## 2024-11-11 DIAGNOSIS — N18.32 STAGE 3B CHRONIC KIDNEY DISEASE (H): ICD-10-CM

## 2024-11-11 DIAGNOSIS — R19.5 POSITIVE COLORECTAL CANCER SCREENING USING COLOGUARD TEST: ICD-10-CM

## 2024-11-11 DIAGNOSIS — Z01.818 PREOP GENERAL PHYSICAL EXAM: Primary | ICD-10-CM

## 2024-11-11 DIAGNOSIS — I25.119 CORONARY ARTERY DISEASE INVOLVING NATIVE CORONARY ARTERY OF NATIVE HEART WITH ANGINA PECTORIS (H): ICD-10-CM

## 2024-11-11 DIAGNOSIS — K21.9 GASTROESOPHAGEAL REFLUX DISEASE WITHOUT ESOPHAGITIS: ICD-10-CM

## 2024-11-11 DIAGNOSIS — D64.9 ANEMIA, UNSPECIFIED TYPE: ICD-10-CM

## 2024-11-11 LAB
ERYTHROCYTE [DISTWIDTH] IN BLOOD BY AUTOMATED COUNT: 19.1 % (ref 10–15)
HCT VFR BLD AUTO: 39.5 % (ref 40–53)
HGB BLD-MCNC: 11.4 G/DL (ref 13.3–17.7)
MCH RBC QN AUTO: 21.5 PG (ref 26.5–33)
MCHC RBC AUTO-ENTMCNC: 28.9 G/DL (ref 31.5–36.5)
MCV RBC AUTO: 74 FL (ref 78–100)
PLATELET # BLD AUTO: 226 10E3/UL (ref 150–450)
RBC # BLD AUTO: 5.31 10E6/UL (ref 4.4–5.9)
WBC # BLD AUTO: 9.5 10E3/UL (ref 4–11)

## 2024-11-11 PROCEDURE — 99213 OFFICE O/P EST LOW 20 MIN: CPT | Performed by: NURSE PRACTITIONER

## 2024-11-11 PROCEDURE — 83550 IRON BINDING TEST: CPT | Performed by: NURSE PRACTITIONER

## 2024-11-11 PROCEDURE — 36415 COLL VENOUS BLD VENIPUNCTURE: CPT | Performed by: NURSE PRACTITIONER

## 2024-11-11 PROCEDURE — 85027 COMPLETE CBC AUTOMATED: CPT | Performed by: NURSE PRACTITIONER

## 2024-11-11 PROCEDURE — 83540 ASSAY OF IRON: CPT | Performed by: NURSE PRACTITIONER

## 2024-11-11 PROCEDURE — 80053 COMPREHEN METABOLIC PANEL: CPT | Performed by: NURSE PRACTITIONER

## 2024-11-11 PROCEDURE — 82728 ASSAY OF FERRITIN: CPT | Performed by: NURSE PRACTITIONER

## 2024-11-11 RX ORDER — SUCRALFATE 1 G/1
1 TABLET ORAL 4 TIMES DAILY PRN
Qty: 60 TABLET | Refills: 2 | Status: SHIPPED | OUTPATIENT
Start: 2024-11-11

## 2024-11-11 ASSESSMENT — ASTHMA QUESTIONNAIRES
QUESTION_1 LAST FOUR WEEKS HOW MUCH OF THE TIME DID YOUR ASTHMA KEEP YOU FROM GETTING AS MUCH DONE AT WORK, SCHOOL OR AT HOME: A LITTLE OF THE TIME
QUESTION_2 LAST FOUR WEEKS HOW OFTEN HAVE YOU HAD SHORTNESS OF BREATH: ONCE OR TWICE A WEEK
ACT_TOTALSCORE: 16
QUESTION_3 LAST FOUR WEEKS HOW OFTEN DID YOUR ASTHMA SYMPTOMS (WHEEZING, COUGHING, SHORTNESS OF BREATH, CHEST TIGHTNESS OR PAIN) WAKE YOU UP AT NIGHT OR EARLIER THAN USUAL IN THE MORNING: NOT AT ALL
QUESTION_5 LAST FOUR WEEKS HOW WOULD YOU RATE YOUR ASTHMA CONTROL: POORLY CONTROLLED
QUESTION_4 LAST FOUR WEEKS HOW OFTEN HAVE YOU USED YOUR RESCUE INHALER OR NEBULIZER MEDICATION (SUCH AS ALBUTEROL): THREE OR MORE TIMES PER DAY
ACT_TOTALSCORE: 16

## 2024-11-11 ASSESSMENT — PATIENT HEALTH QUESTIONNAIRE - PHQ9: SUM OF ALL RESPONSES TO PHQ QUESTIONS 1-9: 4

## 2024-11-11 ASSESSMENT — PAIN SCALES - GENERAL: PAINLEVEL_OUTOF10: NO PAIN (0)
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
## 2024-11-11 NOTE — PATIENT INSTRUCTIONS
How to Take Your Medication Before Surgery  Preoperative Medication Instructions   Antiplatelet or Anticoagulation Medication Instructions   - aspirin: Discontinue aspirin 7-10 days prior to procedure to reduce bleeding risk. It should be resumed postoperatively.    - clopidrogel (Plavix), prasugrel (Effient), ticagrelor (Brilinta): No contraindication to stopping Plavix, DO NOT TAKE 5-7 days before surgery.     Additional Medication Instructions  Take all scheduled medications on the day of surgery EXCEPT for modifications listed below:   No Adderall   Hold supplements       Patient Education   Preparing for Your Surgery  For Adults  Getting started  In most cases, a nurse will call to review your health history and instructions. They will give you an arrival time based on your scheduled surgery time. Please be ready to share:  Your doctor's clinic name and phone number  Your medical, surgical, and anesthesia history  A list of allergies and sensitivities  A list of medicines, including herbal treatments and over-the-counter drugs  Whether the patient has a legal guardian (ask how to send us the papers in advance)  Note: You may not receive a call if you were seen at our PAC (Preoperative Assessment Center).  Please tell us if you're pregnant--or if there's any chance you might be pregnant. Some surgeries may injure a fetus (unborn baby), so they require a pregnancy test. Surgeries that are safe for a fetus don't always need a test, and you can choose whether to have one.   Preparing for surgery  Within 10 to 30 days of surgery: Have a pre-op exam (sometimes called an H&P, or History and Physical). This can be done at a clinic or pre-operative center.  If you're having a , you may not need this exam. Talk to your care team.  At your pre-op exam, talk to your care team about all medicines you take. (This includes CBD oil and any drugs, such as THC, marijuana, and other forms of cannabis.) If you need to  stop any medicine before surgery, ask when to start taking it again.  This is for your safety. Many medicines and drugs can make you bleed too much during surgery. Some change how well surgery (anesthesia) drugs work.  Call your insurance company to let them know you're having surgery. (If you don't have insurance, call [phone].)  Call your clinic if there's any change in your health. This includes a scrape or scratch near the surgery site, or any signs of a cold (sore throat, runny nose, cough, rash, fever).  Eating and drinking guidelines  For your safety: Unless your surgeon tells you otherwise, follow the guidelines below.  Eat and drink as normal until 8 hours before you arrive for surgery. After that, no food or milk. You can spit out gum when you arrive.  Drink clear liquids until 2 hours before you arrive. These are liquids you can see through, like water, Gatorade, and Propel Water. They also include plain black coffee and tea (no cream or milk).  No alcohol for 24 hours before you arrive. The night before surgery, stop any drinks that contain THC.  If your care team tells you to take medicine on the morning of surgery, it's okay to take it with a sip of water. No other medicines or drugs are allowed (including CBD oil)--follow your care team's instructions.  If you have questions the day of surgery, call your hospital or surgery center.   Preventing infection  Shower or bathe the night before and the morning of surgery. Follow the instructions your clinic gave you. (If no instructions, use regular soap.)  Don't shave or clip hair near your surgery site. We'll remove the hair if needed.  Don't smoke or vape the morning of surgery. No chewing tobacco for 6 hours before you arrive. A nicotine patch is okay. You may spit out nicotine gum when you arrive.  For some surgeries, the surgeon will tell you to fully quit smoking and nicotine.  We will make every effort to keep you safe from infection. We  will:  Clean our hands often with soap and water (or an alcohol-based hand rub).  Clean the skin at your surgery site with a special soap that kills germs.  Give you a special gown to keep you warm. (Cold raises the risk of infection.)  Wear hair covers, masks, gowns, and gloves during surgery.  Give antibiotic medicine, if prescribed. Not all surgeries need this medicine.  What to bring on the day of surgery  Photo ID and insurance card  Copy of your health care directive, if you have one  Glasses and hearing aids (bring cases)  You can't wear contacts during surgery  Inhaler and eye drops, if you use them (tell us about these when you arrive)  CPAP machine or breathing device, if you use them  A few personal items, if spending the night  If you have . . .  A pacemaker, ICD (cardiac defibrillator), or other implant: Bring the ID card.  An implanted stimulator: Bring the remote control.  A legal guardian: Bring a copy of the certified (court-stamped) guardianship papers.  Please remove any jewelry, including body piercings. Leave jewelry and other valuables at home.  If you're going home the day of surgery  You must have a responsible adult drive you home. They should stay with you overnight as well.  If you don't have someone to stay with you, and you aren't safe to go home alone, we may keep you overnight. Insurance often won't pay for this.  After surgery  If it's hard to control your pain or you need more pain medicine, please call your surgeon's office.  Questions?   If you have any questions for your care team, list them here:   ____________________________________________________________________________________________________________________________________________________________________________________________________________________________________________________________  For informational purposes only. Not to replace the advice of your health care provider. Copyright   2003, 2019 [facility]  Services. All rights reserved. Clinically reviewed by Tim Ward MD. [place] [id number] - REV 08/24.

## 2024-11-12 LAB
ALBUMIN SERPL BCG-MCNC: 3.6 G/DL (ref 3.5–5.2)
ALP SERPL-CCNC: 64 U/L (ref 40–150)
ALT SERPL W P-5'-P-CCNC: 45 U/L (ref 0–70)
ANION GAP SERPL CALCULATED.3IONS-SCNC: 10 MMOL/L (ref 7–15)
AST SERPL W P-5'-P-CCNC: 51 U/L (ref 0–45)
BILIRUB SERPL-MCNC: 0.4 MG/DL
BUN SERPL-MCNC: 22.1 MG/DL (ref 6–20)
CALCIUM SERPL-MCNC: 8.9 MG/DL (ref 8.8–10.4)
CHLORIDE SERPL-SCNC: 101 MMOL/L (ref 98–107)
CREAT SERPL-MCNC: 2.35 MG/DL (ref 0.67–1.17)
EGFRCR SERPLBLD CKD-EPI 2021: 32 ML/MIN/1.73M2
FERRITIN SERPL-MCNC: 53 NG/ML (ref 31–409)
GLUCOSE SERPL-MCNC: 84 MG/DL (ref 70–99)
HCO3 SERPL-SCNC: 29 MMOL/L (ref 22–29)
IRON BINDING CAPACITY (ROCHE): 334 UG/DL (ref 240–430)
IRON SATN MFR SERPL: 6 % (ref 15–46)
IRON SERPL-MCNC: 21 UG/DL (ref 61–157)
POTASSIUM SERPL-SCNC: 4.5 MMOL/L (ref 3.4–5.3)
PROT SERPL-MCNC: 6.1 G/DL (ref 6.4–8.3)
SODIUM SERPL-SCNC: 140 MMOL/L (ref 135–145)

## 2024-11-12 NOTE — RESULT ENCOUNTER NOTE
Your anemia has improved but it looks like you could have some worsening iron deficiency.  I added on some more labs to check your iron levels.

## 2024-11-18 ENCOUNTER — HOSPITAL ENCOUNTER (OUTPATIENT)
Facility: CLINIC | Age: 54
Discharge: HOME | End: 2024-11-18
Attending: INTERNAL MEDICINE | Admitting: INTERNAL MEDICINE
Payer: COMMERCIAL

## 2024-11-18 ENCOUNTER — ANESTHESIA EVENT (OUTPATIENT)
Dept: GASTROENTEROLOGY | Facility: CLINIC | Age: 54
End: 2024-11-18
Payer: COMMERCIAL

## 2024-11-18 ENCOUNTER — ANESTHESIA (OUTPATIENT)
Dept: GASTROENTEROLOGY | Facility: CLINIC | Age: 54
End: 2024-11-18
Payer: COMMERCIAL

## 2024-11-18 VITALS
RESPIRATION RATE: 10 BRPM | OXYGEN SATURATION: 88 % | SYSTOLIC BLOOD PRESSURE: 121 MMHG | HEART RATE: 77 BPM | DIASTOLIC BLOOD PRESSURE: 74 MMHG

## 2024-11-18 DIAGNOSIS — K21.9 GASTROESOPHAGEAL REFLUX DISEASE WITHOUT ESOPHAGITIS: Primary | ICD-10-CM

## 2024-11-18 LAB
COLONOSCOPY: NORMAL
UPPER GI ENDOSCOPY: NORMAL

## 2024-11-18 PROCEDURE — 43239 EGD BIOPSY SINGLE/MULTIPLE: CPT | Performed by: INTERNAL MEDICINE

## 2024-11-18 PROCEDURE — G0121 COLON CA SCRN NOT HI RSK IND: HCPCS | Performed by: INTERNAL MEDICINE

## 2024-11-18 PROCEDURE — 88342 IMHCHEM/IMCYTCHM 1ST ANTB: CPT | Mod: TC | Performed by: INTERNAL MEDICINE

## 2024-11-18 PROCEDURE — 258N000003 HC RX IP 258 OP 636: Performed by: NURSE ANESTHETIST, CERTIFIED REGISTERED

## 2024-11-18 PROCEDURE — 250N000011 HC RX IP 250 OP 636: Performed by: NURSE ANESTHETIST, CERTIFIED REGISTERED

## 2024-11-18 PROCEDURE — 250N000009 HC RX 250: Performed by: NURSE ANESTHETIST, CERTIFIED REGISTERED

## 2024-11-18 PROCEDURE — 45378 DIAGNOSTIC COLONOSCOPY: CPT | Performed by: INTERNAL MEDICINE

## 2024-11-18 PROCEDURE — 999N000010 HC STATISTIC ANES STAT CODE-CRNA PER MINUTE: Performed by: INTERNAL MEDICINE

## 2024-11-18 PROCEDURE — 88305 TISSUE EXAM BY PATHOLOGIST: CPT | Mod: TC | Performed by: INTERNAL MEDICINE

## 2024-11-18 PROCEDURE — 370N000017 HC ANESTHESIA TECHNICAL FEE, PER MIN: Performed by: INTERNAL MEDICINE

## 2024-11-18 RX ORDER — SODIUM CHLORIDE, SODIUM LACTATE, POTASSIUM CHLORIDE, CALCIUM CHLORIDE 600; 310; 30; 20 MG/100ML; MG/100ML; MG/100ML; MG/100ML
INJECTION, SOLUTION INTRAVENOUS CONTINUOUS PRN
Status: DISCONTINUED | OUTPATIENT
Start: 2024-11-18 | End: 2024-11-18

## 2024-11-18 RX ORDER — PROPOFOL 10 MG/ML
INJECTION, EMULSION INTRAVENOUS CONTINUOUS PRN
Status: DISCONTINUED | OUTPATIENT
Start: 2024-11-18 | End: 2024-11-18

## 2024-11-18 RX ORDER — DEXMEDETOMIDINE HYDROCHLORIDE 4 UG/ML
INJECTION, SOLUTION INTRAVENOUS PRN
Status: DISCONTINUED | OUTPATIENT
Start: 2024-11-18 | End: 2024-11-18

## 2024-11-18 RX ORDER — AMLODIPINE BESYLATE 5 MG/1
5 TABLET ORAL
COMMUNITY

## 2024-11-18 RX ORDER — ONDANSETRON 2 MG/ML
INJECTION INTRAMUSCULAR; INTRAVENOUS PRN
Status: DISCONTINUED | OUTPATIENT
Start: 2024-11-18 | End: 2024-11-18

## 2024-11-18 RX ORDER — PROPOFOL 10 MG/ML
INJECTION, EMULSION INTRAVENOUS PRN
Status: DISCONTINUED | OUTPATIENT
Start: 2024-11-18 | End: 2024-11-18

## 2024-11-18 RX ORDER — LIDOCAINE HYDROCHLORIDE 20 MG/ML
INJECTION, SOLUTION INFILTRATION; PERINEURAL PRN
Status: DISCONTINUED | OUTPATIENT
Start: 2024-11-18 | End: 2024-11-18

## 2024-11-18 RX ADMIN — LIDOCAINE HYDROCHLORIDE 100 MG: 20 INJECTION, SOLUTION INFILTRATION; PERINEURAL at 14:57

## 2024-11-18 RX ADMIN — PROPOFOL 175 MCG/KG/MIN: 10 INJECTION, EMULSION INTRAVENOUS at 14:57

## 2024-11-18 RX ADMIN — DEXMEDETOMIDINE HYDROCHLORIDE 20 MCG: 4 INJECTION, SOLUTION INTRAVENOUS at 14:57

## 2024-11-18 RX ADMIN — ONDANSETRON 4 MG: 2 INJECTION INTRAMUSCULAR; INTRAVENOUS at 14:57

## 2024-11-18 RX ADMIN — SODIUM CHLORIDE, POTASSIUM CHLORIDE, SODIUM LACTATE AND CALCIUM CHLORIDE: 600; 310; 30; 20 INJECTION, SOLUTION INTRAVENOUS at 14:57

## 2024-11-18 RX ADMIN — PROPOFOL 30 MG: 10 INJECTION, EMULSION INTRAVENOUS at 14:57

## 2024-11-18 ASSESSMENT — ACTIVITIES OF DAILY LIVING (ADL)
ADLS_ACUITY_SCORE: 0

## 2024-11-18 ASSESSMENT — LIFESTYLE VARIABLES: TOBACCO_USE: 1

## 2024-11-18 NOTE — ANESTHESIA PREPROCEDURE EVALUATION
Anesthesia Pre-Procedure Evaluation    Patient: Jeremi Damon   MRN: [id number] : 1970        Procedure : Procedure(s):  Colonoscopy  Esophagoscopy, gastroscopy, duodenoscopy (EGD), combined          Past Medical History:   Diagnosis Date    ADHD (attention deficit hyperactivity disorder)     Allergic rhinitis, cause unspecified     Allergic rhinitis    Benign hypertension     CAD (coronary artery disease)     cardiac cath 2022: FERNANDA x3 to LAD    Chronic back pain     Hiccough     Hypersomnia with sleep apnea, unspecified     Hypertensive emergency 2022    Major depressive disorder, single episode, moderate (H) 2008    Mild persistent asthma     NSTEMI (non-ST elevated myocardial infarction) (H) 2020    Other primary cardiomyopathies     Cardiomyopathy -- unknown etiology      Past Surgical History:   Procedure Laterality Date    APPENDECTOMY      BACK SURGERY      L5-S1 fusion    CV CORONARY ANGIOGRAM N/A 2020    Procedure: Heart Catheterization with Possible Intervention;  Surgeon: Theo Donahue MD;  Location:  HEART CARDI[facility] CATH LAB    CV CORONARY ANGIOGRAM N/A 2022    Procedure: Coronary Angiogram;  Surgeon: Venkata Hdez MD;  Location: [facility] CARDIAC CATH LAB    CV PCI N/A 2022    Procedure: Percutaneous Coronary Intervention;  Surgeon: Venkata Hdez MD;  Location: [facility] CARDIAC CATH LAB    DAVINCI NEPHRECTOMY PARTIAL Left 2024    Procedure: ROBOTIC ASSISTED LAPAROSCOPIC PARTIAL NEPHRECTOMY - LEFT,  ROBOTIC ASSISTED LAPAROSCOPIC INTRA-OPERATIVE RENAL ULTRASOUND;  Surgeon: Chung Howard MD;  Location:  OR    LAPAROSCOPIC CHOLECYSTECTOMY  2005    Cholecystectomy, Laparoscopic    NOSE SURGERY      Bilateral radiofrequency volume reduction of the inferior turbinates.    RHINOPLASTY  2012    rhinoplasty- septoplasty    SURGICAL HISTORY OF -       torn pectoral muscle      Allergies   Allergen Reactions     Theophylline Hives    Sulfa Antibiotics       Social History     Tobacco Use    Smoking status: Never    Smokeless tobacco: Never   Substance Use Topics    Alcohol use: Yes     Comment: rarely       Wt Readings from Last 1 Encounters:   11/11/24 119.2 kg (262 lb 11.2 oz)        Anesthesia Evaluation            ROS/MED HX  ENT/Pulmonary:     (+)                tobacco use,      asthma                  Neurologic:       Cardiovascular:     (+) Dyslipidemia hypertension- -  CAD - past MI - stent-2022. 3 to LAD Drug Eluting Stent.    CHF                           Previous cardiac testing   Echo: Date: 4/23 Results:  Interpretation Summary     1. Mildly decreased left ventricular systolic function The visual ejection  fraction is 40-45%.  2. There is severe concentric left ventricular hypertrophy.  3. No regional wall motion abnormalities noted.  4. The right ventricle is normal in structure, function and size.  5. No evidence for significant valvular pathology.  6. The ascending aorta is mildly dilated, 4.0 cm.  7. In comparison with the prior study, there has been some interval  improvement in LV function.    Stress Test:  Date: Results:    ECG Reviewed:  Date: Results:    Cath:  Date: Results:   (-) angina and angina   METS/Exercise Tolerance:     Hematologic:       Musculoskeletal:       GI/Hepatic:     (+) GERD, Asymptomatic on medication,                  Renal/Genitourinary:     (+) renal disease, type: CRI,            Endo:     (+)               Obesity,    (-) Type II DM   Psychiatric/Substance Use:     (+) psychiatric history anxiety and depression (ADHD. Panic)       Infectious Disease:       Malignancy:       Other:            Physical Exam    Airway        Mallampati: II   TM distance: > 3 FB   Neck ROM: full   Mouth opening: > 3 cm    Respiratory Devices and Support         Dental         B=Bridge, C=Chipped, L=Loose, M=Missing    Cardiovascular   cardiovascular exam normal          Pulmonary   pulmonary  exam normal                OUTSIDE LABS:  CBC:   Lab Results   Component Value Date    WBC 9.5 11/11/2024    WBC 12.1 (H) 01/10/2024    HGB 11.4 (L) 11/11/2024    HGB 10.3 (L) 01/10/2024    HCT 39.5 (L) 11/11/2024    HCT 33.4 (L) 01/10/2024     11/11/2024     01/10/2024     BMP:   Lab Results   Component Value Date     11/11/2024     07/16/2024    POTASSIUM 4.5 11/11/2024    POTASSIUM 4.8 07/16/2024    CHLORIDE 101 11/11/2024    CHLORIDE 102 07/16/2024    CO2 29 11/11/2024    CO2 27 07/16/2024    BUN 22.1 (H) 11/11/2024    BUN 22.7 (H) 07/16/2024    CR 2.35 (H) 11/11/2024    CR 2.5 (H) 07/25/2024    GLC 84 11/11/2024    GLC 77 07/16/2024     COAGS:   Lab Results   Component Value Date    PTT 94 (H) 11/29/2020    INR 0.98 08/27/2009     POC:   Lab Results   Component Value Date    BGM 89 11/30/2020     HEPATIC:   Lab Results   Component Value Date    ALBUMIN 3.6 11/11/2024    PROTTOTAL 6.1 (L) 11/11/2024    ALT 45 11/11/2024    AST 51 (H) 11/11/2024    ALKPHOS 64 11/11/2024    BILITOTAL 0.4 11/11/2024     OTHER:   Lab Results   Component Value Date    A1C 5.2 11/30/2020    MIKO 8.9 11/11/2024    PHOS 2.5 06/14/2005    LIPASE 29 02/17/2008    AMYLASE 35 02/18/2008    TSH 3.29 12/23/2022    T4 0.80 01/13/2017    CRP <2.9 02/13/2015     (H) 02/07/2010       Anesthesia Plan    ASA Status:  3    NPO Status:  NPO Appropriate    Anesthesia Type: MAC.     - Reason for MAC: chronic cardiopulmonary disease, immobility needed, straight local not clinically adequate   Induction: Intravenous, Propofol.           Consents    Anesthesia Plan(s) and associated risks, benefits, and realistic alternatives discussed. Questions answered and patient/representative(s) expressed understanding.     - Discussed:     - Discussed with:  Patient      - Extended Intubation/Ventilatory Support Discussed: No.      - Patient is DNR/DNI Status: No     Use of blood products discussed: No .     Postoperative Care             Comments:               Naveen Davneport MD    I have reviewed the pertinent notes and labs in the chart from the past 30 days and (re)examined the patient.  Any updates or changes from those notes are reflected in this note.             # Drug Induced Platelet Defect: home medication list includes an antiplatelet medication   # Hypertension: Noted on problem list           # Obesity: Estimated body mass index is 35.63 kg/m  as calculated from the following:    Height as of 11/11/24: 1.829 m (6').    Weight as of 11/11/24: 119.2 kg (262 lb 11.2 oz).       # Asthma: noted on problem list

## 2024-11-18 NOTE — OR NURSING
EGD/Colonoscopy note:  Pt unable to find ride home for procedure.  Per Jennifer Pinedo, endoscopy nurse manager, pt may call taxi for his ride home, as long as he meets discharge criteria.  Will continue to monitor.

## 2024-11-18 NOTE — ANESTHESIA POSTPROCEDURE EVALUATION
Patient: Jeremi Damon    Procedure: Procedure(s):  Esophagoscopy, gastroscopy, duodenoscopy (EGD), combined       Anesthesia Type:  MAC    Note:  Disposition: Outpatient   Postop Pain Control: Uneventful            Sign Out: Well controlled pain   PONV:    Neuro/Psych: Uneventful            Sign Out: Acceptable/Baseline neuro status   Airway/Respiratory: Uneventful            Sign Out: Acceptable/Baseline resp. status   CV/Hemodynamics: Uneventful            Sign Out: Acceptable CV status   Other NRE: NONE   DID A NON-ROUTINE EVENT OCCUR? No           Last vitals:  Vitals Value Taken Time   /69 11/18/24 1530   Temp     Pulse 72 11/18/24 1536   Resp 15 11/18/24 1536   SpO2 89 % 11/18/24 1536   Vitals shown include unfiled device data.    Electronically Signed By: Naveen Davenport MD  November 18, 2024  3:37 PM

## 2024-11-18 NOTE — ANESTHESIA CARE TRANSFER NOTE
Patient: Jeremi Damon    Procedure: Procedure(s):  Esophagoscopy, gastroscopy, duodenoscopy (EGD), combined       Diagnosis: Screen for colon cancer [Z12.11]  Abdominal pain, epigastric [R10.13]  Heartburn [R12]  Diagnosis Additional Information: No value filed.    Anesthesia Type:   MAC     Note:    Oropharynx: oropharynx clear of all foreign objects and spontaneously breathing  Level of Consciousness: drowsy  Oxygen Supplementation: face mask  Level of Supplemental Oxygen (L/min / FiO2): 6  Independent Airway: airway patency satisfactory and stable  Dentition: dentition unchanged  Vital Signs Stable: post-procedure vital signs reviewed and stable  Report to RN Given: handoff report given  Patient transferred to: PACU  Comments: At end of procedure, spontaneous respirations. Oxygen via facemask at 6 liters per minute to PACU. Oxygen tubing connected to wall O2 in PACU, SpO2, NiBP, and EKG monitors and alarms on and functioning, report on patient's clinical status given to PACU RN, RN questions answered.      Handoff Report: Identifed the Patient, Identified the Reponsible Provider, Reviewed the pertinent medical history, Discussed the surgical course, Reviewed Intra-OP anesthesia mangement and issues during anesthesia, Set expectations for post-procedure period and Allowed opportunity for questions and acknowledgement of understanding      Vitals:  Vitals Value Taken Time   BP     Temp     Pulse 72 11/18/24 1516   Resp 13 11/18/24 1516   SpO2 97 % 11/18/24 1516   Vitals shown include unfiled device data.    Electronically Signed By: RAINA Gonzales CRNA  November 18, 2024  3:18 PM   Ultrasound guided Powerglide (Power Injectable) Midline placed by VAT.

## 2024-11-19 NOTE — RESULT ENCOUNTER NOTE
Your iron is low. Please be sure to eat iron rich foods and continue to discuss with your primary at follow up.

## 2024-11-20 DIAGNOSIS — G47.00 INSOMNIA, UNSPECIFIED TYPE: ICD-10-CM

## 2024-11-20 RX ORDER — ZOLPIDEM TARTRATE 10 MG/1
10 TABLET ORAL
Qty: 30 TABLET | Refills: 0 | Status: SHIPPED | OUTPATIENT
Start: 2024-11-20

## 2024-11-20 NOTE — TELEPHONE ENCOUNTER
The prescription(s) has been sent to the requested pharmacy.   
ER Physician:  Carri Director  CHEST XRAY INTERPRETATION: lungs clear, heart shadow normal, bony structures intact

## 2024-11-21 LAB
PATH REPORT.COMMENTS IMP SPEC: NORMAL
PATH REPORT.COMMENTS IMP SPEC: NORMAL
PATH REPORT.FINAL DX SPEC: NORMAL
PATH REPORT.GROSS SPEC: NORMAL
PATH REPORT.MICROSCOPIC SPEC OTHER STN: NORMAL
PATH REPORT.MICROSCOPIC SPEC OTHER STN: NORMAL
PATH REPORT.RELEVANT HX SPEC: NORMAL
PHOTO IMAGE: NORMAL

## 2024-11-21 PROCEDURE — 88305 TISSUE EXAM BY PATHOLOGIST: CPT | Mod: 26 | Performed by: PATHOLOGY

## 2024-11-21 PROCEDURE — 88342 IMHCHEM/IMCYTCHM 1ST ANTB: CPT | Mod: 26 | Performed by: PATHOLOGY

## 2024-11-28 ENCOUNTER — MYC MEDICAL ADVICE (OUTPATIENT)
Dept: PALLIATIVE MEDICINE | Facility: CLINIC | Age: 54
End: 2024-11-28
Payer: COMMERCIAL

## 2024-12-01 DIAGNOSIS — E29.1 HYPOGONADISM MALE: ICD-10-CM

## 2024-12-01 RX ORDER — TESTOSTERONE CYPIONATE 200 MG/ML
200 INJECTION, SOLUTION INTRAMUSCULAR WEEKLY
Qty: 4 ML | Refills: 1 | Status: SHIPPED | OUTPATIENT
Start: 2024-12-01

## 2024-12-14 DIAGNOSIS — I10 HYPERTENSION GOAL BP (BLOOD PRESSURE) < 140/90: ICD-10-CM

## 2024-12-14 DIAGNOSIS — F41.1 GENERALIZED ANXIETY DISORDER: ICD-10-CM

## 2024-12-14 DIAGNOSIS — F33.1 MAJOR DEPRESSIVE DISORDER, RECURRENT EPISODE, MODERATE (H): ICD-10-CM

## 2024-12-14 DIAGNOSIS — I25.10 CORONARY ARTERY DISEASE INVOLVING NATIVE HEART WITHOUT ANGINA PECTORIS, UNSPECIFIED VESSEL OR LESION TYPE: ICD-10-CM

## 2024-12-15 ENCOUNTER — HOSPITAL ENCOUNTER (EMERGENCY)
Facility: CLINIC | Age: 54
Discharge: HOME | End: 2024-12-15
Attending: EMERGENCY MEDICINE
Payer: COMMERCIAL

## 2024-12-15 VITALS
HEART RATE: 79 BPM | SYSTOLIC BLOOD PRESSURE: 168 MMHG | OXYGEN SATURATION: 91 % | TEMPERATURE: 98.4 F | RESPIRATION RATE: 23 BRPM | DIASTOLIC BLOOD PRESSURE: 95 MMHG

## 2024-12-15 DIAGNOSIS — R68.84 JAW PAIN: ICD-10-CM

## 2024-12-15 LAB
ANION GAP SERPL CALCULATED.3IONS-SCNC: 13 MMOL/L (ref 7–15)
BASOPHILS # BLD AUTO: 0.1 10E3/UL (ref 0–0.2)
BASOPHILS NFR BLD AUTO: 1 %
BUN SERPL-MCNC: 25.2 MG/DL (ref 6–20)
CALCIUM SERPL-MCNC: 8.4 MG/DL (ref 8.8–10.4)
CHLORIDE SERPL-SCNC: 101 MMOL/L (ref 98–107)
CREAT SERPL-MCNC: 2.42 MG/DL (ref 0.67–1.17)
EGFRCR SERPLBLD CKD-EPI 2021: 31 ML/MIN/1.73M2
EOSINOPHIL # BLD AUTO: 0.4 10E3/UL (ref 0–0.7)
EOSINOPHIL NFR BLD AUTO: 4 %
ERYTHROCYTE [DISTWIDTH] IN BLOOD BY AUTOMATED COUNT: 19.6 % (ref 10–15)
GLUCOSE SERPL-MCNC: 79 MG/DL (ref 70–99)
HCO3 SERPL-SCNC: 25 MMOL/L (ref 22–29)
HCT VFR BLD AUTO: 38.5 % (ref 40–53)
HGB BLD-MCNC: 11.5 G/DL (ref 13.3–17.7)
IMM GRANULOCYTES # BLD: 0.1 10E3/UL
IMM GRANULOCYTES NFR BLD: 1 %
LYMPHOCYTES # BLD AUTO: 1.2 10E3/UL (ref 0.8–5.3)
LYMPHOCYTES NFR BLD AUTO: 11 %
MCH RBC QN AUTO: 21.9 PG (ref 26.5–33)
MCHC RBC AUTO-ENTMCNC: 29.9 G/DL (ref 31.5–36.5)
MCV RBC AUTO: 74 FL (ref 78–100)
MONOCYTES # BLD AUTO: 1.1 10E3/UL (ref 0–1.3)
MONOCYTES NFR BLD AUTO: 10 %
NEUTROPHILS # BLD AUTO: 8.1 10E3/UL (ref 1.6–8.3)
NEUTROPHILS NFR BLD AUTO: 74 %
NRBC # BLD AUTO: 0 10E3/UL
NRBC BLD AUTO-RTO: 0 /100
PLATELET # BLD AUTO: 233 10E3/UL (ref 150–450)
POTASSIUM SERPL-SCNC: 3.7 MMOL/L (ref 3.4–5.3)
RBC # BLD AUTO: 5.24 10E6/UL (ref 4.4–5.9)
SODIUM SERPL-SCNC: 139 MMOL/L (ref 135–145)
WBC # BLD AUTO: 10.9 10E3/UL (ref 4–11)

## 2024-12-15 PROCEDURE — 258N000003 HC RX IP 258 OP 636: Performed by: EMERGENCY MEDICINE

## 2024-12-15 PROCEDURE — 96375 TX/PRO/DX INJ NEW DRUG ADDON: CPT

## 2024-12-15 PROCEDURE — 250N000011 HC RX IP 250 OP 636: Performed by: EMERGENCY MEDICINE

## 2024-12-15 PROCEDURE — 96361 HYDRATE IV INFUSION ADD-ON: CPT

## 2024-12-15 PROCEDURE — 99284 EMERGENCY DEPT VISIT MOD MDM: CPT | Mod: 25

## 2024-12-15 PROCEDURE — 96374 THER/PROPH/DIAG INJ IV PUSH: CPT

## 2024-12-15 PROCEDURE — 36415 COLL VENOUS BLD VENIPUNCTURE: CPT | Performed by: EMERGENCY MEDICINE

## 2024-12-15 PROCEDURE — 80048 BASIC METABOLIC PNL TOTAL CA: CPT | Performed by: EMERGENCY MEDICINE

## 2024-12-15 PROCEDURE — 85004 AUTOMATED DIFF WBC COUNT: CPT | Performed by: EMERGENCY MEDICINE

## 2024-12-15 RX ORDER — METOCLOPRAMIDE HYDROCHLORIDE 5 MG/ML
10 INJECTION INTRAMUSCULAR; INTRAVENOUS ONCE
Status: COMPLETED | OUTPATIENT
Start: 2024-12-15 | End: 2024-12-15

## 2024-12-15 RX ORDER — DIPHENHYDRAMINE HYDROCHLORIDE 50 MG/ML
25 INJECTION INTRAMUSCULAR; INTRAVENOUS ONCE
Status: COMPLETED | OUTPATIENT
Start: 2024-12-15 | End: 2024-12-15

## 2024-12-15 RX ORDER — KETOROLAC TROMETHAMINE 15 MG/ML
15 INJECTION, SOLUTION INTRAMUSCULAR; INTRAVENOUS ONCE
Status: COMPLETED | OUTPATIENT
Start: 2024-12-15 | End: 2024-12-15

## 2024-12-15 RX ADMIN — SODIUM CHLORIDE 1000 ML: 9 INJECTION, SOLUTION INTRAVENOUS at 02:12

## 2024-12-15 RX ADMIN — HYDROMORPHONE HYDROCHLORIDE 1 MG: 1 INJECTION, SOLUTION INTRAMUSCULAR; INTRAVENOUS; SUBCUTANEOUS at 03:01

## 2024-12-15 RX ADMIN — DIPHENHYDRAMINE HYDROCHLORIDE 25 MG: 50 INJECTION, SOLUTION INTRAMUSCULAR; INTRAVENOUS at 02:16

## 2024-12-15 RX ADMIN — KETOROLAC TROMETHAMINE 15 MG: 15 INJECTION, SOLUTION INTRAMUSCULAR; INTRAVENOUS at 02:14

## 2024-12-15 RX ADMIN — METOCLOPRAMIDE 10 MG: 5 INJECTION, SOLUTION INTRAMUSCULAR; INTRAVENOUS at 02:17

## 2024-12-15 ASSESSMENT — ACTIVITIES OF DAILY LIVING (ADL)
ADLS_ACUITY_SCORE: 55

## 2024-12-15 ASSESSMENT — COLUMBIA-SUICIDE SEVERITY RATING SCALE - C-SSRS
2. HAVE YOU ACTUALLY HAD ANY THOUGHTS OF KILLING YOURSELF IN THE PAST MONTH?: NO
6. HAVE YOU EVER DONE ANYTHING, STARTED TO DO ANYTHING, OR PREPARED TO DO ANYTHING TO END YOUR LIFE?: NO
1. IN THE PAST MONTH, HAVE YOU WISHED YOU WERE DEAD OR WISHED YOU COULD GO TO SLEEP AND NOT WAKE UP?: NO

## 2024-12-15 NOTE — ED TRIAGE NOTES
[place] via EMS. On Wed had oral surgery in [place]. Had 2 implants removed. Was prescribed pain killers but they have not been alleviating the pain. Pt reports 10/10 pain.   EMS gave 650 oral tylenol. VSS.

## 2024-12-15 NOTE — ED NOTES
Bed: ED09  Expected date: 12/15/24  Expected time: 12:50 AM  Means of arrival: Ambulance  Comments:  [facility] 54M oral surg. Wed; pain

## 2024-12-15 NOTE — ED PROVIDER NOTES
"  Emergency Department Note      History of Present Illness     Chief Complaint   Mouth Problem      HPI   Jeremi Damon is a 54 year old male who presents for evaluation of a mouth problem. Patient reports that 4 days ago on 12/11 he went to [place] to have his dental implants removed and has been experiencing increased jaw and mouth pain since then. He also endorses a severe headache for the last 5 days since 12/10, noting a history of migraines but stating that this pain is different from what he has experienced in the past. He endorses a \"head spinning\" feeling in the ED. Patient denies recent head injury or trauma. He denies any numbness or weakness in the extremities, fever, or chills. Patient is not on antibiotics.       Independent Historian   None    Review of External Notes   ***    Past Medical History     Medical History and Problem List   ADHD  Benign hypertension  CAD  Chronic back pain  Hypersomnia with sleep apnea  Hypertensive emergency  Depression  NSTEMI  Allergic rhinitis      Medications   Albuterol  Elavil  Amlodipine  Adderall  Aspirin 81 mg  Dulcolax  Suboxone  Wellbutrin  Buspar  Coreg  Klonopin  Plavix  Flexeril  Proscar  Advair  Apresoline  Imdur  Nitroglycerin  Protonix  Ativan  Crestor  Golytely  Carafate  Demadex  Ambien  Depotestosterone      Surgical History   Appendectomy  L5-S1 fusion  Colonoscopy  Coronary angiogram x2  Percutaneous coronary intervention  DaVinci nephrectomy partial  Esophagogastroduodenoscopy  Laparoscopic cholecystectomy  Bilateral radiofrequency volume reduction of inferior turbinates  Rhinoplasty-septoplasty  Torn pectoral muscle surgery      Physical Exam     Patient Vitals for the past 24 hrs:   BP Temp Temp src Pulse Resp SpO2   12/15/24 0130 (!) 163/81 -- -- 74 -- 95 %   12/15/24 0100 (!) 188/84 -- -- 85 -- 97 %   12/15/24 0000 (!) 197/107 98.4  F (36.9  C) Oral 84 23 98 %     Physical Exam  ***    Diagnostics     Lab Results   Labs Ordered and Resulted " from Time of ED Arrival to Time of ED Departure - No data to display    Imaging   No orders to display       EKG   ECG taken at ***, ECG read at ***  ***   *** as compared to prior, dated ***/***/***.  Rate *** bpm. GA interval *** ms. QRS duration *** ms. QT/QTc ***/*** ms. P-R-T axes *** *** ***.    Independent Interpretation   None    ED Course      Medications Administered   Medications - No data to display    Procedures   Procedures     Discussion of Management   None    ED Course   ED Course as of 12/15/24 0152   Sun Dec 15, 2024   0145 I obtained patient history and performed a physical exam.        Additional Documentation  None    Medical Decision Making / Diagnosis     CMS Diagnoses: None    MIPS       None    [facility]   Jeremi Damon is a 54 year old male who is afebrile and hemodynamically stable.  He is complaining of a headache initially and dizziness we discussed plan for treatment of migraine and CT scan of the head, however, he feels this was managed understanding later and does not want a CT scan of the head and is here because he is having pain in his left jaw after having implants and teeth extracted down in [place].  I see no obvious infection or drainable fluid collection.  He has no trismus or phonation change.  No fever or leukocytosis I see no indication for imaging such as CT scan of soft tissue of the neck.  He is interested in pain control and then comfortable discharging home.  I will place him on Augmentin in case there is an early infection and to prevent infection.  He will follow-up with his primary care physician and dentist in I gave him very strict return precautions and he is understanding to return if he is develop any signs of infection such as abscess.  His questions were answered and he was in no distress time discharge.    Disposition   The patient was discharged.     Diagnosis     ICD-10-CM    1. Jaw pain  R68.84            Discharge Medications   Discharge Medication List as  of 12/15/2024  3:29 AM        START taking these medications    Details   amoxicillin-clavulanate (AUGMENTIN) 875-125 MG tablet Take 1 tablet by mouth 2 times daily for 10 days., Disp-20 tablet, R-0, Local Print               Scribe Disclosure:  I, Myriam Motta, am serving as a scribe at 1:45 AM on 12/15/2024 to document services personally performed by Bhanu Lowe MD based on my observations and the provider's statements to me.      Disclosure:  I, Myriam Motta, am serving as a scribe at 1:45 AM on 12/15/2024 to document services personally performed by Bhanu Lowe MD based on my observations and the provider's statements to me.        Bhanu Lowe MD  12/23/24 1004

## 2024-12-16 RX ORDER — CLOPIDOGREL BISULFATE 75 MG/1
75 TABLET ORAL DAILY
Qty: 90 TABLET | Refills: 0 | Status: SHIPPED | OUTPATIENT
Start: 2024-12-16

## 2024-12-16 RX ORDER — CARVEDILOL 25 MG/1
25 TABLET ORAL 2 TIMES DAILY WITH MEALS
Qty: 180 TABLET | Refills: 0 | Status: SHIPPED | OUTPATIENT
Start: 2024-12-16

## 2024-12-16 RX ORDER — BUPROPION HYDROCHLORIDE 300 MG/1
300 TABLET ORAL EVERY MORNING
Qty: 90 TABLET | Refills: 0 | Status: SHIPPED | OUTPATIENT
Start: 2024-12-16

## 2024-12-24 DIAGNOSIS — G47.00 INSOMNIA, UNSPECIFIED TYPE: ICD-10-CM

## 2024-12-24 RX ORDER — ZOLPIDEM TARTRATE 10 MG/1
10 TABLET ORAL
Qty: 30 TABLET | Refills: 0 | Status: SHIPPED | OUTPATIENT
Start: 2024-12-24

## 2024-12-31 ENCOUNTER — OFFICE VISIT (OUTPATIENT)
Dept: FAMILY MEDICINE | Facility: CLINIC | Age: 54
End: 2024-12-31
Payer: COMMERCIAL

## 2024-12-31 VITALS
WEIGHT: 247.3 LBS | DIASTOLIC BLOOD PRESSURE: 86 MMHG | HEART RATE: 72 BPM | HEIGHT: 72 IN | BODY MASS INDEX: 33.49 KG/M2 | RESPIRATION RATE: 18 BRPM | SYSTOLIC BLOOD PRESSURE: 136 MMHG | OXYGEN SATURATION: 96 % | TEMPERATURE: 98.7 F

## 2024-12-31 DIAGNOSIS — K21.9 GASTROESOPHAGEAL REFLUX DISEASE WITHOUT ESOPHAGITIS: ICD-10-CM

## 2024-12-31 DIAGNOSIS — F11.20 UNCOMPLICATED OPIOID DEPENDENCE (H): ICD-10-CM

## 2024-12-31 DIAGNOSIS — I11.0 CARDIOMYOPATHY DUE TO HYPERTENSION, WITH HEART FAILURE (H): ICD-10-CM

## 2024-12-31 DIAGNOSIS — Z00.00 ENCOUNTER FOR MEDICARE ANNUAL WELLNESS EXAM: Primary | ICD-10-CM

## 2024-12-31 DIAGNOSIS — E78.5 HYPERLIPIDEMIA LDL GOAL <70: ICD-10-CM

## 2024-12-31 DIAGNOSIS — F41.0 PANIC DISORDER WITHOUT AGORAPHOBIA: ICD-10-CM

## 2024-12-31 DIAGNOSIS — E29.1 HYPOGONADISM IN MALE: ICD-10-CM

## 2024-12-31 DIAGNOSIS — R10.13 ABDOMINAL PAIN, EPIGASTRIC: ICD-10-CM

## 2024-12-31 DIAGNOSIS — L65.9 ALOPECIA: ICD-10-CM

## 2024-12-31 DIAGNOSIS — E29.1 HYPOGONADISM MALE: ICD-10-CM

## 2024-12-31 DIAGNOSIS — Z12.5 SCREENING FOR MALIGNANT NEOPLASM OF PROSTATE: ICD-10-CM

## 2024-12-31 DIAGNOSIS — I21.4 NSTEMI (NON-ST ELEVATED MYOCARDIAL INFARCTION) (H): ICD-10-CM

## 2024-12-31 DIAGNOSIS — I43 CARDIOMYOPATHY DUE TO HYPERTENSION, WITH HEART FAILURE (H): ICD-10-CM

## 2024-12-31 DIAGNOSIS — F41.1 GENERALIZED ANXIETY DISORDER: ICD-10-CM

## 2024-12-31 DIAGNOSIS — G89.4 CHRONIC PAIN SYNDROME: ICD-10-CM

## 2024-12-31 DIAGNOSIS — K13.79 PAIN IN MOUTH: ICD-10-CM

## 2024-12-31 DIAGNOSIS — B07.8 FLAT WART: ICD-10-CM

## 2024-12-31 DIAGNOSIS — R12 HEARTBURN: ICD-10-CM

## 2024-12-31 DIAGNOSIS — F90.9 ATTENTION DEFICIT HYPERACTIVITY DISORDER (ADHD), UNSPECIFIED ADHD TYPE: ICD-10-CM

## 2024-12-31 DIAGNOSIS — F40.243 FLYING PHOBIA: ICD-10-CM

## 2024-12-31 DIAGNOSIS — B36.0 TINEA VERSICOLOR: ICD-10-CM

## 2024-12-31 DIAGNOSIS — I10 HYPERTENSION GOAL BP (BLOOD PRESSURE) < 140/90: ICD-10-CM

## 2024-12-31 DIAGNOSIS — J45.30 MILD PERSISTENT ASTHMA WITHOUT COMPLICATION: ICD-10-CM

## 2024-12-31 DIAGNOSIS — C44.91 BASAL CELL CARCINOMA (BCC), UNSPECIFIED SITE: ICD-10-CM

## 2024-12-31 DIAGNOSIS — H04.123 DRY EYES, BILATERAL: ICD-10-CM

## 2024-12-31 DIAGNOSIS — I25.10 CORONARY ARTERY DISEASE INVOLVING NATIVE HEART WITHOUT ANGINA PECTORIS, UNSPECIFIED VESSEL OR LESION TYPE: ICD-10-CM

## 2024-12-31 DIAGNOSIS — D64.9 ANEMIA, UNSPECIFIED TYPE: ICD-10-CM

## 2024-12-31 DIAGNOSIS — G47.00 INSOMNIA, UNSPECIFIED TYPE: ICD-10-CM

## 2024-12-31 PROCEDURE — G0439 PPPS, SUBSEQ VISIT: HCPCS | Performed by: FAMILY MEDICINE

## 2024-12-31 PROCEDURE — 99214 OFFICE O/P EST MOD 30 MIN: CPT | Mod: 25 | Performed by: FAMILY MEDICINE

## 2024-12-31 RX ORDER — ISOSORBIDE MONONITRATE 60 MG/1
60 TABLET, EXTENDED RELEASE ORAL DAILY
Qty: 90 TABLET | Refills: 3 | Status: SHIPPED | OUTPATIENT
Start: 2024-12-31

## 2024-12-31 RX ORDER — FINASTERIDE 5 MG/1
1 TABLET, FILM COATED ORAL DAILY
Qty: 90 TABLET | Refills: 3 | Status: SHIPPED | OUTPATIENT
Start: 2024-12-31

## 2024-12-31 RX ORDER — AMITRIPTYLINE HYDROCHLORIDE 50 MG/1
150 TABLET ORAL AT BEDTIME
Qty: 270 TABLET | Refills: 1 | Status: SHIPPED | OUTPATIENT
Start: 2024-12-31

## 2024-12-31 RX ORDER — LORAZEPAM 1 MG/1
1 TABLET ORAL 2 TIMES DAILY PRN
Qty: 60 TABLET | Refills: 5 | Status: CANCELLED | OUTPATIENT
Start: 2024-12-31

## 2024-12-31 RX ORDER — HYDRALAZINE HYDROCHLORIDE 100 MG/1
50 TABLET, FILM COATED ORAL 2 TIMES DAILY
Qty: 180 TABLET | Refills: 1 | Status: SHIPPED | OUTPATIENT
Start: 2024-12-31

## 2024-12-31 RX ORDER — CYCLOSPORINE 0.5 MG/ML
1 EMULSION OPHTHALMIC 2 TIMES DAILY
Qty: 60 ML | Refills: 5 | Status: SHIPPED | OUTPATIENT
Start: 2024-12-31

## 2024-12-31 RX ORDER — BUSPIRONE HYDROCHLORIDE 5 MG/1
5 TABLET ORAL 2 TIMES DAILY PRN
Qty: 180 TABLET | Refills: 3 | Status: SHIPPED | OUTPATIENT
Start: 2024-12-31

## 2024-12-31 RX ORDER — CLONAZEPAM 1 MG/1
1 TABLET ORAL 2 TIMES DAILY PRN
Qty: 4 TABLET | Refills: 0 | Status: CANCELLED | OUTPATIENT
Start: 2024-12-31

## 2024-12-31 RX ORDER — FLUCONAZOLE 150 MG/1
300 TABLET ORAL
Qty: 4 TABLET | Refills: 1 | Status: SHIPPED | OUTPATIENT
Start: 2024-12-31

## 2024-12-31 RX ORDER — IMIQUIMOD 12.5 MG/.25G
CREAM TOPICAL
Qty: 24 PACKET | Refills: 11 | Status: CANCELLED | OUTPATIENT
Start: 2024-12-31

## 2024-12-31 RX ORDER — PANTOPRAZOLE SODIUM 40 MG/1
40 TABLET, DELAYED RELEASE ORAL DAILY
Qty: 90 TABLET | Refills: 3 | Status: SHIPPED | OUTPATIENT
Start: 2024-12-31

## 2024-12-31 RX ORDER — BUPRENORPHINE AND NALOXONE 8; 2 MG/1; MG/1
FILM, SOLUBLE BUCCAL; SUBLINGUAL
Qty: 60 FILM | Refills: 0 | Status: CANCELLED | OUTPATIENT
Start: 2024-12-31

## 2024-12-31 RX ORDER — ALBUTEROL SULFATE 90 UG/1
2 INHALANT RESPIRATORY (INHALATION) EVERY 6 HOURS
Qty: 18 G | Refills: 5 | Status: SHIPPED | OUTPATIENT
Start: 2024-12-31

## 2024-12-31 RX ORDER — ROSUVASTATIN CALCIUM 20 MG/1
20 TABLET, COATED ORAL DAILY
Qty: 90 TABLET | Refills: 3 | Status: SHIPPED | OUTPATIENT
Start: 2024-12-31

## 2024-12-31 RX ORDER — DEXTROAMPHETAMINE SACCHARATE, AMPHETAMINE ASPARTATE, DEXTROAMPHETAMINE SULFATE AND AMPHETAMINE SULFATE 5; 5; 5; 5 MG/1; MG/1; MG/1; MG/1
20 TABLET ORAL 2 TIMES DAILY
Qty: 60 TABLET | Refills: 0 | Status: SHIPPED | OUTPATIENT
Start: 2024-12-31

## 2024-12-31 RX ORDER — OXYCODONE HYDROCHLORIDE 5 MG/1
5 TABLET ORAL EVERY 6 HOURS PRN
Qty: 40 TABLET | Refills: 0 | Status: SHIPPED | OUTPATIENT
Start: 2024-12-31

## 2024-12-31 RX ORDER — TORSEMIDE 10 MG/1
10 TABLET ORAL DAILY PRN
Qty: 90 TABLET | Refills: 3 | Status: SHIPPED | OUTPATIENT
Start: 2024-12-31

## 2024-12-31 RX ORDER — TESTOSTERONE CYPIONATE 200 MG/ML
200 INJECTION, SOLUTION INTRAMUSCULAR WEEKLY
Qty: 4 ML | Refills: 1 | Status: SHIPPED | OUTPATIENT
Start: 2024-12-31

## 2024-12-31 RX ORDER — SUCRALFATE 1 G/1
1 TABLET ORAL 4 TIMES DAILY PRN
Qty: 60 TABLET | Refills: 2 | Status: SHIPPED | OUTPATIENT
Start: 2024-12-31

## 2024-12-31 RX ORDER — CLOPIDOGREL BISULFATE 75 MG/1
75 TABLET ORAL DAILY
Qty: 90 TABLET | Refills: 0 | Status: SHIPPED | OUTPATIENT
Start: 2024-12-31

## 2024-12-31 RX ORDER — AMLODIPINE BESYLATE 5 MG/1
5 TABLET ORAL DAILY
Qty: 90 TABLET | Refills: 3 | Status: SHIPPED | OUTPATIENT
Start: 2024-12-31

## 2024-12-31 RX ORDER — CARVEDILOL 25 MG/1
25 TABLET ORAL 2 TIMES DAILY WITH MEALS
Qty: 180 TABLET | Refills: 3 | Status: SHIPPED | OUTPATIENT
Start: 2024-12-31

## 2024-12-31 SDOH — HEALTH STABILITY: PHYSICAL HEALTH: ON AVERAGE, HOW MANY DAYS PER WEEK DO YOU ENGAGE IN MODERATE TO STRENUOUS EXERCISE (LIKE A BRISK WALK)?: 2 DAYS

## 2024-12-31 ASSESSMENT — ANXIETY QUESTIONNAIRES
GAD7 TOTAL SCORE: 11
5. BEING SO RESTLESS THAT IT IS HARD TO SIT STILL: SEVERAL DAYS
GAD7 TOTAL SCORE: 11
GAD7 TOTAL SCORE: 11
6. BECOMING EASILY ANNOYED OR IRRITABLE: SEVERAL DAYS
7. FEELING AFRAID AS IF SOMETHING AWFUL MIGHT HAPPEN: SEVERAL DAYS
8. IF YOU CHECKED OFF ANY PROBLEMS, HOW DIFFICULT HAVE THESE MADE IT FOR YOU TO DO YOUR WORK, TAKE CARE OF THINGS AT HOME, OR GET ALONG WITH OTHER PEOPLE?: SOMEWHAT DIFFICULT
IF YOU CHECKED OFF ANY PROBLEMS ON THIS QUESTIONNAIRE, HOW DIFFICULT HAVE THESE PROBLEMS MADE IT FOR YOU TO DO YOUR WORK, TAKE CARE OF THINGS AT HOME, OR GET ALONG WITH OTHER PEOPLE: SOMEWHAT DIFFICULT
4. TROUBLE RELAXING: MORE THAN HALF THE DAYS
2. NOT BEING ABLE TO STOP OR CONTROL WORRYING: MORE THAN HALF THE DAYS
3. WORRYING TOO MUCH ABOUT DIFFERENT THINGS: MORE THAN HALF THE DAYS
7. FEELING AFRAID AS IF SOMETHING AWFUL MIGHT HAPPEN: SEVERAL DAYS
1. FEELING NERVOUS, ANXIOUS, OR ON EDGE: MORE THAN HALF THE DAYS

## 2024-12-31 ASSESSMENT — ASTHMA QUESTIONNAIRES
QUESTION_4 LAST FOUR WEEKS HOW OFTEN HAVE YOU USED YOUR RESCUE INHALER OR NEBULIZER MEDICATION (SUCH AS ALBUTEROL): THREE OR MORE TIMES PER DAY
ACT_TOTALSCORE: 8
QUESTION_2 LAST FOUR WEEKS HOW OFTEN HAVE YOU HAD SHORTNESS OF BREATH: MORE THAN ONCE A DAY
QUESTION_3 LAST FOUR WEEKS HOW OFTEN DID YOUR ASTHMA SYMPTOMS (WHEEZING, COUGHING, SHORTNESS OF BREATH, CHEST TIGHTNESS OR PAIN) WAKE YOU UP AT NIGHT OR EARLIER THAN USUAL IN THE MORNING: TWO OR THREE NIGHTS A WEEK
QUESTION_1 LAST FOUR WEEKS HOW MUCH OF THE TIME DID YOUR ASTHMA KEEP YOU FROM GETTING AS MUCH DONE AT WORK, SCHOOL OR AT HOME: MOST OF THE TIME
QUESTION_5 LAST FOUR WEEKS HOW WOULD YOU RATE YOUR ASTHMA CONTROL: POORLY CONTROLLED
ACT_TOTALSCORE: 8

## 2024-12-31 ASSESSMENT — PATIENT HEALTH QUESTIONNAIRE - PHQ9
SUM OF ALL RESPONSES TO PHQ QUESTIONS 1-9: 18
SUM OF ALL RESPONSES TO PHQ QUESTIONS 1-9: 18
10. IF YOU CHECKED OFF ANY PROBLEMS, HOW DIFFICULT HAVE THESE PROBLEMS MADE IT FOR YOU TO DO YOUR WORK, TAKE CARE OF THINGS AT HOME, OR GET ALONG WITH OTHER PEOPLE: SOMEWHAT DIFFICULT

## 2024-12-31 ASSESSMENT — SOCIAL DETERMINANTS OF HEALTH (SDOH): HOW OFTEN DO YOU GET TOGETHER WITH FRIENDS OR RELATIVES?: ONCE A WEEK

## 2024-12-31 NOTE — PROGRESS NOTES
Preventive Care Visit  [facility] [place]  Luis Armando Segovia MD, Family Medicine  Dec 31, 2024        Assessment & Plan     Encounter for Medicare annual wellness exam      Hypertension goal BP (blood pressure) < 140/90  Controlled - continue medication(s).  - carvedilol (COREG) 25 MG tablet  Dispense: 180 tablet; Refill: 3  - hydrALAZINE (APRESOLINE) 100 MG tablet  Dispense: 180 tablet; Refill: 1  - torsemide (DEMADEX) 10 MG tablet  Dispense: 90 tablet; Refill: 3  - amLODIPine (NORVASC) 5 MG tablet  Dispense: 90 tablet; Refill: 3  - Comprehensive metabolic panel (BMP + Alb, Alk Phos, ALT, AST, Total. Bili, TP)  - CBC with platelets    Coronary artery disease involving native heart without angina pectoris, unspecified vessel or lesion type  h/o NSTEMI (non-ST elevated myocardial infarction) (H)  No recent chest pains, continue current plan:  - clopidogrel (PLAVIX) 75 MG tablet  Dispense: 90 tablet; Refill: 0  - isosorbide mononitrate (IMDUR) 60 MG 24 hr tablet  Dispense: 90 tablet; Refill: 3    Hypogonadism male  Hypogonadism in male  - Testosterone Free and Total  Does replacement and will refill meds for this.  - testosterone cypionate (DEPOTESTOSTERONE) 200 MG/ML injection  Dispense: 4 mL; Refill: 1    Abdominal pain, epigastric  Heartburn  Gastroesophageal reflux disease without esophagitis  Intermittent symptoms and medications helpful when needed:  - sucralfate (CARAFATE) 1 GM tablet  Dispense: 60 tablet; Refill: 2  - pantoprazole (PROTONIX) 40 MG EC tablet  Dispense: 90 tablet; Refill: 3    Uncomplicated opioid dependence (H)  Chronic pain syndrome  Seen by pain clinic and on Suboxone.    Pain in mouth  Ongoing significant pain in the last few weeks since having dental implant procedures in [place].  Topical treatment only helps for short periods of time and will give prescription to see if this could be useful to at least turn down some of the pain.  Otherwise patient requested oxycodone for  the next week or so to get by until this continues to heal hopefully.  I recommended he contact the pain clinic regarding this to let them know about adding this medication for a defined period of time hopefully his gums heal.  No signs of infection or drainage on exam of the mouth today.  Tender in multiple places low.  - benzocaine (TOPICALE XTRA) 20 % GEL  Dispense: 120 g; Refill: 2  - oxyCODONE (ROXICODONE) 5 MG tablet  Dispense: 40 tablet; Refill: 0      Panic disorder without agoraphobia  Generalized anxiety disorder  Ongoing anxieties especially with increased pain lately as he is not sleeping well either.  - busPIRone (BUSPAR) 5 MG tablet  Dispense: 180 tablet; Refill: 3    Insomnia, unspecified type  Takes up to 3 tabs nightly to help with sleep and this could be useful for pain as well.  - amitriptyline (ELAVIL) 50 MG tablet  Dispense: 270 tablet; Refill: 1    Attention deficit hyperactivity disorder (ADHD), unspecified ADHD type  Uses med when he is doing skating but not as much lately due to the pain that he is dealing with but will refill to have available when needed.  - amphetamine-dextroamphetamine (ADDERALL) 20 MG tablet  Dispense: 60 tablet; Refill: 0    Alopecia  One fourth of the Daily  - finasteride (PROSCAR) 5 MG tablet  Dispense: 90 tablet; Refill: 3    Dry eyes, bilateral  Okay to continue as needed: Staying well-hydrated is important as well.  - cycloSPORINE (RESTASIS) 0.05 % ophthalmic emulsion  Dispense: 60 mL; Refill: 5      Flying phobia  Uses clonidine when needed and can have filled me know.    Mild persistent asthma without complication  Uses as needed, no symptoms at the moment.  - albuterol (PROAIR HFA/PROVENTIL HFA/VENTOLIN HFA) 108 (90 Base) MCG/ACT inhaler  Dispense: 18 g; Refill: 5    Flat wart  No complaints today.    Cardiomyopathy due to hypertension, with heart failure (H)  Continue medication, no signs of congestive heart failure at the moment.  - torsemide (DEMADEX) 10 MG  tablet  Dispense: 90 tablet; Refill: 3    Anemia, unspecified type  Getting iron in diet is much as he can although unable to eat needs  - CBC with platelets  - Ferritin    Screening for malignant neoplasm of prostate  - PSA, screen    Hyperlipidemia LDL goal <70  Controlled - continue medication(s).  - rosuvastatin (CRESTOR) 20 MG tablet  Dispense: 90 tablet; Refill: 3  - Lipid panel reflex to direct LDL Fasting      Basal cell carcinoma (BCC), unspecified site  Lesion on left thigh that he did not make an appointment to excise yet and will schedule that today.    Tinea versicolor  Ongoing rash on the upper chest areas and can use medicine when he takes 2 pills initially and in 2 weeks repeat with 2 more pills.  Maintenance with Selsun Blue topical that he applies for 5 to 10 minutes nightly for a week or 2 and then may be use once or twice every month  - fluconazole (DIFLUCAN) 150 MG tablet  Dispense: 4 tablet; Refill: 1          Depression Screening Follow Up        12/31/2024     1:39 PM   PHQ   PHQ-9 Total Score 18    Q9: Thoughts of better off dead/self-harm past 2 weeks Several days   F/U: Thoughts of suicide or self-harm No   F/U: Safety concerns No       Patient-reported         12/31/2024     1:39 PM   Last PHQ-9   1.  Little interest or pleasure in doing things 3   2.  Feeling down, depressed, or hopeless 2   3.  Trouble falling or staying asleep, or sleeping too much 3   4.  Feeling tired or having little energy 2   5.  Poor appetite or overeating 2   6.  Feeling bad about yourself 2   7.  Trouble concentrating 2   8.  Moving slowly or restless 1   Q9: Thoughts of better off dead/self-harm past 2 weeks 1   PHQ-9 Total Score 18    In the past two weeks have you had thoughts of suicide or self harm? No   Do you have concerns about your personal safety or the safety of others? No       Patient-reported       Follow Up Actions Taken  Crisis resource information provided in the After Visit  Summary    Discussed the following ways the patient can remain in a safe environment:  be around others    Counseling  Appropriate preventive services were addressed with this patient via screening, questionnaire, or discussion as appropriate for fall prevention, nutrition, physical activity, social engagement, weight loss and cognition.  Checklist reviewing preventive services available has been given to the patient.  Reviewed patient's diet, addressing concerns and/or questions.     Patient has been advised of split billing requirements and indicates understanding: Yes    Return in about 2 days (around 1/2/2025).    Follow-up Visit   Expected date: Dec 31, 2024      Follow Up Appointment Details:     Follow-up with whom?: Other Primary Care Services    Follow-Up for what?: Lab Visit    How?: In Person             Follow-up Visit   Expected date:  Jan 07, 2026 (Approximate)      Follow Up Appointment Details:     Follow-up with whom?: PCP    Follow-Up for what?: Medicare Wellness    Welcome or Annual?: Annual Wellness    How?: In Person                     Nate Segovia MD     [address] [address]  [email or web address]     Office: [phone]           Justin Blood is a 54 year old, presenting for the following:  Medicare Visit        12/31/2024     1:46 PM   Additional Questions   Roomed by Evelin ARANA CMA           HPI  Patient is not fasting.    Dental Pain and Procedures  He has significant pain following dental procedures involving the removal of all top and bottom teeth. The procedures were completed in [place], causing severe discomfort. He received ibuprofen for pain management but is aware of the potential risks to kidney function. He is concerned about the adequacy of the dental work, mentioning that the posts were too short and improperly placed. He is currently experiencing ongoing pain and has not eaten solid food in almost three weeks, relying on  liquids instead.    Kidney Concerns  He is worried about kidney health due to the medications being taken. He is not taking torsemide regularly, only using it when blood pressure is high.      Weight and Activity Level  His weight has remained stable despite not eating solid food and not engaging in physical activity. He describes a sedentary lifestyle.      Anxiety and Mental Health  He experiences anxiety and depression, feeling pressure and pain 24/7. He is concerned about the impact of dental issues on mental health.          Hypertension Follow-up    Do you check your blood pressure regularly outside of the clinic? Yes   Are you following a low salt diet? No  Are your blood pressures ever more than 140 on the top number (systolic) OR more   than 90 on the bottom number (diastolic), for example 140/90? Yes    Health Care Directive  Patient does not have a Health Care Directive: Discussed advance care planning with patient; information given to patient to review.      12/31/2024   General Health   How would you rate your overall physical health? (!) FAIR   Feel stress (tense, anxious, or unable to sleep) Very much   (!) STRESS CONCERN      12/31/2024   Nutrition   Diet: Regular (no restrictions)         12/31/2024   Exercise   Days per week of moderate/strenous exercise 2 days   (!) EXERCISE CONCERN      12/31/2024   Social Factors   Frequency of gathering with friends or relatives Once a week   Worry food won't last until get money to buy more No   Food not last or not have enough money for food? No   Do you have housing? (Housing is defined as stable permanent housing and does not include staying ouside in a car, in a tent, in an abandoned building, in an overnight shelter, or couch-surfing.) Yes   Are you worried about losing your housing? No   Lack of transportation? No   Unable to get utilities (heat,electricity)? No         12/31/2024   Fall Risk   Fallen 2 or more times in the past year? No   Trouble with  walking or balance? No          12/31/2024   Activities of Daily Living- Home Safety   Needs help with the following daily activites None of the above   Safety concerns in the home None of the above         12/31/2024   Dental   Dentist two times every year? Yes         12/31/2024   Hearing Screening   Hearing concerns? None of the above         12/31/2024   Driving Risk Screening   Patient/family members have concerns about driving No         12/31/2024   General Alertness/Fatigue Screening   Have you been more tired than usual lately? (!) YES         12/31/2024   Urinary Incontinence Screening   Bothered by leaking urine in past 6 months No         12/31/2024   TB Screening   Were you born outside of the [place]? No       Today's PHQ-9 Score:       12/31/2024     1:39 PM   PHQ-9 SCORE   PHQ-9 Total Score MyChart 18 (Moderately severe depression)   PHQ-9 Total Score 18        Patient-reported         12/31/2024   Substance Use   Alcohol more than 3/day or more than 7/wk Not Applicable   Do you have a current opioid prescription? No   How severe/bad is pain from 1 to 10? 9/10   Do you use any other substances recreationally? No     Social History     Tobacco Use    Smoking status: Never    Smokeless tobacco: Never   Vaping Use    Vaping status: Never Used   Substance Use Topics    Alcohol use: Yes     Comment: rarely     Drug use: No       Last PSA:   PSA   Date Value Ref Range Status   02/08/2021 0.34 0 - 4 ug/L Final     Comment:     Assay Method:  Chemiluminescence using Siemens Vista analyzer     Prostate Specific Antigen Screen   Date Value Ref Range Status   09/08/2023 0.93 0.00 - 3.50 ng/mL Final   06/23/2022 1.76 0.00 - 4.00 ug/L Final     ASCVD Risk   The ASCVD Risk score (Ladonna JOHN, et al., 2019) failed to calculate for the following reasons:    Risk score cannot be calculated because patient has a medical history suggesting prior/existing ASCVD        Reviewed and updated as needed this visit by  Provider   Tobacco  Allergies  Meds  Problems  Med Hx  Surg Hx  Fam Hx            Current providers sharing in care for this patient include:  Patient Care Team:  Luis Armando Segovia MD as PCP - General  Luis Armando Segovia MD as Assigned PCP  Tamara Mcneil APRN CNP as Nurse Practitioner (Nurse Practitioner)  Genevieve Morales MD as MD (Cardiovascular Disease)  Gilda Keita MD as MD (Cardiovascular Disease)  Chung Howard MD as Assigned Surgical Provider    The following health maintenance items are reviewed in Epic and correct as of today:  Health Maintenance   Topic Date Due    HF ACTION PLAN  Never done    HEPATITIS A IMMUNIZATION (1 of 2 - Risk 2-dose series) Never done    ZOSTER IMMUNIZATION (1 of 2) Never done    ASTHMA ACTION PLAN  06/28/2020    Medicare Annual MTM Pharmacist Visit (once per calendar year)  Never done    LIPID  04/21/2024    INFLUENZA VACCINE (1) 09/01/2024    COVID-19 Vaccine (3 - 2024-25 season) 09/01/2024    PSA  09/08/2024    ANNUAL REVIEW OF HM ORDERS  09/08/2024    CONTROLLED SUBSTANCE AGREEMENT FOR CHRONIC PAIN MANAGEMENT  11/02/2024    MICROALBUMIN  01/16/2025    DEPRESSION 12 MO INDEX REPEAT PHQ-9  01/14/2025    COLORECTAL CANCER SCREENING  02/18/2025    ASTHMA CONTROL TEST  06/30/2025    URINE DRUG SCREEN  11/01/2025    ALT  11/11/2025    CMP  11/11/2025    BMP  12/15/2025    CBC  12/15/2025    HEMOGLOBIN  12/15/2025    MEDICARE ANNUAL WELLNESS VISIT  12/31/2025    NANCY ASSESSMENT  12/31/2025    PHQ-9  12/31/2025    DTAP/TDAP/TD IMMUNIZATION (3 - Td or Tdap) 05/20/2027    GLUCOSE  12/15/2027    ADVANCE CARE PLANNING  11/11/2029    RSV VACCINE (1 - 1-dose 75+ series) 06/13/2043    PARATHYROID  Completed    PHOSPHORUS  Completed    TSH W/FREE T4 REFLEX  Completed    HEPATITIS C SCREENING  Completed    HIV SCREENING  Completed    Pneumococcal Vaccine: 50+ Years  Completed    URINALYSIS  Completed    ALK PHOS  Completed    HPV IMMUNIZATION  Aged Out    MENINGITIS  "IMMUNIZATION  Aged Out    RSV MONOCLONAL ANTIBODY  Aged Out    HEPATITIS B IMMUNIZATION  Discontinued        Objective    Exam  /86   Pulse 72   Temp 98.7  F (37.1  C) (Tympanic)   Resp 18   Ht 1.816 m (5' 11.5\")   Wt 112.2 kg (247 lb 4.8 oz)   SpO2 96%   BMI 34.01 kg/m     Estimated body mass index is 34.01 kg/m  as calculated from the following:    Height as of this encounter: 1.816 m (5' 11.5\").    Weight as of this encounter: 112.2 kg (247 lb 4.8 oz).    Physical Exam  GENERAL: healthy, alert and no distress  EYES: Eyes grossly normal to inspection, PERRL and conjunctivae and sclerae normal  HENT: ear canals and TM's normal, nose and mouth without ulcers or lesions  NECK: no adenopathy, no asymmetry, masses, or scars and thyroid normal to palpation  RESP: lungs clear to auscultation - no rales, rhonchi or wheezes  BREAST: normal without masses, tenderness or nipple discharge and no palpable axillary masses or adenopathy  CV: regular rate and rhythm, normal S1 S2, no S3 or S4, no murmur, click or rub, no peripheral edema and peripheral pulses strong  ABDOMEN: soft, nontender, no hepatosplenomegaly, no masses and bowel sounds normal   (male): normal male genitalia without lesions or urethral discharge, no hernia  MS: no gross musculoskeletal defects noted, no edema  SKIN: no suspicious lesions or rashes  NEURO: Normal strength and tone, mentation intact and speech normal  PSYCH: mentation appears normal, affect normal/bright  LYMPH: no cervical, supraclavicular, axillary, or inguinal adenopathy   RECTAL: declined exam          12/31/2024   Mini Cog   3 Item Recall 3 objects recalled              Signed Electronically by: Luis Armando Segovia MD    Answers submitted by the patient for this visit:  Patient Health Questionnaire (Submitted on 12/31/2024)  If you checked off any problems, how difficult have these problems made it for you to do your work, take care of things at home, or get along with other " people?: Somewhat difficult  PHQ9 TOTAL SCORE: 18  Patient Health Questionnaire (G7) (Submitted on 12/31/2024)  NANCY 7 TOTAL SCORE: 11

## 2024-12-31 NOTE — PATIENT INSTRUCTIONS
Patient Education   Preventive Care Advice   This is general advice given by our system to help you stay healthy. However, your care team may have specific advice just for you. Please talk to your care team about your preventive care needs.  Nutrition  Eat 5 or more servings of fruits and vegetables each day.  Try wheat bread, brown rice and whole grain pasta (instead of white bread, rice, and pasta).  Get enough calcium and vitamin D. Check the label on foods and aim for 100% of the RDA (recommended daily allowance).  Lifestyle  Exercise at least 150 minutes each week  (30 minutes a day, 5 days a week).  Do muscle strengthening activities 2 days a week. These help control your weight and prevent disease.  No smoking.  Wear sunscreen to prevent skin cancer.  Have a dental exam and cleaning every 6 months.  Yearly exams  See your health care team every year to talk about:  Any changes in your health.  Any medicines your care team has prescribed.  Preventive care, family planning, and ways to prevent chronic diseases.  Shots (vaccines)   HPV shots (up to age 26), if you've never had them before.  Hepatitis B shots (up to age 59), if you've never had them before.  COVID-19 shot: Get this shot when it's due.  Flu shot: Get a flu shot every year.  Tetanus shot: Get a tetanus shot every 10 years.  Pneumococcal, hepatitis A, and RSV shots: Ask your care team if you need these based on your risk.  Shingles shot (for age 50 and up)  General health tests  Diabetes screening:  Starting at age 35, Get screened for diabetes at least every 3 years.  If you are younger than age 35, ask your care team if you should be screened for diabetes.  Cholesterol test: At age 39, start having a cholesterol test every 5 years, or more often if advised.  Bone density scan (DEXA): At age 50, ask your care team if you should have this scan for osteoporosis (brittle bones).  Hepatitis C: Get tested at least once in your life.  STIs (sexually  transmitted infections)  Before age 24: Ask your care team if you should be screened for STIs.  After age 24: Get screened for STIs if you're at risk. You are at risk for STIs (including HIV) if:  You are sexually active with more than one person.  You don't use condoms every time.  You or a partner was diagnosed with a sexually transmitted infection.  If you are at risk for HIV, ask about PrEP medicine to prevent HIV.  Get tested for HIV at least once in your life, whether you are at risk for HIV or not.  Cancer screening tests  Cervical cancer screening: If you have a cervix, begin getting regular cervical cancer screening tests starting at age 21.  Breast cancer scan (mammogram): If you've ever had breasts, begin having regular mammograms starting at age 40. This is a scan to check for breast cancer.  Colon cancer screening: It is important to start screening for colon cancer at age 45.  Have a colonoscopy test every 10 years (or more often if you're at risk) Or, ask your provider about stool tests like a FIT test every year or Cologuard test every 3 years.  To learn more about your testing options, visit:   .  For help making a decision, visit:   https://bit.ly/wz64924.  Prostate cancer screening test: If you have a prostate, ask your care team if a prostate cancer screening test (PSA) at age 55 is right for you.  Lung cancer screening: If you are a current or former smoker ages 50 to 80, ask your care team if ongoing lung cancer screenings are right for you.  For informational purposes only. Not to replace the advice of your health care provider. Copyright   2023 [facility]. All rights reserved. Clinically reviewed by the [facility]. [employer] 535217 - REV 01/24.  Learning About Stress  What is stress?     Stress is your body's response to a hard situation. Your body can have a physical, emotional, or mental response. Stress is a fact of life for most people, and it  affects everyone differently. What causes stress for you may not be stressful for someone else.  A lot of things can cause stress. You may feel stress when you go on a job interview, take a test, or run a race. This kind of short-term stress is normal and even useful. It can help you if you need to work hard or react quickly. For example, stress can help you finish an important job on time.  Long-term stress is caused by ongoing stressful situations or events. Examples of long-term stress include long-term health problems, ongoing problems at work, or conflicts in your family. Long-term stress can harm your health.  How does stress affect your health?  When you are stressed, your body responds as though you are in danger. It makes hormones that speed up your heart, make you breathe faster, and give you a burst of energy. This is called the fight-or-flight stress response. If the stress is over quickly, your body goes back to normal and no harm is done.  But if stress happens too often or lasts too long, it can have bad effects. Long-term stress can make you more likely to get sick, and it can make symptoms of some diseases worse. If you tense up when you are stressed, you may develop neck, shoulder, or low back pain. Stress is linked to high blood pressure and heart disease.  Stress also harms your emotional health. It can make you smart, tense, or depressed. Your relationships may suffer, and you may not do well at work or school.  What can you do to manage stress?  You can try these things to help manage stress:   Do something active. Exercise or activity can help reduce stress. Walking is a great way to get started. Even everyday activities such as housecleaning or yard work can help.  Try yoga or elsy chi. These techniques combine exercise and meditation. You may need some training at first to learn them.  Do something you enjoy. For example, listen to music or go to a movie. Practice your hobby or do volunteer  "work.  Meditate. This can help you relax, because you are not worrying about what happened before or what may happen in the future.  Do guided imagery. Imagine yourself in any setting that helps you feel calm. You can use online videos, books, or a teacher to guide you.  Do breathing exercises. For example:  From a standing position, bend forward from the waist with your knees slightly bent. Let your arms dangle close to the floor.  Breathe in slowly and deeply as you return to a standing position. Roll up slowly and lift your head last.  Hold your breath for just a few seconds in the standing position.  Breathe out slowly and bend forward from the waist.  Let your feelings out. Talk, laugh, cry, and express anger when you need to. Talking with supportive friends or family, a counselor, or a cash leader about your feelings is a healthy way to relieve stress. Avoid discussing your feelings with people who make you feel worse.  Write. It may help to write about things that are bothering you. This helps you find out how much stress you feel and what is causing it. When you know this, you can find better ways to cope.  What can you do to prevent stress?  You might try some of these things to help prevent stress:  Manage your time. This helps you find time to do the things you want and need to do.  Get enough sleep. Your body recovers from the stresses of the day while you are sleeping.  Get support. Your family, friends, and community can make a difference in how you experience stress.  Limit your news feed. Avoid or limit time on social media or news that may make you feel stressed.  Do something active. Exercise or activity can help reduce stress. Walking is a great way to get started.  Where can you learn more?  Go to https://www.[employer].net/patiented  Enter N032 in the search box to learn more about \"Learning About Stress.\"  Current as of: October 24, 2023  Content Version: 14.3    2024 [employer]. "   Care instructions adapted under license by your healthcare professional. If you have questions about a medical condition or this instruction, always ask your healthcare professional. [employer] disclaims any warranty or liability for your use of this information.    Learning About Sleeping Well  What does sleeping well mean?     Sleeping well means getting enough sleep to feel good and stay healthy. How much sleep is enough varies among people.  The number of hours you sleep and how you feel when you wake up are both important. If you do not feel refreshed, you probably need more sleep. Another sign of not getting enough sleep is feeling tired during the day.  Experts recommend that adults get at least 7 or more hours of sleep per day. Children and older adults need more sleep.  Why is getting enough sleep important?  Getting enough quality sleep is a basic part of good health. When your sleep suffers, your physical health, mood, and your thoughts can suffer too. You may find yourself feeling more grumpy or stressed. Not getting enough sleep also can lead to serious problems, including injury, accidents, anxiety, and depression.  What might cause poor sleeping?  Many things can cause sleep problems, including:  Changes to your sleep schedule.  Stress. Stress can be caused by fear about a single event, such as giving a speech. Or you may have ongoing stress, such as worry about work or school.  Depression, anxiety, and other mental or emotional conditions.  Changes in your sleep habits or surroundings. This includes changes that happen where you sleep, such as noise, light, or sleeping in a different bed. It also includes changes in your sleep pattern, such as having jet lag or working a late shift.  Health problems, such as pain, breathing problems, and restless legs syndrome.  Lack of regular exercise.  Using alcohol, nicotine, or caffeine before bed.  How can you help yourself?  Here are some tips  "that may help you sleep more soundly and wake up feeling more refreshed.  Your sleeping area   Use your bedroom only for sleeping and sex. A bit of light reading may help you fall asleep. But if it doesn't, do your reading elsewhere in the house. Try not to use your TV, computer, smartphone, or tablet while you are in bed.  Be sure your bed is big enough to stretch out comfortably, especially if you have a sleep partner.  Keep your bedroom quiet, dark, and cool. Use curtains, blinds, or a sleep mask to block out light. To block out noise, use earplugs, soothing music, or a \"white noise\" machine.  Your evening and bedtime routine   Create a relaxing bedtime routine. You might want to take a warm shower or bath, or listen to soothing music.  Go to bed at the same time every night. And get up at the same time every morning, even if you feel tired.  What to avoid   Limit caffeine (coffee, tea, caffeinated sodas) during the day, and don't have any for at least 6 hours before bedtime.  Avoid drinking alcohol before bedtime. Alcohol can cause you to wake up more often during the night.  Try not to smoke or use tobacco, especially in the evening. Nicotine can keep you awake.  Limit naps during the day, especially close to bedtime.  Avoid lying in bed awake for too long. If you can't fall asleep or if you wake up in the middle of the night and can't get back to sleep within about 20 minutes, get out of bed and go to another room until you feel sleepy.  Avoid taking medicine right before bed that may keep you awake or make you feel hyper or energized. Your doctor can tell you if your medicine may do this and if you can take it earlier in the day.  If you can't sleep   Imagine yourself in a peaceful, pleasant scene. Focus on the details and feelings of being in a place that is relaxing.  Get up and do a quiet or boring activity until you feel sleepy.  Avoid drinking any liquids before going to bed to help prevent waking up " "often to use the bathroom.  Where can you learn more?  Go to https://www.[employer].net/patiented  Enter J942 in the search box to learn more about \"Learning About Sleeping Well.\"  Current as of: July 31, 2024  Content Version: 14.3    2024 [email or web address].   Care instructions adapted under license by your healthcare professional. If you have questions about a medical condition or this instruction, always ask your healthcare professional. [employer] disclaims any warranty or liability for your use of this information.    Learning About Depression Screening  What is depression screening?  Depression screening is a way to see if you have depression symptoms. It may be done by a doctor or counselor. It's often part of a routine checkup. That's because your mental health is just as important as your physical health.  Depression is a mental health condition that affects how you feel, think, and act. You may:  Have less energy.  Lose interest in your daily activities.  Feel sad and grouchy for a long time.  Depression is very common. It affects people of all ages.  Many things can lead to depression. Some people become depressed after they have a stroke or find out they have a major illness like cancer or heart disease. The death of a loved one or a breakup may lead to depression. It can run in families. Most experts believe that a combination of inherited genes and stressful life events can cause it.  What happens during screening?  You may be asked to fill out a form about your depression symptoms. You and the doctor will discuss your answers. The doctor may ask you more questions to learn more about how you think, act, and feel.  What happens after screening?  If you have symptoms of depression, your doctor will talk to you about your options.  Doctors usually treat depression with medicines or counseling. Often, combining the two works best. Many people don't get help because they think that they'll " "get over the depression on their own. But people with depression may not get better unless they get treatment.  The cause of depression is not well understood. There may be many factors involved. But if you have depression, it's not your fault.  A serious symptom of depression is thinking about death or suicide. If you or someone you care about talks about this or about feeling hopeless, get help right away.  It's important to know that depression can be treated. Medicine, counseling, and self-care may help.  Where can you learn more?  Go to https://www.[employer].net/patiented  Enter T185 in the search box to learn more about \"Learning About Depression Screening.\"  Current as of: July 31, 2024  Content Version: 14.3    2024 [employer].   Care instructions adapted under license by your healthcare professional. If you have questions about a medical condition or this instruction, always ask your healthcare professional. [employer] disclaims any warranty or liability for your use of this information.       "

## 2025-01-12 ENCOUNTER — MYC REFILL (OUTPATIENT)
Dept: FAMILY MEDICINE | Facility: CLINIC | Age: 55
End: 2025-01-12
Payer: COMMERCIAL

## 2025-01-12 DIAGNOSIS — K13.79 PAIN IN MOUTH: ICD-10-CM

## 2025-01-14 DIAGNOSIS — R12 HEARTBURN: ICD-10-CM

## 2025-01-14 DIAGNOSIS — R10.13 ABDOMINAL PAIN, EPIGASTRIC: ICD-10-CM

## 2025-01-14 RX ORDER — OXYCODONE HYDROCHLORIDE 5 MG/1
5 TABLET ORAL EVERY 6 HOURS PRN
Qty: 40 TABLET | Refills: 0 | Status: SHIPPED | OUTPATIENT
Start: 2025-01-14

## 2025-01-16 RX ORDER — SUCRALFATE 1 G/1
1 TABLET ORAL 4 TIMES DAILY PRN
Qty: 360 TABLET | Refills: 1 | Status: SHIPPED | OUTPATIENT
Start: 2025-01-16

## 2025-01-27 DIAGNOSIS — G47.00 INSOMNIA, UNSPECIFIED TYPE: ICD-10-CM

## 2025-01-27 RX ORDER — ZOLPIDEM TARTRATE 10 MG/1
10 TABLET ORAL
Qty: 30 TABLET | Refills: 0 | Status: SHIPPED | OUTPATIENT
Start: 2025-01-27
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
## 2025-01-29 ENCOUNTER — LAB (OUTPATIENT)
Dept: LAB | Facility: CLINIC | Age: 55
End: 2025-01-29
Payer: COMMERCIAL

## 2025-01-29 DIAGNOSIS — Z12.5 SCREENING FOR MALIGNANT NEOPLASM OF PROSTATE: ICD-10-CM

## 2025-01-29 DIAGNOSIS — E78.5 HYPERLIPIDEMIA LDL GOAL <70: ICD-10-CM

## 2025-01-29 DIAGNOSIS — E29.1 HYPOGONADISM IN MALE: ICD-10-CM

## 2025-01-29 DIAGNOSIS — I10 HYPERTENSION GOAL BP (BLOOD PRESSURE) < 140/90: ICD-10-CM

## 2025-01-29 DIAGNOSIS — D64.9 ANEMIA, UNSPECIFIED TYPE: ICD-10-CM

## 2025-01-29 LAB
ALBUMIN SERPL BCG-MCNC: 3.6 G/DL (ref 3.5–5.2)
ALP SERPL-CCNC: 77 U/L (ref 40–150)
ALT SERPL W P-5'-P-CCNC: 39 U/L (ref 0–70)
ANION GAP SERPL CALCULATED.3IONS-SCNC: 7 MMOL/L (ref 7–15)
AST SERPL W P-5'-P-CCNC: 41 U/L (ref 0–45)
BILIRUB SERPL-MCNC: 0.5 MG/DL
BUN SERPL-MCNC: 25.7 MG/DL (ref 6–20)
CALCIUM SERPL-MCNC: 10.1 MG/DL (ref 8.8–10.4)
CHLORIDE SERPL-SCNC: 100 MMOL/L (ref 98–107)
CHOLEST SERPL-MCNC: 56 MG/DL
CREAT SERPL-MCNC: 2.71 MG/DL (ref 0.67–1.17)
EGFRCR SERPLBLD CKD-EPI 2021: 27 ML/MIN/1.73M2
ERYTHROCYTE [DISTWIDTH] IN BLOOD BY AUTOMATED COUNT: 18.4 % (ref 10–15)
FASTING STATUS PATIENT QL REPORTED: YES
FASTING STATUS PATIENT QL REPORTED: YES
FERRITIN SERPL-MCNC: 52 NG/ML (ref 31–409)
GLUCOSE SERPL-MCNC: 79 MG/DL (ref 70–99)
HCO3 SERPL-SCNC: 33 MMOL/L (ref 22–29)
HCT VFR BLD AUTO: 39.9 % (ref 40–53)
HDLC SERPL-MCNC: 32 MG/DL
HGB BLD-MCNC: 11 G/DL (ref 13.3–17.7)
LDLC SERPL CALC-MCNC: 13 MG/DL
MCH RBC QN AUTO: 20.6 PG (ref 26.5–33)
MCHC RBC AUTO-ENTMCNC: 27.6 G/DL (ref 31.5–36.5)
MCV RBC AUTO: 75 FL (ref 78–100)
NONHDLC SERPL-MCNC: 24 MG/DL
PLATELET # BLD AUTO: 272 10E3/UL (ref 150–450)
POTASSIUM SERPL-SCNC: 4.8 MMOL/L (ref 3.4–5.3)
PROT SERPL-MCNC: 6.2 G/DL (ref 6.4–8.3)
PSA SERPL DL<=0.01 NG/ML-MCNC: 1.6 NG/ML (ref 0–3.5)
RBC # BLD AUTO: 5.35 10E6/UL (ref 4.4–5.9)
SHBG SERPL-SCNC: 24 NMOL/L (ref 11–80)
SODIUM SERPL-SCNC: 140 MMOL/L (ref 135–145)
TRIGL SERPL-MCNC: 56 MG/DL
WBC # BLD AUTO: 6.3 10E3/UL (ref 4–11)

## 2025-01-29 PROCEDURE — 36415 COLL VENOUS BLD VENIPUNCTURE: CPT

## 2025-01-29 PROCEDURE — 85027 COMPLETE CBC AUTOMATED: CPT

## 2025-01-29 PROCEDURE — 84270 ASSAY OF SEX HORMONE GLOBUL: CPT

## 2025-01-29 PROCEDURE — 80061 LIPID PANEL: CPT

## 2025-01-29 PROCEDURE — G0103 PSA SCREENING: HCPCS

## 2025-01-29 PROCEDURE — 82728 ASSAY OF FERRITIN: CPT

## 2025-01-29 PROCEDURE — 80053 COMPREHEN METABOLIC PANEL: CPT

## 2025-01-30 ENCOUNTER — OFFICE VISIT (OUTPATIENT)
Dept: PALLIATIVE MEDICINE | Facility: CLINIC | Age: 55
End: 2025-01-30
Payer: COMMERCIAL

## 2025-01-30 VITALS — HEART RATE: 78 BPM | SYSTOLIC BLOOD PRESSURE: 117 MMHG | OXYGEN SATURATION: 94 % | DIASTOLIC BLOOD PRESSURE: 69 MMHG

## 2025-01-30 DIAGNOSIS — Z79.899 ENCOUNTER FOR LONG-TERM (CURRENT) USE OF HIGH-RISK MEDICATION: ICD-10-CM

## 2025-01-30 DIAGNOSIS — F41.1 GAD (GENERALIZED ANXIETY DISORDER): ICD-10-CM

## 2025-01-30 DIAGNOSIS — G43.709 CHRONIC MIGRAINE WITHOUT AURA, NOT INTRACTABLE, WITHOUT STATUS MIGRAINOSUS: ICD-10-CM

## 2025-01-30 DIAGNOSIS — F11.20 UNCOMPLICATED OPIOID DEPENDENCE (H): Primary | ICD-10-CM

## 2025-01-30 DIAGNOSIS — G89.4 CHRONIC PAIN SYNDROME: ICD-10-CM

## 2025-01-30 RX ORDER — BUPRENORPHINE AND NALOXONE 8; 2 MG/1; MG/1
FILM, SOLUBLE BUCCAL; SUBLINGUAL
Qty: 60 FILM | Refills: 2 | Status: SHIPPED | OUTPATIENT
Start: 2025-01-30

## 2025-01-30 ASSESSMENT — PAIN SCALES - PAIN ENJOYMENT GENERAL ACTIVITY SCALE (PEG)
INTERFERED_GENERAL_ACTIVITY: 8
INTERFERED_ENJOYMENT_LIFE: 8
PEG_TOTALSCORE: 8
AVG_PAIN_PASTWEEK: 8

## 2025-01-30 ASSESSMENT — PAIN SCALES - GENERAL: PAINLEVEL_OUTOF10: SEVERE PAIN (8)

## 2025-01-30 NOTE — PATIENT INSTRUCTIONS
Restart your suboxone slowly.  Recommend 1/4 - 1/2 of a film a couple times a day and then slowly increase to your previous dose of 1/2 film four times a day.  In the future if you get a prescription for a pain pill do NOT STOP  your suboxone.  Keep taking it and take this in addition to the suboxone.     Follow up in 3 months for botox and f/unit(s) - 4/29/2025 @ 1PM       Macy Subramanian MD

## 2025-02-12 ENCOUNTER — NURSE TRIAGE (OUTPATIENT)
Dept: FAMILY MEDICINE | Facility: CLINIC | Age: 55
End: 2025-02-12
Payer: COMMERCIAL

## 2025-02-13 ENCOUNTER — OFFICE VISIT (OUTPATIENT)
Dept: FAMILY MEDICINE | Facility: CLINIC | Age: 55
End: 2025-02-13
Payer: COMMERCIAL

## 2025-02-13 ENCOUNTER — ANCILLARY PROCEDURE (OUTPATIENT)
Dept: GENERAL RADIOLOGY | Facility: CLINIC | Age: 55
End: 2025-02-13
Attending: FAMILY MEDICINE
Payer: COMMERCIAL

## 2025-02-13 VITALS
RESPIRATION RATE: 25 BRPM | HEIGHT: 72 IN | DIASTOLIC BLOOD PRESSURE: 60 MMHG | SYSTOLIC BLOOD PRESSURE: 110 MMHG | HEART RATE: 87 BPM | OXYGEN SATURATION: 93 % | WEIGHT: 238 LBS | BODY MASS INDEX: 32.23 KG/M2 | TEMPERATURE: 97.5 F

## 2025-02-13 DIAGNOSIS — N18.4 CKD (CHRONIC KIDNEY DISEASE) STAGE 4, GFR 15-29 ML/MIN (H): ICD-10-CM

## 2025-02-13 DIAGNOSIS — R06.02 SHORTNESS OF BREATH: Primary | ICD-10-CM

## 2025-02-13 DIAGNOSIS — F33.1 MAJOR DEPRESSIVE DISORDER, RECURRENT EPISODE, MODERATE (H): ICD-10-CM

## 2025-02-13 DIAGNOSIS — J45.30 MILD PERSISTENT ASTHMA WITHOUT COMPLICATION: ICD-10-CM

## 2025-02-13 DIAGNOSIS — R07.1 CHEST PAIN ON BREATHING: ICD-10-CM

## 2025-02-13 DIAGNOSIS — F31.81 BIPOLAR 2 DISORDER (H): ICD-10-CM

## 2025-02-13 DIAGNOSIS — I10 HYPERTENSION GOAL BP (BLOOD PRESSURE) < 140/90: ICD-10-CM

## 2025-02-13 DIAGNOSIS — R06.02 SHORTNESS OF BREATH: ICD-10-CM

## 2025-02-13 PROBLEM — I43 CARDIOMYOPATHY DUE TO HYPERTENSION, WITH HEART FAILURE (H): Status: RESOLVED | Noted: 2024-01-04 | Resolved: 2025-02-13

## 2025-02-13 PROBLEM — I11.0 CARDIOMYOPATHY DUE TO HYPERTENSION, WITH HEART FAILURE (H): Status: RESOLVED | Noted: 2024-01-04 | Resolved: 2025-02-13

## 2025-02-13 LAB
BASOPHILS # BLD AUTO: 0 10E3/UL (ref 0–0.2)
BASOPHILS NFR BLD AUTO: 0 %
EOSINOPHIL # BLD AUTO: 0.5 10E3/UL (ref 0–0.7)
EOSINOPHIL NFR BLD AUTO: 6 %
ERYTHROCYTE [DISTWIDTH] IN BLOOD BY AUTOMATED COUNT: 19.7 % (ref 10–15)
HCT VFR BLD AUTO: 40.1 % (ref 40–53)
HGB BLD-MCNC: 11.3 G/DL (ref 13.3–17.7)
IMM GRANULOCYTES # BLD: 0 10E3/UL
IMM GRANULOCYTES NFR BLD: 0 %
LYMPHOCYTES # BLD AUTO: 1 10E3/UL (ref 0.8–5.3)
LYMPHOCYTES NFR BLD AUTO: 13 %
MCH RBC QN AUTO: 20.7 PG (ref 26.5–33)
MCHC RBC AUTO-ENTMCNC: 28.2 G/DL (ref 31.5–36.5)
MCV RBC AUTO: 73 FL (ref 78–100)
MONOCYTES # BLD AUTO: 0.8 10E3/UL (ref 0–1.3)
MONOCYTES NFR BLD AUTO: 10 %
NEUTROPHILS # BLD AUTO: 5.4 10E3/UL (ref 1.6–8.3)
NEUTROPHILS NFR BLD AUTO: 70 %
PLATELET # BLD AUTO: 251 10E3/UL (ref 150–450)
RBC # BLD AUTO: 5.46 10E6/UL (ref 4.4–5.9)
WBC # BLD AUTO: 7.6 10E3/UL (ref 4–11)

## 2025-02-13 RX ORDER — FLUTICASONE PROPIONATE AND SALMETEROL 500; 50 UG/1; UG/1
1 POWDER RESPIRATORY (INHALATION) 2 TIMES DAILY
Qty: 120 EACH | Refills: 6 | Status: SHIPPED | OUTPATIENT
Start: 2025-02-13

## 2025-02-13 RX ORDER — HYDRALAZINE HYDROCHLORIDE 100 MG/1
50 TABLET, FILM COATED ORAL 2 TIMES DAILY
Qty: 180 TABLET | Refills: 1 | Status: SHIPPED | OUTPATIENT
Start: 2025-02-13 | End: 2025-02-13 | Stop reason: DRUGHIGH

## 2025-02-13 RX ORDER — BUPROPION HYDROCHLORIDE 300 MG/1
300 TABLET ORAL EVERY MORNING
COMMUNITY
Start: 2025-01-05

## 2025-02-13 RX ORDER — HYDRALAZINE HYDROCHLORIDE 50 MG/1
50 TABLET, FILM COATED ORAL 2 TIMES DAILY
Qty: 180 TABLET | Refills: 3 | Status: SHIPPED | OUTPATIENT
Start: 2025-02-13

## 2025-02-13 RX ORDER — PREDNISONE 20 MG/1
TABLET ORAL
Qty: 20 TABLET | Refills: 0 | Status: SHIPPED | OUTPATIENT
Start: 2025-02-13 | End: 2025-02-26

## 2025-02-13 NOTE — PROGRESS NOTES
Assessment & Plan   Mild persistent asthma without complication  Shortness of breath  Presents with shortness of breath, wheezing, and pleuritic chest pain for the past 2-3 weeks, chest x-ray today did not show any infiltrates or signs of congestive heart failure.  Cardiomegaly noted.. Wheezing noted on physical exam. Albuterol not effective. Off Advair. Differential includes asthma exacerbation and vocal cord paralysis. Discussed risks of untreated asthma including respiratory distress and benefits of restarting Advair and prednisone taper. Prefers tapering prednisone to avoid abrupt cessation effects.  - Order chest x-ray  - Perform EKG -unchanged from previously, no acute ischemic changes, does have right bundle branch block noted with wide QRS due to this.  - Check BNP and CBC  - Restart Advair  - Prescribe prednisone taper (60 mg for 3 days, 40 mg for 3 days, 20 mg for 3 days, 10 mg for 4 days)  - fluticasone-salmeterol (WIXELA INHUB) 500-50 MCG/ACT inhaler  Dispense: 120 each; Refill: 6  - predniSONE (DELTASONE) 20 MG tablet  Dispense: 20 tablet; Refill: 0      Hypertension goal BP (blood pressure) < 140/90  Needs refill of below medication otherwise continue other medications.  - hydrALAZINE (APRESOLINE) 50 MG tablet  Dispense: 180 tablet; Refill: 3    CKD (chronic kidney disease) stage 4, GFR 15-29 ml/min (H)  Ongoing stage IV CKD and will have nephrology see him as he is wondering if you could change him his medicines.  I would recommend keeping blood pressure under good control. GFR continues under 30. Advised to avoid NSAIDs due to nephrotoxicity. Discussed risks of NSAID use including further kidney damage and benefits of nephrology referral for optimized management.  - Refer to nephrologist for chronic kidney disease management  - Basic metabolic panel  (Ca, Cl, CO2, Creat, Gluc, K, Na, BUN)  - Basic metabolic panel  (Ca, Cl, CO2, Creat, Gluc, K, Na, BUN)  - Adult Nephrology   Referral      Chest pain on breathing  See above regards to shortness of breath  - EKG 12-lead complete w/read - Clinics  - XR Chest 2 Views      Major Depressive Disorder, Recurrent Episode, Mode  ?Bipolar 2 disorder (H)  Reports episodes of depression and low mood. Currently on bupropion but reports it is not effective. Discussed risks of untreated bipolar disorder including mood instability and benefits of psychiatric evaluation. Prefers to see a psychiatrist for further management.  - Refer to psychiatry for medication management and evaluation of bipolar disorder  - Adult Mental Health  Referral and continue current medications.    Follow-up  - Follow-up with nephrologist  - Follow-up with psychiatry  - Follow-up with primary care for review of test results and ongoing management.        Consent was obtained from the patient to use an AI documentation tool in the creation of this note.        No follow-ups on file.          Nate Segovia MD      [address] [address]  [email or web address] Don't   Office: [phone]         Justin Blood is a 54 year old, presenting for the following health issues:  Asthma        2/13/2025     1:32 PM   Additional Questions   Roomed by Carol Ann MANCUSO CMA     History of Present Illness  Jeremi Damon is a 54 year old male with asthma and congestive heart failure who presents with shortness of breath and chest pain.    He has been experiencing shortness of breath and chest pain for two to three weeks. The shortness of breath occurs with minimal exertion, such as walking around his home, and is accompanied by wheezing. The chest pain is pleuritic, occurring with deep breaths, and located in the chest. No chest tightness or pain radiating to the neck or left arm.    He has a history of asthma but notes that albuterol is not currently effective. He has been off Advair and uses albuterol as needed. He also reports a history of  congestive heart failure and notes progressive edema in his ankles, which improves with exercise.    He describes a process where stress or abdominal tightness leads to burping and acid reflux, which he believes affects his breathing. He uses various antacids like Rolaids and sucralfate but has been off them recently. He denies current heartburn but notes that deep breaths cause pain. He has a history of similar episodes in childhood, treated with medications for asthma.    He reports a rare occurrence of coughing and denies fever or recent illness. He mentions bringing up sputum with black specks.    He has experienced weight loss, noting a current weight of 238 pounds, which is lower than usual for him. He has not been taking his diuretic, torsemide, regularly due to cramping.    He has a history of chronic kidney disease, with a GFR under 30, and is aware of the need to avoid NSAIDs. He is on multiple medications for blood pressure, including a beta blocker, calcium channel blocker, and hydralazine, which he finds difficult to split.    He is also on bupropion for mood stabilization but reports fluctuating mood states, with periods of productivity followed by days of low mood and inactivity.    Consent was obtained from the patient to use an AI documentation tool in the creation of this note.  HPI     Asthma Follow-Up    Was ACT completed today?  Yes        12/31/2024     1:43 PM   ACT Total Scores   ACT TOTAL SCORE (Goal Greater than or Equal to 20) 8    In the past 12 months, how many times did you visit the emergency room for your asthma without being admitted to the hospital? 0   In the past 12 months, how many times were you hospitalized overnight because of your asthma? 0       Patient-reported        How many days per week do you miss taking your asthma controller medication?  I do not have an asthma controller medication  Please describe any recent triggers for your asthma: Gastric Reflux  Have you had  "any Emergency Room Visits, Urgent Care Visits, or Hospital Admissions since your last office visit?  No      TRIAGED 02/13/2025    S-(situation): Called about Pt Emili     B-(background): LOV: 12/31/24     A-(assessment): Pt reports asthma is worse than usual. SOB with activity. None at rest. Also reports possible hernia.      R-(recommendations): Scheduled Pt today with PCP.          Objective    /60   Pulse 87   Temp 97.5  F (36.4  C) (Tympanic)   Resp 25   Ht 1.816 m (5' 11.5\")   Wt 108 kg (238 lb)   SpO2 93%   BMI 32.73 kg/m    Body mass index is 32.73 kg/m .  Physical Exam   GENERAL: no acute distress  EYES: Conjunctiva are not injected, no discharge.  EARS: Left TM -no erythema, no effusion,  not bulged.               Right TM -no erythema, no effusion,  not bulged.  NOSE: no discharge, no sinus tenderness  THROAT: no erythema, no exudate, no lesions  NECK: supple, no adenopathy.  CARDIAC: regular rate and rhythm, no murmur  RESP: Bilateral expiratory wheezes upon auscultation, no rales, no rhonchi  ABD: Diastasis recti upon palpation, soft, no distension, no tenderness  SKIN: No rashes  EXT - some Edema in ankles to the mid calf, extending half-way up legs bilaterally.    Physical Exam    NECK: No significant lymphadenopathy; thyroid not enlarged.  CHEST:   ABDOMEN: .  EXTREMITIES:     The longitudinal plan of care for the diagnosis(es)/condition(s) as documented were addressed during this visit. Due to the added complexity in care, I will continue to support Jeremi in the subsequent management and with ongoing continuity of care.        Signed Electronically by: Luis Armando Segovia MD    "

## 2025-02-13 NOTE — TELEPHONE ENCOUNTER
S-(situation): Called about Pt Emili    B-(background): LOV: 12/31/24    A-(assessment): Pt reports asthma is worse than usual. SOB with activity. None at rest. Also reports possible hernia.     R-(recommendations): Scheduled Pt today with PCP.     Reason for Disposition   MILD difficulty breathing (e.g., minimal/no SOB at rest, SOB with walking, pulse < 100) of new-onset or worse than normal    Additional Information   Negative: SEVERE difficulty breathing (e.g., struggling for each breath, speaks in single words, pulse > 120)   Negative: Breathing stopped and hasn't returned   Negative: Choking on something   Negative: Bluish (or gray) lips or face   Negative: Difficult to awaken or acting confused (e.g., disoriented, slurred speech)   Negative: Passed out (e.g., fainted, lost consciousness, blacked out and was not responding)   Negative: Wheezing started suddenly after medicine, an allergic food, or bee sting   Negative: Stridor (harsh sound while breathing in)   Negative: Slow, shallow and weak breathing   Negative: Sounds like a life-threatening emergency to the triager   Negative: Chest pain   Negative: Wheezing (high pitched whistling sound) and previous asthma attacks or use of asthma medicines   Negative: Breathing difficulty and within 14 days of COVID-19 EXPOSURE (close contact) with someone diagnosed with COVID-19 (e.g., COVID test positive)   Negative: Breathing difficulty and COVID-19 is widespread in the community   Negative: Breathing diffculty and only present when coughing   Negative: Breathing difficulty and only from stuffy nose   Negative: Breathing diffculty and only from stuffy nose or runny nose from common cold   Negative: MODERATE difficulty breathing (e.g., speaks in phrases, SOB even at rest, pulse 100-120) of new-onset or worse than normal   Negative: Oxygen level (e.g., pulse oximetry) 90% or lower   Negative: Wheezing can be heard across the room   Negative: Drooling or spitting out  "saliva (because can't swallow)   Negative: Any history of prior \"blood clot\" in leg or lungs   Negative: Illness requiring prolonged bedrest in past month (e.g., immobilization, long hospital stay)   Negative: Hip or leg fracture (broken bone) in past month (or had cast on leg or ankle in past month)   Negative: Major surgery in the past month   Negative: Long-distance travel in past month (e.g., car, bus, train, plane; with trip lasting 6 or more hours)   Negative: Cancer treatment in past six months (or has cancer now)   Negative: Extra heartbeats, irregular heart beating, or heart is beating very fast (i.e., 'palpitations')   Negative: Fever > 103 F (39.4 C)   Negative: Fever > 101 F (38.3 C) and over 60 years of age   Negative: Fever > 100 F (37.8 C) and bedridden (e.g., nursing home patient, stroke, chronic illness, recovering from surgery)   Negative: Fever > 100 F (37.8 C) and diabetes mellitus or weak immune system (e.g., HIV positive, cancer chemo, splenectomy, organ transplant, chronic steroids)   Negative: Periods where breathing stops and then resumes normally and bedridden (e.g., nursing home patient, CVA)   Negative: Pregnant or postpartum (from 0 to 6 weeks after delivery)   Negative: Patient sounds very sick or weak to the triager    Answer Assessment - Initial Assessment Questions  1. RESPIRATORY STATUS: \"Describe your breathing?\" (e.g., wheezing, shortness of breath, unable to speak, severe coughing)       SOB  2. ONSET: \"When did this breathing problem begin?\"       No answer  3. PATTERN \"Does the difficult breathing come and go, or has it been constant since it started?\"       Comes and goes  4. SEVERITY: \"How bad is your breathing?\" (e.g., mild, moderate, severe)       Mild  5. RECURRENT SYMPTOM: \"Have you had difficulty breathing before?\" If Yes, ask: \"When was the last time?\" and \"What happened that time?\"       Asthma  6. CARDIAC HISTORY: \"Do you have any history of heart disease?\" (e.g., " "heart attack, angina, bypass surgery, angioplasty)       yes  7. LUNG HISTORY: \"Do you have any history of lung disease?\"  (e.g., pulmonary embolus, asthma, emphysema)      asthma  8. CAUSE: \"What do you think is causing the breathing problem?\"       asthma  9. OTHER SYMPTOMS: \"Do you have any other symptoms?\" (e.g., chest pain, cough, dizziness, fever, runny nose)      No  10. O2 SATURATION MONITOR:  \"Do you use an oxygen saturation monitor (pulse oximeter) at home?\" If Yes, ask: \"What is your reading (oxygen level) today?\" \"What is your usual oxygen saturation reading?\" (e.g., 95%)        N/A  11. PREGNANCY: \"Is there any chance you are pregnant?\" \"When was your last menstrual period?\"        N/A  12. TRAVEL: \"Have you traveled out of the country in the last month?\" (e.g., travel history, exposures)        No    Protocols used: Breathing Difficulty-A-[place]    "

## 2025-02-20 ENCOUNTER — MYC REFILL (OUTPATIENT)
Dept: FAMILY MEDICINE | Facility: CLINIC | Age: 55
End: 2025-02-20
Payer: COMMERCIAL

## 2025-02-20 DIAGNOSIS — G47.00 INSOMNIA, UNSPECIFIED TYPE: ICD-10-CM

## 2025-02-20 DIAGNOSIS — M54.2 NECK PAIN: ICD-10-CM

## 2025-02-20 RX ORDER — CYCLOBENZAPRINE HCL 10 MG
10 TABLET ORAL 3 TIMES DAILY PRN
Qty: 90 TABLET | Refills: 5 | Status: SHIPPED | OUTPATIENT
Start: 2025-02-20

## 2025-02-20 RX ORDER — ZOLPIDEM TARTRATE 10 MG/1
10 TABLET ORAL
Qty: 30 TABLET | Refills: 2 | Status: SHIPPED | OUTPATIENT
Start: 2025-02-27

## 2025-02-20 RX ORDER — AMITRIPTYLINE HYDROCHLORIDE 50 MG/1
150 TABLET ORAL AT BEDTIME
Qty: 270 TABLET | Refills: 1 | OUTPATIENT
Start: 2025-02-20

## 2025-04-06 ENCOUNTER — APPOINTMENT (OUTPATIENT)
Dept: GENERAL RADIOLOGY | Facility: CLINIC | Age: 55
DRG: 193 | End: 2025-04-06
Attending: EMERGENCY MEDICINE
Payer: COMMERCIAL

## 2025-04-06 ENCOUNTER — APPOINTMENT (OUTPATIENT)
Dept: CT IMAGING | Facility: CLINIC | Age: 55
DRG: 193 | End: 2025-04-06
Attending: EMERGENCY MEDICINE
Payer: COMMERCIAL

## 2025-04-06 ENCOUNTER — HOSPITAL ENCOUNTER (INPATIENT)
Facility: CLINIC | Age: 55
DRG: 193 | End: 2025-04-06
Attending: EMERGENCY MEDICINE | Admitting: HOSPITALIST
Payer: COMMERCIAL

## 2025-04-06 DIAGNOSIS — N18.9 CHRONIC RENAL FAILURE, UNSPECIFIED CKD STAGE: ICD-10-CM

## 2025-04-06 DIAGNOSIS — I25.119 CORONARY ARTERY DISEASE INVOLVING NATIVE CORONARY ARTERY OF NATIVE HEART WITH ANGINA PECTORIS: ICD-10-CM

## 2025-04-06 DIAGNOSIS — F41.1 GENERALIZED ANXIETY DISORDER: ICD-10-CM

## 2025-04-06 DIAGNOSIS — N18.4 CKD (CHRONIC KIDNEY DISEASE) STAGE 4, GFR 15-29 ML/MIN (H): ICD-10-CM

## 2025-04-06 DIAGNOSIS — I42.2 HYPERTROPHIC CARDIOMYOPATHY (H): ICD-10-CM

## 2025-04-06 DIAGNOSIS — R74.8 ELEVATED LIVER ENZYMES: ICD-10-CM

## 2025-04-06 DIAGNOSIS — R79.89 ELEVATED TROPONIN: ICD-10-CM

## 2025-04-06 DIAGNOSIS — I50.9 CONGESTIVE HEART FAILURE, UNSPECIFIED HF CHRONICITY, UNSPECIFIED HEART FAILURE TYPE (H): ICD-10-CM

## 2025-04-06 DIAGNOSIS — E04.9 ENLARGED THYROID: ICD-10-CM

## 2025-04-06 DIAGNOSIS — J18.9 PNEUMONIA DUE TO INFECTIOUS ORGANISM, UNSPECIFIED LATERALITY, UNSPECIFIED PART OF LUNG: ICD-10-CM

## 2025-04-06 DIAGNOSIS — I10 HYPERTENSION GOAL BP (BLOOD PRESSURE) < 140/90: ICD-10-CM

## 2025-04-06 DIAGNOSIS — F33.1 MAJOR DEPRESSIVE DISORDER, RECURRENT EPISODE, MODERATE (H): Primary | ICD-10-CM

## 2025-04-06 DIAGNOSIS — F41.0 PANIC DISORDER WITHOUT AGORAPHOBIA: ICD-10-CM

## 2025-04-06 DIAGNOSIS — I51.7 LEFT VENTRICULAR HYPERTROPHY: ICD-10-CM

## 2025-04-06 DIAGNOSIS — J18.9 PNEUMONIA OF UPPER LOBE DUE TO INFECTIOUS ORGANISM, UNSPECIFIED LATERALITY: ICD-10-CM

## 2025-04-06 DIAGNOSIS — R52 PAIN: ICD-10-CM

## 2025-04-06 DIAGNOSIS — I50.9 ACUTE DECOMPENSATED HEART FAILURE (H): ICD-10-CM

## 2025-04-06 LAB
ALBUMIN SERPL BCG-MCNC: 4 G/DL (ref 3.5–5.2)
ALP SERPL-CCNC: 104 U/L (ref 40–150)
ALT SERPL W P-5'-P-CCNC: 65 U/L (ref 0–70)
ANION GAP SERPL CALCULATED.3IONS-SCNC: 10 MMOL/L (ref 7–15)
AST SERPL W P-5'-P-CCNC: 51 U/L (ref 0–45)
ATRIAL RATE - MUSE: 81 BPM
BASOPHILS # BLD AUTO: 0.1 10E3/UL (ref 0–0.2)
BASOPHILS NFR BLD AUTO: 1 %
BILIRUB SERPL-MCNC: 0.7 MG/DL
BUN SERPL-MCNC: 27 MG/DL (ref 6–20)
CALCIUM SERPL-MCNC: 9.2 MG/DL (ref 8.8–10.4)
CHLORIDE SERPL-SCNC: 99 MMOL/L (ref 98–107)
CREAT SERPL-MCNC: 3.03 MG/DL (ref 0.67–1.17)
D DIMER PPP FEU-MCNC: 0.5 UG/ML FEU (ref 0–0.5)
DIASTOLIC BLOOD PRESSURE - MUSE: NORMAL MMHG
EGFRCR SERPLBLD CKD-EPI 2021: 23 ML/MIN/1.73M2
EOSINOPHIL # BLD AUTO: 0.2 10E3/UL (ref 0–0.7)
EOSINOPHIL NFR BLD AUTO: 2 %
ERYTHROCYTE [DISTWIDTH] IN BLOOD BY AUTOMATED COUNT: 21.1 % (ref 10–15)
FLUAV RNA SPEC QL NAA+PROBE: NEGATIVE
FLUBV RNA RESP QL NAA+PROBE: NEGATIVE
GLUCOSE SERPL-MCNC: 111 MG/DL (ref 70–99)
HCO3 SERPL-SCNC: 26 MMOL/L (ref 22–29)
HCT VFR BLD AUTO: 41.5 % (ref 40–53)
HGB BLD-MCNC: 11.4 G/DL (ref 13.3–17.7)
HOLD SPECIMEN: NORMAL
IMM GRANULOCYTES # BLD: 0 10E3/UL
IMM GRANULOCYTES NFR BLD: 0 %
INTERPRETATION ECG - MUSE: NORMAL
LACTATE SERPL-SCNC: 1 MMOL/L (ref 0.7–2)
LIPASE SERPL-CCNC: 29 U/L (ref 13–60)
LYMPHOCYTES # BLD AUTO: 1.1 10E3/UL (ref 0.8–5.3)
LYMPHOCYTES NFR BLD AUTO: 11 %
MCH RBC QN AUTO: 18.8 PG (ref 26.5–33)
MCHC RBC AUTO-ENTMCNC: 27.5 G/DL (ref 31.5–36.5)
MCV RBC AUTO: 69 FL (ref 78–100)
MONOCYTES # BLD AUTO: 1.1 10E3/UL (ref 0–1.3)
MONOCYTES NFR BLD AUTO: 11 %
NEUTROPHILS # BLD AUTO: 7.6 10E3/UL (ref 1.6–8.3)
NEUTROPHILS NFR BLD AUTO: 76 %
NRBC # BLD AUTO: 0 10E3/UL
NRBC BLD AUTO-RTO: 0 /100
NT-PROBNP SERPL-MCNC: 5749 PG/ML (ref 0–900)
P AXIS - MUSE: 4 DEGREES
PLATELET # BLD AUTO: 371 10E3/UL (ref 150–450)
POTASSIUM SERPL-SCNC: 5 MMOL/L (ref 3.4–5.3)
PR INTERVAL - MUSE: 280 MS
PROCALCITONIN SERPL IA-MCNC: 0.23 NG/ML
PROT SERPL-MCNC: 7 G/DL (ref 6.4–8.3)
QRS DURATION - MUSE: 184 MS
QT - MUSE: 448 MS
QTC - MUSE: 520 MS
R AXIS - MUSE: 156 DEGREES
RBC # BLD AUTO: 6.06 10E6/UL (ref 4.4–5.9)
RSV RNA SPEC NAA+PROBE: NEGATIVE
SARS-COV-2 RNA RESP QL NAA+PROBE: NEGATIVE
SODIUM SERPL-SCNC: 135 MMOL/L (ref 135–145)
SYSTOLIC BLOOD PRESSURE - MUSE: NORMAL MMHG
T AXIS - MUSE: -18 DEGREES
TROPONIN T SERPL HS-MCNC: 81 NG/L
TROPONIN T SERPL HS-MCNC: 84 NG/L
VENTRICULAR RATE- MUSE: 81 BPM
WBC # BLD AUTO: 10 10E3/UL (ref 4–11)

## 2025-04-06 PROCEDURE — 82310 ASSAY OF CALCIUM: CPT | Performed by: HOSPITALIST

## 2025-04-06 PROCEDURE — 96375 TX/PRO/DX INJ NEW DRUG ADDON: CPT

## 2025-04-06 PROCEDURE — 82310 ASSAY OF CALCIUM: CPT | Performed by: EMERGENCY MEDICINE

## 2025-04-06 PROCEDURE — 93005 ELECTROCARDIOGRAM TRACING: CPT

## 2025-04-06 PROCEDURE — 36415 COLL VENOUS BLD VENIPUNCTURE: CPT | Performed by: EMERGENCY MEDICINE

## 2025-04-06 PROCEDURE — 250N000011 HC RX IP 250 OP 636: Performed by: EMERGENCY MEDICINE

## 2025-04-06 PROCEDURE — 96374 THER/PROPH/DIAG INJ IV PUSH: CPT

## 2025-04-06 PROCEDURE — 87040 BLOOD CULTURE FOR BACTERIA: CPT | Performed by: EMERGENCY MEDICINE

## 2025-04-06 PROCEDURE — 83605 ASSAY OF LACTIC ACID: CPT | Performed by: EMERGENCY MEDICINE

## 2025-04-06 PROCEDURE — 250N000013 HC RX MED GY IP 250 OP 250 PS 637: Performed by: EMERGENCY MEDICINE

## 2025-04-06 PROCEDURE — 250N000013 HC RX MED GY IP 250 OP 250 PS 637: Performed by: HOSPITALIST

## 2025-04-06 PROCEDURE — 71250 CT THORAX DX C-: CPT

## 2025-04-06 PROCEDURE — 99285 EMERGENCY DEPT VISIT HI MDM: CPT

## 2025-04-06 PROCEDURE — 87637 SARSCOV2&INF A&B&RSV AMP PRB: CPT | Performed by: EMERGENCY MEDICINE

## 2025-04-06 PROCEDURE — 210N000002 HC R&B HEART CARE

## 2025-04-06 PROCEDURE — 83880 ASSAY OF NATRIURETIC PEPTIDE: CPT | Performed by: EMERGENCY MEDICINE

## 2025-04-06 PROCEDURE — 71046 X-RAY EXAM CHEST 2 VIEWS: CPT

## 2025-04-06 PROCEDURE — 84145 PROCALCITONIN (PCT): CPT | Performed by: HOSPITALIST

## 2025-04-06 PROCEDURE — 99223 1ST HOSP IP/OBS HIGH 75: CPT | Performed by: HOSPITALIST

## 2025-04-06 PROCEDURE — 84484 ASSAY OF TROPONIN QUANT: CPT | Performed by: EMERGENCY MEDICINE

## 2025-04-06 PROCEDURE — 83690 ASSAY OF LIPASE: CPT | Performed by: EMERGENCY MEDICINE

## 2025-04-06 PROCEDURE — 85004 AUTOMATED DIFF WBC COUNT: CPT | Performed by: EMERGENCY MEDICINE

## 2025-04-06 PROCEDURE — 85379 FIBRIN DEGRADATION QUANT: CPT | Performed by: EMERGENCY MEDICINE

## 2025-04-06 RX ORDER — ONDANSETRON 4 MG/1
4 TABLET, ORALLY DISINTEGRATING ORAL EVERY 6 HOURS PRN
Status: CANCELLED | OUTPATIENT
Start: 2025-04-06

## 2025-04-06 RX ORDER — BACLOFEN 5 MG/1
5 TABLET ORAL ONCE
Status: COMPLETED | OUTPATIENT
Start: 2025-04-06 | End: 2025-04-06

## 2025-04-06 RX ORDER — AZITHROMYCIN MONOHYDRATE 500 MG/5ML
500 INJECTION, POWDER, LYOPHILIZED, FOR SOLUTION INTRAVENOUS ONCE
Status: COMPLETED | OUTPATIENT
Start: 2025-04-06 | End: 2025-04-06

## 2025-04-06 RX ORDER — CEFTRIAXONE 2 G/1
2 INJECTION, POWDER, FOR SOLUTION INTRAMUSCULAR; INTRAVENOUS ONCE
Status: COMPLETED | OUTPATIENT
Start: 2025-04-06 | End: 2025-04-06

## 2025-04-06 RX ORDER — MAGNESIUM HYDROXIDE/ALUMINUM HYDROXICE/SIMETHICONE 120; 1200; 1200 MG/30ML; MG/30ML; MG/30ML
15 SUSPENSION ORAL ONCE
Status: COMPLETED | OUTPATIENT
Start: 2025-04-06 | End: 2025-04-06

## 2025-04-06 RX ORDER — ONDANSETRON 2 MG/ML
4 INJECTION INTRAMUSCULAR; INTRAVENOUS EVERY 6 HOURS PRN
Status: CANCELLED | OUTPATIENT
Start: 2025-04-06

## 2025-04-06 RX ORDER — FUROSEMIDE 10 MG/ML
60 INJECTION INTRAMUSCULAR; INTRAVENOUS ONCE
Status: COMPLETED | OUTPATIENT
Start: 2025-04-06 | End: 2025-04-06

## 2025-04-06 RX ADMIN — CEFTRIAXONE SODIUM 2 G: 2 INJECTION, POWDER, FOR SOLUTION INTRAMUSCULAR; INTRAVENOUS at 21:18

## 2025-04-06 RX ADMIN — FUROSEMIDE 60 MG: 10 INJECTION, SOLUTION INTRAVENOUS at 17:10

## 2025-04-06 RX ADMIN — AZITHROMYCIN MONOHYDRATE 500 MG: 500 INJECTION, POWDER, LYOPHILIZED, FOR SOLUTION INTRAVENOUS at 22:20

## 2025-04-06 RX ADMIN — ALUMINUM HYDROXIDE, MAGNESIUM HYDROXIDE, AND SIMETHICONE 15 ML: 200; 200; 20 SUSPENSION ORAL at 19:56

## 2025-04-06 RX ADMIN — BACLOFEN 5 MG: 5 TABLET ORAL at 23:13

## 2025-04-06 RX ADMIN — PANTOPRAZOLE SODIUM 80 MG: 40 INJECTION, POWDER, FOR SOLUTION INTRAVENOUS at 19:55

## 2025-04-06 ASSESSMENT — COLUMBIA-SUICIDE SEVERITY RATING SCALE - C-SSRS
1. IN THE PAST MONTH, HAVE YOU WISHED YOU WERE DEAD OR WISHED YOU COULD GO TO SLEEP AND NOT WAKE UP?: NO
6. HAVE YOU EVER DONE ANYTHING, STARTED TO DO ANYTHING, OR PREPARED TO DO ANYTHING TO END YOUR LIFE?: NO
2. HAVE YOU ACTUALLY HAD ANY THOUGHTS OF KILLING YOURSELF IN THE PAST MONTH?: NO

## 2025-04-06 ASSESSMENT — ACTIVITIES OF DAILY LIVING (ADL)
ADLS_ACUITY_SCORE: 55

## 2025-04-06 NOTE — ED TRIAGE NOTES
"Pt arrives via triage with c/o SOB for the past 2 weeks, pt states he just \"couldn't take it anymore\". Pt was diagnosed with CHF but told his PCP \"bullshit. Pt reports taking a diuretic 99% of the time        "

## 2025-04-06 NOTE — ED PROVIDER NOTES
"  Emergency Department Note      History of Present Illness     Chief Complaint   Shortness of Breath      HPI   Jeremi Damon is a 54 year old male on Plavix with history of NSTEMI, CAD, hypertension, stage 4 CKD, basal cell carcinoma who presents to the ED for evaluation of shortness of breath. Patient reports worsening shortness of breath over the past two weeks. Notes he cannot walk more than two steps without shortness of breath. He started coughing approximately one week ago. He also reports right sided abdominal pain for the past week. Endorses bilateral ankle swelling. Denies chest pain. Patient has history of 3 stent placements. Patient was put on prednisone by PCP fro CHF. He reports taking his diuretic most of the time.     Independent Historian   None    Review of External Notes   none    Past Medical History     Medical History and Problem List   ADHD  Benign hypertension  CAD  Chronic back pain  Hypersomnia with sleep apnea  Hypertension  Depression  NSTEMI  Allergic rhinitis  Stage 4 CKD  AKF  Basal cell carcinoma  Panic disroder     Medications   Albuterol  Elavil  Amlodipine  Adderall  Aspirin 81 mg  Suboxone  Wellbutrin  Buspar  Coreg  Klonopin  Plavix  Flexeril  Proscar  Apresoline  Imdur  Protonix  Ativan  Crestor  Demadex  Ambien  Depotestosterone     Surgical History   Appendectomy  L5-S1 fusion  Percutaneous coronary intervention  DaVinci nephrectomy partial  Laparoscopic cholecystectomy  Bilateral radiofrequency volume reduction of inferior turbinates  Rhinoplasty-septoplasty  Torn pectoral muscle surgery    Physical Exam     Patient Vitals for the past 24 hrs:   BP Temp Temp src Pulse Resp SpO2 Height Weight   04/06/25 2014 -- -- -- 78 10 94 % -- --   04/06/25 2004 (!) 147/99 -- -- -- -- -- -- --   04/06/25 1745 (!) 148/99 -- -- 78 20 95 % -- --   04/06/25 1700 -- -- -- 80 18 94 % -- --   04/06/25 1339 (!) 153/99 97.2  F (36.2  C) Temporal 85 22 97 % 1.803 m (5' 11\") 110.2 kg (243 lb) "     Physical Exam  Nursing note and vitals reviewed.  Constitutional:  Appears well-developed and well-nourished.   HENT:   Head:    Atraumatic.   Mouth/Throat:   Oropharynx is clear and moist. No oropharyngeal exudate.   Eyes:    Pupils are equal, round, and reactive to light.   Neck:    Normal range of motion. Neck supple.      No tracheal deviation present. No thyromegaly present.   Cardiovascular:  Normal rate, regular rhythm, no murmur   Pulmonary/Chest: Breath sounds are diminished with few crackles in the lung bases.   Abdominal:   Soft. Bowel sounds are normal. Exhibits no distension and      no mass. There is no tenderness.      There is no rebound and no guarding.   Musculoskeletal:  Exhibits two plus pitting edema in both ankles.   Lymphadenopathy:  No cervical adenopathy.   Neurological:   Alert and oriented to person, place, and time.   Skin:    Skin is warm and dry. No rash noted. No pallor.       Diagnostics     Lab Results   Labs Ordered and Resulted from Time of ED Arrival to Time of ED Departure   TROPONIN T, HIGH SENSITIVITY - Abnormal       Result Value    Troponin T, High Sensitivity 84 (*)    COMPREHENSIVE METABOLIC PANEL - Abnormal    Sodium 135      Potassium 5.0      Carbon Dioxide (CO2) 26      Anion Gap 10      Urea Nitrogen 27.0 (*)     Creatinine 3.03 (*)     GFR Estimate 23 (*)     Calcium 9.2      Chloride 99      Glucose 111 (*)     Alkaline Phosphatase 104      AST 51 (*)     ALT 65      Protein Total 7.0      Albumin 4.0      Bilirubin Total 0.7     CBC WITH PLATELETS AND DIFFERENTIAL - Abnormal    WBC Count 10.0      RBC Count 6.06 (*)     Hemoglobin 11.4 (*)     Hematocrit 41.5      MCV 69 (*)     MCH 18.8 (*)     MCHC 27.5 (*)     RDW 21.1 (*)     Platelet Count 371      % Neutrophils 76      % Lymphocytes 11      % Monocytes 11      % Eosinophils 2      % Basophils 1      % Immature Granulocytes 0      NRBCs per 100 WBC 0      Absolute Neutrophils 7.6      Absolute Lymphocytes  1.1      Absolute Monocytes 1.1      Absolute Eosinophils 0.2      Absolute Basophils 0.1      Absolute Immature Granulocytes 0.0      Absolute NRBCs 0.0     NT PROBNP INPATIENT - Abnormal    N terminal Pro BNP Inpatient 5,749 (*)    TROPONIN T, HIGH SENSITIVITY - Abnormal    Troponin T, High Sensitivity 81 (*)    INFLUENZA A/B, RSV AND SARS-COV2 PCR - Normal    Influenza A PCR Negative      Influenza B PCR Negative      RSV PCR Negative      SARS CoV2 PCR Negative     LIPASE - Normal    Lipase 29     D DIMER QUANTITATIVE - Normal    D-Dimer Quantitative 0.50     LACTIC ACID WHOLE BLOOD WITH 1X REPEAT IN 2 HR WHEN >2   PROCALCITONIN   BLOOD CULTURE   BLOOD CULTURE       Imaging   CT Chest Abdomen Pelvis w/o Contrast   Final Result   IMPRESSION:   1.  Patchy groundglass foci within both upper lobes, right worse than left, most suggestive of atypical pneumonia.   2.  Small bilateral pleural effusions, right larger than left.   3.  Cardiomegaly.   4.  Stable postoperative appearance of left kidney.   5.  Trace volume ascites.   6.  Prominent enlargement of left lobe of thyroid. Benign goiter favored though this would be amenable to evaluation with ultrasound.         Chest XR,  PA & LAT   Final Result   IMPRESSION: Stable enlargement of the cardiac silhouette. Coronary stent is again noted. Likely pulmonary vascular congestion and trace interstitial edema. Small bilateral pleural effusions are also noted. No dewayne airspace consolidation or pneumothorax.    Bones are unchanged.          EKG   ECG results from 04/06/25   EKG 12 lead     Value    Systolic Blood Pressure     Diastolic Blood Pressure     Ventricular Rate 81    Atrial Rate 81    NC Interval 280    QRS Duration 184        QTc 520    P Axis 4    R AXIS 156    T Axis -18    Interpretation ECG      Sinus rhythm with 1st degree A-V block  Left atrial enlargement  Non-specific intra-ventricular conduction block  Right ventricular hypertrophy  Cannot rule out  Inferior infarct (cited on or before 09-Jan-2024)  Anterolateral infarct (cited on or before 29-Nov-2020)  Abnormal ECG  When compared with ECG of 09-Jan-2024 10:45,  No significant changes found  Confirmed by GENERATED REPORT, COMPUTER ([id number]),  Allen Zuniga ([id number]) on 4/6/2025 4:35:07 PM  Read by me at 1633     *Note: Due to a large number of results and/or encounters for the requested time period, some results have not been displayed. A complete set of results can be found in Results Review.       Independent Interpretation   CXR: Shows cardiomegaly and pulmonary edema with small bilateral pleural effusions.  No sign of pneumonia.  No pneumothorax or mediastinal widening.      ED Course      Medications Administered   Medications   cefTRIAXone (ROCEPHIN) 2 g vial to attach to  ml bag for ADULTS or NS 50 ml bag for PEDS (2 g Intravenous $New Bag 4/6/25 2118)   azithromycin (ZITHROMAX) 500 mg vial to attach to  mL bag (has no administration in time range)   furosemide (LASIX) injection 60 mg (60 mg Intravenous $Given 4/6/25 1710)   pantoprazole (PROTONIX) IV push injection 80 mg (80 mg Intravenous $Given 4/6/25 1955)   alum & mag hydroxide-simethicone (MAALOX) suspension 15 mL (15 mLs Oral $Given 4/6/25 1956)       Procedures   Procedures     Discussion of Management   Admitting Hospitalist, Dr. Mccarty    ED Course   ED Course as of 04/06/25 2124   Sun Apr 06, 2025   1647 I obtained history and examined the patient as noted above.    2123 I spoke with Dr. Mccarty, hospitalist, who accepted the patient for admission.        Additional Documentation  None    Medical Decision Making / Diagnosis     CMS Diagnoses: none    MIPS       None    MDM   Jeremi Damon is a 54 year old male who arrives to the emergency department due to worsening shortness of breath with dyspnea with any exertion.  I found him to have atypical pneumonia in the upper lobes of his lungs as well as acute congestive heart  failure.  He has an elevated troponin which is flat here and no chest pain so I did not feel that was acute myocardial infarction at this time.  For treatment of his CHF he was given Lasix 60 mg IV.  He was given broad-spectrum antibiotic of azithromycin and ceftriaxone for treatment of his pneumonia and admitted to the care of the hospitalist service.  D-dimer is negative and there is no sign of pulmonary embolism.  No sign of pneumothorax.  I discussed with the patient his enlarged thyroid and he was told to have his primary care doctor order an ultrasound of his thyroid gland.    Disposition   The patient was admitted to the hospital.     Diagnosis     ICD-10-CM    1. Pneumonia of upper lobe due to infectious organism, unspecified laterality  J18.9     Atypical pneumonia bilateral upper lobes      2. Acute decompensated heart failure (H)  I50.9       3. Elevated troponin  R79.89       4. Chronic renal failure, unspecified CKD stage  N18.9       5. Enlarged thyroid  E04.9            Discharge Medications   New Prescriptions    No medications on file     Scribe Disclosure:  Faby MANRIQUE, am serving as a scribe at 3:52 PM on 4/6/2025 to document services personally performed by Madalyn Caldera MD based on my observations and the provider's statements to me.    MD Werner Medrano Cheri Lee, MD  04/07/25 0039

## 2025-04-07 ENCOUNTER — APPOINTMENT (OUTPATIENT)
Dept: OCCUPATIONAL THERAPY | Facility: CLINIC | Age: 55
DRG: 193 | End: 2025-04-07
Attending: HOSPITALIST
Payer: COMMERCIAL

## 2025-04-07 LAB
ANION GAP SERPL CALCULATED.3IONS-SCNC: 12 MMOL/L (ref 7–15)
ANION GAP SERPL CALCULATED.3IONS-SCNC: 15 MMOL/L (ref 7–15)
BUN SERPL-MCNC: 27.1 MG/DL (ref 6–20)
BUN SERPL-MCNC: 29.3 MG/DL (ref 6–20)
CALCIUM SERPL-MCNC: 8.9 MG/DL (ref 8.8–10.4)
CALCIUM SERPL-MCNC: 9.1 MG/DL (ref 8.8–10.4)
CHLORIDE SERPL-SCNC: 100 MMOL/L (ref 98–107)
CHLORIDE SERPL-SCNC: 96 MMOL/L (ref 98–107)
CREAT SERPL-MCNC: 3.07 MG/DL (ref 0.67–1.17)
CREAT SERPL-MCNC: 3.22 MG/DL (ref 0.67–1.17)
EGFRCR SERPLBLD CKD-EPI 2021: 22 ML/MIN/1.73M2
EGFRCR SERPLBLD CKD-EPI 2021: 23 ML/MIN/1.73M2
ERYTHROCYTE [DISTWIDTH] IN BLOOD BY AUTOMATED COUNT: 20.9 % (ref 10–15)
GLUCOSE SERPL-MCNC: 81 MG/DL (ref 70–99)
GLUCOSE SERPL-MCNC: 96 MG/DL (ref 70–99)
HCO3 SERPL-SCNC: 26 MMOL/L (ref 22–29)
HCO3 SERPL-SCNC: 26 MMOL/L (ref 22–29)
HCT VFR BLD AUTO: 41.5 % (ref 40–53)
HGB BLD-MCNC: 11.3 G/DL (ref 13.3–17.7)
MCH RBC QN AUTO: 18.8 PG (ref 26.5–33)
MCHC RBC AUTO-ENTMCNC: 27.2 G/DL (ref 31.5–36.5)
MCV RBC AUTO: 69 FL (ref 78–100)
PLATELET # BLD AUTO: 364 10E3/UL (ref 150–450)
POTASSIUM SERPL-SCNC: 5.1 MMOL/L (ref 3.4–5.3)
POTASSIUM SERPL-SCNC: 5.2 MMOL/L (ref 3.4–5.3)
RBC # BLD AUTO: 6.02 10E6/UL (ref 4.4–5.9)
SODIUM SERPL-SCNC: 137 MMOL/L (ref 135–145)
SODIUM SERPL-SCNC: 138 MMOL/L (ref 135–145)
WBC # BLD AUTO: 9.9 10E3/UL (ref 4–11)

## 2025-04-07 PROCEDURE — 99233 SBSQ HOSP IP/OBS HIGH 50: CPT | Performed by: INTERNAL MEDICINE

## 2025-04-07 PROCEDURE — 250N000012 HC RX MED GY IP 250 OP 636 PS 637: Performed by: INTERNAL MEDICINE

## 2025-04-07 PROCEDURE — 210N000002 HC R&B HEART CARE

## 2025-04-07 PROCEDURE — 258N000003 HC RX IP 258 OP 636: Performed by: HOSPITALIST

## 2025-04-07 PROCEDURE — 99223 1ST HOSP IP/OBS HIGH 75: CPT | Mod: FS | Performed by: NURSE PRACTITIONER

## 2025-04-07 PROCEDURE — 97165 OT EVAL LOW COMPLEX 30 MIN: CPT | Mod: GO

## 2025-04-07 PROCEDURE — 250N000013 HC RX MED GY IP 250 OP 250 PS 637: Performed by: HOSPITALIST

## 2025-04-07 PROCEDURE — 80048 BASIC METABOLIC PNL TOTAL CA: CPT | Performed by: HOSPITALIST

## 2025-04-07 PROCEDURE — 99222 1ST HOSP IP/OBS MODERATE 55: CPT | Performed by: PSYCHIATRY & NEUROLOGY

## 2025-04-07 PROCEDURE — 250N000013 HC RX MED GY IP 250 OP 250 PS 637: Performed by: INTERNAL MEDICINE

## 2025-04-07 PROCEDURE — 250N000013 HC RX MED GY IP 250 OP 250 PS 637: Performed by: PSYCHIATRY & NEUROLOGY

## 2025-04-07 PROCEDURE — 250N000011 HC RX IP 250 OP 636: Performed by: HOSPITALIST

## 2025-04-07 PROCEDURE — 93010 ELECTROCARDIOGRAM REPORT: CPT | Performed by: INTERNAL MEDICINE

## 2025-04-07 PROCEDURE — 85027 COMPLETE CBC AUTOMATED: CPT | Performed by: HOSPITALIST

## 2025-04-07 PROCEDURE — 97535 SELF CARE MNGMENT TRAINING: CPT | Mod: GO

## 2025-04-07 PROCEDURE — 93005 ELECTROCARDIOGRAM TRACING: CPT

## 2025-04-07 PROCEDURE — 36415 COLL VENOUS BLD VENIPUNCTURE: CPT | Performed by: HOSPITALIST

## 2025-04-07 RX ORDER — BACLOFEN 5 MG/1
5 TABLET ORAL 3 TIMES DAILY PRN
Status: DISCONTINUED | OUTPATIENT
Start: 2025-04-07 | End: 2025-04-11

## 2025-04-07 RX ORDER — HYDRALAZINE HYDROCHLORIDE 10 MG/1
10 TABLET, FILM COATED ORAL EVERY 4 HOURS PRN
Status: DISCONTINUED | OUTPATIENT
Start: 2025-04-07 | End: 2025-04-11 | Stop reason: HOSPADM

## 2025-04-07 RX ORDER — CLOPIDOGREL BISULFATE 75 MG/1
75 TABLET ORAL DAILY
Status: DISCONTINUED | OUTPATIENT
Start: 2025-04-07 | End: 2025-04-11 | Stop reason: HOSPADM

## 2025-04-07 RX ORDER — BUPROPION HYDROCHLORIDE 150 MG/1
300 TABLET ORAL EVERY MORNING
Status: DISCONTINUED | OUTPATIENT
Start: 2025-04-07 | End: 2025-04-11 | Stop reason: HOSPADM

## 2025-04-07 RX ORDER — ACETAMINOPHEN 500 MG
500-1000 TABLET ORAL EVERY 6 HOURS PRN
Status: DISCONTINUED | OUTPATIENT
Start: 2025-04-07 | End: 2025-04-11 | Stop reason: HOSPADM

## 2025-04-07 RX ORDER — BUPRENORPHINE AND NALOXONE 4; 1 MG/1; MG/1
1 FILM, SOLUBLE BUCCAL; SUBLINGUAL DAILY
Status: CANCELLED | OUTPATIENT
Start: 2025-04-07

## 2025-04-07 RX ORDER — LORAZEPAM 0.5 MG/1
0.5 TABLET ORAL EVERY 12 HOURS PRN
Status: DISCONTINUED | OUTPATIENT
Start: 2025-04-07 | End: 2025-04-11 | Stop reason: HOSPADM

## 2025-04-07 RX ORDER — ACETAMINOPHEN 650 MG/1
650 SUPPOSITORY RECTAL EVERY 4 HOURS PRN
Status: DISCONTINUED | OUTPATIENT
Start: 2025-04-07 | End: 2025-04-11 | Stop reason: HOSPADM

## 2025-04-07 RX ORDER — CALCIUM CARBONATE 500 MG/1
1000 TABLET, CHEWABLE ORAL 4 TIMES DAILY PRN
Status: DISCONTINUED | OUTPATIENT
Start: 2025-04-07 | End: 2025-04-11 | Stop reason: HOSPADM

## 2025-04-07 RX ORDER — NALOXONE HYDROCHLORIDE 0.4 MG/ML
0.4 INJECTION, SOLUTION INTRAMUSCULAR; INTRAVENOUS; SUBCUTANEOUS
Status: DISCONTINUED | OUTPATIENT
Start: 2025-04-07 | End: 2025-04-11 | Stop reason: HOSPADM

## 2025-04-07 RX ORDER — HYDROMORPHONE HCL IN WATER/PF 6 MG/30 ML
0.2 PATIENT CONTROLLED ANALGESIA SYRINGE INTRAVENOUS
Status: DISCONTINUED | OUTPATIENT
Start: 2025-04-07 | End: 2025-04-11

## 2025-04-07 RX ORDER — AMOXICILLIN 250 MG
1 CAPSULE ORAL 2 TIMES DAILY PRN
Status: DISCONTINUED | OUTPATIENT
Start: 2025-04-07 | End: 2025-04-11 | Stop reason: HOSPADM

## 2025-04-07 RX ORDER — PANTOPRAZOLE SODIUM 40 MG/1
40 TABLET, DELAYED RELEASE ORAL DAILY
Status: DISCONTINUED | OUTPATIENT
Start: 2025-04-07 | End: 2025-04-11 | Stop reason: HOSPADM

## 2025-04-07 RX ORDER — PROCHLORPERAZINE MALEATE 5 MG/1
5 TABLET ORAL EVERY 6 HOURS PRN
Status: DISCONTINUED | OUTPATIENT
Start: 2025-04-07 | End: 2025-04-11 | Stop reason: HOSPADM

## 2025-04-07 RX ORDER — CEFTRIAXONE 2 G/1
2 INJECTION, POWDER, FOR SOLUTION INTRAMUSCULAR; INTRAVENOUS EVERY 24 HOURS
Status: DISCONTINUED | OUTPATIENT
Start: 2025-04-07 | End: 2025-04-11 | Stop reason: HOSPADM

## 2025-04-07 RX ORDER — LIDOCAINE 40 MG/G
CREAM TOPICAL
Status: DISCONTINUED | OUTPATIENT
Start: 2025-04-07 | End: 2025-04-11 | Stop reason: HOSPADM

## 2025-04-07 RX ORDER — FLUTICASONE FUROATE AND VILANTEROL 100; 25 UG/1; UG/1
1 POWDER RESPIRATORY (INHALATION) DAILY
Status: DISCONTINUED | OUTPATIENT
Start: 2025-04-07 | End: 2025-04-11 | Stop reason: HOSPADM

## 2025-04-07 RX ORDER — AMOXICILLIN 250 MG
2 CAPSULE ORAL 2 TIMES DAILY PRN
Status: DISCONTINUED | OUTPATIENT
Start: 2025-04-07 | End: 2025-04-11 | Stop reason: HOSPADM

## 2025-04-07 RX ORDER — FINASTERIDE 5 MG/1
5 TABLET, FILM COATED ORAL DAILY
Status: DISCONTINUED | OUTPATIENT
Start: 2025-04-07 | End: 2025-04-11 | Stop reason: HOSPADM

## 2025-04-07 RX ORDER — ALBUTEROL SULFATE 90 UG/1
2 INHALANT RESPIRATORY (INHALATION) EVERY 6 HOURS
Status: DISCONTINUED | OUTPATIENT
Start: 2025-04-07 | End: 2025-04-11 | Stop reason: HOSPADM

## 2025-04-07 RX ORDER — FUROSEMIDE 10 MG/ML
40 INJECTION INTRAMUSCULAR; INTRAVENOUS 2 TIMES DAILY
Status: DISCONTINUED | OUTPATIENT
Start: 2025-04-07 | End: 2025-04-08

## 2025-04-07 RX ORDER — AMITRIPTYLINE HYDROCHLORIDE 75 MG/1
150 TABLET ORAL AT BEDTIME
Status: DISCONTINUED | OUTPATIENT
Start: 2025-04-07 | End: 2025-04-11 | Stop reason: HOSPADM

## 2025-04-07 RX ORDER — CARVEDILOL 25 MG/1
25 TABLET ORAL 2 TIMES DAILY WITH MEALS
Status: DISCONTINUED | OUTPATIENT
Start: 2025-04-07 | End: 2025-04-10

## 2025-04-07 RX ORDER — PROCHLORPERAZINE 25 MG
25 SUPPOSITORY, RECTAL RECTAL EVERY 12 HOURS PRN
Status: DISCONTINUED | OUTPATIENT
Start: 2025-04-07 | End: 2025-04-11 | Stop reason: HOSPADM

## 2025-04-07 RX ORDER — AMLODIPINE BESYLATE 5 MG/1
5 TABLET ORAL DAILY
Status: DISCONTINUED | OUTPATIENT
Start: 2025-04-07 | End: 2025-04-08

## 2025-04-07 RX ORDER — ACETAMINOPHEN 325 MG/1
650 TABLET ORAL EVERY 4 HOURS PRN
Status: DISCONTINUED | OUTPATIENT
Start: 2025-04-07 | End: 2025-04-07

## 2025-04-07 RX ORDER — ROSUVASTATIN CALCIUM 20 MG/1
20 TABLET, COATED ORAL DAILY
Status: DISCONTINUED | OUTPATIENT
Start: 2025-04-07 | End: 2025-04-11 | Stop reason: HOSPADM

## 2025-04-07 RX ORDER — ASPIRIN 81 MG/1
81 TABLET, CHEWABLE ORAL DAILY
Status: DISCONTINUED | OUTPATIENT
Start: 2025-04-07 | End: 2025-04-11 | Stop reason: HOSPADM

## 2025-04-07 RX ORDER — HYDRALAZINE HYDROCHLORIDE 20 MG/ML
10 INJECTION INTRAMUSCULAR; INTRAVENOUS EVERY 4 HOURS PRN
Status: DISCONTINUED | OUTPATIENT
Start: 2025-04-07 | End: 2025-04-11 | Stop reason: HOSPADM

## 2025-04-07 RX ORDER — ZOLPIDEM TARTRATE 5 MG/1
10 TABLET ORAL
Status: DISCONTINUED | OUTPATIENT
Start: 2025-04-07 | End: 2025-04-08

## 2025-04-07 RX ORDER — BUPRENORPHINE AND NALOXONE 4; 1 MG/1; MG/1
1 FILM, SOLUBLE BUCCAL; SUBLINGUAL 4 TIMES DAILY
Status: DISCONTINUED | OUTPATIENT
Start: 2025-04-07 | End: 2025-04-11 | Stop reason: HOSPADM

## 2025-04-07 RX ORDER — AZITHROMYCIN MONOHYDRATE 500 MG/5ML
500 INJECTION, POWDER, LYOPHILIZED, FOR SOLUTION INTRAVENOUS EVERY 24 HOURS
Status: DISCONTINUED | OUTPATIENT
Start: 2025-04-07 | End: 2025-04-07

## 2025-04-07 RX ORDER — ISOSORBIDE MONONITRATE 60 MG/1
60 TABLET, EXTENDED RELEASE ORAL DAILY
Status: DISCONTINUED | OUTPATIENT
Start: 2025-04-07 | End: 2025-04-11 | Stop reason: HOSPADM

## 2025-04-07 RX ORDER — NALOXONE HYDROCHLORIDE 0.4 MG/ML
0.2 INJECTION, SOLUTION INTRAMUSCULAR; INTRAVENOUS; SUBCUTANEOUS
Status: DISCONTINUED | OUTPATIENT
Start: 2025-04-07 | End: 2025-04-11 | Stop reason: HOSPADM

## 2025-04-07 RX ORDER — BUSPIRONE HYDROCHLORIDE 5 MG/1
5 TABLET ORAL 2 TIMES DAILY PRN
Status: DISCONTINUED | OUTPATIENT
Start: 2025-04-07 | End: 2025-04-07

## 2025-04-07 RX ORDER — GLIPIZIDE 10 MG/1
1 TABLET ORAL 2 TIMES DAILY
Status: DISCONTINUED | OUTPATIENT
Start: 2025-04-07 | End: 2025-04-11 | Stop reason: HOSPADM

## 2025-04-07 RX ORDER — BUSPIRONE HYDROCHLORIDE 5 MG/1
5 TABLET ORAL 2 TIMES DAILY
Status: DISCONTINUED | OUTPATIENT
Start: 2025-04-07 | End: 2025-04-11 | Stop reason: HOSPADM

## 2025-04-07 RX ORDER — ASCORBIC ACID 500 MG
1000 TABLET ORAL DAILY
Status: DISCONTINUED | OUTPATIENT
Start: 2025-04-07 | End: 2025-04-11 | Stop reason: HOSPADM

## 2025-04-07 RX ORDER — HYDROMORPHONE HCL IN WATER/PF 6 MG/30 ML
0.4 PATIENT CONTROLLED ANALGESIA SYRINGE INTRAVENOUS
Status: DISCONTINUED | OUTPATIENT
Start: 2025-04-07 | End: 2025-04-11

## 2025-04-07 RX ADMIN — CEFTRIAXONE SODIUM 2 G: 2 INJECTION, POWDER, FOR SOLUTION INTRAMUSCULAR; INTRAVENOUS at 21:22

## 2025-04-07 RX ADMIN — DEXTRAN 70, GLYCERIN, HYPROMELLOSE 1 DROP: 1; 2; 3 SOLUTION/ DROPS OPHTHALMIC at 18:30

## 2025-04-07 RX ADMIN — FUROSEMIDE 40 MG: 10 INJECTION, SOLUTION INTRAVENOUS at 20:18

## 2025-04-07 RX ADMIN — CARVEDILOL 25 MG: 25 TABLET, FILM COATED ORAL at 18:24

## 2025-04-07 RX ADMIN — CLOPIDOGREL BISULFATE 75 MG: 75 TABLET, FILM COATED ORAL at 12:38

## 2025-04-07 RX ADMIN — ALBUTEROL SULFATE 2 PUFF: 108 INHALANT RESPIRATORY (INHALATION) at 12:42

## 2025-04-07 RX ADMIN — BUSPIRONE HYDROCHLORIDE 5 MG: 5 TABLET ORAL at 12:51

## 2025-04-07 RX ADMIN — Medication 5 MG: at 22:13

## 2025-04-07 RX ADMIN — CARVEDILOL 25 MG: 25 TABLET, FILM COATED ORAL at 13:19

## 2025-04-07 RX ADMIN — ACETAMINOPHEN 500 MG: 500 TABLET, FILM COATED ORAL at 22:13

## 2025-04-07 RX ADMIN — BUSPIRONE HYDROCHLORIDE 5 MG: 5 TABLET ORAL at 21:21

## 2025-04-07 RX ADMIN — AMITRIPTYLINE HYDROCHLORIDE 150 MG: 75 TABLET, FILM COATED ORAL at 21:21

## 2025-04-07 RX ADMIN — ACETAMINOPHEN 650 MG: 325 TABLET ORAL at 10:47

## 2025-04-07 RX ADMIN — BUPRENORPHINE AND NALOXONE 0.25 FILM: 4; 1 FILM BUCCAL; SUBLINGUAL at 12:48

## 2025-04-07 RX ADMIN — LORAZEPAM 0.5 MG: 0.5 TABLET ORAL at 13:19

## 2025-04-07 RX ADMIN — ROSUVASTATIN CALCIUM 20 MG: 20 TABLET, FILM COATED ORAL at 12:38

## 2025-04-07 RX ADMIN — FLUTICASONE FUROATE AND VILANTEROL TRIFENATATE 1 PUFF: 100; 25 POWDER RESPIRATORY (INHALATION) at 12:40

## 2025-04-07 RX ADMIN — DEXTRAN 70, GLYCERIN, HYPROMELLOSE 1 DROP: 1; 2; 3 SOLUTION/ DROPS OPHTHALMIC at 20:18

## 2025-04-07 RX ADMIN — PANTOPRAZOLE SODIUM 40 MG: 40 TABLET, DELAYED RELEASE ORAL at 12:37

## 2025-04-07 RX ADMIN — DEXTRAN 70, GLYCERIN, HYPROMELLOSE 1 DROP: 1; 2; 3 SOLUTION/ DROPS OPHTHALMIC at 12:33

## 2025-04-07 RX ADMIN — OXYCODONE HYDROCHLORIDE AND ACETAMINOPHEN 1000 MG: 500 TABLET ORAL at 12:37

## 2025-04-07 RX ADMIN — HYDROMORPHONE HYDROCHLORIDE 0.2 MG: 0.2 INJECTION, SOLUTION INTRAMUSCULAR; INTRAVENOUS; SUBCUTANEOUS at 00:32

## 2025-04-07 RX ADMIN — ASPIRIN 81 MG CHEWABLE TABLET 81 MG: 81 TABLET CHEWABLE at 12:38

## 2025-04-07 RX ADMIN — ACETAMINOPHEN 650 MG: 325 TABLET ORAL at 02:17

## 2025-04-07 RX ADMIN — AZITHROMYCIN MONOHYDRATE 250 MG: 500 INJECTION, POWDER, LYOPHILIZED, FOR SOLUTION INTRAVENOUS at 22:13

## 2025-04-07 RX ADMIN — ISOSORBIDE MONONITRATE 60 MG: 60 TABLET, EXTENDED RELEASE ORAL at 12:33

## 2025-04-07 RX ADMIN — BUPROPION HYDROCHLORIDE 300 MG: 150 TABLET, EXTENDED RELEASE ORAL at 12:49

## 2025-04-07 RX ADMIN — CALCIUM CARBONATE (ANTACID) CHEW TAB 500 MG 1000 MG: 500 CHEW TAB at 21:22

## 2025-04-07 RX ADMIN — FUROSEMIDE 40 MG: 10 INJECTION, SOLUTION INTRAVENOUS at 08:43

## 2025-04-07 RX ADMIN — BACLOFEN 5 MG: 5 TABLET ORAL at 12:11

## 2025-04-07 RX ADMIN — ALBUTEROL SULFATE 2 PUFF: 108 INHALANT RESPIRATORY (INHALATION) at 22:14

## 2025-04-07 RX ADMIN — BUPRENORPHINE AND NALOXONE 0.25 FILM: 4; 1 FILM BUCCAL; SUBLINGUAL at 18:26

## 2025-04-07 ASSESSMENT — ACTIVITIES OF DAILY LIVING (ADL)
ADLS_ACUITY_SCORE: 33
DEPENDENT_IADLS:: INDEPENDENT
ADLS_ACUITY_SCORE: 33
ADLS_ACUITY_SCORE: 33
ADLS_ACUITY_SCORE: 34
ADLS_ACUITY_SCORE: 32
PREVIOUS_RESPONSIBILITIES: MEAL PREP;HOUSEKEEPING;LAUNDRY;MEDICATION MANAGEMENT;FINANCES;DRIVING
ADLS_ACUITY_SCORE: 34
ADLS_ACUITY_SCORE: 32
ADLS_ACUITY_SCORE: 34
ADLS_ACUITY_SCORE: 33
ADLS_ACUITY_SCORE: 33
ADLS_ACUITY_SCORE: 34
ADLS_ACUITY_SCORE: 33
ADLS_ACUITY_SCORE: 34
ADLS_ACUITY_SCORE: 33
ADLS_ACUITY_SCORE: 32
ADLS_ACUITY_SCORE: 32
ADLS_ACUITY_SCORE: 33
IADL_COMMENTS: PT REPORTS BEING IND W/ ALL IADL'S AT BASELINE PRIOR TO ADMISSION. PT REPORTS THAT THEY STILL DRIVE.
ADLS_ACUITY_SCORE: 33
ADLS_ACUITY_SCORE: 34
ADLS_ACUITY_SCORE: 33

## 2025-04-07 NOTE — ED NOTES
"[facility]    ED Nurse Handoff Report    ED Chief complaint: Shortness of Breath      ED Diagnosis:   Final diagnoses:   Pneumonia of upper lobe due to infectious organism, unspecified laterality - Atypical pneumonia bilateral upper lobes   Acute decompensated heart failure (H)   Elevated troponin   Chronic renal failure, unspecified CKD stage   Enlarged thyroid       Code Status: Full Code    Allergies: No Known Allergies    Patient Story:    Pt arrives via triage with c/o SOB for the past 2 weeks, pt states he just \"couldn't take it anymore\". Pt was diagnosed with CHF but told his PCP \"bullshit. Pt reports taking a diuretic 99% of the time    Focused Assessment:        Labs Ordered and Resulted from Time of ED Arrival to Time of ED Departure   TROPONIN T, HIGH SENSITIVITY - Abnormal       Result Value    Troponin T, High Sensitivity 84 (*)    COMPREHENSIVE METABOLIC PANEL - Abnormal    Sodium 135      Potassium 5.0      Carbon Dioxide (CO2) 26      Anion Gap 10      Urea Nitrogen 27.0 (*)     Creatinine 3.03 (*)     GFR Estimate 23 (*)     Calcium 9.2      Chloride 99      Glucose 111 (*)     Alkaline Phosphatase 104      AST 51 (*)     ALT 65      Protein Total 7.0      Albumin 4.0      Bilirubin Total 0.7     CBC WITH PLATELETS AND DIFFERENTIAL - Abnormal    WBC Count 10.0      RBC Count 6.06 (*)     Hemoglobin 11.4 (*)     Hematocrit 41.5      MCV 69 (*)     MCH 18.8 (*)     MCHC 27.5 (*)     RDW 21.1 (*)     Platelet Count 371      % Neutrophils 76      % Lymphocytes 11      % Monocytes 11      % Eosinophils 2      % Basophils 1      % Immature Granulocytes 0      NRBCs per 100 WBC 0      Absolute Neutrophils 7.6      Absolute Lymphocytes 1.1      Absolute Monocytes 1.1      Absolute Eosinophils 0.2      Absolute Basophils 0.1      Absolute Immature Granulocytes 0.0      Absolute NRBCs 0.0     NT PROBNP INPATIENT - Abnormal    N terminal Pro BNP Inpatient 5,749 (*)    TROPONIN T, HIGH " SENSITIVITY - Abnormal    Troponin T, High Sensitivity 81 (*)    INFLUENZA A/B, RSV AND SARS-COV2 PCR - Normal    Influenza A PCR Negative      Influenza B PCR Negative      RSV PCR Negative      SARS CoV2 PCR Negative     LIPASE - Normal    Lipase 29     D DIMER QUANTITATIVE - Normal    D-Dimer Quantitative 0.50     LACTIC ACID WHOLE BLOOD WITH 1X REPEAT IN 2 HR WHEN >2 - Normal    Lactic Acid, Initial 1.0     PROCALCITONIN - Normal    Procalcitonin 0.23     BLOOD CULTURE   BLOOD CULTURE       CT Chest Abdomen Pelvis w/o Contrast   Final Result   IMPRESSION:   1.  Patchy groundglass foci within both upper lobes, right worse than left, most suggestive of atypical pneumonia.   2.  Small bilateral pleural effusions, right larger than left.   3.  Cardiomegaly.   4.  Stable postoperative appearance of left kidney.   5.  Trace volume ascites.   6.  Prominent enlargement of left lobe of thyroid. Benign goiter favored though this would be amenable to evaluation with ultrasound.         Chest XR,  PA & LAT   Final Result   IMPRESSION: Stable enlargement of the cardiac silhouette. Coronary stent is again noted. Likely pulmonary vascular congestion and trace interstitial edema. Small bilateral pleural effusions are also noted. No dewayne airspace consolidation or pneumothorax.    Bones are unchanged.            Treatments and/or interventions provided:      Medications   azithromycin (ZITHROMAX) 500 mg vial to attach to  mL bag (500 mg Intravenous $New Bag 4/6/25 2220)   Baclofen (LIORESAL) tablet 5 mg (has no administration in time range)   furosemide (LASIX) injection 60 mg (60 mg Intravenous $Given 4/6/25 1710)   pantoprazole (PROTONIX) IV push injection 80 mg (80 mg Intravenous $Given 4/6/25 1955)   alum & mag hydroxide-simethicone (MAALOX) suspension 15 mL (15 mLs Oral $Given 4/6/25 1956)   cefTRIAXone (ROCEPHIN) 2 g vial to attach to  ml bag for ADULTS or NS 50 ml bag for PEDS (0 g Intravenous Stopped 4/6/25  2675)       Patient's response to treatments and/or interventions:        To be done/followed up on inpatient unit:   See any in-patient orders    Does this patient have any cognitive concerns?:       Activity level - Baseline/Home:    Stand with Assist    Activity Level - Current:    Stand with Assist    Patient's Preferred language: English     Needed?: No    Isolation: None  Infection: Not Applicable  Patient tested for COVID 19 prior to admission: YES    Bariatric?: Yes    Vital Signs:   Vitals:    04/06/25 1700 04/06/25 1745 04/06/25 2004 04/06/25 2014   BP:  (!) 148/99 (!) 147/99    Pulse: 80 78  78   Resp: 18 20  10   Temp:       TempSrc:       SpO2: 94% 95%  94%   Weight:       Height:           Cardiac Rhythm:     Was the PSS-3 completed:   Yes  What interventions are required if any?               Family Comments:   OBS brochure/video discussed/provided to patient/family: N/A              Name of person given brochure if not patient:               Relationship to patient:     For the majority of the shift this patient's behavior was Green.  Behavioral interventions performed were .    ED NURSE PHONE NUMBER: [id number]

## 2025-04-07 NOTE — PROGRESS NOTES
"[facility]    Medicine Progress Note - Hospitalist Service    Date of Admission:  4/6/2025  Interval History   Patient seen on rounding and complaining of a \"panic attack\" had gone to the [place]. Reports her feels very anxious. Reported that he had not slept in a week.  Per admit had shortness of breath for a week prior to admission.   Seen by cardiology with diuresis.   After rounding the patient had mentioned to his nurse that he wanted to see psychiatry to get something to \" knock me out\"   Recent office visit in February suggesting the patient should see a psychiatrist.  Discussion of potential untreated bipolar disorder mentioned in assessment.  Patient presented to the emergency room with shortness of breath.  He denied any infectious symptoms of fevers chills or cough.  No white count.  Started on antibiotics for community-acquired pneumonia with CT showing patchy groundglass opacities right greater than left consistent of atypical pneumonia.  Small bilateral effusions.       Assessment & Plan   Jeremi Damon is a 54 year old male with a past medical history of cad s/p leeann, htn, hypertrophic cardiomyopathy, combined systolic and diastolic heart failure, CKD stage IV, mild persistent asthma, chronic back pain, depression, bipolar 2 disorder, ADHD, sleep apnea presents to hospital with shortness of breath.    CAD s/p LEEANN  Hypertrophic cardiomyopathy without LVOT  Combined systolic and diastolic heart failure  Ef 45% in 2023  (Meds prescribed prior to admission Demadex 10 mg as needed, Imdur 60 mg, hydralazine 50 mg twice daily, Coreg 25 mg twice daily, Norvasc 5 mg) and Proscar for BPH  Patient presents with worsening shortness of breath reports orthopnea, exertional dyspnea, bilateral lower extremity swelling.    4/6 presentation to the ER with increasing lower extremity edema.    Chest x-ray showing likely pulmonary vascular congestion and trace interstitial edema with small bilateral " I have contacted pt. She would like us to mail the order to her. The address we have on file is not correct. She is going to call us back with the correct address. She couldn't remember it when I had her on the phone. Suad Guan CMA ([facility])     effusions.  CT chest with small bilateral pleural effusions, patchy groundglass opacities right upper lobe consistent with atypical pneumonia and left upper lobe few patchy groundglass foci  Reports in the ER potential noncompliance with his diuretic.  Does not check his weight.  On admission he was noted to have increasing bilateral lower extremity edema  On admission appeared to be fluid or volume overload.  4/7 cardiology consult.  Suspicious more for CHF exacerbation then pneumonia.  4/7 Lasix dose 40 mg IV twice daily  He has not seen his cardiologist in approximately 2 years  Strict I and output  Daily weight   4/7 echo ordered and pending.  Previous echo for/20 1/2023 showing an EF 40-45% with severe concentric LVH, no wall motion abnormality.  Ascending aorta 4  -Lasix 40 IV twice daily  4/7 resumed on Coreg 25 mg twice daily, Imdur 60 mg and on IV Lasix rather than Demadex oral (Readd  hydralazine and/or Norvasc) but await ECHO and use optimal cardiomyopathy meds    History of CAD   Coronary angiogram 11/2022 one-vessel obstructive coronary artery disease PCI of mid to distal LAD with FERNANDA x 3  Troponin 81 >> 84  ECHO pending   Further per cardiology     Hyperlipidemia  PTA rosuvastatin 20 mg daily    HTN  PTA meds as above but ?? Compliance   Patient on multiple medications reported prior to admission but would question compliancy to some extent  Resumed on Coreg 25 mg twice daily  Imdur 60 mg daily      ??Community Acquired Pneumonia: clinically more likely CHF   CT chest with groundglass opacities right worse than left (?  Atypical pneumonia however appears clinically to have fluid overload )  Patient presenting with shortness of breath for 2 weeks found to have groundglass opacities in the upper lobes.    No infectious symptoms such as fevers, chills, cough. 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  Avoid nephrotoxic medications  4/7 creatinine 3.07  Monitor daily with BMP with diuretic      Thyroid goiter  Prominent left lobe of thyroid seen on ct  Follow up US thyroid     Mild persistent asthma  Lungs are clear on auscultation.     MDD  Bipolar 2 disorder  ADHD  Patient has multiple medications currently for his mood disorder (NOS)   Patient does have a history of anxiety.  He is currently on Wellbutrin 300 XL daily and BuSpar.  2/13/2025 discussion of potential untreated bipolar disorder including mood instability recommending a psychiatric consult.  He was referred to psychiatry.  Complex history with opioid/pain management   4/7 will get psychiatry consult this admission.  Currently taking Adderall 20 mg twice a day  Wellbutrin 300 mg XL daily  BuSpar 5 mg 2 times daily as needed  Klonopin 1 mg p.o. twice daily for flying  Ativan 1 mg twice a day as needed for anxiety (60 tablets) last refill 3/18  Ambien 10 mg as needed for sleep    Pain clinic  Follows outpatient on suboxone 8-2 1/2 film QID ( confirmed with pharmacy)      Sleep apnea  Does not use CPAP         Diet: Fluid restriction 2000 ML FLUID (and additional linked orders)  Combination Diet Regular Diet Adult    DVT Prophylaxis: Pneumatic Compression Devices  Yee Catheter: Not present  Lines: None     Cardiac Monitoring: ACTIVE order. Indication: Acute decompensated heart failure (48 hours)  Code Status: Full Code      Clinically Significant Risk Factors Present on Admission          # Hypochloremia: Lowest Cl = 96 mmol/L in last 2 days, will monitor as appropriate        # Drug Induced Platelet Defect: home medication list includes an antiplatelet medication   # Hypertension: Noted on problem list           # Obesity:  "Estimated body mass index is 33.89 kg/m  as calculated from the following:    Height as of this encounter: 1.803 m (5' 11\").    Weight as of this encounter: 110.2 kg (243 lb).       # Asthma: noted on problem list        Social Drivers of Health    Depression: At risk (2/13/2025)    PHQ-2     PHQ-2 Score: 5   Physical Activity: Unknown (12/31/2024)    Exercise Vital Sign     Days of Exercise per Week: 2 days   Stress: Stress Concern Present (12/31/2024)    [employer] [place] [employer] - Occupational Stress Questionnaire     Feeling of Stress : Very much   Social Connections: Unknown (12/31/2024)    Social Connection and Isolation Panel [NHANES]     Frequency of Social Gatherings with Friends and Family: Once a week          Disposition Plan     Medically Ready for Discharge: Anticipated in 2-4 Days       CELINE RODRIGUEZ MD  Hospitalist Service  [facility]  Securely message with [employer] (more info)  Text page via [employer] Paging/Directory   ______________________________________________________________________      Physical Exam   Vital Signs: Temp: 98  F (36.7  C) Temp src: Oral BP: 113/79 Pulse: 65   Resp: 18 SpO2: 95 % O2 Device: None (Room air)    Weight: 243 lbs 0 oz    General Appearance: Patient was seen walking back from the bathroom and then reporting he was having a panic attack  Respiratory: Breath sounds distant but relatively clear to auscultation bilaterally  Cardiovascular: Regular rate and rhythm without gallop rub normal S1-S2  GI: Positive bowel sounds soft rebound guarding tender  Skin: 2+ edema bilaterally into his feet      Medical Decision Making       50 MINUTES SPENT BY ME on the date of service doing chart review, history, exam, documentation & further activities per the note.    Review of chart, meds, labs, imaging.   Prior evaluations in clinic. Discussion with psychaitry and nursing   Data     I have personally reviewed the following data over the past 24 " hrs:    9.9  \   11.3 (L)   / 364     138 100 29.3 (H) /  81   5.2 26 3.07 (H) \     ALT: N/A AST: N/A AP: N/A TBILI: N/A   ALB: N/A TOT PROTEIN: N/A LIPASE: N/A     Trop: 81 (H) BNP: N/A     Procal: 0.23 CRP: N/A Lactic Acid: 1.0       INR:  N/A PTT:  N/A   D-dimer:  N/A Fibrinogen:  N/A

## 2025-04-07 NOTE — CONSULTS
"      Initial Psychiatric Consult   Consult date: April 7, 2025         Reason for Consult, requesting source:    Reason for consult anxiety  Requesting source: Kyle Mccarty Md    Labs and imaging reviewed.     Total time spent in chart review, patient interview and coordination of care; 60 min        HPI:     Jeremi Damon is a 54 year old male with a past medical history of cad s/p leeann, htn, hypertrophic cardiomyopathy, combined systolic and diastolic heart failure, CKD stage IV, mild persistent asthma, chronic back pain, depression, bipolar 2 disorder, ADHD, sleep apnea presents to hospital with shortness of breath.     Patient reports he has not slept in 2 weeks every time he lays down  he\" fades\".  He reports his vitals jumps.  And this causes him to have anxiety.  He reports has been feeling this for 8 months.  He reports that he grew up in a family that made him feel that he has to be perfect.  He has excessive anxiety not able to stop worrying.  This worry impacts his sleep concentration makes him irritable.  He also gets anxiety attacks where he feels sweaty and hot.  He denies any suicidal ideation plan or intent.  He denies any auditory visual hallucinations.  He reports he feels depressed hopeless helpless lack of energy lack of motivation lack of concentration and poor appetite.  He was started on Elavil by his primary care to help with his insomnia  .  He also is on Wellbutrin 300 mg.  He is on Suboxone.  Patient is also on Ambien 10 mg Ativan 0.5 mg twice a day as needed.  He is prescribed BuSpar 5 mg twice a day as needed            Past Psychiatric History:   History of using anabolic steroids in the past.  He was also in therapy.  Patient has tried Prozac Zoloft Paxil Wellbutrin and Celexa BuSpar.          Substance Use and History:   Patient reports she takes Suboxone because he was on pain pills and he thought that was better.  There is a history of him seeing an addiction medicine " provider Dr. Packer  Patient denies using any alcohol  Social History     Tobacco Use    Smoking status: Never    Smokeless tobacco: Never   Substance Use Topics    Alcohol use: Yes     Comment: rarely            Past Medical History:   PAST MEDICAL HISTORY:   Past Medical History:   Diagnosis Date    ADHD (attention deficit hyperactivity disorder)     Allergic rhinitis, cause unspecified     Allergic rhinitis    Benign hypertension     CAD (coronary artery disease)     cardiac cath 11/23/2022: FERNANDA x3 to LAD    Chronic back pain     Hiccough     Hypersomnia with sleep apnea, unspecified     Hypertensive emergency 11/19/2022    Major depressive disorder, single episode, moderate (H) 03/2008    Mild persistent asthma     NSTEMI (non-ST elevated myocardial infarction) (H) 11/29/2020    Other primary cardiomyopathies     Cardiomyopathy -- unknown etiology       PAST SURGICAL HISTORY:   Past Surgical History:   Procedure Laterality Date    APPENDECTOMY  2007    BACK SURGERY  2014    L5-S1 fusion    COLONOSCOPY N/A 11/18/2024    Procedure: Colonoscopy;  Surgeon: Randal Pennington MD;  Location:  GI    CV CORONARY ANGIOGRAM N/A 11/30/2020    Procedure: Heart Catheterization with Possible Intervention;  Surgeon: hTeo Donahue MD;  Location:  HEART CARDIAC CATH LAB    CV CORONARY ANGIOGRAM N/A 11/23/2022    Procedure: Coronary Angiogram;  Surgeon: Venkata Hdez MD;  Location: [facility] CARDIAC CATH LAB    CV PCI N/A 11/23/2022    Procedure: Percutaneous Coronary Intervention;  Surgeon: Venkata Hdez MD;  Location:  HEART CARDIAC CATH LAB    DAVINCI NEPHRECTOMY PARTIAL Left 01/08/2024    Procedure: ROBOTIC ASSISTED LAPAROSCOPIC PARTIAL NEPHRECTOMY - LEFT,  ROBOTIC ASSISTED LAPAROSCOPIC INTRA-OPERATIVE RENAL ULTRASOUND;  Surgeon: Chung Howard MD;  Location:  OR    ESOPHAGOSCOPY, GASTROSCOPY, DUODENOSCOPY (EGD), COMBINED N/A 11/18/2024    Procedure: Esophagoscopy, gastroscopy,  duodenoscopy (EGD), combined;  Surgeon: Randal Pennington MD;  Location:  GI    LAPAROSCOPIC CHOLECYSTECTOMY  2005    Cholecystectomy, Laparoscopic    NOSE SURGERY      Bilateral radiofrequency volume reduction of the inferior turbinates.    RHINOPLASTY  2012    rhinoplasty- septoplasty    SURGICAL HISTORY OF -       torn pectoral muscle             Family History:   FAMILY HISTORY:   Family History   Problem Relation Age of Onset    Coronary Artery Disease Mother          55; MI x 2    Cancer Father         hodgkins    Hypertension Father     Coronary Artery Disease Father     Cardiovascular Sister     Hypertension Brother     Cardiovascular Maternal Grandmother     No Known Problems Daughter     Prostate Cancer No family hx of     Cancer - colorectal No family hx of     Cardiomyopathy No family hx of     Valvular heart disease No family hx of            Social History:       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           Medications:     Current Facility-Administered Medications   Medication Dose Route Frequency Provider Last Rate Last Admin    albuterol (PROVENTIL HFA/VENTOLIN HFA) inhaler  2 puff Inhalation Q6H Awa Nevarez MD   2 puff at 25 1242    amitriptyline (ELAVIL) tablet 150 mg  150 mg Oral At Bedtime Awa Nevarez MD        [Held by provider] amLODIPine (NORVASC) tablet 5 mg  5 mg Oral Daily Awa Nevarez MD        artificial tears (GENTEAL) 0.1-0.2-0.3 % ophthalmic solution 1 drop  1 drop Both Eyes BID Awa Nevarez MD   1 drop at 25 1233    aspirin (ASA) chewable tablet 81 mg  81 mg Oral Daily Awa Nevarez MD   81 mg at 25 1238    azithromycin (ZITHROMAX) 250 mg in sodium chloride 0.9 % 250 mL intermittent infusion  250 mg Intravenous Q24H Klye Mccarty MD        buprenorphine HCl-naloxone HCl (SUBOXONE) 4-1 MG  per film 1 Film  1 Film Sublingual 4x Daily Awa Nevarez MD   0.25 Film at 04/07/25 1248    buPROPion (WELLBUTRIN XL) 24 hr tablet 300 mg  300 mg Oral QAM Awa Nevarez MD   300 mg at 04/07/25 1249    carvedilol (COREG) tablet 25 mg  25 mg Oral BID w/meals Awa Nevarez MD   25 mg at 04/07/25 1319    cefTRIAXone (ROCEPHIN) 2 g vial to attach to  ml bag for ADULTS or NS 50 ml bag for PEDS  2 g Intravenous Q24H Kyle Mccarty MD        clopidogrel (PLAVIX) tablet 75 mg  75 mg Oral Daily Awa Nevarez MD   75 mg at 04/07/25 1238    [Held by provider] finasteride (PROSCAR) tablet 5 mg  5 mg Oral Daily Awa Nevarez MD        fluticasone-vilanterol (BREO ELLIPTA) 100-25 MCG/ACT inhaler 1 puff  1 puff Inhalation Daily Awa Nevarez MD   1 puff at 04/07/25 1240    furosemide (LASIX) injection 40 mg  40 mg Intravenous BID Kyle Mccarty MD   40 mg at 04/07/25 0843    isosorbide mononitrate (IMDUR) 24 hr tablet 60 mg  60 mg Oral Daily Awa Nevarez MD   60 mg at 04/07/25 1233    pantoprazole (PROTONIX) EC tablet 40 mg  40 mg Oral Daily Awa Nevarez MD   40 mg at 04/07/25 1237    rosuvastatin (CRESTOR) tablet 20 mg  20 mg Oral Daily Awa Nevarez MD   20 mg at 04/07/25 1238    sodium chloride (PF) 0.9% PF flush 3 mL  3 mL Intracatheter Q8H Kyle Mccarty MD   3 mL at 04/07/25 0843    vitamin C (ASCORBIC ACID) tablet 1,000 mg  1,000 mg Oral Daily Awa Nevarez MD   1,000 mg at 04/07/25 1237              Allergies:   No Known Allergies       Labs:     Recent Results (from the past 48 hours)   EKG 12 lead    Collection Time: 04/06/25  4:31 PM   Result Value Ref Range    Systolic Blood Pressure  mmHg    Diastolic Blood Pressure  mmHg    Ventricular Rate 81 BPM    Atrial Rate 81 BPM    VA Interval 280 ms    QRS Duration 184 ms     ms    QTc 520 ms    P Axis 4 degrees    R AXIS 156 degrees    T Axis -18 degrees    Interpretation ECG       Sinus  rhythm with 1st degree A-V block  Left atrial enlargement  Non-specific intra-ventricular conduction block  Right ventricular hypertrophy  Cannot rule out Inferior infarct (cited on or before 09-Jan-2024)  Anterolateral infarct (cited on or before 29-Nov-2020)  Abnormal ECG  When compared with ECG of 09-Jan-2024 10:45,  QT has lengthened  Confirmed by GENERATED REPORT, COMPUTER ([id number]),  Allen Zuniga ([id number]) on 4/6/2025 4:35:07 PM     Influenza A/B, RSV and SARS-CoV2 PCR (COVID-19) Nasopharyngeal    Collection Time: 04/06/25  4:38 PM    Specimen: Nasopharyngeal; Swab   Result Value Ref Range    Influenza A PCR Negative Negative    Influenza B PCR Negative Negative    RSV PCR Negative Negative    SARS CoV2 PCR Negative Negative   Troponin T, High Sensitivity    Collection Time: 04/06/25  4:42 PM   Result Value Ref Range    Troponin T, High Sensitivity 84 (H) <=22 ng/L   Comprehensive metabolic panel    Collection Time: 04/06/25  4:42 PM   Result Value Ref Range    Sodium 135 135 - 145 mmol/L    Potassium 5.0 3.4 - 5.3 mmol/L    Carbon Dioxide (CO2) 26 22 - 29 mmol/L    Anion Gap 10 7 - 15 mmol/L    Urea Nitrogen 27.0 (H) 6.0 - 20.0 mg/dL    Creatinine 3.03 (H) 0.67 - 1.17 mg/dL    GFR Estimate 23 (L) >60 mL/min/1.73m2    Calcium 9.2 8.8 - 10.4 mg/dL    Chloride 99 98 - 107 mmol/L    Glucose 111 (H) 70 - 99 mg/dL    Alkaline Phosphatase 104 40 - 150 U/L    AST 51 (H) 0 - 45 U/L    ALT 65 0 - 70 U/L    Protein Total 7.0 6.4 - 8.3 g/dL    Albumin 4.0 3.5 - 5.2 g/dL    Bilirubin Total 0.7 <=1.2 mg/dL   CBC with platelets and differential    Collection Time: 04/06/25  4:42 PM   Result Value Ref Range    WBC Count 10.0 4.0 - 11.0 10e3/uL    RBC Count 6.06 (H) 4.40 - 5.90 10e6/uL    Hemoglobin 11.4 (L) 13.3 - 17.7 g/dL    Hematocrit 41.5 40.0 - 53.0 %    MCV 69 (L) 78 - 100 fL    MCH 18.8 (L) 26.5 - 33.0 pg    MCHC 27.5 (L) 31.5 - 36.5 g/dL    RDW 21.1 (H) 10.0 - 15.0 %    Platelet Count 371 150 - 450 10e3/uL    %  Neutrophils 76 %    % Lymphocytes 11 %    % Monocytes 11 %    % Eosinophils 2 %    % Basophils 1 %    % Immature Granulocytes 0 %    NRBCs per 100 WBC 0 <1 /100    Absolute Neutrophils 7.6 1.6 - 8.3 10e3/uL    Absolute Lymphocytes 1.1 0.8 - 5.3 10e3/uL    Absolute Monocytes 1.1 0.0 - 1.3 10e3/uL    Absolute Eosinophils 0.2 0.0 - 0.7 10e3/uL    Absolute Basophils 0.1 0.0 - 0.2 10e3/uL    Absolute Immature Granulocytes 0.0 <=0.4 10e3/uL    Absolute NRBCs 0.0 10e3/uL   Extra Blue Top Tube    Collection Time: 04/06/25  4:42 PM   Result Value Ref Range    Hold Specimen JIC    Lipase    Collection Time: 04/06/25  4:42 PM   Result Value Ref Range    Lipase 29 13 - 60 U/L   D dimer quantitative    Collection Time: 04/06/25  4:42 PM   Result Value Ref Range    D-Dimer Quantitative 0.50 0.00 - 0.50 ug/mL FEU   BNP    Collection Time: 04/06/25  4:42 PM   Result Value Ref Range    N terminal Pro BNP Inpatient 5,749 (H) 0 - 900 pg/mL   Troponin T, High Sensitivity    Collection Time: 04/06/25  6:57 PM   Result Value Ref Range    Troponin T, High Sensitivity 81 (H) <=22 ng/L   Procalcitonin    Collection Time: 04/06/25  6:57 PM   Result Value Ref Range    Procalcitonin 0.23 <0.50 ng/mL   Basic metabolic panel    Collection Time: 04/06/25  6:57 PM   Result Value Ref Range    Sodium 137 135 - 145 mmol/L    Potassium 5.1 3.4 - 5.3 mmol/L    Chloride 96 (L) 98 - 107 mmol/L    Carbon Dioxide (CO2) 26 22 - 29 mmol/L    Anion Gap 15 7 - 15 mmol/L    Urea Nitrogen 27.1 (H) 6.0 - 20.0 mg/dL    Creatinine 3.22 (H) 0.67 - 1.17 mg/dL    GFR Estimate 22 (L) >60 mL/min/1.73m2    Calcium 9.1 8.8 - 10.4 mg/dL    Glucose 96 70 - 99 mg/dL   Blood Culture Peripheral Blood    Collection Time: 04/06/25  9:17 PM    Specimen: Peripheral Blood   Result Value Ref Range    Culture No growth after 12 hours    Blood Culture Peripheral Blood    Collection Time: 04/06/25  9:17 PM    Specimen: Peripheral Blood   Result Value Ref Range    Culture No growth  "after 12 hours    Lactic acid whole blood with 1x repeat in 2 hr when >2    Collection Time: 04/06/25  9:17 PM   Result Value Ref Range    Lactic Acid, Initial 1.0 0.7 - 2.0 mmol/L   Basic metabolic panel    Collection Time: 04/07/25  6:14 AM   Result Value Ref Range    Sodium 138 135 - 145 mmol/L    Potassium 5.2 3.4 - 5.3 mmol/L    Chloride 100 98 - 107 mmol/L    Carbon Dioxide (CO2) 26 22 - 29 mmol/L    Anion Gap 12 7 - 15 mmol/L    Urea Nitrogen 29.3 (H) 6.0 - 20.0 mg/dL    Creatinine 3.07 (H) 0.67 - 1.17 mg/dL    GFR Estimate 23 (L) >60 mL/min/1.73m2    Calcium 8.9 8.8 - 10.4 mg/dL    Glucose 81 70 - 99 mg/dL   CBC with platelets    Collection Time: 04/07/25  6:14 AM   Result Value Ref Range    WBC Count 9.9 4.0 - 11.0 10e3/uL    RBC Count 6.02 (H) 4.40 - 5.90 10e6/uL    Hemoglobin 11.3 (L) 13.3 - 17.7 g/dL    Hematocrit 41.5 40.0 - 53.0 %    MCV 69 (L) 78 - 100 fL    MCH 18.8 (L) 26.5 - 33.0 pg    MCHC 27.2 (L) 31.5 - 36.5 g/dL    RDW 20.9 (H) 10.0 - 15.0 %    Platelet Count 364 150 - 450 10e3/uL          Physical and Psychiatric Examination:     BP (!) 159/103 (BP Location: Left arm)   Pulse 81   Temp 98  F (36.7  C) (Oral)   Resp 18   Ht 1.803 m (5' 11\")   Wt 110.2 kg (243 lb)   SpO2 95%   BMI 33.89 kg/m    Weight is 243 lbs 0 oz  Body mass index is 33.89 kg/m .    Physical Exam:  I have reviewed the physical exam as documented by by the medical team and agree with findings and assessment and have no additional findings to add at this time.         MSE:   Appearance: awake, alert and adequately groomed  Attitude:  cooperative  Eye Contact:  good  Mood:  \"fair\"  Affect:  mood congruent and slightly restricted  Speech:  clear, coherent  Psychomotor Behavior:  no evidence of tardive dyskinesia, dystonia, or tics and intact station, gait and muscle tone  Muscle strength and tone: intact   Thought Process:  logical  Associations:  no loose associations  Thought Content:  no evidence of suicidal ideation or " "homicidal ideation, no evidence of psychotic thought, no auditory hallucinations present, and no visual hallucinations present  Insight:  good  Judgement:  intact  Oriented to:  time, person, and place  Attention Span and Concentration:  intact  Recent and Remote Memory:  intact      QTc:520  Corrected 483 as per rubio formula          DSM-5 Diagnosis:   NANCY  Depression NOS         Assessment:   Patient has chronic anxiety.  He has chronic depression but he has no suicide ideation plan or intent          Summary of Recommendations:     Continue present medications   Wellbutrin 300 mg p.o. daily  We will schedule BuSpar 5 mg twice daily  Patient can utilize both doses of Ativan is only been utilizing 1 dose  Patient is on a ELAVIL 150 mg-patient plans to see a psychiatrist on 23rd and talk about tapering off this medication and trying something else patient is medically stable    Patient can continue rest of his medications including Suboxone.    I spoke with patient's cardiologist EKG will be repeated tomorrow however,Corrected QTc is 483.  Can be discharged from a psychiatric standpoint after medical stabilization.  Psychiatry will sign off  \"This dictation was performed with voice recognition software and may contain errors,  omissions and inadvertent word substitution.\"          "

## 2025-04-07 NOTE — CONSULTS
[facility]    Cardiology Consultation     Date of Admission:  4/6/2025    Assessment & Plan   Jeremi Damon is a 54 year old male who was admitted on 4/6/2025 with shortness of breath. Patient with a past medical history of stage IV CKD, multivessel CAD, hyperlipidemia, mild cardiomyopathy with chronic systolic heart failure, and hypertension.     Primary cardiologist: Dr. Keita      Shortness of breath, likely secondary to #2 and #7  -In the setting of diuretic non-compliance and concurrent pneumonia   -Appears to be volume overloaded ~3-4 lbs, per weights, however symptomatically more significant     2. HFmrEF with current exacerbation   - LVEF: 45-50% (4/2023)  - NYHA class C, stage III  - Etiology: ischemic  - Fluid status: hypervolemic; suspected dry weight: ~240#  - Diuretic regimen: Torsemide 10mg daily   - Ischemic evaluation: Cor angio in 2022 as outlined in #3  - Guideline directed medical therapy:  - Beta blocker: PTA Carvedilol 25mg BID  - ACEI/ARB/ARNI: none 2/2 #6   - Aldactone antagonist: none 2/2 #6   - SGLT2 inhibitor: none 2/2 #6     3.  Multivessel coronary artery disease s/p PCI of LAD with FERNANDA x 3 (2022)  -PTA plavix 75mg daily and imdur 60mg daily     4. Hyperlipidemia,   -PTA on rosuvastatin 20mg daily   -1/2025 LDL 13    5. Hypertension, uncontrolled, home meds resumed this morning, will monitor response  -Multidrug PTA regimen:hydralazine, carvedilol, amlodipine, and imdur     6.  Stage IV CKD, slight decline  - Baseline creatinine ~2.5    7.  Community-acquired pneumonia  -Antibiotics per hospitalist     8.  Elevated troponin, suspect secondary to demand in the setting of #2  - Troponin trend 84-81  - No chest pain     Plan:  Agree with repeat echocardiogram, depending on results, will consider inpatient ischemic evaluation considering mild elevation in troponin and known multivessel CAD  2. Daily supportive heart failure measures: Strict I/O, daily weights,  Daily BMP, 2gm Na diet, 2L FR  3. Continue diuresis with IV furosemide 40mg BID with close monitoring of renal function   4. Continue plavix 75mg daily, rosuvastatin 20mg daily, and imdur 60mg daily for management of CAD  5. Cardiology will continue to follow     Moderate complexity     Frances Euceda NP    Primary Care Physician   Luis Armando Segovia    Reason for Consult   Reason for consult: I was asked by the hospitalist team to evaluate this patient for heart failure.    History of Present Illness   Jeremi Damon is a 54 year old male who presents with shortness of breath, worsened for the last 2 weeks.     In the emergency room a chest x-ray was done and showed stable enlargement of the cardiac silhouette, likely pulmonary vascular congestion and trace interstitial edema, small bilateral pleural effusions without any dewayne airspace consolidation or pneumothorax.  He had a CT chest which showed patchy groundglass foci in bilateral upper lobes, right worse than left, suggestive of atypical pneumonia, small bilateral pleural effusions, right larger than left.  His EKG was nonischemic showing sinus rhythm with a first-degree AV block.  His D-dimer was negative, however his NT proBNP was elevated at 5749.  His troponin was mildly elevated at 84 and repeated with a flat trend at 81.  He was started on IV antibiotics and given IV furosemide 60mg x1.    Today, his breathing is slightly improved following continued IV diuresis. Significant urine output this morning, 1.2L. He continues to feel exertional dyspnea after a few steps and orthopnea. Feels his pedal edema is improved per patient report. Denies any chest pain, dizziness, or lightheadedness. Denies any alcohol or illicit drug use.     He was evaluated by his PCP in February for increased shortness of breath and noted he was not consistently taking his torsemide due to leg cramping. Weight was 238# at that time. He continues to not take his torsemide  consistently for this reason.     He underwent a coronary angiogram in 2022 in the setting of an NSTEMI which showed one-vessel obstructive coronary artery disease and a PCI of the mid to distal LAD was done with stenting x 3.  He did have multiple other areas of mild to moderate disease in the first obtuse marginal, proximal RCA, distal RCA, RPDA, RPL, and second diagonal.    He has a history of cardiomyopathy with LVEF as low as 30 to 35% at the time of his NSTEMI in 2022. His most recent TTE from April of 2023 showed improvement to 45-50%.     Past Medical History   Past Medical History:   Diagnosis Date    ADHD (attention deficit hyperactivity disorder)     Allergic rhinitis, cause unspecified     Allergic rhinitis    Benign hypertension     CAD (coronary artery disease)     cardiac cath 11/23/2022: FERNANDA x3 to LAD    Chronic back pain     Hiccough     Hypersomnia with sleep apnea, unspecified     Hypertensive emergency 11/19/2022    Major depressive disorder, single episode, moderate (H) 03/2008    Mild persistent asthma     NSTEMI (non-ST elevated myocardial infarction) (H) 11/29/2020    Other primary cardiomyopathies     Cardiomyopathy -- unknown etiology     Past Surgical History   Past Surgical History:   Procedure Laterality Date    APPENDECTOMY  2007    BACK SURGERY  2014    L5-S1 fusion    COLONOSCOPY N/A 11/18/2024    Procedure: Colonoscopy;  Surgeon: Randal Pennington MD;  Location:  GI    CV CORONARY ANGIOGRAM N/A 11/30/2020    Procedure: Heart Catheterization with Possible Intervention;  Surgeon: Theo Donahue MD;  Location: [facility] CARDIAC CATH LAB    CV CORONARY ANGIOGRAM N/A 11/23/2022    Procedure: Coronary Angiogram;  Surgeon: Venkata Hdez MD;  Location: [facility] CARDIAC CATH LAB    CV PCI N/A 11/23/2022    Procedure: Percutaneous Coronary Intervention;  Surgeon: Venkata Hdez MD;  Location: [facility] CARDIAC CATH LAB    DAVINCI NEPHRECTOMY PARTIAL Left 01/08/2024  "   Procedure: ROBOTIC ASSISTED LAPAROSCOPIC PARTIAL NEPHRECTOMY - LEFT,  ROBOTIC ASSISTED LAPAROSCOPIC INTRA-OPERATIVE RENAL ULTRASOUND;  Surgeon: Chung Howard MD;  Location:  OR    ESOPHAGOSCOPY, GASTROSCOPY, DUODENOSCOPY (EGD), COMBINED N/A 2024    Procedure: Esophagoscopy, gastroscopy, duodenoscopy (EGD), combined;  Surgeon: Randal Pennington MD;  Location:  GI    LAPAROSCOPIC CHOLECYSTECTOMY  2005    Cholecystectomy, Laparoscopic    NOSE SURGERY      Bilateral radiofrequency volume reduction of the inferior turbinates.    RHINOPLASTY      rhinoplasty- septoplasty    SURGICAL HISTORY OF -       torn pectoral muscle       Prior to Admission Medications   Prior to Admission Medications   Prescriptions Last Dose Informant Patient Reported? Taking?   B-D LUER-HIWOT SYRINGE 21G X 1-1/2\" 3 ML MISC  Self No No   Si DEVICE ONCE A WEEK AND ALSO NEEDS 22 G NEEDLES 1.5 INCH   LORazepam (ATIVAN) 1 MG tablet   No Yes   Sig: Take 1 tablet (1 mg) by mouth 2 times daily as needed for anxiety.   Syringe/Needle, Disp, (SYRINGE LUER LOCK) 20G X 1-1/2\" 3 ML MISC  Self No No   Si Device once a week - and also needs 22 G needles 1.5 inch #30 with 1 refill   acetaminophen (TYLENOL) 500 MG tablet   No Yes   Sig: Take 1-2 tablets (500-1,000 mg) by mouth every 6 hours as needed for mild pain   albuterol (PROAIR HFA/PROVENTIL HFA/VENTOLIN HFA) 108 (90 Base) MCG/ACT inhaler   No Yes   Sig: Inhale 2 puffs into the lungs every 6 hours.   amLODIPine (NORVASC) 5 MG tablet 2025 Morning  No Yes   Sig: Take 1 tablet (5 mg) by mouth daily.   amitriptyline (ELAVIL) 50 MG tablet 2025 Evening  No Yes   Sig: Take 3 tablets (150 mg) by mouth at bedtime.   amphetamine-dextroamphetamine (ADDERALL) 20 MG tablet 2025 Morning  No Yes   Sig: Take 1 tablet (20 mg) by mouth 2 times daily.   aspirin (ASA) 81 MG chewable tablet 2025 Morning Self No Yes   Sig: Take 1 tablet (81 mg) by mouth daily Starting " tomorrow.   buPROPion (WELLBUTRIN XL) 300 MG 24 hr tablet 2025 Morning  Yes Yes   Sig: Take 300 mg by mouth every morning.   buprenorphine HCl-naloxone HCl (SUBOXONE) 8-2 MG per film 2025 Morning  No Yes   Si/2 film QID - Brand name only.   busPIRone (BUSPAR) 5 MG tablet   No Yes   Sig: Take 1 tablet (5 mg) by mouth 2 times daily as needed (panic attack).   carvedilol (COREG) 25 MG tablet 2025 Evening  No Yes   Sig: Take 1 tablet (25 mg) by mouth 2 times daily (with meals).   clonazePAM (KLONOPIN) 1 MG tablet   No Yes   Sig: Take 1 tablet (1 mg) by mouth 2 times daily as needed (flying phobia)   clopidogrel (PLAVIX) 75 MG tablet 2025 Morning  No Yes   Sig: Take 1 tablet (75 mg) by mouth daily.   cycloSPORINE (RESTASIS) 0.05 % ophthalmic emulsion 2025 Morning  No Yes   Sig: Place 1 drop into both eyes 2 times daily.   cyclobenzaprine (FLEXERIL) 10 MG tablet   No Yes   Sig: Take 1 tablet (10 mg) by mouth 3 times daily as needed for other (hiccups).   finasteride (PROSCAR) 5 MG tablet 2025 Morning  No Yes   Sig: Take 1 tablet (5 mg) by mouth daily.   fluconazole (DIFLUCAN) 150 MG tablet Not Taking  No No   Sig: Take 2 tablets (300 mg) by mouth every 14 days. - for two doses   Patient not taking: Reported on 2025   fluticasone-salmeterol (WIXELA INHUB) 500-50 MCG/ACT inhaler 2025 Morning  No Yes   Sig: Inhale 1 puff into the lungs 2 times daily.   hydrALAZINE (APRESOLINE) 50 MG tablet 2025 Evening  No Yes   Sig: Take 1 tablet (50 mg) by mouth 2 times daily.   imiquimod (ALDARA) 5 % external cream   No Yes   Sig: APPLY TOPICALLY AT BEDTIME -WASH OFF AFTER 8 HOURS AND MAY USE FOR UP TO 16 WEEKS.   isosorbide mononitrate (IMDUR) 60 MG 24 hr tablet 2025 Morning  No Yes   Sig: Take 1 tablet (60 mg) by mouth daily.   nitroGLYcerin (NITROSTAT) 0.4 MG sublingual tablet  Self Yes Yes   Sig: Place under the tongue every 5 minutes as needed   oxyCODONE (ROXICODONE) 5 MG tablet   No Yes    Sig: Take 1 tablet (5 mg) by mouth every 6 hours as needed for pain.   pantoprazole (PROTONIX) 40 MG EC tablet 4/6/2025 Morning  No Yes   Sig: Take 1 tablet (40 mg) by mouth daily.   rosuvastatin (CRESTOR) 20 MG tablet 4/5/2025 Evening  No Yes   Sig: Take 1 tablet (20 mg) by mouth daily.   sucralfate (CARAFATE) 1 GM tablet   No Yes   Sig: TAKE 1 TABLET (1 G) BY MOUTH 4 TIMES DAILY AS NEEDED (HEARTBURN).   testosterone cypionate (DEPOTESTOSTERONE) 200 MG/ML injection Past Week  No Yes   Sig: Inject 1 mL (200 mg) into the muscle once a week.   torsemide (DEMADEX) 10 MG tablet   No Yes   Sig: Take 1 tablet (10 mg) by mouth daily as needed (edema).   vitamin C (ASCORBIC ACID) 1000 MG TABS 4/5/2025 Morning Self No Yes   Sig: Take 1,000 mg by mouth daily   zolpidem (AMBIEN) 10 MG tablet   No Yes   Sig: Take 1 tablet (10 mg) by mouth nightly as needed for sleep.      Facility-Administered Medications: None     Current Facility-Administered Medications   Medication Dose Route Frequency Provider Last Rate Last Admin    acetaminophen (TYLENOL) tablet 650 mg  650 mg Oral Q4H PRN Kyle Mccarty MD   650 mg at 04/07/25 0217    Or    acetaminophen (TYLENOL) Suppository 650 mg  650 mg Rectal Q4H PRN Kyle Mccarty MD        azithromycin (ZITHROMAX) 250 mg in sodium chloride 0.9 % 250 mL intermittent infusion  250 mg Intravenous Q24H Kyle Mccarty MD        Baclofen (LIORESAL) tablet 5 mg  5 mg Oral TID PRN Kyle Mccarty MD        calcium carbonate (TUMS) chewable tablet 1,000 mg  1,000 mg Oral 4x Daily PRN Kyle Mccarty MD        cefTRIAXone (ROCEPHIN) 2 g vial to attach to  ml bag for ADULTS or NS 50 ml bag for PEDS  2 g Intravenous Q24H Kyle Mccarty MD        Continuing ACE inhibitor/ARB/ARNI from home medication list OR ACE inhibitor/ARB/ARNI order already placed during this visit   Does not apply DOES NOT GO TO Kyle Atkins MD         Continuing beta blocker from home medication list OR beta blocker order already placed during this visit   Does not apply DOES NOT GO TO Kyle Atkins MD        furosemide (LASIX) injection 40 mg  40 mg Intravenous BID Kyle Mccarty MD   40 mg at 04/07/25 0843    hydrALAZINE (APRESOLINE) tablet 10 mg  10 mg Oral Q4H PRN Kyle Mccarty MD        Or    hydrALAZINE (APRESOLINE) injection 10 mg  10 mg Intravenous Q4H PRN Kyle Mccarty MD        HYDROmorphone (DILAUDID) injection 0.2 mg  0.2 mg Intravenous Q2H PRN Kyle Mccarty MD   0.2 mg at 04/07/25 0032    HYDROmorphone (DILAUDID) injection 0.4 mg  0.4 mg Intravenous Q2H PRN Kyle Mccarty MD        lidocaine (LMX4) cream   Topical Q1H PRN Kyle Mccarty MD        lidocaine 1 % 0.1-1 mL  0.1-1 mL Other Q1H PRN Kyle Mccarty MD        melatonin tablet 5 mg  5 mg Oral At Bedtime PRN Kyle Mccarty MD        naloxone (NARCAN) injection 0.2 mg  0.2 mg Intravenous Q2 Min PRN Kyle Mccarty MD        Or    naloxone (NARCAN) injection 0.4 mg  0.4 mg Intravenous Q2 Min PRN Kyle Mccarty MD        Or    naloxone (NARCAN) injection 0.2 mg  0.2 mg Intramuscular Q2 Min PRN Kyle Mccarty MD        Or    naloxone (NARCAN) injection 0.4 mg  0.4 mg Intramuscular Q2 Min PRN Kyle Mccarty MD        prochlorperazine (COMPAZINE) injection 5 mg  5 mg Intravenous Q6H PRN Kyle Mccarty MD        Or    prochlorperazine (COMPAZINE) tablet 5 mg  5 mg Oral Q6H PRN Kyle Mccarty MD        Or    prochlorperazine (COMPAZINE) suppository 25 mg  25 mg Rectal Q12H PRN Kyle Mccarty MD        senna-docusate (SENOKOT-S/PERICOLACE) 8.6-50 MG per tablet 1 tablet  1 tablet Oral BID PRN Kyle Mccarty MD        Or    senna-docusate (SENOKOT-S/PERICOLACE) 8.6-50 MG per tablet 2 tablet  2 tablet Oral BID PRN Kyle Mccarty  MD Gary        sodium chloride (PF) 0.9% PF flush 3 mL  3 mL Intracatheter Q8H Kyle Mccarty MD   3 mL at 04/07/25 0843    sodium chloride (PF) 0.9% PF flush 3 mL  3 mL Intracatheter q1 min prn Kyle Mccarty MD         Current Facility-Administered Medications   Medication Dose Route Frequency Provider Last Rate Last Admin    acetaminophen (TYLENOL) tablet 650 mg  650 mg Oral Q4H PRN Kyle Mccarty MD   650 mg at 04/07/25 0217    Or    acetaminophen (TYLENOL) Suppository 650 mg  650 mg Rectal Q4H PRN Kyle Mccarty MD        azithromycin (ZITHROMAX) 250 mg in sodium chloride 0.9 % 250 mL intermittent infusion  250 mg Intravenous Q24H Kyle Mccarty MD        Baclofen (LIORESAL) tablet 5 mg  5 mg Oral TID PRN Kyle Mccarty MD        calcium carbonate (TUMS) chewable tablet 1,000 mg  1,000 mg Oral 4x Daily PRN Kyle Mccarty MD        cefTRIAXone (ROCEPHIN) 2 g vial to attach to  ml bag for ADULTS or NS 50 ml bag for PEDS  2 g Intravenous Q24H Kyle Mccarty MD        Continuing ACE inhibitor/ARB/ARNI from home medication list OR ACE inhibitor/ARB/ARNI order already placed during this visit   Does not apply DOES NOT GO TO Kyle Atkins MD        Continuing beta blocker from home medication list OR beta blocker order already placed during this visit   Does not apply DOES NOT GO TO Kyle Atkins MD        furosemide (LASIX) injection 40 mg  40 mg Intravenous BID Kyle Mccarty MD   40 mg at 04/07/25 0843    hydrALAZINE (APRESOLINE) tablet 10 mg  10 mg Oral Q4H PRN Kyle Mccarty MD        Or    hydrALAZINE (APRESOLINE) injection 10 mg  10 mg Intravenous Q4H PRN Kyle Mccarty MD        HYDROmorphone (DILAUDID) injection 0.2 mg  0.2 mg Intravenous Q2H PRN Kyle Mccarty MD   0.2 mg at 04/07/25 0032    HYDROmorphone (DILAUDID) injection 0.4 mg  0.4 mg Intravenous  Q2H PRN Kyle Mccarty MD        lidocaine (LMX4) cream   Topical Q1H PRN Kyle Mccarty MD        lidocaine 1 % 0.1-1 mL  0.1-1 mL Other Q1H PRN Kyle Mccarty MD        melatonin tablet 5 mg  5 mg Oral At Bedtime PRN Kyle Mccarty MD        naloxone (NARCAN) injection 0.2 mg  0.2 mg Intravenous Q2 Min PRN Kyle Mccarty MD        Or    naloxone (NARCAN) injection 0.4 mg  0.4 mg Intravenous Q2 Min PRN Kyle Mccarty MD        Or    naloxone (NARCAN) injection 0.2 mg  0.2 mg Intramuscular Q2 Min PRN Kyle Mccarty MD        Or    naloxone (NARCAN) injection 0.4 mg  0.4 mg Intramuscular Q2 Min PRN Kyle Mccarty MD        prochlorperazine (COMPAZINE) injection 5 mg  5 mg Intravenous Q6H PRN Kyle Mccarty MD        Or    prochlorperazine (COMPAZINE) tablet 5 mg  5 mg Oral Q6H PRN Kyle Mccarty MD        Or    prochlorperazine (COMPAZINE) suppository 25 mg  25 mg Rectal Q12H PRN Kyle Mccarty MD        senna-docusate (SENOKOT-S/PERICOLACE) 8.6-50 MG per tablet 1 tablet  1 tablet Oral BID PRN Kyle Mccarty MD        Or    senna-docusate (SENOKOT-S/PERICOLACE) 8.6-50 MG per tablet 2 tablet  2 tablet Oral BID PRN Kyle Mccarty MD        sodium chloride (PF) 0.9% PF flush 3 mL  3 mL Intracatheter Q8H Kyle Mccarty MD   3 mL at 25 0843    sodium chloride (PF) 0.9% PF flush 3 mL  3 mL Intracatheter q1 min prn Kyle Mccarty MD         Allergies   No Known Allergies    Social History    reports that he has never smoked. He has never used smokeless tobacco. He reports current alcohol use. He reports that he does not use drugs.      Family History   I have reviewed this patient's family history and updated it with pertinent information if needed.  Family History   Problem Relation Age of Onset    Coronary Artery Disease Mother          55; MI x 2    Cancer Father   "       hodgkins    Hypertension Father     Coronary Artery Disease Father     Cardiovascular Sister     Hypertension Brother     Cardiovascular Maternal Grandmother     No Known Problems Daughter     Prostate Cancer No family hx of     Cancer - colorectal No family hx of     Cardiomyopathy No family hx of     Valvular heart disease No family hx of           Review of Systems   A comprehensive review of system was performed and is negative other than that noted in the HPI or here.     Physical Exam   Vital Signs with Ranges  Temp:  [97.2  F (36.2  C)-98.7  F (37.1  C)] 98.7  F (37.1  C)  Pulse:  [78-85] 81  Resp:  [10-22] 18  BP: (147-157)/() 151/92  SpO2:  [94 %-97 %] 95 %  Wt Readings from Last 4 Encounters:   04/06/25 110.2 kg (243 lb)   02/13/25 108 kg (238 lb)   12/31/24 112.2 kg (247 lb 4.8 oz)   11/11/24 119.2 kg (262 lb 11.2 oz)     I/O last 3 completed shifts:  In: -   Out: 200 [Urine:200]      Vitals: BP (!) 151/92 (BP Location: Left arm)   Pulse 81   Temp 98.7  F (37.1  C) (Oral)   Resp 18   Ht 1.803 m (5' 11\")   Wt 110.2 kg (243 lb)   SpO2 95%   BMI 33.89 kg/m      Physical Exam:   General - Alert and oriented to time place and person in no acute distress  Eyes - No scleral icterus  HEENT - Neck supple, moist mucous membranes, faint JVD with patient upright   Cardiovascular - regular rate and rhythm, no murmur or rub   Extremities - There is significant pedal to lower extremity edema  Respiratory - breathing non-labored at rest and fine bi-basilar crackles   Skin - No pallor or cyanosis  Gastrointestinal - Non tender and non distended without rebound or guarding  Psych - Appropriate affect   Neurological - No gross motor neurological focal deficits    No lab results found in last 7 days.    Invalid input(s): \"TROPONINIES\"    Recent Labs   Lab 04/07/25  0614 04/06/25  1857 04/06/25  1642   WBC 9.9  --  10.0   HGB 11.3*  --  11.4*   MCV 69*  --  69*     --  371    137 135 " "  POTASSIUM 5.2 5.1 5.0   CHLORIDE 100 96* 99   CO2 26 26 26   BUN 29.3* 27.1* 27.0*   CR 3.07* 3.22* 3.03*   GFRESTIMATED 23* 22* 23*   ANIONGAP 12 15 10   MIKO 8.9 9.1 9.2   GLC 81 96 111*   ALBUMIN  --   --  4.0   PROTTOTAL  --   --  7.0   BILITOTAL  --   --  0.7   ALKPHOS  --   --  104   ALT  --   --  65   AST  --   --  51*   LIPASE  --   --  29     Recent Labs   Lab Test 01/29/25  0934 04/21/23  0948   CHOL 56 82   HDL 32* 33*   LDL 13 22   TRIG 56 137     Recent Labs   Lab 04/07/25  0614 04/06/25  1642   WBC 9.9 10.0   HGB 11.3* 11.4*   HCT 41.5 41.5   MCV 69* 69*    371     No results for input(s): \"PH\", \"PHV\", \"PO2\", \"PO2V\", \"SAT\", \"PCO2\", \"PCO2V\", \"HCO3\", \"HCO3V\" in the last 168 hours.  Recent Labs   Lab 04/06/25  1642   NTBNPI 5,749*     Recent Labs   Lab 04/06/25  1642   DD 0.50     No results for input(s): \"SED\", \"CRP\" in the last 168 hours.  Recent Labs   Lab 04/07/25  0614 04/06/25  1642    371     No results for input(s): \"TSH\" in the last 168 hours.  No results for input(s): \"COLOR\", \"APPEARANCE\", \"URINEGLC\", \"URINEBILI\", \"URINEKETONE\", \"SG\", \"UBLD\", \"URINEPH\", \"PROTEIN\", \"UROBILINOGEN\", \"NITRITE\", \"LEUKEST\", \"RBCU\", \"WBCU\" in the last 168 hours.    Imaging:  Recent Results (from the past 48 hours)   Chest XR,  PA & LAT    Narrative    EXAM: XR CHEST 2 VIEWS  LOCATION: [facility]  DATE: 4/6/2025    INDICATION: Shortness of breath, check for CHF or pneumonia  COMPARISON: Chest radiograph 2/13/2025      Impression    IMPRESSION: Stable enlargement of the cardiac silhouette. Coronary stent is again noted. Likely pulmonary vascular congestion and trace interstitial edema. Small bilateral pleural effusions are also noted. No dewayne airspace consolidation or pneumothorax.   Bones are unchanged.   CT Chest Abdomen Pelvis w/o Contrast    Narrative    EXAM: CT CHEST ABDOMEN PELVIS W/O CONTRAST  LOCATION: [facility]  DATE: 4/6/2025    INDICATION: " SOB and epigastric abd pain  check for pneumonia, CHF, hiatal hernia, liver mass, pancreatitis  COMPARISON: 7/25/2024  TECHNIQUE: CT scan of the chest, abdomen, and pelvis was performed without IV contrast. Multiplanar reformats were obtained. Dose reduction techniques were used.   CONTRAST: None.    FINDINGS:   LUNGS AND PLEURA: Small bilateral pleural effusions, right larger than left. Patchy groundglass foci present throughout the right upper lobe in a pattern most suggestive of atypical pneumonia. Left upper lobe also demonstrates a few patchy groundglass   foci.    MEDIASTINUM/AXILLAE: Prominent enlargement left lobe of thyroid bowing of the trachea to the right small calcified mediastinal lymph nodes, no significant adenopathy. Cardiomegaly.    CORONARY ARTERY CALCIFICATION: Severe.    HEPATOBILIARY: Prior cholecystectomy. Liver parenchyma unremarkable.    PANCREAS: Normal.    SPLEEN: Normal.    ADRENAL GLANDS: Normal.    KIDNEYS/BLADDER: Stable postoperative appearance following nephron sparing resection mid left kidney. Small left renal cyst stable and needs no dedicated follow-up. No stone or hydronephrosis.  Right kidney negative. Bladder unremarkable.    BOWEL: No obstruction or inflammatory change.    LYMPH NODES: No adenopathy.    VASCULATURE: Normal.    PELVIC ORGANS: Trace volume ascites.    MUSCULOSKELETAL: Instrumentation and fusion at lumbosacral junction.      Impression    IMPRESSION:  1.  Patchy groundglass foci within both upper lobes, right worse than left, most suggestive of atypical pneumonia.  2.  Small bilateral pleural effusions, right larger than left.  3.  Cardiomegaly.  4.  Stable postoperative appearance of left kidney.  5.  Trace volume ascites.  6.  Prominent enlargement of left lobe of thyroid. Benign goiter favored though this would be amenable to evaluation with ultrasound.         Echo:  No results found for this or any previous visit (from the past 4320 hours).    Clinically  "Significant Risk Factors Present on Admission          # Hypochloremia: Lowest Cl = 96 mmol/L in last 2 days, will monitor as appropriate        # Drug Induced Platelet Defect: home medication list includes an antiplatelet medication   # Hypertension: Noted on problem list           # Obesity: Estimated body mass index is 33.89 kg/m  as calculated from the following:    Height as of this encounter: 1.803 m (5' 11\").    Weight as of this encounter: 110.2 kg (243 lb).       # Asthma: noted on problem list     Systolic chronic    Fluid overload, unspecified    CKD POA List: Stage 4 (GFR 15-29)    Pneumonia                     "

## 2025-04-07 NOTE — H&P
[facility]  Hospitalist History and Physical  Date of Admission:  4/6/2025    Primary Care Physician   Luis Armando Segovia    Chief Complaint  Shortness of Breath    History obtained from the patient    History of Present Illness   Jeremi Damon is a 54 year old male with a past medical history of cad s/p leeann, htn, hypertrophic cardiomyopathy, combined systolic and diastolic heart failure, CKD stage IV, mild persistent asthma, chronic back pain, depression, bipolar 2 disorder, ADHD, sleep apnea presents to hospital with shortness of breath.  The patient reports that he has been experiencing worsening shortness of breath for the last 2 weeks.  It has been progressively getting worse to the point where he is short of breath after walking 3 steps.  He endorses orthopnea and worsening bilateral lower extremity swelling.  He denies any fevers, chills, cough, sputum production.  He denies any sick contacts or recent travel.  He reports that he gets very little sleep so is not sure if he has any paroxysmal nocturnal dyspnea.  He does not check his weight regularly and does not know if he has gained or lost weight.  He has a PTA prescription for torsemide but reports that he does not take it regularly.  He denies any chest pain but does endorse a right upper quadrant abdominal pain with tenderness.  He reports that he thinks there is a mass or a pulled muscle there.  He also endorses hiccups which have started since he came to the hospital.  He attributes the hiccups to the dose of Lasix he received in the emergency room.  He reports that he normally takes Flexeril for his hiccups however they do not work anymore and has asked for another muscle relaxant.  He provides no other complaints.    Past Medical History    I have reviewed this patient's medical history and updated it with pertinent information if needed.   Past Medical History:   Diagnosis Date    ADHD (attention deficit hyperactivity  disorder)     Allergic rhinitis, cause unspecified     Allergic rhinitis    Benign hypertension     CAD (coronary artery disease)     cardiac cath 11/23/2022: FERNANDA x3 to LAD    Chronic back pain     Hiccough     Hypersomnia with sleep apnea, unspecified     Hypertensive emergency 11/19/2022    Major depressive disorder, single episode, moderate (H) 03/2008    Mild persistent asthma     NSTEMI (non-ST elevated myocardial infarction) (H) 11/29/2020    Other primary cardiomyopathies     Cardiomyopathy -- unknown etiology       Past Surgical History   I have reviewed this patient's surgical history and updated it with pertinent information if needed.  Past Surgical History:   Procedure Laterality Date    APPENDECTOMY  2007    BACK SURGERY  2014    L5-S1 fusion    COLONOSCOPY N/A 11/18/2024    Procedure: Colonoscopy;  Surgeon: Randal Pennington MD;  Location:  GI    CV CORONARY ANGIOGRAM N/A 11/30/2020    Procedure: Heart Catheterization with Possible Intervention;  Surgeon: Theo Donahue MD;  Location:  HEART CARDIAC CATH LAB    CV CORONARY ANGIOGRAM N/A 11/23/2022    Procedure: Coronary Angiogram;  Surgeon: Venktaa Hdez MD;  Location: [facility] CARDIAC CATH LAB    CV PCI N/A 11/23/2022    Procedure: Percutaneous Coronary Intervention;  Surgeon: Venkata Hdez MD;  Location:  HEART CARDIAC CATH LAB    DAVINCI NEPHRECTOMY PARTIAL Left 01/08/2024    Procedure: ROBOTIC ASSISTED LAPAROSCOPIC PARTIAL NEPHRECTOMY - LEFT,  ROBOTIC ASSISTED LAPAROSCOPIC INTRA-OPERATIVE RENAL ULTRASOUND;  Surgeon: Chung Howard MD;  Location:  OR    ESOPHAGOSCOPY, GASTROSCOPY, DUODENOSCOPY (EGD), COMBINED N/A 11/18/2024    Procedure: Esophagoscopy, gastroscopy, duodenoscopy (EGD), combined;  Surgeon: Randal Pennington MD;  Location:  GI    LAPAROSCOPIC CHOLECYSTECTOMY  01/2005    Cholecystectomy, Laparoscopic    NOSE SURGERY  2009    Bilateral radiofrequency volume reduction of the inferior  turbinates.    RHINOPLASTY  2012    rhinoplasty- septoplasty    SURGICAL HISTORY OF -       torn pectoral muscle       Allergies   No Known Allergies    Social History   I have reviewed this patient's social history and updated it with pertinent information if needed. Jeremi Damon  reports that he has never smoked. He has never used smokeless tobacco. He reports current alcohol use. He reports that he does not use drugs.    Family History   I have reviewed this patient's family history and updated it with pertinent information if needed.   Family History   Problem Relation Age of Onset    Coronary Artery Disease Mother          55; MI x 2    Cancer Father         hodgkins    Hypertension Father     Coronary Artery Disease Father     Cardiovascular Sister     Hypertension Brother     Cardiovascular Maternal Grandmother     No Known Problems Daughter     Prostate Cancer No family hx of     Cancer - colorectal No family hx of     Cardiomyopathy No family hx of     Valvular heart disease No family hx of        Physical Exam   Temp: 97.2  F (36.2  C) Temp src: Temporal BP: (!) 147/99 Pulse: 78   Resp: 10 SpO2: 94 %      Vital Signs with Ranges  Temp:  [97.2  F (36.2  C)] 97.2  F (36.2  C)  Pulse:  [78-85] 78  Resp:  [10-22] 10  BP: (147-153)/(99) 147/99  SpO2:  [94 %-97 %] 94 %  243 lbs 0 oz  Physical Exam  Vitals reviewed.   Constitutional:       Comments: Pleasant man seen resting in bed in no apparent distress watching TV in the emergency room.   HENT:      Head: Normocephalic and atraumatic.   Eyes:      Extraocular Movements: Extraocular movements intact.      Conjunctiva/sclera: Conjunctivae normal.      Pupils: Pupils are equal, round, and reactive to light.   Cardiovascular:      Rate and Rhythm: Normal rate and regular rhythm.   Pulmonary:      Effort: Pulmonary effort is normal.      Breath sounds: Normal breath sounds.   Abdominal:      General: Abdomen is flat. Bowel sounds are normal.      Palpations:  Abdomen is soft.      Tenderness: There is abdominal tenderness.      Comments: He reports right upper quadrant tenderness without rebound or guarding.  No visible mass or hernia.   Musculoskeletal:         General: Swelling present.      Comments: 3+ pitting edema bilateral lower extremities   Neurological:      General: No focal deficit present.      Mental Status: He is alert and oriented to person, place, and time. Mental status is at baseline.         Assessment & Plan   Jeremi Damon is a 54 year old male with a past medical history of cad s/p leeann, htn, hypertrophic cardiomyopathy, combined systolic and diastolic heart failure, CKD stage IV, mild persistent asthma, chronic back pain, depression, bipolar 2 disorder, ADHD, sleep apnea presents to hospital with shortness of breath.    Community Acquired Pneumonia  Patient presenting with shortness of breath for 2 weeks found to have groundglass opacities in the upper lobes.  The patient does not report any infectious symptoms such as fevers, chills, cough.  He has no leukocytosis.  Will check a procalcitonin.  He was started on antibiotics for community-acquired pneumonia in the emergency room which I will continue for now.  -Azithromycin and ceftriaxone  -Follow blood cultures, sputum culture, Gram stain, Legionella antigen, strep pneumoniae antigen    CAD s/p LEEANN  HTN  Hypertrophic cardiomyopathy without LVOT  Combined systolic and diastolic heart failure  Ef 45% in 2023  Patient presents with worsening shortness of breath reports orthopnea, exertional dyspnea, bilateral lower extremity swelling.  He does not check his weight regularly and denies any paroxysmal nocturnal dyspnea because he gets very little sleep.he  has a prescription for a diuretic but reports poor compliance.  BNP has increased from February.  Physical exam is significant for bilateral pitting edema in the lower extremities.  Overall presentation is concerning for volume overload in setting  of his known CHF.  He has not seen his cardiologist in approximately 2 years.  His heart failure could have progressed.  -Monitor on telemetry, strict I's and O's, daily weights  -Lasix 40 twice daily  -Follow-up echocardiogram  -Follow-up cardiology consult  -Resume PTA meds once pharmacy med rec is complete    hiccups  Baclofen prn    PEDRO on Ckd 4  His creatinine baseline appears to be around 2.5.  Likely due to his heart failure.  Will monitor closely while treating with diuresis.  -Avoid nephrotoxic medications  -Repeat chemistry in the morning    Thyroid goiter  Prominent left lobe of thyroid seen on ct  Follow up US thyroid    Mild persistent asthma  Lungs are clear on auscultation.    MDD  Bipolar 2 disorder  ADHD  Resume PTA meds once pharmacy med rec is complete    Sleep apnea  Does not use CPAP    DVT ppx: SCDs  Code Status: Full code  Medically Ready for Discharge: Anticipated in 2-4 Days    Medical Decision Making       75 MINUTES SPENT BY ME on the date of service doing chart review, history, exam, documentation & further activities per the note.      Kyle Mccarty MD  Hospitalist Medicine Service  Pager# [phone]

## 2025-04-07 NOTE — PROGRESS NOTES
DATE & TIME:4/7/25, [id number]  Cognitive Concerns/ Orientation:A/Ox4  BEHAVIOR & AGGRESSION TOOL COLOR:Green, calm and cooperative  ABNL VS/O2:VSS on 1L nc  MOBILITY:SBA  PAIN MANAGMENT:Given dilaudid and tylenol x1  DIET:Regular, 2000 mL Fr  BOWEL/BLADDER:Continent of B/B  DRAIN/DEVICES:PIV SL  TELEMETRY RHYTHM:SR BBB first degree AVB  TESTS/PROCEDURES:none  D/C DATE: Pending

## 2025-04-07 NOTE — PLAN OF CARE
Goal Outcome Evaluation:  A& O x4.  VSS on RA. C/o generalized pain and anxiety- tylenol and ativan PO effective. Tachypneic at times of anxiety but maintains oxygen saturations. GAMING, lungs diminished in bases- using albuterol inhaler PRN.  BLE red, micheal, +3 edema in lower legs +4 in feet.  Continues  IV lasix and azithromycin and rocephin.

## 2025-04-07 NOTE — DISCHARGE INSTRUCTIONS
HEALTHCARE DIRECTIVE:   For information on completing a healthcare directive (all adults should complete one) please visit [email or web address]   A direct link to the forms you can print and guides for how to complete them can be found at: [email or web address]     To make it valid, please either 1) have it signed by 2 witnesses or 2) have it signed by a notary.  Then make copies to provide to your Primary Care Provider and any other care providers.

## 2025-04-07 NOTE — CONSULTS
"SPIRITUAL HEALTH SERVICES  SPIRITUAL ASSESSMENT Consult Note  [facility]     REFERRAL SOURCE: Assessment of spiritual and emotional needs because patient responded \"Yes\" to the question in the admission assessment, \"Do you have any spiritual or Islam beliefs that will affect your care?\"     Introduced spiritual health services/role to Jeremi. He shared that he is Jainism and would welcome a visit from the  for non-emergent visit for sacrament of the sick.   Jeremi was struggling to keep his eyes open and shared that he has \"a lot of things going on right now\" declining to say more at this time.    PLAN: Triaged for non-emergent [facility] visit for sacrament of the sick. Spiritual Health remains available for support. Please consult as needs arise or as requested.    Tash Gillespie  Associate      [facility]S available 24/7 for emergent requests/referrals, either by paging the on-call  or by entering an ASAP/STAT consult in Epic (this will also page the on-call ).     "

## 2025-04-07 NOTE — PHARMACY-ADMISSION MEDICATION HISTORY
Pharmacist Admission Medication History    Admission medication history is complete. The information provided in this note is only as accurate as the sources available at the time of the update.    Information Source(s): Patient via in-person    Pertinent Information: Hasn't been taking his medications consistently with being sick recently     Changes made to PTA medication list:  Added: None  Deleted: None  Changed: None    Allergies reviewed with patient and updates made in EHR: no    Medication History Completed By: Cassidy Preston [email or web address] 4/6/2025 10:39 PM    PTA Med List   Medication Sig Last Dose/Taking    acetaminophen (TYLENOL) 500 MG tablet Take 1-2 tablets (500-1,000 mg) by mouth every 6 hours as needed for mild pain Taking As Needed    albuterol (PROAIR HFA/PROVENTIL HFA/VENTOLIN HFA) 108 (90 Base) MCG/ACT inhaler Inhale 2 puffs into the lungs every 6 hours. Taking    amitriptyline (ELAVIL) 50 MG tablet Take 3 tablets (150 mg) by mouth at bedtime. 4/5/2025 Evening    amLODIPine (NORVASC) 5 MG tablet Take 1 tablet (5 mg) by mouth daily. 4/5/2025 Morning    amphetamine-dextroamphetamine (ADDERALL) 20 MG tablet Take 1 tablet (20 mg) by mouth 2 times daily. 4/5/2025 Morning    aspirin (ASA) 81 MG chewable tablet Take 1 tablet (81 mg) by mouth daily Starting tomorrow. 4/5/2025 Morning    buprenorphine HCl-naloxone HCl (SUBOXONE) 8-2 MG per film 1/2 film QID - Brand name only. 4/5/2025 Morning    buPROPion (WELLBUTRIN XL) 300 MG 24 hr tablet Take 300 mg by mouth every morning. 4/6/2025 Morning    busPIRone (BUSPAR) 5 MG tablet Take 1 tablet (5 mg) by mouth 2 times daily as needed (panic attack). Taking As Needed    carvedilol (COREG) 25 MG tablet Take 1 tablet (25 mg) by mouth 2 times daily (with meals). 4/5/2025 Evening    clonazePAM (KLONOPIN) 1 MG tablet Take 1 tablet (1 mg) by mouth 2 times daily as needed (flying phobia) Taking As Needed    clopidogrel (PLAVIX) 75 MG tablet Take 1 tablet (75 mg) by mouth daily.  4/5/2025 Morning    cyclobenzaprine (FLEXERIL) 10 MG tablet Take 1 tablet (10 mg) by mouth 3 times daily as needed for other (hiccups). Taking As Needed    cycloSPORINE (RESTASIS) 0.05 % ophthalmic emulsion Place 1 drop into both eyes 2 times daily. 4/6/2025 Morning    finasteride (PROSCAR) 5 MG tablet Take 1 tablet (5 mg) by mouth daily. 4/6/2025 Morning    fluticasone-salmeterol (WIXELA INHUB) 500-50 MCG/ACT inhaler Inhale 1 puff into the lungs 2 times daily. 4/6/2025 Morning    hydrALAZINE (APRESOLINE) 50 MG tablet Take 1 tablet (50 mg) by mouth 2 times daily. 4/5/2025 Evening    imiquimod (ALDARA) 5 % external cream APPLY TOPICALLY AT BEDTIME -WASH OFF AFTER 8 HOURS AND MAY USE FOR UP TO 16 WEEKS. Taking    isosorbide mononitrate (IMDUR) 60 MG 24 hr tablet Take 1 tablet (60 mg) by mouth daily. 4/5/2025 Morning    LORazepam (ATIVAN) 1 MG tablet Take 1 tablet (1 mg) by mouth 2 times daily as needed for anxiety. Taking As Needed    nitroGLYcerin (NITROSTAT) 0.4 MG sublingual tablet Place under the tongue every 5 minutes as needed Taking As Needed    oxyCODONE (ROXICODONE) 5 MG tablet Take 1 tablet (5 mg) by mouth every 6 hours as needed for pain. Taking As Needed    pantoprazole (PROTONIX) 40 MG EC tablet Take 1 tablet (40 mg) by mouth daily. 4/6/2025 Morning    rosuvastatin (CRESTOR) 20 MG tablet Take 1 tablet (20 mg) by mouth daily. 4/5/2025 Evening    sucralfate (CARAFATE) 1 GM tablet TAKE 1 TABLET (1 G) BY MOUTH 4 TIMES DAILY AS NEEDED (HEARTBURN). Taking As Needed    testosterone cypionate (DEPOTESTOSTERONE) 200 MG/ML injection Inject 1 mL (200 mg) into the muscle once a week. Past Week    torsemide (DEMADEX) 10 MG tablet Take 1 tablet (10 mg) by mouth daily as needed (edema). Taking As Needed    vitamin C (ASCORBIC ACID) 1000 MG TABS Take 1,000 mg by mouth daily 4/5/2025 Morning    zolpidem (AMBIEN) 10 MG tablet Take 1 tablet (10 mg) by mouth nightly as needed for sleep. Taking As Needed

## 2025-04-07 NOTE — PROGRESS NOTES
RECEIVING UNIT ED HANDOFF REVIEW    ED Nurse Handoff Report was reviewed by: Carlos Alberto Enciso RN on April 6, 2025 at 11:38 PM

## 2025-04-07 NOTE — PROGRESS NOTES
04/07/25 0717   Appointment Info   Signing Clinician's Name / Credentials (OT) Edward Alva OTR/L   Living Environment   People in Home alone   Current Living Arrangements condominium   Home Accessibility stairs to enter home   Number of Stairs, Main Entrance 8   Stair Railings, Main Entrance railings safe and in good condition   Transportation Anticipated family or friend will provide   Living Environment Comments Pt reports living alone in condo w/ 6-8 ADRIA. Tub shower w/ grab bars.   Self-Care   Usual Activity Tolerance good   Current Activity Tolerance moderate   Equipment Currently Used at Home none   Fall history within last six months no   Activity/Exercise/Self-Care Comment Pt reports being IND w/ all ADL's at baseline prior to admission.   Instrumental Activities of Daily Living (IADL)   Previous Responsibilities meal prep;housekeeping;laundry;medication management;finances;driving   IADL Comments Pt reports being IND w/ all IADL's at baseline prior to admission. Pt reports that they still drive.   General Information   Onset of Illness/Injury or Date of Surgery 04/06/25   Referring Physician Kyle Mccarty MD   Patient/Family Therapy Goal Statement (OT) Return to home.   Additional Occupational Profile Info/Pertinent History of Current Problem Jeremi Damon is a 54 year old male with a past medical history of cad s/p leeann, htn, hypertrophic cardiomyopathy, combined systolic and diastolic heart failure, CKD stage IV, mild persistent asthma, chronic back pain, depression, bipolar 2 disorder, ADHD, sleep apnea presents to hospital with shortness of breath.  The patient reports that he has been experiencing worsening shortness of breath for the last 2 weeks.   Existing Precautions/Restrictions fall   Cognitive Status Examination   Orientation Status orientation to person, place and time   Visual Perception   Visual Impairment/Limitations WFL   Sensory   Sensory Quick Adds sensation intact   Pain  Assessment   Patient Currently in Pain Yes, see Vital Sign flowsheet  (8/10 headache)   Range of Motion Comprehensive   General Range of Motion bilateral upper extremity ROM WFL   Strength Comprehensive (MMT)   General Manual Muscle Testing (MMT) Assessment no strength deficits identified   Bed Mobility   Comment (Bed Mobility) Pt sitting EOB on arrival   Transfers   Transfers sit-stand transfer;toilet transfer   Sit-Stand Transfer   Sit-Stand [place] (Transfers) supervision   Toilet Transfer   Type (Toilet Transfer) stand-sit;sit-stand   [place] Level (Toilet Transfer) supervision   Activities of Daily Living   BADL Assessment/Intervention lower body dressing;grooming;toileting   Lower Body Dressing Assessment/Training   Position (Lower Body Dressing) edge of bed sitting   [place] Level (Lower Body Dressing) supervision   Grooming Assessment/Training   [place] Level (Grooming) independent   Toileting   [place] Level (Toileting) independent   Clinical Impression   Criteria for Skilled Therapeutic Interventions Met (OT) Yes, treatment indicated   OT Diagnosis Decreased activity tolerance for I/ADL's   Planned Therapy Interventions (OT) ADL retraining;IADL retraining;risk factor education;home program guidelines;progressive activity/exercise   Clinical Decision Making Complexity (OT) problem focused assessment/low complexity   Risk & Benefits of therapy have been explained evaluation/treatment results reviewed;care plan/treatment goals reviewed;risks/benefits reviewed;current/potential barriers reviewed;patient   OT Total Evaluation Time   OT Eval, Low Complexity Minutes (78450) 10   OT Goals   Therapy Frequency (OT) Daily   OT Predicted Duration/Target Date for Goal Attainment 04/14/25   OT Goals Lower Body Dressing   OT: Lower Body Dressing Modified independent;using adaptive equipment   OT: Understanding of cardiac education to maximize quality of life, condition management, and health  outcomes Patient;Verbalize;Demonstrate   OT: Perform aerobic activity with stable cardiovascular response continuous;10 minutes;ambulation;treadmill   OT: Functional/aerobic ambulation tolerance with stable cardiovascular response in order to return to home and community environment Independent;Greater than 300 feet   OT: Navigation of stairs simulating home set up with stable cardiovascular response in order to return to home and community environment Modified independent;8 stairs   Self-Care/Home Management   Self-Care/Home Mgmt/ADL, Compensatory, Meal Prep Minutes (13596) 18   Symptoms Noted During/After Treatment (Meal Preparation/Planning Training) increased pain   Treatment Detail/Skilled Intervention Pt sitting EOB eating breakfast on arrival; agreeable for OT evaluation. Pt reporting a 8/10 headache pain and reports that it has been getting worse. RN entered room at beginning of session. Asked pt regarding completion of LB dressing and pt reports that they dont think they could don their B socks right now. Pt reports usually wearing slip on shoes at home and has no concerns w/ those. Pt requesting to use bathroom. Supervision sit > stand from EOB w/ no AD. Pt ambulated into bathroom and was able to complete toileting and g/h sinkside IND w/ no concerns. Upon returning to EOB, cued pt in PLB in which they demonstrated understanding and reports that their breathing is getting better each day. Issued pt HF packet (see below for details). Pt declining to ambulate in halls at this time d/t headache. Pt remained seated EOB at end of therapy session w/ all needs met.   Cardiac Education   Education Provided Diet;Signs and symptoms   Education Packet Given to Patient Yes   All Patient Education Handouts Reviewed with Patient and/or Family No   OT Discharge Planning   OT Plan HF packet education, timed ambulation, stairs   OT Discharge Recommendation (DC Rec) home with assist   OT Rationale for DC Rec Pt currently  slightly below baseline d/t decreased activity tolerance. [facility] and reports being IND w/ all I/ADL's at baseline. Anticipate w/ continued IP therapy, pt will be safe to d/c home w/ intermittent assist as needed.   OT Brief overview of current status Goals of therapy will be to address safe mobility and make recs for d/c to next level of care. Pt and RN will continue to follow all falls risk precautions as documented by RN staff while hospitalized.   OT Total Distance Amb During Session (feet) 30   Total Session Time   Timed Code Treatment Minutes 18   Total Session Time (sum of timed and untimed services) 28

## 2025-04-08 ENCOUNTER — APPOINTMENT (OUTPATIENT)
Dept: CARDIOLOGY | Facility: CLINIC | Age: 55
DRG: 193 | End: 2025-04-08
Attending: HOSPITALIST
Payer: COMMERCIAL

## 2025-04-08 LAB
ANION GAP SERPL CALCULATED.3IONS-SCNC: 11 MMOL/L (ref 7–15)
ANION GAP SERPL CALCULATED.3IONS-SCNC: 13 MMOL/L (ref 7–15)
ATRIAL RATE - MUSE: 65 BPM
BUN SERPL-MCNC: 34.2 MG/DL (ref 6–20)
BUN SERPL-MCNC: 36.8 MG/DL (ref 6–20)
CALCIUM SERPL-MCNC: 8.5 MG/DL (ref 8.8–10.4)
CALCIUM SERPL-MCNC: 8.8 MG/DL (ref 8.8–10.4)
CHLORIDE SERPL-SCNC: 97 MMOL/L (ref 98–107)
CHLORIDE SERPL-SCNC: 97 MMOL/L (ref 98–107)
CREAT SERPL-MCNC: 3.34 MG/DL (ref 0.67–1.17)
CREAT SERPL-MCNC: 3.59 MG/DL (ref 0.67–1.17)
DIASTOLIC BLOOD PRESSURE - MUSE: NORMAL MMHG
EGFRCR SERPLBLD CKD-EPI 2021: 19 ML/MIN/1.73M2
EGFRCR SERPLBLD CKD-EPI 2021: 21 ML/MIN/1.73M2
GLUCOSE SERPL-MCNC: 101 MG/DL (ref 70–99)
GLUCOSE SERPL-MCNC: 97 MG/DL (ref 70–99)
HCO3 SERPL-SCNC: 28 MMOL/L (ref 22–29)
HCO3 SERPL-SCNC: 29 MMOL/L (ref 22–29)
INTERPRETATION ECG - MUSE: NORMAL
LVEF ECHO: NORMAL
P AXIS - MUSE: 35 DEGREES
POTASSIUM SERPL-SCNC: 4.5 MMOL/L (ref 3.4–5.3)
POTASSIUM SERPL-SCNC: 4.5 MMOL/L (ref 3.4–5.3)
PR INTERVAL - MUSE: 286 MS
QRS DURATION - MUSE: 190 MS
QT - MUSE: 458 MS
QTC - MUSE: 476 MS
R AXIS - MUSE: 214 DEGREES
SODIUM SERPL-SCNC: 136 MMOL/L (ref 135–145)
SODIUM SERPL-SCNC: 139 MMOL/L (ref 135–145)
SYSTOLIC BLOOD PRESSURE - MUSE: NORMAL MMHG
T AXIS - MUSE: -21 DEGREES
VENTRICULAR RATE- MUSE: 65 BPM

## 2025-04-08 PROCEDURE — 86334 IMMUNOFIX E-PHORESIS SERUM: CPT | Mod: 26 | Performed by: STUDENT IN AN ORGANIZED HEALTH CARE EDUCATION/TRAINING PROGRAM

## 2025-04-08 PROCEDURE — 210N000002 HC R&B HEART CARE

## 2025-04-08 PROCEDURE — 250N000013 HC RX MED GY IP 250 OP 250 PS 637: Performed by: STUDENT IN AN ORGANIZED HEALTH CARE EDUCATION/TRAINING PROGRAM

## 2025-04-08 PROCEDURE — 86160 COMPLEMENT ANTIGEN: CPT | Performed by: STUDENT IN AN ORGANIZED HEALTH CARE EDUCATION/TRAINING PROGRAM

## 2025-04-08 PROCEDURE — 36415 COLL VENOUS BLD VENIPUNCTURE: CPT | Performed by: INTERNAL MEDICINE

## 2025-04-08 PROCEDURE — 99233 SBSQ HOSP IP/OBS HIGH 50: CPT | Performed by: INTERNAL MEDICINE

## 2025-04-08 PROCEDURE — 250N000011 HC RX IP 250 OP 636: Performed by: INTERNAL MEDICINE

## 2025-04-08 PROCEDURE — 87389 HIV-1 AG W/HIV-1&-2 AB AG IA: CPT | Performed by: INTERNAL MEDICINE

## 2025-04-08 PROCEDURE — 250N000011 HC RX IP 250 OP 636: Performed by: HOSPITALIST

## 2025-04-08 PROCEDURE — 99233 SBSQ HOSP IP/OBS HIGH 50: CPT | Mod: 25 | Performed by: NURSE PRACTITIONER

## 2025-04-08 PROCEDURE — 86334 IMMUNOFIX E-PHORESIS SERUM: CPT | Performed by: STUDENT IN AN ORGANIZED HEALTH CARE EDUCATION/TRAINING PROGRAM

## 2025-04-08 PROCEDURE — 250N000012 HC RX MED GY IP 250 OP 636 PS 637: Performed by: INTERNAL MEDICINE

## 2025-04-08 PROCEDURE — 93306 TTE W/DOPPLER COMPLETE: CPT | Mod: 26 | Performed by: INTERNAL MEDICINE

## 2025-04-08 PROCEDURE — 80048 BASIC METABOLIC PNL TOTAL CA: CPT | Performed by: INTERNAL MEDICINE

## 2025-04-08 PROCEDURE — 250N000013 HC RX MED GY IP 250 OP 250 PS 637: Performed by: PSYCHIATRY & NEUROLOGY

## 2025-04-08 PROCEDURE — 250N000013 HC RX MED GY IP 250 OP 250 PS 637: Performed by: HOSPITALIST

## 2025-04-08 PROCEDURE — 80048 BASIC METABOLIC PNL TOTAL CA: CPT | Performed by: NURSE PRACTITIONER

## 2025-04-08 PROCEDURE — 250N000013 HC RX MED GY IP 250 OP 250 PS 637: Performed by: INTERNAL MEDICINE

## 2025-04-08 PROCEDURE — 255N000002 HC RX 255 OP 636: Performed by: HOSPITALIST

## 2025-04-08 PROCEDURE — 999N000208 ECHOCARDIOGRAM COMPLETE

## 2025-04-08 PROCEDURE — 36415 COLL VENOUS BLD VENIPUNCTURE: CPT | Performed by: NURSE PRACTITIONER

## 2025-04-08 RX ORDER — ZOLPIDEM TARTRATE 5 MG/1
5 TABLET ORAL
Status: DISCONTINUED | OUTPATIENT
Start: 2025-04-08 | End: 2025-04-08

## 2025-04-08 RX ORDER — FUROSEMIDE 10 MG/ML
40 INJECTION INTRAMUSCULAR; INTRAVENOUS 2 TIMES DAILY
Status: DISCONTINUED | OUTPATIENT
Start: 2025-04-08 | End: 2025-04-08

## 2025-04-08 RX ORDER — FUROSEMIDE 10 MG/ML
40 INJECTION INTRAMUSCULAR; INTRAVENOUS
Status: DISCONTINUED | OUTPATIENT
Start: 2025-04-08 | End: 2025-04-08

## 2025-04-08 RX ORDER — ZOLPIDEM TARTRATE 5 MG/1
5 TABLET ORAL ONCE
Status: COMPLETED | OUTPATIENT
Start: 2025-04-08 | End: 2025-04-08

## 2025-04-08 RX ORDER — CARBOXYMETHYLCELLULOSE SODIUM 5 MG/ML
1 SOLUTION/ DROPS OPHTHALMIC 4 TIMES DAILY PRN
Status: DISCONTINUED | OUTPATIENT
Start: 2025-04-08 | End: 2025-04-11 | Stop reason: HOSPADM

## 2025-04-08 RX ORDER — SODIUM CHLORIDE 9 MG/ML
INJECTION, SOLUTION INTRAVENOUS CONTINUOUS
Status: DISCONTINUED | OUTPATIENT
Start: 2025-04-08 | End: 2025-04-08

## 2025-04-08 RX ORDER — ZOLPIDEM TARTRATE 5 MG/1
10 TABLET ORAL
Status: DISCONTINUED | OUTPATIENT
Start: 2025-04-08 | End: 2025-04-11 | Stop reason: HOSPADM

## 2025-04-08 RX ADMIN — CARVEDILOL 25 MG: 25 TABLET, FILM COATED ORAL at 16:55

## 2025-04-08 RX ADMIN — CEFTRIAXONE SODIUM 2 G: 2 INJECTION, POWDER, FOR SOLUTION INTRAMUSCULAR; INTRAVENOUS at 22:50

## 2025-04-08 RX ADMIN — ISOSORBIDE MONONITRATE 60 MG: 60 TABLET, EXTENDED RELEASE ORAL at 10:32

## 2025-04-08 RX ADMIN — LORAZEPAM 0.5 MG: 0.5 TABLET ORAL at 13:12

## 2025-04-08 RX ADMIN — BUPROPION HYDROCHLORIDE 300 MG: 150 TABLET, EXTENDED RELEASE ORAL at 10:32

## 2025-04-08 RX ADMIN — PERFLUTREN 10 ML: 6.52 INJECTION, SUSPENSION INTRAVENOUS at 09:26

## 2025-04-08 RX ADMIN — ASPIRIN 81 MG CHEWABLE TABLET 81 MG: 81 TABLET CHEWABLE at 10:32

## 2025-04-08 RX ADMIN — BACLOFEN 5 MG: 5 TABLET ORAL at 14:53

## 2025-04-08 RX ADMIN — BUPRENORPHINE AND NALOXONE 1 FILM: 4; 1 FILM BUCCAL; SUBLINGUAL at 22:51

## 2025-04-08 RX ADMIN — ACETAMINOPHEN 1000 MG: 500 TABLET, FILM COATED ORAL at 13:12

## 2025-04-08 RX ADMIN — LORAZEPAM 0.5 MG: 0.5 TABLET ORAL at 00:21

## 2025-04-08 RX ADMIN — CARBOXYMETHYLCELLULOSE SODIUM 1 DROP: 5 SOLUTION/ DROPS OPHTHALMIC at 16:54

## 2025-04-08 RX ADMIN — DEXTRAN 70, GLYCERIN, HYPROMELLOSE 1 DROP: 1; 2; 3 SOLUTION/ DROPS OPHTHALMIC at 10:47

## 2025-04-08 RX ADMIN — CLOPIDOGREL BISULFATE 75 MG: 75 TABLET, FILM COATED ORAL at 10:32

## 2025-04-08 RX ADMIN — PANTOPRAZOLE SODIUM 40 MG: 40 TABLET, DELAYED RELEASE ORAL at 10:32

## 2025-04-08 RX ADMIN — BUPRENORPHINE AND NALOXONE 0.25 FILM: 4; 1 FILM BUCCAL; SUBLINGUAL at 10:37

## 2025-04-08 RX ADMIN — BUSPIRONE HYDROCHLORIDE 5 MG: 5 TABLET ORAL at 22:51

## 2025-04-08 RX ADMIN — CARVEDILOL 25 MG: 25 TABLET, FILM COATED ORAL at 10:32

## 2025-04-08 RX ADMIN — AMITRIPTYLINE HYDROCHLORIDE 150 MG: 75 TABLET, FILM COATED ORAL at 22:51

## 2025-04-08 RX ADMIN — ROSUVASTATIN CALCIUM 20 MG: 20 TABLET, FILM COATED ORAL at 10:32

## 2025-04-08 RX ADMIN — BUSPIRONE HYDROCHLORIDE 5 MG: 5 TABLET ORAL at 10:33

## 2025-04-08 RX ADMIN — ALBUTEROL SULFATE 2 PUFF: 108 INHALANT RESPIRATORY (INHALATION) at 10:47

## 2025-04-08 RX ADMIN — ZOLPIDEM TARTRATE 5 MG: 5 TABLET, COATED ORAL at 01:04

## 2025-04-08 RX ADMIN — DEXTRAN 70, GLYCERIN, HYPROMELLOSE 1 DROP: 1; 2; 3 SOLUTION/ DROPS OPHTHALMIC at 23:06

## 2025-04-08 RX ADMIN — BUPRENORPHINE AND NALOXONE 0.5 FILM: 4; 1 FILM BUCCAL; SUBLINGUAL at 13:12

## 2025-04-08 RX ADMIN — FUROSEMIDE 40 MG: 10 INJECTION, SOLUTION INTRAVENOUS at 13:13

## 2025-04-08 RX ADMIN — FLUTICASONE FUROATE AND VILANTEROL TRIFENATATE 1 PUFF: 100; 25 POWDER RESPIRATORY (INHALATION) at 10:47

## 2025-04-08 RX ADMIN — BUPRENORPHINE AND NALOXONE 1 FILM: 4; 1 FILM BUCCAL; SUBLINGUAL at 16:55

## 2025-04-08 RX ADMIN — OXYCODONE HYDROCHLORIDE AND ACETAMINOPHEN 1000 MG: 500 TABLET ORAL at 10:32

## 2025-04-08 ASSESSMENT — ACTIVITIES OF DAILY LIVING (ADL)
ADLS_ACUITY_SCORE: 33
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
## 2025-04-08 NOTE — PLAN OF CARE
A&O x4. Pt reported foot cramping, tylenol, baclofin and ativan given. VSS, on RA. Up w/ IND. Tele: SR 1* AVB and BBB. Pt refused am Lasix but agreed to noon dose. ECHO completed.   Plan for continued diuresis  tomorrow. Continue to monitor.

## 2025-04-08 NOTE — PLAN OF CARE
Active Problem: SOB, pneumonia, fluid overload  Hx: NSTEMI, CAD, HTN, CKD4  VS: VSS  Tele/Cardiac: EF: 45% in 2023, SR w/ BBB/1 degree AVB  [place]/Neuro: A&O x4. Ativan given for anxiety. Ambien given for sleep.  Resp: RA  Pain: 7/10 headache, gave tylenol with no relief. Offered O2 to poss assist with headache as O2 sat was 91. However, patient didn't want to move from the recliner to bed  GI/: Urinal, UA still needed  Skin: WDL  IV Lines/Drains: PIV SL  Activity/Diet: Independent, as tolerated. Reg/2000 mL fluid restriction  Plan: ECHO (poss discharge depending on the results), outpt thyroid US

## 2025-04-08 NOTE — PROVIDER NOTIFICATION
MD Notification    Notified Person: MD    Notified Person Name:Dr. Flores     Notification Date/Time:4/8/25 0058    Notification Interaction:vocera    Purpose of Notification:Pt here for pna. Takes ambien at home, held by provider. Gave melatonin and ativan. Pt is anxious and requesting ambien. Please advise, thanks    Orders Received:    Comments:

## 2025-04-08 NOTE — SIGNIFICANT EVENT
Significant Event Note    Time of event: 12:59 AM April 8, 2025    Description of event:  insomnia    Plan:  5mg ambien once    Josesito Flores MD

## 2025-04-08 NOTE — PLAN OF CARE
Neuro:  Tele/cardiac:  Respiration:  Activity:  Pain:  Drips:  Drains/tubes:  Skin:  GI/:  Aggression color:  Isolation:  Plan:

## 2025-04-08 NOTE — PLAN OF CARE
Neuro: A&O x4  Tele/cardiac: SR with 1* AVB  Respiration: RA  Activity: Ind  Drips: PIV SL  Drains/tubes: none  Skin: intact  GI/: continent  Aggression color: green  Isolation: none  Plan: continue plan of care

## 2025-04-09 ENCOUNTER — APPOINTMENT (OUTPATIENT)
Dept: ULTRASOUND IMAGING | Facility: CLINIC | Age: 55
DRG: 193 | End: 2025-04-09
Attending: INTERNAL MEDICINE
Payer: COMMERCIAL

## 2025-04-09 ENCOUNTER — APPOINTMENT (OUTPATIENT)
Dept: OCCUPATIONAL THERAPY | Facility: CLINIC | Age: 55
DRG: 193 | End: 2025-04-09
Payer: COMMERCIAL

## 2025-04-09 LAB
ALBUMIN MFR UR ELPH: 144.9 MG/DL
ALBUMIN SERPL BCG-MCNC: 4.1 G/DL (ref 3.5–5.2)
ALBUMIN SERPL BCG-MCNC: 4.1 G/DL (ref 3.5–5.2)
ALBUMIN UR-MCNC: 100 MG/DL
ALP SERPL-CCNC: 109 U/L (ref 40–150)
ALT SERPL W P-5'-P-CCNC: 106 U/L (ref 0–70)
ANION GAP SERPL CALCULATED.3IONS-SCNC: 9 MMOL/L (ref 7–15)
APPEARANCE UR: CLEAR
AST SERPL W P-5'-P-CCNC: 69 U/L (ref 0–45)
BILIRUB SERPL-MCNC: 0.4 MG/DL
BILIRUB UR QL STRIP: NEGATIVE
BUN SERPL-MCNC: 38.9 MG/DL (ref 6–20)
CALCIUM SERPL-MCNC: 8.6 MG/DL (ref 8.8–10.4)
CHLORIDE SERPL-SCNC: 99 MMOL/L (ref 98–107)
COLOR UR AUTO: YELLOW
CREAT SERPL-MCNC: 4.05 MG/DL (ref 0.67–1.17)
CREAT UR-MCNC: 228.2 MG/DL
EGFRCR SERPLBLD CKD-EPI 2021: 16 ML/MIN/1.73M2
EOSINOPHIL SMEAR SPECIMEN TYPE: NORMAL
EOSINOPHIL SPEC QL WRIGHT STN: NORMAL
GLUCOSE SERPL-MCNC: 75 MG/DL (ref 70–99)
GLUCOSE UR STRIP-MCNC: NEGATIVE MG/DL
HCO3 SERPL-SCNC: 31 MMOL/L (ref 22–29)
HGB UR QL STRIP: NEGATIVE
HYALINE CASTS: 16 /LPF
KETONES UR STRIP-MCNC: NEGATIVE MG/DL
LEUKOCYTE ESTERASE UR QL STRIP: NEGATIVE
LOCATION OF TASK: NORMAL
MUCOUS THREADS #/AREA URNS LPF: PRESENT /LPF
NITRATE UR QL: NEGATIVE
PH UR STRIP: 6 [PH] (ref 5–7)
PHOSPHATE SERPL-MCNC: 5.3 MG/DL (ref 2.5–4.5)
POTASSIUM SERPL-SCNC: 4.7 MMOL/L (ref 3.4–5.3)
PROT PATTERN SERPL IFE-IMP: NORMAL
PROT SERPL-MCNC: 6.7 G/DL (ref 6.4–8.3)
PROT/CREAT 24H UR: 0.63 MG/MG CR (ref 0–0.2)
RBC URINE: 1 /HPF
SODIUM SERPL-SCNC: 139 MMOL/L (ref 135–145)
SP GR UR STRIP: 1.02 (ref 1–1.03)
UROBILINOGEN UR STRIP-MCNC: NORMAL MG/DL
WBC URINE: 1 /HPF

## 2025-04-09 PROCEDURE — 99233 SBSQ HOSP IP/OBS HIGH 50: CPT | Mod: FS | Performed by: NURSE PRACTITIONER

## 2025-04-09 PROCEDURE — 97140 MANUAL THERAPY 1/> REGIONS: CPT | Mod: GO | Performed by: OCCUPATIONAL THERAPIST

## 2025-04-09 PROCEDURE — 80053 COMPREHEN METABOLIC PANEL: CPT | Performed by: INTERNAL MEDICINE

## 2025-04-09 PROCEDURE — 250N000013 HC RX MED GY IP 250 OP 250 PS 637: Performed by: INTERNAL MEDICINE

## 2025-04-09 PROCEDURE — 76770 US EXAM ABDO BACK WALL COMP: CPT

## 2025-04-09 PROCEDURE — 84100 ASSAY OF PHOSPHORUS: CPT | Performed by: INTERNAL MEDICINE

## 2025-04-09 PROCEDURE — 258N000003 HC RX IP 258 OP 636: Performed by: HOSPITALIST

## 2025-04-09 PROCEDURE — 250N000011 HC RX IP 250 OP 636: Performed by: HOSPITALIST

## 2025-04-09 PROCEDURE — 89190 NASAL SMEAR FOR EOSINOPHILS: CPT | Performed by: INTERNAL MEDICINE

## 2025-04-09 PROCEDURE — 81003 URINALYSIS AUTO W/O SCOPE: CPT | Performed by: INTERNAL MEDICINE

## 2025-04-09 PROCEDURE — 99222 1ST HOSP IP/OBS MODERATE 55: CPT | Performed by: INTERNAL MEDICINE

## 2025-04-09 PROCEDURE — 250N000012 HC RX MED GY IP 250 OP 636 PS 637: Performed by: INTERNAL MEDICINE

## 2025-04-09 PROCEDURE — 210N000002 HC R&B HEART CARE

## 2025-04-09 PROCEDURE — 36415 COLL VENOUS BLD VENIPUNCTURE: CPT | Performed by: INTERNAL MEDICINE

## 2025-04-09 PROCEDURE — 250N000013 HC RX MED GY IP 250 OP 250 PS 637: Performed by: PSYCHIATRY & NEUROLOGY

## 2025-04-09 PROCEDURE — 86803 HEPATITIS C AB TEST: CPT | Performed by: INTERNAL MEDICINE

## 2025-04-09 PROCEDURE — 97110 THERAPEUTIC EXERCISES: CPT | Mod: GO | Performed by: OCCUPATIONAL THERAPIST

## 2025-04-09 PROCEDURE — 99233 SBSQ HOSP IP/OBS HIGH 50: CPT | Performed by: INTERNAL MEDICINE

## 2025-04-09 PROCEDURE — 84156 ASSAY OF PROTEIN URINE: CPT | Performed by: INTERNAL MEDICINE

## 2025-04-09 RX ORDER — BUMETANIDE 1 MG/1
2 TABLET ORAL
Status: DISCONTINUED | OUTPATIENT
Start: 2025-04-09 | End: 2025-04-11 | Stop reason: HOSPADM

## 2025-04-09 RX ADMIN — LORAZEPAM 0.5 MG: 0.5 TABLET ORAL at 14:27

## 2025-04-09 RX ADMIN — ACETAMINOPHEN 1000 MG: 500 TABLET, FILM COATED ORAL at 08:56

## 2025-04-09 RX ADMIN — OXYCODONE HYDROCHLORIDE AND ACETAMINOPHEN 1000 MG: 500 TABLET ORAL at 08:56

## 2025-04-09 RX ADMIN — BUPROPION HYDROCHLORIDE 300 MG: 150 TABLET, EXTENDED RELEASE ORAL at 08:55

## 2025-04-09 RX ADMIN — ASPIRIN 81 MG CHEWABLE TABLET 81 MG: 81 TABLET CHEWABLE at 08:56

## 2025-04-09 RX ADMIN — CLOPIDOGREL BISULFATE 75 MG: 75 TABLET, FILM COATED ORAL at 08:56

## 2025-04-09 RX ADMIN — AMITRIPTYLINE HYDROCHLORIDE 150 MG: 75 TABLET, FILM COATED ORAL at 21:34

## 2025-04-09 RX ADMIN — BUSPIRONE HYDROCHLORIDE 5 MG: 5 TABLET ORAL at 08:55

## 2025-04-09 RX ADMIN — BUSPIRONE HYDROCHLORIDE 5 MG: 5 TABLET ORAL at 21:34

## 2025-04-09 RX ADMIN — ROSUVASTATIN CALCIUM 20 MG: 20 TABLET, FILM COATED ORAL at 08:55

## 2025-04-09 RX ADMIN — DEXTRAN 70, GLYCERIN, HYPROMELLOSE 1 DROP: 1; 2; 3 SOLUTION/ DROPS OPHTHALMIC at 21:35

## 2025-04-09 RX ADMIN — BUPRENORPHINE AND NALOXONE 1 FILM: 4; 1 FILM BUCCAL; SUBLINGUAL at 14:33

## 2025-04-09 RX ADMIN — ACETAMINOPHEN 1000 MG: 500 TABLET, FILM COATED ORAL at 01:39

## 2025-04-09 RX ADMIN — BUMETANIDE 2 MG: 1 TABLET ORAL at 18:18

## 2025-04-09 RX ADMIN — DEXTRAN 70, GLYCERIN, HYPROMELLOSE 1 DROP: 1; 2; 3 SOLUTION/ DROPS OPHTHALMIC at 09:13

## 2025-04-09 RX ADMIN — AZITHROMYCIN MONOHYDRATE 250 MG: 500 INJECTION, POWDER, LYOPHILIZED, FOR SOLUTION INTRAVENOUS at 01:11

## 2025-04-09 RX ADMIN — LORAZEPAM 0.5 MG: 0.5 TABLET ORAL at 00:55

## 2025-04-09 RX ADMIN — FLUTICASONE FUROATE AND VILANTEROL TRIFENATATE 1 PUFF: 100; 25 POWDER RESPIRATORY (INHALATION) at 09:13

## 2025-04-09 RX ADMIN — ZOLPIDEM TARTRATE 10 MG: 5 TABLET, FILM COATED ORAL at 01:07

## 2025-04-09 ASSESSMENT — ACTIVITIES OF DAILY LIVING (ADL)
ADLS_ACUITY_SCORE: 33
ADLS_ACUITY_SCORE: 34
ADLS_ACUITY_SCORE: 33
ADLS_ACUITY_SCORE: 34
ADLS_ACUITY_SCORE: 33
ADLS_ACUITY_SCORE: 34
ADLS_ACUITY_SCORE: 33
ADLS_ACUITY_SCORE: 34
ADLS_ACUITY_SCORE: 34
ADLS_ACUITY_SCORE: 33

## 2025-04-09 NOTE — PLAN OF CARE
Goal Outcome Evaluation:  -~Care plan-end of shift note:   -~Orientation/Mentation:A&O x4  -~VS: VSS on RA  -~LS/Pulm:Ls diminished   -~Tele/Cardiac:SR w/1st degree AVB+BBB  -~GI:WDL  -~:WDL, voids adequate UOP, declined bladder scan   -~Pain:managed with tylenol  -~Mobility:up indep  -~Skin:intact  -~Diet:regular  -~Lines/IVs:PIV SL  -~Safety/Concern:calls appropriately   -~Aggression color:green  -~Plan/Shift summary/Goals:denies SOB/CP. Plans for nephrology consult & US renal complete non-vascular. Continues on basic workup for ATTR amyloid

## 2025-04-09 NOTE — PROGRESS NOTES
[facility]    Medicine Progress Note - Hospitalist Service    Date of Admission:  4/6/2025  Interval History   Patient has baseline advanced renal disease and now with acute and rapid increase in creatinine.   ECHO showing reduced EF. (Global hypokinesis, 2-3+ MR)   Discussion with patient who is sitting in the chair. He is sleepier this am. (Received ambien at night)   He has edema in his legs. Med for BP and diuresis on hold.   Nephrology seeing patient today     Assessment & Plan   Jeremi Damon is a 54 year old male with a past medical history of cad s/p leeann, htn, hypertrophic cardiomyopathy, combined systolic and diastolic heart failure, CKD stage IV, mild persistent asthma, chronic back pain, depression, bipolar 2 disorder, ADHD, sleep apnea presents to hospital with shortness of breath.    CAD s/p LEEANN  Hypertrophic cardiomyopathy without LVOT  Combined systolic and diastolic heart failure  Ef 45% in 2023  (Meds prescribed prior to admission Demadex 10 mg as needed, Imdur 60 mg, hydralazine 50 mg twice daily, Coreg 25 mg twice daily, Norvasc 5 mg) and Proscar for BPH  Patient presents with worsening shortness of breath reports orthopnea, exertional dyspnea, bilateral lower extremity swelling.    4/6 presentation to the ER with increasing lower extremity edema.    Chest x-ray showing likely pulmonary vascular congestion and trace interstitial edema with small bilateral effusions.  CT chest with small bilateral pleural effusions, patchy groundglass opacities right upper lobe consistent with atypical pneumonia and left upper lobe few patchy groundglass foci  Reports in the ER potential noncompliance with his diuretic.  Does not check his weight.  On admission he was noted to have increasing bilateral lower extremity edema  On admission appeared to be fluid or volume overload.  4/7 cardiology consult.  Suspicious more for CHF exacerbation then pneumonia.  4/7 Lasix dose 40 mg IV twice  daily  He has not seen his cardiologist in approximately 2 years  Strict I and output  Daily weight   Previous echo 4/2023 showing an EF 40-45% with severe concentric LVH, no wall motion abnormality.  Ascending aorta 4  Lasix 40 IV twice daily started this admit   4/7 resumed on Coreg 25 mg twice daily, Imdur 60 mg initially this admit   4/7 ECHO: The LV is moderately dilated based on volumes. Severely increased LV wall thickness is noted as previously reported. Visually estimated ejection fraction is 25 to 30%.There is severe diffuse LV global hypokinesis noted with abnormal septal motion consistent with conduction abnormality. Minor regional variations arepresent. The RV is mild to moderately dilated with mild RVH. Systolic function is normal visually and on Doppler. Severe left atrial and moderate to severe right atrial dilatation is noted. Mitral leaflets are mildly thickened and there is tenting. There is a eccentric posteriorly directed jet of 2-3+ mitral regurgitation. Moderate pulmonary hypertension   4/8 per cardiology ongoing diuresis.   Fluid overload on admit with lower extremity edema, pleural effusions (BNP 4500)   4/9 increase in creatinine this admit and progressively worse with diuresis in setting of fluid retention and cardiomyopathy. (Renal workup and evaluation ordered)   4/9 Diuretics on hold, BP meds held this am to increase MAP (discuss with nephrology if higher MAP)   ECHO with notable change in EF And severe LVH, diffuse global hypokinesis and RV.   At some point cardiac MRI to exclude infiltrative CM or HCM (do as outpatient)   Work up for ATTR amyoid  COMT is on hold with renal function     History of CAD   Coronary angiogram 11/2022 one-vessel obstructive coronary artery disease PCI of mid to distal LAD with FERNANDA x 3  Troponin 81 >> 84  Further per cardiology as above     Hyperlipidemia  PTA rosuvastatin 20 mg daily    HTN  PTA meds as above but ?? Compliance   Patient on multiple  medications reported prior to admission but would question compliancy to some extent  Resumed on Coreg 25 mg twice daily  Imdur 60 mg daily   Hold in am and discuss with nephrology as well as may have too low MAP   4/9 His BP in clinic review show that he has not been overly hypertensive and in fact more likely normotensive but uncertain of trend (at times has spikes in BP) overall compliance uncertain      ??Community Acquired Pneumonia: clinically more likely CHF   CT chest with groundglass opacities right worse than left (?  Atypical pneumonia however appears clinically to have fluid overload )  Patient presenting with shortness of breath for 2 weeks found to have groundglass opacities in the upper lobes.    No infectious symptoms such as fevers, chills, cough. 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  Avoid nephrotoxic medications  4/7 creatinine 3.07  Monitor daily with BMP with diuretic   At baseline he has had progressive CKD as outpatient.   4/9 since admit his renal function has progressively gotten worse.   With diuresis worsening renal function in setting of fluid overload on exam   CT on admit with no overt obstruction or hydronephrosis (NON contrast)   Reports lot of NSAIDS at times up to 1600 mg in a day. (Urine eosinophils neg)   Renal consult pending   Renal ultrasound ordered.   Patient refused PVR   Total protein /creatinine ratio 0.63   Albumin normal 4.1   4/9 nephrology consult      Thyroid goiter  Prominent left lobe of thyroid seen on ct  Follow up US thyroid    Mild persistent asthma  Lungs are clear on auscultation.     MDD  Bipolar 2 disorder  ADHD  Patient has  "multiple medications currently for his mood disorder (NOS)   Patient does have a history of anxiety.  He is currently on Wellbutrin 300 XL daily and BuSpar.  2/13/2025 discussion of potential untreated bipolar disorder including mood instability recommending a psychiatric consult.  He was referred to psychiatry.  Complex history with opioid/pain management   4/7 will get psychiatry consult this admission.  Currently taking Adderall 20 mg twice a day  Wellbutrin 300 mg XL daily  BuSpar 5 mg 2 times daily as needed  Klonopin 1 mg p.o. twice daily for flying  Ativan 1 mg twice a day as needed for anxiety (60 tablets) last refill 3/18  Ambien 10 mg as needed for sleep  4/7 psychiatry consult rec schedule buspar   Discussion of tapering off elavil at his outpatient visit.     Pain clinic  Follows outpatient on suboxone 8-2 1/2 film QID ( confirmed with pharmacy)      Sleep apnea  Does not use CPAP         Diet: Fluid restriction 2000 ML FLUID (and additional linked orders)  Combination Diet Regular Diet Adult    DVT Prophylaxis: Pneumatic Compression Devices  Yee Catheter: Not present  Lines: None     Cardiac Monitoring: ACTIVE order. Indication: Acute decompensated heart failure (48 hours)  Code Status: Full Code      Clinically Significant Risk Factors          # Hypochloremia: Lowest Cl = 97 mmol/L in last 2 days, will monitor as appropriate  # Hypocalcemia: Lowest Ca = 8.5 mg/dL in last 2 days, will monitor and replace as appropriate        # Acute Kidney Injury, unspecified: based on a >150% or 0.3 mg/dL increase in last creatinine compared to past 90 day average, will monitor renal function  # Hypertension: Noted on problem list  # Acute heart failure with reduced ejection fraction: last echo with EF <40% and receiving IV diuretics           # Obesity: Estimated body mass index is 33.78 kg/m  as calculated from the following:    Height as of this encounter: 1.803 m (5' 11\").    Weight as of this encounter: 109.9 " kg (242 lb 3.2 oz)., PRESENT ON ADMISSION     # Financial/Environmental Concerns: none  # Asthma: noted on problem list        Social Drivers of Health    Depression: At risk (2/13/2025)    PHQ-2     PHQ-2 Score: 5   Physical Activity: Unknown (12/31/2024)    Exercise Vital Sign     Days of Exercise per Week: 2 days   Stress: Stress Concern Present (12/31/2024)    [employer] [place] [employer] - Occupational Stress Questionnaire     Feeling of Stress : Very much   Social Connections: Unknown (12/31/2024)    Social Connection and Isolation Panel [NHANES]     Frequency of Social Gatherings with Friends and Family: Once a week          Disposition Plan     Medically Ready for Discharge: Anticipated in 2-4 Days       CELINE RODRIGUEZ MD  Hospitalist Service  [facility]  Securely message with [employer] (more info)  Text page via [employer] Paging/Directory   ______________________________________________________________________      Physical Exam   Vital Signs: Temp: 98.1  F (36.7  C) Temp src: Oral BP: (!) 141/106 Pulse: 68   Resp: 16 SpO2: 97 % O2 Device: None (Room air)    Weight: 242 lbs 3.2 oz    General Appearance: Patient was seen walking back from the bathroom and then reporting he was having a panic attack  Respiratory: Breath sounds distant but relatively clear to auscultation bilaterally  Cardiovascular: Regular rate and rhythm without gallop rub normal S1-S2  GI: Positive bowel sounds soft rebound guarding tender  Skin: 2+ edema bilaterally into his feet      Medical Decision Making       50 MINUTES SPENT BY ME on the date of service doing chart review, history, exam, documentation & further activities per the note.    Evaluation of labs, imaging, care plan.   Discussion with cardiology and nephrology     Data     I have personally reviewed the following data over the past 24 hrs:    N/A  \   N/A   / N/A     139 99 38.9 (H) /  75   4.7 31 (H) 4.05 (H) \     ALT: 106 (H) AST: 69 (H)  AP: 109 TBILI: 0.4   ALB: 4.1; 4.1 TOT PROTEIN: 6.7 LIPASE: N/A

## 2025-04-09 NOTE — PLAN OF CARE
Goal Outcome Evaluation:    VSS. Monitor shows Sinus rhythm with 1st degree AVB,BBB. Tylenol given for headache. Pt. Has 3+ pedal edema. Edemawear placed per OT. Ativan given for anxiety. Continue plan of care.

## 2025-04-09 NOTE — CONSULTS
[facility]    RENAL CONSULTATION NOTE    REFERRING MD:  Dr. Nevarez    REASON FOR CONSULTATION:  PEDRO/CKD IV    DATE OF CONSULTATION: 04/09/25    SHORTHAND KEY FOR MY NOTES:  c = with, s = without, p = after, a = before, x = except, asx = asymptomatic, tx = transplant or treatment, sx = symptoms or symptomatic, cx = canceled or culture, rxn = reaction, yday = yesterday, nl = normal, abx = antibiotics, fxn = function, dx = diagnosis, dz = disease, m/h = melena/hematochezia, c/d/l/ha = cramping/dizziness/lightheadedness/headache, d/c = discharge or diarrhea/constipation, f/c/n/v = fevers/chills/nausea/vomiting, cp/sob = chest pain/shortness of breath, tbv = total body volume, rxn = reaction, tdc = tunneled dialysis catheter, pta = prior to admission, hd = hemodialysis, pd = peritoneal dialysis, hhd = home hemodialysis, edw = estimated dry wt    HPI: Jeremi Daomn is a 54 year old male c CKD IV 2 CRS/HTN/other? who was admitted on 4/6/2025 c SOB and found to have B pneumonia, CHF exacerbation, and now has PEDRO.  Notes from Leanna Caldera (ER), Avis and Geri (Hosp) were reviewed.    Pt was having trouble sleeping x 2 wks bc he couldn't breathe well when he would lie down.  In addition, his legs became progressively more swollen over the same period of time.  He was not taking diuretics at home.  Of note, he was taking a lot of ibu.    In the ER, a CXR was wet and he was started on IV furos.  A CT showed atypical pneumonia c B infiltrates and he was started on broad abx.  He had an echo done on admission and his EF was 25-30%, which was lower than a few yrs ago, and he has persistent severe LVH.    His Cr was in the low 3s on admission, and with diuresis his Cr has risen to ~4.  As a result, the diuretics were held today even though he is still swollen and has orthopnea.  His recent baseline Cr range appears to be in the mid-to-high 2s and he had abn Cr readings as much as 20+ yrs ago when his  range was in the low-to-mid 1s.  Today, he does not have any uremic sx.  He has prostatic sx but denies any UTI sx.      ROS:  A complete review of systems was performed and is x as noted above.    PMH:    Past Medical History:   Diagnosis Date    ADHD (attention deficit hyperactivity disorder)     Allergic rhinitis, cause unspecified     Allergic rhinitis    Benign hypertension     CAD (coronary artery disease)     cardiac cath 11/23/2022: FERNANDA x3 to LAD    Chronic back pain     Hiccough     Hypersomnia with sleep apnea, unspecified     Hypertensive emergency 11/19/2022    Major depressive disorder, single episode, moderate (H) 03/2008    Mild persistent asthma     NSTEMI (non-ST elevated myocardial infarction) (H) 11/29/2020    Other primary cardiomyopathies     Cardiomyopathy -- unknown etiology     PSH:    Past Surgical History:   Procedure Laterality Date    APPENDECTOMY  2007    BACK SURGERY  2014    L5-S1 fusion    COLONOSCOPY N/A 11/18/2024    Procedure: Colonoscopy;  Surgeon: Randal Pennington MD;  Location:  GI    CV CORONARY ANGIOGRAM N/A 11/30/2020    Procedure: Heart Catheterization with Possible Intervention;  Surgeon: Theo Donahue MD;  Location: [facility] CARDIAC CATH LAB    CV CORONARY ANGIOGRAM N/A 11/23/2022    Procedure: Coronary Angiogram;  Surgeon: Venkata Hdez MD;  Location: [facility] CARDIAC CATH LAB    CV PCI N/A 11/23/2022    Procedure: Percutaneous Coronary Intervention;  Surgeon: Venkata Hdez MD;  Location:  HEART CARDIAC CATH LAB    DAVINCI NEPHRECTOMY PARTIAL Left 01/08/2024    Procedure: ROBOTIC ASSISTED LAPAROSCOPIC PARTIAL NEPHRECTOMY - LEFT,  ROBOTIC ASSISTED LAPAROSCOPIC INTRA-OPERATIVE RENAL ULTRASOUND;  Surgeon: Chung Howard MD;  Location:  OR    ESOPHAGOSCOPY, GASTROSCOPY, DUODENOSCOPY (EGD), COMBINED N/A 11/18/2024    Procedure: Esophagoscopy, gastroscopy, duodenoscopy (EGD), combined;  Surgeon: Randal Pennington MD;  Location:  GI     LAPAROSCOPIC CHOLECYSTECTOMY  01/2005    Cholecystectomy, Laparoscopic    NOSE SURGERY  2009    Bilateral radiofrequency volume reduction of the inferior turbinates.    RHINOPLASTY  2012    rhinoplasty- septoplasty    SURGICAL HISTORY OF -       torn pectoral muscle     MEDICATIONS:    Current Facility-Administered Medications   Medication Dose Route Frequency Provider Last Rate Last Admin    albuterol (PROVENTIL HFA/VENTOLIN HFA) inhaler  2 puff Inhalation Q6H Awa Nevarez MD   2 puff at 04/08/25 1047    amitriptyline (ELAVIL) tablet 150 mg  150 mg Oral At Bedtime Awa Nevarez MD   150 mg at 04/08/25 2251    artificial tears (GENTEAL) 0.1-0.2-0.3 % ophthalmic solution 1 drop  1 drop Both Eyes BID Awa Nevarez MD   1 drop at 04/09/25 0913    aspirin (ASA) chewable tablet 81 mg  81 mg Oral Daily Awa Nevarez MD   81 mg at 04/09/25 0856    azithromycin (ZITHROMAX) 250 mg in sodium chloride 0.9 % 250 mL intermittent infusion  250 mg Intravenous Q24H Kyle Mccarty MD   250 mg at 04/09/25 0111    buprenorphine HCl-naloxone HCl (SUBOXONE) 4-1 MG per film 1 Film  1 Film Sublingual 4x Daily Awa Nevarez MD   1 Film at 04/09/25 1433    buPROPion (WELLBUTRIN XL) 24 hr tablet 300 mg  300 mg Oral QAM Awa Nevarez MD   300 mg at 04/09/25 0855    busPIRone (BUSPAR) tablet 5 mg  5 mg Oral BID Hayley Barton MD   5 mg at 04/09/25 0855    [Held by provider] carvedilol (COREG) tablet 25 mg  25 mg Oral BID w/meals Awa Nevarez MD   25 mg at 04/08/25 1655    cefTRIAXone (ROCEPHIN) 2 g vial to attach to  ml bag for ADULTS or NS 50 ml bag for PEDS  2 g Intravenous Q24H Kyle Mccarty MD   2 g at 04/08/25 2250    clopidogrel (PLAVIX) tablet 75 mg  75 mg Oral Daily Awa Nevarez MD   75 mg at 04/09/25 0856    [Held by provider] finasteride (PROSCAR) tablet 5 mg  5 mg Oral Daily Awa Nevarez MD        fluticasone-vilanterol (BREO ELLIPTA) 100-25 MCG/ACT inhaler 1  puff  1 puff Inhalation Daily Awa Nevarez MD   1 puff at 25 0913    [Held by provider] isosorbide mononitrate (IMDUR) 24 hr tablet 60 mg  60 mg Oral Daily Awa Nevarez MD   60 mg at 25 1032    [Held by provider] pantoprazole (PROTONIX) EC tablet 40 mg  40 mg Oral Daily Awa Nevarez MD   40 mg at 25 1032    rosuvastatin (CRESTOR) tablet 20 mg  20 mg Oral Daily Awa Nevarez MD   20 mg at 25 0855    sodium chloride (PF) 0.9% PF flush 3 mL  3 mL Intracatheter Q8H Kyle Mccarty MD   3 mL at 25 0855    vitamin C (ASCORBIC ACID) tablet 1,000 mg  1,000 mg Oral Daily Awa Nevarez MD   1,000 mg at 25 0856     ALLERGIES:    Allergies as of 2025    (No Known Allergies)     FH:    Family History   Problem Relation Age of Onset    Coronary Artery Disease Mother          55; MI x 2    Cancer Father         hodgkins    Hypertension Father     Coronary Artery Disease Father     Cardiovascular Sister     Hypertension Brother     Cardiovascular Maternal Grandmother     No Known Problems Daughter     Prostate Cancer No family hx of     Cancer - colorectal No family hx of     Cardiomyopathy No family hx of     Valvular heart disease No family hx of      SH:    Social History     Socioeconomic History    Marital status:      Spouse name: Not on file    Number of children: 1    Years of education: Not on file    Highest education level: Not on file   Occupational History    Occupation: Retired chiropracter.     Employer: [employer]    Tobacco Use    Smoking status: Never    Smokeless tobacco: Never   Vaping Use    Vaping status: Never Used   Substance and Sexual Activity    Alcohol use: Yes     Comment: rarely     Drug use: No    Sexual activity: Yes     Partners: Female     Birth control/protection: Condom   Other Topics Concern    Parent/sibling w/ CABG, MI or angioplasty before 65F 55M? No   Social History Narrative    Not on file     Social  Drivers of Health     Financial Resource Strain: Low Risk  (4/7/2025)    Financial Resource Strain     Within the past 12 months, have you or your family members you live with been unable to get utilities (heat, electricity) when it was really needed?: No   Food Insecurity: Low Risk  (4/7/2025)    Food Insecurity     Within the past 12 months, did you worry that your food would run out before you got money to buy more?: No     Within the past 12 months, did the food you bought just not last and you didn t have money to get more?: No   Transportation Needs: Low Risk  (4/7/2025)    Transportation Needs     Within the past 12 months, has lack of transportation kept you from medical appointments, getting your medicines, non-medical meetings or appointments, work, or from getting things that you need?: No   Physical Activity: Unknown (12/31/2024)    Exercise Vital Sign     Days of Exercise per Week: 2 days     Minutes of Exercise per Session: Not on file   Stress: Stress Concern Present (12/31/2024)    [employer] - Occupational Stress Questionnaire     Feeling of Stress : Very much   Social Connections: Unknown (12/31/2024)    Social Connection and Isolation Panel [NHANES]     Frequency of Communication with Friends and Family: Not on file     Frequency of Social Gatherings with Friends and Family: Once a week     Attends [employer] Services: Not on file     Active Member of Clubs or Organizations: Not on file     Attends Club or Organization Meetings: Not on file     Marital Status: Not on file   Interpersonal Safety: Low Risk  (4/7/2025)    Interpersonal Safety     Do you feel physically and emotionally safe where you currently live?: Yes     Within the past 12 months, have you been hit, slapped, kicked or otherwise physically hurt by someone?: No     Within the past 12 months, have you been humiliated or emotionally abused in other ways by your partner or ex-partner?: No   Housing  "Stability: Low Risk  (4/7/2025)    Housing Stability     Do you have housing? : Yes     Are you worried about losing your housing?: No     PHYSICAL EXAM:    BP (!) 141/106   Pulse 68   Temp 98.1  F (36.7  C) (Oral)   Resp 16   Ht 1.803 m (5' 11\")   Wt 109.9 kg (242 lb 3.2 oz)   SpO2 97%   BMI 33.78 kg/m      GENERAL: awake, alert, NAD  HEENT:  normocephalic, prominent veins on forehead  CV: RRR, nl S1/S2; 3+ ble edema  RESP: CTA B c good efforts  GI: abd s/nt/nd  MUSCULOSKELETAL: extremities nl - no gross deformities noted  SKIN: no suspicious lesions or rashes, dry to touch  NEURO:  strength normal and symmetric  PSYCH: mood good, affect appropriate    LABS:      CBC RESULTS:     Recent Labs   Lab 04/07/25  0614 04/06/25  1642   WBC 9.9 10.0   RBC 6.02* 6.06*   HGB 11.3* 11.4*   HCT 41.5 41.5    371     BMP RESULTS:  Recent Labs   Lab 04/09/25  0615 04/08/25  2125 04/08/25  1030 04/07/25  0614 04/06/25  1857 04/06/25  1642    136 139 138 137 135   POTASSIUM 4.7 4.5 4.5 5.2 5.1 5.0   CHLORIDE 99 97* 97* 100 96* 99   CO2 31* 28 29 26 26 26   BUN 38.9* 36.8* 34.2* 29.3* 27.1* 27.0*   CR 4.05* 3.59* 3.34* 3.07* 3.22* 3.03*   GLC 75 97 101* 81 96 111*   MIKO 8.6* 8.5* 8.8 8.9 9.1 9.2     INRNo lab results found in last 7 days.     DIAGNOSTICS:  Personally reviewed:  CXR - vasc congestion, sm effusions, cardiomegaly; Renal US - large kidneys    A/P:  Jeremi Damon is a 54 year old male c progressive CKD IV of multifactorial etiology and PEDRO.    1.  PEDRO/CKD IV.  The pt's renal dysfxn is due to longstanding HTN and it has prob been worsening due to the cardiorenal syndrome.  He is TBV up and needs more diuretics to achieve euvolemia and a \"dry\" Cr.  The acute rise now is due to NSAIDs + diuresis and the Cr may rise higher as he is properly diuresed.  No uremic sx.  Interestingly, the renal US shows large kidneys, which is the opposite that one would expect c HTN + CRS.  The serum immunofix was neg and " the abd CT doesn't reference any significant lymphadenopathy.  He denies any h/o IVDA or other HIV risk factors.  Pt would like to follow-up in our clinic.  We discussed that it's quite likely he will end up c CKD V and will need proper kidney dz education.  A.  Follow labs, uo, sx.  B.  Resume diuretics.  Will give oral bumet 2 mg bid and see how he does clinically.  C.  Avoid nephrotoxics.  D.  Check HIV, Hep C, C' to be complete.  E.  He will need follow-up in our office given the extent of his renal dysfxn.    2.  HTN.  The pt's BP has been high for a long time and he has been taking TST supps that make it worse.  He has severe LVH on multiple echos but is not interested in stopping the TST.  Currently, the BP mgmt focus should be on diuresing him and then adjust other meds.   A.  Follow BP, clinically.  B.  Once dry, then adjust other meds.    3.  CHF exacerbation.  Pt is being followed by Cards and meds are being adjusted.  A.  Diuretics as above.  B.  Other meds per Cards.    4.  Atypical pneumonia.  Pt is on broad abx.  A.  Continue abx at the proper renal doses.    5.  FEN.  Electrolytes are ok.  A.  Continue reg diet and adjust diuretics accordingly.    Thank you for this consultation. We will follow c you.  Please call if any questions.  Case d/w Dr. Nevarez.      Attestation:   I have reviewed today's relevant vital signs, notes, medications, labs and imaging.    Royce Willis MD  [facility] - Nephrology  [phone]

## 2025-04-09 NOTE — PROGRESS NOTES
[facility]    Medicine Progress Note - Hospitalist Service    Date of Admission:  4/6/2025  Interval History   Patient seen this morning.  He has lower extremity edema.  Started on diuretics.  Asked why she he needs to be seen as he is already been seen by 2 other doctors.   He denies any chest pain.  His echo has shown a reduced EF.  Further workup as per cardiology.   Needs to have nephrology consult.   Discussion with patient and he has been taking NSAID since tooth surgery in January.       Assessment & Plan   Jeremi Dmaon is a 54 year old male with a past medical history of cad s/p leeann, htn, hypertrophic cardiomyopathy, combined systolic and diastolic heart failure, CKD stage IV, mild persistent asthma, chronic back pain, depression, bipolar 2 disorder, ADHD, sleep apnea presents to hospital with shortness of breath.    CAD s/p LEEANN  Hypertrophic cardiomyopathy without LVOT  Combined systolic and diastolic heart failure  Ef 45% in 2023  (Meds prescribed prior to admission Demadex 10 mg as needed, Imdur 60 mg, hydralazine 50 mg twice daily, Coreg 25 mg twice daily, Norvasc 5 mg) and Proscar for BPH  Patient presents with worsening shortness of breath reports orthopnea, exertional dyspnea, bilateral lower extremity swelling.    4/6 presentation to the ER with increasing lower extremity edema.    Chest x-ray showing likely pulmonary vascular congestion and trace interstitial edema with small bilateral effusions.  CT chest with small bilateral pleural effusions, patchy groundglass opacities right upper lobe consistent with atypical pneumonia and left upper lobe few patchy groundglass foci  Reports in the ER potential noncompliance with his diuretic.  Does not check his weight.  On admission he was noted to have increasing bilateral lower extremity edema  On admission appeared to be fluid or volume overload.  4/7 cardiology consult.  Suspicious more for CHF exacerbation then  pneumonia.  4/7 Lasix dose 40 mg IV twice daily  He has not seen his cardiologist in approximately 2 years  Strict I and output  Daily weight   Previous echo for/20 1/2023 showing an EF 40-45% with severe concentric LVH, no wall motion abnormality.  Ascending aorta 4  Lasix 40 IV twice daily  4/7 resumed on Coreg 25 mg twice daily, Imdur 60 mg and on IV Lasix rather than Demadex oral (Readd  hydralazine and/or Norvasc) but await ECHO and use optimal cardiomyopathy meds  4/7 ECHO: The LV is moderately dilated based on volumes. Severely increased LV wall thickness is noted as previously reported. Visually estimated ejection fraction is 25 to 30%.There is severe diffuse LV global hypokinesis noted with abnormal septal motion consistent with conduction abnormality. Minor regional variations arepresent. The RV is mild to moderately dilated with mild RVH. Systolic function is normal visually and on Doppler. Severe left atrial and moderate to severe right atrial dilatation is noted. Mitral leaflets are mildly thickened and there is tenting. There is a eccentric posteriorly directed jet of 2-3+ mitral regurgitation. Moderate pulmonary hypertension   4/8 per cardiology ongoing diuresis.   BNP 4500/   Started on basic workup for ATTR amyloid   MRI to primary cardiologist.   Potentially up to 2.4 liters in last 24 hours.  Weight down 1 pound  Norvasc remains on hold.   Add proscar tomorrow? If BP ok and cards ok.   On coreg 25 mg po BID   Lasix 40 IV BID  Imdur 60 mg po daily   No ACE with renal function   ADDENDUM: patient informed of ECHO results. States he was not aware yet.     History of CAD   Coronary angiogram 11/2022 one-vessel obstructive coronary artery disease PCI of mid to distal LAD with FERNANDA x 3  Troponin 81 >> 84  ECHO pending   Further per cardiology     Hyperlipidemia  PTA rosuvastatin 20 mg daily    HTN  PTA meds as above but ?? Compliance   Patient on multiple medications reported prior to admission but would  question compliancy to some extent  Resumed on Coreg 25 mg twice daily  Imdur 60 mg daily   Hold in am and discuss with nephrology as well as may have too low MAP   4/8 His BP in clinic review show that he has not been overly hypertensive and in fact more likely normotensive   Pre op 11/2024 117/69  ER 12/2024 elevated 168-190 (headache and dizzy)   12/31/2024 clinic 136/84  2/13/2025 /60     ??Community Acquired Pneumonia: clinically more likely CHF   CT chest with groundglass opacities right worse than left (?  Atypical pneumonia however appears clinically to have fluid overload )  Patient presenting with shortness of breath for 2 weeks found to have groundglass opacities in the upper lobes.    No infectious symptoms such as fevers, chills, cough. 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  Avoid nephrotoxic medications  4/7 creatinine 3.07  Monitor daily with BMP with diuretic   Review of chart and he does not have a nephrologist.   He was referred but did not go to the clinic  Will have Nephrology consult   Hold imdur to increase MAP   In addition he has one kidney and Clearance very poor.   He has been taking NSAID as well.  (Taking up to 1600 mg a day)   Renal function discussed with patient   4/8 updated patient on his kidney function.   4/6 CT abdomen/pelvis with no obstruction.   His morning BP meds held for now until reasses of perfusion and his diuretics.   Review of meds with pharmacy and no overt nephrotoxic  Hold PPI with ? Interstitial nephritis but taking NSAID      Thyroid goiter  Prominent left lobe of thyroid seen on ct  Follow up US thyroid     Mild persistent  "asthma  Lungs are clear on auscultation.     MDD  Bipolar 2 disorder  ADHD  Patient has multiple medications currently for his mood disorder (NOS)   Patient does have a history of anxiety.  He is currently on Wellbutrin 300 XL daily and BuSpar.  2/13/2025 discussion of potential untreated bipolar disorder including mood instability recommending a psychiatric consult.  He was referred to psychiatry.  Complex history with opioid/pain management   4/7 will get psychiatry consult this admission.  Currently taking Adderall 20 mg twice a day  Wellbutrin 300 mg XL daily  BuSpar 5 mg 2 times daily as needed  Klonopin 1 mg p.o. twice daily for flying  Ativan 1 mg twice a day as needed for anxiety (60 tablets) last refill 3/18  Ambien 10 mg as needed for sleep    Pain clinic  Follows outpatient on suboxone 8-2 1/2 film QID ( confirmed with pharmacy)      Sleep apnea  Does not use CPAP         Diet: Fluid restriction 2000 ML FLUID (and additional linked orders)  Combination Diet Regular Diet Adult    DVT Prophylaxis: Pneumatic Compression Devices  Yee Catheter: Not present  Lines: None     Cardiac Monitoring: ACTIVE order. Indication: Acute decompensated heart failure (48 hours)  Code Status: Full Code      Clinically Significant Risk Factors          # Hypochloremia: Lowest Cl = 97 mmol/L in last 2 days, will monitor as appropriate         # Acute Kidney Injury, unspecified: based on a >150% or 0.3 mg/dL increase in last creatinine compared to past 90 day average, will monitor renal function  # Hypertension: Noted on problem list  # Acute heart failure with reduced ejection fraction: last echo with EF <40% and receiving IV diuretics           # Obesity: Estimated body mass index is 33.78 kg/m  as calculated from the following:    Height as of this encounter: 1.803 m (5' 11\").    Weight as of this encounter: 109.9 kg (242 lb 3.2 oz)., PRESENT ON ADMISSION     # Financial/Environmental Concerns: none  # Asthma: noted on " problem list        Social Drivers of Health    Depression: At risk (2/13/2025)    PHQ-2     PHQ-2 Score: 5   Physical Activity: Unknown (12/31/2024)    Exercise Vital Sign     Days of Exercise per Week: 2 days   Stress: Stress Concern Present (12/31/2024)    [employer] - Occupational Stress Questionnaire     Feeling of Stress : Very much   Social Connections: Unknown (12/31/2024)    Social Connection and Isolation Panel [NHANES]     Frequency of Social Gatherings with Friends and Family: Once a week          Disposition Plan     Medically Ready for Discharge: Anticipated in 2-4 Days       CELINE RODRIGUEZ MD  Hospitalist Service  [facility]  Securely message with LRN (more info)  Text page via [employer] Paging/Directory   ______________________________________________________________________      Physical Exam   Vital Signs: Temp: 98.6  F (37  C) Temp src: Oral BP: 124/80 Pulse: 65   Resp: 18 SpO2: 93 % O2 Device: None (Room air)    Weight: 242 lbs 3.2 oz    General Appearance: Patient was seen walking back from the bathroom and then reporting he was having a panic attack  Respiratory: Breath sounds distant but relatively clear to auscultation bilaterally  Cardiovascular: Regular rate and rhythm without gallop rub normal S1-S2  GI: Positive bowel sounds soft rebound guarding tender  Skin: 2+ edema bilaterally into his feet      Medical Decision Making       50 MINUTES SPENT BY ME on the date of service doing chart review, history, exam, documentation & further activities per the note.    Review of chart, meds, labs, imaging.   Prior evaluations in clinic. Discussion with psychaitry and nursing   Data     I have personally reviewed the following data over the past 24 hrs:    N/A  \   N/A   / N/A     139 97 (L) 34.2 (H) /  101 (H)   4.5 29 3.34 (H) \
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
## 2025-04-10 ENCOUNTER — APPOINTMENT (OUTPATIENT)
Dept: OCCUPATIONAL THERAPY | Facility: CLINIC | Age: 55
DRG: 193 | End: 2025-04-10
Payer: COMMERCIAL

## 2025-04-10 LAB
ALBUMIN SERPL BCG-MCNC: 4.2 G/DL (ref 3.5–5.2)
ALP SERPL-CCNC: 132 U/L (ref 40–150)
ALT SERPL W P-5'-P-CCNC: 198 U/L (ref 0–70)
ANION GAP SERPL CALCULATED.3IONS-SCNC: 11 MMOL/L (ref 7–15)
AST SERPL W P-5'-P-CCNC: 132 U/L (ref 0–45)
BACTERIA BLD CULT: NORMAL
BACTERIA BLD CULT: NORMAL
BILIRUB SERPL-MCNC: 0.5 MG/DL
BUN SERPL-MCNC: 39.4 MG/DL (ref 6–20)
C3 SERPL-MCNC: 94 MG/DL (ref 81–157)
C4 SERPL-MCNC: 23 MG/DL (ref 13–39)
CA-I BLD-MCNC: 4.3 MG/DL (ref 4.4–5.2)
CALCIUM SERPL-MCNC: 9 MG/DL (ref 8.8–10.4)
CHLORIDE SERPL-SCNC: 96 MMOL/L (ref 98–107)
CREAT SERPL-MCNC: 3.44 MG/DL (ref 0.67–1.17)
EGFRCR SERPLBLD CKD-EPI 2021: 20 ML/MIN/1.73M2
ERYTHROCYTE [DISTWIDTH] IN BLOOD BY AUTOMATED COUNT: 20.7 % (ref 10–15)
GLUCOSE SERPL-MCNC: 75 MG/DL (ref 70–99)
HCO3 SERPL-SCNC: 31 MMOL/L (ref 22–29)
HCT VFR BLD AUTO: 41.3 % (ref 40–53)
HCV AB SERPL QL IA: NONREACTIVE
HGB BLD-MCNC: 11.6 G/DL (ref 13.3–17.7)
HIV 1+2 AB+HIV1 P24 AG SERPL QL IA: NONREACTIVE
MCH RBC QN AUTO: 19.3 PG (ref 26.5–33)
MCHC RBC AUTO-ENTMCNC: 28.1 G/DL (ref 31.5–36.5)
MCV RBC AUTO: 69 FL (ref 78–100)
PHOSPHATE SERPL-MCNC: 3.9 MG/DL (ref 2.5–4.5)
PLATELET # BLD AUTO: 363 10E3/UL (ref 150–450)
POTASSIUM SERPL-SCNC: 4.4 MMOL/L (ref 3.4–5.3)
PROT SERPL-MCNC: 7.1 G/DL (ref 6.4–8.3)
RBC # BLD AUTO: 6 10E6/UL (ref 4.4–5.9)
SODIUM SERPL-SCNC: 138 MMOL/L (ref 135–145)
WBC # BLD AUTO: 9.1 10E3/UL (ref 4–11)

## 2025-04-10 PROCEDURE — 210N000002 HC R&B HEART CARE

## 2025-04-10 PROCEDURE — 80053 COMPREHEN METABOLIC PANEL: CPT | Performed by: INTERNAL MEDICINE

## 2025-04-10 PROCEDURE — 250N000013 HC RX MED GY IP 250 OP 250 PS 637: Performed by: PSYCHIATRY & NEUROLOGY

## 2025-04-10 PROCEDURE — 85027 COMPLETE CBC AUTOMATED: CPT | Performed by: INTERNAL MEDICINE

## 2025-04-10 PROCEDURE — 250N000012 HC RX MED GY IP 250 OP 636 PS 637: Performed by: INTERNAL MEDICINE

## 2025-04-10 PROCEDURE — 99232 SBSQ HOSP IP/OBS MODERATE 35: CPT | Performed by: INTERNAL MEDICINE

## 2025-04-10 PROCEDURE — 36415 COLL VENOUS BLD VENIPUNCTURE: CPT | Performed by: INTERNAL MEDICINE

## 2025-04-10 PROCEDURE — 84100 ASSAY OF PHOSPHORUS: CPT | Performed by: INTERNAL MEDICINE

## 2025-04-10 PROCEDURE — 99233 SBSQ HOSP IP/OBS HIGH 50: CPT | Mod: FS | Performed by: NURSE PRACTITIONER

## 2025-04-10 PROCEDURE — 97140 MANUAL THERAPY 1/> REGIONS: CPT | Mod: GO | Performed by: OCCUPATIONAL THERAPIST

## 2025-04-10 PROCEDURE — 250N000013 HC RX MED GY IP 250 OP 250 PS 637: Performed by: NURSE PRACTITIONER

## 2025-04-10 PROCEDURE — 82595 ASSAY OF CRYOGLOBULIN: CPT | Performed by: INTERNAL MEDICINE

## 2025-04-10 PROCEDURE — 250N000011 HC RX IP 250 OP 636: Performed by: HOSPITALIST

## 2025-04-10 PROCEDURE — 97110 THERAPEUTIC EXERCISES: CPT | Mod: GO | Performed by: OCCUPATIONAL THERAPIST

## 2025-04-10 PROCEDURE — 258N000003 HC RX IP 258 OP 636: Performed by: HOSPITALIST

## 2025-04-10 PROCEDURE — 82330 ASSAY OF CALCIUM: CPT | Performed by: INTERNAL MEDICINE

## 2025-04-10 PROCEDURE — 250N000013 HC RX MED GY IP 250 OP 250 PS 637: Performed by: INTERNAL MEDICINE

## 2025-04-10 RX ORDER — CARVEDILOL 6.25 MG/1
6.25 TABLET ORAL 2 TIMES DAILY WITH MEALS
Status: DISCONTINUED | OUTPATIENT
Start: 2025-04-10 | End: 2025-04-11

## 2025-04-10 RX ADMIN — ZOLPIDEM TARTRATE 5 MG: 5 TABLET, FILM COATED ORAL at 00:48

## 2025-04-10 RX ADMIN — AZITHROMYCIN MONOHYDRATE 250 MG: 500 INJECTION, POWDER, LYOPHILIZED, FOR SOLUTION INTRAVENOUS at 00:12

## 2025-04-10 RX ADMIN — BUMETANIDE 2 MG: 1 TABLET ORAL at 09:39

## 2025-04-10 RX ADMIN — ROSUVASTATIN CALCIUM 20 MG: 20 TABLET, FILM COATED ORAL at 09:39

## 2025-04-10 RX ADMIN — DEXTRAN 70, GLYCERIN, HYPROMELLOSE 1 DROP: 1; 2; 3 SOLUTION/ DROPS OPHTHALMIC at 21:40

## 2025-04-10 RX ADMIN — BUMETANIDE 2 MG: 1 TABLET ORAL at 12:44

## 2025-04-10 RX ADMIN — BUPRENORPHINE AND NALOXONE 0.25 FILM: 4; 1 FILM BUCCAL; SUBLINGUAL at 09:44

## 2025-04-10 RX ADMIN — BUPROPION HYDROCHLORIDE 300 MG: 150 TABLET, EXTENDED RELEASE ORAL at 09:39

## 2025-04-10 RX ADMIN — CEFTRIAXONE SODIUM 2 G: 2 INJECTION, POWDER, FOR SOLUTION INTRAMUSCULAR; INTRAVENOUS at 01:35

## 2025-04-10 RX ADMIN — OXYCODONE HYDROCHLORIDE AND ACETAMINOPHEN 1000 MG: 500 TABLET ORAL at 09:39

## 2025-04-10 RX ADMIN — ALBUTEROL SULFATE 2 PUFF: 108 INHALANT RESPIRATORY (INHALATION) at 09:47

## 2025-04-10 RX ADMIN — CARVEDILOL 6.25 MG: 6.25 TABLET, FILM COATED ORAL at 17:47

## 2025-04-10 RX ADMIN — BUSPIRONE HYDROCHLORIDE 5 MG: 5 TABLET ORAL at 21:40

## 2025-04-10 RX ADMIN — DEXTRAN 70, GLYCERIN, HYPROMELLOSE 1 DROP: 1; 2; 3 SOLUTION/ DROPS OPHTHALMIC at 09:40

## 2025-04-10 RX ADMIN — CLOPIDOGREL BISULFATE 75 MG: 75 TABLET, FILM COATED ORAL at 09:39

## 2025-04-10 RX ADMIN — FLUTICASONE FUROATE AND VILANTEROL TRIFENATATE 1 PUFF: 100; 25 POWDER RESPIRATORY (INHALATION) at 09:40

## 2025-04-10 RX ADMIN — ASPIRIN 81 MG CHEWABLE TABLET 81 MG: 81 TABLET CHEWABLE at 09:40

## 2025-04-10 RX ADMIN — ALBUTEROL SULFATE 2 PUFF: 108 INHALANT RESPIRATORY (INHALATION) at 17:47

## 2025-04-10 RX ADMIN — AZITHROMYCIN MONOHYDRATE 250 MG: 500 INJECTION, POWDER, LYOPHILIZED, FOR SOLUTION INTRAVENOUS at 22:56

## 2025-04-10 RX ADMIN — AMITRIPTYLINE HYDROCHLORIDE 150 MG: 75 TABLET, FILM COATED ORAL at 21:40

## 2025-04-10 RX ADMIN — BUSPIRONE HYDROCHLORIDE 5 MG: 5 TABLET ORAL at 09:39

## 2025-04-10 ASSESSMENT — ACTIVITIES OF DAILY LIVING (ADL)
ADLS_ACUITY_SCORE: 34

## 2025-04-10 NOTE — PROGRESS NOTES
[facility]     Renal Progress Note       SHORTHAND KEY FOR MY NOTES:  c = with, s = without, p = after, a = before, x = except, asx = asymptomatic, tx = transplant or treatment, sx = symptoms or symptomatic, cx = canceled or culture, rxn = reaction, yday = yesterday, nl = normal, abx = antibiotics, fxn = function, dx = diagnosis, dz = disease, m/h = melena/hematochezia, c/d/l/ha = cramping/dizziness/lightheadedness/headache, d/c = discharge or diarrhea/constipation, f/c/n/v = fevers/chills/nausea/vomiting, cp/sob = chest pain/shortness of breath, tbv = total body volume, rxn = reaction, tdc = tunneled dialysis catheter, pta = prior to admission, hd = hemodialysis, pd = peritoneal dialysis, hhd = home hemodialysis, edw = estimated dry wt         Assessment/Plan:     1.  PEDRO/CKD IV.  The pt's Cr is better today, likely for two reasons - improving ATN from stopping the NSAIDs and improved cardiorenal syndrome.  No major uremic sx and I doubt the hiccups are not related to the kidney dysfxn.  TBV up but better c the current dose of bumet.  He is not yet contracted.  His C', HIV, Hep C are all neg and cryos are pending, though unlikely to be positive given his hx.    A.  Check labs in AM.  B.  Continue bumet at same dose.  C.  Will arrange hosp follow-up in our office.     2.  HTN.  The pt's BP remains high and will improve as he diureses more.  He is not on any other meds, which is fine since we need to focus on volume first.  The TRT (testosterone replacement therapy) is not helping his BP and he is aware of this.  A.  Follow BP, clinically.  B.  Focus on drying him out first and then adding other meds.     3.  CHF exacerbation.  Pt's LFTs are up and may be related to passive congestion.    A.  Follow clinically as he diureses.  B.  Check CMP daily.     4.  Atypical pneumonia.  Pt is on abx.  A.  Continue abx at the proper renal doses.     5.  FEN.  Electrolytes are ok.  A.  Continue reg diet  "and adjust diuretics accordingly.    Case d/w Dr. Nevarez.        Interval History:     Pt urinated quite well in response to the oral bumet.  He had less orthopnea and didn't sleep much bc \"they keep coming in all the time.\"  No major uremic sx but he has some hiccups.          Medications and Allergies:     Current Facility-Administered Medications   Medication Dose Route Frequency Provider Last Rate Last Admin    albuterol (PROVENTIL HFA/VENTOLIN HFA) inhaler  2 puff Inhalation Q6H Awa Nevarez MD   2 puff at 04/10/25 0947    amitriptyline (ELAVIL) tablet 150 mg  150 mg Oral At Bedtime Awa Nevarez MD   150 mg at 04/09/25 2134    artificial tears (GENTEAL) 0.1-0.2-0.3 % ophthalmic solution 1 drop  1 drop Both Eyes BID Awa Nevarez MD   1 drop at 04/10/25 0940    aspirin (ASA) chewable tablet 81 mg  81 mg Oral Daily Awa Nevarez MD   81 mg at 04/10/25 0940    azithromycin (ZITHROMAX) 250 mg in sodium chloride 0.9 % 250 mL intermittent infusion  250 mg Intravenous Q24H Kyle Mccarty MD   250 mg at 04/10/25 0012    bumetanide (BUMEX) tablet 2 mg  2 mg Oral BID Royce Willis MD   2 mg at 04/10/25 1244    buprenorphine HCl-naloxone HCl (SUBOXONE) 4-1 MG per film 1 Film  1 Film Sublingual 4x Daily Awa Nevarez MD   0.25 Film at 04/10/25 0944    buPROPion (WELLBUTRIN XL) 24 hr tablet 300 mg  300 mg Oral QAM Awa Nevarez MD   300 mg at 04/10/25 0939    busPIRone (BUSPAR) tablet 5 mg  5 mg Oral BID Hayley Barton MD   5 mg at 04/10/25 0939    carvedilol (COREG) tablet 6.25 mg  6.25 mg Oral BID w/meals Frances Euceda NP        cefTRIAXone (ROCEPHIN) 2 g vial to attach to  ml bag for ADULTS or NS 50 ml bag for PEDS  2 g Intravenous Q24H Kyle Mccarty MD   2 g at 04/10/25 0135    clopidogrel (PLAVIX) tablet 75 mg  75 mg Oral Daily Awa Nevarez MD   75 mg at 04/10/25 0939    [Held by provider] finasteride (PROSCAR) tablet 5 mg  5 mg Oral Daily Geri, " "Awa LOONEY MD        fluticasone-vilanterol (BREO ELLIPTA) 100-25 MCG/ACT inhaler 1 puff  1 puff Inhalation Daily Awa Nevarez MD   1 puff at 04/10/25 0940    [Held by provider] isosorbide mononitrate (IMDUR) 24 hr tablet 60 mg  60 mg Oral Daily Awa Nevarez MD   60 mg at 04/08/25 1032    [Held by provider] pantoprazole (PROTONIX) EC tablet 40 mg  40 mg Oral Daily Awa Nevarez MD   40 mg at 04/08/25 1032    rosuvastatin (CRESTOR) tablet 20 mg  20 mg Oral Daily Awa Nevarez MD   20 mg at 04/10/25 0939    sodium chloride (PF) 0.9% PF flush 3 mL  3 mL Intracatheter Q8H Kyle Mccarty MD   3 mL at 04/10/25 0942    vitamin C (ASCORBIC ACID) tablet 1,000 mg  1,000 mg Oral Daily Awa Nevarez MD   1,000 mg at 04/10/25 0939     No Known Allergies       Physical Exam:     Vitals were reviewed     , Blood pressure (!) 156/103, pulse 74, temperature 99.4  F (37.4  C), temperature source Oral, resp. rate 20, height 1.803 m (5' 11\"), weight 111.2 kg (245 lb 3.2 oz), SpO2 94%.  Wt Readings from Last 3 Encounters:   04/10/25 111.2 kg (245 lb 3.2 oz)   02/13/25 108 kg (238 lb)   12/31/24 112.2 kg (247 lb 4.8 oz)     Intake/Output Summary (Last 24 hours) at 4/10/2025 1359  Last data filed at 4/10/2025 1300  Gross per 24 hour   Intake 2120 ml   Output 3300 ml   Net -1180 ml     GENERAL APPEARANCE: pleasant, NAD, alert  RESP: CTA B c good efforts  CV: RRR, nl S1/S2   ABDOMEN: s/nt/nd  EXTREMITIES/SKIN: 2+ ble edema (better)         Data:     CBC RESULTS:     Recent Labs   Lab 04/10/25  0549 04/07/25  0614 04/06/25  1642   WBC 9.1 9.9 10.0   RBC 6.00* 6.02* 6.06*   HGB 11.6* 11.3* 11.4*   HCT 41.3 41.5 41.5    364 371     Basic Metabolic Panel:  Recent Labs   Lab 04/10/25  0549 04/09/25  0615 04/08/25  2125 04/08/25  1030 04/07/25  0614 04/06/25  1857    139 136 139 138 137   POTASSIUM 4.4 4.7 4.5 4.5 5.2 5.1   CHLORIDE 96* 99 97* 97* 100 96*   CO2 31* 31* 28 29 26 26   BUN 39.4* 38.9* " 36.8* 34.2* 29.3* 27.1*   CR 3.44* 4.05* 3.59* 3.34* 3.07* 3.22*   GLC 75 75 97 101* 81 96   MIKO 9.0 8.6* 8.5* 8.8 8.9 9.1     INRNo lab results found in last 7 days.   Attestation:   I have reviewed today's relevant vital signs, notes, medications, labs and imaging.    Royce Willis MD  [facility] - Nephrology  [phone]

## 2025-04-10 NOTE — PROGRESS NOTES
Hypertensive; cards/neph resuming bp meds this evening. Independent. Denies pain. Tele: SR with 1* AVB and BBB. 2gm Na diet, upset about this. 2L FR, also upset about this. Aggression color: yellow. Gets agitated very easily. Refusing suboxone; talked to pharm who might switch to different administrative route. PIV SL. +3 edema BLE; wraps on to be removed at 1300 tomorrow.

## 2025-04-10 NOTE — PROGRESS NOTES
[facility]    Medicine Progress Note - Hospitalist Service    Date of Admission:  4/6/2025  Interval History   Patient seen this afternoon.  He is upset that he had a low sodium diet.  States he was not aware of the fact that his sodium was going to be reduced because he ate a high sodium breakfast.  Lunch she can have much sodium given the restriction  Discussion regarding need for follow-up and ongoing management of his fluid retention.  Getting his legs wrapped.   States he is going to the bathroom all the time    Assessment & Plan   Jeremi Damon is a 54 year old male with a past medical history of cad s/p leeann, htn, hypertrophic cardiomyopathy, combined systolic and diastolic heart failure, CKD stage IV, mild persistent asthma, chronic back pain, depression, bipolar 2 disorder, ADHD, sleep apnea presents to hospital with shortness of breath.    CAD s/p LEEANN  Hypertrophic cardiomyopathy without LVOT  Combined systolic and diastolic heart failure  Ef 45% in 2023  (Meds prescribed prior to admission Demadex 10 mg as needed, Imdur 60 mg, hydralazine 50 mg twice daily, Coreg 25 mg twice daily, Norvasc 5 mg) and Proscar for BPH  Patient presents with worsening shortness of breath reports orthopnea, exertional dyspnea, bilateral lower extremity swelling.    4/6 presentation to the ER with increasing lower extremity edema.    Chest x-ray showing likely pulmonary vascular congestion and trace interstitial edema with small bilateral effusions.  CT chest with small bilateral pleural effusions, patchy groundglass opacities right upper lobe consistent with atypical pneumonia and left upper lobe few patchy groundglass foci  Reports in the ER potential noncompliance with his diuretic.  Does not check his weight.  On admission he was noted to have increasing bilateral lower extremity edema  On admission appeared to be fluid or volume overload.  4/7 cardiology consult.  Suspicious more for CHF  exacerbation then pneumonia.  4/7 Lasix dose 40 mg IV twice daily  He has not seen his cardiologist in approximately 2 years  Strict I and output  Daily weight   Previous echo 4/2023 showing an EF 40-45% with severe concentric LVH, no wall motion abnormality.  Ascending aorta 4  Lasix 40 IV twice daily started this admit but stopped 4/8 with increase in creatinine   4/7 resumed on Coreg 25 mg twice daily, Imdur 60 mg initially this admit   4/7 ECHO: The LV is moderately dilated based on volumes. Severely increased LV wall thickness is noted as previously reported. Visually estimated ejection fraction is 25 to 30%.There is severe diffuse LV global hypokinesis noted with abnormal septal motion consistent with conduction abnormality. Minor regional variations arepresent. The RV is mild to moderately dilated with mild RVH. Systolic function is normal visually and on Doppler. Severe left atrial and moderate to severe right atrial dilatation is noted. Mitral leaflets are mildly thickened and there is tenting. There is a eccentric posteriorly directed jet of 2-3+ mitral regurgitation. Moderate pulmonary hypertension   4/8 per cardiology ongoing diuresis.   Fluid overload on admit with lower extremity edema, pleural effusions (BNP 4500)   4/9 increase in creatinine and progressively worse with diuresis in setting of fluid retention and cardiomyopathy. (Renal workup and evaluation ordered)   Echo as noted above  At some point cardiac MRI to exclude infiltrative CM or HCM (do as outpatient)   Work up for ATTR amyoid  4/10 cardiology follow-up  Cardiology following  On Bumex 2 mg twice daily  Coreg 6.25 mg twice daily  4/10 while rounding patient upset about his sodium restriction.  States he was not aware and he ate a lot of sodium for breakfast (discussion regarding the need for low sodium to avoid fluid retention)   Nutrition consult  BP goal per cardiology/nephrology    History of CAD   Coronary angiogram 1/2022 one-vessel  obstructive coronary artery disease PCI of mid to distal LAD with FERNANDA x 3  Troponin 81 >> 84  Further per cardiology as above     Hyperlipidemia  PTA rosuvastatin 20 mg daily    HTN  PTA meds as above but ?? Compliance   Patient on multiple medications reported prior to admission but would question compliancy to some extent  Resumed on Coreg 25 mg twice daily  Imdur 60 mg daily   Hold in am and discuss with nephrology as well as may have too low MAP   4/9 His BP in clinic review show that he has not been overly hypertensive and in fact more likely normotensive but uncertain of trend (at times has spikes in BP) overall compliance uncertain      ??Community Acquired Pneumonia: clinically more likely CHF   CT chest with groundglass opacities right worse than left (?  Atypical pneumonia however appears clinically to have fluid overload )  Patient presenting with shortness of breath for 2 weeks found to have groundglass opacities in the upper lobes.    No infectious symptoms such as fevers, chills, cough. 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  Avoid nephrotoxic medications  4/7 creatinine 3.07  Monitor daily with BMP with diuretic   At baseline he has had progressive CKD as outpatient.   4/9 since admit his renal function has progressively gotten worse.   With diuresis worsening renal function in setting of fluid overload on exam   CT on admit with no overt obstruction or hydronephrosis (NON contrast)   Reports lot of NSAIDS at times up to 1600 mg in a day. (Urine eosinophils neg)   Renal consult pending   Renal ultrasound ordered.   Patient refused PVR   Total protein /creatinine ratio  0.63   Albumin normal 4.1   4/9 nephrology consult      Thyroid goiter  Prominent left lobe of thyroid seen on ct  Follow up US thyroid    Mild persistent asthma  Lungs are clear on auscultation.     MDD  Bipolar 2 disorder  ADHD  Patient has multiple medications currently for his mood disorder (NOS)   Patient does have a history of anxiety.  He is currently on Wellbutrin 300 XL daily and BuSpar.  2/13/2025 discussion of potential untreated bipolar disorder including mood instability recommending a psychiatric consult.  He was referred to psychiatry.  Complex history with opioid/pain management   4/7 will get psychiatry consult this admission.  Currently taking Adderall 20 mg twice a day  Wellbutrin 300 mg XL daily  BuSpar 5 mg 2 times daily as needed  Klonopin 1 mg p.o. twice daily for flying  Ativan 1 mg twice a day as needed for anxiety (60 tablets) last refill 3/18  Ambien 10 mg as needed for sleep  4/7 psychiatry consult rec schedule buspar   Discussion of tapering off elavil at his outpatient visit.     Pain clinic  Follows outpatient on suboxone 8-2 1/2 film QID ( confirmed with pharmacy)      Sleep apnea  Does not use CPAP         Diet: Fluid restriction 2000 ML FLUID (and additional linked orders)  Regular Diet Adult    DVT Prophylaxis: Pneumatic Compression Devices  Yee Catheter: Not present  Lines: None     Cardiac Monitoring: ACTIVE order. Indication: Acute decompensated heart failure (48 hours)  Code Status: Full Code      Clinically Significant Risk Factors          # Hypochloremia: Lowest Cl = 96 mmol/L in last 2 days, will monitor as appropriate  # Hypocalcemia: Lowest Ca = 8.5 mg/dL in last 2 days, will monitor and replace as appropriate         # Hypertension: Noted on problem list  # Acute heart failure with reduced ejection fraction: last echo with EF <40% and receiving IV diuretics           # Obesity: Estimated body mass index is 34.2 kg/m  as calculated from the following:    Height as of  "this encounter: 1.803 m (5' 11\").    Weight as of this encounter: 111.2 kg (245 lb 3.2 oz)., PRESENT ON ADMISSION     # Financial/Environmental Concerns: none  # Asthma: noted on problem list        Social Drivers of Health    Depression: At risk (2/13/2025)    PHQ-2     PHQ-2 Score: 5   Physical Activity: Unknown (12/31/2024)    Exercise Vital Sign     Days of Exercise per Week: 2 days   Stress: Stress Concern Present (12/31/2024)    [employer] [place] [employer] - Occupational Stress Questionnaire     Feeling of Stress : Very much   Social Connections: Unknown (12/31/2024)    Social Connection and Isolation Panel [NHANES]     Frequency of Social Gatherings with Friends and Family: Once a week          Disposition Plan     Medically Ready for Discharge: Anticipated in 2-4 Days       CELINE RODRIGUEZ MD  Hospitalist Service  [facility]  Securely message with "[employer]" (more info)  Text page via [employer] Paging/Directory   ______________________________________________________________________      Physical Exam   Vital Signs: Temp: 99.1  F (37.3  C) Temp src: Oral BP: (!) 157/105 Pulse: 75   Resp: 20 SpO2: 92 % O2 Device: None (Room air)    Weight: 245 lbs 3.2 oz    General Appearance: Patient was seen walking back from the bathroom and then reporting he was having a panic attack  Respiratory: Breath sounds distant but relatively clear to auscultation bilaterally  Cardiovascular: Regular rate and rhythm without gallop rub normal S1-S2  GI: Positive bowel sounds soft rebound guarding tender  Skin: 2+ edema bilaterally into his feet      Medical Decision Making       45 MINUTES SPENT BY ME on the date of service doing chart review, history, exam, documentation & further activities per the note.    Discussion of care plan and review medications, labs, follow-up    Data     I have personally reviewed the following data over the past 24 hrs:    9.1  \   11.6 (L)   / 363     138 96 (L) 39.4 (H) " /  75   4.4 31 (H) 3.44 (H) \     ALT: 198 (H) AST: 132 (H) AP: 132 TBILI: 0.5   ALB: 4.2 TOT PROTEIN: 7.1 LIPASE: N/A

## 2025-04-10 NOTE — PLAN OF CARE
Goal Outcome Evaluation:  ~Care plan-end of shift note:   -~Orientation/Mentation:A&O x4  -~VS: VSS on RA  -~LS/Pulm:Ls diminished   -~Tele/Cardiac:SR w/1st degree AVB+BBB  -~GI:WDL  -~:WDL, voids adequate UOP  -~Pain: denies  -~Mobility:up indep  -~Skin:intact, LE edema-encouraged elevation   -~Diet:regular/2 gm NA, 2000 ml FR  -~Lines/IVs:PIV SL  -~Safety/Concern:calls appropriately   -~Aggression color:green  -~Plan/Shift summary/Goals:denies SOB/CP. Plans for OT consult, work-up to r/o ATTR amyloid, Cr 3.59->4.05->3.44. Plans for discharge pending clinical improvements
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
## 2025-04-10 NOTE — PLAN OF CARE
Goal Outcome Evaluation:  4/9/2025 [id number]     Neuro: A&O x4, anxious at times  Tele/cardiac: SR with 1* AVB. Denies pain  Respiration: RA  Activity: Ind  Drips: PIV SL  Drains/tubes: none  Skin: intact, Has 3+ pedal edema. [place] placed per OT   GI/: continent  Aggression color: green  Isolation: none  Plan: continue plan of care. Nephology consulted this shift, Bumex added this shift.

## 2025-04-11 ENCOUNTER — APPOINTMENT (OUTPATIENT)
Dept: ULTRASOUND IMAGING | Facility: CLINIC | Age: 55
DRG: 193 | End: 2025-04-11
Attending: INTERNAL MEDICINE
Payer: COMMERCIAL

## 2025-04-11 ENCOUNTER — APPOINTMENT (OUTPATIENT)
Dept: OCCUPATIONAL THERAPY | Facility: CLINIC | Age: 55
DRG: 193 | End: 2025-04-11
Payer: COMMERCIAL

## 2025-04-11 VITALS
HEIGHT: 71 IN | SYSTOLIC BLOOD PRESSURE: 143 MMHG | TEMPERATURE: 98.7 F | OXYGEN SATURATION: 97 % | BODY MASS INDEX: 34.33 KG/M2 | WEIGHT: 245.2 LBS | DIASTOLIC BLOOD PRESSURE: 94 MMHG | RESPIRATION RATE: 20 BRPM | HEART RATE: 72 BPM

## 2025-04-11 VITALS
OXYGEN SATURATION: 97 % | WEIGHT: 238.54 LBS | DIASTOLIC BLOOD PRESSURE: 98 MMHG | SYSTOLIC BLOOD PRESSURE: 146 MMHG | BODY MASS INDEX: 33.4 KG/M2 | HEART RATE: 69 BPM | HEIGHT: 71 IN | TEMPERATURE: 99.2 F | RESPIRATION RATE: 16 BRPM

## 2025-04-11 LAB
ALBUMIN SERPL BCG-MCNC: 3.6 G/DL (ref 3.5–5.2)
ALP SERPL-CCNC: 111 U/L (ref 40–150)
ALT SERPL W P-5'-P-CCNC: 141 U/L (ref 0–70)
ANION GAP SERPL CALCULATED.3IONS-SCNC: 11 MMOL/L (ref 7–15)
AST SERPL W P-5'-P-CCNC: 73 U/L (ref 0–45)
BACTERIA BLD CULT: NO GROWTH
BACTERIA BLD CULT: NO GROWTH
BILIRUB SERPL-MCNC: 0.6 MG/DL
BUN SERPL-MCNC: 34.1 MG/DL (ref 6–20)
CALCIUM SERPL-MCNC: 8.7 MG/DL (ref 8.8–10.4)
CHLORIDE SERPL-SCNC: 97 MMOL/L (ref 98–107)
CREAT SERPL-MCNC: 2.79 MG/DL (ref 0.67–1.17)
EGFRCR SERPLBLD CKD-EPI 2021: 26 ML/MIN/1.73M2
ERYTHROCYTE [DISTWIDTH] IN BLOOD BY AUTOMATED COUNT: 20.6 % (ref 10–15)
GLUCOSE SERPL-MCNC: 83 MG/DL (ref 70–99)
HCO3 SERPL-SCNC: 31 MMOL/L (ref 22–29)
HCT VFR BLD AUTO: 41.5 % (ref 40–53)
HGB BLD-MCNC: 11.3 G/DL (ref 13.3–17.7)
MCH RBC QN AUTO: 18.7 PG (ref 26.5–33)
MCHC RBC AUTO-ENTMCNC: 27.2 G/DL (ref 31.5–36.5)
MCV RBC AUTO: 69 FL (ref 78–100)
PLATELET # BLD AUTO: 298 10E3/UL (ref 150–450)
POTASSIUM SERPL-SCNC: 3.7 MMOL/L (ref 3.4–5.3)
PROT SERPL-MCNC: 6.2 G/DL (ref 6.4–8.3)
RBC # BLD AUTO: 6.03 10E6/UL (ref 4.4–5.9)
SODIUM SERPL-SCNC: 139 MMOL/L (ref 135–145)
TSH SERPL DL<=0.005 MIU/L-ACNC: 1.38 UIU/ML (ref 0.3–4.2)
WBC # BLD AUTO: 8.2 10E3/UL (ref 4–11)

## 2025-04-11 PROCEDURE — 97140 MANUAL THERAPY 1/> REGIONS: CPT | Mod: GO

## 2025-04-11 PROCEDURE — 97530 THERAPEUTIC ACTIVITIES: CPT | Mod: GO

## 2025-04-11 PROCEDURE — 250N000013 HC RX MED GY IP 250 OP 250 PS 637: Performed by: INTERNAL MEDICINE

## 2025-04-11 PROCEDURE — 250N000012 HC RX MED GY IP 250 OP 636 PS 637: Performed by: INTERNAL MEDICINE

## 2025-04-11 PROCEDURE — 84155 ASSAY OF PROTEIN SERUM: CPT | Performed by: INTERNAL MEDICINE

## 2025-04-11 PROCEDURE — 99239 HOSP IP/OBS DSCHRG MGMT >30: CPT | Performed by: INTERNAL MEDICINE

## 2025-04-11 PROCEDURE — 250N000013 HC RX MED GY IP 250 OP 250 PS 637: Performed by: HOSPITALIST

## 2025-04-11 PROCEDURE — 99232 SBSQ HOSP IP/OBS MODERATE 35: CPT | Mod: FS | Performed by: NURSE PRACTITIONER

## 2025-04-11 PROCEDURE — 85027 COMPLETE CBC AUTOMATED: CPT | Performed by: INTERNAL MEDICINE

## 2025-04-11 PROCEDURE — 93970 EXTREMITY STUDY: CPT

## 2025-04-11 PROCEDURE — 36415 COLL VENOUS BLD VENIPUNCTURE: CPT | Performed by: INTERNAL MEDICINE

## 2025-04-11 PROCEDURE — 99232 SBSQ HOSP IP/OBS MODERATE 35: CPT | Performed by: INTERNAL MEDICINE

## 2025-04-11 PROCEDURE — 84443 ASSAY THYROID STIM HORMONE: CPT | Performed by: INTERNAL MEDICINE

## 2025-04-11 PROCEDURE — 250N000011 HC RX IP 250 OP 636: Performed by: HOSPITALIST

## 2025-04-11 PROCEDURE — 250N000013 HC RX MED GY IP 250 OP 250 PS 637: Performed by: NURSE PRACTITIONER

## 2025-04-11 PROCEDURE — 250N000013 HC RX MED GY IP 250 OP 250 PS 637: Performed by: PSYCHIATRY & NEUROLOGY

## 2025-04-11 RX ORDER — BUMETANIDE 2 MG/1
2 TABLET ORAL
Qty: 60 TABLET | Refills: 0 | Status: SHIPPED | OUTPATIENT
Start: 2025-04-12 | End: 2025-04-22

## 2025-04-11 RX ORDER — CARVEDILOL 12.5 MG/1
12.5 TABLET ORAL 2 TIMES DAILY WITH MEALS
Status: DISCONTINUED | OUTPATIENT
Start: 2025-04-11 | End: 2025-04-11 | Stop reason: HOSPADM

## 2025-04-11 RX ORDER — CEFDINIR 300 MG/1
300 CAPSULE ORAL 2 TIMES DAILY
Qty: 4 CAPSULE | Refills: 0 | Status: SHIPPED | OUTPATIENT
Start: 2025-04-11 | End: 2025-04-22

## 2025-04-11 RX ORDER — LORAZEPAM 1 MG/1
0.5 TABLET ORAL 2 TIMES DAILY PRN
Status: SHIPPED
Start: 2025-04-11 | End: 2025-04-29

## 2025-04-11 RX ORDER — CARVEDILOL 25 MG/1
12.5 TABLET ORAL 2 TIMES DAILY WITH MEALS
Status: SHIPPED
Start: 2025-04-11 | End: 2025-04-22

## 2025-04-11 RX ORDER — ACETAMINOPHEN 325 MG/1
325-650 TABLET ORAL EVERY 6 HOURS PRN
Status: SHIPPED
Start: 2025-04-11

## 2025-04-11 RX ADMIN — BUPRENORPHINE AND NALOXONE 1 FILM: 4; 1 FILM BUCCAL; SUBLINGUAL at 09:00

## 2025-04-11 RX ADMIN — BUPROPION HYDROCHLORIDE 300 MG: 150 TABLET, EXTENDED RELEASE ORAL at 08:59

## 2025-04-11 RX ADMIN — FINASTERIDE 5 MG: 5 TABLET, FILM COATED ORAL at 08:59

## 2025-04-11 RX ADMIN — BUMETANIDE 2 MG: 1 TABLET ORAL at 13:29

## 2025-04-11 RX ADMIN — DEXTRAN 70, GLYCERIN, HYPROMELLOSE 1 DROP: 1; 2; 3 SOLUTION/ DROPS OPHTHALMIC at 09:01

## 2025-04-11 RX ADMIN — CEFTRIAXONE SODIUM 2 G: 2 INJECTION, POWDER, FOR SOLUTION INTRAMUSCULAR; INTRAVENOUS at 00:05

## 2025-04-11 RX ADMIN — FLUTICASONE FUROATE AND VILANTEROL TRIFENATATE 1 PUFF: 100; 25 POWDER RESPIRATORY (INHALATION) at 09:02

## 2025-04-11 RX ADMIN — CLOPIDOGREL BISULFATE 75 MG: 75 TABLET, FILM COATED ORAL at 08:59

## 2025-04-11 RX ADMIN — BUPRENORPHINE AND NALOXONE 1 FILM: 4; 1 FILM BUCCAL; SUBLINGUAL at 13:28

## 2025-04-11 RX ADMIN — BUMETANIDE 2 MG: 1 TABLET ORAL at 08:59

## 2025-04-11 RX ADMIN — ZOLPIDEM TARTRATE 10 MG: 5 TABLET, FILM COATED ORAL at 00:30

## 2025-04-11 RX ADMIN — PANTOPRAZOLE SODIUM 40 MG: 40 TABLET, DELAYED RELEASE ORAL at 09:00

## 2025-04-11 RX ADMIN — CARVEDILOL 12.5 MG: 6.25 TABLET, FILM COATED ORAL at 09:00

## 2025-04-11 RX ADMIN — OXYCODONE HYDROCHLORIDE AND ACETAMINOPHEN 1000 MG: 500 TABLET ORAL at 08:59

## 2025-04-11 RX ADMIN — ROSUVASTATIN CALCIUM 20 MG: 20 TABLET, FILM COATED ORAL at 09:14

## 2025-04-11 RX ADMIN — ASPIRIN 81 MG CHEWABLE TABLET 81 MG: 81 TABLET CHEWABLE at 09:01

## 2025-04-11 RX ADMIN — BUSPIRONE HYDROCHLORIDE 5 MG: 5 TABLET ORAL at 08:59

## 2025-04-11 RX ADMIN — CALCIUM CARBONATE (ANTACID) CHEW TAB 500 MG 1000 MG: 500 CHEW TAB at 00:33

## 2025-04-11 ASSESSMENT — ACTIVITIES OF DAILY LIVING (ADL)
ADLS_ACUITY_SCORE: 34

## 2025-04-11 NOTE — PROGRESS NOTES
[facility]     Renal Progress Note       SHORTHAND KEY FOR MY NOTES:  c = with, s = without, p = after, a = before, x = except, asx = asymptomatic, tx = transplant or treatment, sx = symptoms or symptomatic, cx = canceled or culture, rxn = reaction, yday = yesterday, nl = normal, abx = antibiotics, fxn = function, dx = diagnosis, dz = disease, m/h = melena/hematochezia, c/d/l/ha = cramping/dizziness/lightheadedness/headache, d/c = discharge or diarrhea/constipation, f/c/n/v = fevers/chills/nausea/vomiting, cp/sob = chest pain/shortness of breath, tbv = total body volume, rxn = reaction, tdc = tunneled dialysis catheter, pta = prior to admission, hd = hemodialysis, pd = peritoneal dialysis, hhd = home hemodialysis, edw = estimated dry wt         Assessment/Plan:     1.  PEDRO/CKD IV.  The pt's Cr continues to improve.  Given the significant improvement c better diuresis, this is most likely related to the CRS.  He was taking a lot of NSAIDs for the past few mos and was advised to stop taking them when he leaves.  He was taking them for HA and abd pain that he related to stress.  No significant uremic sx.  He remains TBV up but it's improving c the bumet.  A.  Continue bumet 2 mg bid.  B.  Follow-up c Dr. Randall on Tuesday, 15 Apr at 12:30p at [facility], [address] [place] [address], [address], [place] [address].  Tel - [phone]  C.  Avoid NSAIDs as able.     2.  HTN.  The pt's BP remains elevated.  He is diuresing wll.  His not on any other BP meds at present but will need them as an outpt.  Hopefully, he will be able to be put on an ACEi when his renal fxn stabilizes. He will be taking TRT when he is out of the hospital so his BP will rise more then, it seems.  A.  Continue diuretics as above.  B.  We will manage his BP as an outpt.  C.  Focus on drying him out first and then adding other meds.     3.  CHF exacerbation.  Pt's LFTs are better today.  He is responding well clinically.  A.   "Follow sx.     4.  Atypical pneumonia.  Pt is on abx.  A.  Continue abx at the proper renal doses.     5.  FEN.  Electrolytes are ok.  He's on a low salt diet here but unsure he'll follow that as an outpt.  A.  Continue reg diet and adjust diuretics accordingly.    Case d/w Dr. Nevarez.        Interval History:     Pt urinated quite well in response to the oral bumet.  He had less orthopnea and didn't sleep much bc \"they keep coming in all the time.\"  No major uremic sx but he has some hiccups.          Medications and Allergies:     Current Facility-Administered Medications   Medication Dose Route Frequency Provider Last Rate Last Admin    albuterol (PROVENTIL HFA/VENTOLIN HFA) inhaler  2 puff Inhalation Q6H Awa Nevarez MD   2 puff at 04/10/25 1747    amitriptyline (ELAVIL) tablet 150 mg  150 mg Oral At Bedtime Awa Nevarez MD   150 mg at 04/10/25 2140    artificial tears (GENTEAL) 0.1-0.2-0.3 % ophthalmic solution 1 drop  1 drop Both Eyes BID Awa Nevarez MD   1 drop at 04/11/25 0901    aspirin (ASA) chewable tablet 81 mg  81 mg Oral Daily Awa Nevarez MD   81 mg at 04/11/25 0901    bumetanide (BUMEX) tablet 2 mg  2 mg Oral BID Royce Willis MD   2 mg at 04/11/25 0859    buprenorphine HCl-naloxone HCl (SUBOXONE) 4-1 MG per film 1 Film  1 Film Sublingual 4x Daily Awa Nevarez MD   1 Film at 04/11/25 0900    buPROPion (WELLBUTRIN XL) 24 hr tablet 300 mg  300 mg Oral QAM Awa Nevarez MD   300 mg at 04/11/25 0859    busPIRone (BUSPAR) tablet 5 mg  5 mg Oral BID Hayley Barton MD   5 mg at 04/11/25 0859    carvedilol (COREG) tablet 12.5 mg  12.5 mg Oral BID w/meals Frances Euceda NP   12.5 mg at 04/11/25 0900    cefTRIAXone (ROCEPHIN) 2 g vial to attach to  ml bag for ADULTS or NS 50 ml bag for PEDS  2 g Intravenous Q24H Kyle Mccarty MD   2 g at 04/11/25 0005    clopidogrel (PLAVIX) tablet 75 mg  75 mg Oral Daily Awa Nevarez MD   75 mg at 04/11/25 0859    " "finasteride (PROSCAR) tablet 5 mg  5 mg Oral Daily Awa Nevarez MD   5 mg at 04/11/25 0859    fluticasone-vilanterol (BREO ELLIPTA) 100-25 MCG/ACT inhaler 1 puff  1 puff Inhalation Daily Awa Nevarez MD   1 puff at 04/11/25 0902    [Held by provider] isosorbide mononitrate (IMDUR) 24 hr tablet 60 mg  60 mg Oral Daily Awa Nevarez MD   60 mg at 04/08/25 1032    pantoprazole (PROTONIX) EC tablet 40 mg  40 mg Oral Daily Awa Nevarez MD   40 mg at 04/11/25 0900    rosuvastatin (CRESTOR) tablet 20 mg  20 mg Oral Daily Awa Nevarez MD   20 mg at 04/11/25 0914    sodium chloride (PF) 0.9% PF flush 3 mL  3 mL Intracatheter Q8H Kyle Mccarty MD   3 mL at 04/11/25 0908    vitamin C (ASCORBIC ACID) tablet 1,000 mg  1,000 mg Oral Daily Awa Nevarez MD   1,000 mg at 04/11/25 0859     No Known Allergies       Physical Exam:     Vitals were reviewed     , Blood pressure (!) 146/98, pulse 69, temperature 99.2  F (37.3  C), temperature source Oral, resp. rate 18, height 1.803 m (5' 11\"), weight 108.2 kg (238 lb 8.6 oz), SpO2 97%.  Wt Readings from Last 3 Encounters:   04/11/25 108.2 kg (238 lb 8.6 oz)   02/13/25 108 kg (238 lb)   12/31/24 112.2 kg (247 lb 4.8 oz)     Intake/Output Summary (Last 24 hours) at 4/11/2025 1058  Last data filed at 4/11/2025 1000  Gross per 24 hour   Intake 1653 ml   Output 5150 ml   Net -3497 ml     GENERAL APPEARANCE: pleasant, NAD, alert  RESP: CTA B c good efforts  CV: RRR, nl S1/S2   ABDOMEN: s/nt/nd  EXTREMITIES/SKIN: 2+ ble edema (better)         Data:     CBC RESULTS:     Recent Labs   Lab 04/11/25  0644 04/10/25  0549 04/07/25  0614 04/06/25  1642   WBC 8.2 9.1 9.9 10.0   RBC 6.03* 6.00* 6.02* 6.06*   HGB 11.3* 11.6* 11.3* 11.4*   HCT 41.5 41.3 41.5 41.5    363 364 371     Basic Metabolic Panel:  Recent Labs   Lab 04/11/25  0644 04/10/25  0549 04/09/25  0615 04/08/25  2125 04/08/25  1030 04/07/25  0614    138 139 136 139 138   POTASSIUM 3.7 4.4 " 4.7 4.5 4.5 5.2   CHLORIDE 97* 96* 99 97* 97* 100   CO2 31* 31* 31* 28 29 26   BUN 34.1* 39.4* 38.9* 36.8* 34.2* 29.3*   CR 2.79* 3.44* 4.05* 3.59* 3.34* 3.07*   GLC 83 75 75 97 101* 81   MIKO 8.7* 9.0 8.6* 8.5* 8.8 8.9     INRNo lab results found in last 7 days.   Attestation:   I have reviewed today's relevant vital signs, notes, medications, labs and imaging.    Royce Willis MD  [facility] - Nephrology  [phone]
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
## 2025-04-11 NOTE — PLAN OF CARE
Goal Outcome Evaluation:      Plan of Care Reviewed With: patient      Discharge home. Up in the room independent tolerated food, VSS, RA. Pain controlled with medications.

## 2025-04-11 NOTE — PROGRESS NOTES
[facility] ACUTE PAIN SERVICE CONSULTATION   [facility], [facility], [facility], [facility], [place]     Date of Admission:  4/6/2025  Date of Consult (When I saw the patient): 04/11/25  Physician requesting consult: Awa Nevarez MD       Assessment/Plan:     Jeremi Damon is a 54 year old male who was admitted on 4/6/2025.  Pain team was asked to see the patient for pain management on suboxone. History of cad s/p leeann, htn, hypertrophic cardiomyopathy, combined systolic and diastolic heart failure, CKD stage IV, mild persistent asthma, chronic back pain, depression, bipolar 2 disorder, ADHD, sleep apnea presents to hospital with shortness of breath likely 2/2 CHF, HTN, and CAP - per cardiology consult.      Cardiology signed off today. Pt discharging likely today. 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 Pt also takes benzos 1-2x/day per report (0.5-1 tab) - adderall can also lead to use of benzo's to sleep etc. Medication inappropriate for diagnosis- no evidence to support long term use of benzos - would recommend tapering outpatient d/t dependence. Would also recommend tapering off adderall. Pt reports having a psychiatry appt in 2 weeks to establish care. For now will stop IV dilaudid - no indication for prn opioids, stop baclofen - not a home med, uses flexeril at home for prn hiccups - which he states he doesn't need here.     PLAN:   1) Pain is consistent with chronic pain - reporting a migraine during assessment that has improved after taking his suboxone. Pt is high risk for resp depression d/t CHF, ERICA, asthma, obesity, concomitant cns depressants  Multimodal Medication Therapy  Topical: none  NSAID'S: none, d/t CrCl 37 ml/min and on antiplatelet therapy (ASA, plavix)  Muscle Relaxants: baclofen 5 mg po tid prn - takes flexeril  10 mg po tid prn at home  Adjuvants: apap 500-1000 mg po q6h prn, keep APAP <2g/day d/t elevated ast/alt, home amitriptyline 150 mg po at bedtime,   Antidepressants/anxiolytics: home wellbutrin, home buspar BID (however prescribed bid prn at home for panic attacks), ativan 0.5 mg po q12h prn - prescribed 1 mg bid prn at home, home ambien prn  Opioids: home suboxone 4-1 QID  IV Pain medication: dilaudid 0.2-0.4 mg IV q2h prn   Non-medication interventions: per nursing  Constipation Prophylaxis: none ordered - going home today - if stays order bowel meds - last BM 2 days ago    -[place]  pulled from system on 4/11/25. This indicates last filled suboxone 8/2 #60 (30 day supply) on 4/1/25 from [facility], zolpidem 10 mg #30 (30 day supply) on 3/27/25, ativan 1 mg #60 (30 day supply)on 3/18/2025 - has been filling this regularly - started suboxone 1/2025 - prior to that was filled oxycodone from primary prescriber Luis Armando Segovia MD    Discharge Recommendations - We recommend prescribing the following at the time of discharge: no scripts for pain - follow up psychiatry - needs extensive psych medication review     History of Present Illness (HPI):       Jeremi Damon is a 54 year old male who presented for SOB.  Past medical history as above.     Per [place]  review, the patient does have an opioid tolerance.     Home pain medications/psych medications/anticoagulation medications include: APAP 500-1000 mg po q6h prn, amitriptyline 150 mg po at bedtime - fills regularly, adderall 20 mg po BID, - last filled 12/31/24 suboxone 4/1 QID - fills regularly, wellbutrin xl 300 mg po qam - fills regulalry, buspar 5 mg po BID prn panic attack - fills regularly, clonazepam 1 mg po bid prn flying phobia - filled x 2 on 4/13/24 and on 7/16/24 for 2 day supplies #4 tabs, flexeril 10 mg po tid prn - fills regularly, ativan 1 mg po bid prn anxiety - fills regularly, oxycodone 5 mg po q6h prn - filled x 2 rx's one one 12/31/24 #40 (10 day  supply) and on 1/14/25 #28 (7 day supply), ambien - fills regularly    Last UDS: 11/1/24 - results as expected based on rx history    Thank you for this consultation.    Tatiana D eLa Cruz, PharmD, BCPS  Acute Care Inpatient Pain Management Program  [facility] ([facility])  Hours of coverage Monday-Friday [id number]. After hours please contact Primary team   Page via Epic chat or [employer]

## 2025-04-11 NOTE — CONSULTS
"NUTRITION EDUCATION    REASON FOR ASSESSMENT:  Provider Order - \"Nutrition Education\"    NUTRITION HISTORY:  Information obtained from patient in room. Per report, pt does not follow any typical eating schedule. Foods he eats on a regular basis include ground meat (turkey, chicken, beef) + white rice, cereal + almond milk, and rice cakes. Pt tells writer he eats clean and does not cook with salt. Pt denies prior education on a heart healthy diet.     CURRENT DIET:  2 gram Na, 2000 mL fluid restriction    NUTRITION DIAGNOSIS:  Food- and nutrition-related knowledge deficit R/t no prior education as evidenced by need for nutrition education (low Na, fluid restriction, heart healthy)    INTERVENTIONS:    Nutrition Prescription:  Recommend patient to follow AHA guidelines and tips for limiting sodium and fluid intake per diet orders    Implementation:      *  Nutrition Education (Content):   A)  Provided handouts: AHA heart healthy diet, tips for a low sodium diet, managing fluid intake   B)  Discussed handouts      *  Nutrition Education (Application):   A)  Discussed current eating habits and recommended alternative food choices      *  Anticipate good compliance      *  Diet Education - refer to Education Flowsheet    Goals:      *  Patient verbalizes understanding of diet      *  All of the above goals met during the education session    Follow Up/Monitoring:      *  Recommended Out-Patient Nutrition Referral, if further diet instructions are needed    Myrna Bah RD, LD    "

## 2025-04-11 NOTE — PLAN OF CARE
Goal Outcome Evaluation:  ~Care plan-end of shift note:   -~Orientation/Mentation:A&O x4  -~VS: VSS on RA  -~LS/Pulm:Ls diminished   -~Tele/Cardiac:SR w/1st degree AVB+BBB  -~GI:WDL  -~:WDL, voids adequate UOP  -~Pain: denies  -~Mobility:up indep  -~Skin:intact, LE edema-encouraged elevation   -~Diet:regular/2 gm NA, 2000 ml FR  -~Lines/IVs:PIV SL  -~Safety/Concern:calls appropriately   -~Aggression color:green  -~Plan/Shift summary/Goals:denies SOB/CP, Cr 3.59->4.05->3.44->2.79 BLE edema-encouraged elevation, wraps to be removed at 1300 today  Plans for discharge pending clinical improvements

## 2025-04-12 NOTE — CARE PLAN
Occupational Therapy Discharge Summary    Reason for therapy discharge:    Discharged to home with outpatient therapy.    Progress towards therapy goal(s). See goals on Care Plan in [facility] electronic health record for goal details.  Goals not met.  Barriers to achieving goals:   discharge from facility.    Therapy recommendation(s):    Continued therapy is recommended.  Rationale/Recommendations:  for lymph.

## 2025-04-13 NOTE — DISCHARGE SUMMARY
"[facility]  Hospitalist Discharge Summary      Date of Admission:  4/6/2025  Date of Discharge:  4/11/2025  4:08 PM  Discharging Provider: CELINE RODRIGUEZ MD  Discharge Service: Hospitalist Service    Discharge Diagnoses   Acute systolic CHF with CKD   Coronary angiogram 1/2022 one-vessel obstructive coronary artery disease PCI of mid to distal LAD with FERNANDA x 3   Hypertrophic cardiomyopathy without LVOT  Combined systolic and diastolic heart failure  4/7 ECHO with severe decrease EF 25-30%: severe diffuse LV global hypokinesis noted with abnormal septal motion consistent with conduction abnormality. 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 Benign goiter favored though this would be amenable to evaluation with ultrasound)   MDD/ADHD  Bipolar 2 disorder   Pain clinic   CT chest with Patchy groundglass foci within both upper lobes, right worse than left, most suggestive of atypical pneumonia.  Small bilateral pleural effusions, right larger than left.. Cardiomegaly.  Prominent enlargement of left lobe of thyroid. Benign goiter favored though this would be amenable to evaluation with ultrasound.  Polypharmacy recommend evaluation and potential to reduce medications as able.   Eleveted liver tests: Follow up liver tests in the clinic    Clinically Significant Risk Factors     # Obesity: Estimated body mass index is 33.27 kg/m  as calculated from the following:    Height as of this encounter: 1.803 m (5' 11\").    Weight as of this encounter: 108.2 kg (238 lb 8.6 oz).       Follow-ups Needed After Discharge   Follow-up Appointments       " Hospital Follow-up with Existing Primary Care Provider (PCP)          Schedule Primary Care visit within: 7 Days   Recommended labs and Imaging (to be ordered by Primary Care Provider): basic metabolic profile and cbc/platlet and liver profile             Unresulted Labs Ordered in the Past 30 Days of this Admission       Date and Time Order Name Status Description    4/10/2025 12:01 AM Cryoglobulin, Quantitative with Reflex to Identification In process         These results will be followed up by nephrology     Discharge Disposition   Discharged to home  Condition at discharge: Stable    Hospital Course   Jeremi Damon is a 54 year old male with a past medical history of CAD s/p FERNANDA, htn, hypertrophic cardiomyopathy, combined systolic and diastolic heart failure, CKD stage IV, mild persistent asthma, chronic back pain, depression, bipolar 2 disorder, ADHD, sleep apnea presents to hospital with shortness of breath.    The patient reports that he has been experiencing worsening shortness of breath for the last 2 weeks. It has been progressively getting worse to the point where he is short of breath after walking 3 steps. He endorses orthopnea and worsening bilateral lower extremity swelling. He denies any fevers, chills, cough, sputum production. He denies any sick contacts or recent travel. He reports that he gets very little sleep so is not sure if he has any paroxysmal nocturnal dyspnea. He does not check his weight regularly and does not know if he has gained or lost weight. He has a PTA prescription for torsemide but reports that he does not take it regularly. He denies any chest pain but does endorse a right upper quadrant abdominal pain with tenderness. He reports that he thinks there is a mass or a pulled muscle there. He also endorses hiccups which have started since he came to the hospital. He attributes the hiccups to the dose of Lasix he received in the emergency room. He reports that he normally takes  Flexeril for his hiccups however they do not work anymore and has asked for another muscle relaxant. He provides no other complaints.     CAD s/p FERNANDA  Hypertrophic cardiomyopathy without LVOT  Combined systolic and diastolic heart failure  ECHO with EF 25-30%: Severe global hypokinesis   (Meds prescribed prior to admission Demadex 10 mg as needed, Imdur 60 mg, hydralazine 50 mg twice daily, Coreg 25 mg twice daily, Norvasc 5 mg) and Proscar for BPH  Patient presented with worsening shortness of breath reports orthopnea, exertional dyspnea, bilateral lower extremity swelling. Presentation to the ER with increasing lower extremity edema.  Chest x-ray showing likely pulmonary vascular congestion and trace interstitial edema with small bilateral effusions. CT chest with small bilateral pleural effusions, patchy groundglass opacities right upper lobe consistent with atypical pneumonia and left upper lobe few patchy groundglass foci. Reports in the ER potential noncompliance with his diuretic.  Does not check his weight. Noted increase in lower extremity edema with fluid overload. Cardiology consult. Started on IV lasix. He has not seen his cardiologist in approximately 2 years. Strict I and O.  Daily weight.  Lasix 40 IV twice daily started this admit but stopped 4/8 with increase in creatinine.  Resumed on Coreg 25 mg twice daily, Imdur 60 mg initially this admit   4/7 ECHO: The LV is moderately dilated based on volumes. Severely increased LV wall thickness is noted as previously reported. Visually estimated ejection fraction is 25 to 30%.There is severe diffuse LV global hypokinesis noted with abnormal septal motion consistent with conduction abnormality. Minor regional variations arepresent. The RV is mild to moderately dilated with mild RVH. Systolic function is normal visually and on Doppler. Severe left atrial and moderate to severe right atrial dilatation is noted. Mitral leaflets are mildly thickened and there is  tenting. There is a eccentric posteriorly directed jet of 2-3+ mitral regurgitation. Moderate pulmonary hypertension   With diuresis noted to have increase in creatinine. Stop diuretics with renal consult. Creatinine 4.05 at peak with diuresis.. BNP 4500  He was allowed more permissive BP with reduction in BP meds. Decrease dose of diuretics.   Nephrology consult this admission. Followed  by both cardiology and nephrology.   Discharge Medications  Stop norvasc  Stop hydralazine   Stop imdur   Continue bumex 2 mg po BID  (stopped demadex)   Coreg 12.5 mg po BID (reduced from 25 mg po BID)   Routine, Continue bumet 2 mg bid. Follow-up c Dr. Randall on Tuesday, 15 Apr at 12:30p at [facility], [address] [place] [address], [address], [place] [address]. Tel - [phone] Avoid NSAIDs as able.      History of CAD   Coronary angiogram 1/2022 one-vessel obstructive coronary artery disease PCI of mid to distal LAD with FERNANDA x 3  Cardiomyopathy 25-30% global hypokinesis   4/7 ECHO: The LV is moderately dilated based on volumes. Severely increased LV wall thickness is noted as previously reported. Visually estimated ejection fraction is 25 to 30%.There is severe diffuse LV global hypokinesis noted with abnormal septal motion consistent with conduction abnormality. Minor regional variations arepresent. The RV is mild to moderately dilated with mild RVH. Systolic function is normal visually and on Doppler. Severe left atrial and moderate to severe right atrial dilatation is noted. Mitral leaflets are mildly thickened and there is tenting. There is a eccentric posteriorly directed jet of 2-3+ mitral regurgitation. Moderate pulmonary hypertension   Troponin 81 >> 84  Treated medically   At some point cardiac MRI to exclude infiltrative CM or HCM (do as outpatient)   Plans for workup for ATTP amyloid.   Continued on ASA, plavix, crestor 20 mg   Outpatient PYP scan in 2 weeks.     Elevated liver tests  Patient had liver tests elevated  and improved prior to discharge but recommend follow up in the clinic.    and AST 73 with normal bilirubin.   Follow up liver profile in clinic    Hyperlipidemia  PTA rosuvastatin 20 mg daily    HTN  PTA meds as above but not certain of compliance  (Meds prescribed prior to admission Demadex 10 mg as needed, Imdur 60 mg, hydralazine 50 mg twice daily, Coreg 25 mg twice daily, Norvasc 5 mg) and Proscar for BPH  Patient on multiple medications reported prior to admission but may have had some compliancy challenges.   Resumed on Coreg 25 mg twice daily. Imdur 60 mg daily initially   BP meds adjusted with increase in creatinine and reduced dosing.   Hydralazine and imdur stopped.   Decreased coreg 12.5 mg po BID.   Close follow up nephrology and cardiology     Potential Community Acquired Pneumonia: clinically more likely CHF   CT chest with groundglass opacities right worse than left (? Atypical pneumonia however appears clinically to have fluid overload )  Patient presenting with shortness of breath for 2 weeks found to have groundglass opacities in the upper lobes.    No infectious symptoms such as fevers, chills, cough. Normal WBC and procalcitonin   Started on rocephin and zpak   Legionella urinary ordered and strep pending  Respiratory panel also ordered and not completed   Influenza RSV and COVID-negative  Treated respiratory infection with course of omnicef to complete on discharge.   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 Avoid nephrotoxic medications  At baseline he has had progressive CKD as outpatient. On admission patient had increase in creatinine with diuresis. Peak in creatinine of 4.05. Nephrology consult. Patient continued on diuresis. CT on admit with no overt obstruction or hydronephrosis (NON contrast)   Reported lot of NSAIDS at  times up to 1600 mg in a day. (Urine eosinophils neg)   Total protein /creatinine ratio 0.63  Albumin normal 4.1   4/9 nephrology consult.   Renal ultrasound. No hydronephrosis. Simple appearing renal cysts. S/P partial nephrectomy on the left.   Per nephrology most likely stopping NSAID and improved cardiorenal syndrome.   Follow up closely with nephrology after discharge.        Thyroid goiter  Prominent left lobe of thyroid seen on ct  Follow up US thyroid with primary (ordered)    Mild persistent asthma  Lungs are clear on auscultation.     MDD  Bipolar 2 disorder  ADHD  Patient has multiple medications currently for his mood disorder (NOS)   Patient does have a history of anxiety.  He is currently on Wellbutrin 300 XL daily and BuSpar.  2/13/2025 discussion of potential untreated bipolar disorder including mood instability recommending a psychiatric consult.  He was referred to psychiatry.  Complex history with opioid/pain management   psychiatry consult this admission.  Currently taking Adderall 20 mg twice a day  Wellbutrin 300 mg XL daily  BuSpar 5 mg 2 times daily as needed  Klonopin 1 mg p.o. twice daily for flying  Ativan 1 mg twice a day as needed for anxiety (60 tablets) last refill 3/18  Ambien 10 mg as needed for sleep  4/7 psychiatry consult rec schedule buspar   Discussion of tapering off elavil at his outpatient visit.       Pain clinic  Chronic pain management.   Follows outpatient on suboxone 8-2 1/2 film QID ( confirmed with pharmacy)   Seen by pain service in hospital as not taking full suboxone as not name brand and upsetting his stomach.  Recommending follow up psychiatry outpatient and pain management.     He has polypharmacy and recommend ongoing evaluation of medications and evaluation of potential reduction in polypharmacy as able.   No scripts for pain, or psychiatry given on discharge. (Follow up outpatient providers)  Only Rx for diuretic        Sleep apnea  Does not use  CPAP    Hiccups  Baclofen prn using at home.        Consultations This Hospital Stay   OCCUPATIONAL THERAPY ADULT IP CONSULT  CARDIOLOGY IP CONSULT  CARE MANAGEMENT / SOCIAL WORK IP CONSULT  PSYCHIATRY IP CONSULT  SPIRITUAL HEALTH SERVICES IP CONSULT  NEPHROLOGY IP CONSULT  OCCUPATIONAL THERAPY ADULT IP CONSULT  NEPHROLOGY IP CONSULT  NUTRITION SERVICES ADULT IP CONSULT  NUTRITION SERVICES ADULT IP CONSULT  PAIN MANAGEMENT ADULT IP CONSULT    Code Status   Prior    Time Spent on this Encounter   I, CELINE RODRIGUEZ MD, personally saw the patient today and spent greater than 30 minutes discharging this patient.       CELINE RODRIGUEZ MD  [facility]  [address], [address]  [facility] [address]  Phone: [phone]  ______________________________________________________________________    Physical Exam   Vital Signs:                    Weight: 238 lbs 8.6 oz  General Appearance:  Alert and awake   Respiratory: Decrease BS bilaterally with no wheezes or rhonchi  Cardiovascular: Regular rate with no gallop or rub   GI:  + BS, soft, non tender   Skin: 2+ bilaterally          Primary Care Physician   Luis Armando Segovia    Discharge Orders      NM Tc PYP Imaging for Transthyretin Cardiac Amyloidosis     US Thyroid    Results to primary     Basic metabolic panel     Hepatic panel     Primary Care - Care Coordination Referral      Med Therapy Management Referral      Follow-Up with Cardiology JAM      Adult Cardiology Eval  Referral      Occupational Therapy  Referral      Brief Discharge Instructions    You have an enlarged thyroid please schedule ultrasound through your primary     Brief Discharge Instructions    Continue bumet 2 mg bid.  Follow-up c Dr. Randall on Tuesday, 15 Apr at 12:30p at [facility], [address], [address], [place] [address].  Tel - [phone]  Avoid NSAIDs as able.     Brief Discharge Instructions    You had an abnormal CT on your  chest on admission and will need to have follow up CAT scan of your chest in 4-6 weeks with your primary after treated potential infection     Reason for your hospital stay    Shortness of breath     Activity    Your activity upon discharge: activity as tolerated     Brief Discharge Instructions    Do not exceed 650 mg of tylenol at a dose every 6 hours prn     Brief Discharge Instructions    You will require follow up liver tests with your provider primary next week     Diet    Follow this diet upon discharge: Current Diet:Orders Placed This Encounter      Fluid restriction 2000 ML FLUID      2 Gram Sodium Diet     Hospital Follow-up with Existing Primary Care Provider (PCP)            Significant Results and Procedures   Most Recent 3 CBC's:  Recent Labs   Lab Test 04/11/25  0644 04/10/25  0549 04/07/25  0614   WBC 8.2 9.1 9.9   HGB 11.3* 11.6* 11.3*   MCV 69* 69* 69*    363 364     Most Recent 3 BMP's:  Recent Labs   Lab Test 04/11/25  0644 04/10/25  0549 04/09/25  0615    138 139   POTASSIUM 3.7 4.4 4.7   CHLORIDE 97* 96* 99   CO2 31* 31* 31*   BUN 34.1* 39.4* 38.9*   CR 2.79* 3.44* 4.05*   ANIONGAP 11 11 9   MIKO 8.7* 9.0 8.6*   GLC 83 75 75     Most Recent 2 LFT's:  Recent Labs   Lab Test 04/11/25  0644 04/10/25  0549   AST 73* 132*   * 198*   ALKPHOS 111 132   BILITOTAL 0.6 0.5     Most Recent 3 INR's:No lab results found.  Most Recent INR's and Anticoagulation Dosing History:  Anticoagulation Dose History          Latest Ref Rng & Units 1/20/2005 1/26/2005 1/28/2005 1/29/2005 8/27/2009   Recent Dosing and Labs   INR 0.86 - 1.14 1.30  1.32  1.47  1.32  0.98      Most Recent 3 Creatinines:  Recent Labs   Lab Test 04/11/25  0644 04/10/25  0549 04/09/25  0615   CR 2.79* 3.44* 4.05*     Most Recent 3 Hemoglobins:  Recent Labs   Lab Test 04/11/25  0644 04/10/25  0549 04/07/25  0614   HGB 11.3* 11.6* 11.3*     Most Recent 3 Troponin's:  Recent Labs   Lab Test 11/30/20  1651 11/30/20  0700  11/29/20  2258   TROPI 7.901* 13.249* 13.775*     Most Recent 3 BNP's:  Recent Labs   Lab Test 04/06/25  1642 02/13/25  1401 12/23/22  1237 11/25/22  1502 11/19/22 2001   NTBNPI 5,749*  --   --   --  1,852*   NTBNP  --  3,160* 202 1,686*  --      Most Recent D-dimer:  Recent Labs   Lab Test 04/06/25  1642   DD 0.50     Most Recent Cholesterol Panel:  Recent Labs   Lab Test 01/29/25  0934   CHOL 56   LDL 13   HDL 32*   TRIG 56     7-Day Micro Results       Collected Updated Procedure Result Status      04/06/2025 2117 04/11/2025 2231 Blood Culture Peripheral Blood [[id number]]   Peripheral Blood    Final result Component Value   Culture No Growth               04/06/2025 2117 04/11/2025 2231 Blood Culture Peripheral Blood [[id number]]   Peripheral Blood    Final result Component Value   Culture No Growth               04/06/2025 1638 04/06/2025 1725 Influenza A/B, RSV and SARS-CoV2 PCR (COVID-19) Nasopharyngeal [[id number]]    Swab from Nasopharyngeal    Final result Component Value   Influenza A PCR Negative   Influenza B PCR Negative   RSV PCR Negative   SARS CoV2 PCR Negative   NEGATIVE: SARS-CoV-2 (COVID-19) RNA not detected, presumed negative.                  Most Recent TSH and T4:  Recent Labs   Lab Test 04/11/25  0644   TSH 1.38     Most Recent Hemoglobin A1c:  Recent Labs   Lab Test 11/30/20  0700   A1C 5.2     Most Recent 6 glucoses:  Recent Labs   Lab Test 04/11/25  0644 04/10/25  0549 04/09/25  0615 04/08/25  2125 04/08/25  1030 04/07/25  0614   GLC 83 75 75 97 101* 81     Most Recent Urinalysis:  Recent Labs   Lab Test 04/09/25  0118   COLOR Yellow   APPEARANCE Clear   URINEGLC Negative   URINEBILI Negative   URINEKETONE Negative   SG 1.021   UBLD Negative   URINEPH 6.0   PROTEIN 100*   NITRITE Negative   LEUKEST Negative   RBCU 1   WBCU 1     Most Recent ABG:No lab results found.  Most Recent ESR & CRP:No lab results found.  Most Recent Anemia Panel:  Recent Labs   Lab Test 04/11/25  0627  02/13/25  1401 01/29/25  0934 12/15/24  0208 11/11/24  1100   WBC 8.2   < > 6.3   < > 9.5   HGB 11.3*   < > 11.0*   < > 11.4*   HCT 41.5   < > 39.9*   < > 39.5*   MCV 69*   < > 75*   < > 74*      < > 272   < > 226   IRON  --   --   --   --  21*   IRONSAT  --   --   --   --  6*   FEB  --   --   --   --  334   LILLIAN  --   --  52  --  53    < > = values in this interval not displayed.     Most Recent CPK:No lab results found.,   Results for orders placed or performed during the hospital encounter of 04/06/25   Chest XR,  PA & LAT    Narrative    EXAM: XR CHEST 2 VIEWS  LOCATION: [facility]  DATE: 4/6/2025    INDICATION: Shortness of breath, check for CHF or pneumonia  COMPARISON: Chest radiograph 2/13/2025      Impression    IMPRESSION: Stable enlargement of the cardiac silhouette. Coronary stent is again noted. Likely pulmonary vascular congestion and trace interstitial edema. Small bilateral pleural effusions are also noted. No dewayne airspace consolidation or pneumothorax.   Bones are unchanged.   CT Chest Abdomen Pelvis w/o Contrast    Narrative    EXAM: CT CHEST ABDOMEN PELVIS W/O CONTRAST  LOCATION: [facility]  DATE: 4/6/2025    INDICATION: SOB and epigastric abd pain  check for pneumonia, CHF, hiatal hernia, liver mass, pancreatitis  COMPARISON: 7/25/2024  TECHNIQUE: CT scan of the chest, abdomen, and pelvis was performed without IV contrast. Multiplanar reformats were obtained. Dose reduction techniques were used.   CONTRAST: None.    FINDINGS:   LUNGS AND PLEURA: Small bilateral pleural effusions, right larger than left. Patchy groundglass foci present throughout the right upper lobe in a pattern most suggestive of atypical pneumonia. Left upper lobe also demonstrates a few patchy groundglass   foci.    MEDIASTINUM/AXILLAE: Prominent enlargement left lobe of thyroid bowing of the trachea to the right small calcified mediastinal lymph nodes, no significant  adenopathy. Cardiomegaly.    CORONARY ARTERY CALCIFICATION: Severe.    HEPATOBILIARY: Prior cholecystectomy. Liver parenchyma unremarkable.    PANCREAS: Normal.    SPLEEN: Normal.    ADRENAL GLANDS: Normal.    KIDNEYS/BLADDER: Stable postoperative appearance following nephron sparing resection mid left kidney. Small left renal cyst stable and needs no dedicated follow-up. No stone or hydronephrosis.  Right kidney negative. Bladder unremarkable.    BOWEL: No obstruction or inflammatory change.    LYMPH NODES: No adenopathy.    VASCULATURE: Normal.    PELVIC ORGANS: Trace volume ascites.    MUSCULOSKELETAL: Instrumentation and fusion at lumbosacral junction.      Impression    IMPRESSION:  1.  Patchy groundglass foci within both upper lobes, right worse than left, most suggestive of atypical pneumonia.  2.  Small bilateral pleural effusions, right larger than left.  3.  Cardiomegaly.  4.  Stable postoperative appearance of left kidney.  5.  Trace volume ascites.  6.  Prominent enlargement of left lobe of thyroid. Benign goiter favored though this would be amenable to evaluation with ultrasound.     US Renal Complete Non-Vascular    Narrative    EXAM: US RENAL COMPLETE NON-VASCULAR  LOCATION: [facility]  DATE: 4/9/2025    INDICATION: Worsening renal function.  COMPARISON: 4/6/2025, 7/25/2024.  TECHNIQUE: Routine Bilateral Renal and Bladder Ultrasound.    FINDINGS:    RIGHT KIDNEY: 12.2 x 6.2 x 5.7 cm. No hydronephrosis. No solid mass. Simple-appearing hypoechoic cysts measuring up to 15 x 14 x 11 mm.     LEFT KIDNEY: 11.5 x 6.7 x 5 cm. Status post partial nephrectomy. No hydronephrosis. No solid mass. Simple-appearing cyst in the upper pole measuring 22 x 21 x 16 mm.     BLADDER: No urinary bladder wall thickening. Incidentally noted prostatomegaly.      Impression    IMPRESSION:  1.  No hydronephrosis.  2.  Simple-appearing renal cysts. No follow-up is necessary.  3.  Status post partial  nephrectomy on the left.   US Lower Extremity Venous Duplex Bilateral    Narrative    EXAM: US LOWER EXTREMITY VENOUS DUPLEX BILATERAL  LOCATION: [facility]  DATE: 2025    INDICATION: Edema  COMPARISON: 2019.  TECHNIQUE: Venous Duplex ultrasound of bilateral lower extremities with and without compression, augmentation and duplex. Color flow and spectral Doppler with waveform analysis performed.    FINDINGS: Exam includes the common femoral, femoral, popliteal veins as well as segmentally visualized deep calf veins and greater saphenous vein.     RIGHT: No deep vein thrombosis. No superficial thrombophlebitis. No popliteal cyst.    LEFT: No deep vein thrombosis. No superficial thrombophlebitis. No popliteal cyst.      Impression    IMPRESSION:  1.  No deep venous thrombosis in the bilateral lower extremities.                Echocardiogram Complete     Value    LVEF  25-30%    Narrative    [id number]  [id number]  [id number]  [id number]^NICOLE^SANDRA^CHANTAL     [facility]  Echocardiography Laboratory  [address] [place] [address]     Name: FRANCISCO KELLER  MRN: [id number]  : 1970  Study Date: 2025 08:24 AM  Age: 54 yrs  Gender: Male  Patient Location: [facility]  Reason For Study: Heart Failure  Ordering Physician: SANDRA RIVERA  Performed By: Maine Jordan     BSA: 2.3 m2  Height: 71 in  Weight: 243 lb  HR: 72  BP: 137/90 mmHg  ______________________________________________________________________________  Procedure  Echocardiogram with two-dimensional, color and spectral Doppler. Definity (NDC  #97922-439) given intravenously.  ______________________________________________________________________________  Interpretation Summary     The LV is moderately dilated based on volumes. Severely increased LV wall  thickness is noted as previously reported. Visually estimated ejection  fraction is 25 to 30%.  There is severe diffuse LV global  hypokinesis noted with abnormal septal  motion consistent with conduction abnormality. Minor regional variations are  present.  The RV is mild to moderately dilated with mild RVH. Systolic function is  normal visually and on Doppler.  Severe left atrial and moderate to severe right atrial dilatation is noted.  Mitral leaflets are mildly thickened and there is tenting. There is a  eccentric posteriorly directed jet of 2-3+ mitral regurgitation.  The tricuspid valve is not well-seen but there is mild to moderate TR with  estimated pulmonary pressures of 43 mmHg plus right atrial pressure. Overall  at least moderate pulmonary hypertension.  Based on Doppler parameters, left-sided filling pressures seem to be  increased.  Aortic root is normal in size but visualized portions of the ascending aorta  seem to be dilated at 44 mm.  IVC is dilated with poor collapsibility suggestive of increased right-sided  filling pressures.  ______________________________________________________________________________  Left Ventricle  The left ventricle is moderately dilated. There is severe concentric left  ventricular hypertrophy. The visual ejection fraction is 25-30%. Diastolic  Doppler findings (E/E' ratio and/or other parameters) suggest left ventricular  filling pressures are increased. There is severe global hypokinesia of the  left ventricle. Septal motion is consistent with conduction abnormality.     Right Ventricle  The right ventricle is mild to moderately dilated. There is mild right  ventricular hypertrophy. The right ventricular systolic function is normal.     Atria  The left atrium is severely dilated. The right atrium is moderate to severely  dilated.     Mitral Valve  The mitral valve leaflets are mildly thickened. The mitral regurgitant jet is  eccentrically directed. There is moderate to mod-severe (2-3+) mitral  regurgitation. The mean mitral valve gradient is 2.7 mmHg. The peak mitral  valve gradient is 7.4  mmHg.     Tricuspid Valve  The tricuspid valve is not well visualized, but is grossly normal. There is  mild to moderate (1-2+) tricuspid regurgitation. The right ventricular  systolic pressure is approximated at 42.6 mmHg plus the right atrial pressure.  Pulmonary hypertension.     Aortic Valve  There is trivial trileaflet aortic sclerosis. There is physiologic aortic  regurgitation. No hemodynamically significant valvular aortic stenosis.     Pulmonic Valve  The pulmonic valve is not well visualized. There is mild (1+) pulmonic  valvular regurgitation.     Vessels  The aortic root is normal size. Ascending aorta dilatation is present. Not  well seen, but measrues 4.4 cms. Dilation of the inferior vena cava is present  with abnormal respiratory variation in diameter. IVC diameter >2.1 cm  collapsing <50% with sniff suggests a high RA pressure estimated at 15 mmHg or  greater.     Pericardium  There is no pericardial effusion.     ______________________________________________________________________________  MMode/2D Measurements & Calculations  IVSd: 1.6 cm  LVIDd: 6.5 cm  LVIDs: 4.9 cm  LVPWd: 1.6 cm  FS: 24.6 %     LV mass(C)d: 531.5 grams  LV mass(C)dI: 232.0 grams/m2  Ao root diam: 3.6 cm  asc Aorta Diam: 4.4 cm  LVOT diam: 2.2 cm  LVOT area: 4.0 cm2  Ao root diam index Ht(cm/m): 2.0  Ao root diam index BSA (cm/m2): 1.6  Asc Ao diam index BSA (cm/m2): 1.9  Asc Ao diam index Ht(cm/m): 2.4  EF Biplane: 23.8 %  RV Base: 5.3 cm  RWT: 0.49  TAPSE: 2.5 cm     Doppler Measurements & Calculations  MV E max vincent: 125.3 cm/sec  MV max P.4 mmHg  MV mean P.7 mmHg  MV V2 VTI: 42.2 cm  MVA(VTI): 1.7 cm2     MV dec slope: 811.3 cm/sec2  MV dec time: 0.16 sec  Ao V2 max: 155.3 cm/sec  Ao max PG: 10.0 mmHg  Ao V2 mean: 104.6 cm/sec  Ao mean P.3 mmHg  Ao V2 VTI: 28.5 cm  EDDI(I,D): 2.5 cm2  EDDI(V,D): 3.0 cm2  LV V1 max P.6 mmHg  LV V1 max: 117.0 cm/sec  LV V1 VTI: 18.3 cm  MR PISA: 2.5 cm2  MR ERO: 0.17 cm2  MR  volume: 23.3 ml  SV(LVOT): 72.4 ml  SI(LVOT): 31.6 ml/m2  PA acc time: 0.10 sec  PI end-d danis: 163.0 cm/sec  TR max danis: 320.6 cm/sec  TR max P.6 mmHg  AV Danis Ratio (DI): 0.75  EDDI Index (cm2/m2): 1.1  E/E' av.8  Lateral E/e': 17.2  Medial E/e': 20.5  RV S Danis: 11.7 cm/sec     ______________________________________________________________________________  Report approved by: Aaron Thompson MD on 2025 02:13 PM           *Note: Due to a large number of results and/or encounters for the requested time period, some results have not been displayed. A complete set of results can be found in Results Review.       Discharge Medications   Discharge Medication List as of 2025  3:49 PM        START taking these medications    Details   bumetanide (BUMEX) 2 MG tablet Take 1 tablet (2 mg) by mouth 2 times daily., Disp-60 tablet, R-0, E-Prescribe      cefdinir (OMNICEF) 300 MG capsule Take 1 capsule (300 mg) by mouth 2 times daily., Disp-4 capsule, R-0, E-Prescribe           CONTINUE these medications which have CHANGED    Details   acetaminophen (TYLENOL) 325 MG tablet Take 1-2 tablets (325-650 mg) by mouth every 6 hours as needed for mild pain., No Print Out      carvedilol (COREG) 25 MG tablet Take 0.5 tablets (12.5 mg) by mouth 2 times daily (with meals)., No Print Out      LORazepam (ATIVAN) 1 MG tablet Take 0.5 tablets (0.5 mg) by mouth 2 times daily as needed for anxiety., No Print Out           CONTINUE these medications which have NOT CHANGED    Details   albuterol (PROAIR HFA/PROVENTIL HFA/VENTOLIN HFA) 108 (90 Base) MCG/ACT inhaler Inhale 2 puffs into the lungs every 6 hours., Disp-18 g, R-5, E-PrescribePharmacy may dispense brand covered by insurance (Proair, or proventil or ventolin or generic albuterol inhaler)      amitriptyline (ELAVIL) 50 MG tablet Take 3 tablets (150 mg) by mouth at bedtime., Disp-270 tablet, R-1, E-Prescribe      amphetamine-dextroamphetamine (ADDERALL) 20 MG tablet Take 1  tablet (20 mg) by mouth 2 times daily., Disp-60 tablet, R-0, E-Prescribe      aspirin (ASA) 81 MG chewable tablet Take 1 tablet (81 mg) by mouth daily Starting tomorrow., Disp-30 tablet, R-3, E-Prescribe      buprenorphine HCl-naloxone HCl (SUBOXONE) 8-2 MG per film 1/2 film QID - Brand name only., Disp-60 Film, R-2, E-Prescribe      buPROPion (WELLBUTRIN XL) 300 MG 24 hr tablet Take 300 mg by mouth every morning., Historical      busPIRone (BUSPAR) 5 MG tablet Take 1 tablet (5 mg) by mouth 2 times daily as needed (panic attack)., Disp-180 tablet, R-3, E-Prescribe      clonazePAM (KLONOPIN) 1 MG tablet Take 1 tablet (1 mg) by mouth 2 times daily as needed (flying phobia), Disp-4 tablet, R-0, E-Prescribe      clopidogrel (PLAVIX) 75 MG tablet Take 1 tablet (75 mg) by mouth daily., Disp-90 tablet, R-0, E-Prescribe      cyclobenzaprine (FLEXERIL) 10 MG tablet Take 1 tablet (10 mg) by mouth 3 times daily as needed for other (hiccups)., Disp-90 tablet, R-5, E-Prescribe      cycloSPORINE (RESTASIS) 0.05 % ophthalmic emulsion Place 1 drop into both eyes 2 times daily., Disp-60 mL, R-5, E-Prescribe      finasteride (PROSCAR) 5 MG tablet Take 1 tablet (5 mg) by mouth daily., Disp-90 tablet, R-3, E-Prescribe      fluticasone-salmeterol (WIXELA INHUB) 500-50 MCG/ACT inhaler Inhale 1 puff into the lungs 2 times daily., Disp-120 each, R-6, E-Prescribe      imiquimod (ALDARA) 5 % external cream APPLY TOPICALLY AT BEDTIME -WASH OFF AFTER 8 HOURS AND MAY USE FOR UP TO 16 WEEKS.Disp-24 packet, [id number]      nitroGLYcerin (NITROSTAT) 0.4 MG sublingual tablet Place under the tongue every 5 minutes as needed, Historical      oxyCODONE (ROXICODONE) 5 MG tablet Take 1 tablet (5 mg) by mouth every 6 hours as needed for pain., Disp-40 tablet, R-0, E-Prescribe      pantoprazole (PROTONIX) 40 MG EC tablet Take 1 tablet (40 mg) by mouth daily., Disp-90 tablet, R-3, E-Prescribe      rosuvastatin (CRESTOR) 20 MG tablet Take 1 tablet (20  "mg) by mouth daily., Disp-90 tablet, R-3, E-Prescribe      sucralfate (CARAFATE) 1 GM tablet TAKE 1 TABLET (1 G) BY MOUTH 4 TIMES DAILY AS NEEDED (HEARTBURN)., Disp-360 tablet, R-1, E-Prescribe      testosterone cypionate (DEPOTESTOSTERONE) 200 MG/ML injection Inject 1 mL (200 mg) into the muscle once a week., Disp-4 mL, R-1, E-Prescribe      vitamin C (ASCORBIC ACID) 1000 MG TABS Take 1,000 mg by mouth daily, Disp-90, R-0, Historical      zolpidem (AMBIEN) 10 MG tablet Take 1 tablet (10 mg) by mouth nightly as needed for sleep., Disp-30 tablet, R-2, E-Prescribe      B-D LUER-HIWOT SYRINGE 21G X 1-1/2\" 3 ML MISC 1 DEVICE ONCE A WEEK AND ALSO NEEDS 22 G NEEDLES 1.5 INCH, Disp-50 each, R-3, COLLETTE, E-Prescribe      Syringe/Needle, Disp, (SYRINGE LUER LOCK) 20G X 1-1/2\" 3 ML MISC 1 Device once a week - and also needs 22 G needles 1.5 inch #30 with 1 refill, Disp-30 each, R-1, E-Prescribe           STOP taking these medications       amLODIPine (NORVASC) 5 MG tablet Comments:   Reason for Stopping:         fluconazole (DIFLUCAN) 150 MG tablet Comments:   Reason for Stopping:         hydrALAZINE (APRESOLINE) 50 MG tablet Comments:   Reason for Stopping:         isosorbide mononitrate (IMDUR) 60 MG 24 hr tablet Comments:   Reason for Stopping:         torsemide (DEMADEX) 10 MG tablet Comments:   Reason for Stopping:             Allergies   No Known Allergies  "

## 2025-04-14 ENCOUNTER — PATIENT OUTREACH (OUTPATIENT)
Dept: CARE COORDINATION | Facility: CLINIC | Age: 55
End: 2025-04-14
Payer: COMMERCIAL

## 2025-04-14 ENCOUNTER — TELEPHONE (OUTPATIENT)
Dept: CARDIOLOGY | Facility: CLINIC | Age: 55
End: 2025-04-14
Payer: COMMERCIAL

## 2025-04-14 DIAGNOSIS — Z71.89 OTHER SPECIFIED COUNSELING: Primary | Chronic | ICD-10-CM

## 2025-04-14 NOTE — PROGRESS NOTES
Clinic Care Coordination Contact  [facility]/Voicemail    Clinical Data: Care Coordinator Outreach    Outreach Documentation Number of Outreach Attempt   4/14/2025  11:25 AM 1     Left message on patient's voicemail with call back information and requested return call.    Plan: Care Coordinator will try to reach patient again in 1-2 business days.    CATHERINE Schuler  [facility]   Primary Care Social Work Care Coordinator  [place] [facility] [place]   Phone: [phone]

## 2025-04-14 NOTE — LETTER
[facility] CARE COORDINATION  [address] [place] [address]    April 15, 2025    Jeremi Damon  [address] [address]      Dear Jeremi,    I am a clinic care coordinator who works with Luis Armando Segovia MD with the [facility]. I wanted to thank you for spending the time to talk with me.  Below is a description of clinic care coordination and how I can further assist you.       The clinic care coordination team is made up of a registered nurse, , financial resource worker and community health worker who understand the health care system. The goal of clinic care coordination is to help you manage your health and improve access to the health care system. Our team works alongside your provider to assist you in determining your health and social needs. We can help you obtain health care and community resources, providing you with necessary information and education. We can work with you through any barriers and develop a care plan that helps coordinate and strengthen the communication between you and your care team.  Our services are voluntary and are offered without charge to you personally.    Please feel free to contact me with any questions or concerns regarding care coordination and what we can offer.      We are focused on providing you with the highest-quality healthcare experience possible.    Sincerely,     Jennifer Hope [facility]  [facility]   Primary Care Social Work Care Coordinator  [place] [facility]  [place]   Phone: [phone]

## 2025-04-14 NOTE — TELEPHONE ENCOUNTER
Patient was admitted to [facility] on 4/6/25 with worsening shortness of breath reports orthopnea, exertional dyspnea, bilateral lower extremity swelling. PNA. Reports in the ER potential noncompliance with his diuretic. Does not check his weight.     PMH: CAD s/p FERNANDA, HTN, hypertrophic cardiomyopathy, combined systolic and diastolic heart failure, CKD stage IV, mild persistent asthma, chronic back pain, depression, bipolar 2 disorder, ADHD, sleep apnea.     4/8/25: Echo showed EF of 25 to 30%.There is severe diffuse LV global hypokinesis noted with abnormal septal motion consistent with conduction abnormality. Minor regional variations arepresent. The RV is mild to moderately dilated with mild RVH. Systolic function is normal visually and on Doppler. Severe left atrial and moderate to severe right atrial dilatation is noted. Mitral leaflets are mildly thickened and there is tenting. There is a eccentric posteriorly directed jet of 2-3+ mitral regurgitation. Moderate pulmonary hypertension.    IV Lasix diuresed.    Cardiac MRI to exclude infiltrative CM or HCM.    Pt was started on Bumex and ABX. PTA Coreg dosage was decreased. Norvasc, Imdur, Hydralazine and Demadex were discontinued at time of discharge.    Pt is scheduled for a [facility] P[facility] Cardiac Sarcoidosis test on 4/30/25 at 0945, and an OV on 5/6/25 at 0910 with JAM Rukhsana Lipscomb at our [place] [facility].    Writer attempted to call pt for a cardiology post discharge phone call, but no answer. VM left to return my call. TAWNYA Davila RN.

## 2025-04-15 ENCOUNTER — TELEPHONE (OUTPATIENT)
Dept: FAMILY MEDICINE | Facility: CLINIC | Age: 55
End: 2025-04-15

## 2025-04-15 NOTE — TELEPHONE ENCOUNTER
MTM referral from: Transitions of Care (recent hospital discharge, TCU discharge, or ED visit)    MTM referral outreach attempt #2 on April 15, 2025 at 1:55 PM      Outcome: Patient not reachable after several attempts, sent [place] message    Use [facility] med adv  for the carrier/Plan on the flowsheet      [employer] Message Sent    SHAHAB Puentes   [phone]

## 2025-04-15 NOTE — PROGRESS NOTES
"Clinic Care Coordination Contact  Transitions of Care Outreach  Chief Complaint   Patient presents with    Clinic Care Coordination - Post Hospital       Most Recent Admission Date: 4/6/2025   Most Recent Admission Diagnosis: Enlarged thyroid - E04.9  Elevated troponin - R79.89  Acute decompensated heart failure (H) - I50.9  Pneumonia of upper lobe due to infectious organism, unspecified laterality - J18.9  Chronic renal failure, unspecified CKD stage - N18.9     Most Recent Discharge Date: 4/11/2025   Most Recent Discharge Diagnosis: Pneumonia of upper lobe due to infectious organism, unspecified laterality - J18.9  Acute decompensated heart failure (H) - I50.9  Elevated troponin - R79.89  Chronic renal failure, unspecified CKD stage - N18.9  Enlarged thyroid - E04.9  Major depressive disorder, recurrent episode, moderate (H) - F33.1  Left ventricular hypertrophy - I51.7  Hypertrophic cardiomyopathy (H) - I42.2  Pain - R52  Coronary artery disease involving native coronary artery of native heart with angina pectoris - I25.119  Hypertension goal BP (blood pressure) < 140/90 - I10  Panic disorder without agoraphobia - F41.0  Generalized anxiety disorder - F41.1  Congestive heart failure, unspecified HF chronicity, unspecified heart failure type (H) - I50.9  Pneumonia due to infectious organism, unspecified laterality, unspecified part of lung - J18.9  CKD (chronic kidney disease) stage 4, GFR 15-29 ml/min (H) - N18.4  Elevated liver enzymes - R74.8     Transitions of Care Assessment    Discharge Assessment  How are you doing now that you are home?: \"Tired\" per pt report  How are your symptoms? (Red Flag symptoms escalate to triage hotline per guidelines): Improved  Do you know how to contact your clinic care team if you have future questions or changes to your health status? : Yes  Does the patient have their discharge instructions? : Yes  Does the patient have questions regarding their discharge instructions? : " "No  Were you started on any new medications or were there changes to any of your previous medications? : Yes  Does the patient have all of their medications?: Yes  Do you have questions regarding any of your medications? : No (Pt initially stated that he did have questions and then stated that \"it's all fine\" and denied questions. Pt has video visit with PCP within next 2 hours. Encouraged pt to review his medications with PCP at that time and bring any questions.)  Do you have all of your needed medical supplies or equipment (DME)?  (i.e. oxygen tank, CPAP, cane, etc.): Yes  Post-op (Clinicians Only)  Fever: No  Chills: No  Eating & Drinking: eating and drinking without complaints/concerns  Date of last BM: 04/15/25  Urinary Status: voiding without complaint/concerns    NANDO CC contacted patient for post-hospital follow up and to introduce Care Coordination. Post discharge assessment questions completed and listed above for reference. Pt difficult to understand at times. Full assessment not indicated as patient declined to have Care Coordination support at this time. The patient was agreeable to receiving an introduction letter with additional information on Care Coordination and that letter was sent via [employer]. Verified patient has access to [employer]. Pt has PCP follow-up apt later today and NANDO WONG reviewed the details of the apt with pt. Pt denied any questions for CC at this time. NANDO WONG discussed OP referral. Pt stated that he has not gotten a call, he has the number if needed, and declined SW CC assistance with calling and scheduling an eval. Encouraged pt to call back if questions, concerns, or needs arise. No further outreaches planned at this time. Please enter PCP CC referral if needs arise.     Follow up Plan     Discharge Follow-Up  Discharge follow up appointment scheduled in alignment with recommended follow up timeframe or Transitions of Risk Category? (Low = within 30 days; Moderate= within 14 days; " High= within 7 days): Yes  Discharge Follow Up Appointment Date: 04/15/25  Discharge Follow Up Appointment Scheduled with?: Primary Care Provider    Future Appointments   Date Time Provider Department Center   4/15/2025 11:40 AM Luis Armando Segovia MD [facility]   4/23/2025  9:30 AM Corin Virgen APRN CNP [facility]   4/29/2025  1:00 PM Macy Subramanian MD [facility]   4/30/2025 10:00 AM SHANNA FINNEGAN DONALD   5/6/2025  9:20 AM Kalie Lipscomb APRN CNP [facility]     Outpatient Plan as outlined on AVS reviewed with patient.    For any urgent concerns, please contact our 24 hour nurse triage line: [phone] ([phone])       TADEO IrwinSW

## 2025-04-16 LAB — CRYOGLOB SER QL: NEGATIVE

## 2025-04-17 NOTE — ADDENDUM NOTE
"  Assessment & Plan     Cervical lymphadenitis  Flares again with pain and tenderness, will have him to try abx and mouth wash for potential dental infection   - amoxicillin-clavulanate (AUGMENTIN) 875-125 MG tablet; Take 1 tablet by mouth 2 times daily  - chlorhexidine (PERIDEX) 0.12 % solution; Swish and spit 15 mLs in mouth 2 times daily    Dental infection  Mentioned above   - amoxicillin-clavulanate (AUGMENTIN) 875-125 MG tablet; Take 1 tablet by mouth 2 times daily  - chlorhexidine (PERIDEX) 0.12 % solution; Swish and spit 15 mLs in mouth 2 times daily    Review of external notes as documented above                Tobacco Cessation:   reports that he has been smoking cigarettes. He has a 20.00 pack-year smoking history. He has never used smokeless tobacco.      BMI:   Estimated body mass index is 44.49 kg/m  as calculated from the following:    Height as of this encounter: 1.854 m (6' 1\").    Weight as of this encounter: 153 kg (337 lb 3.2 oz).         FUTURE APPOINTMENTS:       - Follow-up visit in 6 months for CPE    No follow-ups on file.    Attila Rodriguez MD  [facility] ODALYS Angulo is a 45 year old who presents to clinic today for the following health issues     HPI       Concern - swollen gland under ear   Onset: ongoing, US completed in the past. Started again a couple days ago  Description: feels hard, patient feels it is the saliva gland  Intensity: patent feels it is a dull ache  Progression of Symptoms:  improving and has had it in the past but it changes  Accompanying Signs & Symptoms: no  Previous history of similar problem: happened in the past  Precipitating factors:        Worsened by: eating, salivation  Alleviating factors:        Improved by: massaging, ibuprofen  Therapies tried and outcome: ibuprofen, massaging    Patient refused flu shot today.     Review of Systems   Constitutional, HEENT, cardiovascular, pulmonary, gi and gu systems are negative, " Addended by: BENJAMIN PAGAN on: 4/17/2025 02:17 PM     Modules accepted: Orders     "except as otherwise noted.      Objective    /76   Pulse 91   Temp 97.5  F (36.4  C) (Tympanic)   Ht 1.854 m (6' 1\")   Wt (!) 153 kg (337 lb 3.2 oz)   SpO2 96%   BMI 44.49 kg/m    Body mass index is 44.49 kg/m .  Physical Exam   GENERAL: healthy, alert and no distress  HENT: normal cephalic/atraumatic, ear canals and TM's normal, nose and mouth without ulcers or lesions, oropharynx clear, oral mucous membranes moist and left anterior cervical lymphadenitis   NECK: no adenopathy, no asymmetry, masses, or scars and thyroid normal to palpation  RESP: lungs clear to auscultation - no rales, rhonchi or wheezes  CV: regular rate and rhythm, normal S1 S2, no S3 or S4, no murmur, click or rub, no peripheral edema and peripheral pulses strong  ABDOMEN: soft, nontender, no hepatosplenomegaly, no masses and bowel sounds normal  MS: no gross musculoskeletal defects noted, no edema                "

## 2025-04-17 NOTE — TELEPHONE ENCOUNTER
Writer attempted to return pt's phone message, but now, no answer. VM left to return my call. TAWNYA Davila RN.

## 2025-04-17 NOTE — PROGRESS NOTES
Clinic Care Coordination Contact    Received a call from pt stating that he received a vm from this writer. NANDO CC informed pt that SW CC and pt spoke on 04/15/25 and NNADO CC does not have any other questions or needs. Pt stated that he needs to get an apt setup with Cardiology and Nephology. Cardiology follow-up already scheduled (05/06/25). Discussed with pt and pt stated that he didn't see that and he is ok with the apt that is scheduled and just needs help with Nephrology.     Called [facility]. They are scheduling out to the end of this year. Pt stated that he wants to see Dr. Willis. Reviewed pt chart. Dr Willis is with [facility]. Information for [facility] reviewed and sent to pt via [employer]. Called with pt. Pt scheduled for a follow-up on 05/01/25 at 10am with Dr Willis, per request.     Pt also requested SW CC help him schedule an apt with MT. NANDO CC called with pt and pt scheduled. Apt scheduled 05/05/25 at 3pm. Informed pt that the number to reach MT is in his MyChart in a recent message that they sent. Pt stated that he has that and appreciative of the information.     NANDO CC sent list of future apts to pt via [employer]. Pt asked about quan care as he has had it in the past. FRW referral entered for pt and pt aware and in agreement. Pt denied additional needs at this time. Pt is not enrolled in PCP CC support. No future outreaches anticipated. Please enter PCP CC referral if needs arise.     CATHERINE Schuler  [facility]   Primary Care Social Work Care Coordinator  [place] [facility] [place]   Phone: [phone]

## 2025-04-18 NOTE — TELEPHONE ENCOUNTER
Clinic RN: Please investigate patient's chart or contact patient if the information cannot be found because the medication is listed as historical or discontinued. Confirm patient is taking this medication. Document findings and route refill encounter to provider for approval or denial.  Patricia Martins RN, BSN  [facility]

## 2025-04-19 DIAGNOSIS — E29.1 HYPOGONADISM MALE: ICD-10-CM

## 2025-04-19 RX ORDER — TESTOSTERONE CYPIONATE 200 MG/ML
200 INJECTION, SOLUTION INTRAMUSCULAR WEEKLY
Qty: 4 ML | Refills: 1 | Status: SHIPPED | OUTPATIENT
Start: 2025-04-19

## 2025-04-21 ENCOUNTER — PATIENT OUTREACH (OUTPATIENT)
Dept: CARE COORDINATION | Facility: CLINIC | Age: 55
End: 2025-04-21
Payer: COMMERCIAL

## 2025-04-21 NOTE — TELEPHONE ENCOUNTER
"Chart reviewed.   Medication ordered 12/16/24 then discontinued 12/31, then added back in on 1/5 as historical.     LOV: 2/13/25 - noted:   \"Major Depressive Disorder, Recurrent Episode, Mode  ?Bipolar 2 disorder (H)  Reports episodes of depression and low mood. Currently on bupropion but reports it is not effective. Discussed risks of untreated bipolar disorder including mood instability and benefits of psychiatric evaluation. Prefers to see a psychiatrist for further management.  - Refer to psychiatry for medication management and evaluation of bipolar disorder  - [facility] Referral and continue current medications.\"      Scheduled to see psychiatry on 4/23    Attempt # 1    Called # [phone]   Left a non detailed VM to call back at [phone] and ask for any available Triage Nurse.    On call back:  Please ask if taking bupropion, and if needing refill before psychiatry appointment?    BEATRICE CONSTANTINO RN on 4/21/2025 at 4:42 PM   [facility]          "

## 2025-04-21 NOTE — PROGRESS NOTES
FRW update  4/21/25  Outreach attempted x 1 was unable to reach. Left message on voicemail with call back information and requested return call.  Plan: Current outreach date reflects FRW 's follow up within one week.

## 2025-04-22 ENCOUNTER — OFFICE VISIT (OUTPATIENT)
Dept: FAMILY MEDICINE | Facility: CLINIC | Age: 55
End: 2025-04-22
Payer: COMMERCIAL

## 2025-04-22 VITALS
TEMPERATURE: 98.1 F | OXYGEN SATURATION: 96 % | BODY MASS INDEX: 32.06 KG/M2 | HEART RATE: 67 BPM | DIASTOLIC BLOOD PRESSURE: 80 MMHG | HEIGHT: 71 IN | SYSTOLIC BLOOD PRESSURE: 120 MMHG | RESPIRATION RATE: 18 BRPM | WEIGHT: 229 LBS

## 2025-04-22 DIAGNOSIS — D64.9 ANEMIA, UNSPECIFIED TYPE: ICD-10-CM

## 2025-04-22 DIAGNOSIS — I51.89 COMBINED SYSTOLIC AND DIASTOLIC CARDIAC DYSFUNCTION: Primary | ICD-10-CM

## 2025-04-22 DIAGNOSIS — I10 HYPERTENSION GOAL BP (BLOOD PRESSURE) < 140/90: ICD-10-CM

## 2025-04-22 DIAGNOSIS — N18.9 CHRONIC RENAL FAILURE, UNSPECIFIED CKD STAGE: ICD-10-CM

## 2025-04-22 DIAGNOSIS — J45.30 MILD PERSISTENT ASTHMA WITHOUT COMPLICATION: ICD-10-CM

## 2025-04-22 DIAGNOSIS — R94.6 ABNORMAL THYROID EXAM: ICD-10-CM

## 2025-04-22 DIAGNOSIS — F33.1 MAJOR DEPRESSIVE DISORDER, RECURRENT EPISODE, MODERATE (H): ICD-10-CM

## 2025-04-22 DIAGNOSIS — I50.9 CONGESTIVE HEART FAILURE, UNSPECIFIED HF CHRONICITY, UNSPECIFIED HEART FAILURE TYPE (H): ICD-10-CM

## 2025-04-22 DIAGNOSIS — R93.89 ABNORMAL CHEST CT: ICD-10-CM

## 2025-04-22 DIAGNOSIS — I42.2 HYPERTROPHIC CARDIOMYOPATHY (H): ICD-10-CM

## 2025-04-22 DIAGNOSIS — R25.2 CRAMPS, EXTREMITY: ICD-10-CM

## 2025-04-22 LAB
ERYTHROCYTE [DISTWIDTH] IN BLOOD BY AUTOMATED COUNT: 21 % (ref 10–15)
HCT VFR BLD AUTO: 38.8 % (ref 40–53)
HGB BLD-MCNC: 10.8 G/DL (ref 13.3–17.7)
MCH RBC QN AUTO: 19.5 PG (ref 26.5–33)
MCHC RBC AUTO-ENTMCNC: 27.8 G/DL (ref 31.5–36.5)
MCV RBC AUTO: 70 FL (ref 78–100)
PLATELET # BLD AUTO: 261 10E3/UL (ref 150–450)
RBC # BLD AUTO: 5.54 10E6/UL (ref 4.4–5.9)
WBC # BLD AUTO: 9.4 10E3/UL (ref 4–11)

## 2025-04-22 PROCEDURE — 82570 ASSAY OF URINE CREATININE: CPT | Performed by: FAMILY MEDICINE

## 2025-04-22 PROCEDURE — 3079F DIAST BP 80-89 MM HG: CPT | Performed by: FAMILY MEDICINE

## 2025-04-22 PROCEDURE — 82043 UR ALBUMIN QUANTITATIVE: CPT | Performed by: FAMILY MEDICINE

## 2025-04-22 PROCEDURE — 36415 COLL VENOUS BLD VENIPUNCTURE: CPT | Performed by: FAMILY MEDICINE

## 2025-04-22 PROCEDURE — 85027 COMPLETE CBC AUTOMATED: CPT | Performed by: FAMILY MEDICINE

## 2025-04-22 PROCEDURE — 3074F SYST BP LT 130 MM HG: CPT | Performed by: FAMILY MEDICINE

## 2025-04-22 PROCEDURE — 83735 ASSAY OF MAGNESIUM: CPT | Performed by: FAMILY MEDICINE

## 2025-04-22 PROCEDURE — 80053 COMPREHEN METABOLIC PANEL: CPT | Performed by: FAMILY MEDICINE

## 2025-04-22 PROCEDURE — 99495 TRANSJ CARE MGMT MOD F2F 14D: CPT | Performed by: FAMILY MEDICINE

## 2025-04-22 PROCEDURE — 1111F DSCHRG MED/CURRENT MED MERGE: CPT | Performed by: FAMILY MEDICINE

## 2025-04-22 RX ORDER — BUMETANIDE 2 MG/1
2 TABLET ORAL
Qty: 180 TABLET | Refills: 3 | Status: SHIPPED | OUTPATIENT
Start: 2025-04-22

## 2025-04-22 RX ORDER — BUPROPION HYDROCHLORIDE 300 MG/1
300 TABLET ORAL EVERY MORNING
Qty: 90 TABLET | Refills: 3 | Status: SHIPPED | OUTPATIENT
Start: 2025-04-22

## 2025-04-22 RX ORDER — BUPROPION HYDROCHLORIDE 300 MG/1
300 TABLET ORAL EVERY MORNING
Qty: 90 TABLET | OUTPATIENT
Start: 2025-04-22

## 2025-04-22 RX ORDER — CARVEDILOL 12.5 MG/1
12.5 TABLET ORAL 2 TIMES DAILY WITH MEALS
Qty: 180 TABLET | Refills: 3 | Status: SHIPPED | OUTPATIENT
Start: 2025-04-22

## 2025-04-22 NOTE — TELEPHONE ENCOUNTER
Called #   Telephone Information:   Mobile [phone]     Pt stated he is taking this medication and needs a refill as he has been out for 2 days now     Please advise     Thank you       Kari Mcleod RN, BSN  [facility]

## 2025-04-22 NOTE — PROGRESS NOTES
Assessment & Plan     Combined systolic and diastolic cardiac dysfunction  Congestive heart failure, unspecified HF chronicity, unspecified heart failure type (H)  Hypertrophic cardiomyopathy (H)  Recent hospitalization and is back to baseline/better.  - Continue carvedilol 12.5 mg twice daily. Monitor fluid balance through weight measurement. Avoid ibuprofen and naproxen due to kidney toxicity. Monitoring daily weights.  - bumetanide (BUMEX) 2 MG tablet  Dispense: 180 tablet; Refill: 3  - Comprehensive metabolic panel (BMP + Alb, Alk Phos, ALT, AST, Total. Bili, TP)  - Comprehensive metabolic panel (BMP + Alb, Alk Phos, ALT, AST, Total. Bili, TP)      Chronic renal failure, unspecified CKD stage  Multifactorial, likely prerenal with recent CHF but that is improved.  Will monitor labs today.  Follow-up with nephrology as planned.  Continue medications.  Avoid ibuprofen and naproxen.  - Albumin Random Urine Quantitative with Creat Ratio  - Comprehensive metabolic panel (BMP + Alb, Alk Phos, ALT, AST, Total. Bili, TP)  - Albumin Random Urine Quantitative with Creat Ratio  - Comprehensive metabolic panel (BMP + Alb, Alk Phos, ALT, AST, Total. Bili, TP)    Mild persistent asthma without complication  Stable and continue medications.  Normal exam today.    Hypertension goal BP (blood pressure) < 140/90  Controlled -continue medication(s).  - carvedilol (COREG) 12.5 MG tablet  Dispense: 180 tablet; Refill: 3    Abnormal thyroid exam  left-sided thyroid has some fullness noted on CT scan I will check ultrasound.  Recent thyroid function tests were normal  - US Thyroid    Major Depressive Disorder, Recurrent Episode, Mode  Underlying mood issues, has follow-up with psychiatry, will refill medication that he has :  - buPROPion (WELLBUTRIN XL) 300 MG 24 hr tablet  Dispense: 90 tablet; Refill: 3    Anemia, unspecified type  Chronic, ferritin level okay, likely chronic disease related with renal issues.  - CBC with  platelets  - CBC with platelets    Abnormal chest CT  Groundglass appearance of lungs and CT scan is recommended, will recheck in about 4 to 5 weeks  - CT Chest w/o Contrast    Cramps, extremity  Leg cramps.  He is wonder about magnesium level checked, interested in some replacement of electrolytes.  Stretching, massage might be helpful as well.  - Magnesium  - Magnesium        Assessment & Plan     Combined systolic and diastolic cardiac dysfunction  - Heart not pumping effectively, ejection fraction at 20-30%.      Congestive heart failure, unspecified HF chronicity, unspecified heart failure type (H)  - Potential congestive heart failure with pulmonary congestion.  - Continue bumex 1 pill twice daily. Monitor fluid intake and salt consumption. Follow up with cardiologist in May.    Hypertrophic cardiomyopathy (H)  - Hypertrophic heart noted.  - Follow up with cardiologist in May.    Chronic renal failure, unspecified CKD stage  - Low glomerular filtration rate, possibly due to prerenal kidney stress.  - Recheck kidney function today. Follow up with kidney doctor.    Hypertension goal BP (blood pressure) < 140/90  - Blood pressure doing okay on current medication.  - Continue current blood pressure management.    Abnormal thyroid exam  - Prominent left lobe of thyroid.  - Order thyroid ultrasound.    Major depressive disorder, recurrent episode, moderate (H)  - Follow up with psychiatry. Refill wellbutrin and buspar.    Anemia, unspecified type  - Mild anemia, possibly related to underlying kidney disease.  - Check comprehensive panel. Ensure adequate dietary iron intake.    Abnormal chest CT  - Patchy ground glass opacities suggestive of possible pneumonia.  - Schedule follow-up chest CT in about a month.    Cramps, extremity  - Cramps possibly related to fluid balance and electrolytes.  - Address cramps with stretching, massage, and heat. Check magnesium level.        Consent was obtained from the patient to use  "an AI documentation tool in the creation of this note.    MED REC REQUIRED  Post Medication Reconciliation Status: discharge medications reconciled and changed, per note/orders  BMI  Estimated body mass index is 31.94 kg/m  as calculated from the following:    Height as of this encounter: 1.803 m (5' 11\").    Weight as of this encounter: 103.9 kg (229 lb).   Weight management plan: Discussed healthy diet and exercise guidelines          No follow-ups on file.           Nate Segovia MD     [address] [address]  [employer]     Office: [phone]           Justin Blood is a 54 year old, presenting for the following health issues:  Hospital F/U        4/22/2025    12:00 PM   Additional Questions   Roomed by Carol Ann MANCUSO CMA   Consent was obtained from the patient to use an AI documentation tool in the creation of this note.      HPI He was hospitalized due to shortness of breath and inability to walk more than a few feet. During the hospital stay, it was determined that his heart was not pumping effectively, with an ejection fraction of approximately 30%. Imaging suggested pulmonary congestion and possible pneumonia, with small bilateral fluid in the lungs, more on the right than the left, and patchy ground-glass opacities in the upper lobe. He was treated for pneumonia as a precaution. He experienced orthopnea, requiring multiple pillows to sleep comfortably. Swelling in his legs was noted, which was not present at the time of the encounter. He has been monitoring his salt intake carefully.    Kidney Function  He experienced issues with kidney function, with a low glomerular filtration rate noted during his hospital stay. This was attributed to prerenal kidney stress due to inadequate heart perfusion. He has a follow-up appointment with a kidney doctor.    Medication and Symptoms  He is currently taking bumex, a diuretic, twice daily, which causes him " "to experience cramps. He is also on carvedilol, taking a full 25 mg pill twice daily, although there was some confusion about the dosage. He felt backed up, indicating constipation, and had not taken ibuprofen recently. He is also on wellbutrin and buspar, with a psychiatry follow-up scheduled. He has been experiencing mild anemia, which is attributed to underlying kidney disease rather than iron deficiency. His iron levels are fair, with some reserves present.        4/15/2025   Post Discharge Outreach   How are you doing now that you are home? \"Tired\" per pt report   How are your symptoms? (Red Flag symptoms escalate to triage hotline per guidelines) Improved   Does the patient have their discharge instructions?  Yes   Does the patient have questions regarding their discharge instructions?  No   Were you started on any new medications or were there changes to any of your previous medications?  Yes   Does the patient have all of their medications? Yes   Do you have questions regarding any of your medications?  No   Do you have all of your needed medical supplies or equipment (DME)?  (i.e. oxygen tank, CPAP, cane, etc.) Yes   Discharge Follow Up Appointment Date 4/15/2025   Discharge Follow Up Appointment Scheduled with? Primary Care Provider       Hospital Follow-up Visit:    Hospital/Nursing Home/IP Rehab Facility: [facility]  Most Recent Admission Date: 4/6/2025   Most Recent Admission Diagnosis: Enlarged thyroid - E04.9  Elevated troponin - R79.89  Acute decompensated heart failure (H) - I50.9  Pneumonia of upper lobe due to infectious organism, unspecified laterality - J18.9  Chronic renal failure, unspecified CKD stage - N18.9     Most Recent Discharge Date: 4/11/2025   Most Recent Discharge Diagnosis: Pneumonia of upper lobe due to infectious organism, unspecified laterality - J18.9  Acute decompensated heart failure (H) - I50.9  Elevated troponin - R79.89  Chronic renal failure, " "unspecified CKD stage - N18.9  Enlarged thyroid - E04.9  Major depressive disorder, recurrent episode, moderate (H) - F33.1  Left ventricular hypertrophy - I51.7  Hypertrophic cardiomyopathy (H) - I42.2  Pain - R52  Coronary artery disease involving native coronary artery of native heart with angina pectoris - I25.119  Hypertension goal BP (blood pressure) < 140/90 - I10  Panic disorder without agoraphobia - F41.0  Generalized anxiety disorder - F41.1  Congestive heart failure, unspecified HF chronicity, unspecified heart failure type (H) - I50.9  Pneumonia due to infectious organism, unspecified laterality, unspecified part of lung - J18.9  CKD (chronic kidney disease) stage 4, GFR 15-29 ml/min (H) - N18.4  Elevated liver enzymes - R74.8   Was the patient in the ICU or did the patient experience delirium during hospitalization?  No      Problems taking medications regularly:  None  Medication changes since discharge: None  Problems adhering to non-medication therapy:  None    Summary of hospitalization:  [facility] discharge summary reviewed  Diagnostic Tests/Treatments reviewed.  Follow up needed: Nephrology, psychiatry, cardiology  Other Healthcare Providers Involved in Patient s Care:         MTM  Update since discharge: stable.         Plan of care communicated with patient               Objective    /80   Pulse 67   Temp 98.1  F (36.7  C) (Tympanic)   Resp 18   Ht 1.803 m (5' 11\")   Wt 103.9 kg (229 lb)   SpO2 96%   BMI 31.94 kg/m    Body mass index is 31.94 kg/m .  Physical Exam   GENERAL: alert and no distress  HENT: ear canals and TM's normal, nose and mouth without ulcers or lesions  NECK: no adenopathy, no asymmetry, masses, or scars  RESP: lungs clear to auscultation - no rales, rhonchi or wheezes  CV: regular rate and rhythm, normal S1 S2, no S3 or S4, no murmur, click or rub, no peripheral edema  ABDOMEN: soft, nontender, no hepatosplenomegaly, no masses and bowel sounds " normal  MS: no gross musculoskeletal defects noted, no edema  SKIN: no suspicious lesions or rashes  PSYCH: mentation appears normal, affect okay  Labs and other tests reviewed of the hospitalization course.        Signed Electronically by: Luis Armando Segovia MD

## 2025-04-23 LAB
ALBUMIN SERPL BCG-MCNC: 3.9 G/DL (ref 3.5–5.2)
ALP SERPL-CCNC: 73 U/L (ref 40–150)
ALT SERPL W P-5'-P-CCNC: 39 U/L (ref 0–70)
ANION GAP SERPL CALCULATED.3IONS-SCNC: 11 MMOL/L (ref 7–15)
AST SERPL W P-5'-P-CCNC: 47 U/L (ref 0–45)
BILIRUB SERPL-MCNC: 0.5 MG/DL
BUN SERPL-MCNC: 20.4 MG/DL (ref 6–20)
CALCIUM SERPL-MCNC: 9.9 MG/DL (ref 8.8–10.4)
CHLORIDE SERPL-SCNC: 96 MMOL/L (ref 98–107)
CREAT SERPL-MCNC: 2.76 MG/DL (ref 0.67–1.17)
CREAT UR-MCNC: 36.4 MG/DL
EGFRCR SERPLBLD CKD-EPI 2021: 26 ML/MIN/1.73M2
GLUCOSE SERPL-MCNC: 122 MG/DL (ref 70–99)
HCO3 SERPL-SCNC: 31 MMOL/L (ref 22–29)
MAGNESIUM SERPL-MCNC: 1.9 MG/DL (ref 1.7–2.3)
MICROALBUMIN UR-MCNC: 29.6 MG/L
MICROALBUMIN/CREAT UR: 81.32 MG/G CR (ref 0–17)
POTASSIUM SERPL-SCNC: 3.9 MMOL/L (ref 3.4–5.3)
PROT SERPL-MCNC: 6.3 G/DL (ref 6.4–8.3)
SODIUM SERPL-SCNC: 138 MMOL/L (ref 135–145)

## 2025-04-24 NOTE — TELEPHONE ENCOUNTER
Third and final attempt to call pt for a post cardiology discharge phone call.    Called patient to discuss any post hospital d/c questions he may have, review medication changes, and confirm f/u appts. Writer reviewed all medication changes with pt made at time of discharge. Pt verbalized understanding.    Patient denied any SOB, chest pain, or light headedness.    RN confirmed with patient that he is scheduled for a [facility] TC PYP Cardiac Sarcoidosis test on 4/30/25 at 0945, and an OV on 5/6/25 at 0910 with JAIRO Rukhsana Lipscomb at our [place] Office. Pt asking if this is an MRI, as pt is very clautrophobic and will not do well with an MRI without sedation. Writer will call [facility] to ask them. Dr. Keita's Team RN phone number provided.    Patient advised to call clinic with any cardiac related questions or concerns prior to this jairo't. Patient verbalized understanding and agreed with plan. TAWNYA Davila RN.

## 2025-04-24 NOTE — RESULT ENCOUNTER NOTE
Dear Jeremi,    Here is a summary of your recent test results:  -Hemoglobin is decreased indicating mild anemia.  Anemia can cause fatigue and, occasionally, light-headedness.  This can sometimes be related to decreased kidney function  -White blood cell and platelet counts are normal.  -Liver and gallbladder tests (ALT,AST, Alk phos,bilirubin) are normal.  -Kidney function (GFR) is decreased.  ADVISE: Avoiding ibuprofen and naproxen and following up with kidney specialist (nephrologist) as planned  -Sodium is normal.  -Potassium is normal.  -Calcium is normal.  -Glucose is mildly elevated but you were nonfasting and this is okay..  -Magnesium is normal.  -Microalbumin (urine protein) level is elevated. This is suggestive of early damage to your kidneys from high blood pressure.  ADVISE: avoiding anti-inflamatory agents such as ibuprofen (Advil, Motrin) or naproxen (Aleve) as much as possible, and keeping your blood pressure in a normal range.    For additional lab test information, www.testing.com is a very good reference.    In addition, here is a list of due or overdue Health Maintenance reminders:  Heart Failure Action Plan Never done  Hepatitis A Vaccine(1 of 2 - Risk 2-dose series) Never done  Zoster (Shingles) Vaccine(1 of 2) Never done  Asthma Action Plan - yearly due on 06/28/2020  CONTROLLED SUBSTANCE AGREEMENT FOR CHRONIC PAIN MANAGEMENT due on 11/02/2024  Medicare Annual Pharmacist Medication Review Never done  Colorectal Cancer Screening due on 02/18/2025    Please call us at [phone] (or use GTxcel) to address the above recommendations if needed.           Thank you very much for trusting me and [facility].     Have a peaceful day.    Healthy regards,  Nate Segovia MD

## 2025-04-24 NOTE — TELEPHONE ENCOUNTER
Writer called NM tech and verified this test IS NOT an MRI, but a NM test in which pt will lay on table for 45 minutes after injection and then have sequential pictures taken ending with a CT scan. Writer called pt and updated him and he stated this should be fine without sedation. No further questions. TAWNYA Davila RN.

## 2025-04-28 ENCOUNTER — PATIENT OUTREACH (OUTPATIENT)
Dept: CARE COORDINATION | Facility: CLINIC | Age: 55
End: 2025-04-28
Payer: COMMERCIAL

## 2025-04-28 DIAGNOSIS — F41.1 GENERALIZED ANXIETY DISORDER: ICD-10-CM

## 2025-04-28 DIAGNOSIS — F41.0 PANIC DISORDER WITHOUT AGORAPHOBIA: ICD-10-CM

## 2025-04-28 DIAGNOSIS — F90.9 ATTENTION DEFICIT HYPERACTIVITY DISORDER (ADHD), UNSPECIFIED ADHD TYPE: ICD-10-CM

## 2025-04-28 NOTE — PROGRESS NOTES
FRW Update   4/28/25  FRW spoke to the patient. Pt is interested in applying for Financial Assistance [facility]. Pt says he can complete the application on his own. I sent a URL to the patient via My Chart. No ongoing needs from [facility].

## 2025-04-29 RX ORDER — DEXTROAMPHETAMINE SACCHARATE, AMPHETAMINE ASPARTATE, DEXTROAMPHETAMINE SULFATE AND AMPHETAMINE SULFATE 5; 5; 5; 5 MG/1; MG/1; MG/1; MG/1
20 TABLET ORAL 2 TIMES DAILY
Qty: 60 TABLET | Refills: 0 | Status: SHIPPED | OUTPATIENT
Start: 2025-04-29

## 2025-04-29 RX ORDER — LORAZEPAM 1 MG/1
1 TABLET ORAL 2 TIMES DAILY PRN
Qty: 60 TABLET | Refills: 5 | Status: SHIPPED | OUTPATIENT
Start: 2025-04-29

## 2025-04-30 ENCOUNTER — HOSPITAL ENCOUNTER (OUTPATIENT)
Dept: NUCLEAR MEDICINE | Facility: CLINIC | Age: 55
Setting detail: NUCLEAR MEDICINE
Discharge: HOME | End: 2025-04-30
Attending: NURSE PRACTITIONER
Payer: COMMERCIAL

## 2025-04-30 DIAGNOSIS — I42.2 HYPERTROPHIC CARDIOMYOPATHY (H): ICD-10-CM

## 2025-04-30 DIAGNOSIS — I51.7 LEFT VENTRICULAR HYPERTROPHY: ICD-10-CM

## 2025-04-30 DIAGNOSIS — G89.4 CHRONIC PAIN SYNDROME: ICD-10-CM

## 2025-04-30 DIAGNOSIS — F11.20 UNCOMPLICATED OPIOID DEPENDENCE (H): ICD-10-CM

## 2025-04-30 PROCEDURE — 343N000001 HC RX 343 MED OP 636: Performed by: NURSE PRACTITIONER

## 2025-04-30 PROCEDURE — A9561 TC99M OXIDRONATE: HCPCS | Performed by: NURSE PRACTITIONER

## 2025-04-30 PROCEDURE — 78830 RP LOCLZJ TUM SPECT W/CT 1: CPT

## 2025-04-30 RX ADMIN — Medication 16 MILLICURIE: at 09:56

## 2025-04-30 NOTE — TELEPHONE ENCOUNTER
[facility] Call Center    Phone Message    May a detailed message be left on voicemail: yes     Reason for Call: Medication Refill Request    Has the patient contacted the pharmacy for the refill? Yes   Name of medication being requested: buprenorphine HCl-naloxone HCl (SUBOXONE) 8-2 MG per film   Provider who prescribed the medication: Moris   Pharmacy:   [facility] [place], [place] - [address]-     Date medication is needed: Patient requested ASAP      Action Taken: Message routed to:  Other: Pain     Travel Screening: Not Applicable     Date of Service:

## 2025-05-01 RX ORDER — BUPRENORPHINE AND NALOXONE 8; 2 MG/1; MG/1
FILM, SOLUBLE BUCCAL; SUBLINGUAL
Qty: 60 FILM | Refills: 0 | Status: SHIPPED | OUTPATIENT
Start: 2025-05-01

## 2025-05-01 NOTE — TELEPHONE ENCOUNTER
Received request for a refill(s) of     buprenorphine HCl-naloxone HCl (SUBOXONE) 8-2 MG per film        Last dispensed from pharmacy on 4/1/2025    Patient's last office/virtual visit by prescribing provider on 1/30/2025  Next office/virtual appointment scheduled for 7/8/2025    Last urine drug screen date 11/1/2024  Current opioid agreement on file (completed within the last year) NoDate of opioid agreement: 11/2/2023    E-prescribe to [facility] L[facility] Guerra  [address] [place] [address]   pharmacy    Will route to nursing [place] for review and preparation of prescription(s).

## 2025-05-01 NOTE — TELEPHONE ENCOUNTER
Script Eprescribed to pharmacy  [place] Prescription Monitoring Program checked      Signed Prescriptions:                        Disp   Refills    buprenorphine HCl-naloxone HCl (SUBOXONE) *60 Film0        Si/2 film QID - Brand name only. Dispense and start           25. Bridge to appointment  Authorizing Provider: ABENA MEDELLIN MD

## 2025-05-01 NOTE — TELEPHONE ENCOUNTER
Routing to provider to review medication prepped per below. TE created for  to reschedule  appt to 60min as is currently scheduled for 30min    Suboxone 8-2, #60, Refill:no  Si/2 film QID - Brand name only. Dispense and start 25. Bridge to appointment   Last picked up 25 with start on Not listed  Due 25    Per last OV note 25:    He recently STOPPED taking his Suboxone when he got some prescriptions from his PCP after having dental implants. Spend a great deal of time educating him that we do not recommend stopping his suboxone. If he gets prescriptions for opioids in the future he should continue taking his suboxone and take whatever is prescribed IN ADDITION to this. Explained that we have patients continue suboxone even when having major surgeries during the perioperative period.     FOLLOW UP:  EVERY 3 MONTHS - 2025 @ 1PM -  needs 60min for medication follow up and botox injections.

## 2025-05-06 DIAGNOSIS — I50.9 ACUTE DECOMPENSATED HEART FAILURE (H): Primary | ICD-10-CM

## 2025-06-07 DIAGNOSIS — B07.8 FLAT WART: ICD-10-CM

## 2025-06-09 DIAGNOSIS — G89.4 CHRONIC PAIN SYNDROME: ICD-10-CM

## 2025-06-09 DIAGNOSIS — G47.00 INSOMNIA, UNSPECIFIED TYPE: ICD-10-CM

## 2025-06-09 DIAGNOSIS — F11.20 UNCOMPLICATED OPIOID DEPENDENCE (H): ICD-10-CM

## 2025-06-09 NOTE — TELEPHONE ENCOUNTER
Requested Renewals     buprenorphine HCl-naloxone HCl (SUBOXONE) 8-2 MG per film         Si/2 film QID - Brand name only. Dispense and start 25. Bridge to appointment     important  Maximum MME cannot be calculated for this prescription. Enter discrete sig details to calculate maximum MME.       Disp: 60 Film    Refills: 0    Start: 2025    Class: E-Prescribe    Non-formulary For: Uncomplicated opioid dependence (H), Chronic pain syndrome    Last ordered: 1 month ago (2025) by Macy Subramanian MD       To be filled at: [facility], [place] - [address] [address]

## 2025-06-09 NOTE — TELEPHONE ENCOUNTER
Received request for a refill(s) of buprenorphine HCl-naloxone HCl (SUBOXONE) 8-2 MG per film      Last dispensed from pharmacy on 05/01/25    Patient's last office/virtual visit by prescribing provider on 01/30/25  Next office/virtual appointment scheduled for 07/08/25    Last urine drug screen date 11/01/24  Current opioid agreement on file (completed within the last year) No Date of opioid agreement: NA    E-prescribe to pharmacy- [facility] [place], [place] - [address] [facility]     Will route to nursing [place] for review and preparation of prescription(s).

## 2025-06-10 RX ORDER — BUPRENORPHINE AND NALOXONE 8; 2 MG/1; MG/1
FILM, SOLUBLE BUCCAL; SUBLINGUAL
Qty: 56 FILM | Refills: 0 | Status: SHIPPED | OUTPATIENT
Start: 2025-06-10

## 2025-06-10 RX ORDER — IMIQUIMOD 12.5 MG/.25G
CREAM TOPICAL
Qty: 24 PACKET | Refills: 11 | Status: SHIPPED | OUTPATIENT
Start: 2025-06-10

## 2025-06-10 RX ORDER — ZOLPIDEM TARTRATE 10 MG/1
10 TABLET ORAL
Qty: 30 TABLET | Refills: 2 | Status: SHIPPED | OUTPATIENT
Start: 2025-06-10

## 2025-06-10 NOTE — TELEPHONE ENCOUNTER
Script Eprescribed to pharmacy  [place] Prescription Monitoring Program checked      Signed Prescriptions:                        Disp   Refills    buprenorphine HCl-naloxone HCl (SUBOXONE) *56 Film0        Si/2 film QID - Brand name only. Dispense and start           06/10/25. Bridge to appointment 25 only  Authorizing Provider: ABENA MEDELLIN MD

## 2025-06-10 NOTE — TELEPHONE ENCOUNTER
Routing to provider to review medication prepped per below    Suboxone 8-2, #56, Refill:no  Si/ film QID - Brand name only. Dispense and start 06/10/25. Bridge to appointment 25 only  Last picked up 25 with start on 25  Due 06/10/25    Per last OV note 25:    MEDICATIONS:        Orders Placed This Encounter   Medications    Botulinum Toxin Type A (BOTOX) 200 units injection 155 Units    buprenorphine HCl-naloxone HCl (SUBOXONE) 8-2 MG per film       Si/2 film QID - Brand name only.       Dispense:  60 Film       Refill:  2         FOLLOW UP:  EVERY 3 MONTHS - 2025 @ 1PM -  needs 60min for medication follow up and botox injections.

## 2025-06-13 PROBLEM — D63.1 ERYTHROPOIETIN DEFICIENCY ANEMIA: Status: ACTIVE | Noted: 2025-06-13

## 2025-06-23 ENCOUNTER — NURSE TRIAGE (OUTPATIENT)
Dept: FAMILY MEDICINE | Facility: CLINIC | Age: 55
End: 2025-06-23

## 2025-06-23 ENCOUNTER — VIRTUAL VISIT (OUTPATIENT)
Dept: FAMILY MEDICINE | Facility: CLINIC | Age: 55
End: 2025-06-23
Payer: COMMERCIAL

## 2025-06-23 DIAGNOSIS — J45.21 MILD INTERMITTENT ASTHMA WITH EXACERBATION: ICD-10-CM

## 2025-06-23 DIAGNOSIS — R06.02 SHORTNESS OF BREATH: ICD-10-CM

## 2025-06-23 DIAGNOSIS — J20.9 ACUTE BRONCHITIS WITH SYMPTOMS > 10 DAYS: Primary | ICD-10-CM

## 2025-06-23 PROCEDURE — 98006 SYNCH AUDIO-VIDEO EST MOD 30: CPT | Performed by: NURSE PRACTITIONER

## 2025-06-23 RX ORDER — AZITHROMYCIN 250 MG/1
TABLET, FILM COATED ORAL
Qty: 6 TABLET | Refills: 0 | Status: SHIPPED | OUTPATIENT
Start: 2025-06-23 | End: 2025-06-28

## 2025-06-23 ASSESSMENT — ANXIETY QUESTIONNAIRES
7. FEELING AFRAID AS IF SOMETHING AWFUL MIGHT HAPPEN: SEVERAL DAYS
4. TROUBLE RELAXING: SEVERAL DAYS
8. IF YOU CHECKED OFF ANY PROBLEMS, HOW DIFFICULT HAVE THESE MADE IT FOR YOU TO DO YOUR WORK, TAKE CARE OF THINGS AT HOME, OR GET ALONG WITH OTHER PEOPLE?: NOT DIFFICULT AT ALL
3. WORRYING TOO MUCH ABOUT DIFFERENT THINGS: SEVERAL DAYS
IF YOU CHECKED OFF ANY PROBLEMS ON THIS QUESTIONNAIRE, HOW DIFFICULT HAVE THESE PROBLEMS MADE IT FOR YOU TO DO YOUR WORK, TAKE CARE OF THINGS AT HOME, OR GET ALONG WITH OTHER PEOPLE: NOT DIFFICULT AT ALL
GAD7 TOTAL SCORE: 9
6. BECOMING EASILY ANNOYED OR IRRITABLE: SEVERAL DAYS
1. FEELING NERVOUS, ANXIOUS, OR ON EDGE: NEARLY EVERY DAY
7. FEELING AFRAID AS IF SOMETHING AWFUL MIGHT HAPPEN: SEVERAL DAYS
GAD7 TOTAL SCORE: 9
2. NOT BEING ABLE TO STOP OR CONTROL WORRYING: SEVERAL DAYS
5. BEING SO RESTLESS THAT IT IS HARD TO SIT STILL: SEVERAL DAYS
GAD7 TOTAL SCORE: 9

## 2025-06-23 ASSESSMENT — ASTHMA QUESTIONNAIRES
QUESTION_2 LAST FOUR WEEKS HOW OFTEN HAVE YOU HAD SHORTNESS OF BREATH: MORE THAN ONCE A DAY
QUESTION_2 LAST FOUR WEEKS HOW OFTEN HAVE YOU HAD SHORTNESS OF BREATH: MORE THAN ONCE A DAY
ACT_TOTALSCORE: 6
QUESTION_4 LAST FOUR WEEKS HOW OFTEN HAVE YOU USED YOUR RESCUE INHALER OR NEBULIZER MEDICATION (SUCH AS ALBUTEROL): THREE OR MORE TIMES PER DAY
QUESTION_1 LAST FOUR WEEKS HOW MUCH OF THE TIME DID YOUR ASTHMA KEEP YOU FROM GETTING AS MUCH DONE AT WORK, SCHOOL OR AT HOME: ALL OF THE TIME
ACT_TOTALSCORE: 7
QUESTION_3 LAST FOUR WEEKS HOW OFTEN DID YOUR ASTHMA SYMPTOMS (WHEEZING, COUGHING, SHORTNESS OF BREATH, CHEST TIGHTNESS OR PAIN) WAKE YOU UP AT NIGHT OR EARLIER THAN USUAL IN THE MORNING: TWO OR THREE NIGHTS A WEEK
QUESTION_5 LAST FOUR WEEKS HOW WOULD YOU RATE YOUR ASTHMA CONTROL: NOT CONTROLLED AT ALL
QUESTION_4 LAST FOUR WEEKS HOW OFTEN HAVE YOU USED YOUR RESCUE INHALER OR NEBULIZER MEDICATION (SUCH AS ALBUTEROL): THREE OR MORE TIMES PER DAY
QUESTION_5 LAST FOUR WEEKS HOW WOULD YOU RATE YOUR ASTHMA CONTROL: POORLY CONTROLLED
QUESTION_3 LAST FOUR WEEKS HOW OFTEN DID YOUR ASTHMA SYMPTOMS (WHEEZING, COUGHING, SHORTNESS OF BREATH, CHEST TIGHTNESS OR PAIN) WAKE YOU UP AT NIGHT OR EARLIER THAN USUAL IN THE MORNING: TWO OR THREE NIGHTS A WEEK
QUESTION_1 LAST FOUR WEEKS HOW MUCH OF THE TIME DID YOUR ASTHMA KEEP YOU FROM GETTING AS MUCH DONE AT WORK, SCHOOL OR AT HOME: ALL OF THE TIME

## 2025-06-23 ASSESSMENT — PATIENT HEALTH QUESTIONNAIRE - PHQ9: SUM OF ALL RESPONSES TO PHQ QUESTIONS 1-9: 7

## 2025-06-23 NOTE — TELEPHONE ENCOUNTER
Provider Response to 2nd Level Triage Request    I have reviewed the RN documentation. My recommendation is:  Face To Face Visit. Needs in person to be listened to. Perhaps xray if feels like pneumonia. At very least virtual visit to discuss symptoms if unwilling to come in.    Yaquelin Barajas, CNP

## 2025-06-23 NOTE — TELEPHONE ENCOUNTER
"S:  Pt calls stating he has URI symptoms/SOB. For 3-4 weeks.   He is asking Dr Segovia for antibiotics.     B: Was in [place] for a long weekend. Also was working 12 hour days.   He thinks he over did it.   History of cardiac issues. Diagnoses: Acute decompensated heart failure (H)     A:   Pt has SOB with walking, has to rest after short distance. Feels SOB with lying flat. Sleeps sitting up. Declined doing a COVID test, \"its not COVID\".   He is using his inhalers.   Pt is denying chest pain. Pt has nitroglycerin that he can take.   Denies any productive cough. He states he has had this Pneumonia twice before. Last time in April.     126/82 HR 86. He states his BP  Denies fevers, denies palpitations.     R: Advised pt may need to see ADS, or OV/VV, if approval given.   Pt declined. He states he is not feeling up to going out into this humidity.     Please advise if needs VV OR antibiotics     Pts CT scan from 4/6/25:   IMPRESSION:  1.  Patchy groundglass foci within both upper lobes, right worse than left, most suggestive of atypical pneumonia.  2.  Small bilateral pleural effusions, right larger than left.  3.  Cardiomegaly.  4.  Stable postoperative appearance of left kidney.  5.  Trace volume ascites.  6.  Prominent enlargement of left lobe of thyroid. Benign goiter favored though this would be amenable to evaluation with ultrasound.      Reason for Disposition   Patient sounds very sick or weak to the triager    Additional Information   Negative: SEVERE difficulty breathing (e.g., struggling for each breath, speaks in single words, pulse > 120)   Negative: Breathing stopped and hasn't returned   Negative: Choking on something   Negative: Bluish (or gray) lips or face   Negative: Difficult to awaken or acting confused (e.g., disoriented, slurred speech)   Negative: Passed out (e.g., fainted, lost consciousness, blacked out and was not responding)   Negative: Wheezing started suddenly after medicine, an allergic " "food, or bee sting   Negative: Stridor (harsh sound while breathing in)   Negative: Slow, shallow and weak breathing   Negative: Sounds like a life-threatening emergency to the triager   Negative: Chest pain   Negative: Wheezing (high pitched whistling sound) and previous asthma attacks or use of asthma medicines   Negative: Breathing difficulty and within 14 days of COVID-19 EXPOSURE (close contact) with someone diagnosed with COVID-19 (e.g., COVID test positive)   Negative: Breathing difficulty and COVID-19 is widespread in the community   Negative: Breathing diffculty and only present when coughing   Negative: Breathing difficulty and only from stuffy nose   Negative: Breathing diffculty and only from stuffy nose or runny nose from common cold   Negative: MODERATE difficulty breathing (e.g., speaks in phrases, SOB even at rest, pulse 100-120) of new-onset or worse than normal   Negative: Oxygen level (e.g., pulse oximetry) 90% or lower   Negative: Wheezing can be heard across the room   Negative: Drooling or spitting out saliva (because can't swallow)   Negative: Any history of prior \"blood clot\" in leg or lungs   Negative: Illness requiring prolonged bedrest in past month (e.g., immobilization, long hospital stay)   Negative: Hip or leg fracture (broken bone) in past month (or had cast on leg or ankle in past month)   Negative: Major surgery in the past month   Negative: Long-distance travel in past month (e.g., car, bus, train, plane; with trip lasting 6 or more hours)   Negative: Cancer treatment in past six months (or has cancer now)   Negative: Extra heartbeats, irregular heart beating, or heart is beating very fast (i.e., 'palpitations')   Negative: Fever > 103 F (39.4 C)   Negative: Fever > 101 F (38.3 C) and over 60 years of age   Negative: Fever > 100 F (37.8 C) and bedridden (e.g., nursing home patient, stroke, chronic illness, recovering from surgery)   Negative: Fever > 100 F (37.8 C) and diabetes " mellitus or weak immune system (e.g., HIV positive, cancer chemo, splenectomy, organ transplant, chronic steroids)   Negative: Periods where breathing stops and then resumes normally and bedridden (e.g., nursing home patient, CVA)   Negative: Pregnant or postpartum (from 0 to 6 weeks after delivery)    Protocols used: Breathing Difficulty-A-OH

## 2025-06-23 NOTE — TELEPHONE ENCOUNTER
Provider Response to 2nd Level Triage Request    I have reviewed the RN documentation. My recommendation is:  Refer to Acute and Diagnostic Services (ADS) clinic.     Referral to Acute and Diagnostic Services  Day of Diagnosis (DOD) - patients needing diagnosis made, which most often requires imaging and/or labs.  He needs further evaluation, not just office visit here.  May need NT- proBNP, etc with rapid lab response.     Or ED for pt this complicated with his current symptoms.

## 2025-06-23 NOTE — TELEPHONE ENCOUNTER
CONSULTATION



DATE OF SERVICE:

08/02/2020



CHIEF COMPLAINT:

Change in mental status and diabetes and as well as other medical issues 
requested by

the ER physician.



HISTORY OF PRESENT ILLNESS:

This 50-year-old woman with past medical history of CVA, TIA, diabetes mellitus,

hypertension, renal disease, end-stage renal disease on hemodialysis, seizure 
disorder,

hypothyroidism, being followed by Dr. Saturnino Castillo in the outpatient setting was

admitted with some change in mental status.  Apparently the home glucose meter 
read

extremely high and the patient  blood sugars were around 200 and the patient was

taken to [facility]. The patient is extremely drowsy at this time, 
unable to

give a coherent history. The patient is on multiple medications and creatinine 
is found

to be 9.36 and glucose 212 and 127.  The CAT scan of the brain which was done 
showed

old cerebral infarct and left thalamic infarcts.  No acute changes are noted. 
Chest x-

ray which was also reviewed personally by me showed no acute abnormality.  The 
patient

is unable to give coherent history, most of the history was from my discussion 
with

staff and discussion with the ER physician and review of the chart at this time.



PAST MEDICAL HISTORY:

History of end-stage renal disease, history of CVA, TIA, diabetes mellitus,

hypertension, history of seizure disorder, breakthrough seizures.



MEDICATIONS:

Home medications are:

1. Keppra 500 mg p.r.n.

2. Clobazam.

3. Norvasc.

4. Metoprolol 25 mg p.o. b.i.d.

5. Vimpat 100 mg p.o.

6. Lantus 12 units subcu at bedtime.

7. Vitamin D2, 50,000 q.7 days.

8. Plavix 75 mg p.o. daily.

9. Celexa 10 mg p.o. daily.

10.Calcium acetate 1334 mg a.c. t.i.d.

11.Lipitor 20 mg p.o. daily.

12.Ecotrin 81 mg daily.

13.Tylenol p.r.n.



ALLERGIES:

PENICILLIN.



Family history, social history, review of systems could not be taken because of 
the

patient's change in mental status.



PHYSICAL EXAMINATION:

Patient is stuporous, arousable, unable to give a coherent history.  Pulse is 
64,

blood pressure 125/78, respiration 15, temperature 98.4, pulse ox 97% on room 
air.

HEENT: Conjunctivae normal.  Oral mucosa moist.

NECK: No jugular venous distention.  No carotid bruit. No lymph node 
enlargement.

CARDIOVASCULAR: S1, S2 muffled. No S3, no S4.

RESPIRATORY:  Breath sounds diminished at the bases. A few scattered rhonchi and

crackles.

ABDOMEN:  Soft, nontender.  No mass palpable.

LEGS: No edema, no swelling.

NERVOUS SYSTEM: Higher function mentioned earlier. Moves all 4 limbs on repeated

instructions.  Otherwise, full neurologic examination not possible at this time.

SKIN:  No ulcer, rash or bleeding.

JOINTS: No active deforming arthropathy.

LYMPHATICS:  No lymphadenopathy of the neck, axillae or groin.



LAB INVESTIGATIONS:

CBC within normal limits. VBG 32. Sodium 140, potassium 4.3. Creatinine is 9.36.
 The

labs are noted.  Magnesium 2.6, alkaline phosphatase 142.  Drug screen is 
negative for

salicylate, acetaminophen, alcohol and acetone.



ASSESSMENT:

1. Change in mental status, metabolic encephalopathy, multifactorial possibly.

2. End-stage renal disease, on hemodialysis.

3. History of cerebrovascular accident, transient ischemic attack.

4. Old cerebellar and left thalamic infarcts on the CAT scan.

5. History of chest pain.

6. Diabetes mellitus type 2.

7. Hypertension.

8. Seizure disorder and breakthrough seizure.

9. Hypothyroidism.

10.History of bipolar.

11.History of tonsillectomy.

12.History of nicotine dependence.

13.Obesity with body mass index 33.5.



RECOMMENDATIONS AND DISCUSSION:

This 50-year-old woman who presented with multiple complex medical issues, we 
will

monitor the patient closely. Continue the current medications, continue 
symptomatic

treatment. Otherwise at this time I recommend continue monitoring and this 
patient does

not have any focal signs at this time but however an acute stroke is not 
completely

ruled out, especially in view of the abnormal CAT scans and I would recommend 
possibly

transfer to [facility] for continued evaluation and neurology consultation.

Otherwise recommend to continue the current medications.  The patient does not 
appear

to be in DKA at this point.  The possibility of sepsis also seems unlikely.  I

recommend nephrology evaluation as well.  Overall prognosis guarded because of 
multiple

complex medical issues. Further recommendations to follow. A copy of dictation

forwarded to Dr. Saturnino Castillo who is the primary physician.





MMODL / PATRICIAN: [id number] / Job#: [id number]

[facility] Called # [phone] and spoke to patient     Patient refusing all other treatment options, will only do a virtual visit     Advised importance of in person visit, patient continued to refuse saying that it is the same symptoms as the last 2 times     Future Appointments 6/23/2025 - 12/20/2025        Date Visit Type Length Department Provider     6/23/2025  2:00 PM OFFICE VISIT 30 min  FAMILY PRACTICE Yaquelin Barajas CNP    Location Instructions:     [facility] is located at [address] [place] [address], along [address] Free parking is available; access the lot by turning north from [address] onto [address], then west onto [address].              7/8/2025 10:30 AM RETURN PAIN 60 min [facility]NAG[facility] Macy Subramanian MD                   Scheduled with Yaquelin Barajas today, okay from provider    Patient stated understanding and had no further questions.    Advised patient to call [phone] and ask to speak to a triage nurse with any further questions or concerns.       Haritha Duff RN on 6/23/2025 at 12:34 PM   [facility]

## 2025-06-23 NOTE — PROGRESS NOTES
Jeremi is a 55 year old who is being evaluated via a billable video visit.    How would you like to obtain your AVS? MyChart  If the video visit is dropped, the invitation should be resent by: Text to cell phone: [phone]  Will anyone else be joining your video visit? No      Assessment & Plan     Acute bronchitis with symptoms > 10 days  Mild intermittent asthma with exacerbation  Possibility of upper respiratory infection given longevity of this and recent pneumonia.  Treat as below.  Discussed return precautions in detail.  - azithromycin (ZITHROMAX) 250 MG tablet  Dispense: 6 tablet; Refill: 0    Shortness of breath  He was off of his diuretic for couple days since he was working outside and felt he was getting too dehydrated.  He restarted today and will continue.  Recommend if any worsening shortness of breath he will return to clinic for in person evaluation and enter lab orders for him to repeat below.  Consider ADS if any worsening.  - NT-proBNP  - CBC with platelets  - XR Chest 2 Views            Depression Screening Follow Up        6/23/2025     1:42 PM   PHQ   PHQ-9 Total Score 7   Q9: Thoughts of better off dead/self-harm past 2 weeks Not at all             Follow Up Actions Taken  Crisis resource information provided in the After Visit Summary    Discussed the following ways the patient can remain in a safe environment:  remove alcohol, remove drugs, and be around others        Subjective   Jeremi is a 55 year old, presenting for the following health issues:  Breathing Problem        6/23/2025     1:43 PM   Additional Questions   Roomed by Carol Ann MANCUSO CMA       Video Start Time: 2:06 PM    History of Present Illness       Reason for visit:  SOB               TRIAGED 06/23/2025    S:  Pt calls stating he has URI symptoms/SOB. For 3-4 weeks.   He is asking Dr Segovia for antibiotics.      B: Was in [place] for a long weekend. Also was working 12 hour days.   He thinks he over did it.   History of cardiac  "issues. Diagnoses: Acute decompensated heart failure (H)      A:   Pt has SOB with walking, has to rest after short distance. Feels SOB with lying flat. Sleeps sitting up. Declined doing a COVID test, \"its not COVID\".   He is using his inhalers.   Pt is denying chest pain. Pt has nitroglycerin that he can take.   Denies any productive cough. He states he has had this Pneumonia twice before. Last time in April.      126/82 HR 86. He states his BP  Denies fevers, denies palpitations.      R: Advised pt may need to see ADS, or OV/VV, if approval given.   Pt declined. He states he is not feeling up to going out into this humidity.      Please advise if needs VV OR antibiotics      Pts CT scan from 4/6/25:   IMPRESSION:  1.  Patchy groundglass foci within both upper lobes, right worse than left, most suggestive of atypical pneumonia.  2.  Small bilateral pleural effusions, right larger than left.  3.  Cardiomegaly.  4.  Stable postoperative appearance of left kidney.  5.  Trace volume ascites.  6.  Prominent enlargement of left lobe of thyroid. Benign goiter favored though this would be amenable to evaluation with ultrasound.        Objective           Vitals:  No vitals were obtained today due to virtual visit.    Physical Exam   GENERAL: alert and no distress  EYES: Eyes grossly normal to inspection.  No discharge or erythema, or obvious scleral/conjunctival abnormalities.  RESP: No audible wheeze, cough, or visible cyanosis.    SKIN: Visible skin clear. No significant rash, abnormal pigmentation or lesions.  NEURO: Cranial nerves grossly intact.  Mentation and speech appropriate for age.  PSYCH: Appropriate affect, tone, and pace of words          Video-Visit Details    Type of service:  Video Visit   Video End Time:2:22 PM  Originating Location (pt. Location): Home    Distant Location (provider location):  On-site  Platform used for Video Visit: Jyothi  Signed Electronically by: Yaquelin Barajas CNP    "

## 2025-06-26 DIAGNOSIS — I25.10 CORONARY ARTERY DISEASE INVOLVING NATIVE HEART WITHOUT ANGINA PECTORIS, UNSPECIFIED VESSEL OR LESION TYPE: ICD-10-CM

## 2025-06-26 RX ORDER — CLOPIDOGREL BISULFATE 75 MG/1
75 TABLET ORAL DAILY
Qty: 90 TABLET | Refills: 1 | Status: SHIPPED | OUTPATIENT
Start: 2025-06-26

## 2025-07-07 ASSESSMENT — PAIN SCALES - PAIN ENJOYMENT GENERAL ACTIVITY SCALE (PEG)
INTERFERED_GENERAL_ACTIVITY: 9
AVG_PAIN_PASTWEEK: 7
INTERFERED_ENJOYMENT_LIFE: 9
PEG_TOTALSCORE: 8.33
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
## 2025-07-08 ENCOUNTER — LAB (OUTPATIENT)
Dept: LAB | Facility: CLINIC | Age: 55
End: 2025-07-08
Payer: COMMERCIAL

## 2025-07-08 ENCOUNTER — OFFICE VISIT (OUTPATIENT)
Dept: PALLIATIVE MEDICINE | Facility: CLINIC | Age: 55
End: 2025-07-08
Payer: COMMERCIAL

## 2025-07-08 VITALS — HEART RATE: 82 BPM | DIASTOLIC BLOOD PRESSURE: 93 MMHG | SYSTOLIC BLOOD PRESSURE: 158 MMHG | OXYGEN SATURATION: 96 %

## 2025-07-08 DIAGNOSIS — G43.709 CHRONIC MIGRAINE WITHOUT AURA, NOT INTRACTABLE, WITHOUT STATUS MIGRAINOSUS: ICD-10-CM

## 2025-07-08 DIAGNOSIS — Z79.899 ENCOUNTER FOR LONG-TERM (CURRENT) USE OF HIGH-RISK MEDICATION: ICD-10-CM

## 2025-07-08 DIAGNOSIS — G89.4 CHRONIC PAIN SYNDROME: ICD-10-CM

## 2025-07-08 DIAGNOSIS — F41.1 GAD (GENERALIZED ANXIETY DISORDER): ICD-10-CM

## 2025-07-08 DIAGNOSIS — N18.9 CHRONIC RENAL FAILURE, UNSPECIFIED CKD STAGE: Primary | ICD-10-CM

## 2025-07-08 DIAGNOSIS — F11.20 UNCOMPLICATED OPIOID DEPENDENCE (H): Primary | ICD-10-CM

## 2025-07-08 PROCEDURE — 64615 CHEMODENERV MUSC MIGRAINE: CPT | Performed by: ANESTHESIOLOGY

## 2025-07-08 PROCEDURE — 99215 OFFICE O/P EST HI 40 MIN: CPT | Mod: 25 | Performed by: ANESTHESIOLOGY

## 2025-07-08 PROCEDURE — 82043 UR ALBUMIN QUANTITATIVE: CPT

## 2025-07-08 PROCEDURE — 3077F SYST BP >= 140 MM HG: CPT | Performed by: ANESTHESIOLOGY

## 2025-07-08 PROCEDURE — 82570 ASSAY OF URINE CREATININE: CPT

## 2025-07-08 PROCEDURE — 3080F DIAST BP >= 90 MM HG: CPT | Performed by: ANESTHESIOLOGY

## 2025-07-08 RX ORDER — BUPRENORPHINE AND NALOXONE 8; 2 MG/1; MG/1
FILM, SOLUBLE BUCCAL; SUBLINGUAL
Qty: 60 FILM | Refills: 2 | Status: SHIPPED | OUTPATIENT
Start: 2025-07-08

## 2025-07-08 NOTE — PATIENT INSTRUCTIONS
[facility]  Botox Injection Discharge Instructions    Do not rub or put extended pressure on the injection sites. You may gently touch the sites to remove any excess blood.  Monitor the injection sites for signs and symptoms of infection such as redness, swelling, warmth, fever, chills, or drainage to areas.  You may have soreness at the injection sites for up to 24 hours.  If you are able to use anti-inflammatory medications or Tylenol for pain control, you can take these as directed.  It may take up to 1 month to notice benefit from the 1st treatment  If you do notice relief, botox can be done every 3 months.  It may require insurance authorization everytime.    For questions about insurance coverage, please call the main clinic number and ask to speak with someone about botox coverage.     Pain Clinic phone number during work hours (Monday through Friday 8 am-4:30 pm) at [phone] or the Provider Line after hours at [phone]:

## 2025-07-08 NOTE — NURSING NOTE
Obtained urine specimen.  Specimen sent to the lab.        Ofelia Laurent MA  [facility]

## 2025-07-09 LAB
CREAT UR-MCNC: 66.7 MG/DL
CREAT UR-MCNC: 67 MG/DL
MICROALBUMIN UR-MCNC: 79.7 MG/L
MICROALBUMIN/CREAT UR: 119.49 MG/G CR (ref 0–17)

## 2025-07-10 LAB
AMPHET UR CFM-MCNC: 1200 NG/ML
AMPHET/CREAT UR: 1791 NG/MG {CREAT}
BUPRENORPHINE UR CFM-MCNC: 7 NG/ML
BUPRENORPHINE/CREAT UR: 10 NG/MG {CREAT}
LORAZEPAM UR QL CFM: PRESENT
NALOXONE UR CFM-MCNC: 7 NG/ML
NALOXONE: 10 NG/MG {CREAT}
NORBUPRENORPHINE UR CFM-MCNC: 24 NG/ML
NORBUPRENORPHINE/CREAT UR: 36 NG/MG {CREAT}

## 2025-07-15 ENCOUNTER — HOSPITAL ENCOUNTER (EMERGENCY)
Facility: CLINIC | Age: 55
Discharge: HOME | End: 2025-07-16
Attending: EMERGENCY MEDICINE
Payer: COMMERCIAL

## 2025-07-15 DIAGNOSIS — G51.0 LEFT-SIDED BELL'S PALSY: ICD-10-CM

## 2025-07-15 PROCEDURE — 85004 AUTOMATED DIFF WBC COUNT: CPT | Performed by: EMERGENCY MEDICINE

## 2025-07-15 PROCEDURE — 99284 EMERGENCY DEPT VISIT MOD MDM: CPT | Performed by: EMERGENCY MEDICINE

## 2025-07-15 PROCEDURE — 36415 COLL VENOUS BLD VENIPUNCTURE: CPT | Performed by: EMERGENCY MEDICINE

## 2025-07-15 PROCEDURE — 84155 ASSAY OF PROTEIN SERUM: CPT | Performed by: EMERGENCY MEDICINE

## 2025-07-15 PROCEDURE — 86618 LYME DISEASE ANTIBODY: CPT | Performed by: EMERGENCY MEDICINE

## 2025-07-16 VITALS
TEMPERATURE: 98.2 F | SYSTOLIC BLOOD PRESSURE: 149 MMHG | HEART RATE: 73 BPM | RESPIRATION RATE: 14 BRPM | OXYGEN SATURATION: 96 % | DIASTOLIC BLOOD PRESSURE: 97 MMHG

## 2025-07-16 DIAGNOSIS — F90.9 ATTENTION DEFICIT HYPERACTIVITY DISORDER (ADHD), UNSPECIFIED ADHD TYPE: ICD-10-CM

## 2025-07-16 PROBLEM — G51.0 LEFT-SIDED BELL'S PALSY: Status: ACTIVE | Noted: 2025-07-16

## 2025-07-16 LAB
ALBUMIN SERPL BCG-MCNC: 4.2 G/DL (ref 3.5–5.2)
ALP SERPL-CCNC: 73 U/L (ref 40–150)
ALT SERPL W P-5'-P-CCNC: 40 U/L (ref 0–70)
ANION GAP SERPL CALCULATED.3IONS-SCNC: 9 MMOL/L (ref 7–15)
AST SERPL W P-5'-P-CCNC: 52 U/L (ref 0–45)
B BURGDOR IGG+IGM SER QL: 0.04
BASOPHILS # BLD AUTO: 0.1 10E3/UL (ref 0–0.2)
BASOPHILS NFR BLD AUTO: 1 %
BILIRUB SERPL-MCNC: 0.4 MG/DL
BUN SERPL-MCNC: 27.8 MG/DL (ref 6–20)
CALCIUM SERPL-MCNC: 9.8 MG/DL (ref 8.8–10.4)
CHLORIDE SERPL-SCNC: 96 MMOL/L (ref 98–107)
CREAT SERPL-MCNC: 2.9 MG/DL (ref 0.67–1.17)
EGFRCR SERPLBLD CKD-EPI 2021: 24 ML/MIN/1.73M2
EOSINOPHIL # BLD AUTO: 0.7 10E3/UL (ref 0–0.7)
EOSINOPHIL NFR BLD AUTO: 9 %
ERYTHROCYTE [DISTWIDTH] IN BLOOD BY AUTOMATED COUNT: 20.4 % (ref 10–15)
GLUCOSE SERPL-MCNC: 94 MG/DL (ref 70–99)
HCO3 SERPL-SCNC: 32 MMOL/L (ref 22–29)
HCT VFR BLD AUTO: 43.4 % (ref 40–53)
HGB BLD-MCNC: 12.5 G/DL (ref 13.3–17.7)
HOLD SPECIMEN: NORMAL
HOLD SPECIMEN: NORMAL
IMM GRANULOCYTES # BLD: 0 10E3/UL
IMM GRANULOCYTES NFR BLD: 0 %
LYMPHOCYTES # BLD AUTO: 1.6 10E3/UL (ref 0.8–5.3)
LYMPHOCYTES NFR BLD AUTO: 20 %
MCH RBC QN AUTO: 20.6 PG (ref 26.5–33)
MCHC RBC AUTO-ENTMCNC: 28.8 G/DL (ref 31.5–36.5)
MCV RBC AUTO: 71 FL (ref 78–100)
MONOCYTES # BLD AUTO: 0.9 10E3/UL (ref 0–1.3)
MONOCYTES NFR BLD AUTO: 11 %
NEUTROPHILS # BLD AUTO: 4.7 10E3/UL (ref 1.6–8.3)
NEUTROPHILS NFR BLD AUTO: 59 %
NRBC # BLD AUTO: 0 10E3/UL
NRBC BLD AUTO-RTO: 0 /100
PLATELET # BLD AUTO: 269 10E3/UL (ref 150–450)
POTASSIUM SERPL-SCNC: 4.2 MMOL/L (ref 3.4–5.3)
PROT SERPL-MCNC: 7 G/DL (ref 6.4–8.3)
RBC # BLD AUTO: 6.08 10E6/UL (ref 4.4–5.9)
SODIUM SERPL-SCNC: 137 MMOL/L (ref 135–145)
WBC # BLD AUTO: 7.9 10E3/UL (ref 4–11)

## 2025-07-16 PROCEDURE — 250N000012 HC RX MED GY IP 250 OP 636 PS 637: Performed by: EMERGENCY MEDICINE

## 2025-07-16 PROCEDURE — 250N000013 HC RX MED GY IP 250 OP 250 PS 637: Performed by: EMERGENCY MEDICINE

## 2025-07-16 RX ORDER — VALACYCLOVIR HYDROCHLORIDE 1 G/1
1000 TABLET, FILM COATED ORAL 2 TIMES DAILY
Qty: 14 TABLET | Refills: 0 | Status: SHIPPED | OUTPATIENT
Start: 2025-07-16

## 2025-07-16 RX ORDER — VALACYCLOVIR HYDROCHLORIDE 1 G/1
1000 TABLET, FILM COATED ORAL ONCE
Status: COMPLETED | OUTPATIENT
Start: 2025-07-16 | End: 2025-07-16

## 2025-07-16 RX ORDER — VALACYCLOVIR HYDROCHLORIDE 1 G/1
1000 TABLET, FILM COATED ORAL 2 TIMES DAILY
Qty: 14 TABLET | Refills: 0 | Status: SHIPPED | OUTPATIENT
Start: 2025-07-16 | End: 2025-07-16

## 2025-07-16 RX ORDER — PREDNISONE 20 MG/1
60 TABLET ORAL DAILY
Qty: 18 TABLET | Refills: 0 | Status: SHIPPED | OUTPATIENT
Start: 2025-07-16

## 2025-07-16 RX ORDER — PREDNISONE 20 MG/1
60 TABLET ORAL ONCE
Status: COMPLETED | OUTPATIENT
Start: 2025-07-16 | End: 2025-07-16

## 2025-07-16 RX ORDER — PREDNISONE 20 MG/1
60 TABLET ORAL DAILY
Qty: 18 TABLET | Refills: 0 | Status: SHIPPED | OUTPATIENT
Start: 2025-07-16 | End: 2025-07-16

## 2025-07-16 RX ADMIN — VALACYCLOVIR HYDROCHLORIDE 1000 MG: 1 TABLET, FILM COATED ORAL at 00:37

## 2025-07-16 RX ADMIN — PREDNISONE 60 MG: 20 TABLET ORAL at 00:37

## 2025-07-16 ASSESSMENT — ACTIVITIES OF DAILY LIVING (ADL): ADLS_ACUITY_SCORE: 54

## 2025-07-16 NOTE — ED TRIAGE NOTES
C/o of left sided facial droop that started 3 days ago. Pt states it all started with left ear ache and progressed. 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

## 2025-07-16 NOTE — ED NOTES
Bed: ED19  Expected date:   Expected time:   Means of arrival:   Comments:  Jeremi stroke symptoms

## 2025-07-16 NOTE — ED PROVIDER NOTES
Emergency Department Note      History of Present Illness     Chief Complaint   Facial Droop and Otalgia      HPI   Jeremi Damon is a 55 year old male with a history of hypertension, chronic kidney disease stage IV, coronary artery disease, and NSTEMI who presents to the ED for evaluation of facial droop and otalgia. Patient reports that a few days ago he developed a left sided facial droop that has progressively worsened, and he has had sharp left ear pain. He cannot raise his left eyebrow, and it is difficult to fully close his left eye. He has not lost sensation in his tongue or his ability to taste. Ibuprofen is not helping his pain. He takes his suboxone regularly.     Independent Historian   None    Review of External Notes   I reviewed  pain clinic note form 7/8/25. Patient seen for chronic neck pain.    Past Medical History     Medical History and Problem List   Hypertension  Depression  Chronic kidney disease stage IV  Hypogonadism  NSTEMI  Coronary artery disease  Benign prostatic hyperplasia  Opioid dependence  Basal cell carcinoma  Congestive heart failure    Medications   Elavil  Adderall  Aspirin 81 mg  Buspar  Coreg  Plavix  Proscar  Crestor  Depotestosterone    Surgical History   Appendectomy  L5-S1 fusion  Colonoscopy  Coronary angiogram x 2  CV PCI  Nephrectomy partial  Esophagogastroduodenoscopy  Cholecystectomy  Rhinoplasty-Septoplasty    Physical Exam     Patient Vitals for the past 24 hrs:   BP Temp Temp src Pulse Resp SpO2   07/16/25 0039 (!) 149/97 -- -- 73 -- --   07/16/25 0001 -- -- -- 73 11 96 %   07/15/25 2343 (!) 171/97 98.2  F (36.8  C) Temporal 88 18 97 %     Physical Exam  General: Does not appear in acute distress  Head: No signs of trauma.   Mouth/Throat: Oropharynx is clear and moist.   Eyes: Conjunctivae are normal. Difficulty fully closing left eye.    Ears:  Clear, no rash or sores noted.  Neck: Normal range of motion. No nuchal rigidity.   CV: Normal rate and regular  rhythm.    Resp: Effort normal and breath sounds normal. No respiratory distress.   GI: Soft. There is no tenderness.  No rebound or guarding.  Normal bowel sounds.    MSK: Normal range of motion. no edema. No Calf tenderness.  Neuro: Neuro Exam:  Mental status  Alertness: Alert  Orientation: Oriented to self, place, and time  Language: normal naming and normal fluency  Cranial nerves  CN II: acuity grossly intact, visual fields intact, and direct pupillary light response normal  CN III/IV/VI: EOMI and consensual pupillary light reflex normal  CN V: facial sensation intact to light touch throughout  CN VII: left-sided droop  CN VIII: finger rub normal  CN IX/X: palate & uvula symmetric  CN XI: equal shoulder shrug  CN XII: tongue midline  Motor  no drift and normal strength in all four extremities  Sensory  intact sensation to light touch distally in all 4 extremities and face  Coordination  Gait is normal  finger-nose-finger normal and rapid alternating movements normal    Skin: Skin is warm and dry. No rash noted.   Psych: normal mood and affect. behavior is normal.       Diagnostics     Lab Results   Labs Ordered and Resulted from Time of ED Arrival to Time of ED Departure   COMPREHENSIVE METABOLIC PANEL - Abnormal       Result Value    Sodium 137      Potassium 4.2      Carbon Dioxide (CO2) 32 (*)     Anion Gap 9      Urea Nitrogen 27.8 (*)     Creatinine 2.90 (*)     GFR Estimate 24 (*)     Calcium 9.8      Chloride 96 (*)     Glucose 94      Alkaline Phosphatase 73      AST 52 (*)     ALT 40      Protein Total 7.0      Albumin 4.2      Bilirubin Total 0.4     CBC WITH PLATELETS AND DIFFERENTIAL - Abnormal    WBC Count 7.9      RBC Count 6.08 (*)     Hemoglobin 12.5 (*)     Hematocrit 43.4      MCV 71 (*)     MCH 20.6 (*)     MCHC 28.8 (*)     RDW 20.4 (*)     Platelet Count 269      % Neutrophils 59      % Lymphocytes 20      % Monocytes 11      % Eosinophils 9      % Basophils 1      % Immature Granulocytes 0       NRBCs per 100 WBC 0      Absolute Neutrophils 4.7      Absolute Lymphocytes 1.6      Absolute Monocytes 0.9      Absolute Eosinophils 0.7      Absolute Basophils 0.1      Absolute Immature Granulocytes 0.0      Absolute NRBCs 0.0     LYME DISEASE TOTAL ANTIBODIES WITH REFLEX TO CONFIRMATION       Imaging   No orders to display     Independent Interpretation   None    ED Course      Medications Administered   Medications   predniSONE (DELTASONE) tablet 60 mg (60 mg Oral $Given 7/16/25 0037)   valACYclovir (VALTREX) tablet 1,000 mg (1,000 mg Oral $Given 7/16/25 0037)       Procedures   Procedures     Discussion of Management   None    ED Course   ED Course as of 07/16/25 0101   Wed Jul 16, 2025   0004 I obtained the history and performed the examination as described.    0045 I rechecked and updated the patient.         Additional Documentation  None    Medical Decision Making / Diagnosis     CMS Diagnoses: None    MIPS   None    [facility]   Jeremi Damon is a 55 year old male presents with left-sided facial droop.  He reports that the symptom started a few days ago and progressed.  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  He states that he waited a few days to come as the hospital had been busy, so he chose to come in tonight.  He did report some periauricular pain.  On my evaluation he did have a clear facial droop on the left side that included the forehead.  Patient did not have any other neurologic deficits throughout.  I did not see any signs of rash or findings of herpes.  I discussed that his symptoms are very classic for Bell's palsy.  I discussed doing an MRI to more definitively rule out a stroke, but the patient declined this.  I discussed doing a CT CTA to evaluate the blood vessels.  I discussed this is effective at evaluating the vessels, but it is less sensitive for stroke.  Patient also declined to have this done.  He states  he primarily wanted the steroids to reduce the inflammation for the Bell's palsy.  I did agree to give the steroids and I also felt it was reasonable to do the antivirals as the patient does have significant facial droop.  I did discuss that the symptoms can last longer than the course of her medications.  Patient does have a chronic kidney dysfunction, so I did adjust the dosing of the valacyclovir.  Patient was having some periauricular pain, which is common with Bell's palsy.  I discussed using Tylenol and ibuprofen for pain control along with anti-inflammatories.  I did not feel that opiates were indicated, particularly in the setting of a history of significant opiate use disorder that was brought on by prescription opiates.  I recommended he speak with his pain clinic to address further pain control options if needed.  I did send a Lyme titer given we are in an endemic area.  Patient is recommended follow-up closely in clinic for recheck and I did discuss return precautions such as numbness or weakness or other neurologic deficits outside of the face or any further concerns.  He was provided information regarding protecting his eye.    Disposition   The patient was discharged.     Diagnosis     ICD-10-CM    1. Left-sided Bell's palsy  G51.0            Discharge Medications   Current Discharge Medication List        START taking these medications    Details   predniSONE (DELTASONE) 20 MG tablet Take 3 tablets (60 mg) by mouth daily.  Qty: 18 tablet, Refills: 0      valACYclovir (VALTREX) 1000 mg tablet Take 1 tablet (1,000 mg) by mouth 2 times daily.  Qty: 14 tablet, Refills: 0               Scribe Disclosure:  I, Jad Franco, am serving as a scribe at 12:14 AM on 7/16/2025 to document services personally performed by Fab Matthews MD based on my observations and the provider's statements to me.        Fab Matthews MD  07/16/25 1122       Fab Matthews MD  07/16/25 7820

## 2025-07-17 RX ORDER — DEXTROAMPHETAMINE SACCHARATE, AMPHETAMINE ASPARTATE, DEXTROAMPHETAMINE SULFATE AND AMPHETAMINE SULFATE 5; 5; 5; 5 MG/1; MG/1; MG/1; MG/1
20 TABLET ORAL 2 TIMES DAILY
Qty: 60 TABLET | Refills: 0 | Status: SHIPPED | OUTPATIENT
Start: 2025-07-17

## 2025-07-17 RX ORDER — DEXTROAMPHETAMINE SACCHARATE, AMPHETAMINE ASPARTATE, DEXTROAMPHETAMINE SULFATE AND AMPHETAMINE SULFATE 5; 5; 5; 5 MG/1; MG/1; MG/1; MG/1
20 TABLET ORAL 2 TIMES DAILY
Qty: 60 TABLET | Refills: 0 | Status: SHIPPED | OUTPATIENT
Start: 2025-08-16

## 2025-07-19 DIAGNOSIS — G47.00 INSOMNIA, UNSPECIFIED TYPE: ICD-10-CM

## 2025-07-21 RX ORDER — AMITRIPTYLINE HYDROCHLORIDE 50 MG/1
150 TABLET ORAL AT BEDTIME
Qty: 270 TABLET | Refills: 1 | Status: SHIPPED | OUTPATIENT
Start: 2025-07-21

## 2025-07-27 DIAGNOSIS — J45.30 MILD PERSISTENT ASTHMA WITHOUT COMPLICATION: ICD-10-CM

## 2025-07-28 RX ORDER — ALBUTEROL SULFATE 90 UG/1
2 INHALANT RESPIRATORY (INHALATION) EVERY 6 HOURS
Qty: 36 G | Refills: 5 | Status: SHIPPED | OUTPATIENT
Start: 2025-07-28

## 2025-07-29 NOTE — TELEPHONE ENCOUNTER
Here for second Prevnar Vaccine.  Administered in Left Deltoid and told well.   "METOPROLOL TARTRATE 100 MG TAB   Last Written Prescription Date:  1/27/2020  Last Fill Quantity: 180,  # refills: 0   Last office visit: 8/29/2019 with prescribing provider:  Luis Armadno Segovia MD   Future Office Visit: NA  Next 5 appointments (look out 90 days)    May 06, 2020 11:20 AM CDT  Telephone Visit with Cleopatra Carreon MD  [facility] ([facility]) [address]  [address]  [facility] [address]  [phone]         AMLODIPINE BESYLATE 10 MG TAB   Last Written Prescription Date:  8/29/2019  Last Fill Quantity: 90,  # refills: 1   Last office visit: 8/29/2019 with prescribing provider:  Luis Armando Segovia MD   Future Office Visit: NA  Next 5 appointments (look out 90 days)    May 06, 2020 11:20 AM CDT  Telephone Visit with Cleopatra Carreon MD  [facility] ([facility]) [address]  [address]  [facility] [address]  [phone]           Requested Prescriptions   Pending Prescriptions Disp Refills     metoprolol tartrate (LOPRESSOR) 100 MG tablet [Pharmacy Med Name: METOPROLOL TARTRATE 100 MG TAB] 180 tablet 1     Sig: TAKE 1 TABLET BY MOUTH TWICE A DAY       Beta-Blockers Protocol Failed - 3/26/2020  3:48 AM        Failed - Blood pressure under 140/90 in past 12 months     BP Readings from Last 3 Encounters:   02/04/20 (!) 173/97   01/22/20 (!) 142/72   12/20/19 (!) 176/108                 Passed - Patient is age 6 or older        Passed - Recent (12 mo) or future (30 days) visit within the authorizing provider's specialty     Patient has had an office visit with the authorizing provider or a provider within the authorizing providers department within the previous 12 mos or has a future within next 30 days. See \"Patient Info\" tab in inbasket, or \"Choose Columns\" in Meds & Orders section of the refill encounter.              Passed - Medication is active on med list           amLODIPine (NORVASC) " "10 MG tablet [Pharmacy Med Name: AMLODIPINE BESYLATE 10 MG TAB] 90 tablet 1     Sig: TAKE 1 TABLET BY MOUTH EVERY DAY       Calcium Channel Blockers Protocol  Failed - 3/26/2020  3:48 AM        Failed - Blood pressure under 140/90 in past 12 months     BP Readings from Last 3 Encounters:   02/04/20 (!) 173/97   01/22/20 (!) 142/72   12/20/19 (!) 176/108                 Failed - Normal serum creatinine on file in past 12 months     Recent Labs   Lab Test 08/29/19  1416   CR 1.49*       Ok to refill medication if creatinine is low          Passed - Recent (12 mo) or future (30 days) visit within the authorizing provider's specialty     Patient has had an office visit with the authorizing provider or a provider within the authorizing providers department within the previous 12 mos or has a future within next 30 days. See \"Patient Info\" tab in inbasket, or \"Choose Columns\" in Meds & Orders section of the refill encounter.              Passed - Medication is active on med list        Passed - Patient is age 18 or older             "

## (undated) DEVICE — INTRO GLIDESHEATH SLENDER 6FR 10X45CM 60-1060

## (undated) DEVICE — DEFIB PRO-PADZ LVP LQD GEL ADULT 8900-2105-01

## (undated) DEVICE — CATH DIAGNOSTIC RADIAL 5FR TIG 4.0

## (undated) DEVICE — LINEN TOWEL PACK X5 5464

## (undated) DEVICE — KIT HAND CONTROL ANGIOTOUCH ACIST 65CM AT-P65

## (undated) DEVICE — CATH LAUNCHER 6FR EBU 3.5 LA6EBU35

## (undated) DEVICE — TUBING CONMED AIRSEAL SMOKE EVAC INSUFFLATION ASM-EVAC

## (undated) DEVICE — DRAPE MAYO STAND 23X54 8337

## (undated) DEVICE — SYR ANGIOGRAPHY MULTIUSE KIT ACIST 014612

## (undated) DEVICE — TOTE ANGIO CORP PC15AT SAN32CC83O

## (undated) DEVICE — DAVINCI XI DRAPE COLUMN 470341

## (undated) DEVICE — NDL INSUFFLATION 13GA 120MM C2201

## (undated) DEVICE — ELECTRODE MEDITRACE MULT FUNC AED ADULT 20770

## (undated) DEVICE — MANIFOLD KIT ANGIO AUTOMATED 014613

## (undated) DEVICE — GLOVE BIOGEL PI MICRO SZ 8.0 48580

## (undated) DEVICE — BLADE KNIFE SURG 10 371110

## (undated) DEVICE — DAVINCI HOT SHEARS TIP COVER  400180

## (undated) DEVICE — SU VICRYL 0 TIE 12X18" J906G

## (undated) DEVICE — CLIP ENDO HEMO-LOC PURPLE LG 544240

## (undated) DEVICE — TAPE DURAPORE 3" SILK 1538-3

## (undated) DEVICE — CATH TRAY FOLEY COUDE SURESTEP 16FR W/URNE MTR STLK A304716A

## (undated) DEVICE — CATH BALLOON NC EMERGE 3.50X12MM H7493926712350

## (undated) DEVICE — SU PDS II 0 CT-2 27" Z334H

## (undated) DEVICE — DAVINCI XI MONOPOLAR SCISSORS HOT SHEARS 8MM 470179

## (undated) DEVICE — ESU GROUND PAD ADULT W/CORD E7507

## (undated) DEVICE — SOL NACL 0.9% INJ 1000ML BAG 2B1324X

## (undated) DEVICE — SLEEVE TR BAND RADIAL COMPRESSION DEVICE 29CM XX-RF06L

## (undated) DEVICE — SU MONOCRYL 4-0 PS-2 18" UND Y496G

## (undated) DEVICE — DRAIN JACKSON PRATT CHANNEL 19FR ROUND HUBLESS SIL JP-2230

## (undated) DEVICE — DAVINCI XI SEAL UNIVERSAL 5-8MM 470361

## (undated) DEVICE — INFL DVC BASIXCOMPAK PLYCRB 30 ATM 13IN 20ML IN4530

## (undated) DEVICE — CATH RX TAKERU PTCA BALLOON 2.00MM X 15MM

## (undated) DEVICE — DRAIN JACKSON PRATT RESERVOIR 100ML SU130-1305

## (undated) DEVICE — GUIDEWIRE VASC 0.014INX180CM RUNTHROUGH 25-1011

## (undated) DEVICE — SUCTION IRR STRYKERFLOW II W/TIP 250-070-520

## (undated) DEVICE — GLOVE BIOGEL PI MICRO INDICATOR UNDERGLOVE SZ 8.0 48980

## (undated) DEVICE — DAVINCI XI DRAPE ARM 470015

## (undated) DEVICE — SOL NACL 0.9% IRRIG 1000ML BOTTLE 2F7124

## (undated) DEVICE — ENDO POUCH UNIV RETRIEVAL SYSTEM INZII 10MM CD001

## (undated) DEVICE — SLEEVE TR BAND RADIAL COMPRESSION DEVICE 24CM TRB24-REG

## (undated) DEVICE — SU DERMABOND ADVANCED .7ML DNX12

## (undated) DEVICE — ANTIFOG SOLUTION SEE SHARP 150M TROCAR SWABS 30978

## (undated) DEVICE — SOL WATER IRRIG 1000ML BOTTLE 2F7114

## (undated) DEVICE — DAVINCI XI GRASPER ENDOWRIST PROGRASP 470093

## (undated) DEVICE — CATH BALLOON EMERGE 2.5X15MM H7493918915250

## (undated) DEVICE — PACK DAVINCI UROLOGY SBA15UDFSG

## (undated) DEVICE — SU VICRYL 2-0 CT-2 27" UND J269H

## (undated) DEVICE — ENDO OBTURATOR ACCESS PORT BLADELESS 12X150MM IAS12-150

## (undated) DEVICE — CATH JACKY 5FR 3.5 CURVE 40-5023

## (undated) DEVICE — SU VICRYL 0 CT-1 27" J340H

## (undated) DEVICE — DAVINCI XI NDL DRIVER LARGE 470006

## (undated) DEVICE — WIRE GUIDE 0.035"X260CM SAFE-T-J EXCHANGE G00517

## (undated) RX ORDER — HEPARIN SODIUM 200 [USP'U]/100ML
INJECTION, SOLUTION INTRAVENOUS
Status: DISPENSED
Start: 2020-11-30

## (undated) RX ORDER — FENTANYL CITRATE 50 UG/ML
INJECTION, SOLUTION INTRAMUSCULAR; INTRAVENOUS
Status: DISPENSED
Start: 2020-11-30

## (undated) RX ORDER — HYDROMORPHONE HCL IN WATER/PF 6 MG/30 ML
PATIENT CONTROLLED ANALGESIA SYRINGE INTRAVENOUS
Status: DISPENSED
Start: 2024-01-08

## (undated) RX ORDER — FENTANYL CITRATE 50 UG/ML
INJECTION, SOLUTION INTRAMUSCULAR; INTRAVENOUS
Status: DISPENSED
Start: 2022-11-23

## (undated) RX ORDER — LIDOCAINE HYDROCHLORIDE 10 MG/ML
INJECTION, SOLUTION EPIDURAL; INFILTRATION; INTRACAUDAL; PERINEURAL
Status: DISPENSED
Start: 2022-11-23

## (undated) RX ORDER — HYDROMORPHONE HYDROCHLORIDE 1 MG/ML
INJECTION, SOLUTION INTRAMUSCULAR; INTRAVENOUS; SUBCUTANEOUS
Status: DISPENSED
Start: 2024-01-08

## (undated) RX ORDER — HEPARIN SODIUM 1000 [USP'U]/ML
INJECTION, SOLUTION INTRAVENOUS; SUBCUTANEOUS
Status: DISPENSED
Start: 2020-11-30

## (undated) RX ORDER — EPHEDRINE SULFATE 50 MG/ML
INJECTION, SOLUTION INTRAMUSCULAR; INTRAVENOUS; SUBCUTANEOUS
Status: DISPENSED
Start: 2024-01-08

## (undated) RX ORDER — DEXAMETHASONE SODIUM PHOSPHATE 4 MG/ML
INJECTION, SOLUTION INTRA-ARTICULAR; INTRALESIONAL; INTRAMUSCULAR; INTRAVENOUS; SOFT TISSUE
Status: DISPENSED
Start: 2024-01-08

## (undated) RX ORDER — HEPARIN SODIUM 1000 [USP'U]/ML
INJECTION, SOLUTION INTRAVENOUS; SUBCUTANEOUS
Status: DISPENSED
Start: 2022-11-23

## (undated) RX ORDER — BUPIVACAINE HYDROCHLORIDE 2.5 MG/ML
INJECTION, SOLUTION EPIDURAL; INFILTRATION; INTRACAUDAL
Status: DISPENSED
Start: 2024-01-08

## (undated) RX ORDER — GLYCOPYRROLATE 0.2 MG/ML
INJECTION, SOLUTION INTRAMUSCULAR; INTRAVENOUS
Status: DISPENSED
Start: 2024-01-08

## (undated) RX ORDER — PROPOFOL 10 MG/ML
INJECTION, EMULSION INTRAVENOUS
Status: DISPENSED
Start: 2024-01-08

## (undated) RX ORDER — HEPARIN SODIUM 200 [USP'U]/100ML
INJECTION, SOLUTION INTRAVENOUS
Status: DISPENSED
Start: 2022-11-23

## (undated) RX ORDER — FENTANYL CITRATE 50 UG/ML
INJECTION, SOLUTION INTRAMUSCULAR; INTRAVENOUS
Status: DISPENSED
Start: 2024-01-08

## (undated) RX ORDER — ONDANSETRON 2 MG/ML
INJECTION INTRAMUSCULAR; INTRAVENOUS
Status: DISPENSED
Start: 2024-01-08

## (undated) RX ORDER — CLOPIDOGREL 300 MG/1
TABLET, FILM COATED ORAL
Status: DISPENSED
Start: 2020-11-30

## (undated) RX ORDER — CLOPIDOGREL 300 MG/1
TABLET, FILM COATED ORAL
Status: DISPENSED
Start: 2022-11-23

## (undated) RX ORDER — LIDOCAINE HYDROCHLORIDE 10 MG/ML
INJECTION, SOLUTION EPIDURAL; INFILTRATION; INTRACAUDAL; PERINEURAL
Status: DISPENSED
Start: 2020-11-30

## (undated) RX ORDER — VERAPAMIL HYDROCHLORIDE 2.5 MG/ML
INJECTION, SOLUTION INTRAVENOUS
Status: DISPENSED
Start: 2022-11-23

## (undated) RX ORDER — NITROGLYCERIN 5 MG/ML
VIAL (ML) INTRAVENOUS
Status: DISPENSED
Start: 2022-11-23